# Patient Record
Sex: MALE | Race: WHITE | Employment: OTHER | URBAN - METROPOLITAN AREA
[De-identification: names, ages, dates, MRNs, and addresses within clinical notes are randomized per-mention and may not be internally consistent; named-entity substitution may affect disease eponyms.]

---

## 2017-03-29 DIAGNOSIS — E78.2 MIXED HYPERLIPIDEMIA: ICD-10-CM

## 2017-03-30 RX ORDER — SIMVASTATIN 40 MG
TABLET ORAL
Qty: 90 TABLET | Refills: 1 | Status: SHIPPED | OUTPATIENT
Start: 2017-03-30 | End: 2018-03-27 | Stop reason: SDUPTHER

## 2017-04-23 DIAGNOSIS — I10 ESSENTIAL HYPERTENSION, BENIGN: ICD-10-CM

## 2017-04-25 RX ORDER — ATENOLOL 100 MG/1
TABLET ORAL
Qty: 90 TABLET | Refills: 1 | Status: ON HOLD | OUTPATIENT
Start: 2017-04-25 | End: 2019-01-01

## 2017-05-01 ENCOUNTER — OFFICE VISIT (OUTPATIENT)
Dept: FAMILY MEDICINE CLINIC | Age: 72
End: 2017-05-01

## 2017-05-01 VITALS
SYSTOLIC BLOOD PRESSURE: 178 MMHG | DIASTOLIC BLOOD PRESSURE: 84 MMHG | HEART RATE: 60 BPM | WEIGHT: 198 LBS | BODY MASS INDEX: 29.24 KG/M2

## 2017-05-01 DIAGNOSIS — E78.2 MIXED HYPERLIPIDEMIA: ICD-10-CM

## 2017-05-01 DIAGNOSIS — R97.20 ELEVATED PSA: ICD-10-CM

## 2017-05-01 DIAGNOSIS — I25.10 CORONARY ARTERY DISEASE INVOLVING NATIVE CORONARY ARTERY OF NATIVE HEART WITHOUT ANGINA PECTORIS: ICD-10-CM

## 2017-05-01 DIAGNOSIS — I73.9 PAD (PERIPHERAL ARTERY DISEASE) (HCC): ICD-10-CM

## 2017-05-01 DIAGNOSIS — I10 ESSENTIAL HYPERTENSION: Primary | ICD-10-CM

## 2017-05-01 DIAGNOSIS — Z11.59 NEED FOR HEPATITIS C SCREENING TEST: ICD-10-CM

## 2017-05-01 DIAGNOSIS — I73.9 PERIPHERAL VASCULAR DISEASE, UNSPECIFIED (HCC): ICD-10-CM

## 2017-05-01 DIAGNOSIS — R73.01 ELEVATED FASTING GLUCOSE: ICD-10-CM

## 2017-05-01 LAB
A/G RATIO: 1.7 (ref 1.1–2.2)
ALBUMIN SERPL-MCNC: 4.5 G/DL (ref 3.4–5)
ALP BLD-CCNC: 70 U/L (ref 40–129)
ALT SERPL-CCNC: 35 U/L (ref 10–40)
ANION GAP SERPL CALCULATED.3IONS-SCNC: 15 MMOL/L (ref 3–16)
AST SERPL-CCNC: 26 U/L (ref 15–37)
BASOPHILS ABSOLUTE: 0.1 K/UL (ref 0–0.2)
BASOPHILS RELATIVE PERCENT: 1.1 %
BILIRUB SERPL-MCNC: 1.1 MG/DL (ref 0–1)
BUN BLDV-MCNC: 14 MG/DL (ref 7–20)
CALCIUM SERPL-MCNC: 9.5 MG/DL (ref 8.3–10.6)
CHLORIDE BLD-SCNC: 101 MMOL/L (ref 99–110)
CHOLESTEROL, TOTAL: 157 MG/DL (ref 0–199)
CO2: 24 MMOL/L (ref 21–32)
CREAT SERPL-MCNC: 0.9 MG/DL (ref 0.8–1.3)
EOSINOPHILS ABSOLUTE: 0.3 K/UL (ref 0–0.6)
EOSINOPHILS RELATIVE PERCENT: 4.1 %
GFR AFRICAN AMERICAN: >60
GFR NON-AFRICAN AMERICAN: >60
GLOBULIN: 2.6 G/DL
GLUCOSE BLD-MCNC: 106 MG/DL (ref 70–99)
HCT VFR BLD CALC: 46.4 % (ref 40.5–52.5)
HDLC SERPL-MCNC: 47 MG/DL (ref 40–60)
HEMOGLOBIN: 15.4 G/DL (ref 13.5–17.5)
HEPATITIS C ANTIBODY INTERPRETATION: NORMAL
LDL CHOLESTEROL CALCULATED: 75 MG/DL
LYMPHOCYTES ABSOLUTE: 2.1 K/UL (ref 1–5.1)
LYMPHOCYTES RELATIVE PERCENT: 29.2 %
MCH RBC QN AUTO: 30.3 PG (ref 26–34)
MCHC RBC AUTO-ENTMCNC: 33.3 G/DL (ref 31–36)
MCV RBC AUTO: 91 FL (ref 80–100)
MONOCYTES ABSOLUTE: 0.7 K/UL (ref 0–1.3)
MONOCYTES RELATIVE PERCENT: 9.6 %
NEUTROPHILS ABSOLUTE: 4 K/UL (ref 1.7–7.7)
NEUTROPHILS RELATIVE PERCENT: 56 %
PDW BLD-RTO: 13.9 % (ref 12.4–15.4)
PLATELET # BLD: 141 K/UL (ref 135–450)
PMV BLD AUTO: 9.4 FL (ref 5–10.5)
POTASSIUM SERPL-SCNC: 4.1 MMOL/L (ref 3.5–5.1)
PROSTATE SPECIFIC ANTIGEN: 6.96 NG/ML (ref 0–4)
RBC # BLD: 5.1 M/UL (ref 4.2–5.9)
SODIUM BLD-SCNC: 140 MMOL/L (ref 136–145)
TOTAL PROTEIN: 7.1 G/DL (ref 6.4–8.2)
TRIGL SERPL-MCNC: 173 MG/DL (ref 0–150)
TSH SERPL DL<=0.05 MIU/L-ACNC: 1.76 UIU/ML (ref 0.27–4.2)
VLDLC SERPL CALC-MCNC: 35 MG/DL
WBC # BLD: 7.1 K/UL (ref 4–11)

## 2017-05-01 PROCEDURE — G8598 ASA/ANTIPLAT THER USED: HCPCS | Performed by: FAMILY MEDICINE

## 2017-05-01 PROCEDURE — 1123F ACP DISCUSS/DSCN MKR DOCD: CPT | Performed by: FAMILY MEDICINE

## 2017-05-01 PROCEDURE — 1036F TOBACCO NON-USER: CPT | Performed by: FAMILY MEDICINE

## 2017-05-01 PROCEDURE — G8420 CALC BMI NORM PARAMETERS: HCPCS | Performed by: FAMILY MEDICINE

## 2017-05-01 PROCEDURE — 3017F COLORECTAL CA SCREEN DOC REV: CPT | Performed by: FAMILY MEDICINE

## 2017-05-01 PROCEDURE — 36415 COLL VENOUS BLD VENIPUNCTURE: CPT | Performed by: FAMILY MEDICINE

## 2017-05-01 PROCEDURE — G8427 DOCREV CUR MEDS BY ELIG CLIN: HCPCS | Performed by: FAMILY MEDICINE

## 2017-05-01 PROCEDURE — 99214 OFFICE O/P EST MOD 30 MIN: CPT | Performed by: FAMILY MEDICINE

## 2017-05-01 PROCEDURE — 4040F PNEUMOC VAC/ADMIN/RCVD: CPT | Performed by: FAMILY MEDICINE

## 2017-05-01 RX ORDER — VALSARTAN 320 MG/1
320 TABLET ORAL DAILY
Qty: 30 TABLET | Refills: 1 | Status: SHIPPED | OUTPATIENT
Start: 2017-05-01 | End: 2017-06-30 | Stop reason: SDUPTHER

## 2017-05-01 ASSESSMENT — ENCOUNTER SYMPTOMS
ABDOMINAL PAIN: 0
COUGH: 0
NAUSEA: 0
DIARRHEA: 0
EYE PAIN: 0
SHORTNESS OF BREATH: 0

## 2017-05-02 LAB
ESTIMATED AVERAGE GLUCOSE: 122.6 MG/DL
HBA1C MFR BLD: 5.9 %

## 2017-06-30 DIAGNOSIS — I10 ESSENTIAL HYPERTENSION: ICD-10-CM

## 2017-07-03 RX ORDER — VALSARTAN 320 MG/1
320 TABLET ORAL DAILY
Qty: 90 TABLET | Refills: 1 | Status: SHIPPED | OUTPATIENT
Start: 2017-07-03 | End: 2017-11-21 | Stop reason: SDUPTHER

## 2017-11-21 ENCOUNTER — OFFICE VISIT (OUTPATIENT)
Dept: FAMILY MEDICINE CLINIC | Age: 72
End: 2017-11-21

## 2017-11-21 VITALS
HEART RATE: 60 BPM | SYSTOLIC BLOOD PRESSURE: 174 MMHG | BODY MASS INDEX: 31.45 KG/M2 | DIASTOLIC BLOOD PRESSURE: 84 MMHG | WEIGHT: 213 LBS

## 2017-11-21 DIAGNOSIS — I25.10 CORONARY ARTERY DISEASE INVOLVING NATIVE CORONARY ARTERY OF NATIVE HEART WITHOUT ANGINA PECTORIS: ICD-10-CM

## 2017-11-21 DIAGNOSIS — I10 ESSENTIAL HYPERTENSION: Primary | ICD-10-CM

## 2017-11-21 DIAGNOSIS — R97.20 ELEVATED PSA: ICD-10-CM

## 2017-11-21 DIAGNOSIS — L30.9 DERMATITIS: ICD-10-CM

## 2017-11-21 DIAGNOSIS — Z79.899 LONG TERM CURRENT USE OF AMIODARONE: ICD-10-CM

## 2017-11-21 DIAGNOSIS — Z51.81 MEDICATION MONITORING ENCOUNTER: ICD-10-CM

## 2017-11-21 DIAGNOSIS — H61.21 OBSTRUCTION OF VENTILATION TUBE OF RIGHT EAR BY CERUMEN: ICD-10-CM

## 2017-11-21 DIAGNOSIS — R73.01 ELEVATED FASTING GLUCOSE: ICD-10-CM

## 2017-11-21 DIAGNOSIS — I73.9 PAD (PERIPHERAL ARTERY DISEASE) (HCC): ICD-10-CM

## 2017-11-21 DIAGNOSIS — E78.2 MIXED HYPERLIPIDEMIA: ICD-10-CM

## 2017-11-21 LAB
A/G RATIO: 1.5 (ref 1.1–2.2)
ALBUMIN SERPL-MCNC: 4.3 G/DL (ref 3.4–5)
ALP BLD-CCNC: 84 U/L (ref 40–129)
ALT SERPL-CCNC: 33 U/L (ref 10–40)
ANION GAP SERPL CALCULATED.3IONS-SCNC: 15 MMOL/L (ref 3–16)
AST SERPL-CCNC: 29 U/L (ref 15–37)
BILIRUB SERPL-MCNC: 1.1 MG/DL (ref 0–1)
BUN BLDV-MCNC: 14 MG/DL (ref 7–20)
CALCIUM SERPL-MCNC: 9.1 MG/DL (ref 8.3–10.6)
CHLORIDE BLD-SCNC: 99 MMOL/L (ref 99–110)
CHOLESTEROL, TOTAL: 180 MG/DL (ref 0–199)
CO2: 27 MMOL/L (ref 21–32)
CREAT SERPL-MCNC: 1 MG/DL (ref 0.8–1.3)
GFR AFRICAN AMERICAN: >60
GFR NON-AFRICAN AMERICAN: >60
GLOBULIN: 2.9 G/DL
GLUCOSE BLD-MCNC: 108 MG/DL (ref 70–99)
HDLC SERPL-MCNC: 46 MG/DL (ref 40–60)
LDL CHOLESTEROL CALCULATED: 97 MG/DL
POTASSIUM SERPL-SCNC: 4.1 MMOL/L (ref 3.5–5.1)
PROSTATE SPECIFIC ANTIGEN: 7.03 NG/ML (ref 0–4)
SODIUM BLD-SCNC: 141 MMOL/L (ref 136–145)
T4 FREE: 1.5 NG/DL (ref 0.9–1.8)
TOTAL PROTEIN: 7.2 G/DL (ref 6.4–8.2)
TRIGL SERPL-MCNC: 183 MG/DL (ref 0–150)
TSH SERPL DL<=0.05 MIU/L-ACNC: 1.65 UIU/ML (ref 0.27–4.2)
VLDLC SERPL CALC-MCNC: 37 MG/DL

## 2017-11-21 PROCEDURE — 3017F COLORECTAL CA SCREEN DOC REV: CPT | Performed by: FAMILY MEDICINE

## 2017-11-21 PROCEDURE — 99214 OFFICE O/P EST MOD 30 MIN: CPT | Performed by: FAMILY MEDICINE

## 2017-11-21 PROCEDURE — G8419 CALC BMI OUT NRM PARAM NOF/U: HCPCS | Performed by: FAMILY MEDICINE

## 2017-11-21 PROCEDURE — 4040F PNEUMOC VAC/ADMIN/RCVD: CPT | Performed by: FAMILY MEDICINE

## 2017-11-21 PROCEDURE — 4004F PT TOBACCO SCREEN RCVD TLK: CPT | Performed by: FAMILY MEDICINE

## 2017-11-21 PROCEDURE — G8427 DOCREV CUR MEDS BY ELIG CLIN: HCPCS | Performed by: FAMILY MEDICINE

## 2017-11-21 PROCEDURE — 1123F ACP DISCUSS/DSCN MKR DOCD: CPT | Performed by: FAMILY MEDICINE

## 2017-11-21 PROCEDURE — 36415 COLL VENOUS BLD VENIPUNCTURE: CPT | Performed by: FAMILY MEDICINE

## 2017-11-21 PROCEDURE — G8598 ASA/ANTIPLAT THER USED: HCPCS | Performed by: FAMILY MEDICINE

## 2017-11-21 PROCEDURE — G8484 FLU IMMUNIZE NO ADMIN: HCPCS | Performed by: FAMILY MEDICINE

## 2017-11-21 RX ORDER — VALSARTAN 320 MG/1
320 TABLET ORAL DAILY
Qty: 90 TABLET | Refills: 1 | Status: ON HOLD | OUTPATIENT
Start: 2017-11-21 | End: 2019-01-01 | Stop reason: ALTCHOICE

## 2017-11-21 RX ORDER — MUPIROCIN CALCIUM 20 MG/G
CREAM TOPICAL
Qty: 15 G | Refills: 1 | Status: SHIPPED | OUTPATIENT
Start: 2017-11-21 | End: 2017-12-21

## 2017-11-22 LAB
ESTIMATED AVERAGE GLUCOSE: 119.8 MG/DL
HBA1C MFR BLD: 5.8 %

## 2017-11-22 ASSESSMENT — ENCOUNTER SYMPTOMS
SHORTNESS OF BREATH: 0
DIARRHEA: 0
ABDOMINAL PAIN: 0
NAUSEA: 0
COUGH: 0
EYE PAIN: 0

## 2017-12-19 ENCOUNTER — OFFICE VISIT (OUTPATIENT)
Dept: FAMILY MEDICINE CLINIC | Age: 72
End: 2017-12-19

## 2017-12-19 DIAGNOSIS — J01.90 ACUTE NON-RECURRENT SINUSITIS, UNSPECIFIED LOCATION: Primary | ICD-10-CM

## 2017-12-19 PROCEDURE — G8419 CALC BMI OUT NRM PARAM NOF/U: HCPCS | Performed by: FAMILY MEDICINE

## 2017-12-19 PROCEDURE — 3017F COLORECTAL CA SCREEN DOC REV: CPT | Performed by: FAMILY MEDICINE

## 2017-12-19 PROCEDURE — 1123F ACP DISCUSS/DSCN MKR DOCD: CPT | Performed by: FAMILY MEDICINE

## 2017-12-19 PROCEDURE — G8598 ASA/ANTIPLAT THER USED: HCPCS | Performed by: FAMILY MEDICINE

## 2017-12-19 PROCEDURE — 4040F PNEUMOC VAC/ADMIN/RCVD: CPT | Performed by: FAMILY MEDICINE

## 2017-12-19 PROCEDURE — G8427 DOCREV CUR MEDS BY ELIG CLIN: HCPCS | Performed by: FAMILY MEDICINE

## 2017-12-19 PROCEDURE — 99213 OFFICE O/P EST LOW 20 MIN: CPT | Performed by: FAMILY MEDICINE

## 2017-12-19 PROCEDURE — G8484 FLU IMMUNIZE NO ADMIN: HCPCS | Performed by: FAMILY MEDICINE

## 2017-12-19 PROCEDURE — 4004F PT TOBACCO SCREEN RCVD TLK: CPT | Performed by: FAMILY MEDICINE

## 2017-12-19 RX ORDER — CEPHALEXIN 500 MG/1
500 CAPSULE ORAL 3 TIMES DAILY
Qty: 30 CAPSULE | Refills: 0 | Status: SHIPPED | OUTPATIENT
Start: 2017-12-19 | End: 2017-12-29

## 2017-12-21 VITALS
BODY MASS INDEX: 29.53 KG/M2 | DIASTOLIC BLOOD PRESSURE: 82 MMHG | TEMPERATURE: 99.2 F | WEIGHT: 200 LBS | SYSTOLIC BLOOD PRESSURE: 154 MMHG

## 2017-12-21 ASSESSMENT — ENCOUNTER SYMPTOMS
COUGH: 1
WHEEZING: 1
ABDOMINAL PAIN: 0
SHORTNESS OF BREATH: 0
SINUS PRESSURE: 1

## 2017-12-21 NOTE — PATIENT INSTRUCTIONS
Advised supportive treatment as discussed, including consideration of Mucinex DM. Advise you start antibiotic only if new or worsening symptoms or if no definite improvement to your symptoms after 10-14 days. Call or return if any questions.

## 2018-01-01 ENCOUNTER — HOSPITAL ENCOUNTER (OUTPATIENT)
Dept: PHYSICAL THERAPY | Age: 73
Setting detail: THERAPIES SERIES
Discharge: HOME | End: 2018-07-24
Payer: MEDICARE

## 2018-01-01 ENCOUNTER — HOSPITAL ENCOUNTER (OUTPATIENT)
Dept: OTHER | Age: 73
Discharge: HOME | End: 2018-05-11
Attending: FAMILY MEDICINE | Admitting: FAMILY MEDICINE

## 2018-01-01 ENCOUNTER — NURSE ONLY (OUTPATIENT)
Dept: FAMILY MEDICINE CLINIC | Age: 73
End: 2018-01-01
Payer: MEDICARE

## 2018-01-01 ENCOUNTER — HOSPITAL ENCOUNTER (OUTPATIENT)
Dept: GENERAL RADIOLOGY | Age: 73
Discharge: HOME | End: 2018-12-04
Payer: MEDICARE

## 2018-01-01 ENCOUNTER — HOSPITAL ENCOUNTER (OUTPATIENT)
Dept: NUCLEAR MEDICINE | Age: 73
Discharge: HOME | End: 2018-12-10
Payer: MEDICARE

## 2018-01-01 ENCOUNTER — OFFICE VISIT (OUTPATIENT)
Dept: ORTHOPEDIC SURGERY | Age: 73
End: 2018-01-01
Payer: MEDICARE

## 2018-01-01 ENCOUNTER — HOSPITAL ENCOUNTER (OUTPATIENT)
Dept: PHYSICAL THERAPY | Age: 73
Discharge: HOME | End: 2018-07-04
Admitting: FAMILY MEDICINE

## 2018-01-01 ENCOUNTER — OFFICE VISIT (OUTPATIENT)
Dept: FAMILY MEDICINE CLINIC | Age: 73
End: 2018-01-01

## 2018-01-01 ENCOUNTER — OFFICE VISIT (OUTPATIENT)
Dept: ORTHOPEDIC SURGERY | Age: 73
End: 2018-01-01

## 2018-01-01 ENCOUNTER — HOSPITAL ENCOUNTER (OUTPATIENT)
Dept: PHYSICAL THERAPY | Age: 73
Setting detail: THERAPIES SERIES
Discharge: HOME | End: 2018-11-19
Payer: MEDICARE

## 2018-01-01 ENCOUNTER — HOSPITAL ENCOUNTER (OUTPATIENT)
Dept: PHYSICAL THERAPY | Age: 73
Setting detail: THERAPIES SERIES
Discharge: HOME | End: 2018-11-02
Payer: MEDICARE

## 2018-01-01 ENCOUNTER — HOSPITAL ENCOUNTER (OUTPATIENT)
Dept: PHYSICAL THERAPY | Age: 73
Setting detail: THERAPIES SERIES
Discharge: HOME | End: 2018-11-05
Payer: MEDICARE

## 2018-01-01 ENCOUNTER — HOSPITAL ENCOUNTER (OUTPATIENT)
Dept: PHYSICAL THERAPY | Age: 73
Discharge: HOME | End: 2018-06-27
Admitting: FAMILY MEDICINE

## 2018-01-01 ENCOUNTER — TELEPHONE (OUTPATIENT)
Dept: FAMILY MEDICINE CLINIC | Age: 73
End: 2018-01-01

## 2018-01-01 ENCOUNTER — HOSPITAL ENCOUNTER (OUTPATIENT)
Dept: PHYSICAL THERAPY | Age: 73
Setting detail: THERAPIES SERIES
Discharge: HOME | End: 2018-10-31
Payer: MEDICARE

## 2018-01-01 ENCOUNTER — HOSPITAL ENCOUNTER (OUTPATIENT)
Dept: PHYSICAL THERAPY | Age: 73
Discharge: HOME | End: 2018-07-01
Attending: FAMILY MEDICINE | Admitting: FAMILY MEDICINE

## 2018-01-01 ENCOUNTER — OFFICE VISIT (OUTPATIENT)
Dept: FAMILY MEDICINE CLINIC | Age: 73
End: 2018-01-01
Payer: MEDICARE

## 2018-01-01 ENCOUNTER — HOSPITAL ENCOUNTER (OUTPATIENT)
Dept: PHYSICAL THERAPY | Age: 73
Discharge: HOME | End: 2018-07-06
Admitting: FAMILY MEDICINE

## 2018-01-01 ENCOUNTER — HOSPITAL ENCOUNTER (OUTPATIENT)
Dept: PHYSICAL THERAPY | Age: 73
Discharge: HOME | End: 2018-06-29
Admitting: FAMILY MEDICINE

## 2018-01-01 ENCOUNTER — HOSPITAL ENCOUNTER (OUTPATIENT)
Dept: PHYSICAL THERAPY | Age: 73
Discharge: HOME | End: 2018-06-30
Admitting: FAMILY MEDICINE

## 2018-01-01 ENCOUNTER — HOSPITAL ENCOUNTER (OUTPATIENT)
Dept: CT IMAGING | Age: 73
Discharge: HOME | End: 2018-11-02
Payer: MEDICARE

## 2018-01-01 ENCOUNTER — HOSPITAL ENCOUNTER (OUTPATIENT)
Dept: PHYSICAL THERAPY | Age: 73
Setting detail: THERAPIES SERIES
Discharge: HOME | End: 2018-11-21
Payer: MEDICARE

## 2018-01-01 ENCOUNTER — HOSPITAL ENCOUNTER (OUTPATIENT)
Age: 73
Discharge: HOME | End: 2018-12-04
Payer: MEDICARE

## 2018-01-01 VITALS
SYSTOLIC BLOOD PRESSURE: 117 MMHG | HEIGHT: 69 IN | DIASTOLIC BLOOD PRESSURE: 68 MMHG | HEART RATE: 60 BPM | BODY MASS INDEX: 28.9 KG/M2 | WEIGHT: 195.11 LBS

## 2018-01-01 VITALS
WEIGHT: 195.11 LBS | HEIGHT: 69 IN | DIASTOLIC BLOOD PRESSURE: 77 MMHG | SYSTOLIC BLOOD PRESSURE: 131 MMHG | BODY MASS INDEX: 28.9 KG/M2 | HEART RATE: 58 BPM

## 2018-01-01 VITALS
WEIGHT: 196.8 LBS | HEART RATE: 62 BPM | HEIGHT: 69 IN | BODY MASS INDEX: 29.15 KG/M2 | DIASTOLIC BLOOD PRESSURE: 76 MMHG | SYSTOLIC BLOOD PRESSURE: 130 MMHG

## 2018-01-01 VITALS
DIASTOLIC BLOOD PRESSURE: 75 MMHG | WEIGHT: 196 LBS | BODY MASS INDEX: 29.03 KG/M2 | SYSTOLIC BLOOD PRESSURE: 134 MMHG | HEIGHT: 69 IN

## 2018-01-01 VITALS
HEART RATE: 57 BPM | WEIGHT: 195.11 LBS | SYSTOLIC BLOOD PRESSURE: 139 MMHG | DIASTOLIC BLOOD PRESSURE: 82 MMHG | BODY MASS INDEX: 28.9 KG/M2 | HEIGHT: 69 IN

## 2018-01-01 VITALS
BODY MASS INDEX: 29.53 KG/M2 | OXYGEN SATURATION: 99 % | DIASTOLIC BLOOD PRESSURE: 80 MMHG | SYSTOLIC BLOOD PRESSURE: 137 MMHG | HEIGHT: 69 IN | WEIGHT: 199.4 LBS | HEART RATE: 62 BPM

## 2018-01-01 VITALS
DIASTOLIC BLOOD PRESSURE: 76 MMHG | HEIGHT: 69 IN | SYSTOLIC BLOOD PRESSURE: 138 MMHG | BODY MASS INDEX: 29.16 KG/M2 | HEART RATE: 54 BPM | WEIGHT: 196.87 LBS

## 2018-01-01 VITALS
SYSTOLIC BLOOD PRESSURE: 138 MMHG | BODY MASS INDEX: 28.9 KG/M2 | WEIGHT: 195.11 LBS | DIASTOLIC BLOOD PRESSURE: 75 MMHG | HEIGHT: 69 IN | HEART RATE: 60 BPM

## 2018-01-01 VITALS — SYSTOLIC BLOOD PRESSURE: 160 MMHG | DIASTOLIC BLOOD PRESSURE: 76 MMHG | HEART RATE: 60 BPM

## 2018-01-01 VITALS — BODY MASS INDEX: 28.88 KG/M2 | HEIGHT: 69 IN | WEIGHT: 195 LBS

## 2018-01-01 VITALS
DIASTOLIC BLOOD PRESSURE: 73 MMHG | HEART RATE: 63 BPM | BODY MASS INDEX: 28.9 KG/M2 | HEIGHT: 69 IN | SYSTOLIC BLOOD PRESSURE: 135 MMHG | WEIGHT: 195.11 LBS

## 2018-01-01 VITALS
DIASTOLIC BLOOD PRESSURE: 63 MMHG | BODY MASS INDEX: 28.88 KG/M2 | WEIGHT: 195 LBS | HEIGHT: 69 IN | HEART RATE: 65 BPM | SYSTOLIC BLOOD PRESSURE: 128 MMHG

## 2018-01-01 VITALS — WEIGHT: 199 LBS | BODY MASS INDEX: 29.47 KG/M2 | HEIGHT: 69 IN

## 2018-01-01 DIAGNOSIS — I73.9 PAD (PERIPHERAL ARTERY DISEASE) (HCC): ICD-10-CM

## 2018-01-01 DIAGNOSIS — S82.61XA CLOSED FRACTURE OF PROXIMAL LATERAL MALLEOLUS OF RIGHT FIBULA, INITIAL ENCOUNTER: ICD-10-CM

## 2018-01-01 DIAGNOSIS — M54.50 LUMBAR SPINE PAIN: ICD-10-CM

## 2018-01-01 DIAGNOSIS — S92.354D CLOSED NONDISPLACED FRACTURE OF FIFTH METATARSAL BONE OF RIGHT FOOT WITH ROUTINE HEALING, SUBSEQUENT ENCOUNTER: ICD-10-CM

## 2018-01-01 DIAGNOSIS — R93.7 ABNORMAL X-RAY OF THORACIC SPINE: ICD-10-CM

## 2018-01-01 DIAGNOSIS — R93.89 ABNORMAL CHEST X-RAY: ICD-10-CM

## 2018-01-01 DIAGNOSIS — S93.601D SPRAIN OF RIGHT FOOT, SUBSEQUENT ENCOUNTER: ICD-10-CM

## 2018-01-01 DIAGNOSIS — M25.571 RIGHT ANKLE PAIN, UNSPECIFIED CHRONICITY: Primary | ICD-10-CM

## 2018-01-01 DIAGNOSIS — S92.354D CLOSED NONDISPLACED FRACTURE OF FIFTH METATARSAL BONE OF RIGHT FOOT WITH ROUTINE HEALING, SUBSEQUENT ENCOUNTER: Primary | ICD-10-CM

## 2018-01-01 DIAGNOSIS — M25.571 RIGHT ANKLE PAIN, UNSPECIFIED CHRONICITY: ICD-10-CM

## 2018-01-01 DIAGNOSIS — M47.816 SPONDYLOSIS OF LUMBAR REGION WITHOUT MYELOPATHY OR RADICULOPATHY: ICD-10-CM

## 2018-01-01 DIAGNOSIS — S92.354A CLOSED NONDISPLACED FRACTURE OF FIFTH METATARSAL BONE OF RIGHT FOOT, INITIAL ENCOUNTER: ICD-10-CM

## 2018-01-01 DIAGNOSIS — M85.88 OTHER SPECIFIED DISORDERS OF BONE DENSITY AND STRUCTURE, OTHER SITE: ICD-10-CM

## 2018-01-01 DIAGNOSIS — M79.671 RIGHT FOOT PAIN: ICD-10-CM

## 2018-01-01 DIAGNOSIS — I25.10 CORONARY ARTERY DISEASE INVOLVING NATIVE CORONARY ARTERY OF NATIVE HEART WITHOUT ANGINA PECTORIS: ICD-10-CM

## 2018-01-01 DIAGNOSIS — R94.8 ABNORMAL BONE SCAN OF THORACIC SPINE: ICD-10-CM

## 2018-01-01 DIAGNOSIS — M54.31 SCIATICA OF RIGHT SIDE: ICD-10-CM

## 2018-01-01 DIAGNOSIS — M54.6 ACUTE THORACIC BACK PAIN, UNSPECIFIED BACK PAIN LATERALITY: ICD-10-CM

## 2018-01-01 DIAGNOSIS — R73.01 ELEVATED FASTING GLUCOSE: ICD-10-CM

## 2018-01-01 DIAGNOSIS — S82.61XD CLOSED FRACTURE OF PROXIMAL LATERAL MALLEOLUS OF RIGHT FIBULA WITH ROUTINE HEALING, SUBSEQUENT ENCOUNTER: ICD-10-CM

## 2018-01-01 DIAGNOSIS — M54.41 ACUTE RIGHT-SIDED LOW BACK PAIN WITH RIGHT-SIDED SCIATICA: Primary | ICD-10-CM

## 2018-01-01 DIAGNOSIS — Z87.891 FORMER SMOKER: ICD-10-CM

## 2018-01-01 DIAGNOSIS — Z91.81 AT HIGH RISK FOR FALLS: ICD-10-CM

## 2018-01-01 DIAGNOSIS — R94.8 ABNORMAL BONE SCAN OF THORACIC SPINE: Primary | ICD-10-CM

## 2018-01-01 DIAGNOSIS — M54.16 LUMBAR RADICULOPATHY: ICD-10-CM

## 2018-01-01 DIAGNOSIS — S93.601A SPRAIN OF RIGHT FOOT, INITIAL ENCOUNTER: ICD-10-CM

## 2018-01-01 DIAGNOSIS — E78.2 MIXED HYPERLIPIDEMIA: ICD-10-CM

## 2018-01-01 DIAGNOSIS — M79.671 RIGHT FOOT PAIN: Primary | ICD-10-CM

## 2018-01-01 DIAGNOSIS — S29.012A UPPER BACK STRAIN, INITIAL ENCOUNTER: ICD-10-CM

## 2018-01-01 DIAGNOSIS — M54.31 SCIATICA OF RIGHT SIDE: Primary | ICD-10-CM

## 2018-01-01 DIAGNOSIS — S93.491D SPRAIN OF ANTERIOR TALOFIBULAR LIGAMENT OF RIGHT ANKLE, SUBSEQUENT ENCOUNTER: ICD-10-CM

## 2018-01-01 DIAGNOSIS — R93.89 ABNORMAL CXR: ICD-10-CM

## 2018-01-01 DIAGNOSIS — S93.491A SPRAIN OF ANTERIOR TALOFIBULAR LIGAMENT OF RIGHT ANKLE, INITIAL ENCOUNTER: ICD-10-CM

## 2018-01-01 DIAGNOSIS — R05.9 COUGH: ICD-10-CM

## 2018-01-01 DIAGNOSIS — M54.6 THORACIC SPINE PAIN: ICD-10-CM

## 2018-01-01 DIAGNOSIS — M51.26 PROTRUSION OF LUMBAR INTERVERTEBRAL DISC: Primary | ICD-10-CM

## 2018-01-01 DIAGNOSIS — R05.9 COUGH: Primary | ICD-10-CM

## 2018-01-01 DIAGNOSIS — R97.20 ELEVATED PSA: ICD-10-CM

## 2018-01-01 DIAGNOSIS — I10 ESSENTIAL HYPERTENSION: ICD-10-CM

## 2018-01-01 DIAGNOSIS — M51.26 HNP (HERNIATED NUCLEUS PULPOSUS), LUMBAR: Primary | ICD-10-CM

## 2018-01-01 DIAGNOSIS — M25.571 ACUTE RIGHT ANKLE PAIN: Primary | ICD-10-CM

## 2018-01-01 DIAGNOSIS — I10 ESSENTIAL HYPERTENSION: Primary | ICD-10-CM

## 2018-01-01 DIAGNOSIS — M79.671 FOOT PAIN, RIGHT: Primary | ICD-10-CM

## 2018-01-01 LAB
A/G RATIO: 1.5 (ref 1.1–2.2)
ALBUMIN SERPL-MCNC: 4.3 G/DL (ref 3.4–5)
ALK PHOS BONE SPECIFIC: 13.7 UG/L (ref 6.5–20.1)
ALP BLD-CCNC: 73 U/L (ref 40–129)
ALT SERPL-CCNC: 17 U/L (ref 10–40)
ANION GAP SERPL CALCULATED.3IONS-SCNC: 14 MMOL/L (ref 3–16)
ANION GAP SERPL CALCULATED.3IONS-SCNC: 17 MMOL/L (ref 3–16)
AST SERPL-CCNC: 20 U/L (ref 15–37)
BILIRUB SERPL-MCNC: 0.7 MG/DL (ref 0–1)
BUN BLDV-MCNC: 15 MG/DL (ref 7–20)
BUN BLDV-MCNC: 15 MG/DL (ref 7–20)
CALCIUM SERPL-MCNC: 9.4 MG/DL (ref 8.3–10.6)
CALCIUM SERPL-MCNC: 9.8 MG/DL (ref 8.3–10.6)
CHLORIDE BLD-SCNC: 100 MMOL/L (ref 99–110)
CHLORIDE BLD-SCNC: 98 MMOL/L (ref 99–110)
CHOLESTEROL, TOTAL: 147 MG/DL (ref 0–199)
CO2: 24 MMOL/L (ref 21–32)
CO2: 28 MMOL/L (ref 21–32)
CREAT SERPL-MCNC: 1 MG/DL (ref 0.8–1.3)
CREAT SERPL-MCNC: 1.1 MG/DL (ref 0.8–1.3)
ESTIMATED AVERAGE GLUCOSE: 125.5 MG/DL
GFR AFRICAN AMERICAN: >60
GFR AFRICAN AMERICAN: >60
GFR NON-AFRICAN AMERICAN: >60
GFR NON-AFRICAN AMERICAN: >60
GLOBULIN: 2.8 G/DL
GLUCOSE BLD-MCNC: 108 MG/DL (ref 70–99)
GLUCOSE BLD-MCNC: 94 MG/DL (ref 70–99)
HBA1C MFR BLD: 6 %
HDLC SERPL-MCNC: 39 MG/DL (ref 40–60)
LDL CHOLESTEROL CALCULATED: 72 MG/DL
POTASSIUM SERPL-SCNC: 3.5 MMOL/L (ref 3.5–5.1)
POTASSIUM SERPL-SCNC: 4.4 MMOL/L (ref 3.5–5.1)
PROSTATE SPECIFIC ANTIGEN: 7.72 NG/ML (ref 0–4)
SODIUM BLD-SCNC: 140 MMOL/L (ref 136–145)
SODIUM BLD-SCNC: 141 MMOL/L (ref 136–145)
TOTAL PROTEIN: 7.1 G/DL (ref 6.4–8.2)
TRIGL SERPL-MCNC: 178 MG/DL (ref 0–150)
URIC ACID, SERUM: 6.5 MG/DL (ref 3.5–7.2)
VITAMIN D 25-HYDROXY: 30.1 NG/ML
VLDLC SERPL CALC-MCNC: 36 MG/DL

## 2018-01-01 PROCEDURE — G8484 FLU IMMUNIZE NO ADMIN: HCPCS | Performed by: PHYSICAL MEDICINE & REHABILITATION

## 2018-01-01 PROCEDURE — G8427 DOCREV CUR MEDS BY ELIG CLIN: HCPCS | Performed by: FAMILY MEDICINE

## 2018-01-01 PROCEDURE — 1123F ACP DISCUSS/DSCN MKR DOCD: CPT | Performed by: PHYSICAL MEDICINE & REHABILITATION

## 2018-01-01 PROCEDURE — 1101F PT FALLS ASSESS-DOCD LE1/YR: CPT | Performed by: PHYSICAL MEDICINE & REHABILITATION

## 2018-01-01 PROCEDURE — 1036F TOBACCO NON-USER: CPT | Performed by: FAMILY MEDICINE

## 2018-01-01 PROCEDURE — G8598 ASA/ANTIPLAT THER USED: HCPCS | Performed by: FAMILY MEDICINE

## 2018-01-01 PROCEDURE — L1902 AFO ANKLE GAUNTLET PRE OTS: HCPCS | Performed by: FAMILY MEDICINE

## 2018-01-01 PROCEDURE — 99213 OFFICE O/P EST LOW 20 MIN: CPT | Performed by: FAMILY MEDICINE

## 2018-01-01 PROCEDURE — 4040F PNEUMOC VAC/ADMIN/RCVD: CPT | Performed by: FAMILY MEDICINE

## 2018-01-01 PROCEDURE — 97110 THERAPEUTIC EXERCISES: CPT

## 2018-01-01 PROCEDURE — G8427 DOCREV CUR MEDS BY ELIG CLIN: HCPCS | Performed by: PHYSICAL MEDICINE & REHABILITATION

## 2018-01-01 PROCEDURE — 1123F ACP DISCUSS/DSCN MKR DOCD: CPT | Performed by: PHYSICIAN ASSISTANT

## 2018-01-01 PROCEDURE — 1036F TOBACCO NON-USER: CPT | Performed by: PHYSICAL MEDICINE & REHABILITATION

## 2018-01-01 PROCEDURE — 1123F ACP DISCUSS/DSCN MKR DOCD: CPT | Performed by: FAMILY MEDICINE

## 2018-01-01 PROCEDURE — G8981 BODY POS CURRENT STATUS: HCPCS

## 2018-01-01 PROCEDURE — G8419 CALC BMI OUT NRM PARAM NOF/U: HCPCS | Performed by: FAMILY MEDICINE

## 2018-01-01 PROCEDURE — 99203 OFFICE O/P NEW LOW 30 MIN: CPT | Performed by: PHYSICAL MEDICINE & REHABILITATION

## 2018-01-01 PROCEDURE — 1101F PT FALLS ASSESS-DOCD LE1/YR: CPT | Performed by: PHYSICIAN ASSISTANT

## 2018-01-01 PROCEDURE — 97140 MANUAL THERAPY 1/> REGIONS: CPT

## 2018-01-01 PROCEDURE — G8484 FLU IMMUNIZE NO ADMIN: HCPCS | Performed by: PHYSICIAN ASSISTANT

## 2018-01-01 PROCEDURE — 36415 COLL VENOUS BLD VENIPUNCTURE: CPT | Performed by: FAMILY MEDICINE

## 2018-01-01 PROCEDURE — G8427 DOCREV CUR MEDS BY ELIG CLIN: HCPCS | Performed by: PHYSICIAN ASSISTANT

## 2018-01-01 PROCEDURE — G8482 FLU IMMUNIZE ORDER/ADMIN: HCPCS | Performed by: PHYSICAL MEDICINE & REHABILITATION

## 2018-01-01 PROCEDURE — 1101F PT FALLS ASSESS-DOCD LE1/YR: CPT | Performed by: FAMILY MEDICINE

## 2018-01-01 PROCEDURE — G8598 ASA/ANTIPLAT THER USED: HCPCS | Performed by: PHYSICAL MEDICINE & REHABILITATION

## 2018-01-01 PROCEDURE — 3430000000 HC RX DIAGNOSTIC RADIOPHARMACEUTICAL: Performed by: FAMILY MEDICINE

## 2018-01-01 PROCEDURE — 1036F TOBACCO NON-USER: CPT | Performed by: PHYSICIAN ASSISTANT

## 2018-01-01 PROCEDURE — G8482 FLU IMMUNIZE ORDER/ADMIN: HCPCS | Performed by: FAMILY MEDICINE

## 2018-01-01 PROCEDURE — 99213 OFFICE O/P EST LOW 20 MIN: CPT | Performed by: PHYSICAL MEDICINE & REHABILITATION

## 2018-01-01 PROCEDURE — 3017F COLORECTAL CA SCREEN DOC REV: CPT | Performed by: FAMILY MEDICINE

## 2018-01-01 PROCEDURE — G8419 CALC BMI OUT NRM PARAM NOF/U: HCPCS | Performed by: PHYSICAL MEDICINE & REHABILITATION

## 2018-01-01 PROCEDURE — G8982 BODY POS GOAL STATUS: HCPCS

## 2018-01-01 PROCEDURE — 4040F PNEUMOC VAC/ADMIN/RCVD: CPT | Performed by: PHYSICIAN ASSISTANT

## 2018-01-01 PROCEDURE — 97110 THERAPEUTIC EXERCISES: CPT | Performed by: PHYSICAL THERAPIST

## 2018-01-01 PROCEDURE — 3017F COLORECTAL CA SCREEN DOC REV: CPT | Performed by: PHYSICAL MEDICINE & REHABILITATION

## 2018-01-01 PROCEDURE — G8978 MOBILITY CURRENT STATUS: HCPCS

## 2018-01-01 PROCEDURE — A9503 TC99M MEDRONATE: HCPCS | Performed by: FAMILY MEDICINE

## 2018-01-01 PROCEDURE — G8980 MOBILITY D/C STATUS: HCPCS

## 2018-01-01 PROCEDURE — 99214 OFFICE O/P EST MOD 30 MIN: CPT | Performed by: PHYSICIAN ASSISTANT

## 2018-01-01 PROCEDURE — 99203 OFFICE O/P NEW LOW 30 MIN: CPT | Performed by: FAMILY MEDICINE

## 2018-01-01 PROCEDURE — 3017F COLORECTAL CA SCREEN DOC REV: CPT | Performed by: PHYSICIAN ASSISTANT

## 2018-01-01 PROCEDURE — L4361 PNEUMA/VAC WALK BOOT PRE OTS: HCPCS | Performed by: FAMILY MEDICINE

## 2018-01-01 PROCEDURE — 71046 X-RAY EXAM CHEST 2 VIEWS: CPT

## 2018-01-01 PROCEDURE — G8598 ASA/ANTIPLAT THER USED: HCPCS | Performed by: PHYSICIAN ASSISTANT

## 2018-01-01 PROCEDURE — G8979 MOBILITY GOAL STATUS: HCPCS

## 2018-01-01 PROCEDURE — 29515 APPLICATION SHORT LEG SPLINT: CPT | Performed by: FAMILY MEDICINE

## 2018-01-01 PROCEDURE — 4040F PNEUMOC VAC/ADMIN/RCVD: CPT | Performed by: PHYSICAL MEDICINE & REHABILITATION

## 2018-01-01 PROCEDURE — 29405 APPL SHORT LEG CAST: CPT | Performed by: FAMILY MEDICINE

## 2018-01-01 PROCEDURE — G8419 CALC BMI OUT NRM PARAM NOF/U: HCPCS | Performed by: PHYSICIAN ASSISTANT

## 2018-01-01 PROCEDURE — 78306 BONE IMAGING WHOLE BODY: CPT

## 2018-01-01 PROCEDURE — 97140 MANUAL THERAPY 1/> REGIONS: CPT | Performed by: PHYSICAL THERAPIST

## 2018-01-01 PROCEDURE — 97161 PT EVAL LOW COMPLEX 20 MIN: CPT

## 2018-01-01 PROCEDURE — 72131 CT LUMBAR SPINE W/O DYE: CPT

## 2018-01-01 RX ORDER — METHYLPREDNISOLONE 4 MG/1
TABLET ORAL
Qty: 1 KIT | Refills: 0 | Status: SHIPPED | OUTPATIENT
Start: 2018-01-01 | End: 2018-01-01

## 2018-01-01 RX ORDER — TC 99M MEDRONATE 20 MG/10ML
26 INJECTION, POWDER, LYOPHILIZED, FOR SOLUTION INTRAVENOUS
Status: COMPLETED | OUTPATIENT
Start: 2018-01-01 | End: 2018-01-01

## 2018-01-01 RX ORDER — SIMVASTATIN 40 MG
TABLET ORAL
Qty: 90 TABLET | Refills: 1 | Status: SHIPPED | OUTPATIENT
Start: 2018-01-01

## 2018-01-01 RX ADMIN — Medication 26 MILLICURIE: at 10:30

## 2018-01-01 ASSESSMENT — PATIENT HEALTH QUESTIONNAIRE - PHQ9
SUM OF ALL RESPONSES TO PHQ9 QUESTIONS 1 & 2: 2
SUM OF ALL RESPONSES TO PHQ QUESTIONS 1-9: 2
2. FEELING DOWN, DEPRESSED OR HOPELESS: 1
1. LITTLE INTEREST OR PLEASURE IN DOING THINGS: 1

## 2018-01-01 ASSESSMENT — ENCOUNTER SYMPTOMS
ABDOMINAL PAIN: 0
SHORTNESS OF BREATH: 0
COUGH: 1
ABDOMINAL PAIN: 0
SHORTNESS OF BREATH: 0
WHEEZING: 0
BACK PAIN: 1

## 2018-03-27 DIAGNOSIS — E78.2 MIXED HYPERLIPIDEMIA: ICD-10-CM

## 2018-03-29 RX ORDER — SIMVASTATIN 40 MG
TABLET ORAL
Qty: 90 TABLET | Refills: 1 | Status: SHIPPED | OUTPATIENT
Start: 2018-03-29 | End: 2018-01-01 | Stop reason: SDUPTHER

## 2018-05-07 PROBLEM — S82.61XA CLOSED FRACTURE OF PROXIMAL LATERAL MALLEOLUS OF RIGHT ANKLE: Status: ACTIVE | Noted: 2018-01-01

## 2018-05-07 PROBLEM — M79.671 RIGHT FOOT PAIN: Status: ACTIVE | Noted: 2018-01-01

## 2018-05-07 PROBLEM — S93.491A SPRAIN OF ANTERIOR TALOFIBULAR LIGAMENT OF RIGHT ANKLE: Status: ACTIVE | Noted: 2018-01-01

## 2018-05-07 PROBLEM — S92.354A CLOSED NONDISPLACED FRACTURE OF FIFTH RIGHT METATARSAL BONE: Status: ACTIVE | Noted: 2018-01-01

## 2018-05-07 PROBLEM — M25.571 RIGHT ANKLE PAIN: Status: ACTIVE | Noted: 2018-01-01

## 2018-05-07 PROBLEM — S93.601A SPRAIN OF RIGHT FOOT: Status: ACTIVE | Noted: 2018-01-01

## 2018-05-14 PROBLEM — S92.354D NONDISPLACED FRACTURE OF FIFTH RIGHT METATARSAL BONE WITH ROUTINE HEALING: Status: ACTIVE | Noted: 2018-01-01

## 2018-05-14 PROBLEM — S82.63XD: Status: ACTIVE | Noted: 2018-01-01

## 2018-06-20 NOTE — PLAN OF CARE
[facility], [address]  Phone: [phone]  Fax [phone]     Physical Therapy Certification    Dear Referring Practitioner: Dr. Lupillo Bardales,    We had the pleasure of evaluating the following patient for physical therapy services at [address] [place] [address]. A summary of our findings can be found in the initial assessment below. This includes our plan of care. If you have any questions or concerns regarding these findings, please do not hesitate to contact me at the office phone number checked above. Thank you for the referral.       Physician Signature:_______________________________Date:__________________  By signing above (or electronic signature), therapists plan is approved by physician    Patient: Patricia Copeland   : 1945   MRN: [id number]  Referring Physician: Referring Practitioner: Dr. Lupillo Bardales      Evaluation Date: 2018      Medical Diagnosis Information:  Diagnosis: Sprain ATFL (V85.466A), R foot pain (M79.671) s/p R lateral mallelus and base of fifth met fractures on 18   Treatment Diagnosis: R ankle stiffness s/p lateral malleolus and 5th met fracture (M25.671)                                         Insurance information: PT Insurance Information: [facility]     Precautions/ Contra-indications: Pacemeaker and defibrillator- no estim  Latex Allergy:  [x]NO      []YES  Preferred Language for Healthcare:   [x]English       []other:    SUBJECTIVE: Patient stated complaint: Patient states he fractured his ankle/foot on May 1st.  Has been doing 25% weight bearing for last week and a half. Hasn't had much pain since the initial break. Was non-weight bearing for first 5 weeks. States he is 25% weight bearing right now and today is the first time he has used crutches.     Relevant Medical History: Heart and leg stents  Functional Disability Index:PT G-Codes  Functional Review Of Systems (ROS):  [x]Performed Review of systems (Integumentary, CardioPulmonary, Neurological) by intake and observation. Intake form has been scanned into medical record. Patient has been instructed to contact their primary care physician regarding ROS issues if not already being addressed at this time. Co-morbidities/Complexities (which will affect course of rehabilitation):   []None           Arthritic conditions   []Rheumatoid arthritis (M05.9)  []Osteoarthritis (M19.91)   Cardiovascular conditions   [x]Hypertension (I10)  [x]Hyperlipidemia (E78.5)  []Angina pectoris (I20)  []Atherosclerosis (I70)   Musculoskeletal conditions   []Disc pathology   []Congenital spine pathologies   []Prior surgical intervention  []Osteoporosis (M81.8)  []Osteopenia (M85.8)   Endocrine conditions   []Hypothyroid (E03.9)  []Hyperthyroid Gastrointestinal conditions   []Constipation (H97.75)   Metabolic conditions   []Morbid obesity (E66.01)  []Diabetes type 1(E10.65) or 2 (E11.65)   []Neuropathy (G60.9)     Pulmonary conditions   []Asthma (J45)  []Coughing   []COPD (J44.9)   Psychological Disorders  []Anxiety (F41.9)  []Depression (F32.9)   []Other:   [x]Other:  Pacemaker        Barriers to/and or personal factors that will affect rehab potential:              []Age  []Sex              []Motivation/Lack of Motivation                        [x]Co-Morbidities              []Cognitive Function, education/learning barriers              []Environmental, home barriers              []profession/work barriers  []past PT/medical experience  []other:  Justification:     Falls Risk Assessment (30 days):  [x] Falls Risk assessed and no intervention required.   [] Falls Risk assessed and Patient requires intervention due to being higher risk   TUG score (>12s at risk):     [] Falls education provided, including       G-Codes:  PT G-Codes  Functional Assessment Tool Used: LEFS  Score: 43%  Functional Limitation: Mobility: Walking

## 2018-06-20 NOTE — FLOWSHEET NOTE
hip and/or LS spine soft tissue/joints for the purpose of modulating pain, promoting relaxation,  increasing ROM, reducing/eliminating soft tissue swelling/inflammation/restriction, improving soft tissue extensibility and allowing for proper ROM for normal function with self care, mobility, lifting and ambulation. Modalities: CP 15 min foot/ankle     Charges:  Timed Code Treatment Minutes: 28'   Total Treatment Minutes: 72'     [x] EVAL (LOW) 88564 (typically 20 minutes face-to-face)  [] EVAL (MOD) 37056 (typically 30 minutes face-to-face)  [] EVAL (HIGH) 14636 (typically 45 minutes face-to-face)  [] RE-EVAL     [x] JQ(50318) x  2   [] IONTO  [] NMR (94013) x      [] VASO  [] Manual (74755) x       [] Other:  [] TA x       [] Mech Traction (80447)  [] ES(attended) (48055)      [] ES (un) (65010):     GOALS:  Patient stated goal: \"Normal walking\"    Therapist goals for Patient:   Short Term Goals: To be achieved in: 2 weeks  1. Independent in HEP and progression per patient tolerance, in order to prevent re-injury. 2. Patient will have a decrease in pain to facilitate improvement in movement, function, and ADLs as indicated by Functional Deficits. Long Term Goals: To be achieved in: 8 weeks  1. Disability index score of 22% or less for the LEFS to assist with reaching prior level of function. 2. Patient will demonstrate increased AROM to 10 deg DF, 50 deg PF to allow for proper joint functioning as indicated by patients Functional Deficits. 3. Patient will demonstrate an increase in Strength to4+/5 into PF, DF, inv and ev in LE to allow for proper functional mobility as indicated by patients Functional Deficits. 4. Patient will return to walking without AD without increased symptoms or restriction.   (patient specific functional goal)      Progression Towards Functional goals:  [] Patient is progressing as expected towards functional goals listed.     [] Progression is slowed due to complexities listed. [] Progression has been slowed due to co-morbidities.   [x] Plan just implemented, too soon to assess goals progression  [] Other:     ASSESSMENT:  See eval    Treatment/Activity Tolerance:  [x] Patient tolerated treatment well [] Patient limited by fatique  [] Patient limited by pain  [] Patient limited by other medical complications  [] Other:     Prognosis: [x] Good [] Fair  [] Poor    Patient Requires Follow-up: [x] Yes  [] No    PLAN: See eval  [] Continue per plan of care [] Alter current plan (see comments)  [x] Plan of care initiated [] Hold pending MD visit [] Discharge    Electronically signed by: Jason Rhoades, PT,DPT [id number]

## 2018-07-03 NOTE — FLOWSHEET NOTE
5x +HEP 6/28   [facility] walking 5x  5        Therapeutic Exercise and NMR EXR  [x] (29119) Provided verbal/tactile cueing for activities related to strengthening, flexibility, endurance, ROM for improvements in LE, proximal hip, and core control with self care, mobility, lifting, ambulation.  [] (28318) Provided verbal/tactile cueing for activities related to improving balance, coordination, kinesthetic sense, posture, motor skill, proprioception  to assist with LE, proximal hip, and core control in self care, mobility, lifting, ambulation and eccentric single leg control.      NMR and Therapeutic Activities:    [] (89572 or 99959) Provided verbal/tactile cueing for activities related to improving balance, coordination, kinesthetic sense, posture, motor skill, proprioception and motor activation to allow for proper function of core, proximal hip and LE with self care and ADLs  [] (91972) Gait Re-education- Provided training and instruction to the patient for proper LE, core and proximal hip recruitment and positioning and eccentric body weight control with ambulation re-education including up and down stairs     Home Exercise Program:    [x] (35526) Reviewed/Progressed HEP activities related to strengthening, flexibility, endurance, ROM of core, proximal hip and LE for functional self-care, mobility, lifting and ambulation/stair navigation   [] (87417)Reviewed/Progressed HEP activities related to improving balance, coordination, kinesthetic sense, posture, motor skill, proprioception of core, proximal hip and LE for self care, mobility, lifting, and ambulation/stair navigation      Manual Treatments:  PROM / STM / Oscillations-Mobs:  G-I, II, III, IV (PA's, Inf., Post.)  [x] (21050) Provided manual therapy to mobilize LE, proximal hip and/or LS spine soft tissue/joints for the purpose of modulating pain, promoting relaxation,  increasing ROM, reducing/eliminating soft tissue swelling/inflammation/restriction, improving soft tissue extensibility and allowing for proper ROM for normal function with self care, mobility, lifting and ambulation. Modalities: CP 15 min foot/ankle NO ESU PACEMAKER    Charges:  Timed Code Treatment Minutes: 39'   Total Treatment Minutes: 61'     [] EVAL (LOW) 91364 (typically 20 minutes face-to-face)  [] EVAL (MOD) 79100 (typically 30 minutes face-to-face)  [] EVAL (HIGH) 27505 (typically 45 minutes face-to-face)  [] RE-EVAL     [x] CY(16072) x  1   [] IONTO  [x] NMR (52481) x  1   [] VASO  [x] Manual (03812) x  1    [] Other:  [] TA x       [] Mech Traction (13551)  [] ES(attended) (69250)      [] ES (un) (11712):     GOALS:  Patient stated goal: \"Normal walking\"    Therapist goals for Patient:   Short Term Goals: To be achieved in: 2 weeks  1. Independent in HEP and progression per patient tolerance, in order to prevent re-injury. 2. Patient will have a decrease in pain to facilitate improvement in movement, function, and ADLs as indicated by Functional Deficits. Long Term Goals: To be achieved in: 8 weeks  1. Disability index score of 22% or less for the LEFS to assist with reaching prior level of function. 2. Patient will demonstrate increased AROM to 10 deg DF, 50 deg PF to allow for proper joint functioning as indicated by patients Functional Deficits. 3. Patient will demonstrate an increase in Strength to4+/5 into PF, DF, inv and ev in LE to allow for proper functional mobility as indicated by patients Functional Deficits. 4. Patient will return to walking without AD without increased symptoms or restriction.   (patient specific functional goal)      Progression Towards Functional goals:  [x] Patient is progressing as expected towards functional goals listed. [] Progression is slowed due to complexities listed. [] Progression has been slowed due to co-morbidities.   [] Plan just implemented, too soon to assess goals progression  [] Other:     ASSESSMENT: Pt doing well with ambulation without AD and in brace. Balance still challenging.     Treatment/Activity Tolerance:  [x] Patient tolerated treatment well [] Patient limited by fatique  [] Patient limited by pain  [] Patient limited by other medical complications  [] Other:     Prognosis: [x] Good [] Fair  [] Poor    Patient Requires Follow-up: [x] Yes  [] No    PLAN: Update HEP if needed  [x] Continue per plan of care [] Alter current plan (see comments)  [] Plan of care initiated [] Hold pending MD visit [] Discharge    Electronically signed by: Gemma Dsouza, PT,DPT [id number]

## 2018-07-05 NOTE — FLOWSHEET NOTE
[facility], [address] [facility] [place]  Phone: [phone]  Fax [phone]    Physical Therapy Daily Treatment Note  Date:  2018    Patient Name:  Maurilio Roberts  \"FLORENCIA\" :  1945  MRN: [id number]  Restrictions/Precautions:    Medical/Treatment Diagnosis Information:  Diagnosis: Sprain ATFL (D71.290K), R foot pain (M79.671) s/p R lateral mallelus and base of fifth met fractures on 18  Treatment Diagnosis: R ankle stiffness s/p lateral malleolus and 5th met fracture (U31.563)  Insurance/Certification information:  PT Insurance Information: /[place]  Physician Information:  Referring Practitioner: Dr. Cop[facility] of care signed (Y/N): yes    Date of Patient follow up with Physician:  18    G-Code (if applicable):      Date G-Code Applied:  18       Progress Note: []  Yes  [x]  No  Next due by: Visit #10       Latex Allergy:  [x]NO      []YES  Preferred Language for Healthcare:   [x]English       []other:    Visit # Insurance Allowable Requires auth   5     []no        []yes:       Pain level:  0/10     SUBJECTIVE: Pt states he still doesn't have any pain. Feels exercises are easy at home. OBJECTIVE: Per MD note pr able to transition into lace up brace full time. 18  Observation: tight medial soleus  Test measurements:  None this visit    RESTRICTIONS/PRECAUTIONS: ICD/Pacemaker; no estim; ;    Exercises/Interventions:     Therapeutic Ex Sets/sec Reps Notes   Calf stretch incline 30\" 3 HEP   HS stretch on step R/L 30\" 3 HEP    HEP    HEP   Supine piriformis stretch 30\" 3    SLR abd 2 10    SLR flex 2 10 \"   \"   Seated 1/2 foam roll DF/PF and INV/EV  15x ea    LSD touch only  15x 6\"   3D step through wedge 15x ea     Heel raises 20x     Recumbent bike  5'  No pain may do at home   Squats 20x     Manual Intervention                                 NMR re-education                  SLS RLE 10\" 5x +HEP 6/28   [facility] walking 5x  5        Therapeutic Exercise and NMR EXR  [x] (17683) Provided verbal/tactile cueing for activities related to strengthening, flexibility, endurance, ROM for improvements in LE, proximal hip, and core control with self care, mobility, lifting, ambulation.  [] (42977) Provided verbal/tactile cueing for activities related to improving balance, coordination, kinesthetic sense, posture, motor skill, proprioception  to assist with LE, proximal hip, and core control in self care, mobility, lifting, ambulation and eccentric single leg control.      NMR and Therapeutic Activities:    [] (22607 or 15443) Provided verbal/tactile cueing for activities related to improving balance, coordination, kinesthetic sense, posture, motor skill, proprioception and motor activation to allow for proper function of core, proximal hip and LE with self care and ADLs  [] (16598) Gait Re-education- Provided training and instruction to the patient for proper LE, core and proximal hip recruitment and positioning and eccentric body weight control with ambulation re-education including up and down stairs     Home Exercise Program:    [x] (00534) Reviewed/Progressed HEP activities related to strengthening, flexibility, endurance, ROM of core, proximal hip and LE for functional self-care, mobility, lifting and ambulation/stair navigation   [] (72349)Reviewed/Progressed HEP activities related to improving balance, coordination, kinesthetic sense, posture, motor skill, proprioception of core, proximal hip and LE for self care, mobility, lifting, and ambulation/stair navigation      Manual Treatments:  PROM / STM / Oscillations-Mobs:  G-I, II, III, IV (PA's, Inf., Post.)  [x] (22785) Provided manual therapy to mobilize LE, proximal hip and/or LS spine soft tissue/joints for the purpose of modulating pain, promoting relaxation,  increasing ROM, reducing/eliminating soft tissue swelling/inflammation/restriction, improving soft standing TE today. Updated HEP (see media), pt will be gone for about 3 weeks then will do a re-check visit when he returns.     Treatment/Activity Tolerance:  [x] Patient tolerated treatment well [] Patient limited by fatique  [] Patient limited by pain  [] Patient limited by other medical complications  [] Other:     Prognosis: [x] Good [] Fair  [] Poor    Patient Requires Follow-up: [x] Yes  [] No    PLAN:reassess  [x] Continue per plan of care [] Alter current plan (see comments)  [] Plan of care initiated [] Hold pending MD visit [] Discharge    Electronically signed by: Cammy Zhu, PT,DPT [id number]

## 2018-07-05 NOTE — FLOWSHEET NOTE
[facility], [address] [place]  Phone: [phone]  Fax [phone]      ATHLETIC TRAINING [address]  Date:  2018    Patient Name:  Mona Madison    :  1945  MRN: [id number]  Restrictions/Precautions:    Medical/Treatment Diagnosis Information:  ·    ·    Physician Information:       Weeks Post-op  8 wks  12 wks 16 wks 20 wks   24 wks                            Activity Log                                                  DOS/DOI:                                                    Date:  18   ATC communication Never been on machines before, needs overall lower strengthening, walks in hip INV   Bike    Elliptical    Treadmill    Airdyne        Gastroc stretch    Soleus stretch    Hamstring stretch    ITB stretch    Hip Flexor stretch    Quad stretch    Adductor stretch        Weight Shifting sp                              fp                              tp    Lateral walking (with/w/o TB)        Balance: PEP/Ekaterina board                   SLS          Star excursion load/explode          Extremity reach UE/LE        Leg Press Vipul. 80# 2x10                     Ecc.                      Inv. Calf Press Vipul. 80# 2x10                      Ecc.                        Inv.        KEI   Flex               ABd 45# 2x10 R/L              ADd              TKE               Ext        Steps Up               Up and Over               Down               Lateral               Rotation        Squats  mini                  wall                 BOSU         Lunges:  Lunge to Balance                   Balance to Lunge                   Walking        Knee Extension Bilat. Ecc.                               Inv. Hamstring Curls Bilat. 40# 2x10                              Ecc.                               Inv.        Soleus Press Bilat.  40# 2x10 Ecc.                           Inv.                             Ladders                Square               Jump/Hop  Low                      Med.                      High                                                            Modality Declined- at home   Initials                             MKS   Time spent with PT assistant

## 2018-07-24 NOTE — DISCHARGE SUMMARY
[x]Electrical Stimulation  [] Gait Training     []Cervical Traction    [] Lumbar Traction  [] Neuromuscular Re-education [x] Cold/hotpack         []Iontophoresis  [x] Instruction in HEP      Other:  [x] Manual Therapy                   []                       ?           []   Assessment:  [x] All Goals were achieved. [] The following goals were achieved (#'s):  [] The following goals were not achieved for the following reasons:/assessmen of improvement as it relates to each goal:    Plan of Care:  [x] Discharge from Therapy Services due to:  Pt has met all goals for therapy and is not having any functional limitations. Has progressed well and has a home program that he can continue at home as well as a 30 day [facility] pass. Reason for Discharge:   [x] All goals achieved    [] Patient having surgery  [] Physician discontinued therapy  [] Insurance/Financial Limitations [] Patient did not return for follow up visits [] Home program/1 visit only   [] No subjective or objective improvement [] Plateaued   [] Patient was unable to adhere to the plan of care established at evaluation. [] Referred back to physician for re-evaluation and did not return.      [] Other:?       Electronically signed by:  Shirin Donald, PT,DPT [id number]

## 2018-07-24 NOTE — FLOWSHEET NOTE
[facility],  [address] [facility] [place]  Phone: [phone]  Fax [phone]      ATHLETIC TRAINING [address]  Date:  2018    Patient Name:  Tacos Almanzar    :  1945  MRN: [id number]  Restrictions/Precautions:    Medical/Treatment Diagnosis Information:  ·   Diagnosis: Sprain ATFL (U06.451L), R foot pain (M79.671) s/p R lateral mallelus and base of fifth met fractures on 18  Treatment Diagnosis: R ankle stiffness s/p lateral malleolus and 5th met fracture (M25.671)     Physician Information:    Referring Practitioner: Dr. Lauren Mckeon Post-op  8 wks  12 wks 16 wks 20 wks   24 wks                            Activity Log                                                  DOS/DOI:                                                    Date:  18   ATC communication:  Works part time - [employer]  Cardiac patient - walks TM 3x20\"/day Never been on machines before, needs overall lower strengthening, walks in hip INV General strength for stability  Gave Gym HEP  Review TM walking progression   Bike     Elliptical     Treadmill  5' 2.0 mph   Airdyne          Gastroc stretch     Soleus stretch     Hamstring stretch     ITB stretch     Hip Flexor stretch     Quad stretch     Adductor stretch          Weight Shifting sp                               fp                               tp     Lateral walking (with/w/o TB)          Balance: PEP/Ekaterina board                    SLS           Star excursion load/explode           Extremity reach UE/LE          Leg Press Vipul. 80# 2x10 80# 2x10                     Ecc.                       Inv. Calf Press Vipul.  80# 2x10                       Ecc.   W/ PT                      Inv.          [facility]   Flex                ABd 45# 2x10 R/L 45# 2x10 R/L              ADd               TKE                Ext  45# 2x10 R/L        Steps Up                Up and Over

## 2018-07-24 NOTE — FLOWSHEET NOTE
ICD/Pacemaker; no estim; ;    Exercises/Interventions:     Therapeutic Ex Sets/sec Reps Notes   Calf stretch incline  Soleus stretch incline 30\" 3 each   HS stretch on table 30\" 3 HEP    HEP    HEP   Supine piriformis stretch 30\" 3    SLR abd 2 10    SLR flex 2 10 \"   \"         LSD touch only  15x 6\"         Heel raises 20x     Recumbent bike  5'  No pain may do at home   Squats 20x     Manual Intervention      Calcaneal mobs  STM gastroc/soleus 12'  Tight at medial gastroc                                 NMR re-education      Step up and over 6\" step 20x     Glider SL squat 20x                 SLS RLE 10\" 5x +HEP 6/28             Therapeutic Exercise and NMR EXR  [x] (87748) Provided verbal/tactile cueing for activities related to strengthening, flexibility, endurance, ROM for improvements in LE, proximal hip, and core control with self care, mobility, lifting, ambulation.  [] (55402) Provided verbal/tactile cueing for activities related to improving balance, coordination, kinesthetic sense, posture, motor skill, proprioception  to assist with LE, proximal hip, and core control in self care, mobility, lifting, ambulation and eccentric single leg control.      NMR and Therapeutic Activities:    [] (77035 or 98372) Provided verbal/tactile cueing for activities related to improving balance, coordination, kinesthetic sense, posture, motor skill, proprioception and motor activation to allow for proper function of core, proximal hip and LE with self care and ADLs  [] (49728) Gait Re-education- Provided training and instruction to the patient for proper LE, core and proximal hip recruitment and positioning and eccentric body weight control with ambulation re-education including up and down stairs     Home Exercise Program:    [x] (69717) Reviewed/Progressed HEP activities related to strengthening, flexibility, endurance, ROM of core, proximal hip and LE for functional self-care, mobility, lifting and ambulation/stair navigation   [] (04776)Reviewed/Progressed HEP activities related to improving balance, coordination, kinesthetic sense, posture, motor skill, proprioception of core, proximal hip and LE for self care, mobility, lifting, and ambulation/stair navigation      Manual Treatments:  PROM / STM / Oscillations-Mobs:  G-I, II, III, IV (PA's, Inf., Post.)  [x] (94475) Provided manual therapy to mobilize LE, proximal hip and/or LS spine soft tissue/joints for the purpose of modulating pain, promoting relaxation,  increasing ROM, reducing/eliminating soft tissue swelling/inflammation/restriction, improving soft tissue extensibility and allowing for proper ROM for normal function with self care, mobility, lifting and ambulation. Modalities: CP 15 min foot/ankle NO ESU PACEMAKER    Charges:  Timed Code Treatment Minutes: 39'   Total Treatment Minutes: 61'     [] EVAL (LOW) 02112 (typically 20 minutes face-to-face)  [] EVAL (MOD) 67817 (typically 30 minutes face-to-face)  [] EVAL (HIGH) 68774 (typically 45 minutes face-to-face)  [] RE-EVAL     [x] ML(39878) x  2   [] IONTO  [x] NMR (02554) x  1   [] VASO  [] Manual (37563) x       [] Other:  [] TA x       [] Mech Traction (56619)  [] ES(attended) (24837)      [] ES (un) (98030):     GOALS:  Patient stated goal: \"Normal walking\"    Therapist goals for Patient:   Short Term Goals: To be achieved in: 2 weeks  1. Independent in HEP and progression per patient tolerance, in order to prevent re-injury. 2. Patient will have a decrease in pain to facilitate improvement in movement, function, and ADLs as indicated by Functional Deficits. Long Term Goals: To be achieved in: 8 weeks  1. Disability index score of 22% or less for the LEFS to assist with reaching prior level of function. met  2. Patient will demonstrate increased AROM to 10 deg DF, 50 deg PF to allow for proper joint functioning as indicated by patients Functional Deficits. met  3.  Patient will demonstrate an increase

## 2018-08-08 NOTE — PROGRESS NOTES
Chief Complaint follow-up     Ankle Pain (CK RIGHT ANKLE) and Foot Pain (CK RIGHT FOOT)    Follow-up right foot 5th metatarsal base fracture with lateral malleolus transverse fracture with x-ray check with improved right great toe soreness and swelling    History of Present Illness:  Macho Colbert is a 68 y.o. 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 Sharita Grey who is being seen today in Consultation from Dr. Ana Hickman in [facility] for Evaluation of an Acute Injury to His Right Foot and Ankle. Apparently on 4/30/2018, He Was Coming off of the Step off of his deck and Stepped Awkwardly and Sustained a Rotational Inversion Injury about His Right Foot and Ankle. There Is a Popliteal Time of the Injury Followed by Immediate Pain and Limited Ability to Bear Weight. He Also Developed Swelling and Ecchymosis. Initially Treated Himself with Relative Rest Ice Elevation and Compression. He was also using crutches and due to persistence of pain and was actually seen in the emergency room on 5/1/2018 where x-rays did reveal a nondisplaced transverse fracture to his right lateral malleolus as well as a 5th metatarsal base fracture. He was placed in a posterior splint. He has been trying to be nonweightbearing. As he is on Plavix he has been taking some Tylenol and was given some pain medications of the emergency room which he has not taken. He has been working to elevate. He has been a little bit inconsistent with this. He does have an achy pain at only 1-2 out of 10 but if he inadvertently tries to put weight on his ankle he can be a 7-8 out of 10 and more sharp in nature. He does have stiffness and weakness in his swelling has improved to some degree and ecchymosis distally is improving.   He is being seen today for orthopedic and sports consultation with He is not requiring any medications at this point and would rates improvement to 80 and 90%. He is having less great toe soreness. He has been working on his range of motion exercises in the foot. He was last seen on 6/13/2018 and was continued on treatment for his transverse right ankle lateral malleolus fracture with metatarsal base fracture. Overall he is doing much better and has not had pain for the past 3 weeks he is in the process of this progression of weightbearing. He has had 2 sessions of therapy thus far with some improvement in his mobility and strength. He is going on vacation in [place] for 2 weeks next week. Denies locking catching or instability. He is not requiring pain medications at this point. He was last seen in the office on 6/27/2018 was continued on treatment for his transverse right lateral malleolus fracture with this metatarsal base fracture. Overall he is doing much better and believes he is better than his baseline. He did attend 6 sessions of therapy prior to going to their [address] [place] [address] in [place] and has no pain with prolonged standing and walking stairclimbing or with doing work on his home in [place]. He is not requiring any medications at this point and is now about 14 weeks out from his fractures. He is very pleased with his progress. He is no longer having knee discomfort. Denies mechanical or instability symptoms about the knee. Medical History    Patient's medications, allergies, past medical, surgical, social and family histories were reviewed and updated as appropriate. Review of Systems    Pertinent items are noted in HPI  Review of systems reviewed from Patient History Form dated on 5/7/2018 and available in the patient's chart under the Media tab.        Vital Signs  Vitals:    08/08/18 0958   BP: 128/63   Pulse: 65       General Exam:     Constitutional: Patient is adequately groomed with no evidence of malnutrition  DTRs: Deep tendon

## 2018-10-30 NOTE — PROGRESS NOTES
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      Past Surgical History:     Past Surgical History:   Procedure Laterality Date    ARTERIAL ANEURYSM REPAIR  11/2010    right popliteal artery aneurysm repair    COLONOSCOPY  10/08/01 12/20/04    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    COLONOSCOPY  4/4/14    wnl    CORONARY ANGIOPLASTY WITH STENT PLACEMENT      FINGER FRACTURE SURGERY      left middle finger    UPPER GASTROINTESTINAL ENDOSCOPY  2/10/2014    BX duodenum, atrum, distal esophagus    UPPER GASTROINTESTINAL ENDOSCOPY  4/4/14    BX gastric    UPPER GASTROINTESTINAL ENDOSCOPY  11/4/15     Current Medications:     Current Outpatient Prescriptions:     simvastatin (ZOCOR) 40 MG tablet, TAKE 1 TABLET BY MOUTH NIGHTLY, Disp: 90 tablet, Rfl: 1    valsartan (DIOVAN) 320 MG tablet, Take 1 tablet by mouth daily (instead of the 160mg dose) for high blood pressure., Disp: 90 tablet, Rfl: 1    atenolol (TENORMIN) 100 MG tablet, TAKE 1 TABLET BY MOUTH EVERY MORNING (INSTEAD OF THE METROPROLOL ER 50MG), Disp: 90 tablet, Rfl: 1    amiodarone (PACERONE) 100 MG tablet, Take 100 mg by mouth daily, Disp: , Rfl:     famotidine (PEPCID) 20 MG tablet, Take 20 mg by mouth 2 times daily. , Disp: , Rfl:     aspirin 81 MG EC tablet, Take 81 mg by mouth daily. , Disp: , Rfl:     clopidogrel (PLAVIX) 75 MG tablet, Take 75 mg by mouth daily. , Disp: , Rfl:   

## 2018-10-31 NOTE — FLOWSHEET NOTE
without increased symptoms or restriction. 5. Patient will be able to ascend/descend stairs with reciprocal pattern for return to normal ADLs. New or Updated Goals (if applicable):  [x] No change to goals established upon initial eval/last progress note:  New Goals:    Progression Towards Functional goals:   [] Patient is progressing as expected towards functional goals listed. [] Progression is slowed due to complexities listed. [] Progression has been slowed due to co-morbidities.   [x] Plan just implemented, too soon to assess goals progression  [] Other:     ASSESSMENT:    [] Improvement noted relative to goals:  [] No Improvement noted related to goals:  Summary/Patient's response to treatment: See Eval    Treatment/Activity Tolerance:  [x] Patient tolerated treatment well [] Patient limited by fatique  [] Patient limited by pain  [] Patient limited by other medical complications  [] Other:     Prognosis: [x] Good [] Fair  [] Poor    Patient Requires Follow-up: [x] Yes  [] No    PLAN: See eval  [] Continue per plan of care [] Alter current plan (see comments)  [x] Plan of care initiated [] Hold pending MD visit [] Discharge    Electronically signed by: Justin Thornton PT

## 2018-11-02 NOTE — FLOWSHEET NOTE
[facility], [address] [place] [address]  Phone: [phone]  Fax [phone]    Physical Therapy Daily Treatment Note  Date:  2018    Patient Name:  Gabriel Lisa   :  1945  MRN: [id number]  Restrictions/Precautions:    Physician Information:  Referring Practitioner: Sara Coreas  Medical/Treatment Diagnosis Information:  · Diagnosis: M54.31 (ICD-10-CM) - Sciatica of right side,  M47.816 (ICD-10-CM) - Spondylosis of lumbar region without myelopathy or radiculopathy  · Treatment Diagnosis[de-identified] M54.5 LBP M54.31 (ICD-10-CM) - Sciatica of right side  [x] Conservative / [] Surgical - DOS:  Therapy Diagnosis/Practice Pattern:  Practice Pattern F: Spinal Disorders  Insurance/Certification information:  PT Insurance Information: [address] [place] [address] signed: [x] YES  [] NO  Number of Comorbidities:  []0     [x]1-2    []3+  Date of Patient follow up with Physician:     G-Code (if applicable):      Date G-Code Applied:         Progress Note: [x]  Yes  []  No  Next due by: Visit #10        Latex Allergy:  [x]NO      []YES  Preferred Language for Healthcare:   [x]English       []other:    Visit # Insurance Allowable Reporting Period   2 MC Begin Date: 2018               End Date:      RECERT DUE BY: 10 weeks    SUBJECTIVE:  Pt is sore in the right side of his mid back. Pt states that he has been sore since a deep tissue massage. He does not think it is related to low back. He is scheduled to get CAT Scan today. OBJECTIVE: See eval  Observation:   Palpation:     Test used Initial score Current Score   Pain Summary VAS 5-6    Functional questionnaire Oswestry 16%    ROM Lumbar Flexion To ankles     Lumbar Ext 85% provoke symptoms     Lumbar SB L/R Fib head     Lumbar rotation L/R 90%    Strength Hip ext      ABD      TrA       RESTRICTIONS/PRECAUTIONS:  PACEMAKER!!! Exercises/Interventions:   See ATC . IV (Vashti, Inf., Post.)  [x] (49262) Provided manual therapy to mobilize proximal hip and LS spine soft tissue/joints for the purpose of modulating pain, promoting relaxation,  increasing ROM, reducing/eliminating soft tissue swelling/inflammation/restriction, improving soft tissue extensibility and allowing for proper ROM for normal function with self care, mobility, lifting and ambulation. Modalities:   NO ESU    Charges:  Timed Code Treatment Minutes: 45   Total Treatment Minutes: 45     [] EVAL (LOW) 97676 (typically 20 minutes face-to-face)  [] EVAL (MOD) 79500 (typically 30 minutes face-to-face)  [] EVAL (HIGH) 09853 (typically 45 minutes face-to-face)  [] RE-EVAL     [x] SV(42205) x  2   [] IONTO  [] NMR (49205) x      [] VASO  [x] Manual (21365) x  1    [] Other:  [] TA x       [] Mech Traction (56856)  [] ES(attended) (97321)      [] ES (un) (29304):     Goals: Patient stated goal: Patient would like to be able to walk without pain.     Therapist goals for Patient:   Short Term Goals: To be achieved in: 2 weeks  1. Independent in HEP and progression per patient tolerance, in order to prevent re-injury. 2. Patient will have a decrease in pain to facilitate improvement in movement, function, and ADLs as indicated by Functional Deficits.     Long Term Goals: To be achieved in: 10 weeks  1. Disability index score of 10% or less for the Oswestry  to assist with reaching prior level of function. 2. Patient will demonstrate increased AROM to WNL, good LS mobility, good hip ROM to allow for proper joint functioning as indicated by patients Functional Deficits. 3. Patient will demonstrate an increase in Strength to good proximal hip and core activation to allow for proper functional mobility as indicated by patients Functional Deficits. 4. Patient will return to walking 20+ minutes without increased symptoms or restriction.    5. Patient will be able to ascend/descend stairs with reciprocal pattern for

## 2018-11-06 NOTE — PROGRESS NOTES
the feet. · Gait & station: normal, patient ambulates without assistance  · Additional Examinations:  · RIGHT LOWER EXTREMITY: Inspection/examination of the right lower extremity does not show any tenderness, deformity or injury. Range of motion is normal and pain-free. There is no gross instability. There are no rashes, ulcerations or lesions. Strength and tone are normal. No atrophy or abnormal movements are noted. · LEFT LOWER EXTREMITY:  Inspection/examination of the left lower extremity does not show any tenderness, deformity or injury. Range of motion is normal and pain-free. There is no gross instability. There are no rashes, ulcerations or lesions. Strength and tone are normal. No atrophy or abnormal movements are noted. Diagnostic Testing:    CT lumbar 11/2/18  No acute fracture or subluxation.       L5-S1 right paracentral disc protrusion, suspicious for impingement on the   traversing right S1 nerve root. Results for orders placed or performed in visit on 05/22/18   Lipid Panel   Result Value Ref Range    Cholesterol, Total 147 0 - 199 mg/dL    Triglycerides 178 (H) 0 - 150 mg/dL    HDL 39 (L) 40 - 60 mg/dL    LDL Calculated 72 <100 mg/dL    VLDL Cholesterol Calculated 36 Not Established mg/dL   Basic Metabolic Panel   Result Value Ref Range    Sodium 141 136 - 145 mmol/L    Potassium 4.4 3.5 - 5.1 mmol/L    Chloride 100 99 - 110 mmol/L    CO2 24 21 - 32 mmol/L    Anion Gap 17 (H) 3 - 16    Glucose 108 (H) 70 - 99 mg/dL    BUN 15 7 - 20 mg/dL    CREATININE 1.0 0.8 - 1.3 mg/dL    GFR Non-African American >60 >60    GFR African American >60 >60    Calcium 9.4 8.3 - 10.6 mg/dL   Hemoglobin A1C   Result Value Ref Range    Hemoglobin A1C 6.0 See comment %    eAG 125.5 mg/dL   PSA, Prostatic Specific Antigen   Result Value Ref Range    PSA 7.72 (H) 0.00 - 4.00 ng/mL     Impression:       1. HNP (herniated nucleus pulposus), lumbar    2. Lumbar radiculopathy    3.  Spondylosis of lumbar region

## 2018-11-19 NOTE — FLOWSHEET NOTE
symptoms Continues to provoke symptoms    Lumbar SB L/R Fib head Fib head    Lumbar rotation L/R 90%    Strength Hip ext      ABD      TrA       RESTRICTIONS/PRECAUTIONS:  PACEMAKER    Exercises/Interventions:   See ATC . Therapeutic Ex sets/reps comments   Longsit HS stretch 3 x 30\" HEP   Gastroc strap strech 3 x 30\" HEP   Q/L (S)     SKTC 3x  ;20     DKTC     LTR X 10      Supine Piriformis (S)     Supine Hip flexor (S)     TA brace/TA bridge/SB bridge X 10  :05 /.2 x 10 HEP   Clamshell 2 x 10 HEP   Seth ADD/ABD iso 20 x    Prone prop X 1 min    Prone knee flex, hip ext X 15     Standing march, ext 10 x              Manual Intervention      Hip PROM, HS stretch 4 min    Central PA lumbar, sacrum 4 min                                  NMR re-education      TA activation                           Therapeutic Exercise and NMR EXR  [] (88979) Provided verbal/tactile cueing for activities related to strengthening, flexibility, endurance, ROM  for improvements in proximal hip and core control with self care, mobility, lifting and ambulation.  [] (97104) Provided verbal/tactile cueing for activities related to improving balance, coordination, kinesthetic sense, posture, motor skill, proprioception  to assist with core control in self care, mobility, lifting, and ambulation.      Therapeutic Activities:    [] (65637 or 80530) Provided verbal/tactile cueing for activities related to improving balance, coordination, kinesthetic sense, posture, motor skill, proprioception and motor activation to allow for proper function  with self care and ADLs  [] (41524) Provided training and instruction to the patient for proper core and proximal hip recruitment and positioning with ambulation re-education     Home Exercise Program:    [x] (01537) Reviewed/Progressed HEP activities related to strengthening, flexibility, endurance, ROM of core, proximal hip and LE for functional self-care, mobility, lifting and ambulation   [] (39361) proximal hip and core activation to allow for proper functional mobility as indicated by patients Functional Deficits. 4. Patient will return to walking 20+ minutes without increased symptoms or restriction. 5. Patient will be able to ascend/descend stairs with reciprocal pattern for return to normal ADLs. New or Updated Goals (if applicable):  [x] No change to goals established upon initial eval/last progress note:  New Goals:    Progression Towards Functional goals:   [] Patient is progressing as expected towards functional goals listed. [] Progression is slowed due to complexities listed. [] Progression has been slowed due to co-morbidities.   [x] Plan just implemented, too soon to assess goals progression  [] Other:     ASSESSMENT:    [] Improvement noted relative to goals:  [] No Improvement noted related to goals:  Summary/Patient's response to treatment: See Eval    Treatment/Activity Tolerance:  [x] Patient tolerated treatment well [] Patient limited by fatique  [] Patient limited by pain  [] Patient limited by other medical complications  [] Other:     Prognosis: [x] Good [] Fair  [] Poor    Patient Requires Follow-up: [x] Yes  [] No    PLAN: See eval  [x] Continue per plan of care [] Alter current plan (see comments)  [] Plan of care initiated [] Hold pending MD visit [] Discharge    Electronically signed by: Antione Reed PT

## 2018-11-21 NOTE — FLOWSHEET NOTE
kinesthetic sense, posture, motor skill, proprioception of core, proximal hip and LE for self care, mobility, lifting, and ambulation      Manual Treatments:  PROM / STM / Oscillations-Mobs:  G-I, II, III, IV (PA's, Inf., Post.)  [x] (90227) Provided manual therapy to mobilize proximal hip and LS spine soft tissue/joints for the purpose of modulating pain, promoting relaxation,  increasing ROM, reducing/eliminating soft tissue swelling/inflammation/restriction, improving soft tissue extensibility and allowing for proper ROM for normal function with self care, mobility, lifting and ambulation. Modalities:   NO ESU   10 min CP    Charges:  Timed Code Treatment Minutes: 45   Total Treatment Minutes: 45     [] EVAL (LOW) 35980 (typically 20 minutes face-to-face)  [] EVAL (MOD) 02246 (typically 30 minutes face-to-face)  [] EVAL (HIGH) 17313 (typically 45 minutes face-to-face)  [] RE-EVAL     [x] GX(70326) x  2   [] IONTO  [] NMR (60267) x      [] VASO  [x] Manual (37528) x  1    [] Other:  [] TA x       [] Mech Traction (32062)  [] ES(attended) (03121)      [] ES (un) (76346):     Goals: Patient stated goal: Patient would like to be able to walk without pain.     Therapist goals for Patient:   Short Term Goals: To be achieved in: 2 weeks  1. Independent in HEP and progression per patient tolerance, in order to prevent re-injury. MET  2. Patient will have a decrease in pain to facilitate improvement in movement, function, and ADLs as indicated by Functional Deficits. MET     Long Term Goals: To be achieved in: 10 weeks  1. Disability index score of 10% or less for the Oswestry  to assist with reaching prior level of function. PROGRESSING  2. Patient will demonstrate increased AROM to WNL, good LS mobility, good hip ROM to allow for proper joint functioning as indicated by patients Functional Deficits. PROGRESSING  3.  Patient will demonstrate an increase in Strength to good proximal hip and core activation to allow for

## 2018-12-11 NOTE — PROGRESS NOTES
Follow up: [address] [facility]  1945  [id number]      CHIEF COMPLAINT:    Chief Complaint   Patient presents with    Lower Back Pain     f/u PT LSP         HISTORY OF PRESENT ILLNESS:  Mr. Vj Wan is a 68 y.o. male returns for a follow up visit for multiple medical problems. His current presenting problems are   1. Protrusion of lumbar intervertebral disc    2. Lumbar radiculopathy    3. Thoracic spine pain    . As per information/history obtained from the PADT(patient assessment and documentation tool) - He complains of pain in the lower back with radiation to the upper leg Right He rates the pain 1/10 and describes it as dull. Pain is made worse by: nothing. He denies side effects from the current pain regimen. Patient reports that since the last follow up visit the physical functioning is better, family/social relationships are unchanged, mood is unchanged and sleep patterns are unchanged, and that the overall functioning is better. Patient denies neurological bowel or bladder. Patient follows up today after completing 6 visits of physical therapy from 10/31/18 through 11/21/18. Patient reports complete relief following therapy. He underwent a bone scan due to thoracic pain. He was found have a T6 lesion on x-rays of the thoracic spine. He reports he has pain and thoracic spine as well as in the red. He has had an elevated PSA but has been unable to undergo biopsy due to being unable to come off his blood thinners. He reports a physical therapy has completely relieved his back pain. He previously underwent a CT which was unremarkable.     Associated signs and symptoms:   Neurogenic bowel or bladder symptoms:  no   Perceived weakness:  no   Difficulty walking:  no              Past Medical History:   Past Medical History:   Diagnosis Date    Benign neoplasm of colon 2001    Diverticulosis of colon (without mention of hemorrhage) 2001    Elevated fasting glucose     Former lesions. · Reflexes: Reflexes are symmetrically 2+ at the patellar and ankle tendons. Clonus absent bilaterally at the feet. · Gait & station: normal, patient ambulates without assistance  · Additional Examinations:  · RIGHT LOWER EXTREMITY: Inspection/examination of the right lower extremity does not show any tenderness, deformity or injury. Range of motion is normal and pain-free. There is no gross instability. There are no rashes, ulcerations or lesions. Strength and tone are normal. No atrophy or abnormal movements are noted. · LEFT LOWER EXTREMITY:  Inspection/examination of the left lower extremity does not show any tenderness, deformity or injury. Range of motion is normal and pain-free. There is no gross instability. There are no rashes, ulcerations or lesions. Strength and tone are normal. No atrophy or abnormal movements are noted. Diagnostic Testing:    Bone scan -   Extensive abnormal radionuclide, T6 vertebral body which appears to extend   into the transverse processes.  This in addition to the moderate radiograph   sclerosis, raises potential benign consideration of Paget's disease.  This   could also involve the medial most right 5th rib or transverse process and   mandible.  The possibility of blastic metastatic disease such as from   prostate carcinoma is also a strong but malignant consideration.  Both   differential considerations could also potentially account for the bilateral   mandibular disease since the uptake is fairly pronounced for periodontal   disease.        Results for orders placed or performed in visit on 05/22/18   Lipid Panel   Result Value Ref Range    Cholesterol, Total 147 0 - 199 mg/dL    Triglycerides 178 (H) 0 - 150 mg/dL    HDL 39 (L) 40 - 60 mg/dL    LDL Calculated 72 <100 mg/dL    VLDL Cholesterol Calculated 36 Not Established mg/dL   Basic Metabolic Panel   Result Value Ref Range    Sodium 141 136 - 145 mmol/L    Potassium 4.4 3.5 - 5.1 mmol/L    Chloride 100

## 2018-12-12 NOTE — TELEPHONE ENCOUNTER
I spoke to patient last evening, approximately 7:20 PM (12/11/18). Reviewed main findings of the recent abnormal bone scan, specifically abnormal uptake at the T6 vertebral body which extends and the transverse processes and also some increased uptake in the mandible bilaterally. Main concerns per radiology impression include Paget's disease and possible prostatic disease such as metastatic prostate cancer. Advised patient return tomorrow for basic blood tests to help rule out Paget's disease. Patient also has scheduled a follow-up exam with his urologist, Dr. Philomena Correa, later this week. Advised patient  a copy of the interpreted bone scan when he comes in for the blood draw on Wednesday (to give to Dr. Philomena Correa). Told him I would be surprised if this would be metastatic prostate cancer, as my limited understanding is that usually it tends to spread more locally, to bones around the prostate and pelvic area, though I cannot say for sure. (Of note - patient did have recent CT of the lumbar spine without contrast, without any significant osseous abnormalities.)  Patient denies any localized midline thoracic back pain, though does still have more generalized/widespread thoracic back pain. Await further labs and urology opinion. Orders in EPIC (for FU nonfasting labs), and copies placed up front of his bone scan AND the CT of lumbar spine.

## 2018-12-27 NOTE — TELEPHONE ENCOUNTER
Patient is asking for a copy of his bone density results of 12-10-18. These have not been read by Dr. Nancy Curran as of yet. I printed a copy, let me know when able to call patient for . Also, he asked for a recommendation for an oncologist.  I have him the name for Dr. Louise Barboza and gave him the phone number.

## 2019-01-01 ENCOUNTER — APPOINTMENT (OUTPATIENT)
Dept: CT IMAGING | Age: 74
DRG: 167 | End: 2019-01-01
Attending: INTERNAL MEDICINE
Payer: MEDICARE

## 2019-01-01 ENCOUNTER — APPOINTMENT (OUTPATIENT)
Dept: GENERAL RADIOLOGY | Age: 74
DRG: 391 | End: 2019-01-01
Payer: MEDICARE

## 2019-01-01 ENCOUNTER — APPOINTMENT (OUTPATIENT)
Dept: GENERAL RADIOLOGY | Age: 74
DRG: 167 | End: 2019-01-01
Attending: INTERNAL MEDICINE
Payer: MEDICARE

## 2019-01-01 ENCOUNTER — HOSPITAL ENCOUNTER (INPATIENT)
Age: 74
LOS: 38 days | Discharge: HOSPICE | DRG: 329 | End: 2019-04-14
Attending: EMERGENCY MEDICINE | Admitting: INTERNAL MEDICINE
Payer: MEDICARE

## 2019-01-01 ENCOUNTER — CARE COORDINATION (OUTPATIENT)
Dept: CASE MANAGEMENT | Age: 74
End: 2019-01-01

## 2019-01-01 ENCOUNTER — APPOINTMENT (OUTPATIENT)
Dept: GENERAL RADIOLOGY | Age: 74
DRG: 329 | End: 2019-01-01
Payer: MEDICARE

## 2019-01-01 ENCOUNTER — APPOINTMENT (OUTPATIENT)
Dept: CT IMAGING | Age: 74
DRG: 329 | End: 2019-01-01
Payer: MEDICARE

## 2019-01-01 ENCOUNTER — ANESTHESIA (OUTPATIENT)
Dept: OPERATING ROOM | Age: 74
DRG: 329 | End: 2019-01-01
Payer: MEDICARE

## 2019-01-01 ENCOUNTER — APPOINTMENT (OUTPATIENT)
Dept: INTERVENTIONAL RADIOLOGY/VASCULAR | Age: 74
DRG: 329 | End: 2019-01-01
Payer: MEDICARE

## 2019-01-01 ENCOUNTER — ANESTHESIA EVENT (OUTPATIENT)
Dept: OPERATING ROOM | Age: 74
DRG: 329 | End: 2019-01-01
Payer: MEDICARE

## 2019-01-01 ENCOUNTER — HOSPITAL ENCOUNTER (INPATIENT)
Age: 74
LOS: 8 days | Discharge: HOME | DRG: 167 | End: 2019-01-24
Attending: INTERNAL MEDICINE | Admitting: INTERNAL MEDICINE
Payer: MEDICARE

## 2019-01-01 ENCOUNTER — OFFICE VISIT (OUTPATIENT)
Dept: FAMILY MEDICINE CLINIC | Age: 74
End: 2019-01-01
Payer: MEDICARE

## 2019-01-01 ENCOUNTER — APPOINTMENT (OUTPATIENT)
Dept: CT IMAGING | Age: 74
DRG: 391 | End: 2019-01-01
Payer: MEDICARE

## 2019-01-01 ENCOUNTER — ANESTHESIA EVENT (OUTPATIENT)
Dept: ENDOSCOPY | Age: 74
DRG: 329 | End: 2019-01-01
Payer: MEDICARE

## 2019-01-01 ENCOUNTER — HOSPITAL ENCOUNTER (OUTPATIENT)
Age: 74
Discharge: HOME | End: 2019-02-04
Payer: MEDICARE

## 2019-01-01 ENCOUNTER — HOSPITAL ENCOUNTER (OUTPATIENT)
Dept: CT IMAGING | Age: 74
Discharge: HOME | End: 2019-01-02
Payer: MEDICARE

## 2019-01-01 ENCOUNTER — ANESTHESIA EVENT (OUTPATIENT)
Dept: OPERATING ROOM | Age: 74
DRG: 167 | End: 2019-01-01
Payer: MEDICARE

## 2019-01-01 ENCOUNTER — HOSPITAL ENCOUNTER (OUTPATIENT)
Age: 74
Discharge: HOME | End: 2019-01-02
Payer: MEDICARE

## 2019-01-01 ENCOUNTER — HOSPITAL ENCOUNTER (INPATIENT)
Age: 74
LOS: 1 days | DRG: 189 | End: 2019-04-14
Attending: INTERNAL MEDICINE | Admitting: INTERNAL MEDICINE
Payer: COMMERCIAL

## 2019-01-01 ENCOUNTER — HOSPITAL ENCOUNTER (OUTPATIENT)
Dept: CT IMAGING | Age: 74
Discharge: HOME | End: 2019-02-04
Payer: MEDICARE

## 2019-01-01 ENCOUNTER — HOSPITAL ENCOUNTER (OUTPATIENT)
Dept: PET IMAGING | Age: 74
Discharge: HOME | End: 2019-02-05
Payer: MEDICARE

## 2019-01-01 ENCOUNTER — ANESTHESIA EVENT (OUTPATIENT)
Dept: ENDOSCOPY | Age: 74
DRG: 167 | End: 2019-01-01
Payer: MEDICARE

## 2019-01-01 ENCOUNTER — ANESTHESIA (OUTPATIENT)
Dept: ENDOSCOPY | Age: 74
DRG: 167 | End: 2019-01-01
Payer: MEDICARE

## 2019-01-01 ENCOUNTER — ANESTHESIA (OUTPATIENT)
Dept: ENDOSCOPY | Age: 74
DRG: 329 | End: 2019-01-01
Payer: MEDICARE

## 2019-01-01 ENCOUNTER — TELEPHONE (OUTPATIENT)
Dept: OTHER | Facility: CLINIC | Age: 74
End: 2019-01-01

## 2019-01-01 ENCOUNTER — ANESTHESIA (OUTPATIENT)
Dept: OPERATING ROOM | Age: 74
DRG: 167 | End: 2019-01-01
Payer: MEDICARE

## 2019-01-01 ENCOUNTER — HOSPITAL ENCOUNTER (INPATIENT)
Age: 74
LOS: 4 days | Discharge: HOME | DRG: 391 | End: 2019-02-23
Attending: EMERGENCY MEDICINE | Admitting: INTERNAL MEDICINE
Payer: MEDICARE

## 2019-01-01 VITALS
DIASTOLIC BLOOD PRESSURE: 68 MMHG | WEIGHT: 188.3 LBS | SYSTOLIC BLOOD PRESSURE: 145 MMHG | BODY MASS INDEX: 27.89 KG/M2 | HEIGHT: 69 IN | HEART RATE: 70 BPM | TEMPERATURE: 97.5 F | OXYGEN SATURATION: 99 % | RESPIRATION RATE: 18 BRPM

## 2019-01-01 VITALS
OXYGEN SATURATION: 97 % | RESPIRATION RATE: 16 BRPM | HEIGHT: 69 IN | WEIGHT: 183 LBS | BODY MASS INDEX: 27.11 KG/M2 | SYSTOLIC BLOOD PRESSURE: 135 MMHG | DIASTOLIC BLOOD PRESSURE: 78 MMHG | HEART RATE: 96 BPM | TEMPERATURE: 98 F

## 2019-01-01 VITALS
HEIGHT: 69 IN | WEIGHT: 163.14 LBS | DIASTOLIC BLOOD PRESSURE: 88 MMHG | RESPIRATION RATE: 26 BRPM | TEMPERATURE: 97.5 F | OXYGEN SATURATION: 88 % | SYSTOLIC BLOOD PRESSURE: 154 MMHG | HEART RATE: 101 BPM | BODY MASS INDEX: 24.16 KG/M2

## 2019-01-01 VITALS
TEMPERATURE: 96.4 F | HEIGHT: 69 IN | WEIGHT: 201.2 LBS | OXYGEN SATURATION: 96 % | RESPIRATION RATE: 14 BRPM | SYSTOLIC BLOOD PRESSURE: 130 MMHG | HEART RATE: 57 BPM | BODY MASS INDEX: 29.8 KG/M2 | DIASTOLIC BLOOD PRESSURE: 84 MMHG

## 2019-01-01 VITALS — DIASTOLIC BLOOD PRESSURE: 56 MMHG | OXYGEN SATURATION: 82 % | SYSTOLIC BLOOD PRESSURE: 66 MMHG | TEMPERATURE: 98.3 F

## 2019-01-01 VITALS — DIASTOLIC BLOOD PRESSURE: 50 MMHG | SYSTOLIC BLOOD PRESSURE: 109 MMHG | OXYGEN SATURATION: 92 % | TEMPERATURE: 97.9 F

## 2019-01-01 VITALS
TEMPERATURE: 97.4 F | HEIGHT: 69 IN | OXYGEN SATURATION: 98 % | DIASTOLIC BLOOD PRESSURE: 60 MMHG | WEIGHT: 184.6 LBS | SYSTOLIC BLOOD PRESSURE: 130 MMHG | BODY MASS INDEX: 27.34 KG/M2 | RESPIRATION RATE: 16 BRPM | HEART RATE: 66 BPM

## 2019-01-01 VITALS
BODY MASS INDEX: 28.79 KG/M2 | TEMPERATURE: 97.5 F | HEIGHT: 69 IN | DIASTOLIC BLOOD PRESSURE: 72 MMHG | RESPIRATION RATE: 16 BRPM | OXYGEN SATURATION: 97 % | WEIGHT: 194.4 LBS | HEART RATE: 59 BPM | SYSTOLIC BLOOD PRESSURE: 138 MMHG

## 2019-01-01 VITALS — DIASTOLIC BLOOD PRESSURE: 80 MMHG | OXYGEN SATURATION: 97 % | SYSTOLIC BLOOD PRESSURE: 120 MMHG

## 2019-01-01 VITALS
RESPIRATION RATE: 12 BRPM | DIASTOLIC BLOOD PRESSURE: 48 MMHG | SYSTOLIC BLOOD PRESSURE: 89 MMHG | OXYGEN SATURATION: 99 % | TEMPERATURE: 98.6 F

## 2019-01-01 VITALS — TEMPERATURE: 98.6 F | OXYGEN SATURATION: 100 % | DIASTOLIC BLOOD PRESSURE: 53 MMHG | SYSTOLIC BLOOD PRESSURE: 121 MMHG

## 2019-01-01 DIAGNOSIS — C79.51 BONE METASTASIS (HCC): ICD-10-CM

## 2019-01-01 DIAGNOSIS — K57.20 DIVERTICULITIS OF LARGE INTESTINE WITH PERFORATION AND ABSCESS WITHOUT BLEEDING: Primary | ICD-10-CM

## 2019-01-01 DIAGNOSIS — R10.32 ABDOMINAL PAIN, LEFT LOWER QUADRANT: ICD-10-CM

## 2019-01-01 DIAGNOSIS — K57.32 DIVERTICULITIS OF COLON: Primary | ICD-10-CM

## 2019-01-01 DIAGNOSIS — Z87.09: ICD-10-CM

## 2019-01-01 DIAGNOSIS — C34.12 CANCER OF BRONCHUS OF LEFT UPPER LOBE (HCC): ICD-10-CM

## 2019-01-01 DIAGNOSIS — K59.00 CONSTIPATION, UNSPECIFIED CONSTIPATION TYPE: ICD-10-CM

## 2019-01-01 DIAGNOSIS — Z09 HOSPITAL DISCHARGE FOLLOW-UP: Primary | ICD-10-CM

## 2019-01-01 DIAGNOSIS — E87.1 HYPONATREMIA: ICD-10-CM

## 2019-01-01 DIAGNOSIS — Z85.118 HISTORY OF LUNG CANCER: ICD-10-CM

## 2019-01-01 DIAGNOSIS — T45.1X5A CHEMOTHERAPY-INDUCED NEUTROPENIA (HCC): ICD-10-CM

## 2019-01-01 DIAGNOSIS — R91.8 LUNG NODULES: ICD-10-CM

## 2019-01-01 DIAGNOSIS — D61.818 PANCYTOPENIA (HCC): ICD-10-CM

## 2019-01-01 DIAGNOSIS — K57.92 DIVERTICULITIS: Primary | ICD-10-CM

## 2019-01-01 DIAGNOSIS — I71.30 LEAKING ABDOMINAL AORTIC ANEURYSM (AAA): ICD-10-CM

## 2019-01-01 DIAGNOSIS — C34.90 LUNG CANCER METASTATIC TO BONE (HCC): ICD-10-CM

## 2019-01-01 DIAGNOSIS — C79.51 LUNG CANCER METASTATIC TO BONE (HCC): ICD-10-CM

## 2019-01-01 DIAGNOSIS — R06.2 WHEEZES: ICD-10-CM

## 2019-01-01 DIAGNOSIS — C34.92 ADENOCARCINOMA OF LEFT LUNG (HCC): ICD-10-CM

## 2019-01-01 DIAGNOSIS — D70.1 CHEMOTHERAPY-INDUCED NEUTROPENIA (HCC): ICD-10-CM

## 2019-01-01 DIAGNOSIS — C34.90 PRIMARY MALIGNANT NEOPLASM OF LUNG METASTATIC TO OTHER SITE, UNSPECIFIED LATERALITY (HCC): ICD-10-CM

## 2019-01-01 DIAGNOSIS — J44.1 CHRONIC OBSTRUCTIVE PULMONARY DISEASE WITH (ACUTE) EXACERBATION (HCC): ICD-10-CM

## 2019-01-01 DIAGNOSIS — R94.8 ABNORMAL BONE SCAN OF THORACIC SPINE: ICD-10-CM

## 2019-01-01 DIAGNOSIS — J44.1 CHRONIC OBSTRUCTIVE PULMONARY DISEASE WITH (ACUTE) EXACERBATION (HCC): Primary | ICD-10-CM

## 2019-01-01 DIAGNOSIS — R93.7 ABNORMAL SPINAL DIAGNOSTIC IMAGING: ICD-10-CM

## 2019-01-01 DIAGNOSIS — I73.9 PAD (PERIPHERAL ARTERY DISEASE) (HCC): ICD-10-CM

## 2019-01-01 LAB
A/G RATIO: 0.9 (ref 1.1–2.2)
A/G RATIO: 1.1 (ref 1.1–2.2)
A/G RATIO: 1.1 (ref 1.1–2.2)
A/G RATIO: 1.2 (ref 1.1–2.2)
ABO/RH: NORMAL
AFB CULTURE (MYCOBACTERIA): NORMAL
AFB SMEAR: NORMAL
ALBUMIN SERPL-MCNC: 2.8 G/DL (ref 3.4–5)
ALBUMIN SERPL-MCNC: 2.8 G/DL (ref 3.4–5)
ALBUMIN SERPL-MCNC: 3 G/DL (ref 3.4–5)
ALBUMIN SERPL-MCNC: 3.5 G/DL (ref 3.4–5)
ALP BLD-CCNC: 51 U/L (ref 40–129)
ALP BLD-CCNC: 53 U/L (ref 40–129)
ALP BLD-CCNC: 55 U/L (ref 40–129)
ALP BLD-CCNC: 58 U/L (ref 40–129)
ALT SERPL-CCNC: 14 U/L (ref 10–40)
ALT SERPL-CCNC: 15 U/L (ref 10–40)
ALT SERPL-CCNC: 16 U/L (ref 10–40)
ALT SERPL-CCNC: 28 U/L (ref 10–40)
AMORPHOUS: ABNORMAL /HPF
AMORPHOUS: ABNORMAL /HPF
ANION GAP SERPL CALCULATED.3IONS-SCNC: 10 MMOL/L (ref 3–16)
ANION GAP SERPL CALCULATED.3IONS-SCNC: 11 MMOL/L (ref 3–16)
ANION GAP SERPL CALCULATED.3IONS-SCNC: 12 MMOL/L (ref 3–16)
ANION GAP SERPL CALCULATED.3IONS-SCNC: 13 MMOL/L (ref 3–16)
ANION GAP SERPL CALCULATED.3IONS-SCNC: 13 MMOL/L (ref 3–16)
ANION GAP SERPL CALCULATED.3IONS-SCNC: 14 MMOL/L (ref 3–16)
ANION GAP SERPL CALCULATED.3IONS-SCNC: 7 MMOL/L (ref 3–16)
ANION GAP SERPL CALCULATED.3IONS-SCNC: 7 MMOL/L (ref 3–16)
ANION GAP SERPL CALCULATED.3IONS-SCNC: 8 MMOL/L (ref 3–16)
ANION GAP SERPL CALCULATED.3IONS-SCNC: 9 MMOL/L (ref 3–16)
ANISOCYTOSIS: ABNORMAL
ANTIBODY SCREEN: NORMAL
APTT: 106.9 SEC (ref 26–36)
APTT: 108.2 SEC (ref 26–36)
APTT: 171.1 SEC (ref 26–36)
APTT: 29 SEC (ref 26–36)
APTT: 31.6 SEC (ref 26–36)
APTT: 32.4 SEC (ref 26–36)
APTT: 35.1 SEC (ref 26–36)
APTT: 39.2 SEC (ref 26–36)
APTT: 43.6 SEC (ref 26–36)
APTT: 46.9 SEC (ref 26–36)
APTT: 47 SEC (ref 26–36)
APTT: 47.2 SEC (ref 26–36)
APTT: 48.1 SEC (ref 26–36)
APTT: 51.3 SEC (ref 26–36)
APTT: 52.7 SEC (ref 26–36)
APTT: 53.4 SEC (ref 26–36)
APTT: 53.6 SEC (ref 26–36)
APTT: 53.7 SEC (ref 26–36)
APTT: 54.2 SEC (ref 26–36)
APTT: 57.8 SEC (ref 26–36)
APTT: 58.5 SEC (ref 26–36)
APTT: 59.5 SEC (ref 26–36)
APTT: 60.4 SEC (ref 26–36)
APTT: 61.4 SEC (ref 26–36)
APTT: 62.3 SEC (ref 26–36)
APTT: 62.8 SEC (ref 26–36)
APTT: 63 SEC (ref 26–36)
APTT: 63.5 SEC (ref 26–36)
APTT: 63.6 SEC (ref 26–36)
APTT: 64.6 SEC (ref 26–36)
APTT: 65.5 SEC (ref 26–36)
APTT: 66.2 SEC (ref 26–36)
APTT: 66.2 SEC (ref 26–36)
APTT: 69.5 SEC (ref 26–36)
APTT: 70.4 SEC (ref 26–36)
APTT: 72.1 SEC (ref 26–36)
APTT: 73.5 SEC (ref 26–36)
APTT: 74.4 SEC (ref 26–36)
APTT: 75.4 SEC (ref 26–36)
APTT: 75.7 SEC (ref 26–36)
APTT: 75.9 SEC (ref 26–36)
APTT: 76.3 SEC (ref 26–36)
APTT: 76.4 SEC (ref 26–36)
APTT: 77.6 SEC (ref 26–36)
APTT: 80.5 SEC (ref 26–36)
APTT: 80.5 SEC (ref 26–36)
APTT: 85.3 SEC (ref 26–36)
APTT: 86.3 SEC (ref 26–36)
APTT: 87.4 SEC (ref 26–36)
APTT: 87.6 SEC (ref 26–36)
APTT: 93.1 SEC (ref 26–36)
APTT: 94.6 SEC (ref 26–36)
AST SERPL-CCNC: 14 U/L (ref 15–37)
AST SERPL-CCNC: 14 U/L (ref 15–37)
AST SERPL-CCNC: 15 U/L (ref 15–37)
AST SERPL-CCNC: 20 U/L (ref 15–37)
ATYPICAL LYMPHOCYTE RELATIVE PERCENT: 1 % (ref 0–6)
ATYPICAL LYMPHOCYTE RELATIVE PERCENT: 11 % (ref 0–6)
ATYPICAL LYMPHOCYTE RELATIVE PERCENT: 2 % (ref 0–6)
ATYPICAL LYMPHOCYTE RELATIVE PERCENT: 3 % (ref 0–6)
BANDED NEUTROPHILS RELATIVE PERCENT: 10 % (ref 0–7)
BANDED NEUTROPHILS RELATIVE PERCENT: 11 % (ref 0–7)
BANDED NEUTROPHILS RELATIVE PERCENT: 12 % (ref 0–7)
BANDED NEUTROPHILS RELATIVE PERCENT: 16 % (ref 0–7)
BANDED NEUTROPHILS RELATIVE PERCENT: 17 % (ref 0–7)
BANDED NEUTROPHILS RELATIVE PERCENT: 18 % (ref 0–7)
BANDED NEUTROPHILS RELATIVE PERCENT: 18 % (ref 0–7)
BANDED NEUTROPHILS RELATIVE PERCENT: 2 % (ref 0–7)
BANDED NEUTROPHILS RELATIVE PERCENT: 21 % (ref 0–7)
BANDED NEUTROPHILS RELATIVE PERCENT: 22 % (ref 0–7)
BANDED NEUTROPHILS RELATIVE PERCENT: 3 % (ref 0–7)
BANDED NEUTROPHILS RELATIVE PERCENT: 4 % (ref 0–7)
BANDED NEUTROPHILS RELATIVE PERCENT: 42 % (ref 0–7)
BANDED NEUTROPHILS RELATIVE PERCENT: 5 % (ref 0–7)
BANDED NEUTROPHILS RELATIVE PERCENT: 6 % (ref 0–7)
BANDED NEUTROPHILS RELATIVE PERCENT: 6 % (ref 0–7)
BANDED NEUTROPHILS RELATIVE PERCENT: 7 % (ref 0–7)
BANDED NEUTROPHILS RELATIVE PERCENT: 7 % (ref 0–7)
BASE EXCESS ARTERIAL: -1.2 MMOL/L (ref -3–3)
BASE EXCESS ARTERIAL: 2.6 MMOL/L (ref -3–3)
BASE EXCESS ARTERIAL: 3.1 MMOL/L (ref -3–3)
BASE EXCESS ARTERIAL: 3.1 MMOL/L (ref -3–3)
BASE EXCESS ARTERIAL: 4 (ref -3–3)
BASE EXCESS ARTERIAL: 4 MMOL/L (ref -3–3)
BASOPHILIC STIPPLING: ABNORMAL
BASOPHILS ABSOLUTE: 0 K/UL (ref 0–0.2)
BASOPHILS ABSOLUTE: 0.1 K/UL (ref 0–0.2)
BASOPHILS RELATIVE PERCENT: 0 %
BASOPHILS RELATIVE PERCENT: 0.1 %
BASOPHILS RELATIVE PERCENT: 0.3 %
BASOPHILS RELATIVE PERCENT: 0.3 %
BASOPHILS RELATIVE PERCENT: 0.4 %
BASOPHILS RELATIVE PERCENT: 0.6 %
BASOPHILS RELATIVE PERCENT: 1 %
BILIRUB SERPL-MCNC: 0.4 MG/DL (ref 0–1)
BILIRUB SERPL-MCNC: 0.4 MG/DL (ref 0–1)
BILIRUB SERPL-MCNC: 0.6 MG/DL (ref 0–1)
BILIRUB SERPL-MCNC: 1.2 MG/DL (ref 0–1)
BILIRUBIN URINE: NEGATIVE
BLOOD BANK DISPENSE STATUS: NORMAL
BLOOD BANK PRODUCT CODE: NORMAL
BLOOD CULTURE, ROUTINE: NORMAL
BLOOD CULTURE, ROUTINE: NORMAL
BLOOD, URINE: NEGATIVE
BPU ID: NORMAL
BUN BLDV-MCNC: 10 MG/DL (ref 7–20)
BUN BLDV-MCNC: 11 MG/DL (ref 7–20)
BUN BLDV-MCNC: 12 MG/DL (ref 7–20)
BUN BLDV-MCNC: 13 MG/DL (ref 7–20)
BUN BLDV-MCNC: 14 MG/DL (ref 7–20)
BUN BLDV-MCNC: 15 MG/DL (ref 7–20)
BUN BLDV-MCNC: 16 MG/DL (ref 7–20)
BUN BLDV-MCNC: 16 MG/DL (ref 7–20)
BUN BLDV-MCNC: 17 MG/DL (ref 7–20)
BUN BLDV-MCNC: 18 MG/DL (ref 7–20)
BUN BLDV-MCNC: 18 MG/DL (ref 7–20)
BUN BLDV-MCNC: 19 MG/DL (ref 7–20)
BUN BLDV-MCNC: 21 MG/DL (ref 7–20)
BUN BLDV-MCNC: 5 MG/DL (ref 7–20)
BUN BLDV-MCNC: 6 MG/DL (ref 7–20)
BUN BLDV-MCNC: 6 MG/DL (ref 7–20)
BUN BLDV-MCNC: 7 MG/DL (ref 7–20)
BUN BLDV-MCNC: 7 MG/DL (ref 7–20)
BUN BLDV-MCNC: 8 MG/DL (ref 7–20)
BUN BLDV-MCNC: 9 MG/DL (ref 7–20)
C DIFFICILE TOXIN, EIA: NORMAL
C-REACTIVE PROTEIN: 126.3 MG/L (ref 0–5.1)
CALCIUM SERPL-MCNC: 6.4 MG/DL (ref 8.3–10.6)
CALCIUM SERPL-MCNC: 6.7 MG/DL (ref 8.3–10.6)
CALCIUM SERPL-MCNC: 6.8 MG/DL (ref 8.3–10.6)
CALCIUM SERPL-MCNC: 7 MG/DL (ref 8.3–10.6)
CALCIUM SERPL-MCNC: 7.1 MG/DL (ref 8.3–10.6)
CALCIUM SERPL-MCNC: 7.2 MG/DL (ref 8.3–10.6)
CALCIUM SERPL-MCNC: 7.3 MG/DL (ref 8.3–10.6)
CALCIUM SERPL-MCNC: 7.4 MG/DL (ref 8.3–10.6)
CALCIUM SERPL-MCNC: 7.4 MG/DL (ref 8.3–10.6)
CALCIUM SERPL-MCNC: 7.5 MG/DL (ref 8.3–10.6)
CALCIUM SERPL-MCNC: 7.6 MG/DL (ref 8.3–10.6)
CALCIUM SERPL-MCNC: 7.7 MG/DL (ref 8.3–10.6)
CALCIUM SERPL-MCNC: 7.8 MG/DL (ref 8.3–10.6)
CALCIUM SERPL-MCNC: 8 MG/DL (ref 8.3–10.6)
CALCIUM SERPL-MCNC: 8.1 MG/DL (ref 8.3–10.6)
CALCIUM SERPL-MCNC: 8.2 MG/DL (ref 8.3–10.6)
CALCIUM SERPL-MCNC: 8.3 MG/DL (ref 8.3–10.6)
CALCIUM SERPL-MCNC: 8.8 MG/DL (ref 8.3–10.6)
CALCIUM SERPL-MCNC: 8.9 MG/DL (ref 8.3–10.6)
CARBOXYHEMOGLOBIN ARTERIAL: 0 % (ref 0–1.5)
CARBOXYHEMOGLOBIN ARTERIAL: 0.1 % (ref 0–1.5)
CARBOXYHEMOGLOBIN ARTERIAL: 0.3 % (ref 0–1.5)
CHLORIDE BLD-SCNC: 100 MMOL/L (ref 99–110)
CHLORIDE BLD-SCNC: 101 MMOL/L (ref 99–110)
CHLORIDE BLD-SCNC: 102 MMOL/L (ref 99–110)
CHLORIDE BLD-SCNC: 103 MMOL/L (ref 99–110)
CHLORIDE BLD-SCNC: 104 MMOL/L (ref 99–110)
CHLORIDE BLD-SCNC: 105 MMOL/L (ref 99–110)
CHLORIDE BLD-SCNC: 105 MMOL/L (ref 99–110)
CHLORIDE BLD-SCNC: 107 MMOL/L (ref 99–110)
CHLORIDE BLD-SCNC: 109 MMOL/L (ref 99–110)
CHLORIDE BLD-SCNC: 111 MMOL/L (ref 99–110)
CHLORIDE BLD-SCNC: 92 MMOL/L (ref 99–110)
CHLORIDE BLD-SCNC: 93 MMOL/L (ref 99–110)
CHLORIDE BLD-SCNC: 94 MMOL/L (ref 99–110)
CHLORIDE BLD-SCNC: 95 MMOL/L (ref 99–110)
CHLORIDE BLD-SCNC: 96 MMOL/L (ref 99–110)
CHLORIDE BLD-SCNC: 97 MMOL/L (ref 99–110)
CHLORIDE BLD-SCNC: 98 MMOL/L (ref 99–110)
CHLORIDE BLD-SCNC: 99 MMOL/L (ref 99–110)
CLARITY: CLEAR
CLOSTRIDIUM DIFFICILE DNA AMPLIFICATION: NORMAL
CO2: 20 MMOL/L (ref 21–32)
CO2: 21 MMOL/L (ref 21–32)
CO2: 22 MMOL/L (ref 21–32)
CO2: 23 MMOL/L (ref 21–32)
CO2: 24 MMOL/L (ref 21–32)
CO2: 25 MMOL/L (ref 21–32)
CO2: 26 MMOL/L (ref 21–32)
CO2: 27 MMOL/L (ref 21–32)
CO2: 28 MMOL/L (ref 21–32)
CO2: 29 MMOL/L (ref 21–32)
COLOR: YELLOW
CORTISOL TOTAL: 22.6 UG/DL
CREAT SERPL-MCNC: 0.6 MG/DL (ref 0.8–1.3)
CREAT SERPL-MCNC: 0.7 MG/DL (ref 0.8–1.3)
CREAT SERPL-MCNC: 0.8 MG/DL (ref 0.8–1.3)
CREAT SERPL-MCNC: 0.9 MG/DL (ref 0.8–1.3)
CREAT SERPL-MCNC: 1.1 MG/DL (ref 0.8–1.3)
CREAT SERPL-MCNC: 1.2 MG/DL (ref 0.8–1.3)
CREAT SERPL-MCNC: 1.2 MG/DL (ref 0.8–1.3)
CREAT SERPL-MCNC: 1.6 MG/DL (ref 0.8–1.3)
CREAT SERPL-MCNC: <0.5 MG/DL (ref 0.8–1.3)
CRENATED RBC'S: ABNORMAL
CULTURE, BLOOD 2: NORMAL
CULTURE, BLOOD 2: NORMAL
CULTURE, RESPIRATORY: NORMAL
D DIMER: 3876 NG/ML DDU (ref 0–229)
DESCRIPTION BLOOD BANK: NORMAL
DOHLE BODIES: PRESENT
EKG ATRIAL RATE: 108 BPM
EKG ATRIAL RATE: 58 BPM
EKG ATRIAL RATE: 72 BPM
EKG ATRIAL RATE: 84 BPM
EKG DIAGNOSIS: NORMAL
EKG P AXIS: -14 DEGREES
EKG P AXIS: 37 DEGREES
EKG P-R INTERVAL: 248 MS
EKG P-R INTERVAL: 264 MS
EKG P-R INTERVAL: 270 MS
EKG Q-T INTERVAL: 338 MS
EKG Q-T INTERVAL: 356 MS
EKG Q-T INTERVAL: 470 MS
EKG Q-T INTERVAL: 502 MS
EKG QRS DURATION: 124 MS
EKG QRS DURATION: 124 MS
EKG QRS DURATION: 126 MS
EKG QRS DURATION: 128 MS
EKG QTC CALCULATION (BAZETT): 452 MS
EKG QTC CALCULATION (BAZETT): 492 MS
EKG QTC CALCULATION (BAZETT): 531 MS
EKG QTC CALCULATION (BAZETT): 555 MS
EKG R AXIS: -33 DEGREES
EKG R AXIS: -50 DEGREES
EKG R AXIS: -54 DEGREES
EKG R AXIS: -67 DEGREES
EKG T AXIS: 149 DEGREES
EKG T AXIS: 29 DEGREES
EKG T AXIS: 66 DEGREES
EKG T AXIS: 93 DEGREES
EKG VENTRICULAR RATE: 108 BPM
EKG VENTRICULAR RATE: 134 BPM
EKG VENTRICULAR RATE: 58 BPM
EKG VENTRICULAR RATE: 84 BPM
EOSINOPHILS ABSOLUTE: 0 K/UL (ref 0–0.6)
EOSINOPHILS ABSOLUTE: 0.1 K/UL (ref 0–0.6)
EOSINOPHILS ABSOLUTE: 0.2 K/UL (ref 0–0.6)
EOSINOPHILS ABSOLUTE: 0.2 K/UL (ref 0–0.6)
EOSINOPHILS ABSOLUTE: 0.3 K/UL (ref 0–0.6)
EOSINOPHILS ABSOLUTE: 0.4 K/UL (ref 0–0.6)
EOSINOPHILS ABSOLUTE: 0.5 K/UL (ref 0–0.6)
EOSINOPHILS RELATIVE PERCENT: 0 %
EOSINOPHILS RELATIVE PERCENT: 0.1 %
EOSINOPHILS RELATIVE PERCENT: 0.4 %
EOSINOPHILS RELATIVE PERCENT: 0.4 %
EOSINOPHILS RELATIVE PERCENT: 1 %
EOSINOPHILS RELATIVE PERCENT: 1.3 %
EOSINOPHILS RELATIVE PERCENT: 1.7 %
EOSINOPHILS RELATIVE PERCENT: 2 %
EOSINOPHILS RELATIVE PERCENT: 2.2 %
EOSINOPHILS RELATIVE PERCENT: 4 %
EOSINOPHILS RELATIVE PERCENT: 5 %
EOSINOPHILS RELATIVE PERCENT: 7 %
ESTIMATED AVERAGE GLUCOSE: 157.1 MG/DL
FERRITIN: 2037 NG/ML (ref 30–400)
FERRITIN: 3019 NG/ML (ref 30–400)
FIBRINOGEN: 469 MG/DL (ref 200–397)
FOLATE: 14.75 NG/ML (ref 4.78–24.2)
FOLATE: >20 NG/ML (ref 4.78–24.2)
FUNGUS (MYCOLOGY) CULTURE: NORMAL
FUNGUS STAIN: NORMAL
GFR AFRICAN AMERICAN: 51
GFR AFRICAN AMERICAN: >60
GFR NON-AFRICAN AMERICAN: 43
GFR NON-AFRICAN AMERICAN: 59
GFR NON-AFRICAN AMERICAN: 59
GFR NON-AFRICAN AMERICAN: >60
GLOBULIN: 2.6 G/DL
GLOBULIN: 2.8 G/DL
GLOBULIN: 2.9 G/DL
GLOBULIN: 3 G/DL
GLUCOSE BLD-MCNC: 101 MG/DL (ref 70–99)
GLUCOSE BLD-MCNC: 104 MG/DL (ref 70–99)
GLUCOSE BLD-MCNC: 105 MG/DL (ref 70–99)
GLUCOSE BLD-MCNC: 108 MG/DL (ref 70–99)
GLUCOSE BLD-MCNC: 109 MG/DL (ref 70–99)
GLUCOSE BLD-MCNC: 109 MG/DL (ref 70–99)
GLUCOSE BLD-MCNC: 110 MG/DL (ref 70–99)
GLUCOSE BLD-MCNC: 111 MG/DL (ref 70–99)
GLUCOSE BLD-MCNC: 111 MG/DL (ref 70–99)
GLUCOSE BLD-MCNC: 112 MG/DL (ref 70–99)
GLUCOSE BLD-MCNC: 112 MG/DL (ref 70–99)
GLUCOSE BLD-MCNC: 113 MG/DL (ref 70–99)
GLUCOSE BLD-MCNC: 115 MG/DL (ref 70–99)
GLUCOSE BLD-MCNC: 116 MG/DL (ref 70–99)
GLUCOSE BLD-MCNC: 117 MG/DL (ref 70–99)
GLUCOSE BLD-MCNC: 118 MG/DL (ref 70–99)
GLUCOSE BLD-MCNC: 120 MG/DL (ref 70–99)
GLUCOSE BLD-MCNC: 121 MG/DL (ref 70–99)
GLUCOSE BLD-MCNC: 122 MG/DL (ref 70–99)
GLUCOSE BLD-MCNC: 123 MG/DL (ref 70–99)
GLUCOSE BLD-MCNC: 123 MG/DL (ref 70–99)
GLUCOSE BLD-MCNC: 124 MG/DL (ref 70–99)
GLUCOSE BLD-MCNC: 125 MG/DL (ref 70–99)
GLUCOSE BLD-MCNC: 126 MG/DL (ref 70–99)
GLUCOSE BLD-MCNC: 127 MG/DL (ref 70–99)
GLUCOSE BLD-MCNC: 127 MG/DL (ref 70–99)
GLUCOSE BLD-MCNC: 128 MG/DL (ref 70–99)
GLUCOSE BLD-MCNC: 129 MG/DL (ref 70–99)
GLUCOSE BLD-MCNC: 133 MG/DL (ref 70–99)
GLUCOSE BLD-MCNC: 133 MG/DL (ref 70–99)
GLUCOSE BLD-MCNC: 134 MG/DL (ref 70–99)
GLUCOSE BLD-MCNC: 135 MG/DL (ref 70–99)
GLUCOSE BLD-MCNC: 137 MG/DL (ref 70–99)
GLUCOSE BLD-MCNC: 139 MG/DL (ref 70–99)
GLUCOSE BLD-MCNC: 140 MG/DL (ref 70–99)
GLUCOSE BLD-MCNC: 141 MG/DL (ref 70–99)
GLUCOSE BLD-MCNC: 141 MG/DL (ref 70–99)
GLUCOSE BLD-MCNC: 143 MG/DL (ref 70–99)
GLUCOSE BLD-MCNC: 143 MG/DL (ref 70–99)
GLUCOSE BLD-MCNC: 144 MG/DL (ref 70–99)
GLUCOSE BLD-MCNC: 144 MG/DL (ref 70–99)
GLUCOSE BLD-MCNC: 145 MG/DL (ref 70–99)
GLUCOSE BLD-MCNC: 147 MG/DL (ref 70–99)
GLUCOSE BLD-MCNC: 148 MG/DL (ref 70–99)
GLUCOSE BLD-MCNC: 150 MG/DL (ref 70–99)
GLUCOSE BLD-MCNC: 156 MG/DL (ref 70–99)
GLUCOSE BLD-MCNC: 162 MG/DL (ref 70–99)
GLUCOSE BLD-MCNC: 163 MG/DL (ref 70–99)
GLUCOSE BLD-MCNC: 163 MG/DL (ref 70–99)
GLUCOSE BLD-MCNC: 165 MG/DL (ref 70–99)
GLUCOSE BLD-MCNC: 166 MG/DL (ref 70–99)
GLUCOSE BLD-MCNC: 170 MG/DL (ref 70–99)
GLUCOSE BLD-MCNC: 172 MG/DL (ref 70–99)
GLUCOSE BLD-MCNC: 175 MG/DL (ref 70–99)
GLUCOSE BLD-MCNC: 179 MG/DL (ref 70–99)
GLUCOSE BLD-MCNC: 180 MG/DL (ref 70–99)
GLUCOSE BLD-MCNC: 181 MG/DL (ref 70–99)
GLUCOSE BLD-MCNC: 194 MG/DL (ref 70–99)
GLUCOSE BLD-MCNC: 196 MG/DL (ref 70–99)
GLUCOSE BLD-MCNC: 202 MG/DL (ref 70–99)
GLUCOSE BLD-MCNC: 208 MG/DL (ref 70–99)
GLUCOSE BLD-MCNC: 213 MG/DL (ref 70–99)
GLUCOSE BLD-MCNC: 217 MG/DL (ref 70–99)
GLUCOSE BLD-MCNC: 221 MG/DL (ref 70–99)
GLUCOSE BLD-MCNC: 275 MG/DL (ref 70–99)
GLUCOSE BLD-MCNC: 71 MG/DL (ref 70–99)
GLUCOSE BLD-MCNC: 74 MG/DL (ref 70–99)
GLUCOSE BLD-MCNC: 76 MG/DL (ref 70–99)
GLUCOSE BLD-MCNC: 78 MG/DL (ref 70–99)
GLUCOSE BLD-MCNC: 79 MG/DL (ref 70–99)
GLUCOSE BLD-MCNC: 84 MG/DL (ref 70–99)
GLUCOSE BLD-MCNC: 85 MG/DL (ref 70–99)
GLUCOSE BLD-MCNC: 85 MG/DL (ref 70–99)
GLUCOSE BLD-MCNC: 87 MG/DL (ref 70–99)
GLUCOSE BLD-MCNC: 87 MG/DL (ref 70–99)
GLUCOSE BLD-MCNC: 88 MG/DL (ref 70–99)
GLUCOSE BLD-MCNC: 92 MG/DL (ref 70–99)
GLUCOSE BLD-MCNC: 93 MG/DL (ref 70–99)
GLUCOSE BLD-MCNC: 93 MG/DL (ref 70–99)
GLUCOSE BLD-MCNC: 97 MG/DL (ref 70–99)
GLUCOSE BLD-MCNC: 99 MG/DL (ref 70–99)
GLUCOSE BLD-MCNC: 99 MG/DL (ref 70–99)
GLUCOSE URINE: NEGATIVE MG/DL
GRAM STAIN RESULT: NORMAL
HAPTOGLOBIN: 326 MG/DL (ref 30–200)
HBA1C MFR BLD: 7.1 %
HCO3 ARTERIAL: 21.8 MMOL/L (ref 21–29)
HCO3 ARTERIAL: 24.4 MMOL/L (ref 21–29)
HCO3 ARTERIAL: 25.6 MMOL/L (ref 21–29)
HCO3 ARTERIAL: 27 MMOL/L (ref 21–29)
HCO3 ARTERIAL: 27 MMOL/L (ref 21–29)
HCO3 ARTERIAL: 27.2 MMOL/L (ref 21–29)
HCT VFR BLD CALC: 19.5 % (ref 40.5–52.5)
HCT VFR BLD CALC: 20.2 % (ref 40.5–52.5)
HCT VFR BLD CALC: 20.5 % (ref 40.5–52.5)
HCT VFR BLD CALC: 21.2 % (ref 40.5–52.5)
HCT VFR BLD CALC: 21.5 % (ref 40.5–52.5)
HCT VFR BLD CALC: 21.6 % (ref 40.5–52.5)
HCT VFR BLD CALC: 21.6 % (ref 40.5–52.5)
HCT VFR BLD CALC: 21.9 % (ref 40.5–52.5)
HCT VFR BLD CALC: 22.1 % (ref 40.5–52.5)
HCT VFR BLD CALC: 22.3 % (ref 40.5–52.5)
HCT VFR BLD CALC: 22.4 % (ref 40.5–52.5)
HCT VFR BLD CALC: 22.4 % (ref 40.5–52.5)
HCT VFR BLD CALC: 22.6 % (ref 40.5–52.5)
HCT VFR BLD CALC: 22.7 % (ref 40.5–52.5)
HCT VFR BLD CALC: 22.7 % (ref 40.5–52.5)
HCT VFR BLD CALC: 23.1 % (ref 40.5–52.5)
HCT VFR BLD CALC: 23.2 % (ref 40.5–52.5)
HCT VFR BLD CALC: 23.3 % (ref 40.5–52.5)
HCT VFR BLD CALC: 23.4 % (ref 40.5–52.5)
HCT VFR BLD CALC: 23.5 % (ref 40.5–52.5)
HCT VFR BLD CALC: 23.7 % (ref 40.5–52.5)
HCT VFR BLD CALC: 23.8 % (ref 40.5–52.5)
HCT VFR BLD CALC: 23.8 % (ref 40.5–52.5)
HCT VFR BLD CALC: 24 % (ref 40.5–52.5)
HCT VFR BLD CALC: 24.3 % (ref 40.5–52.5)
HCT VFR BLD CALC: 24.4 % (ref 40.5–52.5)
HCT VFR BLD CALC: 24.4 % (ref 40.5–52.5)
HCT VFR BLD CALC: 24.6 % (ref 40.5–52.5)
HCT VFR BLD CALC: 24.7 % (ref 40.5–52.5)
HCT VFR BLD CALC: 24.7 % (ref 40.5–52.5)
HCT VFR BLD CALC: 24.9 % (ref 40.5–52.5)
HCT VFR BLD CALC: 25 % (ref 40.5–52.5)
HCT VFR BLD CALC: 25.3 % (ref 40.5–52.5)
HCT VFR BLD CALC: 26 % (ref 40.5–52.5)
HCT VFR BLD CALC: 26.1 % (ref 40.5–52.5)
HCT VFR BLD CALC: 26.6 % (ref 40.5–52.5)
HCT VFR BLD CALC: 27.1 % (ref 40.5–52.5)
HCT VFR BLD CALC: 27.7 % (ref 40.5–52.5)
HCT VFR BLD CALC: 28.3 % (ref 40.5–52.5)
HCT VFR BLD CALC: 29.8 % (ref 40.5–52.5)
HCT VFR BLD CALC: 32.7 % (ref 40.5–52.5)
HCT VFR BLD CALC: 35.9 % (ref 40.5–52.5)
HCT VFR BLD CALC: 44.3 % (ref 40.5–52.5)
HEMATOLOGY PATH CONSULT: NO
HEMOGLOBIN, ART, EXTENDED: 8.1 G/DL (ref 13.5–17.5)
HEMOGLOBIN, ART, EXTENDED: 8.2 G/DL (ref 13.5–17.5)
HEMOGLOBIN, ART, EXTENDED: 8.8 G/DL (ref 13.5–17.5)
HEMOGLOBIN, ART, EXTENDED: 9.1 G/DL (ref 13.5–17.5)
HEMOGLOBIN, ART, EXTENDED: 9.8 G/DL (ref 13.5–17.5)
HEMOGLOBIN: 10.5 G/DL (ref 13.5–17.5)
HEMOGLOBIN: 10.8 G/DL (ref 13.5–17.5)
HEMOGLOBIN: 12.4 G/DL (ref 13.5–17.5)
HEMOGLOBIN: 14.8 G/DL (ref 13.5–17.5)
HEMOGLOBIN: 6.6 G/DL (ref 13.5–17.5)
HEMOGLOBIN: 6.7 G/DL (ref 13.5–17.5)
HEMOGLOBIN: 6.8 G/DL (ref 13.5–17.5)
HEMOGLOBIN: 7.2 G/DL (ref 13.5–17.5)
HEMOGLOBIN: 7.3 G/DL (ref 13.5–17.5)
HEMOGLOBIN: 7.3 G/DL (ref 13.5–17.5)
HEMOGLOBIN: 7.4 G/DL (ref 13.5–17.5)
HEMOGLOBIN: 7.5 G/DL (ref 13.5–17.5)
HEMOGLOBIN: 7.5 G/DL (ref 13.5–17.5)
HEMOGLOBIN: 7.6 G/DL (ref 13.5–17.5)
HEMOGLOBIN: 7.6 G/DL (ref 13.5–17.5)
HEMOGLOBIN: 7.7 G/DL (ref 13.5–17.5)
HEMOGLOBIN: 7.8 G/DL (ref 13.5–17.5)
HEMOGLOBIN: 7.9 G/DL (ref 13.5–17.5)
HEMOGLOBIN: 8 G/DL (ref 13.5–17.5)
HEMOGLOBIN: 8.1 G/DL (ref 13.5–17.5)
HEMOGLOBIN: 8.2 G/DL (ref 13.5–17.5)
HEMOGLOBIN: 8.3 G/DL (ref 13.5–17.5)
HEMOGLOBIN: 8.3 G/DL (ref 13.5–17.5)
HEMOGLOBIN: 8.5 G/DL (ref 13.5–17.5)
HEMOGLOBIN: 8.6 G/DL (ref 13.5–17.5)
HEMOGLOBIN: 8.6 G/DL (ref 13.5–17.5)
HEMOGLOBIN: 8.7 G/DL (ref 13.5–17.5)
HEMOGLOBIN: 8.8 G/DL (ref 13.5–17.5)
HEMOGLOBIN: 8.9 G/DL (ref 13.5–17.5)
HEMOGLOBIN: 8.9 G/DL (ref 13.5–17.5)
HEMOGLOBIN: 9.1 G/DL (ref 13.5–17.5)
HEMOGLOBIN: 9.4 G/DL (ref 13.5–17.5)
HEMOGLOBIN: 9.7 G/DL (ref 13.5–17.5)
HEMOGLOBIN: 9.9 G/DL (ref 13.5–17.5)
HYPOCHROMIA: ABNORMAL
IMMATURE RETIC FRACT: 0.62 (ref 0.21–0.37)
INR BLD: 1.06 (ref 0.86–1.14)
INR BLD: 1.13 (ref 0.86–1.14)
INR BLD: 1.25 (ref 0.86–1.14)
INR BLD: 1.28 (ref 0.86–1.14)
INR BLD: 1.4 (ref 0.86–1.14)
IRON SATURATION: 22 % (ref 20–50)
IRON SATURATION: 28 % (ref 20–50)
IRON: 25 UG/DL (ref 59–158)
IRON: 39 UG/DL (ref 59–158)
KETONES, URINE: ABNORMAL MG/DL
KETONES, URINE: NEGATIVE MG/DL
KETONES, URINE: NEGATIVE MG/DL
LACTATE DEHYDROGENASE: 464 U/L (ref 100–190)
LACTIC ACID: 0.9 MMOL/L (ref 0.4–2)
LACTIC ACID: 1 MMOL/L (ref 0.4–2)
LACTIC ACID: 1.3 MMOL/L (ref 0.4–2)
LACTIC ACID: 1.9 MMOL/L (ref 0.4–2)
LEUKOCYTE ESTERASE, URINE: NEGATIVE
LIPASE: 80 U/L (ref 13–60)
LV EF: 33 %
LVEF MODALITY: NORMAL
LYMPHOCYTES ABSOLUTE: 0.2 K/UL (ref 1–5.1)
LYMPHOCYTES ABSOLUTE: 0.2 K/UL (ref 1–5.1)
LYMPHOCYTES ABSOLUTE: 0.3 K/UL (ref 1–5.1)
LYMPHOCYTES ABSOLUTE: 0.4 K/UL (ref 1–5.1)
LYMPHOCYTES ABSOLUTE: 0.5 K/UL (ref 1–5.1)
LYMPHOCYTES ABSOLUTE: 0.6 K/UL (ref 1–5.1)
LYMPHOCYTES ABSOLUTE: 0.7 K/UL (ref 1–5.1)
LYMPHOCYTES ABSOLUTE: 0.7 K/UL (ref 1–5.1)
LYMPHOCYTES ABSOLUTE: 0.8 K/UL (ref 1–5.1)
LYMPHOCYTES ABSOLUTE: 0.8 K/UL (ref 1–5.1)
LYMPHOCYTES ABSOLUTE: 0.9 K/UL (ref 1–5.1)
LYMPHOCYTES ABSOLUTE: 1 K/UL (ref 1–5.1)
LYMPHOCYTES ABSOLUTE: 1.1 K/UL (ref 1–5.1)
LYMPHOCYTES ABSOLUTE: 1.2 K/UL (ref 1–5.1)
LYMPHOCYTES ABSOLUTE: 1.2 K/UL (ref 1–5.1)
LYMPHOCYTES ABSOLUTE: 1.3 K/UL (ref 1–5.1)
LYMPHOCYTES ABSOLUTE: 1.5 K/UL (ref 1–5.1)
LYMPHOCYTES ABSOLUTE: 1.8 K/UL (ref 1–5.1)
LYMPHOCYTES ABSOLUTE: 2.2 K/UL (ref 1–5.1)
LYMPHOCYTES ABSOLUTE: 2.3 K/UL (ref 1–5.1)
LYMPHOCYTES ABSOLUTE: 3.1 K/UL (ref 1–5.1)
LYMPHOCYTES RELATIVE PERCENT: 10 %
LYMPHOCYTES RELATIVE PERCENT: 11 %
LYMPHOCYTES RELATIVE PERCENT: 11 %
LYMPHOCYTES RELATIVE PERCENT: 12 %
LYMPHOCYTES RELATIVE PERCENT: 12 %
LYMPHOCYTES RELATIVE PERCENT: 13 %
LYMPHOCYTES RELATIVE PERCENT: 13 %
LYMPHOCYTES RELATIVE PERCENT: 15 %
LYMPHOCYTES RELATIVE PERCENT: 17 %
LYMPHOCYTES RELATIVE PERCENT: 17 %
LYMPHOCYTES RELATIVE PERCENT: 17.2 %
LYMPHOCYTES RELATIVE PERCENT: 2 %
LYMPHOCYTES RELATIVE PERCENT: 2.9 %
LYMPHOCYTES RELATIVE PERCENT: 21.2 %
LYMPHOCYTES RELATIVE PERCENT: 23 %
LYMPHOCYTES RELATIVE PERCENT: 27 %
LYMPHOCYTES RELATIVE PERCENT: 3 %
LYMPHOCYTES RELATIVE PERCENT: 3.5 %
LYMPHOCYTES RELATIVE PERCENT: 3.7 %
LYMPHOCYTES RELATIVE PERCENT: 33 %
LYMPHOCYTES RELATIVE PERCENT: 34 %
LYMPHOCYTES RELATIVE PERCENT: 34 %
LYMPHOCYTES RELATIVE PERCENT: 4 %
LYMPHOCYTES RELATIVE PERCENT: 4 %
LYMPHOCYTES RELATIVE PERCENT: 5 %
LYMPHOCYTES RELATIVE PERCENT: 5 %
LYMPHOCYTES RELATIVE PERCENT: 5.4 %
LYMPHOCYTES RELATIVE PERCENT: 5.6 %
LYMPHOCYTES RELATIVE PERCENT: 6 %
LYMPHOCYTES RELATIVE PERCENT: 6 %
LYMPHOCYTES RELATIVE PERCENT: 6.9 %
LYMPHOCYTES RELATIVE PERCENT: 7 %
LYMPHOCYTES RELATIVE PERCENT: 7 %
LYMPHOCYTES RELATIVE PERCENT: 8 %
LYMPHOCYTES RELATIVE PERCENT: 8.7 %
LYMPHOCYTES RELATIVE PERCENT: 9 %
LYMPHOCYTES RELATIVE PERCENT: 9 %
LYMPHOCYTES RELATIVE PERCENT: 9.6 %
MACROCYTES: ABNORMAL
MAGNESIUM: 1.3 MG/DL (ref 1.8–2.4)
MAGNESIUM: 1.4 MG/DL (ref 1.8–2.4)
MAGNESIUM: 1.4 MG/DL (ref 1.8–2.4)
MAGNESIUM: 1.5 MG/DL (ref 1.8–2.4)
MAGNESIUM: 1.6 MG/DL (ref 1.8–2.4)
MAGNESIUM: 1.7 MG/DL (ref 1.8–2.4)
MAGNESIUM: 1.8 MG/DL (ref 1.8–2.4)
MAGNESIUM: 1.9 MG/DL (ref 1.8–2.4)
MAGNESIUM: 2 MG/DL (ref 1.8–2.4)
MAGNESIUM: 2.2 MG/DL (ref 1.8–2.4)
MCH RBC QN AUTO: 28.4 PG (ref 26–34)
MCH RBC QN AUTO: 28.5 PG (ref 26–34)
MCH RBC QN AUTO: 28.6 PG (ref 26–34)
MCH RBC QN AUTO: 28.6 PG (ref 26–34)
MCH RBC QN AUTO: 28.7 PG (ref 26–34)
MCH RBC QN AUTO: 28.7 PG (ref 26–34)
MCH RBC QN AUTO: 28.8 PG (ref 26–34)
MCH RBC QN AUTO: 28.9 PG (ref 26–34)
MCH RBC QN AUTO: 29 PG (ref 26–34)
MCH RBC QN AUTO: 29.1 PG (ref 26–34)
MCH RBC QN AUTO: 29.2 PG (ref 26–34)
MCH RBC QN AUTO: 29.3 PG (ref 26–34)
MCH RBC QN AUTO: 29.7 PG (ref 26–34)
MCH RBC QN AUTO: 29.9 PG (ref 26–34)
MCH RBC QN AUTO: 30 PG (ref 26–34)
MCH RBC QN AUTO: 30.1 PG (ref 26–34)
MCH RBC QN AUTO: 30.2 PG (ref 26–34)
MCH RBC QN AUTO: 30.4 PG (ref 26–34)
MCH RBC QN AUTO: 30.5 PG (ref 26–34)
MCH RBC QN AUTO: 30.6 PG (ref 26–34)
MCH RBC QN AUTO: 30.7 PG (ref 26–34)
MCH RBC QN AUTO: 30.8 PG (ref 26–34)
MCH RBC QN AUTO: 30.9 PG (ref 26–34)
MCH RBC QN AUTO: 30.9 PG (ref 26–34)
MCH RBC QN AUTO: 31 PG (ref 26–34)
MCH RBC QN AUTO: 31.9 PG (ref 26–34)
MCHC RBC AUTO-ENTMCNC: 32 G/DL (ref 31–36)
MCHC RBC AUTO-ENTMCNC: 32.2 G/DL (ref 31–36)
MCHC RBC AUTO-ENTMCNC: 32.3 G/DL (ref 31–36)
MCHC RBC AUTO-ENTMCNC: 32.4 G/DL (ref 31–36)
MCHC RBC AUTO-ENTMCNC: 32.7 G/DL (ref 31–36)
MCHC RBC AUTO-ENTMCNC: 32.8 G/DL (ref 31–36)
MCHC RBC AUTO-ENTMCNC: 33 G/DL (ref 31–36)
MCHC RBC AUTO-ENTMCNC: 33.1 G/DL (ref 31–36)
MCHC RBC AUTO-ENTMCNC: 33.1 G/DL (ref 31–36)
MCHC RBC AUTO-ENTMCNC: 33.2 G/DL (ref 31–36)
MCHC RBC AUTO-ENTMCNC: 33.2 G/DL (ref 31–36)
MCHC RBC AUTO-ENTMCNC: 33.3 G/DL (ref 31–36)
MCHC RBC AUTO-ENTMCNC: 33.3 G/DL (ref 31–36)
MCHC RBC AUTO-ENTMCNC: 33.4 G/DL (ref 31–36)
MCHC RBC AUTO-ENTMCNC: 33.5 G/DL (ref 31–36)
MCHC RBC AUTO-ENTMCNC: 33.6 G/DL (ref 31–36)
MCHC RBC AUTO-ENTMCNC: 33.7 G/DL (ref 31–36)
MCHC RBC AUTO-ENTMCNC: 33.8 G/DL (ref 31–36)
MCHC RBC AUTO-ENTMCNC: 33.8 G/DL (ref 31–36)
MCHC RBC AUTO-ENTMCNC: 33.9 G/DL (ref 31–36)
MCHC RBC AUTO-ENTMCNC: 34 G/DL (ref 31–36)
MCHC RBC AUTO-ENTMCNC: 34 G/DL (ref 31–36)
MCHC RBC AUTO-ENTMCNC: 34.1 G/DL (ref 31–36)
MCHC RBC AUTO-ENTMCNC: 34.1 G/DL (ref 31–36)
MCHC RBC AUTO-ENTMCNC: 34.2 G/DL (ref 31–36)
MCHC RBC AUTO-ENTMCNC: 34.4 G/DL (ref 31–36)
MCHC RBC AUTO-ENTMCNC: 34.4 G/DL (ref 31–36)
MCHC RBC AUTO-ENTMCNC: 34.5 G/DL (ref 31–36)
MCHC RBC AUTO-ENTMCNC: 34.5 G/DL (ref 31–36)
MCHC RBC AUTO-ENTMCNC: 34.7 G/DL (ref 31–36)
MCHC RBC AUTO-ENTMCNC: 34.8 G/DL (ref 31–36)
MCHC RBC AUTO-ENTMCNC: 34.8 G/DL (ref 31–36)
MCHC RBC AUTO-ENTMCNC: 35.1 G/DL (ref 31–36)
MCHC RBC AUTO-ENTMCNC: 35.2 G/DL (ref 31–36)
MCHC RBC AUTO-ENTMCNC: 35.5 G/DL (ref 31–36)
MCHC RBC AUTO-ENTMCNC: 35.6 G/DL (ref 31–36)
MCV RBC AUTO: 84.8 FL (ref 80–100)
MCV RBC AUTO: 85.5 FL (ref 80–100)
MCV RBC AUTO: 85.6 FL (ref 80–100)
MCV RBC AUTO: 85.7 FL (ref 80–100)
MCV RBC AUTO: 85.8 FL (ref 80–100)
MCV RBC AUTO: 85.9 FL (ref 80–100)
MCV RBC AUTO: 85.9 FL (ref 80–100)
MCV RBC AUTO: 86 FL (ref 80–100)
MCV RBC AUTO: 86.1 FL (ref 80–100)
MCV RBC AUTO: 86.1 FL (ref 80–100)
MCV RBC AUTO: 86.2 FL (ref 80–100)
MCV RBC AUTO: 86.3 FL (ref 80–100)
MCV RBC AUTO: 86.3 FL (ref 80–100)
MCV RBC AUTO: 86.5 FL (ref 80–100)
MCV RBC AUTO: 86.8 FL (ref 80–100)
MCV RBC AUTO: 86.9 FL (ref 80–100)
MCV RBC AUTO: 87 FL (ref 80–100)
MCV RBC AUTO: 87 FL (ref 80–100)
MCV RBC AUTO: 87.3 FL (ref 80–100)
MCV RBC AUTO: 87.4 FL (ref 80–100)
MCV RBC AUTO: 87.6 FL (ref 80–100)
MCV RBC AUTO: 88 FL (ref 80–100)
MCV RBC AUTO: 88.1 FL (ref 80–100)
MCV RBC AUTO: 88.1 FL (ref 80–100)
MCV RBC AUTO: 88.4 FL (ref 80–100)
MCV RBC AUTO: 88.5 FL (ref 80–100)
MCV RBC AUTO: 88.7 FL (ref 80–100)
MCV RBC AUTO: 88.9 FL (ref 80–100)
MCV RBC AUTO: 89 FL (ref 80–100)
MCV RBC AUTO: 89.3 FL (ref 80–100)
MCV RBC AUTO: 89.4 FL (ref 80–100)
MCV RBC AUTO: 89.5 FL (ref 80–100)
MCV RBC AUTO: 89.6 FL (ref 80–100)
MCV RBC AUTO: 89.7 FL (ref 80–100)
MCV RBC AUTO: 90 FL (ref 80–100)
MCV RBC AUTO: 90.1 FL (ref 80–100)
MCV RBC AUTO: 91.7 FL (ref 80–100)
MCV RBC AUTO: 92.4 FL (ref 80–100)
METAMYELOCYTES RELATIVE PERCENT: 1 %
METAMYELOCYTES RELATIVE PERCENT: 2 %
METAMYELOCYTES RELATIVE PERCENT: 2 %
METHEMOGLOBIN ARTERIAL: 0.5 %
METHEMOGLOBIN ARTERIAL: 0.5 %
METHEMOGLOBIN ARTERIAL: 0.6 %
METHEMOGLOBIN ARTERIAL: 0.8 %
METHEMOGLOBIN ARTERIAL: 1 %
MICROCYTES: ABNORMAL
MICROSCOPIC EXAMINATION: NORMAL
MICROSCOPIC EXAMINATION: YES
MICROSCOPIC EXAMINATION: YES
MONOCYTES ABSOLUTE: 0 K/UL (ref 0–1.3)
MONOCYTES ABSOLUTE: 0 K/UL (ref 0–1.3)
MONOCYTES ABSOLUTE: 0.1 K/UL (ref 0–1.3)
MONOCYTES ABSOLUTE: 0.2 K/UL (ref 0–1.3)
MONOCYTES ABSOLUTE: 0.3 K/UL (ref 0–1.3)
MONOCYTES ABSOLUTE: 0.3 K/UL (ref 0–1.3)
MONOCYTES ABSOLUTE: 0.4 K/UL (ref 0–1.3)
MONOCYTES ABSOLUTE: 0.5 K/UL (ref 0–1.3)
MONOCYTES ABSOLUTE: 0.6 K/UL (ref 0–1.3)
MONOCYTES ABSOLUTE: 0.7 K/UL (ref 0–1.3)
MONOCYTES ABSOLUTE: 0.8 K/UL (ref 0–1.3)
MONOCYTES ABSOLUTE: 0.8 K/UL (ref 0–1.3)
MONOCYTES ABSOLUTE: 0.9 K/UL (ref 0–1.3)
MONOCYTES ABSOLUTE: 1 K/UL (ref 0–1.3)
MONOCYTES ABSOLUTE: 1.3 K/UL (ref 0–1.3)
MONOCYTES ABSOLUTE: 1.5 K/UL (ref 0–1.3)
MONOCYTES ABSOLUTE: 1.9 K/UL (ref 0–1.3)
MONOCYTES RELATIVE PERCENT: 0 %
MONOCYTES RELATIVE PERCENT: 1 %
MONOCYTES RELATIVE PERCENT: 1 %
MONOCYTES RELATIVE PERCENT: 1.8 %
MONOCYTES RELATIVE PERCENT: 10.5 %
MONOCYTES RELATIVE PERCENT: 11 %
MONOCYTES RELATIVE PERCENT: 12.6 %
MONOCYTES RELATIVE PERCENT: 13 %
MONOCYTES RELATIVE PERCENT: 19 %
MONOCYTES RELATIVE PERCENT: 2 %
MONOCYTES RELATIVE PERCENT: 2 %
MONOCYTES RELATIVE PERCENT: 2.1 %
MONOCYTES RELATIVE PERCENT: 3 %
MONOCYTES RELATIVE PERCENT: 3.2 %
MONOCYTES RELATIVE PERCENT: 4 %
MONOCYTES RELATIVE PERCENT: 5 %
MONOCYTES RELATIVE PERCENT: 6 %
MONOCYTES RELATIVE PERCENT: 6.6 %
MONOCYTES RELATIVE PERCENT: 6.8 %
MONOCYTES RELATIVE PERCENT: 7 %
MONOCYTES RELATIVE PERCENT: 7.2 %
MONOCYTES RELATIVE PERCENT: 7.4 %
MONOCYTES RELATIVE PERCENT: 8 %
MONOCYTES RELATIVE PERCENT: 8 %
MONOCYTES RELATIVE PERCENT: 8.1 %
MUCUS: ABNORMAL /LPF
MYELOCYTE PERCENT: 1 %
MYELOCYTE PERCENT: 3 %
MYELOCYTE PERCENT: 5 %
MYELOCYTE PERCENT: 6 %
NEUTROPHILS ABSOLUTE: 1 K/UL (ref 1.7–7.7)
NEUTROPHILS ABSOLUTE: 1.2 K/UL (ref 1.7–7.7)
NEUTROPHILS ABSOLUTE: 1.4 K/UL (ref 1.7–7.7)
NEUTROPHILS ABSOLUTE: 1.5 K/UL (ref 1.7–7.7)
NEUTROPHILS ABSOLUTE: 10.4 K/UL (ref 1.7–7.7)
NEUTROPHILS ABSOLUTE: 11.1 K/UL (ref 1.7–7.7)
NEUTROPHILS ABSOLUTE: 11.4 K/UL (ref 1.7–7.7)
NEUTROPHILS ABSOLUTE: 13.2 K/UL (ref 1.7–7.7)
NEUTROPHILS ABSOLUTE: 16.5 K/UL (ref 1.7–7.7)
NEUTROPHILS ABSOLUTE: 3.6 K/UL (ref 1.7–7.7)
NEUTROPHILS ABSOLUTE: 3.6 K/UL (ref 1.7–7.7)
NEUTROPHILS ABSOLUTE: 4.1 K/UL (ref 1.7–7.7)
NEUTROPHILS ABSOLUTE: 4.6 K/UL (ref 1.7–7.7)
NEUTROPHILS ABSOLUTE: 4.9 K/UL (ref 1.7–7.7)
NEUTROPHILS ABSOLUTE: 5.8 K/UL (ref 1.7–7.7)
NEUTROPHILS ABSOLUTE: 5.9 K/UL (ref 1.7–7.7)
NEUTROPHILS ABSOLUTE: 6 K/UL (ref 1.7–7.7)
NEUTROPHILS ABSOLUTE: 6.1 K/UL (ref 1.7–7.7)
NEUTROPHILS ABSOLUTE: 6.4 K/UL (ref 1.7–7.7)
NEUTROPHILS ABSOLUTE: 6.5 K/UL (ref 1.7–7.7)
NEUTROPHILS ABSOLUTE: 6.6 K/UL (ref 1.7–7.7)
NEUTROPHILS ABSOLUTE: 6.8 K/UL (ref 1.7–7.7)
NEUTROPHILS ABSOLUTE: 7.1 K/UL (ref 1.7–7.7)
NEUTROPHILS ABSOLUTE: 7.2 K/UL (ref 1.7–7.7)
NEUTROPHILS ABSOLUTE: 7.4 K/UL (ref 1.7–7.7)
NEUTROPHILS ABSOLUTE: 7.5 K/UL (ref 1.7–7.7)
NEUTROPHILS ABSOLUTE: 7.8 K/UL (ref 1.7–7.7)
NEUTROPHILS ABSOLUTE: 7.8 K/UL (ref 1.7–7.7)
NEUTROPHILS ABSOLUTE: 7.9 K/UL (ref 1.7–7.7)
NEUTROPHILS ABSOLUTE: 8.1 K/UL (ref 1.7–7.7)
NEUTROPHILS ABSOLUTE: 8.7 K/UL (ref 1.7–7.7)
NEUTROPHILS ABSOLUTE: 8.8 K/UL (ref 1.7–7.7)
NEUTROPHILS ABSOLUTE: 8.9 K/UL (ref 1.7–7.7)
NEUTROPHILS ABSOLUTE: 9.4 K/UL (ref 1.7–7.7)
NEUTROPHILS ABSOLUTE: 9.7 K/UL (ref 1.7–7.7)
NEUTROPHILS ABSOLUTE: 9.7 K/UL (ref 1.7–7.7)
NEUTROPHILS RELATIVE PERCENT: 29 %
NEUTROPHILS RELATIVE PERCENT: 32 %
NEUTROPHILS RELATIVE PERCENT: 35 %
NEUTROPHILS RELATIVE PERCENT: 45 %
NEUTROPHILS RELATIVE PERCENT: 49 %
NEUTROPHILS RELATIVE PERCENT: 54 %
NEUTROPHILS RELATIVE PERCENT: 55 %
NEUTROPHILS RELATIVE PERCENT: 55 %
NEUTROPHILS RELATIVE PERCENT: 59 %
NEUTROPHILS RELATIVE PERCENT: 60 %
NEUTROPHILS RELATIVE PERCENT: 63 %
NEUTROPHILS RELATIVE PERCENT: 65.1 %
NEUTROPHILS RELATIVE PERCENT: 68.9 %
NEUTROPHILS RELATIVE PERCENT: 69 %
NEUTROPHILS RELATIVE PERCENT: 70 %
NEUTROPHILS RELATIVE PERCENT: 70 %
NEUTROPHILS RELATIVE PERCENT: 75 %
NEUTROPHILS RELATIVE PERCENT: 77 %
NEUTROPHILS RELATIVE PERCENT: 77 %
NEUTROPHILS RELATIVE PERCENT: 78 %
NEUTROPHILS RELATIVE PERCENT: 78 %
NEUTROPHILS RELATIVE PERCENT: 79 %
NEUTROPHILS RELATIVE PERCENT: 81 %
NEUTROPHILS RELATIVE PERCENT: 81 %
NEUTROPHILS RELATIVE PERCENT: 83.1 %
NEUTROPHILS RELATIVE PERCENT: 83.1 %
NEUTROPHILS RELATIVE PERCENT: 84 %
NEUTROPHILS RELATIVE PERCENT: 84.4 %
NEUTROPHILS RELATIVE PERCENT: 84.7 %
NEUTROPHILS RELATIVE PERCENT: 85 %
NEUTROPHILS RELATIVE PERCENT: 86.1 %
NEUTROPHILS RELATIVE PERCENT: 87 %
NEUTROPHILS RELATIVE PERCENT: 93 %
NEUTROPHILS RELATIVE PERCENT: 94.7 %
NEUTROPHILS RELATIVE PERCENT: 94.9 %
NEUTROPHILS RELATIVE PERCENT: 96 %
NITRITE, URINE: NEGATIVE
NUCLEATED RED BLOOD CELLS: 1 /100 WBC
O2 CONTENT ARTERIAL: 11 ML/DL
O2 CONTENT ARTERIAL: 11 ML/DL
O2 CONTENT ARTERIAL: 12 ML/DL
O2 CONTENT ARTERIAL: 13 ML/DL
O2 CONTENT ARTERIAL: 9 ML/DL
O2 SAT, ARTERIAL: 81.4 %
O2 SAT, ARTERIAL: 90.4 %
O2 SAT, ARTERIAL: 91.8 %
O2 SAT, ARTERIAL: 92 %
O2 SAT, ARTERIAL: 95.8 %
O2 SAT, ARTERIAL: 99 % (ref 93–100)
O2 THERAPY: ABNORMAL
OCCULT BLOOD SCREENING: ABNORMAL
OSMOLALITY URINE: 599 MOSM/KG (ref 390–1070)
OVALOCYTES: ABNORMAL
PCO2 ARTERIAL: 27.7 MMHG (ref 35–45)
PCO2 ARTERIAL: 30.4 MMHG (ref 35–45)
PCO2 ARTERIAL: 31.2 MMHG (ref 35–45)
PCO2 ARTERIAL: 34.9 MMHG (ref 35–45)
PCO2 ARTERIAL: 35 MM HG (ref 35–45)
PCO2 ARTERIAL: 37.8 MMHG (ref 35–45)
PDW BLD-RTO: 13.1 % (ref 12.4–15.4)
PDW BLD-RTO: 13.1 % (ref 12.4–15.4)
PDW BLD-RTO: 13.2 % (ref 12.4–15.4)
PDW BLD-RTO: 13.4 % (ref 12.4–15.4)
PDW BLD-RTO: 13.5 % (ref 12.4–15.4)
PDW BLD-RTO: 13.8 % (ref 12.4–15.4)
PDW BLD-RTO: 14.2 % (ref 12.4–15.4)
PDW BLD-RTO: 14.3 % (ref 12.4–15.4)
PDW BLD-RTO: 14.5 % (ref 12.4–15.4)
PDW BLD-RTO: 14.5 % (ref 12.4–15.4)
PDW BLD-RTO: 14.6 % (ref 12.4–15.4)
PDW BLD-RTO: 14.8 % (ref 12.4–15.4)
PDW BLD-RTO: 15.1 % (ref 12.4–15.4)
PDW BLD-RTO: 15.5 % (ref 12.4–15.4)
PDW BLD-RTO: 15.6 % (ref 12.4–15.4)
PDW BLD-RTO: 15.7 % (ref 12.4–15.4)
PDW BLD-RTO: 15.7 % (ref 12.4–15.4)
PDW BLD-RTO: 15.8 % (ref 12.4–15.4)
PDW BLD-RTO: 15.9 % (ref 12.4–15.4)
PDW BLD-RTO: 15.9 % (ref 12.4–15.4)
PDW BLD-RTO: 16 % (ref 12.4–15.4)
PDW BLD-RTO: 16.1 % (ref 12.4–15.4)
PDW BLD-RTO: 16.3 % (ref 12.4–15.4)
PDW BLD-RTO: 16.5 % (ref 12.4–15.4)
PDW BLD-RTO: 16.6 % (ref 12.4–15.4)
PDW BLD-RTO: 16.7 % (ref 12.4–15.4)
PDW BLD-RTO: 16.7 % (ref 12.4–15.4)
PDW BLD-RTO: 16.8 % (ref 12.4–15.4)
PDW BLD-RTO: 17.2 % (ref 12.4–15.4)
PDW BLD-RTO: 17.6 % (ref 12.4–15.4)
PDW BLD-RTO: 18.9 % (ref 12.4–15.4)
PERFORMED ON: ABNORMAL
PERFORMED ON: NORMAL
PH ARTERIAL: 7.47 (ref 7.35–7.45)
PH ARTERIAL: 7.47 (ref 7.35–7.45)
PH ARTERIAL: 7.5 (ref 7.35–7.45)
PH ARTERIAL: 7.51 (ref 7.35–7.45)
PH ARTERIAL: 7.53 (ref 7.35–7.45)
PH ARTERIAL: 7.56 (ref 7.35–7.45)
PH UA: 6 (ref 5–8)
PH UA: 6 (ref 5–8)
PH UA: 6.5
PHOSPHORUS: 1.1 MG/DL (ref 2.5–4.9)
PLATELET # BLD: 101 K/UL (ref 135–450)
PLATELET # BLD: 108 K/UL (ref 135–450)
PLATELET # BLD: 114 K/UL (ref 135–450)
PLATELET # BLD: 118 K/UL (ref 135–450)
PLATELET # BLD: 123 K/UL (ref 135–450)
PLATELET # BLD: 123 K/UL (ref 135–450)
PLATELET # BLD: 125 K/UL (ref 135–450)
PLATELET # BLD: 126 K/UL (ref 135–450)
PLATELET # BLD: 128 K/UL (ref 135–450)
PLATELET # BLD: 128 K/UL (ref 135–450)
PLATELET # BLD: 129 K/UL (ref 135–450)
PLATELET # BLD: 129 K/UL (ref 135–450)
PLATELET # BLD: 133 K/UL (ref 135–450)
PLATELET # BLD: 138 K/UL (ref 135–450)
PLATELET # BLD: 145 K/UL (ref 135–450)
PLATELET # BLD: 146 K/UL (ref 135–450)
PLATELET # BLD: 147 K/UL (ref 135–450)
PLATELET # BLD: 147 K/UL (ref 135–450)
PLATELET # BLD: 154 K/UL (ref 135–450)
PLATELET # BLD: 159 K/UL (ref 135–450)
PLATELET # BLD: 161 K/UL (ref 135–450)
PLATELET # BLD: 166 K/UL (ref 135–450)
PLATELET # BLD: 173 K/UL (ref 135–450)
PLATELET # BLD: 174 K/UL (ref 135–450)
PLATELET # BLD: 174 K/UL (ref 135–450)
PLATELET # BLD: 178 K/UL (ref 135–450)
PLATELET # BLD: 180 K/UL (ref 135–450)
PLATELET # BLD: 180 K/UL (ref 135–450)
PLATELET # BLD: 192 K/UL (ref 135–450)
PLATELET # BLD: 197 K/UL (ref 135–450)
PLATELET # BLD: 208 K/UL (ref 135–450)
PLATELET # BLD: 24 K/UL (ref 135–450)
PLATELET # BLD: 29 K/UL (ref 135–450)
PLATELET # BLD: 47 K/UL (ref 135–450)
PLATELET # BLD: 50 K/UL (ref 135–450)
PLATELET # BLD: 53 K/UL (ref 135–450)
PLATELET # BLD: 54 K/UL (ref 135–450)
PLATELET # BLD: 57 K/UL (ref 135–450)
PLATELET # BLD: 66 K/UL (ref 135–450)
PLATELET # BLD: 74 K/UL (ref 135–450)
PLATELET # BLD: 82 K/UL (ref 135–450)
PLATELET # BLD: 87 K/UL (ref 135–450)
PLATELET # BLD: 98 K/UL (ref 135–450)
PLATELET SLIDE REVIEW: ABNORMAL
PLATELET SLIDE REVIEW: ADEQUATE
PMV BLD AUTO: 8 FL (ref 5–10.5)
PMV BLD AUTO: 8.1 FL (ref 5–10.5)
PMV BLD AUTO: 8.1 FL (ref 5–10.5)
PMV BLD AUTO: 8.2 FL (ref 5–10.5)
PMV BLD AUTO: 8.3 FL (ref 5–10.5)
PMV BLD AUTO: 8.3 FL (ref 5–10.5)
PMV BLD AUTO: 8.4 FL (ref 5–10.5)
PMV BLD AUTO: 8.5 FL (ref 5–10.5)
PMV BLD AUTO: 8.5 FL (ref 5–10.5)
PMV BLD AUTO: 8.6 FL (ref 5–10.5)
PMV BLD AUTO: 8.7 FL (ref 5–10.5)
PMV BLD AUTO: 8.8 FL (ref 5–10.5)
PMV BLD AUTO: 8.9 FL (ref 5–10.5)
PMV BLD AUTO: 8.9 FL (ref 5–10.5)
PMV BLD AUTO: 9 FL (ref 5–10.5)
PMV BLD AUTO: 9.1 FL (ref 5–10.5)
PMV BLD AUTO: 9.2 FL (ref 5–10.5)
PMV BLD AUTO: 9.2 FL (ref 5–10.5)
PMV BLD AUTO: 9.3 FL (ref 5–10.5)
PMV BLD AUTO: 9.3 FL (ref 5–10.5)
PMV BLD AUTO: 9.4 FL (ref 5–10.5)
PMV BLD AUTO: 9.5 FL (ref 5–10.5)
PMV BLD AUTO: 9.5 FL (ref 5–10.5)
PMV BLD AUTO: 9.6 FL (ref 5–10.5)
PMV BLD AUTO: 9.7 FL (ref 5–10.5)
PMV BLD AUTO: 9.8 FL (ref 5–10.5)
PO2 ARTERIAL: 104.2 MM HG (ref 75–108)
PO2 ARTERIAL: 46.6 MMHG (ref 75–108)
PO2 ARTERIAL: 64.7 MMHG (ref 75–108)
PO2 ARTERIAL: 65.8 MMHG (ref 75–108)
PO2 ARTERIAL: 66.6 MMHG (ref 75–108)
PO2 ARTERIAL: 89.7 MMHG (ref 75–108)
POC ACT LR: 121 SEC
POC ACT LR: 142 SEC
POC ACT LR: 205 SEC
POC ACT LR: 227 SEC
POC ACT LR: 337 SEC
POC ACT LR: 375 SEC
POC ACT LR: >400 SEC
POC SAMPLE TYPE: ABNORMAL
POIKILOCYTES: ABNORMAL
POLYCHROMASIA: ABNORMAL
POTASSIUM REFLEX MAGNESIUM: 2.9 MMOL/L (ref 3.5–5.1)
POTASSIUM REFLEX MAGNESIUM: 3.1 MMOL/L (ref 3.5–5.1)
POTASSIUM REFLEX MAGNESIUM: 3.2 MMOL/L (ref 3.5–5.1)
POTASSIUM REFLEX MAGNESIUM: 3.3 MMOL/L (ref 3.5–5.1)
POTASSIUM REFLEX MAGNESIUM: 3.4 MMOL/L (ref 3.5–5.1)
POTASSIUM REFLEX MAGNESIUM: 3.5 MMOL/L (ref 3.5–5.1)
POTASSIUM REFLEX MAGNESIUM: 3.6 MMOL/L (ref 3.5–5.1)
POTASSIUM REFLEX MAGNESIUM: 3.7 MMOL/L (ref 3.5–5.1)
POTASSIUM REFLEX MAGNESIUM: 3.8 MMOL/L (ref 3.5–5.1)
POTASSIUM REFLEX MAGNESIUM: 3.8 MMOL/L (ref 3.5–5.1)
POTASSIUM REFLEX MAGNESIUM: 3.9 MMOL/L (ref 3.5–5.1)
POTASSIUM REFLEX MAGNESIUM: 3.9 MMOL/L (ref 3.5–5.1)
POTASSIUM REFLEX MAGNESIUM: 4 MMOL/L (ref 3.5–5.1)
POTASSIUM REFLEX MAGNESIUM: 4 MMOL/L (ref 3.5–5.1)
POTASSIUM REFLEX MAGNESIUM: 4.2 MMOL/L (ref 3.5–5.1)
POTASSIUM REFLEX MAGNESIUM: 4.2 MMOL/L (ref 3.5–5.1)
POTASSIUM REFLEX MAGNESIUM: 4.4 MMOL/L (ref 3.5–5.1)
POTASSIUM REFLEX MAGNESIUM: 4.6 MMOL/L (ref 3.5–5.1)
POTASSIUM REFLEX MAGNESIUM: 4.8 MMOL/L (ref 3.5–5.1)
POTASSIUM REFLEX MAGNESIUM: 5.2 MMOL/L (ref 3.5–5.1)
POTASSIUM REFLEX MAGNESIUM: 5.4 MMOL/L (ref 3.5–5.1)
POTASSIUM SERPL-SCNC: 2.9 MMOL/L (ref 3.5–5.1)
POTASSIUM SERPL-SCNC: 3.6 MMOL/L (ref 3.5–5.1)
POTASSIUM SERPL-SCNC: 3.6 MMOL/L (ref 3.5–5.1)
POTASSIUM SERPL-SCNC: 3.7 MMOL/L (ref 3.5–5.1)
POTASSIUM SERPL-SCNC: 3.8 MMOL/L (ref 3.5–5.1)
POTASSIUM SERPL-SCNC: 3.9 MMOL/L (ref 3.5–5.1)
POTASSIUM SERPL-SCNC: 4.1 MMOL/L (ref 3.5–5.1)
POTASSIUM SERPL-SCNC: 4.2 MMOL/L (ref 3.5–5.1)
POTASSIUM SERPL-SCNC: 4.3 MMOL/L (ref 3.5–5.1)
POTASSIUM SERPL-SCNC: 4.4 MMOL/L (ref 3.5–5.1)
POTASSIUM SERPL-SCNC: 4.7 MMOL/L (ref 3.5–5.1)
POTASSIUM SERPL-SCNC: 4.7 MMOL/L (ref 3.5–5.1)
POTASSIUM SERPL-SCNC: 5.4 MMOL/L (ref 3.5–5.1)
PREALBUMIN: 7.2 MG/DL (ref 20–40)
PRO-BNP: 5868 PG/ML (ref 0–124)
PRO-BNP: 8685 PG/ML (ref 0–124)
PROCALCITONIN: 0.18 NG/ML (ref 0–0.15)
PROMYELOCYTES PERCENT: 1 %
PROTEIN UA: 30 MG/DL
PROTEIN UA: ABNORMAL MG/DL
PROTEIN UA: NEGATIVE MG/DL
PROTHROMBIN TIME: 12.1 SEC (ref 9.8–13)
PROTHROMBIN TIME: 12.9 SEC (ref 9.8–13)
PROTHROMBIN TIME: 14.3 SEC (ref 9.8–13)
PROTHROMBIN TIME: 14.6 SEC (ref 9.8–13)
PROTHROMBIN TIME: 16 SEC (ref 9.8–13)
RAPID INFLUENZA  B AGN: NEGATIVE
RAPID INFLUENZA A AGN: NEGATIVE
RBC # BLD: 2.18 M/UL (ref 4.2–5.9)
RBC # BLD: 2.39 M/UL (ref 4.2–5.9)
RBC # BLD: 2.4 M/UL (ref 4.2–5.9)
RBC # BLD: 2.46 M/UL (ref 4.2–5.9)
RBC # BLD: 2.46 M/UL (ref 4.2–5.9)
RBC # BLD: 2.49 M/UL (ref 4.2–5.9)
RBC # BLD: 2.49 M/UL (ref 4.2–5.9)
RBC # BLD: 2.5 M/UL (ref 4.2–5.9)
RBC # BLD: 2.51 M/UL (ref 4.2–5.9)
RBC # BLD: 2.55 M/UL (ref 4.2–5.9)
RBC # BLD: 2.57 M/UL (ref 4.2–5.9)
RBC # BLD: 2.59 M/UL (ref 4.2–5.9)
RBC # BLD: 2.59 M/UL (ref 4.2–5.9)
RBC # BLD: 2.61 M/UL (ref 4.2–5.9)
RBC # BLD: 2.61 M/UL (ref 4.2–5.9)
RBC # BLD: 2.63 M/UL (ref 4.2–5.9)
RBC # BLD: 2.64 M/UL (ref 4.2–5.9)
RBC # BLD: 2.64 M/UL (ref 4.2–5.9)
RBC # BLD: 2.65 M/UL (ref 4.2–5.9)
RBC # BLD: 2.68 M/UL (ref 4.2–5.9)
RBC # BLD: 2.7 M/UL (ref 4.2–5.9)
RBC # BLD: 2.7 M/UL (ref 4.2–5.9)
RBC # BLD: 2.71 M/UL (ref 4.2–5.9)
RBC # BLD: 2.72 M/UL (ref 4.2–5.9)
RBC # BLD: 2.79 M/UL (ref 4.2–5.9)
RBC # BLD: 2.83 M/UL (ref 4.2–5.9)
RBC # BLD: 2.83 M/UL (ref 4.2–5.9)
RBC # BLD: 2.84 M/UL (ref 4.2–5.9)
RBC # BLD: 2.86 M/UL (ref 4.2–5.9)
RBC # BLD: 2.87 M/UL (ref 4.2–5.9)
RBC # BLD: 2.87 M/UL (ref 4.2–5.9)
RBC # BLD: 2.92 M/UL (ref 4.2–5.9)
RBC # BLD: 3.01 M/UL (ref 4.2–5.9)
RBC # BLD: 3.05 M/UL (ref 4.2–5.9)
RBC # BLD: 3.09 M/UL (ref 4.2–5.9)
RBC # BLD: 3.12 M/UL (ref 4.2–5.9)
RBC # BLD: 3.24 M/UL (ref 4.2–5.9)
RBC # BLD: 3.38 M/UL (ref 4.2–5.9)
RBC # BLD: 3.63 M/UL (ref 4.2–5.9)
RBC # BLD: 3.88 M/UL (ref 4.2–5.9)
RBC # BLD: 4.83 M/UL (ref 4.2–5.9)
RBC # BLD: NORMAL 10*6/UL
RBC UA: ABNORMAL /HPF (ref 0–2)
RBC UA: ABNORMAL /HPF (ref 0–2)
REASON FOR REJECTION: NORMAL
REJECTED TEST: NORMAL
RENAL EPITHELIAL, UA: ABNORMAL /HPF
RETICULOCYTE ABSOLUTE COUNT: 0.08 M/UL
RETICULOCYTE COUNT PCT: 2.73 % (ref 0.5–2.18)
SEDIMENTATION RATE, ERYTHROCYTE: 71 MM/HR (ref 0–20)
SLIDE REVIEW: ABNORMAL
SMUDGE CELLS: PRESENT
SODIUM BLD-SCNC: 123 MMOL/L (ref 136–145)
SODIUM BLD-SCNC: 124 MMOL/L (ref 136–145)
SODIUM BLD-SCNC: 125 MMOL/L (ref 136–145)
SODIUM BLD-SCNC: 127 MMOL/L (ref 136–145)
SODIUM BLD-SCNC: 128 MMOL/L (ref 136–145)
SODIUM BLD-SCNC: 129 MMOL/L (ref 136–145)
SODIUM BLD-SCNC: 130 MMOL/L (ref 136–145)
SODIUM BLD-SCNC: 130 MMOL/L (ref 136–145)
SODIUM BLD-SCNC: 131 MMOL/L (ref 136–145)
SODIUM BLD-SCNC: 131 MMOL/L (ref 136–145)
SODIUM BLD-SCNC: 132 MMOL/L (ref 136–145)
SODIUM BLD-SCNC: 133 MMOL/L (ref 136–145)
SODIUM BLD-SCNC: 134 MMOL/L (ref 136–145)
SODIUM BLD-SCNC: 135 MMOL/L (ref 136–145)
SODIUM BLD-SCNC: 136 MMOL/L (ref 136–145)
SODIUM BLD-SCNC: 137 MMOL/L (ref 136–145)
SODIUM BLD-SCNC: 137 MMOL/L (ref 136–145)
SODIUM BLD-SCNC: 138 MMOL/L (ref 136–145)
SODIUM BLD-SCNC: 138 MMOL/L (ref 136–145)
SODIUM BLD-SCNC: 139 MMOL/L (ref 136–145)
SODIUM BLD-SCNC: 140 MMOL/L (ref 136–145)
SODIUM BLD-SCNC: 142 MMOL/L (ref 136–145)
SODIUM BLD-SCNC: 144 MMOL/L (ref 136–145)
SODIUM BLD-SCNC: 144 MMOL/L (ref 136–145)
SODIUM URINE: 98 MMOL/L
SOLUBLE TRANSFERRIN RECEPT: 5.4 MG/L (ref 2.2–5)
SPECIFIC GRAVITY UA: 1.01 (ref 1–1.03)
SPECIFIC GRAVITY UA: 1.02
SPECIFIC GRAVITY UA: 1.02 (ref 1–1.03)
STOMATOCYTES: ABNORMAL
STOMATOCYTES: ABNORMAL
TARGET CELLS: ABNORMAL
TCO2 ARTERIAL: 22.7 MMOL/L
TCO2 ARTERIAL: 25.3 MMOL/L
TCO2 ARTERIAL: 26.6 MMOL/L
TCO2 ARTERIAL: 28 MMOL/L
TCO2 ARTERIAL: 28.1 MMOL/L
TCO2 ARTERIAL: 28.3 MMOL/L
TEAR DROP CELLS: ABNORMAL
TOTAL IRON BINDING CAPACITY: 116 UG/DL (ref 260–445)
TOTAL IRON BINDING CAPACITY: 141 UG/DL (ref 260–445)
TOTAL PROTEIN: 5.4 G/DL (ref 6.4–8.2)
TOTAL PROTEIN: 5.8 G/DL (ref 6.4–8.2)
TOTAL PROTEIN: 5.8 G/DL (ref 6.4–8.2)
TOTAL PROTEIN: 6.4 G/DL (ref 6.4–8.2)
TOXIC GRANULATION: PRESENT
TRIGL SERPL-MCNC: 741 MG/DL (ref 0–150)
TROPONIN: 0.01 NG/ML
TSH REFLEX: 0.89 UIU/ML (ref 0.27–4.2)
URINE REFLEX TO CULTURE: ABNORMAL
URINE TYPE: ABNORMAL
URINE TYPE: ABNORMAL
URINE TYPE: NORMAL
UROBILINOGEN, URINE: 0.2 E.U./DL
UROBILINOGEN, URINE: 0.2 E.U./DL
UROBILINOGEN, URINE: 1 E.U./DL
VANCOMYCIN TROUGH: 13.5 UG/ML (ref 10–20)
VANCOMYCIN TROUGH: 5.7 UG/ML (ref 10–20)
VANCOMYCIN TROUGH: 6.7 UG/ML (ref 10–20)
VITAMIN B-12: 1013 PG/ML (ref 211–911)
VITAMIN B-12: 1027 PG/ML (ref 211–911)
WBC # BLD: 1.4 K/UL (ref 4–11)
WBC # BLD: 1.6 K/UL (ref 4–11)
WBC # BLD: 1.8 K/UL (ref 4–11)
WBC # BLD: 1.8 K/UL (ref 4–11)
WBC # BLD: 10.5 K/UL (ref 4–11)
WBC # BLD: 10.7 K/UL (ref 4–11)
WBC # BLD: 10.7 K/UL (ref 4–11)
WBC # BLD: 11.1 K/UL (ref 4–11)
WBC # BLD: 11.2 K/UL (ref 4–11)
WBC # BLD: 11.3 K/UL (ref 4–11)
WBC # BLD: 11.6 K/UL (ref 4–11)
WBC # BLD: 11.8 K/UL (ref 4–11)
WBC # BLD: 12.2 K/UL (ref 4–11)
WBC # BLD: 12.9 K/UL (ref 4–11)
WBC # BLD: 14.5 K/UL (ref 4–11)
WBC # BLD: 16.9 K/UL (ref 4–11)
WBC # BLD: 17.9 K/UL (ref 4–11)
WBC # BLD: 2.1 K/UL (ref 4–11)
WBC # BLD: 2.7 K/UL (ref 4–11)
WBC # BLD: 3.6 K/UL (ref 4–11)
WBC # BLD: 5 K/UL (ref 4–11)
WBC # BLD: 6.1 K/UL (ref 4–11)
WBC # BLD: 6.5 K/UL (ref 4–11)
WBC # BLD: 6.9 K/UL (ref 4–11)
WBC # BLD: 7 K/UL (ref 4–11)
WBC # BLD: 7.1 K/UL (ref 4–11)
WBC # BLD: 7.4 K/UL (ref 4–11)
WBC # BLD: 7.5 K/UL (ref 4–11)
WBC # BLD: 7.6 K/UL (ref 4–11)
WBC # BLD: 7.7 K/UL (ref 4–11)
WBC # BLD: 7.8 K/UL (ref 4–11)
WBC # BLD: 8.2 K/UL (ref 4–11)
WBC # BLD: 8.4 K/UL (ref 4–11)
WBC # BLD: 8.6 K/UL (ref 4–11)
WBC # BLD: 8.9 K/UL (ref 4–11)
WBC # BLD: 9 K/UL (ref 4–11)
WBC # BLD: 9.1 K/UL (ref 4–11)
WBC # BLD: 9.3 K/UL (ref 4–11)
WBC # BLD: 9.3 K/UL (ref 4–11)
WBC # BLD: 9.4 K/UL (ref 4–11)
WBC # BLD: 9.4 K/UL (ref 4–11)
WBC # BLD: 9.6 K/UL (ref 4–11)
WBC # BLD: 9.8 K/UL (ref 4–11)
WBC UA: ABNORMAL /HPF (ref 0–5)
WBC UA: ABNORMAL /HPF (ref 0–5)

## 2019-01-01 PROCEDURE — 2700000000 HC OXYGEN THERAPY PER DAY

## 2019-01-01 PROCEDURE — 6360000002 HC RX W HCPCS: Performed by: SURGERY

## 2019-01-01 PROCEDURE — 99291 CRITICAL CARE FIRST HOUR: CPT | Performed by: INTERNAL MEDICINE

## 2019-01-01 PROCEDURE — 6370000000 HC RX 637 (ALT 250 FOR IP): Performed by: INTERNAL MEDICINE

## 2019-01-01 PROCEDURE — 6360000002 HC RX W HCPCS: Performed by: INTERNAL MEDICINE

## 2019-01-01 PROCEDURE — 6370000000 HC RX 637 (ALT 250 FOR IP): Performed by: SURGERY

## 2019-01-01 PROCEDURE — 85018 HEMOGLOBIN: CPT

## 2019-01-01 PROCEDURE — 80048 BASIC METABOLIC PNL TOTAL CA: CPT

## 2019-01-01 PROCEDURE — 82803 BLOOD GASES ANY COMBINATION: CPT

## 2019-01-01 PROCEDURE — APPSS45 APP SPLIT SHARED TIME 31-45 MINUTES: Performed by: CLINICAL NURSE SPECIALIST

## 2019-01-01 PROCEDURE — 2580000003 HC RX 258: Performed by: NURSE PRACTITIONER

## 2019-01-01 PROCEDURE — 07B74ZX EXCISION OF THORAX LYMPHATIC, PERCUTANEOUS ENDOSCOPIC APPROACH, DIAGNOSTIC: ICD-10-PCS | Performed by: INTERNAL MEDICINE

## 2019-01-01 PROCEDURE — 2580000003 HC RX 258: Performed by: SURGERY

## 2019-01-01 PROCEDURE — 6370000000 HC RX 637 (ALT 250 FOR IP): Performed by: NURSE PRACTITIONER

## 2019-01-01 PROCEDURE — 71045 X-RAY EXAM CHEST 1 VIEW: CPT

## 2019-01-01 PROCEDURE — 87324 CLOSTRIDIUM AG IA: CPT

## 2019-01-01 PROCEDURE — 6360000002 HC RX W HCPCS: Performed by: NURSE PRACTITIONER

## 2019-01-01 PROCEDURE — G8598 ASA/ANTIPLAT THER USED: HCPCS | Performed by: PHYSICIAN ASSISTANT

## 2019-01-01 PROCEDURE — 99233 SBSQ HOSP IP/OBS HIGH 50: CPT | Performed by: NURSE PRACTITIONER

## 2019-01-01 PROCEDURE — 85730 THROMBOPLASTIN TIME PARTIAL: CPT

## 2019-01-01 PROCEDURE — 94660 CPAP INITIATION&MGMT: CPT

## 2019-01-01 PROCEDURE — 74176 CT ABD & PELVIS W/O CONTRAST: CPT

## 2019-01-01 PROCEDURE — 2580000003 HC RX 258: Performed by: INTERNAL MEDICINE

## 2019-01-01 PROCEDURE — 80202 ASSAY OF VANCOMYCIN: CPT

## 2019-01-01 PROCEDURE — 84100 ASSAY OF PHOSPHORUS: CPT

## 2019-01-01 PROCEDURE — 2580000003 HC RX 258

## 2019-01-01 PROCEDURE — 83735 ASSAY OF MAGNESIUM: CPT

## 2019-01-01 PROCEDURE — 97110 THERAPEUTIC EXERCISES: CPT

## 2019-01-01 PROCEDURE — P9035 PLATELET PHERES LEUKOREDUCED: HCPCS

## 2019-01-01 PROCEDURE — 2060000000 HC ICU INTERMEDIATE R&B

## 2019-01-01 PROCEDURE — 99024 POSTOP FOLLOW-UP VISIT: CPT | Performed by: SURGERY

## 2019-01-01 PROCEDURE — 3700000001 HC ADD 15 MINUTES (ANESTHESIA): Performed by: SURGERY

## 2019-01-01 PROCEDURE — 94761 N-INVAS EAR/PLS OXIMETRY MLT: CPT

## 2019-01-01 PROCEDURE — 6360000002 HC RX W HCPCS: Performed by: CLINICAL NURSE SPECIALIST

## 2019-01-01 PROCEDURE — 85014 HEMATOCRIT: CPT

## 2019-01-01 PROCEDURE — 94640 AIRWAY INHALATION TREATMENT: CPT

## 2019-01-01 PROCEDURE — 87040 BLOOD CULTURE FOR BACTERIA: CPT

## 2019-01-01 PROCEDURE — 36592 COLLECT BLOOD FROM PICC: CPT

## 2019-01-01 PROCEDURE — 80053 COMPREHEN METABOLIC PANEL: CPT

## 2019-01-01 PROCEDURE — 97116 GAIT TRAINING THERAPY: CPT

## 2019-01-01 PROCEDURE — 36415 COLL VENOUS BLD VENIPUNCTURE: CPT

## 2019-01-01 PROCEDURE — 36430 TRANSFUSION BLD/BLD COMPNT: CPT

## 2019-01-01 PROCEDURE — 20225 BONE BIOPSY TROCAR/NDL DEEP: CPT

## 2019-01-01 PROCEDURE — 99223 1ST HOSP IP/OBS HIGH 75: CPT | Performed by: SURGERY

## 2019-01-01 PROCEDURE — 93005 ELECTROCARDIOGRAM TRACING: CPT | Performed by: NURSE PRACTITIONER

## 2019-01-01 PROCEDURE — 1123F ACP DISCUSS/DSCN MKR DOCD: CPT | Performed by: PHYSICIAN ASSISTANT

## 2019-01-01 PROCEDURE — 1200000000 HC SEMI PRIVATE

## 2019-01-01 PROCEDURE — 2580000003 HC RX 258: Performed by: REGISTERED NURSE

## 2019-01-01 PROCEDURE — 6360000002 HC RX W HCPCS: Performed by: REGISTERED NURSE

## 2019-01-01 PROCEDURE — 6370000000 HC RX 637 (ALT 250 FOR IP): Performed by: REGISTERED NURSE

## 2019-01-01 PROCEDURE — P9037 PLATE PHERES LEUKOREDU IRRAD: HCPCS

## 2019-01-01 PROCEDURE — 2709999900 HC NON-CHARGEABLE SUPPLY

## 2019-01-01 PROCEDURE — 99233 SBSQ HOSP IP/OBS HIGH 50: CPT | Performed by: INTERNAL MEDICINE

## 2019-01-01 PROCEDURE — 2500000003 HC RX 250 WO HCPCS: Performed by: NURSE ANESTHETIST, CERTIFIED REGISTERED

## 2019-01-01 PROCEDURE — 7100000000 HC PACU RECOVERY - FIRST 15 MIN: Performed by: SURGERY

## 2019-01-01 PROCEDURE — 3609010800 HC BRONCHOSCOPY ALVEOLAR LAVAGE: Performed by: INTERNAL MEDICINE

## 2019-01-01 PROCEDURE — 86900 BLOOD TYPING SEROLOGIC ABO: CPT

## 2019-01-01 PROCEDURE — 84484 ASSAY OF TROPONIN QUANT: CPT

## 2019-01-01 PROCEDURE — 6360000004 HC RX CONTRAST MEDICATION: Performed by: NURSE PRACTITIONER

## 2019-01-01 PROCEDURE — C9113 INJ PANTOPRAZOLE SODIUM, VIA: HCPCS | Performed by: SURGERY

## 2019-01-01 PROCEDURE — 83550 IRON BINDING TEST: CPT

## 2019-01-01 PROCEDURE — C9113 INJ PANTOPRAZOLE SODIUM, VIA: HCPCS | Performed by: INTERNAL MEDICINE

## 2019-01-01 PROCEDURE — 85025 COMPLETE CBC W/AUTO DIFF WBC: CPT

## 2019-01-01 PROCEDURE — 82533 TOTAL CORTISOL: CPT

## 2019-01-01 PROCEDURE — 0DJ08ZZ INSPECTION OF UPPER INTESTINAL TRACT, VIA NATURAL OR ARTIFICIAL OPENING ENDOSCOPIC: ICD-10-PCS | Performed by: INTERNAL MEDICINE

## 2019-01-01 PROCEDURE — 3700000000 HC ANESTHESIA ATTENDED CARE: Performed by: SURGERY

## 2019-01-01 PROCEDURE — 3609011300 HC BRONCHOSCOPY BRONCHIAL/ENDOBRNCL BX 1+ SITES: Performed by: INTERNAL MEDICINE

## 2019-01-01 PROCEDURE — B2151ZZ FLUOROSCOPY OF LEFT HEART USING LOW OSMOLAR CONTRAST: ICD-10-PCS | Performed by: INTERNAL MEDICINE

## 2019-01-01 PROCEDURE — 4040F PNEUMOC VAC/ADMIN/RCVD: CPT | Performed by: PHYSICIAN ASSISTANT

## 2019-01-01 PROCEDURE — 97535 SELF CARE MNGMENT TRAINING: CPT

## 2019-01-01 PROCEDURE — 97166 OT EVAL MOD COMPLEX 45 MIN: CPT

## 2019-01-01 PROCEDURE — 36573 INSJ PICC RS&I 5 YR+: CPT

## 2019-01-01 PROCEDURE — 2709999900 HC NON-CHARGEABLE SUPPLY: Performed by: INTERNAL MEDICINE

## 2019-01-01 PROCEDURE — 71260 CT THORAX DX C+: CPT

## 2019-01-01 PROCEDURE — 83615 LACTATE (LD) (LDH) ENZYME: CPT

## 2019-01-01 PROCEDURE — C1769 GUIDE WIRE: HCPCS

## 2019-01-01 PROCEDURE — 88305 TISSUE EXAM BY PATHOLOGIST: CPT

## 2019-01-01 PROCEDURE — C1887 CATHETER, GUIDING: HCPCS

## 2019-01-01 PROCEDURE — 7100000010 HC PHASE II RECOVERY - FIRST 15 MIN: Performed by: INTERNAL MEDICINE

## 2019-01-01 PROCEDURE — 88112 CYTOPATH CELL ENHANCE TECH: CPT

## 2019-01-01 PROCEDURE — 7100000011 HC PHASE II RECOVERY - ADDTL 15 MIN: Performed by: INTERNAL MEDICINE

## 2019-01-01 PROCEDURE — 0BDJ8ZX EXTRACTION OF LEFT LOWER LUNG LOBE, VIA NATURAL OR ARTIFICIAL OPENING ENDOSCOPIC, DIAGNOSTIC: ICD-10-PCS | Performed by: INTERNAL MEDICINE

## 2019-01-01 PROCEDURE — 86850 RBC ANTIBODY SCREEN: CPT

## 2019-01-01 PROCEDURE — 0D1M0Z4 BYPASS DESCENDING COLON TO CUTANEOUS, OPEN APPROACH: ICD-10-PCS | Performed by: SURGERY

## 2019-01-01 PROCEDURE — 3609011100 HC BRONCHOSCOPY BRUSHINGS: Performed by: INTERNAL MEDICINE

## 2019-01-01 PROCEDURE — APPSS30 APP SPLIT SHARED TIME 16-30 MINUTES: Performed by: CLINICAL NURSE SPECIALIST

## 2019-01-01 PROCEDURE — 31500 INSERT EMERGENCY AIRWAY: CPT

## 2019-01-01 PROCEDURE — 2709999900 HC NON-CHARGEABLE SUPPLY: Performed by: SURGERY

## 2019-01-01 PROCEDURE — 1101F PT FALLS ASSESS-DOCD LE1/YR: CPT | Performed by: PHYSICIAN ASSISTANT

## 2019-01-01 PROCEDURE — 2500000003 HC RX 250 WO HCPCS: Performed by: NURSE PRACTITIONER

## 2019-01-01 PROCEDURE — 7100000001 HC PACU RECOVERY - ADDTL 15 MIN: Performed by: SURGERY

## 2019-01-01 PROCEDURE — 6360000002 HC RX W HCPCS: Performed by: NURSE ANESTHETIST, CERTIFIED REGISTERED

## 2019-01-01 PROCEDURE — 92526 ORAL FUNCTION THERAPY: CPT

## 2019-01-01 PROCEDURE — 6360000004 HC RX CONTRAST MEDICATION: Performed by: INTERNAL MEDICINE

## 2019-01-01 PROCEDURE — 87102 FUNGUS ISOLATION CULTURE: CPT

## 2019-01-01 PROCEDURE — 82607 VITAMIN B-12: CPT

## 2019-01-01 PROCEDURE — 94668 MNPJ CHEST WALL SBSQ: CPT

## 2019-01-01 PROCEDURE — 85027 COMPLETE CBC AUTOMATED: CPT

## 2019-01-01 PROCEDURE — 99232 SBSQ HOSP IP/OBS MODERATE 35: CPT | Performed by: NURSE PRACTITIONER

## 2019-01-01 PROCEDURE — 84478 ASSAY OF TRIGLYCERIDES: CPT

## 2019-01-01 PROCEDURE — 2000000000 HC ICU R&B

## 2019-01-01 PROCEDURE — G8427 DOCREV CUR MEDS BY ELIG CLIN: HCPCS | Performed by: PHYSICIAN ASSISTANT

## 2019-01-01 PROCEDURE — 6360000004 HC RX CONTRAST MEDICATION: Performed by: EMERGENCY MEDICINE

## 2019-01-01 PROCEDURE — 0DJD4ZZ INSPECTION OF LOWER INTESTINAL TRACT, PERCUTANEOUS ENDOSCOPIC APPROACH: ICD-10-PCS | Performed by: SURGERY

## 2019-01-01 PROCEDURE — 83540 ASSAY OF IRON: CPT

## 2019-01-01 PROCEDURE — 97162 PT EVAL MOD COMPLEX 30 MIN: CPT

## 2019-01-01 PROCEDURE — C1894 INTRO/SHEATH, NON-LASER: HCPCS

## 2019-01-01 PROCEDURE — 99292 CRITICAL CARE ADDL 30 MIN: CPT | Performed by: INTERNAL MEDICINE

## 2019-01-01 PROCEDURE — 3023F SPIROM DOC REV: CPT | Performed by: PHYSICIAN ASSISTANT

## 2019-01-01 PROCEDURE — 85610 PROTHROMBIN TIME: CPT

## 2019-01-01 PROCEDURE — 2500000003 HC RX 250 WO HCPCS: Performed by: INTERNAL MEDICINE

## 2019-01-01 PROCEDURE — APPNB60 APP NON BILLABLE TIME 46-60 MINS: Performed by: CLINICAL NURSE SPECIALIST

## 2019-01-01 PROCEDURE — 5A1945Z RESPIRATORY VENTILATION, 24-96 CONSECUTIVE HOURS: ICD-10-PCS | Performed by: INTERNAL MEDICINE

## 2019-01-01 PROCEDURE — 82947 ASSAY GLUCOSE BLOOD QUANT: CPT

## 2019-01-01 PROCEDURE — 94667 MNPJ CHEST WALL 1ST: CPT

## 2019-01-01 PROCEDURE — APPNB30 APP NON BILLABLE TIME 0-30 MINS: Performed by: CLINICAL NURSE SPECIALIST

## 2019-01-01 PROCEDURE — 3609011900 HC BRONCHOSCOPY NEEDLE BX TRACHEA MAIN STEM&/BRON: Performed by: INTERNAL MEDICINE

## 2019-01-01 PROCEDURE — 3600000004 HC SURGERY LEVEL 4 BASE: Performed by: SURGERY

## 2019-01-01 PROCEDURE — 36591 DRAW BLOOD OFF VENOUS DEVICE: CPT

## 2019-01-01 PROCEDURE — 99232 SBSQ HOSP IP/OBS MODERATE 35: CPT | Performed by: SURGERY

## 2019-01-01 PROCEDURE — 85379 FIBRIN DEGRADATION QUANT: CPT

## 2019-01-01 PROCEDURE — 99285 EMERGENCY DEPT VISIT HI MDM: CPT

## 2019-01-01 PROCEDURE — 86901 BLOOD TYPING SEROLOGIC RH(D): CPT

## 2019-01-01 PROCEDURE — 3700000001 HC ADD 15 MINUTES (ANESTHESIA): Performed by: INTERNAL MEDICINE

## 2019-01-01 PROCEDURE — 87449 NOS EACH ORGANISM AG IA: CPT

## 2019-01-01 PROCEDURE — B2111ZZ FLUOROSCOPY OF MULTIPLE CORONARY ARTERIES USING LOW OSMOLAR CONTRAST: ICD-10-PCS | Performed by: INTERNAL MEDICINE

## 2019-01-01 PROCEDURE — 6360000002 HC RX W HCPCS: Performed by: RADIOLOGY

## 2019-01-01 PROCEDURE — 6360000002 HC RX W HCPCS: Performed by: PEDIATRICS

## 2019-01-01 PROCEDURE — 93005 ELECTROCARDIOGRAM TRACING: CPT | Performed by: INTERNAL MEDICINE

## 2019-01-01 PROCEDURE — 97530 THERAPEUTIC ACTIVITIES: CPT

## 2019-01-01 PROCEDURE — 82728 ASSAY OF FERRITIN: CPT

## 2019-01-01 PROCEDURE — 99232 SBSQ HOSP IP/OBS MODERATE 35: CPT | Performed by: INTERNAL MEDICINE

## 2019-01-01 PROCEDURE — 94664 DEMO&/EVAL PT USE INHALER: CPT

## 2019-01-01 PROCEDURE — 2720000010 HC SURG SUPPLY STERILE: Performed by: SURGERY

## 2019-01-01 PROCEDURE — 83880 ASSAY OF NATRIURETIC PEPTIDE: CPT

## 2019-01-01 PROCEDURE — 99222 1ST HOSP IP/OBS MODERATE 55: CPT | Performed by: SURGERY

## 2019-01-01 PROCEDURE — 31645 BRNCHSC W/THER ASPIR 1ST: CPT | Performed by: INTERNAL MEDICINE

## 2019-01-01 PROCEDURE — C1874 STENT, COATED/COV W/DEL SYS: HCPCS

## 2019-01-01 PROCEDURE — 87804 INFLUENZA ASSAY W/OPTIC: CPT

## 2019-01-01 PROCEDURE — 31624 DX BRONCHOSCOPE/LAVAGE: CPT | Performed by: INTERNAL MEDICINE

## 2019-01-01 PROCEDURE — 71048 X-RAY EXAM CHEST 4+ VIEWS: CPT

## 2019-01-01 PROCEDURE — 74177 CT ABD & PELVIS W/CONTRAST: CPT

## 2019-01-01 PROCEDURE — 88341 IMHCHEM/IMCYTCHM EA ADD ANTB: CPT

## 2019-01-01 PROCEDURE — 44139 MOBILIZATION OF COLON: CPT | Performed by: SURGERY

## 2019-01-01 PROCEDURE — 88307 TISSUE EXAM BY PATHOLOGIST: CPT

## 2019-01-01 PROCEDURE — 2580000003 HC RX 258: Performed by: NURSE ANESTHETIST, CERTIFIED REGISTERED

## 2019-01-01 PROCEDURE — 88177 CYTP FNA EVAL EA ADDL: CPT

## 2019-01-01 PROCEDURE — 93010 ELECTROCARDIOGRAM REPORT: CPT | Performed by: INTERNAL MEDICINE

## 2019-01-01 PROCEDURE — 99152 MOD SED SAME PHYS/QHP 5/>YRS: CPT | Performed by: INTERNAL MEDICINE

## 2019-01-01 PROCEDURE — 99223 1ST HOSP IP/OBS HIGH 75: CPT | Performed by: INTERNAL MEDICINE

## 2019-01-01 PROCEDURE — 85045 AUTOMATED RETICULOCYTE COUNT: CPT

## 2019-01-01 PROCEDURE — 99222 1ST HOSP IP/OBS MODERATE 55: CPT | Performed by: INTERNAL MEDICINE

## 2019-01-01 PROCEDURE — 83605 ASSAY OF LACTIC ACID: CPT

## 2019-01-01 PROCEDURE — 88172 CYTP DX EVAL FNA 1ST EA SITE: CPT

## 2019-01-01 PROCEDURE — 94150 VITAL CAPACITY TEST: CPT

## 2019-01-01 PROCEDURE — 94770 HC ETCO2 MONITOR DAILY: CPT

## 2019-01-01 PROCEDURE — 88342 IMHCHEM/IMCYTCHM 1ST ANTB: CPT

## 2019-01-01 PROCEDURE — 0B9F8ZX DRAINAGE OF RIGHT LOWER LUNG LOBE, VIA NATURAL OR ARTIFICIAL OPENING ENDOSCOPIC, DIAGNOSTIC: ICD-10-PCS | Performed by: INTERNAL MEDICINE

## 2019-01-01 PROCEDURE — 0B9J8ZX DRAINAGE OF LEFT LOWER LUNG LOBE, VIA NATURAL OR ARTIFICIAL OPENING ENDOSCOPIC, DIAGNOSTIC: ICD-10-PCS | Performed by: INTERNAL MEDICINE

## 2019-01-01 PROCEDURE — 81001 URINALYSIS AUTO W/SCOPE: CPT

## 2019-01-01 PROCEDURE — 93970 EXTREMITY STUDY: CPT

## 2019-01-01 PROCEDURE — 99214 OFFICE O/P EST MOD 30 MIN: CPT | Performed by: PHYSICIAN ASSISTANT

## 2019-01-01 PROCEDURE — 3600000013 HC SURGERY LEVEL 3 ADDTL 15MIN: Performed by: SURGERY

## 2019-01-01 PROCEDURE — 94750 HC PULMONARY COMPLIANCE STUDY: CPT

## 2019-01-01 PROCEDURE — 77001 FLUOROGUIDE FOR VEIN DEVICE: CPT | Performed by: SURGERY

## 2019-01-01 PROCEDURE — 86923 COMPATIBILITY TEST ELECTRIC: CPT

## 2019-01-01 PROCEDURE — 0BH18EZ INSERTION OF ENDOTRACHEAL AIRWAY INTO TRACHEA, VIA NATURAL OR ARTIFICIAL OPENING ENDOSCOPIC: ICD-10-PCS | Performed by: INTERNAL MEDICINE

## 2019-01-01 PROCEDURE — 36561 INSERT TUNNELED CV CATH: CPT | Performed by: SURGERY

## 2019-01-01 PROCEDURE — 2580000003 HC RX 258: Performed by: EMERGENCY MEDICINE

## 2019-01-01 PROCEDURE — C9113 INJ PANTOPRAZOLE SODIUM, VIA: HCPCS | Performed by: NURSE PRACTITIONER

## 2019-01-01 PROCEDURE — 3017F COLORECTAL CA SCREEN DOC REV: CPT | Performed by: PHYSICIAN ASSISTANT

## 2019-01-01 PROCEDURE — G8482 FLU IMMUNIZE ORDER/ADMIN: HCPCS | Performed by: PHYSICIAN ASSISTANT

## 2019-01-01 PROCEDURE — 3600000015 HC SURGERY LEVEL 5 ADDTL 15MIN: Performed by: SURGERY

## 2019-01-01 PROCEDURE — 93460 R&L HRT ART/VENTRICLE ANGIO: CPT

## 2019-01-01 PROCEDURE — 97164 PT RE-EVAL EST PLAN CARE: CPT

## 2019-01-01 PROCEDURE — C1725 CATH, TRANSLUMIN NON-LASER: HCPCS

## 2019-01-01 PROCEDURE — 92610 EVALUATE SWALLOWING FUNCTION: CPT

## 2019-01-01 PROCEDURE — 87070 CULTURE OTHR SPECIMN AEROBIC: CPT

## 2019-01-01 PROCEDURE — 87015 SPECIMEN INFECT AGNT CONCNTJ: CPT

## 2019-01-01 PROCEDURE — 6360000004 HC RX CONTRAST MEDICATION

## 2019-01-01 PROCEDURE — 3430000000 HC RX DIAGNOSTIC RADIOPHARMACEUTICAL: Performed by: RADIOLOGY

## 2019-01-01 PROCEDURE — 84443 ASSAY THYROID STIM HORMONE: CPT

## 2019-01-01 PROCEDURE — 2500000003 HC RX 250 WO HCPCS: Performed by: SURGERY

## 2019-01-01 PROCEDURE — 1250000000 HC SEMI PRIVATE HOSPICE R&B

## 2019-01-01 PROCEDURE — C1788 PORT, INDWELLING, IMP: HCPCS | Performed by: SURGERY

## 2019-01-01 PROCEDURE — 77012 CT SCAN FOR NEEDLE BIOPSY: CPT

## 2019-01-01 PROCEDURE — 2500000003 HC RX 250 WO HCPCS

## 2019-01-01 PROCEDURE — 83935 ASSAY OF URINE OSMOLALITY: CPT

## 2019-01-01 PROCEDURE — 99284 EMERGENCY DEPT VISIT MOD MDM: CPT

## 2019-01-01 PROCEDURE — P9016 RBC LEUKOCYTES REDUCED: HCPCS

## 2019-01-01 PROCEDURE — 96365 THER/PROPH/DIAG IV INF INIT: CPT

## 2019-01-01 PROCEDURE — 3609011200 HC BRONCHOSCOPY W/TRANSBRONCL NDL ASPIR BX EA ADDL LOBE: Performed by: INTERNAL MEDICINE

## 2019-01-01 PROCEDURE — 94003 VENT MGMT INPAT SUBQ DAY: CPT

## 2019-01-01 PROCEDURE — 0BC18ZZ EXTIRPATION OF MATTER FROM TRACHEA, VIA NATURAL OR ARTIFICIAL OPENING ENDOSCOPIC: ICD-10-PCS | Performed by: INTERNAL MEDICINE

## 2019-01-01 PROCEDURE — 85347 COAGULATION TIME ACTIVATED: CPT

## 2019-01-01 PROCEDURE — 3600000005 HC SURGERY LEVEL 5 BASE: Performed by: SURGERY

## 2019-01-01 PROCEDURE — 3700000000 HC ANESTHESIA ATTENDED CARE: Performed by: INTERNAL MEDICINE

## 2019-01-01 PROCEDURE — P9045 ALBUMIN (HUMAN), 5%, 250 ML: HCPCS | Performed by: NURSE ANESTHETIST, CERTIFIED REGISTERED

## 2019-01-01 PROCEDURE — 31500 INSERT EMERGENCY AIRWAY: CPT | Performed by: NURSE PRACTITIONER

## 2019-01-01 PROCEDURE — 87116 MYCOBACTERIA CULTURE: CPT

## 2019-01-01 PROCEDURE — 87206 SMEAR FLUORESCENT/ACID STAI: CPT

## 2019-01-01 PROCEDURE — 027135Z DILATION OF CORONARY ARTERY, TWO ARTERIES WITH TWO DRUG-ELUTING INTRALUMINAL DEVICES, PERCUTANEOUS APPROACH: ICD-10-PCS | Performed by: INTERNAL MEDICINE

## 2019-01-01 PROCEDURE — 0BBJ8ZX EXCISION OF LEFT LOWER LUNG LOBE, VIA NATURAL OR ARTIFICIAL OPENING ENDOSCOPIC, DIAGNOSTIC: ICD-10-PCS | Performed by: INTERNAL MEDICINE

## 2019-01-01 PROCEDURE — 97168 OT RE-EVAL EST PLAN CARE: CPT

## 2019-01-01 PROCEDURE — 0PB43ZX EXCISION OF THORACIC VERTEBRA, PERCUTANEOUS APPROACH, DIAGNOSTIC: ICD-10-PCS | Performed by: RADIOLOGY

## 2019-01-01 PROCEDURE — 86140 C-REACTIVE PROTEIN: CPT

## 2019-01-01 PROCEDURE — 49905 OMENTAL FLAP INTRA-ABDOM: CPT | Performed by: SURGERY

## 2019-01-01 PROCEDURE — 3209999900 FLUORO FOR SURGICAL PROCEDURES

## 2019-01-01 PROCEDURE — 99231 SBSQ HOSP IP/OBS SF/LOW 25: CPT | Performed by: INTERNAL MEDICINE

## 2019-01-01 PROCEDURE — 4A023N8 MEASUREMENT OF CARDIAC SAMPLING AND PRESSURE, BILATERAL, PERCUTANEOUS APPROACH: ICD-10-PCS | Performed by: INTERNAL MEDICINE

## 2019-01-01 PROCEDURE — 0DTG0ZZ RESECTION OF LEFT LARGE INTESTINE, OPEN APPROACH: ICD-10-PCS | Performed by: SURGERY

## 2019-01-01 PROCEDURE — 88173 CYTOPATH EVAL FNA REPORT: CPT

## 2019-01-01 PROCEDURE — 6360000002 HC RX W HCPCS: Performed by: ANESTHESIOLOGY

## 2019-01-01 PROCEDURE — 84134 ASSAY OF PREALBUMIN: CPT

## 2019-01-01 PROCEDURE — 94002 VENT MGMT INPAT INIT DAY: CPT

## 2019-01-01 PROCEDURE — 83036 HEMOGLOBIN GLYCOSYLATED A1C: CPT

## 2019-01-01 PROCEDURE — 2500000003 HC RX 250 WO HCPCS: Performed by: REGISTERED NURSE

## 2019-01-01 PROCEDURE — 1036F TOBACCO NON-USER: CPT | Performed by: PHYSICIAN ASSISTANT

## 2019-01-01 PROCEDURE — 31625 BRONCHOSCOPY W/BIOPSY(S): CPT | Performed by: INTERNAL MEDICINE

## 2019-01-01 PROCEDURE — 84295 ASSAY OF SERUM SODIUM: CPT

## 2019-01-01 PROCEDURE — 3600000003 HC SURGERY LEVEL 3 BASE: Performed by: SURGERY

## 2019-01-01 PROCEDURE — 85384 FIBRINOGEN ACTIVITY: CPT

## 2019-01-01 PROCEDURE — 71250 CT THORAX DX C-: CPT

## 2019-01-01 PROCEDURE — 77001 FLUOROGUIDE FOR VEIN DEVICE: CPT

## 2019-01-01 PROCEDURE — 3609011800 HC BRONCHOSCOPY/TRANSBRONCHIAL LUNG BIOPSY: Performed by: INTERNAL MEDICINE

## 2019-01-01 PROCEDURE — 84300 ASSAY OF URINE SODIUM: CPT

## 2019-01-01 PROCEDURE — G0328 FECAL BLOOD SCRN IMMUNOASSAY: HCPCS

## 2019-01-01 PROCEDURE — 82746 ASSAY OF FOLIC ACID SERUM: CPT

## 2019-01-01 PROCEDURE — 93460 R&L HRT ART/VENTRICLE ANGIO: CPT | Performed by: INTERNAL MEDICINE

## 2019-01-01 PROCEDURE — 99496 TRANSJ CARE MGMT HIGH F2F 7D: CPT | Performed by: PHYSICIAN ASSISTANT

## 2019-01-01 PROCEDURE — 87252 VIRUS INOCULATION TISSUE: CPT

## 2019-01-01 PROCEDURE — G8419 CALC BMI OUT NRM PARAM NOF/U: HCPCS | Performed by: PHYSICIAN ASSISTANT

## 2019-01-01 PROCEDURE — A9552 F18 FDG: HCPCS | Performed by: RADIOLOGY

## 2019-01-01 PROCEDURE — 84132 ASSAY OF SERUM POTASSIUM: CPT

## 2019-01-01 PROCEDURE — C8929 TTE W OR WO FOL WCON,DOPPLER: HCPCS

## 2019-01-01 PROCEDURE — 94640 AIRWAY INHALATION TREATMENT: CPT | Performed by: PHYSICIAN ASSISTANT

## 2019-01-01 PROCEDURE — 44145 PARTIAL REMOVAL OF COLON: CPT | Performed by: SURGERY

## 2019-01-01 PROCEDURE — 6360000002 HC RX W HCPCS

## 2019-01-01 PROCEDURE — 02HV33Z INSERTION OF INFUSION DEVICE INTO SUPERIOR VENA CAVA, PERCUTANEOUS APPROACH: ICD-10-PCS | Performed by: RADIOLOGY

## 2019-01-01 PROCEDURE — 99153 MOD SED SAME PHYS/QHP EA: CPT | Performed by: INTERNAL MEDICINE

## 2019-01-01 PROCEDURE — 74018 RADEX ABDOMEN 1 VIEW: CPT

## 2019-01-01 PROCEDURE — 0JH63XZ INSERTION OF TUNNELED VASCULAR ACCESS DEVICE INTO CHEST SUBCUTANEOUS TISSUE AND FASCIA, PERCUTANEOUS APPROACH: ICD-10-PCS | Performed by: EMERGENCY MEDICINE

## 2019-01-01 PROCEDURE — 78815 PET IMAGE W/CT SKULL-THIGH: CPT

## 2019-01-01 PROCEDURE — 93308 TTE F-UP OR LMTD: CPT

## 2019-01-01 PROCEDURE — G8926 SPIRO NO PERF OR DOC: HCPCS | Performed by: PHYSICIAN ASSISTANT

## 2019-01-01 PROCEDURE — 3600000014 HC SURGERY LEVEL 4 ADDTL 15MIN: Performed by: SURGERY

## 2019-01-01 PROCEDURE — 87205 SMEAR GRAM STAIN: CPT

## 2019-01-01 PROCEDURE — 83690 ASSAY OF LIPASE: CPT

## 2019-01-01 PROCEDURE — 94669 MECHANICAL CHEST WALL OSCILL: CPT

## 2019-01-01 PROCEDURE — 44143 PARTIAL REMOVAL OF COLON: CPT | Performed by: SURGERY

## 2019-01-01 PROCEDURE — 83010 ASSAY OF HAPTOGLOBIN QUANT: CPT

## 2019-01-01 PROCEDURE — 70470 CT HEAD/BRAIN W/O & W/DYE: CPT

## 2019-01-01 PROCEDURE — 31652 BRONCH EBUS SAMPLNG 1/2 NODE: CPT | Performed by: INTERNAL MEDICINE

## 2019-01-01 PROCEDURE — 3609017100 HC EGD: Performed by: INTERNAL MEDICINE

## 2019-01-01 PROCEDURE — 85049 AUTOMATED PLATELET COUNT: CPT

## 2019-01-01 PROCEDURE — 1111F DSCHRG MED/CURRENT MED MERGE: CPT | Performed by: PHYSICIAN ASSISTANT

## 2019-01-01 PROCEDURE — 36593 DECLOT VASCULAR DEVICE: CPT

## 2019-01-01 PROCEDURE — 81003 URINALYSIS AUTO W/O SCOPE: CPT

## 2019-01-01 PROCEDURE — C1751 CATH, INF, PER/CENT/MIDLINE: HCPCS

## 2019-01-01 PROCEDURE — 31623 DX BRONCHOSCOPE/BRUSH: CPT | Performed by: INTERNAL MEDICINE

## 2019-01-01 PROCEDURE — 85652 RBC SED RATE AUTOMATED: CPT

## 2019-01-01 PROCEDURE — 88311 DECALCIFY TISSUE: CPT

## 2019-01-01 PROCEDURE — 36600 WITHDRAWAL OF ARTERIAL BLOOD: CPT

## 2019-01-01 PROCEDURE — 02HV33Z INSERTION OF INFUSION DEVICE INTO SUPERIOR VENA CAVA, PERCUTANEOUS APPROACH: ICD-10-PCS | Performed by: SURGERY

## 2019-01-01 PROCEDURE — 3603165200 HC BRNCHSC EBUS GUIDED SAMPL 1/2 NODE STATION/STRUX: Performed by: INTERNAL MEDICINE

## 2019-01-01 PROCEDURE — APPNB30 APP NON BILLABLE TIME 0-30 MINS: Performed by: NURSE PRACTITIONER

## 2019-01-01 PROCEDURE — 6360000002 HC RX W HCPCS: Performed by: EMERGENCY MEDICINE

## 2019-01-01 PROCEDURE — 71046 X-RAY EXAM CHEST 2 VIEWS: CPT

## 2019-01-01 PROCEDURE — 2709999900 FL BARIUM ENEMA

## 2019-01-01 PROCEDURE — 84238 ASSAY NONENDOCRINE RECEPTOR: CPT

## 2019-01-01 PROCEDURE — 87493 C DIFF AMPLIFIED PROBE: CPT

## 2019-01-01 PROCEDURE — 3609010900 HC BRONCHOSCOPY THERAPUTIC ASPIRATION INITIAL: Performed by: INTERNAL MEDICINE

## 2019-01-01 PROCEDURE — 84145 PROCALCITONIN (PCT): CPT

## 2019-01-01 PROCEDURE — C9600 PERC DRUG-EL COR STENT SING: HCPCS

## 2019-01-01 DEVICE — PORT INFUS OD2.7MM ID1.5MM INTRO 8FR TI POLYUR CATH DETACH CT80STPD] ANGIODYNAMICS INC]: Type: IMPLANTABLE DEVICE | Status: FUNCTIONAL

## 2019-01-01 RX ORDER — PROMETHAZINE HYDROCHLORIDE 25 MG/ML
6.25 INJECTION, SOLUTION INTRAMUSCULAR; INTRAVENOUS
Status: DISCONTINUED | OUTPATIENT
Start: 2019-01-01 | End: 2019-01-01 | Stop reason: HOSPADM

## 2019-01-01 RX ORDER — POTASSIUM CHLORIDE 20 MEQ/1
40 TABLET, EXTENDED RELEASE ORAL ONCE
Status: COMPLETED | OUTPATIENT
Start: 2019-01-01 | End: 2019-01-01

## 2019-01-01 RX ORDER — IPRATROPIUM BROMIDE AND ALBUTEROL SULFATE 2.5; .5 MG/3ML; MG/3ML
1 SOLUTION RESPIRATORY (INHALATION) EVERY 4 HOURS PRN
Status: DISCONTINUED | OUTPATIENT
Start: 2019-01-01 | End: 2019-01-01 | Stop reason: HOSPADM

## 2019-01-01 RX ORDER — FENTANYL CITRATE 50 UG/ML
25 INJECTION, SOLUTION INTRAMUSCULAR; INTRAVENOUS
Status: DISCONTINUED | OUTPATIENT
Start: 2019-01-01 | End: 2019-01-01

## 2019-01-01 RX ORDER — AMOXICILLIN AND CLAVULANATE POTASSIUM 875; 125 MG/1; MG/1
1 TABLET, FILM COATED ORAL 2 TIMES DAILY WITH MEALS
Status: DISCONTINUED | OUTPATIENT
Start: 2019-01-01 | End: 2019-01-01

## 2019-01-01 RX ORDER — OXYCODONE HYDROCHLORIDE AND ACETAMINOPHEN 5; 325 MG/1; MG/1
2 TABLET ORAL PRN
Status: DISCONTINUED | OUTPATIENT
Start: 2019-01-01 | End: 2019-01-01 | Stop reason: HOSPADM

## 2019-01-01 RX ORDER — VALSARTAN 80 MG/1
320 TABLET ORAL DAILY
Status: DISCONTINUED | OUTPATIENT
Start: 2019-01-01 | End: 2019-01-01 | Stop reason: HOSPADM

## 2019-01-01 RX ORDER — OXYCODONE HYDROCHLORIDE AND ACETAMINOPHEN 5; 325 MG/1; MG/1
1 TABLET ORAL PRN
Status: DISCONTINUED | OUTPATIENT
Start: 2019-01-01 | End: 2019-01-01 | Stop reason: HOSPADM

## 2019-01-01 RX ORDER — HYDRALAZINE HYDROCHLORIDE 20 MG/ML
5 INJECTION INTRAMUSCULAR; INTRAVENOUS EVERY 10 MIN PRN
Status: DISCONTINUED | OUTPATIENT
Start: 2019-01-01 | End: 2019-01-01 | Stop reason: HOSPADM

## 2019-01-01 RX ORDER — HEPARIN SODIUM 1000 [USP'U]/ML
40 INJECTION, SOLUTION INTRAVENOUS; SUBCUTANEOUS PRN
Status: DISCONTINUED | OUTPATIENT
Start: 2019-01-01 | End: 2019-01-01

## 2019-01-01 RX ORDER — SODIUM CHLORIDE 0.9 % (FLUSH) 0.9 %
10 SYRINGE (ML) INJECTION PRN
Status: DISCONTINUED | OUTPATIENT
Start: 2019-01-01 | End: 2019-01-01

## 2019-01-01 RX ORDER — FUROSEMIDE 40 MG/1
40 TABLET ORAL 2 TIMES DAILY
Status: DISCONTINUED | OUTPATIENT
Start: 2019-01-01 | End: 2019-01-01

## 2019-01-01 RX ORDER — SODIUM CHLORIDE 9 MG/ML
INJECTION, SOLUTION INTRAVENOUS CONTINUOUS
Status: DISCONTINUED | OUTPATIENT
Start: 2019-01-01 | End: 2019-01-01

## 2019-01-01 RX ORDER — HYDROMORPHONE HCL 110MG/55ML
0.25 PATIENT CONTROLLED ANALGESIA SYRINGE INTRAVENOUS EVERY 5 MIN PRN
Status: DISCONTINUED | OUTPATIENT
Start: 2019-01-01 | End: 2019-01-01 | Stop reason: HOSPADM

## 2019-01-01 RX ORDER — SODIUM CHLORIDE, SODIUM LACTATE, POTASSIUM CHLORIDE, CALCIUM CHLORIDE 600; 310; 30; 20 MG/100ML; MG/100ML; MG/100ML; MG/100ML
INJECTION, SOLUTION INTRAVENOUS CONTINUOUS PRN
Status: DISCONTINUED | OUTPATIENT
Start: 2019-01-01 | End: 2019-01-01 | Stop reason: SDUPTHER

## 2019-01-01 RX ORDER — EPHEDRINE SULFATE 50 MG/ML
INJECTION, SOLUTION INTRAVENOUS PRN
Status: DISCONTINUED | OUTPATIENT
Start: 2019-01-01 | End: 2019-01-01 | Stop reason: SDUPTHER

## 2019-01-01 RX ORDER — MAGNESIUM SULFATE IN WATER 40 MG/ML
2 INJECTION, SOLUTION INTRAVENOUS ONCE
Status: COMPLETED | OUTPATIENT
Start: 2019-01-01 | End: 2019-01-01

## 2019-01-01 RX ORDER — PROPOFOL 10 MG/ML
INJECTION, EMULSION INTRAVENOUS PRN
Status: DISCONTINUED | OUTPATIENT
Start: 2019-01-01 | End: 2019-01-01 | Stop reason: SDUPTHER

## 2019-01-01 RX ORDER — 0.9 % SODIUM CHLORIDE 0.9 %
INTRAVENOUS SOLUTION INTRAVENOUS CONTINUOUS PRN
Status: COMPLETED | OUTPATIENT
Start: 2019-01-01 | End: 2019-01-01

## 2019-01-01 RX ORDER — DEXAMETHASONE SODIUM PHOSPHATE 4 MG/ML
INJECTION, SOLUTION INTRA-ARTICULAR; INTRALESIONAL; INTRAMUSCULAR; INTRAVENOUS; SOFT TISSUE PRN
Status: DISCONTINUED | OUTPATIENT
Start: 2019-01-01 | End: 2019-01-01 | Stop reason: SDUPTHER

## 2019-01-01 RX ORDER — ONDANSETRON 2 MG/ML
4 INJECTION INTRAMUSCULAR; INTRAVENOUS EVERY 6 HOURS PRN
Status: DISCONTINUED | OUTPATIENT
Start: 2019-01-01 | End: 2019-01-01 | Stop reason: HOSPADM

## 2019-01-01 RX ORDER — EPTIFIBATIDE 0.75 MG/ML
2 INJECTION, SOLUTION INTRAVENOUS CONTINUOUS
Status: DISPENSED | OUTPATIENT
Start: 2019-01-01 | End: 2019-01-01

## 2019-01-01 RX ORDER — POTASSIUM CHLORIDE 7.45 MG/ML
10 INJECTION INTRAVENOUS
Status: COMPLETED | OUTPATIENT
Start: 2019-01-01 | End: 2019-01-01

## 2019-01-01 RX ORDER — MAGNESIUM SULFATE 1 G/100ML
1 INJECTION INTRAVENOUS PRN
Status: DISCONTINUED | OUTPATIENT
Start: 2019-01-01 | End: 2019-01-01 | Stop reason: HOSPADM

## 2019-01-01 RX ORDER — LORAZEPAM 2 MG/ML
INJECTION INTRAMUSCULAR
Status: DISCONTINUED
Start: 2019-01-01 | End: 2019-01-01

## 2019-01-01 RX ORDER — ONDANSETRON 2 MG/ML
INJECTION INTRAMUSCULAR; INTRAVENOUS PRN
Status: DISCONTINUED | OUTPATIENT
Start: 2019-01-01 | End: 2019-01-01 | Stop reason: SDUPTHER

## 2019-01-01 RX ORDER — CIPROFLOXACIN 2 MG/ML
400 INJECTION, SOLUTION INTRAVENOUS ONCE
Status: COMPLETED | OUTPATIENT
Start: 2019-01-01 | End: 2019-01-01

## 2019-01-01 RX ORDER — POTASSIUM CHLORIDE 20 MEQ/1
40 TABLET, EXTENDED RELEASE ORAL EVERY 4 HOURS
Status: COMPLETED | OUTPATIENT
Start: 2019-01-01 | End: 2019-01-01

## 2019-01-01 RX ORDER — LIDOCAINE HYDROCHLORIDE 10 MG/ML
INJECTION, SOLUTION EPIDURAL; INFILTRATION; INTRACAUDAL; PERINEURAL PRN
Status: DISCONTINUED | OUTPATIENT
Start: 2019-01-01 | End: 2019-01-01 | Stop reason: HOSPADM

## 2019-01-01 RX ORDER — HEPARIN SODIUM 1000 [USP'U]/ML
80 INJECTION, SOLUTION INTRAVENOUS; SUBCUTANEOUS PRN
Status: DISCONTINUED | OUTPATIENT
Start: 2019-01-01 | End: 2019-01-01

## 2019-01-01 RX ORDER — IPRATROPIUM BROMIDE AND ALBUTEROL SULFATE 2.5; .5 MG/3ML; MG/3ML
1 SOLUTION RESPIRATORY (INHALATION)
Status: DISCONTINUED | OUTPATIENT
Start: 2019-01-01 | End: 2019-01-01

## 2019-01-01 RX ORDER — AMIODARONE HYDROCHLORIDE 200 MG/1
100 TABLET ORAL DAILY
Status: DISCONTINUED | OUTPATIENT
Start: 2019-01-01 | End: 2019-01-01 | Stop reason: HOSPADM

## 2019-01-01 RX ORDER — MAGNESIUM SULFATE IN WATER 40 MG/ML
4 INJECTION, SOLUTION INTRAVENOUS ONCE
Status: DISCONTINUED | OUTPATIENT
Start: 2019-01-01 | End: 2019-01-01 | Stop reason: CLARIF

## 2019-01-01 RX ORDER — METHYLPREDNISOLONE SODIUM SUCCINATE 125 MG/2ML
60 INJECTION, POWDER, LYOPHILIZED, FOR SOLUTION INTRAMUSCULAR; INTRAVENOUS EVERY 6 HOURS
Status: DISCONTINUED | OUTPATIENT
Start: 2019-01-01 | End: 2019-01-01

## 2019-01-01 RX ORDER — PREDNISONE 20 MG/1
20 TABLET ORAL DAILY
Status: DISCONTINUED | OUTPATIENT
Start: 2019-01-01 | End: 2019-01-01

## 2019-01-01 RX ORDER — ALBUTEROL SULFATE 90 UG/1
2 AEROSOL, METERED RESPIRATORY (INHALATION) 4 TIMES DAILY
Status: DISCONTINUED | OUTPATIENT
Start: 2019-01-01 | End: 2019-01-01 | Stop reason: HOSPADM

## 2019-01-01 RX ORDER — DEXTROSE MONOHYDRATE 50 MG/ML
100 INJECTION, SOLUTION INTRAVENOUS PRN
Status: DISCONTINUED | OUTPATIENT
Start: 2019-01-01 | End: 2019-01-01 | Stop reason: HOSPADM

## 2019-01-01 RX ORDER — FAMOTIDINE 20 MG/1
20 TABLET, FILM COATED ORAL 2 TIMES DAILY
Status: DISCONTINUED | OUTPATIENT
Start: 2019-01-01 | End: 2019-01-01 | Stop reason: HOSPADM

## 2019-01-01 RX ORDER — MORPHINE SULFATE 4 MG/ML
2 INJECTION, SOLUTION INTRAMUSCULAR; INTRAVENOUS EVERY 5 MIN PRN
Status: DISCONTINUED | OUTPATIENT
Start: 2019-01-01 | End: 2019-01-01 | Stop reason: HOSPADM

## 2019-01-01 RX ORDER — LIDOCAINE HYDROCHLORIDE 20 MG/ML
INJECTION, SOLUTION INFILTRATION; PERINEURAL PRN
Status: DISCONTINUED | OUTPATIENT
Start: 2019-01-01 | End: 2019-01-01 | Stop reason: SDUPTHER

## 2019-01-01 RX ORDER — LIDOCAINE HYDROCHLORIDE 10 MG/ML
5 INJECTION, SOLUTION INFILTRATION; PERINEURAL ONCE
Status: COMPLETED | OUTPATIENT
Start: 2019-01-01 | End: 2019-01-01

## 2019-01-01 RX ORDER — LIDOCAINE HYDROCHLORIDE 20 MG/ML
INJECTION, SOLUTION EPIDURAL; INFILTRATION; INTRACAUDAL; PERINEURAL PRN
Status: DISCONTINUED | OUTPATIENT
Start: 2019-01-01 | End: 2019-01-01 | Stop reason: SDUPTHER

## 2019-01-01 RX ORDER — MAGNESIUM SULFATE IN WATER 40 MG/ML
4 INJECTION, SOLUTION INTRAVENOUS ONCE
Status: COMPLETED | OUTPATIENT
Start: 2019-01-01 | End: 2019-01-01

## 2019-01-01 RX ORDER — ACETAMINOPHEN 325 MG/1
650 TABLET ORAL ONCE
Status: COMPLETED | OUTPATIENT
Start: 2019-01-01 | End: 2019-01-01

## 2019-01-01 RX ORDER — ATROPINE SULFATE 10 MG/ML
1 SOLUTION/ DROPS OPHTHALMIC 3 TIMES DAILY PRN
Status: CANCELLED | OUTPATIENT
Start: 2019-01-01

## 2019-01-01 RX ORDER — ACETAMINOPHEN 325 MG/1
650 TABLET ORAL EVERY 4 HOURS PRN
Status: DISCONTINUED | OUTPATIENT
Start: 2019-01-01 | End: 2019-01-01 | Stop reason: HOSPADM

## 2019-01-01 RX ORDER — MAGNESIUM SULFATE IN WATER 40 MG/ML
2 INJECTION, SOLUTION INTRAVENOUS
Status: COMPLETED | OUTPATIENT
Start: 2019-01-01 | End: 2019-01-01

## 2019-01-01 RX ORDER — SODIUM CHLORIDE 9 MG/ML
INJECTION, SOLUTION INTRAVENOUS CONTINUOUS PRN
Status: DISCONTINUED | OUTPATIENT
Start: 2019-01-01 | End: 2019-01-01 | Stop reason: SDUPTHER

## 2019-01-01 RX ORDER — AMIODARONE HYDROCHLORIDE 200 MG/1
200 TABLET ORAL DAILY
Status: DISCONTINUED | OUTPATIENT
Start: 2019-01-01 | End: 2019-01-01

## 2019-01-01 RX ORDER — OXYCODONE HYDROCHLORIDE 5 MG/1
5 TABLET ORAL EVERY 4 HOURS PRN
Qty: 1 TABLET | Refills: 0
Start: 2019-01-01 | End: 2019-04-16

## 2019-01-01 RX ORDER — FENTANYL CITRATE 50 UG/ML
INJECTION, SOLUTION INTRAMUSCULAR; INTRAVENOUS PRN
Status: DISCONTINUED | OUTPATIENT
Start: 2019-01-01 | End: 2019-01-01 | Stop reason: HOSPADM

## 2019-01-01 RX ORDER — AMOXICILLIN AND CLAVULANATE POTASSIUM 875; 125 MG/1; MG/1
1 TABLET, FILM COATED ORAL 2 TIMES DAILY
Qty: 14 TABLET | Refills: 0 | Status: ON HOLD | OUTPATIENT
Start: 2019-01-01 | End: 2019-01-01 | Stop reason: HOSPADM

## 2019-01-01 RX ORDER — FENTANYL CITRATE 50 UG/ML
INJECTION, SOLUTION INTRAMUSCULAR; INTRAVENOUS PRN
Status: DISCONTINUED | OUTPATIENT
Start: 2019-01-01 | End: 2019-01-01 | Stop reason: SDUPTHER

## 2019-01-01 RX ORDER — LEVOFLOXACIN 5 MG/ML
500 INJECTION, SOLUTION INTRAVENOUS EVERY 24 HOURS
Status: DISCONTINUED | OUTPATIENT
Start: 2019-01-01 | End: 2019-01-01

## 2019-01-01 RX ORDER — LIDOCAINE HYDROCHLORIDE 20 MG/ML
INJECTION, SOLUTION EPIDURAL; INFILTRATION; INTRACAUDAL; PERINEURAL PRN
Status: DISCONTINUED | OUTPATIENT
Start: 2019-01-01 | End: 2019-01-01 | Stop reason: HOSPADM

## 2019-01-01 RX ORDER — DIPHENHYDRAMINE HCL 25 MG
25 TABLET ORAL ONCE
Status: DISCONTINUED | OUTPATIENT
Start: 2019-01-01 | End: 2019-01-01

## 2019-01-01 RX ORDER — IPRATROPIUM BROMIDE AND ALBUTEROL SULFATE 2.5; .5 MG/3ML; MG/3ML
1 SOLUTION RESPIRATORY (INHALATION) EVERY 4 HOURS PRN
Status: CANCELLED | OUTPATIENT
Start: 2019-01-01

## 2019-01-01 RX ORDER — OXYCODONE HYDROCHLORIDE 5 MG/1
5 TABLET ORAL EVERY 4 HOURS PRN
Status: DISCONTINUED | OUTPATIENT
Start: 2019-01-01 | End: 2019-01-01

## 2019-01-01 RX ORDER — HYDROMORPHONE HCL 110MG/55ML
0.5 PATIENT CONTROLLED ANALGESIA SYRINGE INTRAVENOUS EVERY 5 MIN PRN
Status: DISCONTINUED | OUTPATIENT
Start: 2019-01-01 | End: 2019-01-01 | Stop reason: HOSPADM

## 2019-01-01 RX ORDER — ASPIRIN 325 MG
325 TABLET ORAL DAILY
Status: DISCONTINUED | OUTPATIENT
Start: 2019-01-01 | End: 2019-01-01

## 2019-01-01 RX ORDER — POTASSIUM CHLORIDE 750 MG/1
20 TABLET, EXTENDED RELEASE ORAL 2 TIMES DAILY
Status: DISCONTINUED | OUTPATIENT
Start: 2019-01-01 | End: 2019-01-01

## 2019-01-01 RX ORDER — HYDROMORPHONE HCL 110MG/55ML
0.5 PATIENT CONTROLLED ANALGESIA SYRINGE INTRAVENOUS
Status: DISCONTINUED | OUTPATIENT
Start: 2019-01-01 | End: 2019-01-01

## 2019-01-01 RX ORDER — MAGNESIUM HYDROXIDE 1200 MG/15ML
LIQUID ORAL CONTINUOUS PRN
Status: COMPLETED | OUTPATIENT
Start: 2019-01-01 | End: 2019-01-01

## 2019-01-01 RX ORDER — 0.9 % SODIUM CHLORIDE 0.9 %
500 INTRAVENOUS SOLUTION INTRAVENOUS ONCE
Status: COMPLETED | OUTPATIENT
Start: 2019-01-01 | End: 2019-01-01

## 2019-01-01 RX ORDER — OMEGA-3S/DHA/EPA/FISH OIL/D3 300MG-1000
400 CAPSULE ORAL DAILY
Status: ON HOLD | COMMUNITY
End: 2019-01-01 | Stop reason: HOSPADM

## 2019-01-01 RX ORDER — 0.9 % SODIUM CHLORIDE 0.9 %
1000 INTRAVENOUS SOLUTION INTRAVENOUS ONCE
Status: COMPLETED | OUTPATIENT
Start: 2019-01-01 | End: 2019-01-01

## 2019-01-01 RX ORDER — SODIUM CHLORIDE 0.9 % (FLUSH) 0.9 %
10 SYRINGE (ML) INJECTION EVERY 12 HOURS SCHEDULED
Status: DISCONTINUED | OUTPATIENT
Start: 2019-01-01 | End: 2019-01-01 | Stop reason: HOSPADM

## 2019-01-01 RX ORDER — SODIUM CHLORIDE 9 MG/ML
INJECTION, SOLUTION INTRAVENOUS CONTINUOUS
Status: DISPENSED | OUTPATIENT
Start: 2019-01-01 | End: 2019-01-01

## 2019-01-01 RX ORDER — SODIUM CHLORIDE 9 MG/ML
INJECTION, SOLUTION INTRAVENOUS
Status: COMPLETED
Start: 2019-01-01 | End: 2019-01-01

## 2019-01-01 RX ORDER — METOPROLOL SUCCINATE 25 MG/1
50 TABLET, EXTENDED RELEASE ORAL DAILY
Status: DISCONTINUED | OUTPATIENT
Start: 2019-01-01 | End: 2019-01-01

## 2019-01-01 RX ORDER — SODIUM CHLORIDE 0.9 % (FLUSH) 0.9 %
10 SYRINGE (ML) INJECTION EVERY 12 HOURS
Status: DISCONTINUED | OUTPATIENT
Start: 2019-01-01 | End: 2019-01-01 | Stop reason: HOSPADM

## 2019-01-01 RX ORDER — AMLODIPINE BESYLATE 5 MG/1
5 TABLET ORAL DAILY
Qty: 30 TABLET | Refills: 3 | Status: ON HOLD | OUTPATIENT
Start: 2019-01-01 | End: 2019-01-01 | Stop reason: HOSPADM

## 2019-01-01 RX ORDER — DIPHENHYDRAMINE HCL 25 MG
25 TABLET ORAL ONCE
Status: COMPLETED | OUTPATIENT
Start: 2019-01-01 | End: 2019-01-01

## 2019-01-01 RX ORDER — ACETAMINOPHEN 325 MG/1
650 TABLET ORAL EVERY 4 HOURS PRN
Status: CANCELLED | OUTPATIENT
Start: 2019-01-01

## 2019-01-01 RX ORDER — METHYLPREDNISOLONE SODIUM SUCCINATE 125 MG/2ML
60 INJECTION, POWDER, LYOPHILIZED, FOR SOLUTION INTRAMUSCULAR; INTRAVENOUS EVERY 12 HOURS
DISCHARGE
Start: 2019-01-01

## 2019-01-01 RX ORDER — DIPHENHYDRAMINE HYDROCHLORIDE 50 MG/ML
12.5 INJECTION INTRAMUSCULAR; INTRAVENOUS
Status: DISCONTINUED | OUTPATIENT
Start: 2019-01-01 | End: 2019-01-01 | Stop reason: HOSPADM

## 2019-01-01 RX ORDER — PROCHLORPERAZINE 25 MG
25 SUPPOSITORY, RECTAL RECTAL EVERY 12 HOURS PRN
Status: ON HOLD | COMMUNITY
End: 2019-01-01 | Stop reason: HOSPADM

## 2019-01-01 RX ORDER — HEPARIN SODIUM 1000 [USP'U]/ML
80 INJECTION, SOLUTION INTRAVENOUS; SUBCUTANEOUS ONCE
Status: DISCONTINUED | OUTPATIENT
Start: 2019-01-01 | End: 2019-01-01

## 2019-01-01 RX ORDER — LABETALOL HYDROCHLORIDE 5 MG/ML
5 INJECTION, SOLUTION INTRAVENOUS EVERY 10 MIN PRN
Status: DISCONTINUED | OUTPATIENT
Start: 2019-01-01 | End: 2019-01-01 | Stop reason: HOSPADM

## 2019-01-01 RX ORDER — ONDANSETRON 2 MG/ML
4 INJECTION INTRAMUSCULAR; INTRAVENOUS PRN
Status: DISCONTINUED | OUTPATIENT
Start: 2019-01-01 | End: 2019-01-01 | Stop reason: HOSPADM

## 2019-01-01 RX ORDER — CALCIUM CARBONATE/VITAMIN D3 250-3.125
1 TABLET ORAL DAILY
Status: DISCONTINUED | OUTPATIENT
Start: 2019-01-01 | End: 2019-01-01

## 2019-01-01 RX ORDER — ATROPINE SULFATE 10 MG/ML
1 SOLUTION/ DROPS OPHTHALMIC 3 TIMES DAILY PRN
Status: DISCONTINUED | OUTPATIENT
Start: 2019-01-01 | End: 2019-01-01 | Stop reason: HOSPADM

## 2019-01-01 RX ORDER — NALOXONE HYDROCHLORIDE 0.4 MG/ML
0.4 INJECTION, SOLUTION INTRAMUSCULAR; INTRAVENOUS; SUBCUTANEOUS PRN
Status: DISCONTINUED | OUTPATIENT
Start: 2019-01-01 | End: 2019-01-01

## 2019-01-01 RX ORDER — FENTANYL CITRATE 50 UG/ML
50 INJECTION, SOLUTION INTRAMUSCULAR; INTRAVENOUS ONCE
Status: COMPLETED | OUTPATIENT
Start: 2019-01-01 | End: 2019-01-01

## 2019-01-01 RX ORDER — PROPOFOL 10 MG/ML
10 INJECTION, EMULSION INTRAVENOUS
Status: DISCONTINUED | OUTPATIENT
Start: 2019-01-01 | End: 2019-01-01

## 2019-01-01 RX ORDER — BENZONATATE 100 MG/1
100 CAPSULE ORAL 3 TIMES DAILY PRN
Status: DISCONTINUED | OUTPATIENT
Start: 2019-01-01 | End: 2019-01-01 | Stop reason: HOSPADM

## 2019-01-01 RX ORDER — OXYCODONE HYDROCHLORIDE AND ACETAMINOPHEN 5; 325 MG/1; MG/1
1 TABLET ORAL EVERY 6 HOURS PRN
Status: DISCONTINUED | OUTPATIENT
Start: 2019-01-01 | End: 2019-01-01

## 2019-01-01 RX ORDER — POTASSIUM CHLORIDE 750 MG/1
10 TABLET, EXTENDED RELEASE ORAL 3 TIMES DAILY
Status: DISCONTINUED | OUTPATIENT
Start: 2019-01-01 | End: 2019-01-01

## 2019-01-01 RX ORDER — NICOTINE POLACRILEX 4 MG
15 LOZENGE BUCCAL PRN
Status: DISCONTINUED | OUTPATIENT
Start: 2019-01-01 | End: 2019-01-01 | Stop reason: HOSPADM

## 2019-01-01 RX ORDER — PANTOPRAZOLE SODIUM 40 MG/10ML
40 INJECTION, POWDER, LYOPHILIZED, FOR SOLUTION INTRAVENOUS DAILY
Status: DISCONTINUED | OUTPATIENT
Start: 2019-01-01 | End: 2019-01-01

## 2019-01-01 RX ORDER — OXYCODONE HYDROCHLORIDE AND ACETAMINOPHEN 5; 325 MG/1; MG/1
1 TABLET ORAL EVERY 4 HOURS PRN
Status: DISCONTINUED | OUTPATIENT
Start: 2019-01-01 | End: 2019-01-01 | Stop reason: HOSPADM

## 2019-01-01 RX ORDER — MIDODRINE HYDROCHLORIDE 5 MG/1
5 TABLET ORAL
Status: DISCONTINUED | OUTPATIENT
Start: 2019-01-01 | End: 2019-01-01

## 2019-01-01 RX ORDER — HEPARIN SODIUM 5000 [USP'U]/ML
5000 INJECTION, SOLUTION INTRAVENOUS; SUBCUTANEOUS EVERY 8 HOURS SCHEDULED
Status: DISCONTINUED | OUTPATIENT
Start: 2019-01-01 | End: 2019-01-01

## 2019-01-01 RX ORDER — PANTOPRAZOLE SODIUM 40 MG/10ML
40 INJECTION, POWDER, LYOPHILIZED, FOR SOLUTION INTRAVENOUS 2 TIMES DAILY
Status: DISCONTINUED | OUTPATIENT
Start: 2019-01-01 | End: 2019-01-01

## 2019-01-01 RX ORDER — FOLIC ACID 1 MG/1
1 TABLET ORAL DAILY
Status: DISCONTINUED | OUTPATIENT
Start: 2019-01-01 | End: 2019-01-01

## 2019-01-01 RX ORDER — HYDROMORPHONE HCL 110MG/55ML
1 PATIENT CONTROLLED ANALGESIA SYRINGE INTRAVENOUS
Status: DISCONTINUED | OUTPATIENT
Start: 2019-01-01 | End: 2019-01-01 | Stop reason: HOSPADM

## 2019-01-01 RX ORDER — MIDAZOLAM HYDROCHLORIDE 5 MG/ML
INJECTION INTRAMUSCULAR; INTRAVENOUS
Status: COMPLETED | OUTPATIENT
Start: 2019-01-01 | End: 2019-01-01

## 2019-01-01 RX ORDER — METHYLPREDNISOLONE SODIUM SUCCINATE 40 MG/ML
40 INJECTION, POWDER, LYOPHILIZED, FOR SOLUTION INTRAMUSCULAR; INTRAVENOUS EVERY 12 HOURS
Status: DISCONTINUED | OUTPATIENT
Start: 2019-01-01 | End: 2019-01-01

## 2019-01-01 RX ORDER — CLOPIDOGREL BISULFATE 75 MG/1
75 TABLET ORAL DAILY
Status: DISCONTINUED | OUTPATIENT
Start: 2019-01-01 | End: 2019-01-01

## 2019-01-01 RX ORDER — SODIUM CHLORIDE 0.9 % (FLUSH) 0.9 %
10 SYRINGE (ML) INJECTION PRN
Status: DISCONTINUED | OUTPATIENT
Start: 2019-01-01 | End: 2019-01-01 | Stop reason: HOSPADM

## 2019-01-01 RX ORDER — POTASSIUM CHLORIDE 7.45 MG/ML
10 INJECTION INTRAVENOUS PRN
Status: DISCONTINUED | OUTPATIENT
Start: 2019-01-01 | End: 2019-01-01 | Stop reason: HOSPADM

## 2019-01-01 RX ORDER — EPTIFIBATIDE 0.75 MG/ML
2 INJECTION, SOLUTION INTRAVENOUS CONTINUOUS
Status: DISCONTINUED | OUTPATIENT
Start: 2019-01-01 | End: 2019-01-01

## 2019-01-01 RX ORDER — ONDANSETRON 2 MG/ML
4 INJECTION INTRAMUSCULAR; INTRAVENOUS EVERY 10 MIN PRN
Status: DISCONTINUED | OUTPATIENT
Start: 2019-01-01 | End: 2019-01-01 | Stop reason: HOSPADM

## 2019-01-01 RX ORDER — DEXAMETHASONE 4 MG/1
4 TABLET ORAL EVERY 12 HOURS SCHEDULED
Qty: 20 TABLET | Refills: 0 | Status: SHIPPED | OUTPATIENT
Start: 2019-01-01 | End: 2019-01-01

## 2019-01-01 RX ORDER — POTASSIUM CHLORIDE 29.8 MG/ML
20 INJECTION INTRAVENOUS
Status: COMPLETED | OUTPATIENT
Start: 2019-01-01 | End: 2019-01-01

## 2019-01-01 RX ORDER — LORAZEPAM 2 MG/ML
0.5 INJECTION INTRAMUSCULAR ONCE
Status: COMPLETED | OUTPATIENT
Start: 2019-01-01 | End: 2019-01-01

## 2019-01-01 RX ORDER — PANTOPRAZOLE SODIUM 40 MG/10ML
40 INJECTION, POWDER, LYOPHILIZED, FOR SOLUTION INTRAVENOUS DAILY
DISCHARGE
Start: 2019-01-01

## 2019-01-01 RX ORDER — AMLODIPINE BESYLATE 5 MG/1
5 TABLET ORAL DAILY
Status: DISCONTINUED | OUTPATIENT
Start: 2019-01-01 | End: 2019-01-01 | Stop reason: HOSPADM

## 2019-01-01 RX ORDER — DIPHENHYDRAMINE HYDROCHLORIDE 50 MG/ML
25 INJECTION INTRAMUSCULAR; INTRAVENOUS ONCE
Status: COMPLETED | OUTPATIENT
Start: 2019-01-01 | End: 2019-01-01

## 2019-01-01 RX ORDER — CHLORHEXIDINE GLUCONATE 0.12 MG/ML
15 RINSE ORAL 2 TIMES DAILY
Status: DISCONTINUED | OUTPATIENT
Start: 2019-01-01 | End: 2019-01-01

## 2019-01-01 RX ORDER — METRONIDAZOLE 250 MG/1
500 TABLET ORAL EVERY 8 HOURS SCHEDULED
Status: DISCONTINUED | OUTPATIENT
Start: 2019-01-01 | End: 2019-01-01 | Stop reason: HOSPADM

## 2019-01-01 RX ORDER — ROCURONIUM BROMIDE 10 MG/ML
INJECTION, SOLUTION INTRAVENOUS PRN
Status: DISCONTINUED | OUTPATIENT
Start: 2019-01-01 | End: 2019-01-01 | Stop reason: SDUPTHER

## 2019-01-01 RX ORDER — ASPIRIN 81 MG/1
81 TABLET, CHEWABLE ORAL DAILY
Status: DISCONTINUED | OUTPATIENT
Start: 2019-01-01 | End: 2019-01-01

## 2019-01-01 RX ORDER — ATENOLOL 100 MG/1
200 TABLET ORAL DAILY
Status: ON HOLD | COMMUNITY
End: 2019-01-01 | Stop reason: HOSPADM

## 2019-01-01 RX ORDER — FENTANYL CITRATE 50 UG/ML
INJECTION, SOLUTION INTRAMUSCULAR; INTRAVENOUS PRN
Status: DISCONTINUED | OUTPATIENT
Start: 2019-01-01 | End: 2019-01-01 | Stop reason: ALTCHOICE

## 2019-01-01 RX ORDER — HYDROPHILIC CREAM
1 PASTE (GRAM) TOPICAL DAILY
Refills: 0 | DISCHARGE
Start: 2019-01-01

## 2019-01-01 RX ORDER — POTASSIUM CHLORIDE 20 MEQ/1
20 TABLET, EXTENDED RELEASE ORAL ONCE
Status: COMPLETED | OUTPATIENT
Start: 2019-01-01 | End: 2019-01-01

## 2019-01-01 RX ORDER — ONDANSETRON 2 MG/ML
4 INJECTION INTRAMUSCULAR; INTRAVENOUS EVERY 6 HOURS PRN
Status: DISCONTINUED | OUTPATIENT
Start: 2019-01-01 | End: 2019-01-01 | Stop reason: SDUPTHER

## 2019-01-01 RX ORDER — DEXAMETHASONE 4 MG/1
4 TABLET ORAL
Status: ON HOLD | COMMUNITY
Start: 2019-01-01 | End: 2019-01-01

## 2019-01-01 RX ORDER — SODIUM CHLORIDE 0.9 % (FLUSH) 0.9 %
SYRINGE (ML) INJECTION
Status: COMPLETED
Start: 2019-01-01 | End: 2019-01-01

## 2019-01-01 RX ORDER — HEPARIN SODIUM 1000 [USP'U]/ML
40 INJECTION, SOLUTION INTRAVENOUS; SUBCUTANEOUS ONCE
Status: COMPLETED | OUTPATIENT
Start: 2019-01-01 | End: 2019-01-01

## 2019-01-01 RX ORDER — CLOPIDOGREL BISULFATE 75 MG/1
75 TABLET ORAL DAILY
Status: DISCONTINUED | OUTPATIENT
Start: 2019-01-01 | End: 2019-01-01 | Stop reason: HOSPADM

## 2019-01-01 RX ORDER — ONDANSETRON 2 MG/ML
4 INJECTION INTRAMUSCULAR; INTRAVENOUS
Status: DISCONTINUED | OUTPATIENT
Start: 2019-01-01 | End: 2019-01-01 | Stop reason: HOSPADM

## 2019-01-01 RX ORDER — SODIUM CHLORIDE 9 MG/ML
INJECTION, SOLUTION INTRAVENOUS
Status: DISPENSED
Start: 2019-01-01 | End: 2019-01-01

## 2019-01-01 RX ORDER — FUROSEMIDE 40 MG/1
40 TABLET ORAL DAILY
Status: DISCONTINUED | OUTPATIENT
Start: 2019-01-01 | End: 2019-01-01

## 2019-01-01 RX ORDER — FENTANYL CITRATE 50 UG/ML
INJECTION, SOLUTION INTRAMUSCULAR; INTRAVENOUS
Status: COMPLETED | OUTPATIENT
Start: 2019-01-01 | End: 2019-01-01

## 2019-01-01 RX ORDER — SPIRONOLACTONE 25 MG/1
25 TABLET ORAL DAILY
Status: DISCONTINUED | OUTPATIENT
Start: 2019-01-01 | End: 2019-01-01

## 2019-01-01 RX ORDER — SODIUM CHLORIDE 0.9 % (FLUSH) 0.9 %
10 SYRINGE (ML) INJECTION EVERY 12 HOURS SCHEDULED
Status: DISCONTINUED | OUTPATIENT
Start: 2019-01-01 | End: 2019-01-01

## 2019-01-01 RX ORDER — LORAZEPAM 2 MG/ML
1 INJECTION INTRAMUSCULAR ONCE
Status: COMPLETED | OUTPATIENT
Start: 2019-01-01 | End: 2019-01-01

## 2019-01-01 RX ORDER — LOPERAMIDE HYDROCHLORIDE 2 MG/1
2 CAPSULE ORAL 4 TIMES DAILY PRN
Status: DISCONTINUED | OUTPATIENT
Start: 2019-01-01 | End: 2019-01-01

## 2019-01-01 RX ORDER — FLUCONAZOLE 2 MG/ML
200 INJECTION, SOLUTION INTRAVENOUS EVERY 24 HOURS
Status: COMPLETED | OUTPATIENT
Start: 2019-01-01 | End: 2019-01-01

## 2019-01-01 RX ORDER — PROPOFOL 10 MG/ML
INJECTION, EMULSION INTRAVENOUS CONTINUOUS PRN
Status: DISCONTINUED | OUTPATIENT
Start: 2019-01-01 | End: 2019-01-01 | Stop reason: SDUPTHER

## 2019-01-01 RX ORDER — HEPARIN SODIUM 1000 [USP'U]/ML
5200 INJECTION, SOLUTION INTRAVENOUS; SUBCUTANEOUS ONCE
Status: COMPLETED | OUTPATIENT
Start: 2019-01-01 | End: 2019-01-01

## 2019-01-01 RX ORDER — BENZONATATE 100 MG/1
100 CAPSULE ORAL 3 TIMES DAILY PRN
Qty: 30 CAPSULE | Refills: 0 | Status: SHIPPED | OUTPATIENT
Start: 2019-01-01 | End: 2019-01-01

## 2019-01-01 RX ORDER — ACYCLOVIR 800 MG/1
800 TABLET ORAL 3 TIMES DAILY
Status: DISCONTINUED | OUTPATIENT
Start: 2019-01-01 | End: 2019-01-01

## 2019-01-01 RX ORDER — SIMVASTATIN 10 MG
20 TABLET ORAL NIGHTLY
Status: DISCONTINUED | OUTPATIENT
Start: 2019-01-01 | End: 2019-01-01 | Stop reason: HOSPADM

## 2019-01-01 RX ORDER — MORPHINE SULFATE 2 MG/ML
2 INJECTION, SOLUTION INTRAMUSCULAR; INTRAVENOUS
Status: DISCONTINUED | OUTPATIENT
Start: 2019-01-01 | End: 2019-01-01

## 2019-01-01 RX ORDER — MAGNESIUM SULFATE IN WATER 40 MG/ML
INJECTION, SOLUTION INTRAVENOUS
Status: DISPENSED
Start: 2019-01-01 | End: 2019-01-01

## 2019-01-01 RX ORDER — DEXAMETHASONE SODIUM PHOSPHATE 4 MG/ML
4 INJECTION, SOLUTION INTRA-ARTICULAR; INTRALESIONAL; INTRAMUSCULAR; INTRAVENOUS; SOFT TISSUE EVERY 8 HOURS
Status: DISCONTINUED | OUTPATIENT
Start: 2019-01-01 | End: 2019-01-01

## 2019-01-01 RX ORDER — BUPIVACAINE HYDROCHLORIDE 5 MG/ML
INJECTION, SOLUTION PERINEURAL PRN
Status: DISCONTINUED | OUTPATIENT
Start: 2019-01-01 | End: 2019-01-01 | Stop reason: HOSPADM

## 2019-01-01 RX ORDER — ATENOLOL 25 MG/1
25 TABLET ORAL DAILY
Status: DISCONTINUED | OUTPATIENT
Start: 2019-01-01 | End: 2019-01-01 | Stop reason: HOSPADM

## 2019-01-01 RX ORDER — MAGNESIUM SULFATE IN WATER 40 MG/ML
4 INJECTION, SOLUTION INTRAVENOUS ONCE
Status: DISCONTINUED | OUTPATIENT
Start: 2019-01-01 | End: 2019-01-01

## 2019-01-01 RX ORDER — METRONIDAZOLE 500 MG/1
500 TABLET ORAL EVERY 8 HOURS SCHEDULED
Qty: 27 TABLET | Refills: 0 | Status: SHIPPED | OUTPATIENT
Start: 2019-01-01 | End: 2019-01-01

## 2019-01-01 RX ORDER — HEPARIN SODIUM 1000 [USP'U]/ML
80 INJECTION, SOLUTION INTRAVENOUS; SUBCUTANEOUS PRN
Status: DISCONTINUED | OUTPATIENT
Start: 2019-01-01 | End: 2019-01-01 | Stop reason: ALTCHOICE

## 2019-01-01 RX ORDER — ONDANSETRON 2 MG/ML
4 INJECTION INTRAMUSCULAR; INTRAVENOUS EVERY 6 HOURS PRN
Status: CANCELLED | OUTPATIENT
Start: 2019-01-01

## 2019-01-01 RX ORDER — MORPHINE SULFATE 2 MG/ML
2 INJECTION, SOLUTION INTRAMUSCULAR; INTRAVENOUS
Status: DISCONTINUED | OUTPATIENT
Start: 2019-01-01 | End: 2019-01-01 | Stop reason: SDUPTHER

## 2019-01-01 RX ORDER — LORAZEPAM 2 MG/ML
1 INJECTION INTRAMUSCULAR EVERY 30 MIN PRN
Status: DISCONTINUED | OUTPATIENT
Start: 2019-01-01 | End: 2019-01-01 | Stop reason: HOSPADM

## 2019-01-01 RX ORDER — SODIUM CHLORIDE 450 MG/100ML
INJECTION, SOLUTION INTRAVENOUS CONTINUOUS
Status: DISCONTINUED | OUTPATIENT
Start: 2019-01-01 | End: 2019-01-01

## 2019-01-01 RX ORDER — MEPERIDINE HYDROCHLORIDE 50 MG/ML
12.5 INJECTION INTRAMUSCULAR; INTRAVENOUS; SUBCUTANEOUS EVERY 5 MIN PRN
Status: DISCONTINUED | OUTPATIENT
Start: 2019-01-01 | End: 2019-01-01 | Stop reason: HOSPADM

## 2019-01-01 RX ORDER — FUROSEMIDE 10 MG/ML
20 INJECTION INTRAMUSCULAR; INTRAVENOUS 2 TIMES DAILY
Status: DISCONTINUED | OUTPATIENT
Start: 2019-01-01 | End: 2019-01-01

## 2019-01-01 RX ORDER — MORPHINE SULFATE 2 MG/ML
2 INJECTION, SOLUTION INTRAMUSCULAR; INTRAVENOUS EVERY 4 HOURS PRN
Status: DISCONTINUED | OUTPATIENT
Start: 2019-01-01 | End: 2019-01-01

## 2019-01-01 RX ORDER — HYDROMORPHONE HCL 110MG/55ML
0.5 PATIENT CONTROLLED ANALGESIA SYRINGE INTRAVENOUS
Qty: 1 ML | Refills: 0
Start: 2019-01-01 | End: 2019-04-16

## 2019-01-01 RX ORDER — ASPIRIN 81 MG/1
81 TABLET ORAL DAILY
Status: DISCONTINUED | OUTPATIENT
Start: 2019-01-01 | End: 2019-01-01

## 2019-01-01 RX ORDER — DEXAMETHASONE 4 MG/1
4 TABLET ORAL EVERY 12 HOURS SCHEDULED
Status: DISCONTINUED | OUTPATIENT
Start: 2019-01-01 | End: 2019-01-01 | Stop reason: HOSPADM

## 2019-01-01 RX ORDER — METHYLPREDNISOLONE SODIUM SUCCINATE 125 MG/2ML
60 INJECTION, POWDER, LYOPHILIZED, FOR SOLUTION INTRAMUSCULAR; INTRAVENOUS EVERY 12 HOURS
Status: DISCONTINUED | OUTPATIENT
Start: 2019-01-01 | End: 2019-01-01 | Stop reason: HOSPADM

## 2019-01-01 RX ORDER — SULFAMETHOXAZOLE AND TRIMETHOPRIM 800; 160 MG/1; MG/1
1 TABLET ORAL EVERY 12 HOURS SCHEDULED
Qty: 18 TABLET | Refills: 0 | Status: SHIPPED | OUTPATIENT
Start: 2019-01-01 | End: 2019-01-01

## 2019-01-01 RX ORDER — MORPHINE SULFATE 2 MG/ML
2 INJECTION, SOLUTION INTRAMUSCULAR; INTRAVENOUS EVERY 30 MIN PRN
Status: DISCONTINUED | OUTPATIENT
Start: 2019-01-01 | End: 2019-01-01 | Stop reason: HOSPADM

## 2019-01-01 RX ORDER — METOPROLOL SUCCINATE 50 MG/1
50 TABLET, EXTENDED RELEASE ORAL 2 TIMES DAILY
Status: DISCONTINUED | OUTPATIENT
Start: 2019-01-01 | End: 2019-01-01

## 2019-01-01 RX ORDER — HEPARIN SODIUM (PORCINE) LOCK FLUSH IV SOLN 100 UNIT/ML 100 UNIT/ML
SOLUTION INTRAVENOUS PRN
Status: DISCONTINUED | OUTPATIENT
Start: 2019-01-01 | End: 2019-01-01 | Stop reason: HOSPADM

## 2019-01-01 RX ORDER — FUROSEMIDE 10 MG/ML
40 INJECTION INTRAMUSCULAR; INTRAVENOUS ONCE
Status: COMPLETED | OUTPATIENT
Start: 2019-01-01 | End: 2019-01-01

## 2019-01-01 RX ORDER — POTASSIUM CHLORIDE 29.8 MG/ML
20 INJECTION INTRAVENOUS ONCE
Status: COMPLETED | OUTPATIENT
Start: 2019-01-01 | End: 2019-01-01

## 2019-01-01 RX ORDER — 0.9 % SODIUM CHLORIDE 0.9 %
250 INTRAVENOUS SOLUTION INTRAVENOUS ONCE
Status: COMPLETED | OUTPATIENT
Start: 2019-01-01 | End: 2019-01-01

## 2019-01-01 RX ORDER — METOPROLOL SUCCINATE 50 MG/1
100 TABLET, EXTENDED RELEASE ORAL DAILY
Status: DISCONTINUED | OUTPATIENT
Start: 2019-01-01 | End: 2019-01-01

## 2019-01-01 RX ORDER — METOPROLOL SUCCINATE 50 MG/1
50 TABLET, EXTENDED RELEASE ORAL DAILY
Qty: 30 TABLET | Refills: 3 | DISCHARGE
Start: 2019-01-01

## 2019-01-01 RX ORDER — MAGNESIUM SULFATE 1 G/100ML
1 INJECTION INTRAVENOUS ONCE
Status: COMPLETED | OUTPATIENT
Start: 2019-01-01 | End: 2019-01-01

## 2019-01-01 RX ORDER — POTASSIUM CHLORIDE 750 MG/1
10 TABLET, EXTENDED RELEASE ORAL 4 TIMES DAILY
Status: DISCONTINUED | OUTPATIENT
Start: 2019-01-01 | End: 2019-01-01

## 2019-01-01 RX ORDER — 0.9 % SODIUM CHLORIDE 0.9 %
250 INTRAVENOUS SOLUTION INTRAVENOUS ONCE
Status: DISCONTINUED | OUTPATIENT
Start: 2019-01-01 | End: 2019-01-01

## 2019-01-01 RX ORDER — METOPROLOL TARTRATE 5 MG/5ML
2.5 INJECTION INTRAVENOUS ONCE
Status: COMPLETED | OUTPATIENT
Start: 2019-01-01 | End: 2019-01-01

## 2019-01-01 RX ORDER — HYDROMORPHONE HCL 110MG/55ML
PATIENT CONTROLLED ANALGESIA SYRINGE INTRAVENOUS PRN
Status: DISCONTINUED | OUTPATIENT
Start: 2019-01-01 | End: 2019-01-01 | Stop reason: SDUPTHER

## 2019-01-01 RX ORDER — HEPARIN SODIUM 10000 [USP'U]/100ML
16.2 INJECTION, SOLUTION INTRAVENOUS CONTINUOUS
Status: DISCONTINUED | OUTPATIENT
Start: 2019-01-01 | End: 2019-01-01 | Stop reason: HOSPADM

## 2019-01-01 RX ORDER — HYDRALAZINE HYDROCHLORIDE 20 MG/ML
20 INJECTION INTRAMUSCULAR; INTRAVENOUS ONCE
Status: COMPLETED | OUTPATIENT
Start: 2019-01-01 | End: 2019-01-01

## 2019-01-01 RX ORDER — OXYCODONE HYDROCHLORIDE 5 MG/1
10 TABLET ORAL EVERY 4 HOURS PRN
Status: DISCONTINUED | OUTPATIENT
Start: 2019-01-01 | End: 2019-01-01

## 2019-01-01 RX ORDER — IPRATROPIUM BROMIDE AND ALBUTEROL SULFATE 2.5; .5 MG/3ML; MG/3ML
3 SOLUTION RESPIRATORY (INHALATION)
Qty: 360 ML | Refills: 0
Start: 2019-01-01

## 2019-01-01 RX ORDER — LISINOPRIL 5 MG/1
5 TABLET ORAL NIGHTLY
Status: DISCONTINUED | OUTPATIENT
Start: 2019-01-01 | End: 2019-01-01

## 2019-01-01 RX ORDER — POTASSIUM CHLORIDE 7.45 MG/ML
10 INJECTION INTRAVENOUS
Status: DISCONTINUED | OUTPATIENT
Start: 2019-01-01 | End: 2019-01-01

## 2019-01-01 RX ORDER — AMIODARONE HYDROCHLORIDE 200 MG/1
200 TABLET ORAL DAILY
Status: ON HOLD | COMMUNITY
End: 2019-01-01 | Stop reason: HOSPADM

## 2019-01-01 RX ORDER — HEPARIN SODIUM 10000 [USP'U]/100ML
16.8 INJECTION, SOLUTION INTRAVENOUS CONTINUOUS
Status: DISCONTINUED | OUTPATIENT
Start: 2019-01-01 | End: 2019-01-01

## 2019-01-01 RX ORDER — TOLVAPTAN 15 MG/1
15 TABLET ORAL DAILY
Status: DISPENSED | OUTPATIENT
Start: 2019-01-01 | End: 2019-01-01

## 2019-01-01 RX ORDER — HEPARIN SODIUM 1000 [USP'U]/ML
80 INJECTION, SOLUTION INTRAVENOUS; SUBCUTANEOUS ONCE
Status: COMPLETED | OUTPATIENT
Start: 2019-01-01 | End: 2019-01-01

## 2019-01-01 RX ORDER — EPTIFIBATIDE 0.75 MG/ML
2 INJECTION, SOLUTION INTRAVENOUS CONTINUOUS
Status: DISCONTINUED | OUTPATIENT
Start: 2019-01-01 | End: 2019-01-01 | Stop reason: ALTCHOICE

## 2019-01-01 RX ORDER — HEPARIN SODIUM 1000 [USP'U]/ML
40 INJECTION, SOLUTION INTRAVENOUS; SUBCUTANEOUS PRN
Status: DISCONTINUED | OUTPATIENT
Start: 2019-01-01 | End: 2019-01-01 | Stop reason: ALTCHOICE

## 2019-01-01 RX ORDER — EPHEDRINE SULFATE 50 MG/ML
INJECTION INTRAVENOUS PRN
Status: DISCONTINUED | OUTPATIENT
Start: 2019-01-01 | End: 2019-01-01 | Stop reason: SDUPTHER

## 2019-01-01 RX ORDER — FENTANYL CITRATE 50 UG/ML
25 INJECTION, SOLUTION INTRAMUSCULAR; INTRAVENOUS EVERY 5 MIN PRN
Status: DISCONTINUED | OUTPATIENT
Start: 2019-01-01 | End: 2019-01-01 | Stop reason: HOSPADM

## 2019-01-01 RX ORDER — HEPARIN SODIUM 10000 [USP'U]/100ML
18.3 INJECTION, SOLUTION INTRAVENOUS CONTINUOUS
Status: DISPENSED | OUTPATIENT
Start: 2019-01-01 | End: 2019-01-01

## 2019-01-01 RX ORDER — FUROSEMIDE 10 MG/ML
20 INJECTION INTRAMUSCULAR; INTRAVENOUS ONCE
Status: COMPLETED | OUTPATIENT
Start: 2019-01-01 | End: 2019-01-01

## 2019-01-01 RX ORDER — FLUDEOXYGLUCOSE F 18 200 MCI/ML
12.58 INJECTION, SOLUTION INTRAVENOUS
Status: COMPLETED | OUTPATIENT
Start: 2019-01-01 | End: 2019-01-01

## 2019-01-01 RX ORDER — FOLIC ACID 1 MG/1
1 TABLET ORAL DAILY
Qty: 30 TABLET | Refills: 3 | Status: SHIPPED | OUTPATIENT
Start: 2019-01-01

## 2019-01-01 RX ORDER — ONDANSETRON HYDROCHLORIDE 8 MG/1
TABLET, FILM COATED ORAL
Status: ON HOLD | COMMUNITY
Start: 2019-01-01 | End: 2019-01-01 | Stop reason: HOSPADM

## 2019-01-01 RX ORDER — LISINOPRIL 5 MG/1
5 TABLET ORAL DAILY
Status: DISCONTINUED | OUTPATIENT
Start: 2019-01-01 | End: 2019-01-01

## 2019-01-01 RX ORDER — IPRATROPIUM BROMIDE AND ALBUTEROL SULFATE 2.5; .5 MG/3ML; MG/3ML
1 SOLUTION RESPIRATORY (INHALATION) EVERY 4 HOURS PRN
Status: DISCONTINUED | OUTPATIENT
Start: 2019-01-01 | End: 2019-01-01 | Stop reason: SDUPTHER

## 2019-01-01 RX ORDER — SODIUM CHLORIDE 0.9 % (FLUSH) 0.9 %
10 SYRINGE (ML) INJECTION PRN
Status: DISCONTINUED | OUTPATIENT
Start: 2019-01-01 | End: 2019-01-01 | Stop reason: SDUPTHER

## 2019-01-01 RX ORDER — FENTANYL CITRATE 50 UG/ML
100 INJECTION, SOLUTION INTRAMUSCULAR; INTRAVENOUS ONCE
Status: COMPLETED | OUTPATIENT
Start: 2019-01-01 | End: 2019-01-01

## 2019-01-01 RX ORDER — SIMVASTATIN 40 MG
40 TABLET ORAL NIGHTLY
Status: DISCONTINUED | OUTPATIENT
Start: 2019-01-01 | End: 2019-01-01

## 2019-01-01 RX ORDER — ACYCLOVIR 50 MG/G
OINTMENT TOPICAL
Status: DISCONTINUED | OUTPATIENT
Start: 2019-01-01 | End: 2019-01-01

## 2019-01-01 RX ORDER — LEVALBUTEROL INHALATION SOLUTION 1.25 MG/3ML
1.25 SOLUTION RESPIRATORY (INHALATION) ONCE
Status: COMPLETED | OUTPATIENT
Start: 2019-01-01 | End: 2019-01-01

## 2019-01-01 RX ORDER — MIDAZOLAM HYDROCHLORIDE 5 MG/ML
INJECTION INTRAMUSCULAR; INTRAVENOUS PRN
Status: DISCONTINUED | OUTPATIENT
Start: 2019-01-01 | End: 2019-01-01 | Stop reason: ALTCHOICE

## 2019-01-01 RX ORDER — FUROSEMIDE 10 MG/ML
60 INJECTION INTRAMUSCULAR; INTRAVENOUS 2 TIMES DAILY
Status: DISCONTINUED | OUTPATIENT
Start: 2019-01-01 | End: 2019-01-01

## 2019-01-01 RX ORDER — HYDROPHILIC CREAM
1 PASTE (GRAM) TOPICAL DAILY
Status: DISCONTINUED | OUTPATIENT
Start: 2019-01-01 | End: 2019-01-01 | Stop reason: HOSPADM

## 2019-01-01 RX ORDER — HYDROMORPHONE HCL 110MG/55ML
1 PATIENT CONTROLLED ANALGESIA SYRINGE INTRAVENOUS
Status: CANCELLED | OUTPATIENT
Start: 2019-01-01

## 2019-01-01 RX ORDER — CARBOXYMETHYLCELLULOSE SODIUM 10 MG/ML
1 GEL OPHTHALMIC 3 TIMES DAILY
Status: DISCONTINUED | OUTPATIENT
Start: 2019-01-01 | End: 2019-01-01

## 2019-01-01 RX ORDER — MORPHINE SULFATE 2 MG/ML
2 INJECTION, SOLUTION INTRAMUSCULAR; INTRAVENOUS
Status: DISCONTINUED | OUTPATIENT
Start: 2019-01-01 | End: 2019-01-01 | Stop reason: HOSPADM

## 2019-01-01 RX ORDER — LORAZEPAM 2 MG/ML
1 INJECTION INTRAMUSCULAR
Status: CANCELLED | OUTPATIENT
Start: 2019-01-01

## 2019-01-01 RX ORDER — HYDRALAZINE HYDROCHLORIDE 20 MG/ML
10 INJECTION INTRAMUSCULAR; INTRAVENOUS EVERY 4 HOURS PRN
Status: DISCONTINUED | OUTPATIENT
Start: 2019-01-01 | End: 2019-01-01 | Stop reason: HOSPADM

## 2019-01-01 RX ORDER — METOPROLOL SUCCINATE 50 MG/1
50 TABLET, EXTENDED RELEASE ORAL 3 TIMES DAILY
Status: DISCONTINUED | OUTPATIENT
Start: 2019-01-01 | End: 2019-01-01

## 2019-01-01 RX ORDER — POTASSIUM CHLORIDE 750 MG/1
10 TABLET, EXTENDED RELEASE ORAL 2 TIMES DAILY
Status: DISCONTINUED | OUTPATIENT
Start: 2019-01-01 | End: 2019-01-01

## 2019-01-01 RX ORDER — MENTHOL AND ZINC OXIDE .44; 20.625 G/100G; G/100G
OINTMENT TOPICAL 2 TIMES DAILY PRN
Status: DISCONTINUED | OUTPATIENT
Start: 2019-01-01 | End: 2019-01-01

## 2019-01-01 RX ORDER — ATENOLOL 25 MG/1
200 TABLET ORAL DAILY
Status: DISCONTINUED | OUTPATIENT
Start: 2019-01-01 | End: 2019-01-01 | Stop reason: HOSPADM

## 2019-01-01 RX ORDER — MORPHINE SULFATE 2 MG/ML
2 INJECTION, SOLUTION INTRAMUSCULAR; INTRAVENOUS
Status: CANCELLED | OUTPATIENT
Start: 2019-01-01

## 2019-01-01 RX ORDER — POTASSIUM CHLORIDE 20 MEQ/1
40 TABLET, EXTENDED RELEASE ORAL PRN
Status: DISCONTINUED | OUTPATIENT
Start: 2019-01-01 | End: 2019-01-01 | Stop reason: HOSPADM

## 2019-01-01 RX ORDER — CYANOCOBALAMIN 1000 UG/ML
1000 INJECTION INTRAMUSCULAR; SUBCUTANEOUS ONCE
Status: COMPLETED | OUTPATIENT
Start: 2019-01-01 | End: 2019-01-01

## 2019-01-01 RX ORDER — SULFAMETHOXAZOLE AND TRIMETHOPRIM 800; 160 MG/1; MG/1
1 TABLET ORAL EVERY 12 HOURS SCHEDULED
Status: DISCONTINUED | OUTPATIENT
Start: 2019-01-01 | End: 2019-01-01 | Stop reason: HOSPADM

## 2019-01-01 RX ORDER — HYDROPHILIC CREAM
1 PASTE (GRAM) TOPICAL DAILY
Status: DISCONTINUED | OUTPATIENT
Start: 2019-04-15 | End: 2019-01-01 | Stop reason: HOSPADM

## 2019-01-01 RX ORDER — LORAZEPAM 2 MG/ML
1 INJECTION INTRAMUSCULAR
Status: DISCONTINUED | OUTPATIENT
Start: 2019-01-01 | End: 2019-01-01 | Stop reason: HOSPADM

## 2019-01-01 RX ORDER — MORPHINE SULFATE 4 MG/ML
1 INJECTION, SOLUTION INTRAMUSCULAR; INTRAVENOUS EVERY 5 MIN PRN
Status: DISCONTINUED | OUTPATIENT
Start: 2019-01-01 | End: 2019-01-01 | Stop reason: HOSPADM

## 2019-01-01 RX ORDER — LIDOCAINE HYDROCHLORIDE 20 MG/ML
INJECTION, SOLUTION INFILTRATION; PERINEURAL PRN
Status: DISCONTINUED | OUTPATIENT
Start: 2019-01-01 | End: 2019-01-01 | Stop reason: ALTCHOICE

## 2019-01-01 RX ORDER — ALBUMIN, HUMAN INJ 5% 5 %
SOLUTION INTRAVENOUS PRN
Status: DISCONTINUED | OUTPATIENT
Start: 2019-01-01 | End: 2019-01-01 | Stop reason: SDUPTHER

## 2019-01-01 RX ORDER — POTASSIUM CHLORIDE 7.45 MG/ML
10 INJECTION INTRAVENOUS
Status: DISPENSED | OUTPATIENT
Start: 2019-01-01 | End: 2019-01-01

## 2019-01-01 RX ORDER — TOLVAPTAN 15 MG/1
7.5 TABLET ORAL ONCE
Status: COMPLETED | OUTPATIENT
Start: 2019-01-01 | End: 2019-01-01

## 2019-01-01 RX ORDER — ATENOLOL 25 MG/1
100 TABLET ORAL DAILY
Status: DISCONTINUED | OUTPATIENT
Start: 2019-01-01 | End: 2019-01-01

## 2019-01-01 RX ORDER — LISINOPRIL 10 MG/1
10 TABLET ORAL
Status: ON HOLD | COMMUNITY
Start: 2019-01-01 | End: 2019-01-01 | Stop reason: HOSPADM

## 2019-01-01 RX ORDER — ATROPINE SULFATE 10 MG/ML
1 SOLUTION/ DROPS OPHTHALMIC 3 TIMES DAILY PRN
Status: DISCONTINUED | OUTPATIENT
Start: 2019-01-01 | End: 2019-01-01 | Stop reason: SDUPTHER

## 2019-01-01 RX ORDER — ACYCLOVIR 800 MG/1
800 TABLET ORAL
Status: DISCONTINUED | OUTPATIENT
Start: 2019-01-01 | End: 2019-01-01

## 2019-01-01 RX ORDER — ASPIRIN 81 MG/1
81 TABLET ORAL DAILY
Status: DISCONTINUED | OUTPATIENT
Start: 2019-01-01 | End: 2019-01-01 | Stop reason: HOSPADM

## 2019-01-01 RX ORDER — CEFAZOLIN SODIUM 1 G/3ML
INJECTION, POWDER, FOR SOLUTION INTRAMUSCULAR; INTRAVENOUS PRN
Status: DISCONTINUED | OUTPATIENT
Start: 2019-01-01 | End: 2019-01-01 | Stop reason: SDUPTHER

## 2019-01-01 RX ORDER — MIDAZOLAM HYDROCHLORIDE 5 MG/ML
INJECTION INTRAMUSCULAR; INTRAVENOUS PRN
Status: DISCONTINUED | OUTPATIENT
Start: 2019-01-01 | End: 2019-01-01 | Stop reason: HOSPADM

## 2019-01-01 RX ORDER — LORAZEPAM 2 MG/ML
0.5 INJECTION INTRAMUSCULAR
Status: DISCONTINUED | OUTPATIENT
Start: 2019-01-01 | End: 2019-01-01

## 2019-01-01 RX ORDER — AMIODARONE HYDROCHLORIDE 200 MG/1
200 TABLET ORAL 2 TIMES DAILY
Status: DISCONTINUED | OUTPATIENT
Start: 2019-01-01 | End: 2019-01-01

## 2019-01-01 RX ORDER — PREDNISONE 20 MG/1
TABLET ORAL
Qty: 8 TABLET | Refills: 0 | Status: ON HOLD | OUTPATIENT
Start: 2019-01-01 | End: 2019-01-01 | Stop reason: HOSPADM

## 2019-01-01 RX ORDER — HYDROMORPHONE HCL 110MG/55ML
1 PATIENT CONTROLLED ANALGESIA SYRINGE INTRAVENOUS
Status: DISCONTINUED | OUTPATIENT
Start: 2019-01-01 | End: 2019-01-01 | Stop reason: SDUPTHER

## 2019-01-01 RX ORDER — DEXTROSE MONOHYDRATE 25 G/50ML
12.5 INJECTION, SOLUTION INTRAVENOUS PRN
Status: DISCONTINUED | OUTPATIENT
Start: 2019-01-01 | End: 2019-01-01 | Stop reason: HOSPADM

## 2019-01-01 RX ORDER — OXYCODONE HYDROCHLORIDE AND ACETAMINOPHEN 5; 325 MG/1; MG/1
TABLET ORAL
Refills: 0 | COMMUNITY
Start: 2019-01-01 | End: 2019-01-01

## 2019-01-01 RX ORDER — AMIODARONE HYDROCHLORIDE 200 MG/1
100 TABLET ORAL DAILY
Status: DISCONTINUED | OUTPATIENT
Start: 2019-01-01 | End: 2019-01-01

## 2019-01-01 RX ORDER — BUPIVACAINE HYDROCHLORIDE AND EPINEPHRINE 5; 5 MG/ML; UG/ML
INJECTION, SOLUTION PERINEURAL PRN
Status: DISCONTINUED | OUTPATIENT
Start: 2019-01-01 | End: 2019-01-01 | Stop reason: ALTCHOICE

## 2019-01-01 RX ORDER — LORAZEPAM 2 MG/ML
1 INJECTION INTRAMUSCULAR
Status: DISCONTINUED | OUTPATIENT
Start: 2019-01-01 | End: 2019-01-01

## 2019-01-01 RX ORDER — FOLIC ACID 1 MG/1
1 TABLET ORAL DAILY
Status: DISCONTINUED | OUTPATIENT
Start: 2019-01-01 | End: 2019-01-01 | Stop reason: HOSPADM

## 2019-01-01 RX ORDER — TOLVAPTAN 15 MG/1
15 TABLET ORAL DAILY
Status: COMPLETED | OUTPATIENT
Start: 2019-01-01 | End: 2019-01-01

## 2019-01-01 RX ORDER — KETOROLAC TROMETHAMINE 30 MG/ML
30 INJECTION, SOLUTION INTRAMUSCULAR; INTRAVENOUS ONCE
Status: COMPLETED | OUTPATIENT
Start: 2019-01-01 | End: 2019-01-01

## 2019-01-01 RX ORDER — METOPROLOL SUCCINATE 50 MG/1
50 TABLET, EXTENDED RELEASE ORAL DAILY
Status: DISCONTINUED | OUTPATIENT
Start: 2019-01-01 | End: 2019-01-01

## 2019-01-01 RX ORDER — HEPARIN SODIUM 10000 [USP'U]/100ML
18 INJECTION, SOLUTION INTRAVENOUS CONTINUOUS
Status: DISCONTINUED | OUTPATIENT
Start: 2019-01-01 | End: 2019-01-01

## 2019-01-01 RX ORDER — HYDROMORPHONE HCL 110MG/55ML
1 PATIENT CONTROLLED ANALGESIA SYRINGE INTRAVENOUS
Status: DISCONTINUED | OUTPATIENT
Start: 2019-01-01 | End: 2019-01-01

## 2019-01-01 RX ORDER — POTASSIUM CHLORIDE 20 MEQ/1
40 TABLET, EXTENDED RELEASE ORAL EVERY 4 HOURS
Status: DISCONTINUED | OUTPATIENT
Start: 2019-01-01 | End: 2019-01-01

## 2019-01-01 RX ORDER — SODIUM CHLORIDE 0.9 % (FLUSH) 0.9 %
10 SYRINGE (ML) INJECTION PRN
Status: CANCELLED | OUTPATIENT
Start: 2019-01-01

## 2019-01-01 RX ADMIN — FUROSEMIDE 40 MG: 40 TABLET ORAL at 10:18

## 2019-01-01 RX ADMIN — Medication 10 ML: at 20:23

## 2019-01-01 RX ADMIN — CALCIUM CARBONATE-CHOLECALCIFEROL TAB 250 MG-125 UNIT 250 MG: 250-125 TAB at 09:43

## 2019-01-01 RX ADMIN — FUROSEMIDE 40 MG: 40 TABLET ORAL at 09:57

## 2019-01-01 RX ADMIN — MUPIROCIN: 20 OINTMENT TOPICAL at 22:15

## 2019-01-01 RX ADMIN — ONDANSETRON 4 MG: 2 SOLUTION INTRAMUSCULAR; INTRAVENOUS at 17:16

## 2019-01-01 RX ADMIN — ASPIRIN 81 MG 81 MG: 81 TABLET ORAL at 10:11

## 2019-01-01 RX ADMIN — SODIUM CHLORIDE 250 ML: 9 INJECTION, SOLUTION INTRAVENOUS at 20:48

## 2019-01-01 RX ADMIN — AMIODARONE HYDROCHLORIDE 200 MG: 200 TABLET ORAL at 22:18

## 2019-01-01 RX ADMIN — MAGNESIUM GLUCONATE 500 MG ORAL TABLET 400 MG: 500 TABLET ORAL at 21:47

## 2019-01-01 RX ADMIN — HEPARIN SODIUM 2400 UNITS: 1000 INJECTION INTRAVENOUS; SUBCUTANEOUS at 11:09

## 2019-01-01 RX ADMIN — CALCIUM CARBONATE-CHOLECALCIFEROL TAB 250 MG-125 UNIT 250 MG: 250-125 TAB at 08:56

## 2019-01-01 RX ADMIN — ATENOLOL 25 MG: 25 TABLET ORAL at 08:21

## 2019-01-01 RX ADMIN — MAGNESIUM SULFATE HEPTAHYDRATE 2 G: 40 INJECTION, SOLUTION INTRAVENOUS at 11:38

## 2019-01-01 RX ADMIN — MUPIROCIN: 20 OINTMENT TOPICAL at 20:35

## 2019-01-01 RX ADMIN — FUROSEMIDE 60 MG: 10 INJECTION, SOLUTION INTRAMUSCULAR; INTRAVENOUS at 08:54

## 2019-01-01 RX ADMIN — POTASSIUM CHLORIDE 20 MEQ: 20 TABLET, EXTENDED RELEASE ORAL at 09:20

## 2019-01-01 RX ADMIN — ONDANSETRON 4 MG: 2 SOLUTION INTRAMUSCULAR; INTRAVENOUS at 23:42

## 2019-01-01 RX ADMIN — LORAZEPAM 1 MG: 2 INJECTION INTRAMUSCULAR; INTRAVENOUS at 15:31

## 2019-01-01 RX ADMIN — MEROPENEM 1 G: 1 INJECTION, POWDER, FOR SOLUTION INTRAVENOUS at 12:25

## 2019-01-01 RX ADMIN — POTASSIUM CHLORIDE 10 MEQ: 7.46 INJECTION, SOLUTION INTRAVENOUS at 08:52

## 2019-01-01 RX ADMIN — ACYCLOVIR: 50 OINTMENT TOPICAL at 23:30

## 2019-01-01 RX ADMIN — METOPROLOL SUCCINATE 100 MG: 50 TABLET, EXTENDED RELEASE ORAL at 08:57

## 2019-01-01 RX ADMIN — VANCOMYCIN HYDROCHLORIDE 1000 MG: 1 INJECTION, POWDER, LYOPHILIZED, FOR SOLUTION INTRAVENOUS at 06:31

## 2019-01-01 RX ADMIN — EPTIFIBATIDE 2 MCG/KG/MIN: 0.75 INJECTION, SOLUTION INTRAVENOUS at 10:22

## 2019-01-01 RX ADMIN — Medication 10 ML: at 20:46

## 2019-01-01 RX ADMIN — ACYCLOVIR SODIUM 700 MG: 50 INJECTION, SOLUTION INTRAVENOUS at 18:22

## 2019-01-01 RX ADMIN — SODIUM CHLORIDE: 9 INJECTION, SOLUTION INTRAVENOUS at 12:20

## 2019-01-01 RX ADMIN — ACETAMINOPHEN 650 MG: 325 TABLET ORAL at 19:45

## 2019-01-01 RX ADMIN — PANTOPRAZOLE SODIUM 40 MG: 40 INJECTION, POWDER, FOR SOLUTION INTRAVENOUS at 12:00

## 2019-01-01 RX ADMIN — FOLIC ACID 1 MG: 1 TABLET ORAL at 10:11

## 2019-01-01 RX ADMIN — FAMOTIDINE 20 MG: 20 TABLET ORAL at 08:52

## 2019-01-01 RX ADMIN — SODIUM CHLORIDE 1000 ML: 9 INJECTION, SOLUTION INTRAVENOUS at 13:52

## 2019-01-01 RX ADMIN — Medication 0.5 MG: at 11:06

## 2019-01-01 RX ADMIN — ALTEPLASE 1 MG: 2.2 INJECTION, POWDER, LYOPHILIZED, FOR SOLUTION INTRAVENOUS at 04:06

## 2019-01-01 RX ADMIN — ACYCLOVIR 800 MG: 800 TABLET ORAL at 20:01

## 2019-01-01 RX ADMIN — IOHEXOL 50 ML: 240 INJECTION, SOLUTION INTRATHECAL; INTRAVASCULAR; INTRAVENOUS; ORAL at 08:35

## 2019-01-01 RX ADMIN — PIPERACILLIN AND TAZOBACTAM 3.38 G: 3; .375 INJECTION, POWDER, FOR SOLUTION INTRAVENOUS at 03:11

## 2019-01-01 RX ADMIN — AMIODARONE HYDROCHLORIDE 200 MG: 200 TABLET ORAL at 21:13

## 2019-01-01 RX ADMIN — CALCIUM CARBONATE-CHOLECALCIFEROL TAB 250 MG-125 UNIT 250 MG: 250-125 TAB at 09:33

## 2019-01-01 RX ADMIN — METHYLPREDNISOLONE SODIUM SUCCINATE 60 MG: 125 INJECTION, POWDER, FOR SOLUTION INTRAMUSCULAR; INTRAVENOUS at 17:48

## 2019-01-01 RX ADMIN — ACYCLOVIR: 50 OINTMENT TOPICAL at 19:21

## 2019-01-01 RX ADMIN — POTASSIUM CHLORIDE 10 MEQ: 750 TABLET, EXTENDED RELEASE ORAL at 09:19

## 2019-01-01 RX ADMIN — FOLIC ACID 1 MG: 1 TABLET ORAL at 09:33

## 2019-01-01 RX ADMIN — AMIODARONE HYDROCHLORIDE 100 MG: 200 TABLET ORAL at 09:00

## 2019-01-01 RX ADMIN — FUROSEMIDE 20 MG: 10 INJECTION, SOLUTION INTRAMUSCULAR; INTRAVENOUS at 10:11

## 2019-01-01 RX ADMIN — MAGNESIUM GLUCONATE 500 MG ORAL TABLET 400 MG: 500 TABLET ORAL at 20:38

## 2019-01-01 RX ADMIN — ACYCLOVIR: 50 OINTMENT TOPICAL at 01:33

## 2019-01-01 RX ADMIN — INSULIN LISPRO 1 UNITS: 100 INJECTION, SOLUTION INTRAVENOUS; SUBCUTANEOUS at 09:33

## 2019-01-01 RX ADMIN — SODIUM CHLORIDE, POTASSIUM CHLORIDE, SODIUM LACTATE AND CALCIUM CHLORIDE: 600; 310; 30; 20 INJECTION, SOLUTION INTRAVENOUS at 12:57

## 2019-01-01 RX ADMIN — PANTOPRAZOLE SODIUM 40 MG: 40 INJECTION, POWDER, FOR SOLUTION INTRAVENOUS at 09:27

## 2019-01-01 RX ADMIN — SODIUM CHLORIDE: 9 INJECTION, SOLUTION INTRAVENOUS at 13:56

## 2019-01-01 RX ADMIN — ACYCLOVIR 800 MG: 800 TABLET ORAL at 09:34

## 2019-01-01 RX ADMIN — EPTIFIBATIDE 2 MCG/KG/MIN: 0.75 INJECTION, SOLUTION INTRAVENOUS at 02:28

## 2019-01-01 RX ADMIN — SODIUM CHLORIDE 250 ML: 9 INJECTION, SOLUTION INTRAVENOUS at 09:33

## 2019-01-01 RX ADMIN — SODIUM CHLORIDE: 9 INJECTION, SOLUTION INTRAVENOUS at 20:11

## 2019-01-01 RX ADMIN — AMIODARONE HYDROCHLORIDE 100 MG: 200 TABLET ORAL at 08:33

## 2019-01-01 RX ADMIN — POTASSIUM CHLORIDE 40 MEQ: 20 TABLET, EXTENDED RELEASE ORAL at 21:01

## 2019-01-01 RX ADMIN — SILVER SULFADIAZINE: 10 CREAM TOPICAL at 08:53

## 2019-01-01 RX ADMIN — ACYCLOVIR 800 MG: 800 TABLET ORAL at 08:32

## 2019-01-01 RX ADMIN — OXYCODONE AND ACETAMINOPHEN 1 TABLET: 5; 325 TABLET ORAL at 21:57

## 2019-01-01 RX ADMIN — POTASSIUM CHLORIDE 10 MEQ: 750 TABLET, EXTENDED RELEASE ORAL at 14:57

## 2019-01-01 RX ADMIN — Medication 2 PUFF: at 20:39

## 2019-01-01 RX ADMIN — PROPOFOL 50 MCG/KG/MIN: 10 INJECTION, EMULSION INTRAVENOUS at 02:30

## 2019-01-01 RX ADMIN — IPRATROPIUM BROMIDE AND ALBUTEROL SULFATE 1 AMPULE: .5; 3 SOLUTION RESPIRATORY (INHALATION) at 08:20

## 2019-01-01 RX ADMIN — Medication 10 ML: at 09:13

## 2019-01-01 RX ADMIN — POTASSIUM CHLORIDE 40 MEQ: 20 TABLET, EXTENDED RELEASE ORAL at 20:52

## 2019-01-01 RX ADMIN — FOLIC ACID 1 MG: 1 TABLET ORAL at 08:45

## 2019-01-01 RX ADMIN — CALCIUM CARBONATE-CHOLECALCIFEROL TAB 250 MG-125 UNIT 250 MG: 250-125 TAB at 09:36

## 2019-01-01 RX ADMIN — MEROPENEM 1 G: 1 INJECTION, POWDER, FOR SOLUTION INTRAVENOUS at 13:27

## 2019-01-01 RX ADMIN — Medication 2 PUFF: at 11:45

## 2019-01-01 RX ADMIN — MIDAZOLAM HYDROCHLORIDE 1 MG: 5 INJECTION, SOLUTION INTRAMUSCULAR; INTRAVENOUS at 10:34

## 2019-01-01 RX ADMIN — Medication 2 PUFF: at 21:00

## 2019-01-01 RX ADMIN — EPTIFIBATIDE 2 MCG/KG/MIN: 0.75 INJECTION, SOLUTION INTRAVENOUS at 12:30

## 2019-01-01 RX ADMIN — SODIUM CHLORIDE: 9 INJECTION, SOLUTION INTRAVENOUS at 17:55

## 2019-01-01 RX ADMIN — HEPARIN SODIUM 2400 UNITS: 1000 INJECTION INTRAVENOUS; SUBCUTANEOUS at 03:02

## 2019-01-01 RX ADMIN — Medication 10 ML: at 08:54

## 2019-01-01 RX ADMIN — AMIODARONE HYDROCHLORIDE 200 MG: 200 TABLET ORAL at 10:02

## 2019-01-01 RX ADMIN — FUROSEMIDE 20 MG: 10 INJECTION, SOLUTION INTRAMUSCULAR; INTRAVENOUS at 13:50

## 2019-01-01 RX ADMIN — CARBOXYMETHYLCELLULOSE SODIUM 1 DROP: 10 GEL OPHTHALMIC at 20:31

## 2019-01-01 RX ADMIN — ACYCLOVIR: 50 OINTMENT TOPICAL at 09:42

## 2019-01-01 RX ADMIN — VITAMIN D, TAB 1000IU (100/BT) 1000 UNITS: 25 TAB at 08:43

## 2019-01-01 RX ADMIN — ATENOLOL 200 MG: 25 TABLET ORAL at 10:18

## 2019-01-01 RX ADMIN — ASPIRIN 81 MG 81 MG: 81 TABLET ORAL at 09:57

## 2019-01-01 RX ADMIN — ACYCLOVIR: 50 OINTMENT TOPICAL at 04:28

## 2019-01-01 RX ADMIN — OXYCODONE AND ACETAMINOPHEN 1 TABLET: 5; 325 TABLET ORAL at 22:13

## 2019-01-01 RX ADMIN — CALCIUM CARBONATE-CHOLECALCIFEROL TAB 250 MG-125 UNIT 250 MG: 250-125 TAB at 09:35

## 2019-01-01 RX ADMIN — CALCIUM CARBONATE-CHOLECALCIFEROL TAB 250 MG-125 UNIT 250 MG: 250-125 TAB at 08:45

## 2019-01-01 RX ADMIN — POTASSIUM CHLORIDE 20 MEQ: 29.8 INJECTION, SOLUTION INTRAVENOUS at 09:45

## 2019-01-01 RX ADMIN — POTASSIUM CHLORIDE 10 MEQ: 750 TABLET, EXTENDED RELEASE ORAL at 08:45

## 2019-01-01 RX ADMIN — DIPHENHYDRAMINE HCL 25 MG: 25 TABLET ORAL at 05:45

## 2019-01-01 RX ADMIN — POTASSIUM CHLORIDE 10 MEQ: 750 TABLET, EXTENDED RELEASE ORAL at 16:08

## 2019-01-01 RX ADMIN — PANTOPRAZOLE SODIUM 40 MG: 40 INJECTION, POWDER, FOR SOLUTION INTRAVENOUS at 09:00

## 2019-01-01 RX ADMIN — MEROPENEM 1 G: 1 INJECTION, POWDER, FOR SOLUTION INTRAVENOUS at 11:52

## 2019-01-01 RX ADMIN — MUPIROCIN: 20 OINTMENT TOPICAL at 21:47

## 2019-01-01 RX ADMIN — ACETAMINOPHEN 650 MG: 325 TABLET ORAL at 14:40

## 2019-01-01 RX ADMIN — SIMVASTATIN 40 MG: 40 TABLET, FILM COATED ORAL at 21:06

## 2019-01-01 RX ADMIN — Medication 10 MG: at 11:37

## 2019-01-01 RX ADMIN — ACYCLOVIR 800 MG: 800 TABLET ORAL at 14:14

## 2019-01-01 RX ADMIN — EPTIFIBATIDE 2 MCG/KG/MIN: 0.75 INJECTION, SOLUTION INTRAVENOUS at 12:46

## 2019-01-01 RX ADMIN — CALCIUM CARBONATE-CHOLECALCIFEROL TAB 250 MG-125 UNIT 250 MG: 250-125 TAB at 09:34

## 2019-01-01 RX ADMIN — Medication 10 ML: at 08:32

## 2019-01-01 RX ADMIN — INSULIN LISPRO 2 UNITS: 100 INJECTION, SOLUTION INTRAVENOUS; SUBCUTANEOUS at 22:19

## 2019-01-01 RX ADMIN — MUPIROCIN: 20 OINTMENT TOPICAL at 16:23

## 2019-01-01 RX ADMIN — Medication 10 ML: at 20:56

## 2019-01-01 RX ADMIN — POTASSIUM CHLORIDE 20 MEQ: 29.8 INJECTION, SOLUTION INTRAVENOUS at 10:41

## 2019-01-01 RX ADMIN — CIPROFLOXACIN 400 MG: 2 INJECTION, SOLUTION INTRAVENOUS at 17:32

## 2019-01-01 RX ADMIN — METHYLPREDNISOLONE SODIUM SUCCINATE 40 MG: 40 INJECTION, POWDER, FOR SOLUTION INTRAMUSCULAR; INTRAVENOUS at 13:19

## 2019-01-01 RX ADMIN — ASPIRIN 81 MG 81 MG: 81 TABLET ORAL at 08:56

## 2019-01-01 RX ADMIN — KETOROLAC TROMETHAMINE 30 MG: 30 INJECTION, SOLUTION INTRAMUSCULAR at 23:27

## 2019-01-01 RX ADMIN — SPIRONOLACTONE 25 MG: 25 TABLET ORAL at 10:28

## 2019-01-01 RX ADMIN — ACYCLOVIR 800 MG: 800 TABLET ORAL at 20:03

## 2019-01-01 RX ADMIN — Medication 1 APPLICATOR: at 09:30

## 2019-01-01 RX ADMIN — EPTIFIBATIDE 2 MCG/KG/MIN: 0.75 INJECTION, SOLUTION INTRAVENOUS at 04:04

## 2019-01-01 RX ADMIN — AMIODARONE HYDROCHLORIDE 100 MG: 200 TABLET ORAL at 08:54

## 2019-01-01 RX ADMIN — SODIUM CHLORIDE, PRESERVATIVE FREE 10 ML: 5 INJECTION INTRAVENOUS at 22:29

## 2019-01-01 RX ADMIN — AMIODARONE HYDROCHLORIDE 200 MG: 200 TABLET ORAL at 09:55

## 2019-01-01 RX ADMIN — MEROPENEM 1 G: 1 INJECTION, POWDER, FOR SOLUTION INTRAVENOUS at 15:14

## 2019-01-01 RX ADMIN — PANTOPRAZOLE SODIUM 40 MG: 40 INJECTION, POWDER, FOR SOLUTION INTRAVENOUS at 18:18

## 2019-01-01 RX ADMIN — SILVER SULFADIAZINE: 10 CREAM TOPICAL at 12:56

## 2019-01-01 RX ADMIN — SIMVASTATIN 40 MG: 40 TABLET, FILM COATED ORAL at 20:46

## 2019-01-01 RX ADMIN — METOPROLOL SUCCINATE 50 MG: 50 TABLET, EXTENDED RELEASE ORAL at 20:09

## 2019-01-01 RX ADMIN — CALCIUM CARBONATE-CHOLECALCIFEROL TAB 250 MG-125 UNIT 250 MG: 250-125 TAB at 09:57

## 2019-01-01 RX ADMIN — FAMOTIDINE 20 MG: 20 TABLET, FILM COATED ORAL at 20:23

## 2019-01-01 RX ADMIN — HYDRALAZINE HYDROCHLORIDE 10 MG: 20 INJECTION INTRAMUSCULAR; INTRAVENOUS at 04:06

## 2019-01-01 RX ADMIN — OXYCODONE HYDROCHLORIDE 10 MG: 5 TABLET ORAL at 20:53

## 2019-01-01 RX ADMIN — IPRATROPIUM BROMIDE AND ALBUTEROL SULFATE 1 AMPULE: .5; 3 SOLUTION RESPIRATORY (INHALATION) at 00:13

## 2019-01-01 RX ADMIN — ACYCLOVIR 800 MG: 800 TABLET ORAL at 06:36

## 2019-01-01 RX ADMIN — HYDROMORPHONE HYDROCHLORIDE 0.6 MG: 2 INJECTION INTRAMUSCULAR; INTRAVENOUS; SUBCUTANEOUS at 14:11

## 2019-01-01 RX ADMIN — ACYCLOVIR SODIUM 700 MG: 50 INJECTION, SOLUTION INTRAVENOUS at 11:19

## 2019-01-01 RX ADMIN — VALSARTAN 320 MG: 80 TABLET, FILM COATED ORAL at 09:12

## 2019-01-01 RX ADMIN — TOLVAPTAN 15 MG: 15 TABLET ORAL at 09:04

## 2019-01-01 RX ADMIN — VANCOMYCIN HYDROCHLORIDE 1000 MG: 1 INJECTION, POWDER, LYOPHILIZED, FOR SOLUTION INTRAVENOUS at 18:57

## 2019-01-01 RX ADMIN — ACYCLOVIR 800 MG: 800 TABLET ORAL at 22:28

## 2019-01-01 RX ADMIN — LORAZEPAM 0.5 MG: 2 INJECTION INTRAMUSCULAR; INTRAVENOUS at 12:09

## 2019-01-01 RX ADMIN — LISINOPRIL 5 MG: 5 TABLET ORAL at 21:17

## 2019-01-01 RX ADMIN — ACYCLOVIR 800 MG: 800 TABLET ORAL at 14:11

## 2019-01-01 RX ADMIN — FLUCONAZOLE, SODIUM CHLORIDE 200 MG: 2 INJECTION INTRAVENOUS at 12:12

## 2019-01-01 RX ADMIN — EPTIFIBATIDE 2 MCG/KG/MIN: 0.75 INJECTION, SOLUTION INTRAVENOUS at 17:09

## 2019-01-01 RX ADMIN — EPTIFIBATIDE 2 MCG/KG/MIN: 0.75 INJECTION, SOLUTION INTRAVENOUS at 07:44

## 2019-01-01 RX ADMIN — SILVER SULFADIAZINE: 10 CREAM TOPICAL at 09:43

## 2019-01-01 RX ADMIN — ACYCLOVIR: 50 OINTMENT TOPICAL at 09:45

## 2019-01-01 RX ADMIN — EPTIFIBATIDE 2 MCG/KG/MIN: 0.75 INJECTION, SOLUTION INTRAVENOUS at 22:07

## 2019-01-01 RX ADMIN — TBO-FILGRASTIM 480 MCG: 480 INJECTION, SOLUTION SUBCUTANEOUS at 18:50

## 2019-01-01 RX ADMIN — FLUCONAZOLE, SODIUM CHLORIDE 200 MG: 2 INJECTION INTRAVENOUS at 11:37

## 2019-01-01 RX ADMIN — FUROSEMIDE 20 MG: 10 INJECTION, SOLUTION INTRAMUSCULAR; INTRAVENOUS at 18:04

## 2019-01-01 RX ADMIN — HEPARIN SODIUM 3300 UNITS: 1000 INJECTION INTRAVENOUS; SUBCUTANEOUS at 18:52

## 2019-01-01 RX ADMIN — MEROPENEM 1 G: 1 INJECTION, POWDER, FOR SOLUTION INTRAVENOUS at 17:00

## 2019-01-01 RX ADMIN — LIDOCAINE HYDROCHLORIDE 50 MG: 20 INJECTION, SOLUTION INFILTRATION; PERINEURAL at 08:03

## 2019-01-01 RX ADMIN — MEROPENEM 1 G: 1 INJECTION, POWDER, FOR SOLUTION INTRAVENOUS at 23:48

## 2019-01-01 RX ADMIN — MEROPENEM 1 G: 1 INJECTION, POWDER, FOR SOLUTION INTRAVENOUS at 17:12

## 2019-01-01 RX ADMIN — FENTANYL CITRATE 100 MCG: 50 INJECTION INTRAMUSCULAR; INTRAVENOUS at 08:03

## 2019-01-01 RX ADMIN — SODIUM CHLORIDE, POTASSIUM CHLORIDE, SODIUM LACTATE AND CALCIUM CHLORIDE: 600; 310; 30; 20 INJECTION, SOLUTION INTRAVENOUS at 15:30

## 2019-01-01 RX ADMIN — METOPROLOL SUCCINATE 100 MG: 50 TABLET, EXTENDED RELEASE ORAL at 10:31

## 2019-01-01 RX ADMIN — Medication 2 PUFF: at 20:40

## 2019-01-01 RX ADMIN — Medication 10 ML: at 09:33

## 2019-01-01 RX ADMIN — EPTIFIBATIDE 2 MCG/KG/MIN: 0.75 INJECTION, SOLUTION INTRAVENOUS at 19:19

## 2019-01-01 RX ADMIN — PROPOFOL 200 MG: 10 INJECTION, EMULSION INTRAVENOUS at 08:03

## 2019-01-01 RX ADMIN — PIPERACILLIN AND TAZOBACTAM 3.38 G: 3; .375 INJECTION, POWDER, FOR SOLUTION INTRAVENOUS at 17:15

## 2019-01-01 RX ADMIN — FOLIC ACID 1 MG: 1 TABLET ORAL at 08:28

## 2019-01-01 RX ADMIN — MUPIROCIN: 20 OINTMENT TOPICAL at 10:57

## 2019-01-01 RX ADMIN — HEPARIN SODIUM AND DEXTROSE 16.8 ML/HR: 10000; 5 INJECTION INTRAVENOUS at 23:32

## 2019-01-01 RX ADMIN — FOLIC ACID 1 MG: 1 TABLET ORAL at 08:32

## 2019-01-01 RX ADMIN — PROPOFOL 50 MG: 10 INJECTION, EMULSION INTRAVENOUS at 14:54

## 2019-01-01 RX ADMIN — FOLIC ACID 1 MG: 1 TABLET ORAL at 10:18

## 2019-01-01 RX ADMIN — AMIODARONE HYDROCHLORIDE 100 MG: 200 TABLET ORAL at 08:43

## 2019-01-01 RX ADMIN — SIMVASTATIN 40 MG: 40 TABLET, FILM COATED ORAL at 21:10

## 2019-01-01 RX ADMIN — SODIUM CHLORIDE: 900 INJECTION, SOLUTION INTRAVENOUS at 14:52

## 2019-01-01 RX ADMIN — PIPERACILLIN AND TAZOBACTAM 3.38 G: 3; .375 INJECTION, POWDER, FOR SOLUTION INTRAVENOUS at 09:29

## 2019-01-01 RX ADMIN — IOPAMIDOL 75 ML: 755 INJECTION, SOLUTION INTRAVENOUS at 16:09

## 2019-01-01 RX ADMIN — FOLIC ACID 1 MG: 1 TABLET ORAL at 09:21

## 2019-01-01 RX ADMIN — Medication 2 PUFF: at 08:08

## 2019-01-01 RX ADMIN — ACYCLOVIR 800 MG: 800 TABLET ORAL at 12:07

## 2019-01-01 RX ADMIN — POTASSIUM CHLORIDE 20 MEQ: 20 TABLET, EXTENDED RELEASE ORAL at 11:58

## 2019-01-01 RX ADMIN — VANCOMYCIN HYDROCHLORIDE 1000 MG: 1 INJECTION, POWDER, LYOPHILIZED, FOR SOLUTION INTRAVENOUS at 21:26

## 2019-01-01 RX ADMIN — HEPARIN SODIUM 2400 UNITS: 1000 INJECTION INTRAVENOUS; SUBCUTANEOUS at 10:03

## 2019-01-01 RX ADMIN — METOPROLOL SUCCINATE 50 MG: 50 TABLET, EXTENDED RELEASE ORAL at 09:05

## 2019-01-01 RX ADMIN — OXYCODONE AND ACETAMINOPHEN 1 TABLET: 5; 325 TABLET ORAL at 10:22

## 2019-01-01 RX ADMIN — HYDROMORPHONE HYDROCHLORIDE 1 MG: 2 INJECTION INTRAMUSCULAR; INTRAVENOUS; SUBCUTANEOUS at 22:36

## 2019-01-01 RX ADMIN — POTASSIUM CHLORIDE 10 MEQ: 750 TABLET, EXTENDED RELEASE ORAL at 20:46

## 2019-01-01 RX ADMIN — CYANOCOBALAMIN 1000 MCG: 1000 INJECTION, SOLUTION INTRAMUSCULAR; SUBCUTANEOUS at 12:39

## 2019-01-01 RX ADMIN — Medication 10 ML: at 09:43

## 2019-01-01 RX ADMIN — EPTIFIBATIDE 2 MCG/KG/MIN: 0.75 INJECTION, SOLUTION INTRAVENOUS at 02:50

## 2019-01-01 RX ADMIN — MEROPENEM 1 G: 1 INJECTION, POWDER, FOR SOLUTION INTRAVENOUS at 01:00

## 2019-01-01 RX ADMIN — Medication 1 APPLICATOR: at 09:22

## 2019-01-01 RX ADMIN — SIMVASTATIN 20 MG: 10 TABLET, FILM COATED ORAL at 20:10

## 2019-01-01 RX ADMIN — ACYCLOVIR 800 MG: 800 TABLET ORAL at 13:39

## 2019-01-01 RX ADMIN — SILVER SULFADIAZINE: 10 CREAM TOPICAL at 09:40

## 2019-01-01 RX ADMIN — ROCURONIUM BROMIDE 30 MG: 10 SOLUTION INTRAVENOUS at 14:57

## 2019-01-01 RX ADMIN — LOPERAMIDE HYDROCHLORIDE 2 MG: 2 CAPSULE ORAL at 17:09

## 2019-01-01 RX ADMIN — VALSARTAN 320 MG: 80 TABLET, FILM COATED ORAL at 08:49

## 2019-01-01 RX ADMIN — Medication 10 ML: at 21:12

## 2019-01-01 RX ADMIN — ACYCLOVIR 800 MG: 800 TABLET ORAL at 14:47

## 2019-01-01 RX ADMIN — Medication 2 PUFF: at 16:54

## 2019-01-01 RX ADMIN — PANTOPRAZOLE SODIUM 40 MG: 40 INJECTION, POWDER, FOR SOLUTION INTRAVENOUS at 09:20

## 2019-01-01 RX ADMIN — Medication: at 18:03

## 2019-01-01 RX ADMIN — SILVER SULFADIAZINE: 10 CREAM TOPICAL at 09:21

## 2019-01-01 RX ADMIN — ACYCLOVIR 800 MG: 800 TABLET ORAL at 13:16

## 2019-01-01 RX ADMIN — Medication 10 ML: at 20:53

## 2019-01-01 RX ADMIN — ACETAMINOPHEN 650 MG: 325 TABLET ORAL at 00:38

## 2019-01-01 RX ADMIN — AMIODARONE HYDROCHLORIDE 200 MG: 200 TABLET ORAL at 10:29

## 2019-01-01 RX ADMIN — IPRATROPIUM BROMIDE AND ALBUTEROL SULFATE 1 AMPULE: .5; 3 SOLUTION RESPIRATORY (INHALATION) at 03:44

## 2019-01-01 RX ADMIN — MEROPENEM 1 G: 1 INJECTION, POWDER, FOR SOLUTION INTRAVENOUS at 21:33

## 2019-01-01 RX ADMIN — ASPIRIN 81 MG 81 MG: 81 TABLET ORAL at 08:34

## 2019-01-01 RX ADMIN — ACYCLOVIR: 50 OINTMENT TOPICAL at 22:13

## 2019-01-01 RX ADMIN — FOLIC ACID 1 MG: 1 TABLET ORAL at 10:02

## 2019-01-01 RX ADMIN — POTASSIUM CHLORIDE 10 MEQ: 750 TABLET, EXTENDED RELEASE ORAL at 14:16

## 2019-01-01 RX ADMIN — CALCIUM CARBONATE-CHOLECALCIFEROL TAB 250 MG-125 UNIT 250 MG: 250-125 TAB at 09:29

## 2019-01-01 RX ADMIN — FLUCONAZOLE, SODIUM CHLORIDE 200 MG: 2 INJECTION INTRAVENOUS at 11:43

## 2019-01-01 RX ADMIN — INSULIN LISPRO 1 UNITS: 100 INJECTION, SOLUTION INTRAVENOUS; SUBCUTANEOUS at 21:18

## 2019-01-01 RX ADMIN — POTASSIUM CHLORIDE 10 MEQ: 750 TABLET, EXTENDED RELEASE ORAL at 20:03

## 2019-01-01 RX ADMIN — ROCURONIUM BROMIDE 20 MG: 10 SOLUTION INTRAVENOUS at 13:38

## 2019-01-01 RX ADMIN — SILVER SULFADIAZINE: 10 CREAM TOPICAL at 11:33

## 2019-01-01 RX ADMIN — HEPARIN SODIUM AND DEXTROSE 16.8 ML/HR: 10000; 5 INJECTION INTRAVENOUS at 19:19

## 2019-01-01 RX ADMIN — SODIUM CHLORIDE 250 ML: 9 INJECTION, SOLUTION INTRAVENOUS at 11:41

## 2019-01-01 RX ADMIN — POTASSIUM CHLORIDE 10 MEQ: 750 TABLET, EXTENDED RELEASE ORAL at 20:08

## 2019-01-01 RX ADMIN — MUPIROCIN: 20 OINTMENT TOPICAL at 18:19

## 2019-01-01 RX ADMIN — EPHEDRINE SULFATE 5 MG: 50 INJECTION INTRAVENOUS at 10:35

## 2019-01-01 RX ADMIN — IPRATROPIUM BROMIDE AND ALBUTEROL SULFATE 1 AMPULE: .5; 3 SOLUTION RESPIRATORY (INHALATION) at 08:49

## 2019-01-01 RX ADMIN — METRONIDAZOLE 500 MG: 500 INJECTION, SOLUTION INTRAVENOUS at 19:01

## 2019-01-01 RX ADMIN — AMIODARONE HYDROCHLORIDE 100 MG: 200 TABLET ORAL at 08:49

## 2019-01-01 RX ADMIN — EPTIFIBATIDE 2 MCG/KG/MIN: 0.75 INJECTION, SOLUTION INTRAVENOUS at 19:22

## 2019-01-01 RX ADMIN — SIMVASTATIN 40 MG: 40 TABLET, FILM COATED ORAL at 21:12

## 2019-01-01 RX ADMIN — METRONIDAZOLE 500 MG: 500 INJECTION, SOLUTION INTRAVENOUS at 23:23

## 2019-01-01 RX ADMIN — INSULIN LISPRO 1 UNITS: 100 INJECTION, SOLUTION INTRAVENOUS; SUBCUTANEOUS at 14:31

## 2019-01-01 RX ADMIN — LISINOPRIL 5 MG: 5 TABLET ORAL at 22:18

## 2019-01-01 RX ADMIN — POTASSIUM CHLORIDE 10 MEQ: 10 TABLET, EXTENDED RELEASE ORAL at 09:33

## 2019-01-01 RX ADMIN — IPRATROPIUM BROMIDE AND ALBUTEROL SULFATE 1 AMPULE: .5; 3 SOLUTION RESPIRATORY (INHALATION) at 08:09

## 2019-01-01 RX ADMIN — HYDROMORPHONE HYDROCHLORIDE 10 MG: 10 INJECTION, SOLUTION INTRAMUSCULAR; INTRAVENOUS; SUBCUTANEOUS at 11:37

## 2019-01-01 RX ADMIN — ASPIRIN 81 MG 81 MG: 81 TABLET ORAL at 09:33

## 2019-01-01 RX ADMIN — Medication 2 PUFF: at 07:37

## 2019-01-01 RX ADMIN — SODIUM CHLORIDE, POTASSIUM CHLORIDE, SODIUM LACTATE AND CALCIUM CHLORIDE: 600; 310; 30; 20 INJECTION, SOLUTION INTRAVENOUS at 09:27

## 2019-01-01 RX ADMIN — POTASSIUM CHLORIDE 10 MEQ: 10 TABLET, EXTENDED RELEASE ORAL at 21:36

## 2019-01-01 RX ADMIN — Medication 2 PUFF: at 11:14

## 2019-01-01 RX ADMIN — MEROPENEM 1 G: 1 INJECTION, POWDER, FOR SOLUTION INTRAVENOUS at 20:59

## 2019-01-01 RX ADMIN — EPTIFIBATIDE 2 MCG/KG/MIN: 0.75 INJECTION, SOLUTION INTRAVENOUS at 15:14

## 2019-01-01 RX ADMIN — TICAGRELOR 90 MG: 90 TABLET ORAL at 20:01

## 2019-01-01 RX ADMIN — ACYCLOVIR 800 MG: 800 TABLET ORAL at 14:39

## 2019-01-01 RX ADMIN — MAGNESIUM GLUCONATE 500 MG ORAL TABLET 400 MG: 500 TABLET ORAL at 22:18

## 2019-01-01 RX ADMIN — Medication 15 ML: at 20:53

## 2019-01-01 RX ADMIN — Medication 2 PUFF: at 16:19

## 2019-01-01 RX ADMIN — MAGNESIUM GLUCONATE 500 MG ORAL TABLET 400 MG: 500 TABLET ORAL at 09:29

## 2019-01-01 RX ADMIN — MEROPENEM 1 G: 1 INJECTION, POWDER, FOR SOLUTION INTRAVENOUS at 12:39

## 2019-01-01 RX ADMIN — Medication 10 ML: at 20:09

## 2019-01-01 RX ADMIN — IPRATROPIUM BROMIDE AND ALBUTEROL SULFATE 1 AMPULE: .5; 3 SOLUTION RESPIRATORY (INHALATION) at 19:42

## 2019-01-01 RX ADMIN — MUPIROCIN: 20 OINTMENT TOPICAL at 08:48

## 2019-01-01 RX ADMIN — ONDANSETRON 4 MG: 2 SOLUTION INTRAMUSCULAR; INTRAVENOUS at 19:00

## 2019-01-01 RX ADMIN — HEPARIN SODIUM AND DEXTROSE 16.8 ML/HR: 10000; 5 INJECTION INTRAVENOUS at 05:43

## 2019-01-01 RX ADMIN — ROCURONIUM BROMIDE 20 MG: 10 SOLUTION INTRAVENOUS at 09:13

## 2019-01-01 RX ADMIN — Medication 1 APPLICATOR: at 15:56

## 2019-01-01 RX ADMIN — HEPARIN SODIUM AND DEXTROSE 18.3 ML/HR: 10000; 5 INJECTION INTRAVENOUS at 01:33

## 2019-01-01 RX ADMIN — AMIODARONE HYDROCHLORIDE 200 MG: 200 TABLET ORAL at 09:11

## 2019-01-01 RX ADMIN — METRONIDAZOLE 500 MG: 250 TABLET ORAL at 22:28

## 2019-01-01 RX ADMIN — SIMVASTATIN 40 MG: 40 TABLET, FILM COATED ORAL at 21:13

## 2019-01-01 RX ADMIN — AMLODIPINE BESYLATE 5 MG: 5 TABLET ORAL at 11:08

## 2019-01-01 RX ADMIN — OXYCODONE AND ACETAMINOPHEN 1 TABLET: 5; 325 TABLET ORAL at 21:34

## 2019-01-01 RX ADMIN — MAGNESIUM GLUCONATE 500 MG ORAL TABLET 400 MG: 500 TABLET ORAL at 09:19

## 2019-01-01 RX ADMIN — MAGNESIUM SULFATE HEPTAHYDRATE 2 G: 40 INJECTION, SOLUTION INTRAVENOUS at 09:33

## 2019-01-01 RX ADMIN — IPRATROPIUM BROMIDE AND ALBUTEROL SULFATE 1 AMPULE: .5; 3 SOLUTION RESPIRATORY (INHALATION) at 20:34

## 2019-01-01 RX ADMIN — EPTIFIBATIDE 2 MCG/KG/MIN: 0.75 INJECTION, SOLUTION INTRAVENOUS at 18:49

## 2019-01-01 RX ADMIN — ACYCLOVIR 800 MG: 800 TABLET ORAL at 10:55

## 2019-01-01 RX ADMIN — POTASSIUM CHLORIDE 10 MEQ: 750 TABLET, EXTENDED RELEASE ORAL at 08:31

## 2019-01-01 RX ADMIN — SODIUM CHLORIDE 1000 ML: 9 INJECTION, SOLUTION INTRAVENOUS at 14:41

## 2019-01-01 RX ADMIN — EPTIFIBATIDE 2 MCG/KG/MIN: 0.75 INJECTION, SOLUTION INTRAVENOUS at 22:05

## 2019-01-01 RX ADMIN — ALTEPLASE 1 MG: 2.2 INJECTION, POWDER, LYOPHILIZED, FOR SOLUTION INTRAVENOUS at 23:27

## 2019-01-01 RX ADMIN — EPTIFIBATIDE 2 MCG/KG/MIN: 0.75 INJECTION, SOLUTION INTRAVENOUS at 03:21

## 2019-01-01 RX ADMIN — MEROPENEM 1 G: 1 INJECTION, POWDER, FOR SOLUTION INTRAVENOUS at 20:27

## 2019-01-01 RX ADMIN — TICAGRELOR 90 MG: 90 TABLET ORAL at 08:31

## 2019-01-01 RX ADMIN — AMIODARONE HYDROCHLORIDE 200 MG: 200 TABLET ORAL at 21:18

## 2019-01-01 RX ADMIN — FUROSEMIDE 40 MG: 40 TABLET ORAL at 10:29

## 2019-01-01 RX ADMIN — Medication 10 ML: at 12:01

## 2019-01-01 RX ADMIN — ACYCLOVIR 800 MG: 800 TABLET ORAL at 20:08

## 2019-01-01 RX ADMIN — FAMOTIDINE 20 MG: 20 TABLET, FILM COATED ORAL at 08:53

## 2019-01-01 RX ADMIN — PANTOPRAZOLE SODIUM 40 MG: 40 INJECTION, POWDER, FOR SOLUTION INTRAVENOUS at 09:35

## 2019-01-01 RX ADMIN — TICAGRELOR 90 MG: 90 TABLET ORAL at 21:06

## 2019-01-01 RX ADMIN — Medication 2 PUFF: at 20:11

## 2019-01-01 RX ADMIN — LEVOFLOXACIN 500 MG: 5 INJECTION, SOLUTION INTRAVENOUS at 05:14

## 2019-01-01 RX ADMIN — Medication 2 PUFF: at 12:15

## 2019-01-01 RX ADMIN — VANCOMYCIN HYDROCHLORIDE 750 MG: 750 INJECTION, POWDER, LYOPHILIZED, FOR SOLUTION INTRAVENOUS at 16:30

## 2019-01-01 RX ADMIN — METOPROLOL SUCCINATE 100 MG: 50 TABLET, EXTENDED RELEASE ORAL at 10:28

## 2019-01-01 RX ADMIN — Medication 10 ML: at 21:10

## 2019-01-01 RX ADMIN — IOPAMIDOL 75 ML: 755 INJECTION, SOLUTION INTRAVENOUS at 16:42

## 2019-01-01 RX ADMIN — IPRATROPIUM BROMIDE AND ALBUTEROL SULFATE 1 AMPULE: .5; 3 SOLUTION RESPIRATORY (INHALATION) at 15:39

## 2019-01-01 RX ADMIN — Medication 10 ML: at 21:37

## 2019-01-01 RX ADMIN — FENTANYL CITRATE 50 MCG: 50 INJECTION, SOLUTION INTRAMUSCULAR; INTRAVENOUS at 14:49

## 2019-01-01 RX ADMIN — FOLIC ACID 1 MG: 1 TABLET ORAL at 08:49

## 2019-01-01 RX ADMIN — Medication 10 ML: at 21:57

## 2019-01-01 RX ADMIN — CEFAZOLIN 2000 MG: 1 INJECTION, POWDER, FOR SOLUTION INTRAMUSCULAR; INTRAVENOUS at 15:14

## 2019-01-01 RX ADMIN — VALSARTAN 320 MG: 80 TABLET, FILM COATED ORAL at 08:51

## 2019-01-01 RX ADMIN — METHYLPREDNISOLONE SODIUM SUCCINATE 60 MG: 125 INJECTION, POWDER, FOR SOLUTION INTRAMUSCULAR; INTRAVENOUS at 06:16

## 2019-01-01 RX ADMIN — TICAGRELOR 90 MG: 90 TABLET ORAL at 21:43

## 2019-01-01 RX ADMIN — HEPARIN SODIUM 2400 UNITS: 1000 INJECTION INTRAVENOUS; SUBCUTANEOUS at 16:32

## 2019-01-01 RX ADMIN — Medication 2 PUFF: at 08:06

## 2019-01-01 RX ADMIN — MEROPENEM 1 G: 1 INJECTION, POWDER, FOR SOLUTION INTRAVENOUS at 05:13

## 2019-01-01 RX ADMIN — FENTANYL CITRATE 50 MCG: 50 INJECTION INTRAMUSCULAR; INTRAVENOUS at 09:12

## 2019-01-01 RX ADMIN — MAGNESIUM OXIDE TAB 400 MG (241.3 MG ELEMENTAL MG) 400 MG: 400 (241.3 MG) TAB at 16:34

## 2019-01-01 RX ADMIN — Medication 10 ML: at 21:01

## 2019-01-01 RX ADMIN — PHYTONADIONE 10 MG: 10 INJECTION, EMULSION INTRAMUSCULAR; INTRAVENOUS; SUBCUTANEOUS at 01:24

## 2019-01-01 RX ADMIN — PHENYLEPHRINE HYDROCHLORIDE 100 MCG: 10 INJECTION INTRAVENOUS at 10:14

## 2019-01-01 RX ADMIN — MAGNESIUM SULFATE HEPTAHYDRATE 2 G: 40 INJECTION, SOLUTION INTRAVENOUS at 13:53

## 2019-01-01 RX ADMIN — MEROPENEM 1 G: 1 INJECTION, POWDER, FOR SOLUTION INTRAVENOUS at 12:28

## 2019-01-01 RX ADMIN — POTASSIUM CHLORIDE 10 MEQ: 7.46 INJECTION, SOLUTION INTRAVENOUS at 17:07

## 2019-01-01 RX ADMIN — IPRATROPIUM BROMIDE AND ALBUTEROL SULFATE 1 AMPULE: .5; 3 SOLUTION RESPIRATORY (INHALATION) at 09:06

## 2019-01-01 RX ADMIN — POTASSIUM CHLORIDE 40 MEQ: 20 TABLET, EXTENDED RELEASE ORAL at 14:09

## 2019-01-01 RX ADMIN — MEROPENEM 1 G: 1 INJECTION, POWDER, FOR SOLUTION INTRAVENOUS at 06:26

## 2019-01-01 RX ADMIN — EPTIFIBATIDE 2 MCG/KG/MIN: 0.75 INJECTION INTRAVENOUS at 17:10

## 2019-01-01 RX ADMIN — MAGNESIUM SULFATE HEPTAHYDRATE 4 G: 40 INJECTION, SOLUTION INTRAVENOUS at 12:16

## 2019-01-01 RX ADMIN — Medication 2 PUFF: at 08:41

## 2019-01-01 RX ADMIN — ACETAMINOPHEN 650 MG: 325 TABLET ORAL at 11:58

## 2019-01-01 RX ADMIN — Medication 10 ML: at 10:58

## 2019-01-01 RX ADMIN — ASPIRIN 81 MG: 81 TABLET, COATED ORAL at 08:49

## 2019-01-01 RX ADMIN — EPTIFIBATIDE 2 MCG/KG/MIN: 0.75 INJECTION, SOLUTION INTRAVENOUS at 00:19

## 2019-01-01 RX ADMIN — FUROSEMIDE 20 MG: 10 INJECTION, SOLUTION INTRAMUSCULAR; INTRAVENOUS at 09:05

## 2019-01-01 RX ADMIN — METHYLPREDNISOLONE SODIUM SUCCINATE 60 MG: 125 INJECTION, POWDER, FOR SOLUTION INTRAMUSCULAR; INTRAVENOUS at 21:57

## 2019-01-01 RX ADMIN — SIMVASTATIN 40 MG: 40 TABLET, FILM COATED ORAL at 20:09

## 2019-01-01 RX ADMIN — OXYCODONE AND ACETAMINOPHEN 1 TABLET: 5; 325 TABLET ORAL at 16:04

## 2019-01-01 RX ADMIN — SODIUM CHLORIDE: 9 INJECTION, SOLUTION INTRAVENOUS at 16:55

## 2019-01-01 RX ADMIN — Medication 10 ML: at 09:35

## 2019-01-01 RX ADMIN — Medication: at 13:50

## 2019-01-01 RX ADMIN — MEROPENEM 1 G: 1 INJECTION, POWDER, FOR SOLUTION INTRAVENOUS at 04:28

## 2019-01-01 RX ADMIN — EPTIFIBATIDE 2 MCG/KG/MIN: 0.75 INJECTION, SOLUTION INTRAVENOUS at 18:59

## 2019-01-01 RX ADMIN — ENOXAPARIN SODIUM 40 MG: 40 INJECTION SUBCUTANEOUS at 16:19

## 2019-01-01 RX ADMIN — AMLODIPINE BESYLATE 5 MG: 5 TABLET ORAL at 16:48

## 2019-01-01 RX ADMIN — FLUCONAZOLE, SODIUM CHLORIDE 200 MG: 2 INJECTION INTRAVENOUS at 13:51

## 2019-01-01 RX ADMIN — CALCIUM CARBONATE-CHOLECALCIFEROL TAB 250 MG-125 UNIT 250 MG: 250-125 TAB at 09:00

## 2019-01-01 RX ADMIN — MAGNESIUM SULFATE HEPTAHYDRATE 2 G: 40 INJECTION, SOLUTION INTRAVENOUS at 12:45

## 2019-01-01 RX ADMIN — FUROSEMIDE 40 MG: 40 TABLET ORAL at 15:58

## 2019-01-01 RX ADMIN — INSULIN LISPRO 1 UNITS: 100 INJECTION, SOLUTION INTRAVENOUS; SUBCUTANEOUS at 18:05

## 2019-01-01 RX ADMIN — SULFAMETHOXAZOLE AND TRIMETHOPRIM 1 TABLET: 800; 160 TABLET ORAL at 08:51

## 2019-01-01 RX ADMIN — AMIODARONE HYDROCHLORIDE 200 MG: 200 TABLET ORAL at 21:55

## 2019-01-01 RX ADMIN — ACYCLOVIR: 50 OINTMENT TOPICAL at 22:00

## 2019-01-01 RX ADMIN — PROPOFOL 50 MCG/KG/MIN: 10 INJECTION, EMULSION INTRAVENOUS at 10:09

## 2019-01-01 RX ADMIN — SODIUM CHLORIDE: 9 INJECTION, SOLUTION INTRAVENOUS at 21:45

## 2019-01-01 RX ADMIN — ACYCLOVIR: 50 OINTMENT TOPICAL at 21:00

## 2019-01-01 RX ADMIN — AMIODARONE HYDROCHLORIDE 200 MG: 200 TABLET ORAL at 10:18

## 2019-01-01 RX ADMIN — ACYCLOVIR: 50 OINTMENT TOPICAL at 13:39

## 2019-01-01 RX ADMIN — FOLIC ACID 1 MG: 1 TABLET ORAL at 09:35

## 2019-01-01 RX ADMIN — MAGNESIUM GLUCONATE 500 MG ORAL TABLET 400 MG: 500 TABLET ORAL at 08:31

## 2019-01-01 RX ADMIN — SODIUM CHLORIDE, PRESERVATIVE FREE 10 ML: 5 INJECTION INTRAVENOUS at 11:32

## 2019-01-01 RX ADMIN — VANCOMYCIN HYDROCHLORIDE 750 MG: 750 INJECTION, POWDER, LYOPHILIZED, FOR SOLUTION INTRAVENOUS at 18:38

## 2019-01-01 RX ADMIN — FOLIC ACID 1 MG: 1 TABLET ORAL at 08:33

## 2019-01-01 RX ADMIN — MAGNESIUM GLUCONATE 500 MG ORAL TABLET 400 MG: 500 TABLET ORAL at 10:32

## 2019-01-01 RX ADMIN — CALCIUM CARBONATE-CHOLECALCIFEROL TAB 250 MG-125 UNIT 250 MG: 250-125 TAB at 09:11

## 2019-01-01 RX ADMIN — TOLVAPTAN 15 MG: 15 TABLET ORAL at 12:15

## 2019-01-01 RX ADMIN — MAGNESIUM GLUCONATE 500 MG ORAL TABLET 400 MG: 500 TABLET ORAL at 21:06

## 2019-01-01 RX ADMIN — DEXAMETHASONE SODIUM PHOSPHATE 4 MG: 4 INJECTION, SOLUTION INTRAMUSCULAR; INTRAVENOUS at 08:03

## 2019-01-01 RX ADMIN — VALSARTAN 320 MG: 80 TABLET, FILM COATED ORAL at 08:54

## 2019-01-01 RX ADMIN — AMIODARONE HYDROCHLORIDE 100 MG: 200 TABLET ORAL at 10:18

## 2019-01-01 RX ADMIN — POTASSIUM CHLORIDE 20 MEQ: 20 TABLET, EXTENDED RELEASE ORAL at 10:18

## 2019-01-01 RX ADMIN — LORAZEPAM 1 MG: 2 INJECTION, SOLUTION INTRAMUSCULAR; INTRAVENOUS at 13:07

## 2019-01-01 RX ADMIN — LISINOPRIL 5 MG: 5 TABLET ORAL at 20:38

## 2019-01-01 RX ADMIN — ROCURONIUM BROMIDE 50 MG: 10 SOLUTION INTRAVENOUS at 08:03

## 2019-01-01 RX ADMIN — MIDAZOLAM HYDROCHLORIDE 1 MG: 5 INJECTION, SOLUTION INTRAMUSCULAR; INTRAVENOUS at 10:39

## 2019-01-01 RX ADMIN — FOLIC ACID 1 MG: 1 TABLET ORAL at 03:00

## 2019-01-01 RX ADMIN — FLUCONAZOLE, SODIUM CHLORIDE 200 MG: 2 INJECTION INTRAVENOUS at 14:13

## 2019-01-01 RX ADMIN — ACETAMINOPHEN 650 MG: 325 TABLET ORAL at 23:32

## 2019-01-01 RX ADMIN — SODIUM CHLORIDE, POTASSIUM CHLORIDE, SODIUM LACTATE AND CALCIUM CHLORIDE: 600; 310; 30; 20 INJECTION, SOLUTION INTRAVENOUS at 15:14

## 2019-01-01 RX ADMIN — Medication 10 ML: at 10:43

## 2019-01-01 RX ADMIN — SODIUM CHLORIDE, PRESERVATIVE FREE 10 ML: 5 INJECTION INTRAVENOUS at 20:52

## 2019-01-01 RX ADMIN — MEROPENEM 1 G: 1 INJECTION, POWDER, FOR SOLUTION INTRAVENOUS at 21:20

## 2019-01-01 RX ADMIN — FLUDEOXYGLUCOSE F 18 12.58 MILLICURIE: 200 INJECTION, SOLUTION INTRAVENOUS at 11:32

## 2019-01-01 RX ADMIN — SODIUM CHLORIDE, PRESERVATIVE FREE 10 ML: 5 INJECTION INTRAVENOUS at 16:31

## 2019-01-01 RX ADMIN — POTASSIUM CHLORIDE 10 MEQ: 750 TABLET, EXTENDED RELEASE ORAL at 13:50

## 2019-01-01 RX ADMIN — ASPIRIN 81 MG 81 MG: 81 TABLET ORAL at 13:05

## 2019-01-01 RX ADMIN — Medication 10 ML: at 21:22

## 2019-01-01 RX ADMIN — EPTIFIBATIDE 2 MCG/KG/MIN: 0.75 INJECTION, SOLUTION INTRAVENOUS at 18:04

## 2019-01-01 RX ADMIN — ROCURONIUM BROMIDE 15 MG: 10 SOLUTION INTRAVENOUS at 08:40

## 2019-01-01 RX ADMIN — FOLIC ACID 1 MG: 1 TABLET ORAL at 10:29

## 2019-01-01 RX ADMIN — SPIRONOLACTONE 25 MG: 25 TABLET ORAL at 09:57

## 2019-01-01 RX ADMIN — FAMOTIDINE 20 MG: 20 TABLET ORAL at 10:09

## 2019-01-01 RX ADMIN — MEROPENEM 1 G: 1 INJECTION, POWDER, FOR SOLUTION INTRAVENOUS at 00:53

## 2019-01-01 RX ADMIN — MEROPENEM 1 G: 1 INJECTION, POWDER, FOR SOLUTION INTRAVENOUS at 21:18

## 2019-01-01 RX ADMIN — PIPERACILLIN AND TAZOBACTAM 3.38 G: 3; .375 INJECTION, POWDER, FOR SOLUTION INTRAVENOUS at 10:23

## 2019-01-01 RX ADMIN — LOPERAMIDE HYDROCHLORIDE 2 MG: 2 CAPSULE ORAL at 20:48

## 2019-01-01 RX ADMIN — FOLIC ACID 1 MG: 1 TABLET ORAL at 09:20

## 2019-01-01 RX ADMIN — ACYCLOVIR 800 MG: 800 TABLET ORAL at 21:51

## 2019-01-01 RX ADMIN — CARBOXYMETHYLCELLULOSE SODIUM 1 DROP: 10 GEL OPHTHALMIC at 09:35

## 2019-01-01 RX ADMIN — SODIUM CHLORIDE, PRESERVATIVE FREE 10 ML: 5 INJECTION INTRAVENOUS at 20:11

## 2019-01-01 RX ADMIN — ACYCLOVIR 800 MG: 800 TABLET ORAL at 10:41

## 2019-01-01 RX ADMIN — IPRATROPIUM BROMIDE AND ALBUTEROL SULFATE 1 AMPULE: .5; 3 SOLUTION RESPIRATORY (INHALATION) at 16:28

## 2019-01-01 RX ADMIN — METOPROLOL SUCCINATE 50 MG: 50 TABLET, EXTENDED RELEASE ORAL at 14:57

## 2019-01-01 RX ADMIN — Medication 10 ML: at 10:32

## 2019-01-01 RX ADMIN — ROCURONIUM BROMIDE 35 MG: 10 SOLUTION INTRAVENOUS at 14:01

## 2019-01-01 RX ADMIN — Medication: at 18:32

## 2019-01-01 RX ADMIN — PANTOPRAZOLE SODIUM 40 MG: 40 INJECTION, POWDER, FOR SOLUTION INTRAVENOUS at 08:09

## 2019-01-01 RX ADMIN — ALTEPLASE 1 MG: 2.2 INJECTION, POWDER, LYOPHILIZED, FOR SOLUTION INTRAVENOUS at 01:47

## 2019-01-01 RX ADMIN — FENTANYL CITRATE 75 MCG/HR: 50 INJECTION INTRAVENOUS at 12:37

## 2019-01-01 RX ADMIN — IPRATROPIUM BROMIDE AND ALBUTEROL SULFATE 1 AMPULE: .5; 3 SOLUTION RESPIRATORY (INHALATION) at 09:17

## 2019-01-01 RX ADMIN — FUROSEMIDE 20 MG: 10 INJECTION, SOLUTION INTRAMUSCULAR; INTRAVENOUS at 08:46

## 2019-01-01 RX ADMIN — MUPIROCIN: 20 OINTMENT TOPICAL at 15:40

## 2019-01-01 RX ADMIN — Medication 10 ML: at 20:38

## 2019-01-01 RX ADMIN — SODIUM CHLORIDE: 9 INJECTION, SOLUTION INTRAVENOUS at 21:25

## 2019-01-01 RX ADMIN — MEROPENEM 1 G: 1 INJECTION, POWDER, FOR SOLUTION INTRAVENOUS at 06:31

## 2019-01-01 RX ADMIN — MAGNESIUM GLUCONATE 500 MG ORAL TABLET 400 MG: 500 TABLET ORAL at 09:20

## 2019-01-01 RX ADMIN — DEXMEDETOMIDINE HYDROCHLORIDE 0.6 MCG/KG/HR: 100 INJECTION, SOLUTION INTRAVENOUS at 15:58

## 2019-01-01 RX ADMIN — ATENOLOL 25 MG: 25 TABLET ORAL at 08:49

## 2019-01-01 RX ADMIN — ACYCLOVIR 800 MG: 800 TABLET ORAL at 15:18

## 2019-01-01 RX ADMIN — NYSTATIN 5 ML: 100000 SUSPENSION ORAL at 12:12

## 2019-01-01 RX ADMIN — SILVER SULFADIAZINE: 10 CREAM TOPICAL at 09:37

## 2019-01-01 RX ADMIN — IPRATROPIUM BROMIDE AND ALBUTEROL SULFATE 1 AMPULE: .5; 3 SOLUTION RESPIRATORY (INHALATION) at 15:37

## 2019-01-01 RX ADMIN — Medication 2 PUFF: at 16:41

## 2019-01-01 RX ADMIN — PROPOFOL 100 MCG/KG/MIN: 10 INJECTION, EMULSION INTRAVENOUS at 15:14

## 2019-01-01 RX ADMIN — FENTANYL CITRATE 50 MCG: 50 INJECTION INTRAMUSCULAR; INTRAVENOUS at 09:02

## 2019-01-01 RX ADMIN — ACYCLOVIR SODIUM 700 MG: 50 INJECTION, SOLUTION INTRAVENOUS at 00:49

## 2019-01-01 RX ADMIN — FOLIC ACID 1 MG: 1 TABLET ORAL at 09:43

## 2019-01-01 RX ADMIN — MEROPENEM 1 G: 1 INJECTION, POWDER, FOR SOLUTION INTRAVENOUS at 20:02

## 2019-01-01 RX ADMIN — EPTIFIBATIDE 2 MCG/KG/MIN: 0.75 INJECTION, SOLUTION INTRAVENOUS at 11:41

## 2019-01-01 RX ADMIN — ASPIRIN 81 MG 81 MG: 81 TABLET ORAL at 10:02

## 2019-01-01 RX ADMIN — IPRATROPIUM BROMIDE AND ALBUTEROL SULFATE 1 AMPULE: .5; 3 SOLUTION RESPIRATORY (INHALATION) at 08:45

## 2019-01-01 RX ADMIN — POTASSIUM CHLORIDE 10 MEQ: 7.46 INJECTION, SOLUTION INTRAVENOUS at 12:43

## 2019-01-01 RX ADMIN — FLUCONAZOLE, SODIUM CHLORIDE 200 MG: 2 INJECTION INTRAVENOUS at 13:05

## 2019-01-01 RX ADMIN — HYDRALAZINE HYDROCHLORIDE 10 MG: 20 INJECTION INTRAMUSCULAR; INTRAVENOUS at 15:17

## 2019-01-01 RX ADMIN — MAGNESIUM SULFATE HEPTAHYDRATE 1 G: 1 INJECTION, SOLUTION INTRAVENOUS at 14:19

## 2019-01-01 RX ADMIN — Medication 10 ML: at 08:16

## 2019-01-01 RX ADMIN — POTASSIUM CHLORIDE 10 MEQ: 750 TABLET, EXTENDED RELEASE ORAL at 09:34

## 2019-01-01 RX ADMIN — MAGNESIUM GLUCONATE 500 MG ORAL TABLET 400 MG: 500 TABLET ORAL at 08:34

## 2019-01-01 RX ADMIN — Medication 10 ML: at 21:41

## 2019-01-01 RX ADMIN — Medication 1 APPLICATOR: at 09:00

## 2019-01-01 RX ADMIN — POTASSIUM CHLORIDE 10 MEQ: 750 TABLET, EXTENDED RELEASE ORAL at 21:43

## 2019-01-01 RX ADMIN — HYDRALAZINE HYDROCHLORIDE 10 MG: 20 INJECTION INTRAMUSCULAR; INTRAVENOUS at 03:14

## 2019-01-01 RX ADMIN — HYDROMORPHONE HYDROCHLORIDE 1 MG: 2 INJECTION INTRAMUSCULAR; INTRAVENOUS; SUBCUTANEOUS at 14:05

## 2019-01-01 RX ADMIN — ONDANSETRON 4 MG: 2 SOLUTION INTRAMUSCULAR; INTRAVENOUS at 21:08

## 2019-01-01 RX ADMIN — MAGNESIUM GLUCONATE 500 MG ORAL TABLET 400 MG: 500 TABLET ORAL at 10:28

## 2019-01-01 RX ADMIN — Medication 10 ML: at 09:21

## 2019-01-01 RX ADMIN — VANCOMYCIN HYDROCHLORIDE 1000 MG: 1 INJECTION, POWDER, LYOPHILIZED, FOR SOLUTION INTRAVENOUS at 18:40

## 2019-01-01 RX ADMIN — Medication 10 ML: at 20:58

## 2019-01-01 RX ADMIN — IPRATROPIUM BROMIDE AND ALBUTEROL SULFATE 1 AMPULE: .5; 3 SOLUTION RESPIRATORY (INHALATION) at 11:50

## 2019-01-01 RX ADMIN — INSULIN LISPRO 1 UNITS: 100 INJECTION, SOLUTION INTRAVENOUS; SUBCUTANEOUS at 17:01

## 2019-01-01 RX ADMIN — HEPARIN SODIUM AND DEXTROSE 18.3 ML/HR: 10000; 5 INJECTION INTRAVENOUS at 23:42

## 2019-01-01 RX ADMIN — Medication 10 ML: at 16:47

## 2019-01-01 RX ADMIN — MUPIROCIN: 20 OINTMENT TOPICAL at 13:11

## 2019-01-01 RX ADMIN — SODIUM CHLORIDE: 900 INJECTION, SOLUTION INTRAVENOUS at 19:20

## 2019-01-01 RX ADMIN — PANTOPRAZOLE SODIUM 40 MG: 40 INJECTION, POWDER, FOR SOLUTION INTRAVENOUS at 08:35

## 2019-01-01 RX ADMIN — MUPIROCIN: 20 OINTMENT TOPICAL at 09:36

## 2019-01-01 RX ADMIN — Medication 10 ML: at 20:01

## 2019-01-01 RX ADMIN — Medication 10 ML: at 21:14

## 2019-01-01 RX ADMIN — HEPARIN SODIUM AND DEXTROSE 16.8 ML/HR: 10000; 5 INJECTION INTRAVENOUS at 10:26

## 2019-01-01 RX ADMIN — AMIODARONE HYDROCHLORIDE 200 MG: 200 TABLET ORAL at 20:48

## 2019-01-01 RX ADMIN — FLUCONAZOLE, SODIUM CHLORIDE 200 MG: 2 INJECTION INTRAVENOUS at 12:23

## 2019-01-01 RX ADMIN — HEPARIN SODIUM AND DEXTROSE 16.8 ML/HR: 10000; 5 INJECTION INTRAVENOUS at 18:36

## 2019-01-01 RX ADMIN — ACYCLOVIR: 50 OINTMENT TOPICAL at 21:24

## 2019-01-01 RX ADMIN — HYDROMORPHONE HYDROCHLORIDE 0.5 MG: 2 INJECTION, SOLUTION INTRAMUSCULAR; INTRAVENOUS; SUBCUTANEOUS at 06:20

## 2019-01-01 RX ADMIN — SPIRONOLACTONE 25 MG: 25 TABLET ORAL at 10:18

## 2019-01-01 RX ADMIN — IPRATROPIUM BROMIDE AND ALBUTEROL SULFATE 1 AMPULE: .5; 3 SOLUTION RESPIRATORY (INHALATION) at 20:25

## 2019-01-01 RX ADMIN — AMIODARONE HYDROCHLORIDE 200 MG: 200 TABLET ORAL at 08:28

## 2019-01-01 RX ADMIN — AMIODARONE HYDROCHLORIDE 100 MG: 200 TABLET ORAL at 09:34

## 2019-01-01 RX ADMIN — MUPIROCIN: 20 OINTMENT TOPICAL at 21:53

## 2019-01-01 RX ADMIN — EPTIFIBATIDE 2 MCG/KG/MIN: 0.75 INJECTION, SOLUTION INTRAVENOUS at 20:07

## 2019-01-01 RX ADMIN — ACYCLOVIR 800 MG: 800 TABLET ORAL at 13:19

## 2019-01-01 RX ADMIN — FUROSEMIDE 20 MG: 10 INJECTION, SOLUTION INTRAMUSCULAR; INTRAVENOUS at 20:52

## 2019-01-01 RX ADMIN — DEXAMETHASONE 4 MG: 4 TABLET ORAL at 11:08

## 2019-01-01 RX ADMIN — MEROPENEM 1 G: 1 INJECTION, POWDER, FOR SOLUTION INTRAVENOUS at 04:47

## 2019-01-01 RX ADMIN — ACYCLOVIR: 50 OINTMENT TOPICAL at 17:30

## 2019-01-01 RX ADMIN — FENTANYL CITRATE 50 MCG/HR: 50 INJECTION INTRAVENOUS at 14:11

## 2019-01-01 RX ADMIN — ATENOLOL 200 MG: 25 TABLET ORAL at 09:12

## 2019-01-01 RX ADMIN — POTASSIUM CHLORIDE 20 MEQ: 20 TABLET, EXTENDED RELEASE ORAL at 21:13

## 2019-01-01 RX ADMIN — POTASSIUM CHLORIDE 20 MEQ: 20 TABLET, EXTENDED RELEASE ORAL at 20:52

## 2019-01-01 RX ADMIN — OXYCODONE HYDROCHLORIDE 10 MG: 5 TABLET ORAL at 00:23

## 2019-01-01 RX ADMIN — NYSTATIN 5 ML: 100000 SUSPENSION ORAL at 20:35

## 2019-01-01 RX ADMIN — MAGNESIUM SULFATE HEPTAHYDRATE 1 G: 1 INJECTION, SOLUTION INTRAVENOUS at 10:08

## 2019-01-01 RX ADMIN — AMIODARONE HYDROCHLORIDE 200 MG: 200 TABLET ORAL at 09:57

## 2019-01-01 RX ADMIN — SULFAMETHOXAZOLE AND TRIMETHOPRIM 1 TABLET: 800; 160 TABLET ORAL at 13:56

## 2019-01-01 RX ADMIN — SODIUM CHLORIDE, PRESERVATIVE FREE 10 ML: 5 INJECTION INTRAVENOUS at 21:53

## 2019-01-01 RX ADMIN — ACYCLOVIR 800 MG: 800 TABLET ORAL at 09:04

## 2019-01-01 RX ADMIN — MEROPENEM 1 G: 1 INJECTION, POWDER, FOR SOLUTION INTRAVENOUS at 13:40

## 2019-01-01 RX ADMIN — MEROPENEM 1 G: 1 INJECTION, POWDER, FOR SOLUTION INTRAVENOUS at 23:56

## 2019-01-01 RX ADMIN — PROCHLORPERAZINE EDISYLATE 10 MG: 5 INJECTION INTRAMUSCULAR; INTRAVENOUS at 22:00

## 2019-01-01 RX ADMIN — EPTIFIBATIDE 2 MCG/KG/MIN: 0.75 INJECTION INTRAVENOUS at 15:15

## 2019-01-01 RX ADMIN — EPHEDRINE SULFATE 10 MG: 50 INJECTION INTRAMUSCULAR; INTRAVENOUS; SUBCUTANEOUS at 13:06

## 2019-01-01 RX ADMIN — CALCIUM CARBONATE-CHOLECALCIFEROL TAB 250 MG-125 UNIT 250 MG: 250-125 TAB at 08:52

## 2019-01-01 RX ADMIN — CALCIUM CARBONATE-CHOLECALCIFEROL TAB 250 MG-125 UNIT 250 MG: 250-125 TAB at 09:55

## 2019-01-01 RX ADMIN — POTASSIUM CHLORIDE 10 MEQ: 7.46 INJECTION, SOLUTION INTRAVENOUS at 15:39

## 2019-01-01 RX ADMIN — Medication 10 ML: at 09:28

## 2019-01-01 RX ADMIN — MAGNESIUM GLUCONATE 500 MG ORAL TABLET 400 MG: 500 TABLET ORAL at 09:35

## 2019-01-01 RX ADMIN — EPTIFIBATIDE 2 MCG/KG/MIN: 0.75 INJECTION, SOLUTION INTRAVENOUS at 05:24

## 2019-01-01 RX ADMIN — VALSARTAN 320 MG: 80 TABLET, FILM COATED ORAL at 10:18

## 2019-01-01 RX ADMIN — CLOPIDOGREL BISULFATE 75 MG: 75 TABLET ORAL at 08:21

## 2019-01-01 RX ADMIN — Medication 2 PUFF: at 08:15

## 2019-01-01 RX ADMIN — ACYCLOVIR 800 MG: 800 TABLET ORAL at 14:57

## 2019-01-01 RX ADMIN — FAMOTIDINE 20 MG: 20 TABLET, FILM COATED ORAL at 21:01

## 2019-01-01 RX ADMIN — FLUCONAZOLE, SODIUM CHLORIDE 200 MG: 2 INJECTION INTRAVENOUS at 12:15

## 2019-01-01 RX ADMIN — MUPIROCIN: 20 OINTMENT TOPICAL at 20:39

## 2019-01-01 RX ADMIN — LEVALBUTEROL INHALATION SOLUTION 1.25 MG: 1.25 SOLUTION RESPIRATORY (INHALATION) at 11:07

## 2019-01-01 RX ADMIN — ACYCLOVIR: 50 OINTMENT TOPICAL at 14:12

## 2019-01-01 RX ADMIN — FUROSEMIDE 40 MG: 40 TABLET ORAL at 17:16

## 2019-01-01 RX ADMIN — FOLIC ACID 1 MG: 1 TABLET ORAL at 08:21

## 2019-01-01 RX ADMIN — MUPIROCIN: 20 OINTMENT TOPICAL at 15:38

## 2019-01-01 RX ADMIN — ENOXAPARIN SODIUM 40 MG: 40 INJECTION SUBCUTANEOUS at 09:29

## 2019-01-01 RX ADMIN — ACYCLOVIR: 50 OINTMENT TOPICAL at 00:49

## 2019-01-01 RX ADMIN — CLOPIDOGREL BISULFATE 75 MG: 75 TABLET ORAL at 11:10

## 2019-01-01 RX ADMIN — AMIODARONE HYDROCHLORIDE 100 MG: 200 TABLET ORAL at 08:51

## 2019-01-01 RX ADMIN — CALCIUM CARBONATE-CHOLECALCIFEROL TAB 250 MG-125 UNIT 250 MG: 250-125 TAB at 09:20

## 2019-01-01 RX ADMIN — IPRATROPIUM BROMIDE AND ALBUTEROL SULFATE 1 AMPULE: .5; 3 SOLUTION RESPIRATORY (INHALATION) at 15:57

## 2019-01-01 RX ADMIN — MEROPENEM 1 G: 1 INJECTION, POWDER, FOR SOLUTION INTRAVENOUS at 02:29

## 2019-01-01 RX ADMIN — ONDANSETRON 4 MG: 2 INJECTION INTRAMUSCULAR; INTRAVENOUS at 08:24

## 2019-01-01 RX ADMIN — METOPROLOL SUCCINATE 50 MG: 50 TABLET, EXTENDED RELEASE ORAL at 13:50

## 2019-01-01 RX ADMIN — SILVER SULFADIAZINE: 10 CREAM TOPICAL at 08:28

## 2019-01-01 RX ADMIN — AMLODIPINE BESYLATE 5 MG: 5 TABLET ORAL at 08:52

## 2019-01-01 RX ADMIN — CLOPIDOGREL BISULFATE 75 MG: 75 TABLET ORAL at 08:49

## 2019-01-01 RX ADMIN — PROPOFOL 200 MG: 10 INJECTION, EMULSION INTRAVENOUS at 14:01

## 2019-01-01 RX ADMIN — ASPIRIN 81 MG 81 MG: 81 TABLET ORAL at 10:28

## 2019-01-01 RX ADMIN — PANTOPRAZOLE SODIUM 40 MG: 40 INJECTION, POWDER, FOR SOLUTION INTRAVENOUS at 09:39

## 2019-01-01 RX ADMIN — ALBUMIN (HUMAN) 250 ML: 12.5 INJECTION, SOLUTION INTRAVENOUS at 10:04

## 2019-01-01 RX ADMIN — Medication 10 ML: at 22:18

## 2019-01-01 RX ADMIN — MEROPENEM 1 G: 1 INJECTION, POWDER, FOR SOLUTION INTRAVENOUS at 05:44

## 2019-01-01 RX ADMIN — ACYCLOVIR 800 MG: 800 TABLET ORAL at 20:56

## 2019-01-01 RX ADMIN — LOPERAMIDE HYDROCHLORIDE 2 MG: 2 CAPSULE ORAL at 22:47

## 2019-01-01 RX ADMIN — HEPARIN SODIUM 6610 UNITS: 1000 INJECTION INTRAVENOUS; SUBCUTANEOUS at 10:52

## 2019-01-01 RX ADMIN — Medication 2 PUFF: at 16:56

## 2019-01-01 RX ADMIN — Medication 10 ML: at 09:29

## 2019-01-01 RX ADMIN — FUROSEMIDE 20 MG: 10 INJECTION, SOLUTION INTRAMUSCULAR; INTRAVENOUS at 12:39

## 2019-01-01 RX ADMIN — HEPARIN SODIUM AND DEXTROSE 18.3 ML/HR: 10000; 5 INJECTION INTRAVENOUS at 18:58

## 2019-01-01 RX ADMIN — HEPARIN SODIUM 14.9 ML/HR: 10000 INJECTION, SOLUTION INTRAVENOUS at 08:49

## 2019-01-01 RX ADMIN — Medication 10 ML: at 19:24

## 2019-01-01 RX ADMIN — CALCIUM CARBONATE-CHOLECALCIFEROL TAB 250 MG-125 UNIT 250 MG: 250-125 TAB at 09:19

## 2019-01-01 RX ADMIN — FOLIC ACID 1 MG: 1 TABLET ORAL at 09:29

## 2019-01-01 RX ADMIN — IPRATROPIUM BROMIDE AND ALBUTEROL SULFATE 1 AMPULE: .5; 3 SOLUTION RESPIRATORY (INHALATION) at 20:21

## 2019-01-01 RX ADMIN — TBO-FILGRASTIM 300 MCG: 300 INJECTION, SOLUTION SUBCUTANEOUS at 18:04

## 2019-01-01 RX ADMIN — ACYCLOVIR 800 MG: 800 TABLET ORAL at 00:59

## 2019-01-01 RX ADMIN — FENTANYL CITRATE 75 MCG/HR: 50 INJECTION INTRAVENOUS at 23:51

## 2019-01-01 RX ADMIN — Medication 10 ML: at 09:55

## 2019-01-01 RX ADMIN — METOPROLOL SUCCINATE 50 MG: 25 TABLET, EXTENDED RELEASE ORAL at 09:28

## 2019-01-01 RX ADMIN — MAGNESIUM GLUCONATE 500 MG ORAL TABLET 400 MG: 500 TABLET ORAL at 09:34

## 2019-01-01 RX ADMIN — ASPIRIN 81 MG 81 MG: 81 TABLET ORAL at 09:20

## 2019-01-01 RX ADMIN — ATENOLOL 100 MG: 25 TABLET ORAL at 11:58

## 2019-01-01 RX ADMIN — IOPAMIDOL 75 ML: 755 INJECTION, SOLUTION INTRAVENOUS at 20:03

## 2019-01-01 RX ADMIN — OXYCODONE AND ACETAMINOPHEN 1 TABLET: 5; 325 TABLET ORAL at 14:36

## 2019-01-01 RX ADMIN — ASPIRIN 81 MG 81 MG: 81 TABLET ORAL at 09:19

## 2019-01-01 RX ADMIN — EPHEDRINE SULFATE 20 MG: 50 INJECTION INTRAMUSCULAR; INTRAVENOUS; SUBCUTANEOUS at 14:59

## 2019-01-01 RX ADMIN — EPTIFIBATIDE 2 MCG/KG/MIN: 0.75 INJECTION, SOLUTION INTRAVENOUS at 03:25

## 2019-01-01 RX ADMIN — POTASSIUM CHLORIDE 20 MEQ: 20 TABLET, EXTENDED RELEASE ORAL at 08:34

## 2019-01-01 RX ADMIN — CEFEPIME 2 G: 2 INJECTION, POWDER, FOR SOLUTION INTRAVENOUS at 22:02

## 2019-01-01 RX ADMIN — POTASSIUM CHLORIDE 10 MEQ: 750 TABLET, EXTENDED RELEASE ORAL at 09:35

## 2019-01-01 RX ADMIN — ACETAMINOPHEN 650 MG: 325 TABLET ORAL at 08:54

## 2019-01-01 RX ADMIN — MEROPENEM 1 G: 1 INJECTION, POWDER, FOR SOLUTION INTRAVENOUS at 21:52

## 2019-01-01 RX ADMIN — SODIUM CHLORIDE: 9 INJECTION, SOLUTION INTRAVENOUS at 13:11

## 2019-01-01 RX ADMIN — NYSTATIN 5 ML: 100000 SUSPENSION ORAL at 12:50

## 2019-01-01 RX ADMIN — Medication 10 ML: at 14:39

## 2019-01-01 RX ADMIN — HYDRALAZINE HYDROCHLORIDE 10 MG: 20 INJECTION INTRAMUSCULAR; INTRAVENOUS at 16:39

## 2019-01-01 RX ADMIN — SIMVASTATIN 20 MG: 10 TABLET, FILM COATED ORAL at 21:52

## 2019-01-01 RX ADMIN — POTASSIUM CHLORIDE 20 MEQ: 20 TABLET, EXTENDED RELEASE ORAL at 08:57

## 2019-01-01 RX ADMIN — METOPROLOL SUCCINATE 50 MG: 50 TABLET, EXTENDED RELEASE ORAL at 09:10

## 2019-01-01 RX ADMIN — CLOPIDOGREL BISULFATE 75 MG: 75 TABLET ORAL at 08:52

## 2019-01-01 RX ADMIN — POTASSIUM CHLORIDE 20 MEQ: 29.8 INJECTION, SOLUTION INTRAVENOUS at 12:21

## 2019-01-01 RX ADMIN — MUPIROCIN: 20 OINTMENT TOPICAL at 17:35

## 2019-01-01 RX ADMIN — MEROPENEM 1 G: 1 INJECTION, POWDER, FOR SOLUTION INTRAVENOUS at 23:54

## 2019-01-01 RX ADMIN — MEROPENEM 1 G: 1 INJECTION, POWDER, FOR SOLUTION INTRAVENOUS at 03:25

## 2019-01-01 RX ADMIN — FUROSEMIDE 40 MG: 40 TABLET ORAL at 10:31

## 2019-01-01 RX ADMIN — TBO-FILGRASTIM 300 MCG: 300 INJECTION, SOLUTION SUBCUTANEOUS at 19:38

## 2019-01-01 RX ADMIN — HEPARIN SODIUM AND DEXTROSE 16.8 ML/HR: 10000; 5 INJECTION INTRAVENOUS at 03:21

## 2019-01-01 RX ADMIN — VANCOMYCIN HYDROCHLORIDE 1000 MG: 1 INJECTION, POWDER, LYOPHILIZED, FOR SOLUTION INTRAVENOUS at 05:29

## 2019-01-01 RX ADMIN — ACETAMINOPHEN 650 MG: 325 TABLET ORAL at 20:11

## 2019-01-01 RX ADMIN — FOLIC ACID 1 MG: 1 TABLET ORAL at 10:52

## 2019-01-01 RX ADMIN — ATENOLOL 200 MG: 25 TABLET ORAL at 08:42

## 2019-01-01 RX ADMIN — IPRATROPIUM BROMIDE AND ALBUTEROL SULFATE 1 AMPULE: .5; 3 SOLUTION RESPIRATORY (INHALATION) at 15:43

## 2019-01-01 RX ADMIN — PIPERACILLIN AND TAZOBACTAM 3.38 G: 3; .375 INJECTION, POWDER, FOR SOLUTION INTRAVENOUS at 09:35

## 2019-01-01 RX ADMIN — SODIUM CHLORIDE 1000 ML: 9 INJECTION, SOLUTION INTRAVENOUS at 20:29

## 2019-01-01 RX ADMIN — VALSARTAN 320 MG: 80 TABLET, FILM COATED ORAL at 11:08

## 2019-01-01 RX ADMIN — INSULIN LISPRO 1 UNITS: 100 INJECTION, SOLUTION INTRAVENOUS; SUBCUTANEOUS at 12:31

## 2019-01-01 RX ADMIN — FOLIC ACID 1 MG: 1 TABLET ORAL at 16:19

## 2019-01-01 RX ADMIN — ACYCLOVIR: 50 OINTMENT TOPICAL at 00:37

## 2019-01-01 RX ADMIN — ACYCLOVIR: 50 OINTMENT TOPICAL at 18:13

## 2019-01-01 RX ADMIN — MAGNESIUM GLUCONATE 500 MG ORAL TABLET 400 MG: 500 TABLET ORAL at 21:42

## 2019-01-01 RX ADMIN — FENTANYL CITRATE 50 MCG: 50 INJECTION INTRAMUSCULAR; INTRAVENOUS at 10:39

## 2019-01-01 RX ADMIN — MEROPENEM 1 G: 1 INJECTION, POWDER, FOR SOLUTION INTRAVENOUS at 21:26

## 2019-01-01 RX ADMIN — METHYLPREDNISOLONE SODIUM SUCCINATE 60 MG: 125 INJECTION, POWDER, FOR SOLUTION INTRAMUSCULAR; INTRAVENOUS at 11:24

## 2019-01-01 RX ADMIN — SIMVASTATIN 40 MG: 40 TABLET, FILM COATED ORAL at 20:48

## 2019-01-01 RX ADMIN — Medication 10 ML: at 20:48

## 2019-01-01 RX ADMIN — METOPROLOL SUCCINATE 50 MG: 50 TABLET, EXTENDED RELEASE ORAL at 09:20

## 2019-01-01 RX ADMIN — CALCIUM CARBONATE-CHOLECALCIFEROL TAB 250 MG-125 UNIT 250 MG: 250-125 TAB at 10:11

## 2019-01-01 RX ADMIN — MUPIROCIN: 20 OINTMENT TOPICAL at 21:01

## 2019-01-01 RX ADMIN — POTASSIUM CHLORIDE 10 MEQ: 7.46 INJECTION, SOLUTION INTRAVENOUS at 07:23

## 2019-01-01 RX ADMIN — METOPROLOL SUCCINATE 50 MG: 50 TABLET, EXTENDED RELEASE ORAL at 08:45

## 2019-01-01 RX ADMIN — METHYLPREDNISOLONE SODIUM SUCCINATE 60 MG: 125 INJECTION, POWDER, FOR SOLUTION INTRAMUSCULAR; INTRAVENOUS at 05:22

## 2019-01-01 RX ADMIN — METOPROLOL SUCCINATE 50 MG: 50 TABLET, EXTENDED RELEASE ORAL at 14:39

## 2019-01-01 RX ADMIN — AMIODARONE HYDROCHLORIDE 200 MG: 200 TABLET ORAL at 11:59

## 2019-01-01 RX ADMIN — FAMOTIDINE 20 MG: 20 TABLET, FILM COATED ORAL at 09:12

## 2019-01-01 RX ADMIN — Medication 2 PUFF: at 12:28

## 2019-01-01 RX ADMIN — ACYCLOVIR 800 MG: 800 TABLET ORAL at 21:55

## 2019-01-01 RX ADMIN — ACYCLOVIR SODIUM 700 MG: 50 INJECTION, SOLUTION INTRAVENOUS at 14:35

## 2019-01-01 RX ADMIN — SIMVASTATIN 40 MG: 40 TABLET, FILM COATED ORAL at 22:28

## 2019-01-01 RX ADMIN — AMIODARONE HYDROCHLORIDE 100 MG: 200 TABLET ORAL at 08:17

## 2019-01-01 RX ADMIN — CALCIUM CARBONATE-CHOLECALCIFEROL TAB 250 MG-125 UNIT 250 MG: 250-125 TAB at 09:05

## 2019-01-01 RX ADMIN — FLUCONAZOLE, SODIUM CHLORIDE 200 MG: 2 INJECTION INTRAVENOUS at 14:50

## 2019-01-01 RX ADMIN — EPTIFIBATIDE 2 MCG/KG/MIN: 0.75 INJECTION, SOLUTION INTRAVENOUS at 05:05

## 2019-01-01 RX ADMIN — CALCIUM CARBONATE-CHOLECALCIFEROL TAB 250 MG-125 UNIT 250 MG: 250-125 TAB at 08:28

## 2019-01-01 RX ADMIN — IOPAMIDOL 75 ML: 755 INJECTION, SOLUTION INTRAVENOUS at 10:57

## 2019-01-01 RX ADMIN — FLUCONAZOLE, SODIUM CHLORIDE 200 MG: 2 INJECTION INTRAVENOUS at 12:28

## 2019-01-01 RX ADMIN — SODIUM CHLORIDE: 9 INJECTION, SOLUTION INTRAVENOUS at 11:19

## 2019-01-01 RX ADMIN — FUROSEMIDE 40 MG: 40 TABLET ORAL at 08:34

## 2019-01-01 RX ADMIN — FAMOTIDINE 20 MG: 20 TABLET ORAL at 21:52

## 2019-01-01 RX ADMIN — SIMVASTATIN 40 MG: 40 TABLET, FILM COATED ORAL at 21:24

## 2019-01-01 RX ADMIN — MUPIROCIN: 20 OINTMENT TOPICAL at 15:58

## 2019-01-01 RX ADMIN — INSULIN LISPRO 1 UNITS: 100 INJECTION, SOLUTION INTRAVENOUS; SUBCUTANEOUS at 22:55

## 2019-01-01 RX ADMIN — SODIUM CHLORIDE: 9 INJECTION, SOLUTION INTRAVENOUS at 04:17

## 2019-01-01 RX ADMIN — Medication 10 ML: at 09:00

## 2019-01-01 RX ADMIN — ONDANSETRON 4 MG: 2 SOLUTION INTRAMUSCULAR; INTRAVENOUS at 10:55

## 2019-01-01 RX ADMIN — FLUCONAZOLE, SODIUM CHLORIDE 200 MG: 2 INJECTION INTRAVENOUS at 12:17

## 2019-01-01 RX ADMIN — FUROSEMIDE 40 MG: 40 TABLET ORAL at 09:20

## 2019-01-01 RX ADMIN — POTASSIUM CHLORIDE 10 MEQ: 10 TABLET, EXTENDED RELEASE ORAL at 22:28

## 2019-01-01 RX ADMIN — MEROPENEM 1 G: 1 INJECTION, POWDER, FOR SOLUTION INTRAVENOUS at 05:10

## 2019-01-01 RX ADMIN — VALSARTAN 320 MG: 80 TABLET, FILM COATED ORAL at 08:53

## 2019-01-01 RX ADMIN — IPRATROPIUM BROMIDE AND ALBUTEROL SULFATE 1 AMPULE: .5; 3 SOLUTION RESPIRATORY (INHALATION) at 20:43

## 2019-01-01 RX ADMIN — IPRATROPIUM BROMIDE AND ALBUTEROL SULFATE 1 AMPULE: .5; 3 SOLUTION RESPIRATORY (INHALATION) at 19:38

## 2019-01-01 RX ADMIN — PIPERACILLIN AND TAZOBACTAM 3.38 G: 3; .375 INJECTION, POWDER, FOR SOLUTION INTRAVENOUS at 18:05

## 2019-01-01 RX ADMIN — APIXABAN 5 MG: 5 TABLET, FILM COATED ORAL at 09:18

## 2019-01-01 RX ADMIN — METHYLPREDNISOLONE SODIUM SUCCINATE 60 MG: 125 INJECTION, POWDER, FOR SOLUTION INTRAMUSCULAR; INTRAVENOUS at 06:44

## 2019-01-01 RX ADMIN — MUPIROCIN: 20 OINTMENT TOPICAL at 21:00

## 2019-01-01 RX ADMIN — LISINOPRIL 5 MG: 5 TABLET ORAL at 21:13

## 2019-01-01 RX ADMIN — Medication 1 APPLICATOR: at 09:35

## 2019-01-01 RX ADMIN — HYDROMORPHONE HYDROCHLORIDE 0.5 MG: 1 INJECTION, SOLUTION INTRAMUSCULAR; INTRAVENOUS; SUBCUTANEOUS at 15:11

## 2019-01-01 RX ADMIN — ACYCLOVIR: 50 OINTMENT TOPICAL at 07:00

## 2019-01-01 RX ADMIN — ACYCLOVIR 800 MG: 800 TABLET ORAL at 17:40

## 2019-01-01 RX ADMIN — MEROPENEM 1 G: 1 INJECTION, POWDER, FOR SOLUTION INTRAVENOUS at 05:29

## 2019-01-01 RX ADMIN — MAGNESIUM GLUCONATE 500 MG ORAL TABLET 400 MG: 500 TABLET ORAL at 21:10

## 2019-01-01 RX ADMIN — NYSTATIN 5 ML: 100000 SUSPENSION ORAL at 20:36

## 2019-01-01 RX ADMIN — AMIODARONE HYDROCHLORIDE 200 MG: 200 TABLET ORAL at 10:31

## 2019-01-01 RX ADMIN — IPRATROPIUM BROMIDE AND ALBUTEROL SULFATE 1 AMPULE: .5; 3 SOLUTION RESPIRATORY (INHALATION) at 12:06

## 2019-01-01 RX ADMIN — ACYCLOVIR 800 MG: 800 TABLET ORAL at 21:12

## 2019-01-01 RX ADMIN — ACYCLOVIR 800 MG: 800 TABLET ORAL at 14:05

## 2019-01-01 RX ADMIN — SILVER SULFADIAZINE: 10 CREAM TOPICAL at 08:46

## 2019-01-01 RX ADMIN — ASPIRIN 81 MG 81 MG: 81 TABLET ORAL at 10:18

## 2019-01-01 RX ADMIN — HEPARIN SODIUM 14.9 ML/HR: 10000 INJECTION, SOLUTION INTRAVENOUS at 01:56

## 2019-01-01 RX ADMIN — Medication 10 ML: at 09:08

## 2019-01-01 RX ADMIN — LOPERAMIDE HYDROCHLORIDE 2 MG: 2 CAPSULE ORAL at 10:51

## 2019-01-01 RX ADMIN — NYSTATIN 5 ML: 100000 SUSPENSION ORAL at 17:19

## 2019-01-01 RX ADMIN — SODIUM CHLORIDE, PRESERVATIVE FREE 10 ML: 5 INJECTION INTRAVENOUS at 08:52

## 2019-01-01 RX ADMIN — METOPROLOL SUCCINATE 50 MG: 50 TABLET, EXTENDED RELEASE ORAL at 21:48

## 2019-01-01 RX ADMIN — POTASSIUM CHLORIDE 40 MEQ: 20 TABLET, EXTENDED RELEASE ORAL at 11:05

## 2019-01-01 RX ADMIN — SIMVASTATIN 20 MG: 10 TABLET, FILM COATED ORAL at 21:40

## 2019-01-01 RX ADMIN — OXYCODONE HYDROCHLORIDE 5 MG: 5 TABLET ORAL at 12:07

## 2019-01-01 RX ADMIN — LISINOPRIL 5 MG: 5 TABLET ORAL at 20:48

## 2019-01-01 RX ADMIN — POTASSIUM CHLORIDE 20 MEQ: 20 TABLET, EXTENDED RELEASE ORAL at 10:31

## 2019-01-01 RX ADMIN — TICAGRELOR 90 MG: 90 TABLET ORAL at 09:29

## 2019-01-01 RX ADMIN — MUPIROCIN: 20 OINTMENT TOPICAL at 09:37

## 2019-01-01 RX ADMIN — POTASSIUM CHLORIDE 10 MEQ: 7.46 INJECTION, SOLUTION INTRAVENOUS at 18:14

## 2019-01-01 RX ADMIN — DEXTROSE MONOHYDRATE 100 ML/HR: 5 INJECTION INTRAVENOUS at 17:00

## 2019-01-01 RX ADMIN — ACYCLOVIR: 50 OINTMENT TOPICAL at 14:13

## 2019-01-01 RX ADMIN — MUPIROCIN: 20 OINTMENT TOPICAL at 15:37

## 2019-01-01 RX ADMIN — METRONIDAZOLE 500 MG: 250 TABLET ORAL at 13:56

## 2019-01-01 RX ADMIN — MUPIROCIN: 20 OINTMENT TOPICAL at 13:40

## 2019-01-01 RX ADMIN — HEPARIN SODIUM AND DEXTROSE 16.8 ML/HR: 10000; 5 INJECTION INTRAVENOUS at 20:15

## 2019-01-01 RX ADMIN — METHYLPREDNISOLONE SODIUM SUCCINATE 60 MG: 125 INJECTION, POWDER, FOR SOLUTION INTRAMUSCULAR; INTRAVENOUS at 03:57

## 2019-01-01 RX ADMIN — Medication 2 PUFF: at 11:53

## 2019-01-01 RX ADMIN — FENTANYL CITRATE 50 MCG: 50 INJECTION INTRAMUSCULAR; INTRAVENOUS at 09:14

## 2019-01-01 RX ADMIN — Medication 2 PUFF: at 19:03

## 2019-01-01 RX ADMIN — FOLIC ACID 1 MG: 1 TABLET ORAL at 09:57

## 2019-01-01 RX ADMIN — CLOPIDOGREL BISULFATE 75 MG: 75 TABLET ORAL at 10:11

## 2019-01-01 RX ADMIN — Medication 15 ML: at 21:52

## 2019-01-01 RX ADMIN — MORPHINE SULFATE 2 MG: 2 INJECTION, SOLUTION INTRAMUSCULAR; INTRAVENOUS at 08:36

## 2019-01-01 RX ADMIN — POTASSIUM CHLORIDE 10 MEQ: 10 TABLET, EXTENDED RELEASE ORAL at 09:11

## 2019-01-01 RX ADMIN — AMIODARONE HYDROCHLORIDE 100 MG: 200 TABLET ORAL at 10:22

## 2019-01-01 RX ADMIN — SODIUM CHLORIDE: 9 INJECTION, SOLUTION INTRAVENOUS at 16:13

## 2019-01-01 RX ADMIN — MAGNESIUM SULFATE HEPTAHYDRATE 2 G: 40 INJECTION, SOLUTION INTRAVENOUS at 08:28

## 2019-01-01 RX ADMIN — MAGNESIUM GLUCONATE 500 MG ORAL TABLET 400 MG: 500 TABLET ORAL at 21:13

## 2019-01-01 RX ADMIN — SODIUM CHLORIDE: 9 INJECTION, SOLUTION INTRAVENOUS at 05:44

## 2019-01-01 RX ADMIN — FAMOTIDINE 20 MG: 20 TABLET ORAL at 22:28

## 2019-01-01 RX ADMIN — METHYLPREDNISOLONE SODIUM SUCCINATE 60 MG: 125 INJECTION, POWDER, FOR SOLUTION INTRAMUSCULAR; INTRAVENOUS at 23:28

## 2019-01-01 RX ADMIN — MEROPENEM 1 G: 1 INJECTION, POWDER, FOR SOLUTION INTRAVENOUS at 18:36

## 2019-01-01 RX ADMIN — METOPROLOL SUCCINATE 50 MG: 25 TABLET, EXTENDED RELEASE ORAL at 08:31

## 2019-01-01 RX ADMIN — POTASSIUM CHLORIDE 40 MEQ: 20 TABLET, EXTENDED RELEASE ORAL at 09:19

## 2019-01-01 RX ADMIN — ACETAMINOPHEN 650 MG: 325 TABLET ORAL at 20:34

## 2019-01-01 RX ADMIN — PANTOPRAZOLE SODIUM 40 MG: 40 INJECTION, POWDER, FOR SOLUTION INTRAVENOUS at 09:03

## 2019-01-01 RX ADMIN — METHYLPREDNISOLONE SODIUM SUCCINATE 60 MG: 125 INJECTION, POWDER, FOR SOLUTION INTRAMUSCULAR; INTRAVENOUS at 18:21

## 2019-01-01 RX ADMIN — MAGNESIUM SULFATE HEPTAHYDRATE 1 G: 1 INJECTION, SOLUTION INTRAVENOUS at 11:06

## 2019-01-01 RX ADMIN — METOPROLOL SUCCINATE 50 MG: 25 TABLET, EXTENDED RELEASE ORAL at 09:34

## 2019-01-01 RX ADMIN — METHYLPREDNISOLONE SODIUM SUCCINATE 40 MG: 40 INJECTION, POWDER, FOR SOLUTION INTRAMUSCULAR; INTRAVENOUS at 13:40

## 2019-01-01 RX ADMIN — SIMVASTATIN 40 MG: 40 TABLET, FILM COATED ORAL at 20:01

## 2019-01-01 RX ADMIN — SIMVASTATIN 40 MG: 40 TABLET, FILM COATED ORAL at 21:51

## 2019-01-01 RX ADMIN — ACETAMINOPHEN 650 MG: 325 TABLET ORAL at 05:45

## 2019-01-01 RX ADMIN — FOLIC ACID 1 MG: 1 TABLET ORAL at 09:12

## 2019-01-01 RX ADMIN — HEPARIN SODIUM AND DEXTROSE 16.8 ML/HR: 10000; 5 INJECTION INTRAVENOUS at 01:27

## 2019-01-01 RX ADMIN — Medication 10 ML: at 08:49

## 2019-01-01 RX ADMIN — FAMOTIDINE 20 MG: 20 TABLET, FILM COATED ORAL at 08:17

## 2019-01-01 RX ADMIN — ACETAMINOPHEN 650 MG: 325 TABLET ORAL at 21:42

## 2019-01-01 RX ADMIN — FLUCONAZOLE, SODIUM CHLORIDE 200 MG: 2 INJECTION INTRAVENOUS at 11:38

## 2019-01-01 RX ADMIN — HEPARIN SODIUM AND DEXTROSE 16.8 ML/HR: 10000; 5 INJECTION INTRAVENOUS at 15:26

## 2019-01-01 RX ADMIN — AMIODARONE HYDROCHLORIDE 200 MG: 200 TABLET ORAL at 08:09

## 2019-01-01 RX ADMIN — METHYLPREDNISOLONE SODIUM SUCCINATE 40 MG: 40 INJECTION, POWDER, FOR SOLUTION INTRAMUSCULAR; INTRAVENOUS at 00:10

## 2019-01-01 RX ADMIN — ASPIRIN 81 MG 81 MG: 81 TABLET ORAL at 09:29

## 2019-01-01 RX ADMIN — ACYCLOVIR 800 MG: 800 TABLET ORAL at 16:08

## 2019-01-01 RX ADMIN — SIMVASTATIN 40 MG: 40 TABLET, FILM COATED ORAL at 21:18

## 2019-01-01 RX ADMIN — HEPARIN SODIUM 5200 UNITS: 1000 INJECTION INTRAVENOUS; SUBCUTANEOUS at 15:28

## 2019-01-01 RX ADMIN — HYDRALAZINE HYDROCHLORIDE 20 MG: 20 INJECTION INTRAMUSCULAR; INTRAVENOUS at 18:30

## 2019-01-01 RX ADMIN — POTASSIUM CHLORIDE 10 MEQ: 7.46 INJECTION, SOLUTION INTRAVENOUS at 14:13

## 2019-01-01 RX ADMIN — ACYCLOVIR 800 MG: 800 TABLET ORAL at 17:51

## 2019-01-01 RX ADMIN — Medication 325 MG: at 16:26

## 2019-01-01 RX ADMIN — SIMVASTATIN 20 MG: 10 TABLET, FILM COATED ORAL at 21:34

## 2019-01-01 RX ADMIN — FOLIC ACID 1 MG: 1 TABLET ORAL at 11:59

## 2019-01-01 RX ADMIN — POTASSIUM CHLORIDE 10 MEQ: 750 TABLET, EXTENDED RELEASE ORAL at 12:07

## 2019-01-01 RX ADMIN — HEPARIN SODIUM AND DEXTROSE 15 ML/HR: 10000; 5 INJECTION INTRAVENOUS at 01:17

## 2019-01-01 RX ADMIN — Medication 10 ML: at 08:33

## 2019-01-01 RX ADMIN — ACETAMINOPHEN 650 MG: 325 TABLET ORAL at 10:31

## 2019-01-01 RX ADMIN — CALCIUM CARBONATE-CHOLECALCIFEROL TAB 250 MG-125 UNIT 250 MG: 250-125 TAB at 08:34

## 2019-01-01 RX ADMIN — HEPARIN SODIUM 3300 UNITS: 1000 INJECTION INTRAVENOUS; SUBCUTANEOUS at 10:07

## 2019-01-01 RX ADMIN — IPRATROPIUM BROMIDE AND ALBUTEROL SULFATE 1 AMPULE: .5; 3 SOLUTION RESPIRATORY (INHALATION) at 11:54

## 2019-01-01 RX ADMIN — Medication 10 ML: at 21:00

## 2019-01-01 RX ADMIN — IPRATROPIUM BROMIDE AND ALBUTEROL SULFATE 1 AMPULE: .5; 3 SOLUTION RESPIRATORY (INHALATION) at 08:00

## 2019-01-01 RX ADMIN — EPTIFIBATIDE 2 MCG/KG/MIN: 0.75 INJECTION, SOLUTION INTRAVENOUS at 14:28

## 2019-01-01 RX ADMIN — VALSARTAN 320 MG: 80 TABLET, FILM COATED ORAL at 08:43

## 2019-01-01 RX ADMIN — MAGNESIUM SULFATE HEPTAHYDRATE 2 G: 40 INJECTION, SOLUTION INTRAVENOUS at 15:26

## 2019-01-01 RX ADMIN — MEROPENEM 1 G: 1 INJECTION, POWDER, FOR SOLUTION INTRAVENOUS at 20:48

## 2019-01-01 RX ADMIN — EPTIFIBATIDE 2 MCG/KG/MIN: 0.75 INJECTION, SOLUTION INTRAVENOUS at 11:50

## 2019-01-01 RX ADMIN — FAMOTIDINE 20 MG: 20 TABLET, FILM COATED ORAL at 21:40

## 2019-01-01 RX ADMIN — PIPERACILLIN AND TAZOBACTAM 3.38 G: 3; .375 INJECTION, POWDER, FOR SOLUTION INTRAVENOUS at 01:46

## 2019-01-01 RX ADMIN — POTASSIUM CHLORIDE 40 MEQ: 20 TABLET, EXTENDED RELEASE ORAL at 18:11

## 2019-01-01 RX ADMIN — FUROSEMIDE 20 MG: 10 INJECTION, SOLUTION INTRAMUSCULAR; INTRAVENOUS at 18:32

## 2019-01-01 RX ADMIN — ACYCLOVIR 800 MG: 800 TABLET ORAL at 09:11

## 2019-01-01 RX ADMIN — MEROPENEM 1 G: 1 INJECTION, POWDER, FOR SOLUTION INTRAVENOUS at 13:42

## 2019-01-01 RX ADMIN — SODIUM CHLORIDE: 9 INJECTION, SOLUTION INTRAVENOUS at 19:15

## 2019-01-01 RX ADMIN — APIXABAN 5 MG: 5 TABLET, FILM COATED ORAL at 21:10

## 2019-01-01 RX ADMIN — ACYCLOVIR: 50 OINTMENT TOPICAL at 16:20

## 2019-01-01 RX ADMIN — TICAGRELOR 90 MG: 90 TABLET ORAL at 20:46

## 2019-01-01 RX ADMIN — ACYCLOVIR: 50 OINTMENT TOPICAL at 08:46

## 2019-01-01 RX ADMIN — FAMOTIDINE 20 MG: 20 TABLET, FILM COATED ORAL at 20:26

## 2019-01-01 RX ADMIN — HEPARIN SODIUM AND DEXTROSE 15 ML/HR: 10000; 5 INJECTION INTRAVENOUS at 10:59

## 2019-01-01 RX ADMIN — VANCOMYCIN HYDROCHLORIDE 750 MG: 750 INJECTION, POWDER, LYOPHILIZED, FOR SOLUTION INTRAVENOUS at 07:03

## 2019-01-01 RX ADMIN — METHYLPREDNISOLONE SODIUM SUCCINATE 60 MG: 125 INJECTION, POWDER, FOR SOLUTION INTRAMUSCULAR; INTRAVENOUS at 21:44

## 2019-01-01 RX ADMIN — SPIRONOLACTONE 25 MG: 25 TABLET ORAL at 10:32

## 2019-01-01 RX ADMIN — SIMVASTATIN 40 MG: 40 TABLET, FILM COATED ORAL at 21:17

## 2019-01-01 RX ADMIN — PANTOPRAZOLE SODIUM 40 MG: 40 INJECTION, POWDER, FOR SOLUTION INTRAVENOUS at 09:56

## 2019-01-01 RX ADMIN — MEROPENEM 1 G: 1 INJECTION, POWDER, FOR SOLUTION INTRAVENOUS at 21:36

## 2019-01-01 RX ADMIN — MEROPENEM 1 G: 1 INJECTION, POWDER, FOR SOLUTION INTRAVENOUS at 08:33

## 2019-01-01 RX ADMIN — FOLIC ACID 1 MG: 1 TABLET ORAL at 10:31

## 2019-01-01 RX ADMIN — FENTANYL CITRATE 50 MCG: 50 INJECTION INTRAMUSCULAR; INTRAVENOUS at 09:25

## 2019-01-01 RX ADMIN — HYDROMORPHONE HYDROCHLORIDE 0.5 MG: 2 INJECTION, SOLUTION INTRAMUSCULAR; INTRAVENOUS; SUBCUTANEOUS at 11:24

## 2019-01-01 RX ADMIN — IPRATROPIUM BROMIDE AND ALBUTEROL SULFATE 1 AMPULE: .5; 3 SOLUTION RESPIRATORY (INHALATION) at 17:03

## 2019-01-01 RX ADMIN — SODIUM CHLORIDE: 900 INJECTION, SOLUTION INTRAVENOUS at 12:33

## 2019-01-01 RX ADMIN — Medication 10 ML: at 10:14

## 2019-01-01 RX ADMIN — TBO-FILGRASTIM 480 MCG: 480 INJECTION, SOLUTION SUBCUTANEOUS at 18:18

## 2019-01-01 RX ADMIN — ASPIRIN 81 MG: 81 TABLET, COATED ORAL at 10:25

## 2019-01-01 RX ADMIN — FAMOTIDINE 20 MG: 20 TABLET, FILM COATED ORAL at 21:57

## 2019-01-01 RX ADMIN — SODIUM CHLORIDE: 9 INJECTION, SOLUTION INTRAVENOUS at 20:14

## 2019-01-01 RX ADMIN — METOPROLOL SUCCINATE 100 MG: 50 TABLET, EXTENDED RELEASE ORAL at 09:21

## 2019-01-01 RX ADMIN — EPHEDRINE SULFATE 10 MG: 50 INJECTION INTRAVENOUS at 15:59

## 2019-01-01 RX ADMIN — MUPIROCIN: 20 OINTMENT TOPICAL at 19:24

## 2019-01-01 RX ADMIN — LORAZEPAM 0.5 MG: 2 INJECTION INTRAMUSCULAR; INTRAVENOUS at 02:41

## 2019-01-01 RX ADMIN — ATENOLOL 200 MG: 25 TABLET ORAL at 08:54

## 2019-01-01 RX ADMIN — MUPIROCIN: 20 OINTMENT TOPICAL at 20:46

## 2019-01-01 RX ADMIN — FENTANYL CITRATE 100 MCG: 50 INJECTION INTRAMUSCULAR; INTRAVENOUS at 10:00

## 2019-01-01 RX ADMIN — Medication 10 ML: at 09:31

## 2019-01-01 RX ADMIN — AMIODARONE HYDROCHLORIDE 200 MG: 200 TABLET ORAL at 20:27

## 2019-01-01 RX ADMIN — AMIODARONE HYDROCHLORIDE 200 MG: 200 TABLET ORAL at 09:36

## 2019-01-01 RX ADMIN — SODIUM CHLORIDE 250 ML: 900 INJECTION, SOLUTION INTRAVENOUS at 12:00

## 2019-01-01 RX ADMIN — MEROPENEM 1 G: 1 INJECTION, POWDER, FOR SOLUTION INTRAVENOUS at 09:35

## 2019-01-01 RX ADMIN — ASPIRIN 81 MG 81 MG: 81 TABLET ORAL at 09:51

## 2019-01-01 RX ADMIN — POTASSIUM CHLORIDE 20 MEQ: 29.8 INJECTION, SOLUTION INTRAVENOUS at 14:20

## 2019-01-01 RX ADMIN — ATENOLOL 100 MG: 25 TABLET ORAL at 12:39

## 2019-01-01 RX ADMIN — FUROSEMIDE 40 MG: 10 INJECTION, SOLUTION INTRAMUSCULAR; INTRAVENOUS at 16:33

## 2019-01-01 RX ADMIN — HEPARIN SODIUM 16.6 ML/HR: 10000 INJECTION, SOLUTION INTRAVENOUS at 19:51

## 2019-01-01 RX ADMIN — METOPROLOL SUCCINATE 50 MG: 50 TABLET, EXTENDED RELEASE ORAL at 09:55

## 2019-01-01 RX ADMIN — MEROPENEM 1 G: 1 INJECTION, POWDER, FOR SOLUTION INTRAVENOUS at 21:16

## 2019-01-01 RX ADMIN — Medication 10 ML: at 08:57

## 2019-01-01 RX ADMIN — POTASSIUM CHLORIDE 10 MEQ: 750 TABLET, EXTENDED RELEASE ORAL at 14:39

## 2019-01-01 RX ADMIN — EPTIFIBATIDE 2 MCG/KG/MIN: 0.75 INJECTION, SOLUTION INTRAVENOUS at 13:13

## 2019-01-01 RX ADMIN — HEPARIN SODIUM 5000 UNITS: 5000 INJECTION INTRAVENOUS; SUBCUTANEOUS at 14:28

## 2019-01-01 RX ADMIN — FOLIC ACID 1 MG: 1 TABLET ORAL at 09:34

## 2019-01-01 RX ADMIN — OXYCODONE AND ACETAMINOPHEN 1 TABLET: 5; 325 TABLET ORAL at 16:31

## 2019-01-01 RX ADMIN — FOLIC ACID 1 MG: 1 TABLET ORAL at 08:51

## 2019-01-01 RX ADMIN — ACETAMINOPHEN 650 MG: 325 TABLET ORAL at 19:20

## 2019-01-01 RX ADMIN — MAGNESIUM SULFATE HEPTAHYDRATE 2 G: 40 INJECTION, SOLUTION INTRAVENOUS at 14:42

## 2019-01-01 RX ADMIN — MAGNESIUM GLUCONATE 500 MG ORAL TABLET 400 MG: 500 TABLET ORAL at 21:12

## 2019-01-01 RX ADMIN — EPTIFIBATIDE 2 MCG/KG/MIN: 0.75 INJECTION, SOLUTION INTRAVENOUS at 20:56

## 2019-01-01 RX ADMIN — DIPHENHYDRAMINE HCL 25 MG: 25 TABLET ORAL at 12:22

## 2019-01-01 RX ADMIN — METOPROLOL SUCCINATE 50 MG: 50 TABLET, EXTENDED RELEASE ORAL at 08:09

## 2019-01-01 RX ADMIN — MEROPENEM 1 G: 1 INJECTION, POWDER, FOR SOLUTION INTRAVENOUS at 12:20

## 2019-01-01 RX ADMIN — HYDROMORPHONE HYDROCHLORIDE: 10 INJECTION, SOLUTION INTRAMUSCULAR; INTRAVENOUS; SUBCUTANEOUS at 16:32

## 2019-01-01 RX ADMIN — MAGNESIUM SULFATE HEPTAHYDRATE 2 G: 40 INJECTION, SOLUTION INTRAVENOUS at 17:29

## 2019-01-01 RX ADMIN — MAGNESIUM SULFATE HEPTAHYDRATE 4 G: 40 INJECTION, SOLUTION INTRAVENOUS at 09:40

## 2019-01-01 RX ADMIN — SIMVASTATIN 40 MG: 40 TABLET, FILM COATED ORAL at 21:43

## 2019-01-01 RX ADMIN — IPRATROPIUM BROMIDE AND ALBUTEROL SULFATE 1 AMPULE: .5; 3 SOLUTION RESPIRATORY (INHALATION) at 12:56

## 2019-01-01 RX ADMIN — Medication 2 PUFF: at 07:15

## 2019-01-01 RX ADMIN — SILVER SULFADIAZINE: 10 CREAM TOPICAL at 10:58

## 2019-01-01 RX ADMIN — MORPHINE SULFATE 2 MG: 2 INJECTION, SOLUTION INTRAMUSCULAR; INTRAVENOUS at 13:07

## 2019-01-01 RX ADMIN — CALCIUM CARBONATE-CHOLECALCIFEROL TAB 250 MG-125 UNIT 250 MG: 250-125 TAB at 08:32

## 2019-01-01 RX ADMIN — Medication 10 ML: at 09:20

## 2019-01-01 RX ADMIN — MAGNESIUM SULFATE HEPTAHYDRATE 2 G: 40 INJECTION, SOLUTION INTRAVENOUS at 10:30

## 2019-01-01 RX ADMIN — FUROSEMIDE 40 MG: 10 INJECTION, SOLUTION INTRAMUSCULAR; INTRAVENOUS at 17:03

## 2019-01-01 RX ADMIN — SIMVASTATIN 40 MG: 40 TABLET, FILM COATED ORAL at 21:35

## 2019-01-01 RX ADMIN — MORPHINE SULFATE 2 MG: 2 INJECTION, SOLUTION INTRAMUSCULAR; INTRAVENOUS at 05:47

## 2019-01-01 RX ADMIN — METOPROLOL SUCCINATE 50 MG: 50 TABLET, EXTENDED RELEASE ORAL at 20:03

## 2019-01-01 RX ADMIN — EPTIFIBATIDE 2 MCG/KG/MIN: 0.75 INJECTION, SOLUTION INTRAVENOUS at 03:19

## 2019-01-01 RX ADMIN — ACYCLOVIR: 50 OINTMENT TOPICAL at 22:14

## 2019-01-01 RX ADMIN — MEROPENEM 1 G: 1 INJECTION, POWDER, FOR SOLUTION INTRAVENOUS at 05:22

## 2019-01-01 RX ADMIN — HEPARIN SODIUM AND DEXTROSE 16.6 ML/HR: 10000; 5 INJECTION INTRAVENOUS at 08:46

## 2019-01-01 RX ADMIN — IPRATROPIUM BROMIDE AND ALBUTEROL SULFATE 1 AMPULE: .5; 3 SOLUTION RESPIRATORY (INHALATION) at 11:56

## 2019-01-01 RX ADMIN — FAMOTIDINE 20 MG: 20 TABLET ORAL at 20:11

## 2019-01-01 RX ADMIN — FOLIC ACID 1 MG: 1 TABLET ORAL at 09:00

## 2019-01-01 RX ADMIN — PANTOPRAZOLE SODIUM 40 MG: 40 INJECTION, POWDER, FOR SOLUTION INTRAVENOUS at 08:32

## 2019-01-01 RX ADMIN — ASPIRIN 81 MG: 81 TABLET, COATED ORAL at 08:52

## 2019-01-01 RX ADMIN — METOPROLOL SUCCINATE 50 MG: 50 TABLET, EXTENDED RELEASE ORAL at 10:02

## 2019-01-01 RX ADMIN — FENTANYL CITRATE 50 MCG: 50 INJECTION, SOLUTION INTRAMUSCULAR; INTRAVENOUS at 14:01

## 2019-01-01 RX ADMIN — SIMVASTATIN 20 MG: 10 TABLET, FILM COATED ORAL at 20:23

## 2019-01-01 RX ADMIN — FENTANYL CITRATE 50 MCG: 50 INJECTION INTRAMUSCULAR; INTRAVENOUS at 08:40

## 2019-01-01 RX ADMIN — VANCOMYCIN HYDROCHLORIDE 1250 MG: 10 INJECTION, POWDER, LYOPHILIZED, FOR SOLUTION INTRAVENOUS at 22:37

## 2019-01-01 RX ADMIN — FOLIC ACID 1 MG: 1 TABLET ORAL at 09:19

## 2019-01-01 RX ADMIN — AMIODARONE HYDROCHLORIDE 200 MG: 200 TABLET ORAL at 20:52

## 2019-01-01 RX ADMIN — Medication: at 11:41

## 2019-01-01 RX ADMIN — ONDANSETRON 4 MG: 2 INJECTION INTRAMUSCULAR; INTRAVENOUS at 14:10

## 2019-01-01 RX ADMIN — HYDROMORPHONE HYDROCHLORIDE 0.6 MG: 2 INJECTION INTRAMUSCULAR; INTRAVENOUS; SUBCUTANEOUS at 14:24

## 2019-01-01 RX ADMIN — OXYCODONE AND ACETAMINOPHEN 1 TABLET: 5; 325 TABLET ORAL at 04:31

## 2019-01-01 RX ADMIN — SIMVASTATIN 40 MG: 40 TABLET, FILM COATED ORAL at 20:03

## 2019-01-01 RX ADMIN — IOPAMIDOL 75 ML: 755 INJECTION, SOLUTION INTRAVENOUS at 12:01

## 2019-01-01 RX ADMIN — IPRATROPIUM BROMIDE AND ALBUTEROL SULFATE 1 AMPULE: .5; 3 SOLUTION RESPIRATORY (INHALATION) at 11:31

## 2019-01-01 RX ADMIN — EPTIFIBATIDE 2 MCG/KG/MIN: 0.75 INJECTION, SOLUTION INTRAVENOUS at 15:54

## 2019-01-01 RX ADMIN — MEROPENEM 1 G: 1 INJECTION, POWDER, FOR SOLUTION INTRAVENOUS at 23:25

## 2019-01-01 RX ADMIN — MEROPENEM 1 G: 1 INJECTION, POWDER, FOR SOLUTION INTRAVENOUS at 00:02

## 2019-01-01 RX ADMIN — CALCIUM GLUCONATE 2 G: 98 INJECTION, SOLUTION INTRAVENOUS at 18:53

## 2019-01-01 RX ADMIN — PANTOPRAZOLE SODIUM 40 MG: 40 INJECTION, POWDER, FOR SOLUTION INTRAVENOUS at 10:03

## 2019-01-01 RX ADMIN — SODIUM CHLORIDE: 9 INJECTION, SOLUTION INTRAVENOUS at 04:25

## 2019-01-01 RX ADMIN — DEXAMETHASONE SODIUM PHOSPHATE 4 MG: 4 INJECTION, SOLUTION INTRAMUSCULAR; INTRAVENOUS at 06:39

## 2019-01-01 RX ADMIN — ACYCLOVIR: 50 OINTMENT TOPICAL at 03:27

## 2019-01-01 RX ADMIN — ACYCLOVIR 800 MG: 800 TABLET ORAL at 21:50

## 2019-01-01 RX ADMIN — LIDOCAINE HYDROCHLORIDE 5 ML: 10 INJECTION, SOLUTION EPIDURAL; INFILTRATION; INTRACAUDAL; PERINEURAL at 14:41

## 2019-01-01 RX ADMIN — ASPIRIN 81 MG 81 MG: 81 TABLET ORAL at 09:11

## 2019-01-01 RX ADMIN — SODIUM CHLORIDE: 9 INJECTION, SOLUTION INTRAVENOUS at 20:47

## 2019-01-01 RX ADMIN — DEXAMETHASONE SODIUM PHOSPHATE 4 MG: 4 INJECTION, SOLUTION INTRAMUSCULAR; INTRAVENOUS at 23:23

## 2019-01-01 RX ADMIN — IPRATROPIUM BROMIDE AND ALBUTEROL SULFATE 1 AMPULE: .5; 3 SOLUTION RESPIRATORY (INHALATION) at 20:27

## 2019-01-01 RX ADMIN — MEROPENEM 1 G: 1 INJECTION, POWDER, FOR SOLUTION INTRAVENOUS at 21:11

## 2019-01-01 RX ADMIN — MAGNESIUM OXIDE TAB 400 MG (241.3 MG ELEMENTAL MG) 400 MG: 400 (241.3 MG) TAB at 09:57

## 2019-01-01 RX ADMIN — EPTIFIBATIDE 2 MCG/KG/MIN: 0.75 INJECTION, SOLUTION INTRAVENOUS at 15:43

## 2019-01-01 RX ADMIN — SODIUM CHLORIDE: 9 INJECTION, SOLUTION INTRAVENOUS at 21:55

## 2019-01-01 RX ADMIN — Medication 10 ML: at 10:28

## 2019-01-01 RX ADMIN — LISINOPRIL 5 MG: 5 TABLET ORAL at 09:19

## 2019-01-01 RX ADMIN — METHYLPREDNISOLONE SODIUM SUCCINATE 60 MG: 125 INJECTION, POWDER, FOR SOLUTION INTRAMUSCULAR; INTRAVENOUS at 16:44

## 2019-01-01 RX ADMIN — POTASSIUM CHLORIDE 10 MEQ: 750 TABLET, EXTENDED RELEASE ORAL at 21:48

## 2019-01-01 RX ADMIN — FAMOTIDINE 20 MG: 20 TABLET, FILM COATED ORAL at 11:09

## 2019-01-01 RX ADMIN — IPRATROPIUM BROMIDE AND ALBUTEROL SULFATE 1 AMPULE: .5; 3 SOLUTION RESPIRATORY (INHALATION) at 20:17

## 2019-01-01 RX ADMIN — SODIUM CHLORIDE 250 ML: 9 INJECTION, SOLUTION INTRAVENOUS at 20:41

## 2019-01-01 RX ADMIN — LISINOPRIL 5 MG: 5 TABLET ORAL at 21:18

## 2019-01-01 RX ADMIN — IOPAMIDOL 75 ML: 755 INJECTION, SOLUTION INTRAVENOUS at 17:44

## 2019-01-01 RX ADMIN — DEXAMETHASONE SODIUM PHOSPHATE 8 MG: 4 INJECTION, SOLUTION INTRAMUSCULAR; INTRAVENOUS at 14:10

## 2019-01-01 RX ADMIN — METRONIDAZOLE 500 MG: 250 TABLET ORAL at 06:12

## 2019-01-01 RX ADMIN — SILVER SULFADIAZINE: 10 CREAM TOPICAL at 08:35

## 2019-01-01 RX ADMIN — PANTOPRAZOLE SODIUM 40 MG: 40 INJECTION, POWDER, FOR SOLUTION INTRAVENOUS at 11:32

## 2019-01-01 RX ADMIN — Medication 10 ML: at 09:36

## 2019-01-01 RX ADMIN — MAGNESIUM GLUCONATE 500 MG ORAL TABLET 400 MG: 500 TABLET ORAL at 21:17

## 2019-01-01 RX ADMIN — ATENOLOL 25 MG: 25 TABLET ORAL at 10:11

## 2019-01-01 RX ADMIN — ASPIRIN 81 MG: 81 TABLET, COATED ORAL at 08:21

## 2019-01-01 RX ADMIN — Medication 1 APPLICATOR: at 09:12

## 2019-01-01 RX ADMIN — ACYCLOVIR SODIUM 700 MG: 50 INJECTION, SOLUTION INTRAVENOUS at 19:21

## 2019-01-01 RX ADMIN — AMLODIPINE BESYLATE 5 MG: 5 TABLET ORAL at 08:49

## 2019-01-01 RX ADMIN — MEROPENEM 1 G: 1 INJECTION, POWDER, FOR SOLUTION INTRAVENOUS at 05:05

## 2019-01-01 RX ADMIN — APIXABAN 5 MG: 5 TABLET, FILM COATED ORAL at 20:46

## 2019-01-01 RX ADMIN — PHENYLEPHRINE HYDROCHLORIDE 100 MCG: 10 INJECTION INTRAVENOUS at 08:32

## 2019-01-01 RX ADMIN — PANTOPRAZOLE SODIUM 40 MG: 40 INJECTION, POWDER, FOR SOLUTION INTRAVENOUS at 09:55

## 2019-01-01 RX ADMIN — FUROSEMIDE 40 MG: 40 TABLET ORAL at 08:56

## 2019-01-01 RX ADMIN — SILVER SULFADIAZINE: 10 CREAM TOPICAL at 13:40

## 2019-01-01 RX ADMIN — POTASSIUM CHLORIDE 20 MEQ: 29.8 INJECTION, SOLUTION INTRAVENOUS at 13:48

## 2019-01-01 RX ADMIN — ACYCLOVIR 800 MG: 800 TABLET ORAL at 13:40

## 2019-01-01 RX ADMIN — EPHEDRINE SULFATE 10 MG: 50 INJECTION INTRAMUSCULAR; INTRAVENOUS; SUBCUTANEOUS at 13:10

## 2019-01-01 RX ADMIN — MAGNESIUM SULFATE HEPTAHYDRATE 2 G: 40 INJECTION, SOLUTION INTRAVENOUS at 13:11

## 2019-01-01 RX ADMIN — ACETAMINOPHEN 650 MG: 325 TABLET ORAL at 12:23

## 2019-01-01 RX ADMIN — SIMVASTATIN 40 MG: 40 TABLET, FILM COATED ORAL at 21:36

## 2019-01-01 RX ADMIN — SPIRONOLACTONE 25 MG: 25 TABLET ORAL at 08:34

## 2019-01-01 RX ADMIN — IOHEXOL 50 ML: 240 INJECTION, SOLUTION INTRATHECAL; INTRAVASCULAR; INTRAVENOUS; ORAL at 17:44

## 2019-01-01 RX ADMIN — POTASSIUM CHLORIDE 40 MEQ: 20 TABLET, EXTENDED RELEASE ORAL at 10:11

## 2019-01-01 RX ADMIN — ACYCLOVIR 800 MG: 800 TABLET ORAL at 00:25

## 2019-01-01 RX ADMIN — MEROPENEM 1 G: 1 INJECTION, POWDER, FOR SOLUTION INTRAVENOUS at 04:27

## 2019-01-01 RX ADMIN — POTASSIUM CHLORIDE 10 MEQ: 750 TABLET, EXTENDED RELEASE ORAL at 09:20

## 2019-01-01 RX ADMIN — MAGNESIUM SULFATE HEPTAHYDRATE 2 G: 40 INJECTION, SOLUTION INTRAVENOUS at 10:29

## 2019-01-01 RX ADMIN — IPRATROPIUM BROMIDE AND ALBUTEROL SULFATE 1 AMPULE: .5; 3 SOLUTION RESPIRATORY (INHALATION) at 16:34

## 2019-01-01 RX ADMIN — POTASSIUM CHLORIDE 10 MEQ: 750 TABLET, EXTENDED RELEASE ORAL at 20:01

## 2019-01-01 RX ADMIN — ROCURONIUM BROMIDE 15 MG: 10 SOLUTION INTRAVENOUS at 14:23

## 2019-01-01 RX ADMIN — PANTOPRAZOLE SODIUM 40 MG: 40 INJECTION, POWDER, FOR SOLUTION INTRAVENOUS at 13:10

## 2019-01-01 RX ADMIN — CALCIUM CARBONATE-CHOLECALCIFEROL TAB 250 MG-125 UNIT 250 MG: 250-125 TAB at 09:21

## 2019-01-01 RX ADMIN — ONDANSETRON 4 MG: 2 SOLUTION INTRAMUSCULAR; INTRAVENOUS at 08:10

## 2019-01-01 RX ADMIN — EPTIFIBATIDE 2 MCG/KG/MIN: 0.75 INJECTION, SOLUTION INTRAVENOUS at 22:20

## 2019-01-01 RX ADMIN — POTASSIUM CHLORIDE 10 MEQ: 750 TABLET, EXTENDED RELEASE ORAL at 21:24

## 2019-01-01 RX ADMIN — HEPARIN SODIUM AND DEXTROSE 100 ML/HR: 10000; 5 INJECTION INTRAVENOUS at 18:21

## 2019-01-01 RX ADMIN — POTASSIUM CHLORIDE 20 MEQ: 20 TABLET, EXTENDED RELEASE ORAL at 22:18

## 2019-01-01 RX ADMIN — ALBUMIN (HUMAN) 250 ML: 12.5 INJECTION, SOLUTION INTRAVENOUS at 09:26

## 2019-01-01 RX ADMIN — MUPIROCIN: 20 OINTMENT TOPICAL at 23:57

## 2019-01-01 RX ADMIN — POTASSIUM CHLORIDE 20 MEQ: 29.8 INJECTION, SOLUTION INTRAVENOUS at 12:31

## 2019-01-01 RX ADMIN — MAGNESIUM SULFATE HEPTAHYDRATE 2 G: 40 INJECTION, SOLUTION INTRAVENOUS at 13:07

## 2019-01-01 RX ADMIN — MORPHINE SULFATE 2 MG: 2 INJECTION, SOLUTION INTRAMUSCULAR; INTRAVENOUS at 03:50

## 2019-01-01 RX ADMIN — PHENYLEPHRINE HYDROCHLORIDE 100 MCG: 10 INJECTION INTRAVENOUS at 08:19

## 2019-01-01 RX ADMIN — ACETAMINOPHEN 650 MG: 325 TABLET ORAL at 19:21

## 2019-01-01 RX ADMIN — AMIODARONE HYDROCHLORIDE 200 MG: 200 TABLET ORAL at 08:46

## 2019-01-01 RX ADMIN — ROCURONIUM BROMIDE 10 MG: 10 SOLUTION INTRAVENOUS at 09:46

## 2019-01-01 RX ADMIN — FOLIC ACID 1 MG: 1 TABLET ORAL at 08:10

## 2019-01-01 RX ADMIN — NYSTATIN 5 ML: 100000 SUSPENSION ORAL at 08:54

## 2019-01-01 RX ADMIN — ACYCLOVIR: 50 OINTMENT TOPICAL at 15:59

## 2019-01-01 RX ADMIN — SIMVASTATIN 20 MG: 10 TABLET, FILM COATED ORAL at 20:26

## 2019-01-01 RX ADMIN — AMLODIPINE BESYLATE 5 MG: 5 TABLET ORAL at 10:11

## 2019-01-01 RX ADMIN — LORAZEPAM 0.5 MG: 2 INJECTION INTRAMUSCULAR; INTRAVENOUS at 09:41

## 2019-01-01 RX ADMIN — CALCIUM CARBONATE-CHOLECALCIFEROL TAB 250 MG-125 UNIT 250 MG: 250-125 TAB at 10:32

## 2019-01-01 RX ADMIN — AMIODARONE HYDROCHLORIDE 100 MG: 200 TABLET ORAL at 10:11

## 2019-01-01 RX ADMIN — ACETAMINOPHEN 650 MG: 325 TABLET ORAL at 09:43

## 2019-01-01 RX ADMIN — DIPHENHYDRAMINE HYDROCHLORIDE 25 MG: 50 INJECTION, SOLUTION INTRAMUSCULAR; INTRAVENOUS at 22:26

## 2019-01-01 RX ADMIN — FAMOTIDINE 20 MG: 20 TABLET, FILM COATED ORAL at 08:43

## 2019-01-01 RX ADMIN — POTASSIUM CHLORIDE 20 MEQ: 20 TABLET, EXTENDED RELEASE ORAL at 10:28

## 2019-01-01 RX ADMIN — MEROPENEM 1 G: 1 INJECTION, POWDER, FOR SOLUTION INTRAVENOUS at 04:33

## 2019-01-01 RX ADMIN — ENOXAPARIN SODIUM 40 MG: 40 INJECTION SUBCUTANEOUS at 08:09

## 2019-01-01 RX ADMIN — FAMOTIDINE 20 MG: 20 TABLET, FILM COATED ORAL at 10:17

## 2019-01-01 RX ADMIN — FUROSEMIDE 20 MG: 10 INJECTION, SOLUTION INTRAMUSCULAR; INTRAVENOUS at 23:49

## 2019-01-01 RX ADMIN — EPTIFIBATIDE 2 MCG/KG/MIN: 0.75 INJECTION, SOLUTION INTRAVENOUS at 02:42

## 2019-01-01 RX ADMIN — MUPIROCIN: 20 OINTMENT TOPICAL at 09:04

## 2019-01-01 RX ADMIN — MAGNESIUM GLUCONATE 500 MG ORAL TABLET 400 MG: 500 TABLET ORAL at 08:56

## 2019-01-01 RX ADMIN — IPRATROPIUM BROMIDE AND ALBUTEROL SULFATE 1 AMPULE: .5; 3 SOLUTION RESPIRATORY (INHALATION) at 08:42

## 2019-01-01 RX ADMIN — ASPIRIN 81 MG 81 MG: 81 TABLET ORAL at 08:32

## 2019-01-01 RX ADMIN — FAMOTIDINE 20 MG: 20 TABLET ORAL at 08:21

## 2019-01-01 RX ADMIN — SODIUM CHLORIDE, PRESERVATIVE FREE 10 ML: 5 INJECTION INTRAVENOUS at 10:26

## 2019-01-01 RX ADMIN — SPIRONOLACTONE 25 MG: 25 TABLET ORAL at 09:20

## 2019-01-01 RX ADMIN — VANCOMYCIN HYDROCHLORIDE 1000 MG: 1 INJECTION, POWDER, LYOPHILIZED, FOR SOLUTION INTRAVENOUS at 09:10

## 2019-01-01 RX ADMIN — Medication 10 ML: at 20:39

## 2019-01-01 RX ADMIN — POTASSIUM CHLORIDE 20 MEQ: 29.8 INJECTION, SOLUTION INTRAVENOUS at 15:25

## 2019-01-01 RX ADMIN — EPTIFIBATIDE 2 MCG/KG/MIN: 0.75 INJECTION, SOLUTION INTRAVENOUS at 08:10

## 2019-01-01 RX ADMIN — ACYCLOVIR: 50 OINTMENT TOPICAL at 10:56

## 2019-01-01 RX ADMIN — ATENOLOL 200 MG: 25 TABLET ORAL at 08:53

## 2019-01-01 RX ADMIN — FENTANYL CITRATE 50 MCG: 50 INJECTION INTRAMUSCULAR; INTRAVENOUS at 13:00

## 2019-01-01 RX ADMIN — CLOPIDOGREL BISULFATE 75 MG: 75 TABLET ORAL at 21:41

## 2019-01-01 RX ADMIN — MEROPENEM 1 G: 1 INJECTION, POWDER, FOR SOLUTION INTRAVENOUS at 13:50

## 2019-01-01 RX ADMIN — FOLIC ACID 1 MG: 1 TABLET ORAL at 09:04

## 2019-01-01 RX ADMIN — POTASSIUM CHLORIDE 20 MEQ: 29.8 INJECTION, SOLUTION INTRAVENOUS at 11:35

## 2019-01-01 RX ADMIN — METOPROLOL TARTRATE 2.5 MG: 5 INJECTION, SOLUTION INTRAVENOUS at 21:22

## 2019-01-01 RX ADMIN — HEPARIN SODIUM AND DEXTROSE 16.6 ML/HR: 10000; 5 INJECTION INTRAVENOUS at 23:36

## 2019-01-01 RX ADMIN — FUROSEMIDE 20 MG: 10 INJECTION, SOLUTION INTRAMUSCULAR; INTRAVENOUS at 11:59

## 2019-01-01 RX ADMIN — ATENOLOL 200 MG: 25 TABLET ORAL at 08:16

## 2019-01-01 RX ADMIN — LISINOPRIL 5 MG: 5 TABLET ORAL at 21:10

## 2019-01-01 RX ADMIN — INSULIN LISPRO 1 UNITS: 100 INJECTION, SOLUTION INTRAVENOUS; SUBCUTANEOUS at 16:40

## 2019-01-01 RX ADMIN — AMIODARONE HYDROCHLORIDE 200 MG: 200 TABLET ORAL at 09:35

## 2019-01-01 RX ADMIN — ACYCLOVIR SODIUM 700 MG: 50 INJECTION, SOLUTION INTRAVENOUS at 13:50

## 2019-01-01 RX ADMIN — POTASSIUM CHLORIDE 10 MEQ: 750 TABLET, EXTENDED RELEASE ORAL at 15:35

## 2019-01-01 RX ADMIN — EPTIFIBATIDE 2 MCG/KG/MIN: 0.75 INJECTION INTRAVENOUS at 20:33

## 2019-01-01 RX ADMIN — ENOXAPARIN SODIUM 40 MG: 40 INJECTION SUBCUTANEOUS at 15:16

## 2019-01-01 RX ADMIN — METHYLPREDNISOLONE SODIUM SUCCINATE 60 MG: 125 INJECTION, POWDER, FOR SOLUTION INTRAMUSCULAR; INTRAVENOUS at 04:36

## 2019-01-01 RX ADMIN — MEROPENEM 1 G: 1 INJECTION, POWDER, FOR SOLUTION INTRAVENOUS at 04:16

## 2019-01-01 RX ADMIN — POTASSIUM CHLORIDE 20 MEQ: 20 TABLET, EXTENDED RELEASE ORAL at 21:10

## 2019-01-01 RX ADMIN — CALCIUM CARBONATE-CHOLECALCIFEROL TAB 250 MG-125 UNIT 250 MG: 250-125 TAB at 08:33

## 2019-01-01 RX ADMIN — FENTANYL CITRATE 50 MCG: 50 INJECTION INTRAMUSCULAR; INTRAVENOUS at 13:05

## 2019-01-01 RX ADMIN — VALSARTAN 320 MG: 80 TABLET, FILM COATED ORAL at 08:16

## 2019-01-01 RX ADMIN — INSULIN LISPRO 2 UNITS: 100 INJECTION, SOLUTION INTRAVENOUS; SUBCUTANEOUS at 17:36

## 2019-01-01 RX ADMIN — Medication 10 MG: at 15:48

## 2019-01-01 RX ADMIN — MEROPENEM 1 G: 1 INJECTION, POWDER, FOR SOLUTION INTRAVENOUS at 13:39

## 2019-01-01 RX ADMIN — MORPHINE SULFATE 2 MG: 2 INJECTION, SOLUTION INTRAMUSCULAR; INTRAVENOUS at 10:36

## 2019-01-01 RX ADMIN — MEROPENEM 1 G: 1 INJECTION, POWDER, FOR SOLUTION INTRAVENOUS at 04:00

## 2019-01-01 RX ADMIN — FOLIC ACID 1 MG: 1 TABLET ORAL at 08:56

## 2019-01-01 RX ADMIN — FUROSEMIDE 20 MG: 10 INJECTION, SOLUTION INTRAMUSCULAR; INTRAVENOUS at 17:55

## 2019-01-01 RX ADMIN — MEROPENEM 1 G: 1 INJECTION, POWDER, FOR SOLUTION INTRAVENOUS at 12:54

## 2019-01-01 RX ADMIN — HEPARIN SODIUM 18 UNITS/KG/HR: 10000 INJECTION, SOLUTION INTRAVENOUS at 17:29

## 2019-01-01 RX ADMIN — PANTOPRAZOLE SODIUM 40 MG: 40 INJECTION, POWDER, FOR SOLUTION INTRAVENOUS at 09:21

## 2019-01-01 RX ADMIN — PANTOPRAZOLE SODIUM 40 MG: 40 INJECTION, POWDER, FOR SOLUTION INTRAVENOUS at 09:32

## 2019-01-01 RX ADMIN — LISINOPRIL 5 MG: 5 TABLET ORAL at 21:06

## 2019-01-01 RX ADMIN — AMIODARONE HYDROCHLORIDE 100 MG: 200 TABLET ORAL at 08:44

## 2019-01-01 RX ADMIN — SPIRONOLACTONE 25 MG: 25 TABLET ORAL at 08:56

## 2019-01-01 RX ADMIN — METOPROLOL SUCCINATE 50 MG: 50 TABLET, EXTENDED RELEASE ORAL at 09:35

## 2019-01-01 RX ADMIN — METOPROLOL SUCCINATE 100 MG: 50 TABLET, EXTENDED RELEASE ORAL at 10:18

## 2019-01-01 RX ADMIN — PANTOPRAZOLE SODIUM 40 MG: 40 INJECTION, POWDER, FOR SOLUTION INTRAVENOUS at 10:10

## 2019-01-01 RX ADMIN — HEPARIN SODIUM 6610 UNITS: 1000 INJECTION INTRAVENOUS; SUBCUTANEOUS at 18:19

## 2019-01-01 RX ADMIN — PROPOFOL 70 MG: 10 INJECTION, EMULSION INTRAVENOUS at 08:25

## 2019-01-01 RX ADMIN — VALSARTAN 320 MG: 80 TABLET, FILM COATED ORAL at 08:21

## 2019-01-01 RX ADMIN — MEROPENEM 1 G: 1 INJECTION, POWDER, FOR SOLUTION INTRAVENOUS at 08:52

## 2019-01-01 RX ADMIN — METOPROLOL SUCCINATE 50 MG: 50 TABLET, EXTENDED RELEASE ORAL at 09:33

## 2019-01-01 RX ADMIN — IPRATROPIUM BROMIDE AND ALBUTEROL SULFATE 1 AMPULE: .5; 3 SOLUTION RESPIRATORY (INHALATION) at 09:04

## 2019-01-01 RX ADMIN — MEROPENEM 1 G: 1 INJECTION, POWDER, FOR SOLUTION INTRAVENOUS at 16:40

## 2019-01-01 RX ADMIN — TOLVAPTAN 7.5 MG: 15 TABLET ORAL at 17:19

## 2019-01-01 RX ADMIN — ATENOLOL 25 MG: 25 TABLET ORAL at 08:51

## 2019-01-01 RX ADMIN — SIMVASTATIN 40 MG: 40 TABLET, FILM COATED ORAL at 20:52

## 2019-01-01 RX ADMIN — Medication 10 ML: at 21:25

## 2019-01-01 RX ADMIN — SODIUM CHLORIDE: 9 INJECTION, SOLUTION INTRAVENOUS at 04:47

## 2019-01-01 RX ADMIN — POTASSIUM CHLORIDE 20 MEQ: 20 TABLET, EXTENDED RELEASE ORAL at 17:51

## 2019-01-01 RX ADMIN — ACETAMINOPHEN 650 MG: 325 TABLET ORAL at 04:07

## 2019-01-01 RX ADMIN — IPRATROPIUM BROMIDE AND ALBUTEROL SULFATE 1 AMPULE: .5; 3 SOLUTION RESPIRATORY (INHALATION) at 09:00

## 2019-01-01 RX ADMIN — EPTIFIBATIDE 2 MCG/KG/MIN: 0.75 INJECTION, SOLUTION INTRAVENOUS at 14:52

## 2019-01-01 RX ADMIN — MUPIROCIN: 20 OINTMENT TOPICAL at 18:21

## 2019-01-01 RX ADMIN — EPHEDRINE SULFATE 10 MG: 50 INJECTION INTRAMUSCULAR; INTRAVENOUS; SUBCUTANEOUS at 13:50

## 2019-01-01 RX ADMIN — Medication 10 ML: at 10:19

## 2019-01-01 RX ADMIN — MAGNESIUM OXIDE TAB 400 MG (241.3 MG ELEMENTAL MG) 400 MG: 400 (241.3 MG) TAB at 10:18

## 2019-01-01 RX ADMIN — Medication 2 PUFF: at 11:30

## 2019-01-01 RX ADMIN — HEPARIN SODIUM AND DEXTROSE 16.8 ML/HR: 10000; 5 INJECTION INTRAVENOUS at 13:08

## 2019-01-01 RX ADMIN — IPRATROPIUM BROMIDE AND ALBUTEROL SULFATE 1 AMPULE: .5; 3 SOLUTION RESPIRATORY (INHALATION) at 19:50

## 2019-01-01 RX ADMIN — SODIUM CHLORIDE 1000 ML: 9 INJECTION, SOLUTION INTRAVENOUS at 14:40

## 2019-01-01 RX ADMIN — AMIODARONE HYDROCHLORIDE 200 MG: 200 TABLET ORAL at 09:33

## 2019-01-01 RX ADMIN — ACYCLOVIR 800 MG: 800 TABLET ORAL at 06:47

## 2019-01-01 RX ADMIN — PANTOPRAZOLE SODIUM 40 MG: 40 INJECTION, POWDER, FOR SOLUTION INTRAVENOUS at 09:10

## 2019-01-01 RX ADMIN — PANTOPRAZOLE SODIUM 40 MG: 40 INJECTION, POWDER, FOR SOLUTION INTRAVENOUS at 08:56

## 2019-01-01 RX ADMIN — TBO-FILGRASTIM 480 MCG: 480 INJECTION, SOLUTION SUBCUTANEOUS at 18:06

## 2019-01-01 RX ADMIN — ACYCLOVIR 800 MG: 800 TABLET ORAL at 09:35

## 2019-01-01 RX ADMIN — TOLVAPTAN 15 MG: 15 TABLET ORAL at 09:40

## 2019-01-01 RX ADMIN — POTASSIUM CHLORIDE 40 MEQ: 20 TABLET, EXTENDED RELEASE ORAL at 18:55

## 2019-01-01 RX ADMIN — IPRATROPIUM BROMIDE AND ALBUTEROL SULFATE 1 AMPULE: .5; 3 SOLUTION RESPIRATORY (INHALATION) at 08:11

## 2019-01-01 RX ADMIN — MEROPENEM 1 G: 1 INJECTION, POWDER, FOR SOLUTION INTRAVENOUS at 08:29

## 2019-01-01 RX ADMIN — POTASSIUM CHLORIDE 10 MEQ: 10 TABLET, EXTENDED RELEASE ORAL at 12:55

## 2019-01-01 RX ADMIN — Medication 10 ML: at 22:40

## 2019-01-01 RX ADMIN — SIMVASTATIN 20 MG: 10 TABLET, FILM COATED ORAL at 21:01

## 2019-01-01 RX ADMIN — IPRATROPIUM BROMIDE AND ALBUTEROL SULFATE 1 AMPULE: .5; 3 SOLUTION RESPIRATORY (INHALATION) at 00:06

## 2019-01-01 RX ADMIN — VANCOMYCIN HYDROCHLORIDE 1000 MG: 1 INJECTION, POWDER, LYOPHILIZED, FOR SOLUTION INTRAVENOUS at 20:53

## 2019-01-01 RX ADMIN — POTASSIUM CHLORIDE 10 MEQ: 750 TABLET, EXTENDED RELEASE ORAL at 21:06

## 2019-01-01 RX ADMIN — MEROPENEM 1 G: 1 INJECTION, POWDER, FOR SOLUTION INTRAVENOUS at 08:58

## 2019-01-01 RX ADMIN — Medication 2 PUFF: at 20:38

## 2019-01-01 RX ADMIN — SIMVASTATIN 20 MG: 10 TABLET, FILM COATED ORAL at 22:29

## 2019-01-01 RX ADMIN — MEROPENEM 1 G: 1 INJECTION, POWDER, FOR SOLUTION INTRAVENOUS at 14:17

## 2019-01-01 RX ADMIN — AMIODARONE HYDROCHLORIDE 200 MG: 200 TABLET ORAL at 18:20

## 2019-01-01 RX ADMIN — EPTIFIBATIDE 2 MCG/KG/MIN: 0.75 INJECTION, SOLUTION INTRAVENOUS at 00:56

## 2019-01-01 RX ADMIN — IOPAMIDOL 75 ML: 755 INJECTION, SOLUTION INTRAVENOUS at 15:24

## 2019-01-01 RX ADMIN — EPTIFIBATIDE 2 MCG/KG/MIN: 0.75 INJECTION, SOLUTION INTRAVENOUS at 23:56

## 2019-01-01 RX ADMIN — CALCIUM CARBONATE-CHOLECALCIFEROL TAB 250 MG-125 UNIT 250 MG: 250-125 TAB at 16:19

## 2019-01-01 RX ADMIN — ASPIRIN 81 MG 81 MG: 81 TABLET ORAL at 10:31

## 2019-01-01 RX ADMIN — MEROPENEM 1 G: 1 INJECTION, POWDER, FOR SOLUTION INTRAVENOUS at 21:25

## 2019-01-01 RX ADMIN — IPRATROPIUM BROMIDE AND ALBUTEROL SULFATE 1 AMPULE: .5; 3 SOLUTION RESPIRATORY (INHALATION) at 11:44

## 2019-01-01 RX ADMIN — ACYCLOVIR 800 MG: 800 TABLET ORAL at 21:31

## 2019-01-01 RX ADMIN — AMIODARONE HYDROCHLORIDE 100 MG: 200 TABLET ORAL at 08:53

## 2019-01-01 RX ADMIN — MEROPENEM 1 G: 1 INJECTION, POWDER, FOR SOLUTION INTRAVENOUS at 00:25

## 2019-01-01 RX ADMIN — ASPIRIN 81 MG 81 MG: 81 TABLET ORAL at 09:55

## 2019-01-01 RX ADMIN — SIMVASTATIN 40 MG: 40 TABLET, FILM COATED ORAL at 21:48

## 2019-01-01 RX ADMIN — MAGNESIUM SULFATE HEPTAHYDRATE 4 G: 40 INJECTION, SOLUTION INTRAVENOUS at 10:07

## 2019-01-01 RX ADMIN — EPTIFIBATIDE 2 MCG/KG/MIN: 0.75 INJECTION, SOLUTION INTRAVENOUS at 11:24

## 2019-01-01 RX ADMIN — Medication 10 ML: at 21:55

## 2019-01-01 RX ADMIN — ASPIRIN 81 MG 81 MG: 81 TABLET ORAL at 09:35

## 2019-01-01 RX ADMIN — POTASSIUM CHLORIDE 20 MEQ: 20 TABLET, EXTENDED RELEASE ORAL at 14:04

## 2019-01-01 RX ADMIN — AMIODARONE HYDROCHLORIDE 200 MG: 200 TABLET ORAL at 09:21

## 2019-01-01 RX ADMIN — ACYCLOVIR 800 MG: 800 TABLET ORAL at 21:43

## 2019-01-01 RX ADMIN — SILVER SULFADIAZINE: 10 CREAM TOPICAL at 11:42

## 2019-01-01 RX ADMIN — LORAZEPAM 0.5 MG: 2 INJECTION, SOLUTION INTRAMUSCULAR; INTRAVENOUS at 04:09

## 2019-01-01 RX ADMIN — MUPIROCIN: 20 OINTMENT TOPICAL at 08:47

## 2019-01-01 RX ADMIN — METHYLPREDNISOLONE SODIUM SUCCINATE 60 MG: 125 INJECTION, POWDER, FOR SOLUTION INTRAMUSCULAR; INTRAVENOUS at 05:04

## 2019-01-01 RX ADMIN — MAGNESIUM GLUCONATE 500 MG ORAL TABLET 400 MG: 500 TABLET ORAL at 09:04

## 2019-01-01 RX ADMIN — ONDANSETRON 4 MG: 2 SOLUTION INTRAMUSCULAR; INTRAVENOUS at 01:20

## 2019-01-01 RX ADMIN — HEPARIN SODIUM AND DEXTROSE 12 ML/HR: 10000; 5 INJECTION INTRAVENOUS at 08:51

## 2019-01-01 RX ADMIN — POTASSIUM CHLORIDE 10 MEQ: 7.46 INJECTION, SOLUTION INTRAVENOUS at 14:32

## 2019-01-01 RX ADMIN — MAGNESIUM GLUCONATE 500 MG ORAL TABLET 400 MG: 500 TABLET ORAL at 20:01

## 2019-01-01 RX ADMIN — HEPARIN SODIUM AND DEXTROSE 18.3 ML/HR: 10000; 5 INJECTION INTRAVENOUS at 03:16

## 2019-01-01 RX ADMIN — FENTANYL CITRATE 50 MCG: 50 INJECTION INTRAMUSCULAR; INTRAVENOUS at 10:34

## 2019-01-01 RX ADMIN — METHYLPREDNISOLONE SODIUM SUCCINATE 60 MG: 125 INJECTION, POWDER, FOR SOLUTION INTRAMUSCULAR; INTRAVENOUS at 16:08

## 2019-01-01 RX ADMIN — SILVER SULFADIAZINE: 10 CREAM TOPICAL at 09:00

## 2019-01-01 RX ADMIN — NYSTATIN 5 ML: 100000 SUSPENSION ORAL at 14:11

## 2019-01-01 RX ADMIN — Medication 10 ML: at 21:36

## 2019-01-01 RX ADMIN — MAGNESIUM SULFATE HEPTAHYDRATE 2 G: 40 INJECTION, SOLUTION INTRAVENOUS at 09:24

## 2019-01-01 RX ADMIN — SODIUM CHLORIDE: 9 INJECTION, SOLUTION INTRAVENOUS at 17:12

## 2019-01-01 RX ADMIN — METHYLPREDNISOLONE SODIUM SUCCINATE 40 MG: 40 INJECTION, POWDER, FOR SOLUTION INTRAMUSCULAR; INTRAVENOUS at 13:06

## 2019-01-01 RX ADMIN — MEROPENEM 1 G: 1 INJECTION, POWDER, FOR SOLUTION INTRAVENOUS at 20:49

## 2019-01-01 RX ADMIN — Medication 10 ML: at 23:29

## 2019-01-01 RX ADMIN — POTASSIUM CHLORIDE 10 MEQ: 7.46 INJECTION, SOLUTION INTRAVENOUS at 17:17

## 2019-01-01 RX ADMIN — METHYLPREDNISOLONE SODIUM SUCCINATE 60 MG: 125 INJECTION, POWDER, FOR SOLUTION INTRAMUSCULAR; INTRAVENOUS at 10:34

## 2019-01-01 RX ADMIN — Medication 1 APPLICATOR: at 09:06

## 2019-01-01 RX ADMIN — ACYCLOVIR: 50 OINTMENT TOPICAL at 14:38

## 2019-01-01 RX ADMIN — INSULIN LISPRO 2 UNITS: 100 INJECTION, SOLUTION INTRAVENOUS; SUBCUTANEOUS at 11:19

## 2019-01-01 RX ADMIN — IPRATROPIUM BROMIDE AND ALBUTEROL SULFATE 1 AMPULE: .5; 3 SOLUTION RESPIRATORY (INHALATION) at 12:51

## 2019-01-01 RX ADMIN — FENTANYL CITRATE 50 MCG: 50 INJECTION, SOLUTION INTRAMUSCULAR; INTRAVENOUS at 13:42

## 2019-01-01 RX ADMIN — PANTOPRAZOLE SODIUM 40 MG: 40 INJECTION, POWDER, FOR SOLUTION INTRAVENOUS at 09:07

## 2019-01-01 RX ADMIN — POTASSIUM CHLORIDE 10 MEQ: 10 TABLET, EXTENDED RELEASE ORAL at 10:11

## 2019-01-01 RX ADMIN — Medication 10 ML: at 20:04

## 2019-01-01 RX ADMIN — METHYLPREDNISOLONE SODIUM SUCCINATE 40 MG: 40 INJECTION, POWDER, FOR SOLUTION INTRAMUSCULAR; INTRAVENOUS at 01:12

## 2019-01-01 RX ADMIN — HEPARIN SODIUM AND DEXTROSE 18.3 ML/HR: 10000; 5 INJECTION INTRAVENOUS at 04:38

## 2019-01-01 RX ADMIN — AMLODIPINE BESYLATE 5 MG: 5 TABLET ORAL at 08:21

## 2019-01-01 RX ADMIN — POTASSIUM CHLORIDE 10 MEQ: 10 TABLET, EXTENDED RELEASE ORAL at 16:52

## 2019-01-01 RX ADMIN — MEROPENEM 1 G: 1 INJECTION, POWDER, FOR SOLUTION INTRAVENOUS at 09:26

## 2019-01-01 RX ADMIN — ATENOLOL 200 MG: 25 TABLET ORAL at 11:09

## 2019-01-01 RX ADMIN — AMIODARONE HYDROCHLORIDE 100 MG: 200 TABLET ORAL at 09:12

## 2019-01-01 RX ADMIN — FOLIC ACID 1 MG: 1 TABLET ORAL at 09:55

## 2019-01-01 RX ADMIN — HEPARIN SODIUM AND DEXTROSE 16.6 ML/HR: 10000; 5 INJECTION INTRAVENOUS at 05:22

## 2019-01-01 RX ADMIN — Medication 10 ML: at 10:29

## 2019-01-01 RX ADMIN — Medication 1 APPLICATOR: at 10:12

## 2019-01-01 RX ADMIN — CALCIUM CARBONATE-CHOLECALCIFEROL TAB 250 MG-125 UNIT 250 MG: 250-125 TAB at 10:02

## 2019-01-01 RX ADMIN — NYSTATIN 5 ML: 100000 SUSPENSION ORAL at 20:40

## 2019-01-01 RX ADMIN — POTASSIUM CHLORIDE 20 MEQ: 20 TABLET, EXTENDED RELEASE ORAL at 20:48

## 2019-01-01 RX ADMIN — EPHEDRINE SULFATE 10 MG: 50 INJECTION INTRAVENOUS at 08:15

## 2019-01-01 RX ADMIN — MUPIROCIN: 20 OINTMENT TOPICAL at 09:35

## 2019-01-01 RX ADMIN — ACYCLOVIR 800 MG: 800 TABLET ORAL at 06:37

## 2019-01-01 RX ADMIN — HEPARIN SODIUM AND DEXTROSE 18 UNITS/KG/HR: 10000; 5 INJECTION INTRAVENOUS at 14:39

## 2019-01-01 RX ADMIN — HEPARIN SODIUM AND DEXTROSE 16.2 ML/HR: 10000; 5 INJECTION INTRAVENOUS at 03:13

## 2019-01-01 RX ADMIN — METHYLPREDNISOLONE SODIUM SUCCINATE 60 MG: 125 INJECTION, POWDER, FOR SOLUTION INTRAMUSCULAR; INTRAVENOUS at 21:48

## 2019-01-01 RX ADMIN — FAMOTIDINE 20 MG: 20 TABLET ORAL at 08:49

## 2019-01-01 RX ADMIN — METRONIDAZOLE 500 MG: 500 INJECTION, SOLUTION INTRAVENOUS at 06:37

## 2019-01-01 RX ADMIN — AMIODARONE HYDROCHLORIDE 100 MG: 200 TABLET ORAL at 08:21

## 2019-01-01 RX ADMIN — POTASSIUM CHLORIDE 20 MEQ: 29.8 INJECTION, SOLUTION INTRAVENOUS at 13:26

## 2019-01-01 RX ADMIN — SIMVASTATIN 40 MG: 40 TABLET, FILM COATED ORAL at 22:18

## 2019-01-01 RX ADMIN — ACYCLOVIR 800 MG: 800 TABLET ORAL at 07:10

## 2019-01-01 RX ADMIN — OXYCODONE AND ACETAMINOPHEN 1 TABLET: 5; 325 TABLET ORAL at 12:58

## 2019-01-01 RX ADMIN — OXYCODONE AND ACETAMINOPHEN 1 TABLET: 5; 325 TABLET ORAL at 21:40

## 2019-01-01 RX ADMIN — MAGNESIUM GLUCONATE 500 MG ORAL TABLET 400 MG: 500 TABLET ORAL at 10:11

## 2019-01-01 RX ADMIN — AMIODARONE HYDROCHLORIDE 100 MG: 200 TABLET ORAL at 09:41

## 2019-01-01 RX ADMIN — Medication 10 ML: at 21:11

## 2019-01-01 RX ADMIN — IPRATROPIUM BROMIDE AND ALBUTEROL SULFATE 1 AMPULE: .5; 3 SOLUTION RESPIRATORY (INHALATION) at 16:39

## 2019-01-01 RX ADMIN — METOPROLOL SUCCINATE 50 MG: 25 TABLET, EXTENDED RELEASE ORAL at 09:18

## 2019-01-01 RX ADMIN — MAGNESIUM GLUCONATE 500 MG ORAL TABLET 400 MG: 500 TABLET ORAL at 20:08

## 2019-01-01 RX ADMIN — SIMVASTATIN 20 MG: 10 TABLET, FILM COATED ORAL at 20:58

## 2019-01-01 RX ADMIN — POTASSIUM CHLORIDE 20 MEQ: 20 TABLET, EXTENDED RELEASE ORAL at 21:17

## 2019-01-01 RX ADMIN — SODIUM CHLORIDE: 9 INJECTION, SOLUTION INTRAVENOUS at 19:22

## 2019-01-01 RX ADMIN — SIMVASTATIN 20 MG: 10 TABLET, FILM COATED ORAL at 20:32

## 2019-01-01 RX ADMIN — HEPARIN SODIUM AND DEXTROSE 16.6 ML/HR: 10000; 5 INJECTION INTRAVENOUS at 14:34

## 2019-01-01 RX ADMIN — Medication 2 PUFF: at 08:07

## 2019-01-01 RX ADMIN — FAMOTIDINE 20 MG: 20 TABLET, FILM COATED ORAL at 20:58

## 2019-01-01 RX ADMIN — SODIUM CHLORIDE: 9 INJECTION, SOLUTION INTRAVENOUS at 18:50

## 2019-01-01 RX ADMIN — MEROPENEM 1 G: 1 INJECTION, POWDER, FOR SOLUTION INTRAVENOUS at 15:29

## 2019-01-01 RX ADMIN — FAMOTIDINE 20 MG: 20 TABLET ORAL at 20:56

## 2019-01-01 RX ADMIN — POTASSIUM CHLORIDE 10 MEQ: 750 TABLET, EXTENDED RELEASE ORAL at 09:03

## 2019-01-01 RX ADMIN — HEPARIN SODIUM AND DEXTROSE 18.3 ML/HR: 10000; 5 INJECTION INTRAVENOUS at 13:46

## 2019-01-01 RX ADMIN — MEROPENEM 1 G: 1 INJECTION, POWDER, FOR SOLUTION INTRAVENOUS at 16:41

## 2019-01-01 RX ADMIN — EPTIFIBATIDE 2 MCG/KG/MIN: 0.75 INJECTION, SOLUTION INTRAVENOUS at 17:16

## 2019-01-01 RX ADMIN — INSULIN LISPRO 1 UNITS: 100 INJECTION, SOLUTION INTRAVENOUS; SUBCUTANEOUS at 17:18

## 2019-01-01 RX ADMIN — ACYCLOVIR 800 MG: 800 TABLET ORAL at 09:20

## 2019-01-01 RX ADMIN — OXYCODONE HYDROCHLORIDE 5 MG: 5 TABLET ORAL at 18:23

## 2019-01-01 RX ADMIN — ASPIRIN 81 MG 81 MG: 81 TABLET ORAL at 08:11

## 2019-01-01 RX ADMIN — AMLODIPINE BESYLATE 5 MG: 5 TABLET ORAL at 08:54

## 2019-01-01 RX ADMIN — SPIRONOLACTONE 25 MG: 25 TABLET ORAL at 15:58

## 2019-01-01 RX ADMIN — ACYCLOVIR: 50 OINTMENT TOPICAL at 18:20

## 2019-01-01 RX ADMIN — ACYCLOVIR: 50 OINTMENT TOPICAL at 07:27

## 2019-01-01 RX ADMIN — Medication 10 ML: at 09:19

## 2019-01-01 RX ADMIN — ACYCLOVIR 800 MG: 800 TABLET ORAL at 21:36

## 2019-01-01 RX ADMIN — IPRATROPIUM BROMIDE AND ALBUTEROL SULFATE 1 AMPULE: .5; 3 SOLUTION RESPIRATORY (INHALATION) at 00:49

## 2019-01-01 RX ADMIN — Medication 10 ML: at 09:40

## 2019-01-01 RX ADMIN — Medication 10 ML: at 21:48

## 2019-01-01 RX ADMIN — Medication 10 ML: at 09:04

## 2019-01-01 RX ADMIN — LIDOCAINE HYDROCHLORIDE 60 MG: 20 INJECTION, SOLUTION EPIDURAL; INFILTRATION; INTRACAUDAL; PERINEURAL at 13:00

## 2019-01-01 RX ADMIN — EPHEDRINE SULFATE 5 MG: 50 INJECTION INTRAVENOUS at 08:24

## 2019-01-01 RX ADMIN — MUPIROCIN: 20 OINTMENT TOPICAL at 17:36

## 2019-01-01 RX ADMIN — Medication 1 APPLICATOR: at 09:34

## 2019-01-01 RX ADMIN — PROPOFOL 100 MG: 10 INJECTION, EMULSION INTRAVENOUS at 13:00

## 2019-01-01 RX ADMIN — METHYLPREDNISOLONE SODIUM SUCCINATE 40 MG: 40 INJECTION, POWDER, FOR SOLUTION INTRAMUSCULAR; INTRAVENOUS at 00:00

## 2019-01-01 RX ADMIN — MUPIROCIN: 20 OINTMENT TOPICAL at 09:00

## 2019-01-01 RX ADMIN — HYDRALAZINE HYDROCHLORIDE 10 MG: 20 INJECTION INTRAMUSCULAR; INTRAVENOUS at 05:33

## 2019-01-01 RX ADMIN — METHYLPREDNISOLONE SODIUM SUCCINATE 60 MG: 125 INJECTION, POWDER, FOR SOLUTION INTRAMUSCULAR; INTRAVENOUS at 17:55

## 2019-01-01 RX ADMIN — MEROPENEM 1 G: 1 INJECTION, POWDER, FOR SOLUTION INTRAVENOUS at 05:02

## 2019-01-01 RX ADMIN — MAGNESIUM GLUCONATE 500 MG ORAL TABLET 400 MG: 500 TABLET ORAL at 20:03

## 2019-01-01 RX ADMIN — MEROPENEM 1 G: 1 INJECTION, POWDER, FOR SOLUTION INTRAVENOUS at 20:11

## 2019-01-01 RX ADMIN — ACYCLOVIR 800 MG: 800 TABLET ORAL at 09:28

## 2019-01-01 RX ADMIN — POTASSIUM CHLORIDE 10 MEQ: 7.46 INJECTION, SOLUTION INTRAVENOUS at 10:16

## 2019-01-01 RX ADMIN — INSULIN LISPRO 2 UNITS: 100 INJECTION, SOLUTION INTRAVENOUS; SUBCUTANEOUS at 12:43

## 2019-01-01 RX ADMIN — TBO-FILGRASTIM 300 MCG: 300 INJECTION, SOLUTION SUBCUTANEOUS at 18:18

## 2019-01-01 RX ADMIN — POTASSIUM CHLORIDE 10 MEQ: 10 TABLET, EXTENDED RELEASE ORAL at 12:51

## 2019-01-01 RX ADMIN — ONDANSETRON 4 MG: 2 INJECTION INTRAMUSCULAR; INTRAVENOUS at 18:56

## 2019-01-01 RX ADMIN — Medication 10 ML: at 10:13

## 2019-01-01 RX ADMIN — AMIODARONE HYDROCHLORIDE 200 MG: 200 TABLET ORAL at 08:34

## 2019-01-01 RX ADMIN — AMIODARONE HYDROCHLORIDE 100 MG: 200 TABLET ORAL at 11:09

## 2019-01-01 RX ADMIN — HYDRALAZINE HYDROCHLORIDE 10 MG: 20 INJECTION INTRAMUSCULAR; INTRAVENOUS at 21:57

## 2019-01-01 RX ADMIN — CALCIUM CARBONATE-CHOLECALCIFEROL TAB 250 MG-125 UNIT 250 MG: 250-125 TAB at 10:29

## 2019-01-01 RX ADMIN — FOLIC ACID 1 MG: 1 TABLET ORAL at 08:34

## 2019-01-01 RX ADMIN — ACETAMINOPHEN 650 MG: 325 TABLET ORAL at 23:26

## 2019-01-01 RX ADMIN — AMIODARONE HYDROCHLORIDE 200 MG: 200 TABLET ORAL at 10:11

## 2019-01-01 RX ADMIN — VANCOMYCIN HYDROCHLORIDE 750 MG: 750 INJECTION, POWDER, LYOPHILIZED, FOR SOLUTION INTRAVENOUS at 00:40

## 2019-01-01 RX ADMIN — SODIUM CHLORIDE: 4.5 INJECTION, SOLUTION INTRAVENOUS at 14:13

## 2019-01-01 RX ADMIN — FLUCONAZOLE, SODIUM CHLORIDE 200 MG: 2 INJECTION INTRAVENOUS at 15:28

## 2019-01-01 RX ADMIN — LIDOCAINE HYDROCHLORIDE 70 MG: 20 INJECTION, SOLUTION EPIDURAL; INFILTRATION; INTRACAUDAL; PERINEURAL at 14:01

## 2019-01-01 RX ADMIN — EPHEDRINE SULFATE 10 MG: 50 INJECTION INTRAMUSCULAR; INTRAVENOUS; SUBCUTANEOUS at 14:18

## 2019-01-01 RX ADMIN — ACYCLOVIR: 50 OINTMENT TOPICAL at 04:45

## 2019-01-01 RX ADMIN — AMIODARONE HYDROCHLORIDE 200 MG: 200 TABLET ORAL at 09:04

## 2019-01-01 RX ADMIN — AMIODARONE HYDROCHLORIDE 200 MG: 200 TABLET ORAL at 08:52

## 2019-01-01 RX ADMIN — Medication 2 PUFF: at 08:42

## 2019-01-01 RX ADMIN — PANTOPRAZOLE SODIUM 40 MG: 40 INJECTION, POWDER, FOR SOLUTION INTRAVENOUS at 10:29

## 2019-01-01 RX ADMIN — POTASSIUM CHLORIDE 10 MEQ: 750 TABLET, EXTENDED RELEASE ORAL at 14:11

## 2019-01-01 RX ADMIN — FAMOTIDINE 20 MG: 20 TABLET, FILM COATED ORAL at 08:54

## 2019-01-01 RX ADMIN — SUGAMMADEX 100 MG: 100 INJECTION, SOLUTION INTRAVENOUS at 14:22

## 2019-01-01 RX ADMIN — MEROPENEM 1 G: 1 INJECTION, POWDER, FOR SOLUTION INTRAVENOUS at 18:18

## 2019-01-01 RX ADMIN — MEROPENEM 1 G: 1 INJECTION, POWDER, FOR SOLUTION INTRAVENOUS at 16:29

## 2019-01-01 RX ADMIN — IPRATROPIUM BROMIDE AND ALBUTEROL SULFATE 1 AMPULE: .5; 3 SOLUTION RESPIRATORY (INHALATION) at 16:29

## 2019-01-01 RX ADMIN — SILVER SULFADIAZINE: 10 CREAM TOPICAL at 10:38

## 2019-01-01 RX ADMIN — Medication 15 ML: at 09:30

## 2019-01-01 RX ADMIN — MEROPENEM 1 G: 1 INJECTION, POWDER, FOR SOLUTION INTRAVENOUS at 04:01

## 2019-01-01 RX ADMIN — MEROPENEM 1 G: 1 INJECTION, POWDER, FOR SOLUTION INTRAVENOUS at 12:55

## 2019-01-01 RX ADMIN — METOPROLOL SUCCINATE 100 MG: 50 TABLET, EXTENDED RELEASE ORAL at 09:57

## 2019-01-01 RX ADMIN — INSULIN LISPRO 1 UNITS: 100 INJECTION, SOLUTION INTRAVENOUS; SUBCUTANEOUS at 18:24

## 2019-01-01 RX ADMIN — PROPOFOL 50 MCG/KG/MIN: 10 INJECTION, EMULSION INTRAVENOUS at 17:10

## 2019-01-01 RX ADMIN — MAGNESIUM SULFATE HEPTAHYDRATE 2 G: 40 INJECTION, SOLUTION INTRAVENOUS at 12:04

## 2019-01-01 RX ADMIN — HYDROMORPHONE HYDROCHLORIDE 1 MG: 2 INJECTION INTRAMUSCULAR; INTRAVENOUS; SUBCUTANEOUS at 23:49

## 2019-01-01 RX ADMIN — ACYCLOVIR: 50 OINTMENT TOPICAL at 13:45

## 2019-01-01 RX ADMIN — ACYCLOVIR 800 MG: 800 TABLET ORAL at 22:56

## 2019-01-01 RX ADMIN — POTASSIUM CHLORIDE 10 MEQ: 10 TABLET, EXTENDED RELEASE ORAL at 13:39

## 2019-01-01 RX ADMIN — SIMVASTATIN 20 MG: 10 TABLET, FILM COATED ORAL at 21:57

## 2019-01-01 RX ADMIN — FUROSEMIDE 20 MG: 10 INJECTION, SOLUTION INTRAMUSCULAR; INTRAVENOUS at 09:07

## 2019-01-01 RX ADMIN — MEROPENEM 1 G: 1 INJECTION, POWDER, FOR SOLUTION INTRAVENOUS at 08:41

## 2019-01-01 RX ADMIN — ENOXAPARIN SODIUM 40 MG: 40 INJECTION SUBCUTANEOUS at 09:54

## 2019-01-01 RX ADMIN — Medication 10 ML: at 08:35

## 2019-01-01 RX ADMIN — CALCIUM CARBONATE-CHOLECALCIFEROL TAB 250 MG-125 UNIT 250 MG: 250-125 TAB at 11:59

## 2019-01-01 RX ADMIN — PANTOPRAZOLE SODIUM 40 MG: 40 INJECTION, POWDER, FOR SOLUTION INTRAVENOUS at 09:19

## 2019-01-01 RX ADMIN — SUGAMMADEX 200 MG: 100 INJECTION, SOLUTION INTRAVENOUS at 10:49

## 2019-01-01 RX ADMIN — AMIODARONE HYDROCHLORIDE 200 MG: 200 TABLET ORAL at 09:20

## 2019-01-01 RX ADMIN — ACYCLOVIR SODIUM 700 MG: 50 INJECTION, SOLUTION INTRAVENOUS at 02:29

## 2019-01-01 RX ADMIN — FUROSEMIDE 40 MG: 40 TABLET ORAL at 18:18

## 2019-01-01 RX ADMIN — APIXABAN 5 MG: 5 TABLET, FILM COATED ORAL at 11:48

## 2019-01-01 RX ADMIN — FUROSEMIDE 40 MG: 40 TABLET ORAL at 17:09

## 2019-01-01 RX ADMIN — PANTOPRAZOLE SODIUM 40 MG: 40 INJECTION, POWDER, FOR SOLUTION INTRAVENOUS at 12:39

## 2019-01-01 RX ADMIN — ASPIRIN 81 MG 81 MG: 81 TABLET ORAL at 09:05

## 2019-01-01 RX ADMIN — FAMOTIDINE 20 MG: 20 TABLET, FILM COATED ORAL at 21:34

## 2019-01-01 RX ADMIN — MAGNESIUM SULFATE HEPTAHYDRATE 2 G: 40 INJECTION, SOLUTION INTRAVENOUS at 14:04

## 2019-01-01 RX ADMIN — AMIODARONE HYDROCHLORIDE 200 MG: 200 TABLET ORAL at 08:56

## 2019-01-01 RX ADMIN — Medication 10 ML: at 08:45

## 2019-01-01 RX ADMIN — ACYCLOVIR 800 MG: 800 TABLET ORAL at 10:11

## 2019-01-01 RX ADMIN — SIMVASTATIN 40 MG: 40 TABLET, FILM COATED ORAL at 20:38

## 2019-01-01 RX ADMIN — Medication 10 ML: at 09:37

## 2019-01-01 RX ADMIN — METOPROLOL SUCCINATE 100 MG: 50 TABLET, EXTENDED RELEASE ORAL at 08:34

## 2019-01-01 RX ADMIN — SODIUM CHLORIDE, PRESERVATIVE FREE 10 ML: 5 INJECTION INTRAVENOUS at 08:27

## 2019-01-01 RX ADMIN — CALCIUM CARBONATE-CHOLECALCIFEROL TAB 250 MG-125 UNIT 250 MG: 250-125 TAB at 10:52

## 2019-01-01 RX ADMIN — ROCURONIUM BROMIDE 50 MG: 10 SOLUTION INTRAVENOUS at 13:00

## 2019-01-01 RX ADMIN — TICAGRELOR 90 MG: 90 TABLET ORAL at 09:19

## 2019-01-01 RX ADMIN — ACYCLOVIR SODIUM 700 MG: 50 INJECTION, SOLUTION INTRAVENOUS at 00:47

## 2019-01-01 RX ADMIN — SODIUM CHLORIDE: 9 INJECTION, SOLUTION INTRAVENOUS at 10:20

## 2019-01-01 RX ADMIN — AMIODARONE HYDROCHLORIDE 100 MG: 200 TABLET ORAL at 10:51

## 2019-01-01 RX ADMIN — HEPARIN SODIUM AND DEXTROSE 14.4 ML/HR: 10000; 5 INJECTION INTRAVENOUS at 19:18

## 2019-01-01 RX ADMIN — FOLIC ACID 1 MG: 1 TABLET ORAL at 08:52

## 2019-01-01 RX ADMIN — SODIUM CHLORIDE 500 ML: 9 INJECTION, SOLUTION INTRAVENOUS at 12:45

## 2019-01-01 RX ADMIN — Medication 10 ML: at 21:52

## 2019-01-01 RX ADMIN — PROPOFOL 10 MCG/KG/MIN: 10 INJECTION, EMULSION INTRAVENOUS at 12:54

## 2019-01-01 RX ADMIN — INSULIN LISPRO 1 UNITS: 100 INJECTION, SOLUTION INTRAVENOUS; SUBCUTANEOUS at 21:15

## 2019-01-01 RX ADMIN — HEPARIN SODIUM AND DEXTROSE 18.3 ML/HR: 10000; 5 INJECTION INTRAVENOUS at 14:42

## 2019-01-01 RX ADMIN — TICAGRELOR 90 MG: 90 TABLET ORAL at 09:34

## 2019-01-01 RX ADMIN — VITAMIN D, TAB 1000IU (100/BT) 1000 UNITS: 25 TAB at 11:08

## 2019-01-01 RX ADMIN — METHYLPREDNISOLONE SODIUM SUCCINATE 60 MG: 125 INJECTION, POWDER, FOR SOLUTION INTRAMUSCULAR; INTRAVENOUS at 14:55

## 2019-01-01 RX ADMIN — Medication 10 ML: at 21:44

## 2019-01-01 RX ADMIN — SODIUM CHLORIDE: 9 INJECTION, SOLUTION INTRAVENOUS at 17:19

## 2019-01-01 RX ADMIN — SIMVASTATIN 20 MG: 10 TABLET, FILM COATED ORAL at 20:56

## 2019-01-01 RX ADMIN — PROPOFOL 50 MCG/KG/MIN: 10 INJECTION, EMULSION INTRAVENOUS at 05:56

## 2019-01-01 RX ADMIN — AMIODARONE HYDROCHLORIDE 100 MG: 200 TABLET ORAL at 09:29

## 2019-01-01 RX ADMIN — POTASSIUM CHLORIDE 40 MEQ: 20 TABLET, EXTENDED RELEASE ORAL at 09:57

## 2019-01-01 RX ADMIN — POTASSIUM CHLORIDE 20 MEQ: 20 TABLET, EXTENDED RELEASE ORAL at 20:38

## 2019-01-01 RX ADMIN — CALCIUM CARBONATE-CHOLECALCIFEROL TAB 250 MG-125 UNIT 250 MG: 250-125 TAB at 08:09

## 2019-01-01 RX ADMIN — IPRATROPIUM BROMIDE AND ALBUTEROL SULFATE 1 AMPULE: .5; 3 SOLUTION RESPIRATORY (INHALATION) at 19:21

## 2019-01-01 RX ADMIN — SODIUM CHLORIDE: 9 INJECTION, SOLUTION INTRAVENOUS at 11:37

## 2019-01-01 RX ADMIN — EPHEDRINE SULFATE 10 MG: 50 INJECTION INTRAMUSCULAR; INTRAVENOUS; SUBCUTANEOUS at 14:32

## 2019-01-01 RX ADMIN — IPRATROPIUM BROMIDE AND ALBUTEROL SULFATE 1 AMPULE: .5; 3 SOLUTION RESPIRATORY (INHALATION) at 20:51

## 2019-01-01 RX ADMIN — EPTIFIBATIDE 2 MCG/KG/MIN: 0.75 INJECTION, SOLUTION INTRAVENOUS at 20:32

## 2019-01-01 RX ADMIN — Medication 10 ML: at 09:12

## 2019-01-01 RX ADMIN — METHYLPREDNISOLONE SODIUM SUCCINATE 60 MG: 125 INJECTION, POWDER, FOR SOLUTION INTRAMUSCULAR; INTRAVENOUS at 13:10

## 2019-01-01 RX ADMIN — OXYCODONE HYDROCHLORIDE 10 MG: 5 TABLET ORAL at 14:31

## 2019-01-01 RX ADMIN — ASPIRIN 81 MG: 81 TABLET, COATED ORAL at 09:28

## 2019-01-01 RX ADMIN — Medication 10 ML: at 11:14

## 2019-01-01 RX ADMIN — ASPIRIN 81 MG 81 MG: 81 TABLET ORAL at 08:45

## 2019-01-01 RX ADMIN — PHENYLEPHRINE HYDROCHLORIDE 100 MCG: 10 INJECTION INTRAVENOUS at 08:27

## 2019-01-01 RX ADMIN — PANTOPRAZOLE SODIUM 40 MG: 40 INJECTION, POWDER, FOR SOLUTION INTRAVENOUS at 10:31

## 2019-01-01 RX ADMIN — ACYCLOVIR 800 MG: 800 TABLET ORAL at 09:43

## 2019-01-01 RX ADMIN — MEROPENEM 1 G: 1 INJECTION, POWDER, FOR SOLUTION INTRAVENOUS at 13:57

## 2019-01-01 RX ADMIN — METOPROLOL SUCCINATE 50 MG: 50 TABLET, EXTENDED RELEASE ORAL at 21:12

## 2019-01-01 RX ADMIN — MEROPENEM 1 G: 1 INJECTION, POWDER, FOR SOLUTION INTRAVENOUS at 11:59

## 2019-01-01 RX ADMIN — POTASSIUM CHLORIDE 10 MEQ: 10 TABLET, EXTENDED RELEASE ORAL at 17:27

## 2019-01-01 RX ADMIN — CALCIUM CARBONATE-CHOLECALCIFEROL TAB 250 MG-125 UNIT 250 MG: 250-125 TAB at 10:18

## 2019-01-01 RX ADMIN — HEPARIN SODIUM 2400 UNITS: 1000 INJECTION INTRAVENOUS; SUBCUTANEOUS at 15:17

## 2019-01-01 RX ADMIN — POTASSIUM CHLORIDE 10 MEQ: 10 TABLET, EXTENDED RELEASE ORAL at 20:01

## 2019-01-01 RX ADMIN — Medication 10 ML: at 08:11

## 2019-01-01 RX ADMIN — ACYCLOVIR 800 MG: 800 TABLET ORAL at 08:46

## 2019-01-01 RX ADMIN — ACYCLOVIR: 50 OINTMENT TOPICAL at 10:25

## 2019-01-01 RX ADMIN — ACETAMINOPHEN 650 MG: 325 TABLET ORAL at 10:24

## 2019-01-01 RX ADMIN — SODIUM CHLORIDE: 9 INJECTION, SOLUTION INTRAVENOUS at 23:23

## 2019-01-01 RX ADMIN — Medication 10 ML: at 09:39

## 2019-01-01 RX ADMIN — MAGNESIUM SULFATE HEPTAHYDRATE 4 G: 40 INJECTION, SOLUTION INTRAVENOUS at 10:36

## 2019-01-01 RX ADMIN — MAGNESIUM SULFATE HEPTAHYDRATE 2 G: 40 INJECTION, SOLUTION INTRAVENOUS at 11:35

## 2019-01-01 ASSESSMENT — PULMONARY FUNCTION TESTS
PIF_VALUE: 15
PIF_VALUE: 38
PIF_VALUE: 2
PIF_VALUE: 14
PIF_VALUE: 38
PIF_VALUE: 16
PIF_VALUE: 19
PIF_VALUE: 19
PIF_VALUE: 15
PIF_VALUE: 15
PIF_VALUE: 9
PIF_VALUE: 2
PIF_VALUE: 16
PIF_VALUE: 17
PIF_VALUE: 19
PIF_VALUE: 16
PIF_VALUE: 25
PIF_VALUE: 18
PIF_VALUE: 15
PIF_VALUE: 27
PIF_VALUE: 0
PIF_VALUE: 19
PIF_VALUE: 17
PIF_VALUE: 18
PIF_VALUE: 15
PIF_VALUE: 2
PIF_VALUE: 28
PIF_VALUE: 16
PIF_VALUE: 0
PIF_VALUE: 5
PIF_VALUE: 16
PIF_VALUE: 29
PIF_VALUE: 20
PIF_VALUE: 20
PIF_VALUE: 17
PIF_VALUE: 2
PIF_VALUE: 28
PIF_VALUE: 26
PIF_VALUE: 22
PIF_VALUE: 20
PIF_VALUE: 16
PIF_VALUE: 18
PIF_VALUE: 19
PIF_VALUE: 3
PIF_VALUE: 15
PIF_VALUE: 25
PIF_VALUE: 10
PIF_VALUE: 16
PIF_VALUE: 20
PIF_VALUE: 20
PIF_VALUE: 18
PIF_VALUE: 15
PIF_VALUE: 18
PIF_VALUE: 17
PIF_VALUE: 22
PIF_VALUE: 12
PIF_VALUE: 20
PIF_VALUE: 2
PIF_VALUE: 14
PIF_VALUE: 19
PIF_VALUE: 1
PIF_VALUE: 27
PIF_VALUE: 1
PIF_VALUE: 15
PIF_VALUE: 16
PIF_VALUE: 17
PIF_VALUE: 1
PIF_VALUE: 18
PIF_VALUE: 16
PIF_VALUE: 19
PIF_VALUE: 14
PIF_VALUE: 18
PIF_VALUE: 29
PIF_VALUE: 20
PIF_VALUE: 0
PIF_VALUE: 0
PIF_VALUE: 34
PIF_VALUE: 1
PIF_VALUE: 2
PIF_VALUE: 14
PIF_VALUE: 1
PIF_VALUE: 19
PIF_VALUE: 18
PIF_VALUE: 27
PIF_VALUE: 19
PIF_VALUE: 16
PIF_VALUE: 0
PIF_VALUE: 1
PIF_VALUE: 14
PIF_VALUE: 14
PIF_VALUE: 19
PIF_VALUE: 26
PIF_VALUE: 22
PIF_VALUE: 18
PIF_VALUE: 16
PIF_VALUE: 16
PIF_VALUE: 20
PIF_VALUE: 0
PIF_VALUE: 2
PIF_VALUE: 20
PIF_VALUE: 0
PIF_VALUE: 16
PIF_VALUE: 16
PIF_VALUE: 27
PIF_VALUE: 31
PIF_VALUE: 14
PIF_VALUE: 15
PIF_VALUE: 18
PIF_VALUE: 19
PIF_VALUE: 18
PIF_VALUE: 14
PIF_VALUE: 19
PIF_VALUE: 19
PIF_VALUE: 25
PIF_VALUE: 39
PIF_VALUE: 18
PIF_VALUE: 18
PIF_VALUE: 13
PIF_VALUE: 18
PIF_VALUE: 15
PIF_VALUE: 14
PIF_VALUE: 19
PIF_VALUE: 20
PIF_VALUE: 14
PIF_VALUE: 14
PIF_VALUE: 15
PIF_VALUE: 36
PIF_VALUE: 33
PIF_VALUE: 20
PIF_VALUE: 18
PIF_VALUE: 33
PIF_VALUE: 19
PIF_VALUE: 1
PIF_VALUE: 21
PIF_VALUE: 20
PIF_VALUE: 2
PIF_VALUE: 15
PIF_VALUE: 20
PIF_VALUE: 21
PIF_VALUE: 0
PIF_VALUE: 2
PIF_VALUE: 20
PIF_VALUE: 2
PIF_VALUE: 28
PIF_VALUE: 2
PIF_VALUE: 20
PIF_VALUE: 27
PIF_VALUE: 4
PIF_VALUE: 0
PIF_VALUE: 17
PIF_VALUE: 18
PIF_VALUE: 14
PIF_VALUE: 19
PIF_VALUE: 28
PIF_VALUE: 16
PIF_VALUE: 18
PIF_VALUE: 20
PIF_VALUE: 8
PIF_VALUE: 16
PIF_VALUE: 15
PIF_VALUE: 19
PIF_VALUE: 14
PIF_VALUE: 14
PIF_VALUE: 32
PIF_VALUE: 19
PIF_VALUE: 1
PIF_VALUE: 3
PIF_VALUE: 21
PIF_VALUE: 27
PIF_VALUE: 20
PIF_VALUE: 0
PIF_VALUE: 42
PIF_VALUE: 19
PIF_VALUE: 19
PIF_VALUE: 14
PIF_VALUE: 0
PIF_VALUE: 20
PIF_VALUE: 1
PIF_VALUE: 3
PIF_VALUE: 26
PIF_VALUE: 0
PIF_VALUE: 20
PIF_VALUE: 18
PIF_VALUE: 13
PIF_VALUE: 25
PIF_VALUE: 28
PIF_VALUE: 19
PIF_VALUE: 14
PIF_VALUE: 18
PIF_VALUE: 18
PIF_VALUE: 0
PIF_VALUE: 16
PIF_VALUE: 19
PIF_VALUE: 1
PIF_VALUE: 19
PIF_VALUE: 19
PIF_VALUE: 4
PIF_VALUE: 22
PIF_VALUE: 19
PIF_VALUE: 0
PIF_VALUE: 18
PIF_VALUE: 32
PIF_VALUE: 0
PIF_VALUE: 17
PIF_VALUE: 15
PIF_VALUE: 19
PIF_VALUE: 28
PIF_VALUE: 23
PIF_VALUE: 1
PIF_VALUE: 2
PIF_VALUE: 18
PIF_VALUE: 18
PIF_VALUE: 14
PIF_VALUE: 24
PIF_VALUE: 17
PIF_VALUE: 17
PIF_VALUE: 20
PIF_VALUE: 17
PIF_VALUE: 10
PIF_VALUE: 18
PIF_VALUE: 16
PIF_VALUE: 20
PIF_VALUE: 19
PIF_VALUE: 40
PIF_VALUE: 25
PIF_VALUE: 16
PIF_VALUE: 21
PIF_VALUE: 15
PIF_VALUE: 14
PIF_VALUE: 20
PIF_VALUE: 22
PIF_VALUE: 23
PIF_VALUE: 0
PIF_VALUE: 14
PIF_VALUE: 0
PIF_VALUE: 15
PIF_VALUE: 16
PIF_VALUE: 19
PIF_VALUE: 19
PIF_VALUE: 10
PIF_VALUE: 17
PIF_VALUE: 18
PIF_VALUE: 16
PIF_VALUE: 42
PIF_VALUE: 13
PIF_VALUE: 1
PIF_VALUE: 28
PIF_VALUE: 1
PIF_VALUE: 1
PIF_VALUE: 18
PIF_VALUE: 30
PIF_VALUE: 20
PIF_VALUE: 1
PIF_VALUE: 19
PIF_VALUE: 1
PIF_VALUE: 20
PIF_VALUE: 18
PIF_VALUE: 3
PIF_VALUE: 12
PIF_VALUE: 18
PIF_VALUE: 14
PIF_VALUE: 20
PIF_VALUE: 19
PIF_VALUE: 18
PIF_VALUE: 20
PIF_VALUE: 16
PIF_VALUE: 1
PIF_VALUE: 17
PIF_VALUE: 0
PIF_VALUE: 13
PIF_VALUE: 18
PIF_VALUE: 20
PIF_VALUE: 15
PIF_VALUE: 20
PIF_VALUE: 19
PIF_VALUE: 1
PIF_VALUE: 18
PIF_VALUE: 38
PIF_VALUE: 1
PIF_VALUE: 14
PIF_VALUE: 14
PIF_VALUE: 3
PIF_VALUE: 15
PIF_VALUE: 17
PIF_VALUE: 15
PIF_VALUE: 16
PIF_VALUE: 19
PIF_VALUE: 15
PIF_VALUE: 3
PIF_VALUE: 23
PIF_VALUE: 20
PIF_VALUE: 16
PIF_VALUE: 18
PIF_VALUE: 17
PIF_VALUE: 21
PIF_VALUE: 19
PIF_VALUE: 15
PIF_VALUE: 16
PIF_VALUE: 15
PIF_VALUE: 19
PIF_VALUE: 18
PIF_VALUE: 30
PIF_VALUE: 19
PIF_VALUE: 1
PIF_VALUE: 18
PIF_VALUE: 20
PIF_VALUE: 25
PIF_VALUE: 21
PIF_VALUE: 16
PIF_VALUE: 15
PIF_VALUE: 26
PIF_VALUE: 1
PIF_VALUE: 21
PIF_VALUE: 2
PIF_VALUE: 18
PIF_VALUE: 18
PIF_VALUE: 24
PIF_VALUE: 0
PIF_VALUE: 21
PIF_VALUE: 4
PIF_VALUE: 16
PIF_VALUE: 17
PIF_VALUE: 18
PIF_VALUE: 16
PIF_VALUE: 0
PIF_VALUE: 2
PIF_VALUE: 21
PIF_VALUE: 16
PIF_VALUE: 2
PIF_VALUE: 14
PIF_VALUE: 39
PIF_VALUE: 18
PIF_VALUE: 19
PIF_VALUE: 1
PIF_VALUE: 16
PIF_VALUE: 19
PIF_VALUE: 4
PIF_VALUE: 14
PIF_VALUE: 39
PIF_VALUE: 19
PIF_VALUE: 0
PIF_VALUE: 1
PIF_VALUE: 13
PIF_VALUE: 16
PIF_VALUE: 17
PIF_VALUE: 19
PIF_VALUE: 20
PIF_VALUE: 8
PIF_VALUE: 20
PIF_VALUE: 19
PIF_VALUE: 15
PIF_VALUE: 18
PIF_VALUE: 20
PIF_VALUE: 14
PIF_VALUE: 16
PIF_VALUE: 20
PIF_VALUE: 11
PIF_VALUE: 13
PIF_VALUE: 16
PIF_VALUE: 19
PIF_VALUE: 18
PIF_VALUE: 14
PIF_VALUE: 14
PIF_VALUE: 13
PIF_VALUE: 1
PIF_VALUE: 2
PIF_VALUE: 15
PIF_VALUE: 19
PIF_VALUE: 14
PIF_VALUE: 18
PIF_VALUE: 20
PIF_VALUE: 28
PIF_VALUE: 16
PIF_VALUE: 19
PIF_VALUE: 16
PIF_VALUE: 28
PIF_VALUE: 1
PIF_VALUE: 19
PIF_VALUE: 28
PIF_VALUE: 1
PIF_VALUE: 20
PIF_VALUE: 20
PIF_VALUE: 16
PIF_VALUE: 2
PIF_VALUE: 19
PIF_VALUE: 19
PIF_VALUE: 16
PIF_VALUE: 25
PIF_VALUE: 1
PIF_VALUE: 28
PIF_VALUE: 27
PIF_VALUE: 25
PIF_VALUE: 1
PIF_VALUE: 16
PIF_VALUE: 16
PIF_VALUE: 15
PIF_VALUE: 41
PIF_VALUE: 1
PIF_VALUE: 22
PIF_VALUE: 19
PIF_VALUE: 14
PIF_VALUE: 24
PIF_VALUE: 19
PIF_VALUE: 18
PIF_VALUE: 14
PIF_VALUE: 16
PIF_VALUE: 16
PIF_VALUE: 11
PIF_VALUE: 22
PIF_VALUE: 19
PIF_VALUE: 19
PIF_VALUE: 16
PIF_VALUE: 18
PIF_VALUE: 20
PIF_VALUE: 22
PIF_VALUE: 17
PIF_VALUE: 6
PIF_VALUE: 0
PIF_VALUE: 15
PIF_VALUE: 19
PIF_VALUE: 18
PIF_VALUE: 1
PIF_VALUE: 19
PIF_VALUE: 19
PIF_VALUE: 2
PIF_VALUE: 33
PIF_VALUE: 19
PIF_VALUE: 18
PIF_VALUE: 16
PIF_VALUE: 0
PIF_VALUE: 18
PIF_VALUE: 1
PIF_VALUE: 17
PIF_VALUE: 14
PIF_VALUE: 15
PIF_VALUE: 23
PIF_VALUE: 19
PIF_VALUE: 14
PIF_VALUE: 17
PIF_VALUE: 14
PIF_VALUE: 41
PIF_VALUE: 14
PIF_VALUE: 3
PIF_VALUE: 20
PIF_VALUE: 19
PIF_VALUE: 30
PIF_VALUE: 15
PIF_VALUE: 16
PIF_VALUE: 0
PIF_VALUE: 19
PIF_VALUE: 19
PIF_VALUE: 15
PIF_VALUE: 19

## 2019-01-01 ASSESSMENT — PAIN SCALES - GENERAL
PAINLEVEL_OUTOF10: 3
PAINLEVEL_OUTOF10: 4
PAINLEVEL_OUTOF10: 5
PAINLEVEL_OUTOF10: 5
PAINLEVEL_OUTOF10: 6
PAINLEVEL_OUTOF10: 0
PAINLEVEL_OUTOF10: 5
PAINLEVEL_OUTOF10: 0
PAINLEVEL_OUTOF10: 7
PAINLEVEL_OUTOF10: 0
PAINLEVEL_OUTOF10: 4
PAINLEVEL_OUTOF10: 3
PAINLEVEL_OUTOF10: 0
PAINLEVEL_OUTOF10: 5
PAINLEVEL_OUTOF10: 0
PAINLEVEL_OUTOF10: 3
PAINLEVEL_OUTOF10: 2
PAINLEVEL_OUTOF10: 9
PAINLEVEL_OUTOF10: 0
PAINLEVEL_OUTOF10: 0
PAINLEVEL_OUTOF10: 4
PAINLEVEL_OUTOF10: 3
PAINLEVEL_OUTOF10: 0
PAINLEVEL_OUTOF10: 7
PAINLEVEL_OUTOF10: 4
PAINLEVEL_OUTOF10: 10
PAINLEVEL_OUTOF10: 4
PAINLEVEL_OUTOF10: 0
PAINLEVEL_OUTOF10: 3
PAINLEVEL_OUTOF10: 0
PAINLEVEL_OUTOF10: 1
PAINLEVEL_OUTOF10: 0
PAINLEVEL_OUTOF10: 5
PAINLEVEL_OUTOF10: 0
PAINLEVEL_OUTOF10: 3
PAINLEVEL_OUTOF10: 0
PAINLEVEL_OUTOF10: 2
PAINLEVEL_OUTOF10: 2
PAINLEVEL_OUTOF10: 4
PAINLEVEL_OUTOF10: 2
PAINLEVEL_OUTOF10: 5
PAINLEVEL_OUTOF10: 0
PAINLEVEL_OUTOF10: 7
PAINLEVEL_OUTOF10: 0
PAINLEVEL_OUTOF10: 6
PAINLEVEL_OUTOF10: 0
PAINLEVEL_OUTOF10: 0
PAINLEVEL_OUTOF10: 3
PAINLEVEL_OUTOF10: 3
PAINLEVEL_OUTOF10: 0
PAINLEVEL_OUTOF10: 4
PAINLEVEL_OUTOF10: 1
PAINLEVEL_OUTOF10: 0
PAINLEVEL_OUTOF10: 2
PAINLEVEL_OUTOF10: 7
PAINLEVEL_OUTOF10: 1
PAINLEVEL_OUTOF10: 4
PAINLEVEL_OUTOF10: 0
PAINLEVEL_OUTOF10: 1
PAINLEVEL_OUTOF10: 0
PAINLEVEL_OUTOF10: 4
PAINLEVEL_OUTOF10: 5
PAINLEVEL_OUTOF10: 0
PAINLEVEL_OUTOF10: 4
PAINLEVEL_OUTOF10: 3
PAINLEVEL_OUTOF10: 0
PAINLEVEL_OUTOF10: 0
PAINLEVEL_OUTOF10: 3
PAINLEVEL_OUTOF10: 4
PAINLEVEL_OUTOF10: 0
PAINLEVEL_OUTOF10: 5
PAINLEVEL_OUTOF10: 6
PAINLEVEL_OUTOF10: 3
PAINLEVEL_OUTOF10: 4
PAINLEVEL_OUTOF10: 0
PAINLEVEL_OUTOF10: 2
PAINLEVEL_OUTOF10: 0
PAINLEVEL_OUTOF10: 8
PAINLEVEL_OUTOF10: 3
PAINLEVEL_OUTOF10: 0
PAINLEVEL_OUTOF10: 0
PAINLEVEL_OUTOF10: 1
PAINLEVEL_OUTOF10: 0
PAINLEVEL_OUTOF10: 1
PAINLEVEL_OUTOF10: 3
PAINLEVEL_OUTOF10: 4
PAINLEVEL_OUTOF10: 2
PAINLEVEL_OUTOF10: 0
PAINLEVEL_OUTOF10: 3
PAINLEVEL_OUTOF10: 0
PAINLEVEL_OUTOF10: 7
PAINLEVEL_OUTOF10: 0
PAINLEVEL_OUTOF10: 4
PAINLEVEL_OUTOF10: 4
PAINLEVEL_OUTOF10: 1
PAINLEVEL_OUTOF10: 0
PAINLEVEL_OUTOF10: 0
PAINLEVEL_OUTOF10: 3
PAINLEVEL_OUTOF10: 0
PAINLEVEL_OUTOF10: 5
PAINLEVEL_OUTOF10: 0
PAINLEVEL_OUTOF10: 10
PAINLEVEL_OUTOF10: 0
PAINLEVEL_OUTOF10: 2
PAINLEVEL_OUTOF10: 1
PAINLEVEL_OUTOF10: 2
PAINLEVEL_OUTOF10: 3
PAINLEVEL_OUTOF10: 3
PAINLEVEL_OUTOF10: 0
PAINLEVEL_OUTOF10: 4
PAINLEVEL_OUTOF10: 0
PAINLEVEL_OUTOF10: 0
PAINLEVEL_OUTOF10: 2
PAINLEVEL_OUTOF10: 2
PAINLEVEL_OUTOF10: 4
PAINLEVEL_OUTOF10: 1
PAINLEVEL_OUTOF10: 2
PAINLEVEL_OUTOF10: 0
PAINLEVEL_OUTOF10: 2
PAINLEVEL_OUTOF10: 4
PAINLEVEL_OUTOF10: 0
PAINLEVEL_OUTOF10: 4
PAINLEVEL_OUTOF10: 0
PAINLEVEL_OUTOF10: 0
PAINLEVEL_OUTOF10: 6
PAINLEVEL_OUTOF10: 0
PAINLEVEL_OUTOF10: 4
PAINLEVEL_OUTOF10: 0
PAINLEVEL_OUTOF10: 4
PAINLEVEL_OUTOF10: 2
PAINLEVEL_OUTOF10: 2
PAINLEVEL_OUTOF10: 0

## 2019-01-01 ASSESSMENT — PAIN - FUNCTIONAL ASSESSMENT
PAIN_FUNCTIONAL_ASSESSMENT: ACTIVITIES ARE NOT PREVENTED
PAIN_FUNCTIONAL_ASSESSMENT: PREVENTS OR INTERFERES SOME ACTIVE ACTIVITIES AND ADLS
PAIN_FUNCTIONAL_ASSESSMENT: 0-10
PAIN_FUNCTIONAL_ASSESSMENT: 0-10
PAIN_FUNCTIONAL_ASSESSMENT: PREVENTS OR INTERFERES SOME ACTIVE ACTIVITIES AND ADLS

## 2019-01-01 ASSESSMENT — PAIN DESCRIPTION - LOCATION
LOCATION: GENERALIZED
LOCATION: ABDOMEN
LOCATION: BACK
LOCATION: BACK
LOCATION: ABDOMEN
LOCATION: BACK;CHEST
LOCATION: ABDOMEN
LOCATION: BACK
LOCATION: RIB CAGE
LOCATION: ABDOMEN
LOCATION: BACK;NECK;CHEST
LOCATION: ABDOMEN
LOCATION: BACK
LOCATION: ABDOMEN
LOCATION: BACK
LOCATION: BACK
LOCATION: ABDOMEN
LOCATION: BACK
LOCATION: BACK

## 2019-01-01 ASSESSMENT — PAIN DESCRIPTION - PROGRESSION
CLINICAL_PROGRESSION: NOT CHANGED

## 2019-01-01 ASSESSMENT — ENCOUNTER SYMPTOMS
NAUSEA: 0
DIARRHEA: 0
BLOOD IN STOOL: 0
RESPIRATORY NEGATIVE: 1
SHORTNESS OF BREATH: 0
BACK PAIN: 0
ABDOMINAL PAIN: 0
ABDOMINAL PAIN: 1
VOMITING: 0
ABDOMINAL PAIN: 1
VOICE CHANGE: 0
EYE PAIN: 0
COLOR CHANGE: 0
ANAL BLEEDING: 0
SHORTNESS OF BREATH: 0
CONSTIPATION: 0
CONSTIPATION: 0
EYE DISCHARGE: 0
SORE THROAT: 0
COUGH: 1
NAUSEA: 1
EYE ITCHING: 0
STRIDOR: 0
DIARRHEA: 0

## 2019-01-01 ASSESSMENT — PAIN DESCRIPTION - PAIN TYPE
TYPE: ACUTE PAIN
TYPE: ACUTE PAIN
TYPE: CHRONIC PAIN
TYPE: SURGICAL PAIN
TYPE: ACUTE PAIN
TYPE: SURGICAL PAIN
TYPE: SURGICAL PAIN
TYPE: CHRONIC PAIN
TYPE: ACUTE PAIN;CHRONIC PAIN
TYPE: SURGICAL PAIN
TYPE: SURGICAL PAIN
TYPE: ACUTE PAIN
TYPE: SURGICAL PAIN
TYPE: CHRONIC PAIN
TYPE: SURGICAL PAIN
TYPE: SURGICAL PAIN
TYPE: CHRONIC PAIN
TYPE: ACUTE PAIN
TYPE: SURGICAL PAIN
TYPE: CHRONIC PAIN
TYPE: SURGICAL PAIN
TYPE: ACUTE PAIN

## 2019-01-01 ASSESSMENT — PAIN DESCRIPTION - DESCRIPTORS
DESCRIPTORS: ACHING
DESCRIPTORS: DISCOMFORT;DULL
DESCRIPTORS: ACHING
DESCRIPTORS: ACHING;DISCOMFORT

## 2019-01-01 ASSESSMENT — PAIN DESCRIPTION - ORIENTATION
ORIENTATION: MID
ORIENTATION: LEFT
ORIENTATION: MID
ORIENTATION: LEFT;LOWER
ORIENTATION: LEFT
ORIENTATION: LEFT
ORIENTATION: MID
ORIENTATION: LOWER
ORIENTATION: LOWER
ORIENTATION: LEFT;LOWER
ORIENTATION: MID

## 2019-01-01 ASSESSMENT — PAIN DESCRIPTION - FREQUENCY
FREQUENCY: INTERMITTENT
FREQUENCY: INTERMITTENT
FREQUENCY: CONTINUOUS
FREQUENCY: INTERMITTENT
FREQUENCY: CONTINUOUS

## 2019-01-01 ASSESSMENT — COPD QUESTIONNAIRES
CAT_SEVERITY: MILD

## 2019-01-01 ASSESSMENT — PAIN SCALES - WONG BAKER
WONGBAKER_NUMERICALRESPONSE: 4
WONGBAKER_NUMERICALRESPONSE: 2

## 2019-01-01 ASSESSMENT — PAIN DESCRIPTION - ONSET
ONSET: ON-GOING

## 2019-01-01 ASSESSMENT — LIFESTYLE VARIABLES: SMOKING_STATUS: 0

## 2019-01-09 PROBLEM — Z87.09 HISTORY OF PULMONARY EMPHYSEMA: Status: ACTIVE | Noted: 2019-01-01

## 2019-01-09 PROBLEM — R91.8 LUNG NODULES: Status: ACTIVE | Noted: 2019-01-01

## 2019-01-09 PROBLEM — R94.8 ABNORMAL BONE SCAN OF THORACIC SPINE: Status: ACTIVE | Noted: 2019-01-01

## 2019-01-09 PROBLEM — J44.1 CHRONIC OBSTRUCTIVE PULMONARY DISEASE WITH (ACUTE) EXACERBATION (HCC): Status: ACTIVE | Noted: 2019-01-01

## 2019-01-16 PROBLEM — M54.9 BACK PAIN: Status: ACTIVE | Noted: 2019-01-01

## 2019-01-18 PROBLEM — R91.1 PULMONARY NODULE, LEFT: Status: ACTIVE | Noted: 2019-01-01

## 2019-01-21 PROBLEM — Z87.891 FORMER SMOKER: Status: ACTIVE | Noted: 2019-01-01

## 2019-01-21 PROBLEM — K80.20 GALLSTONES: Status: ACTIVE | Noted: 2019-01-01

## 2019-01-21 PROBLEM — R97.20 ELEVATED PSA: Status: ACTIVE | Noted: 2019-01-01

## 2019-01-21 PROBLEM — R04.2 HEMOPTYSIS: Status: ACTIVE | Noted: 2019-01-01

## 2019-01-22 PROBLEM — C34.92 ADENOCARCINOMA OF LEFT LUNG (HCC): Status: ACTIVE | Noted: 2019-01-01

## 2019-02-19 PROBLEM — K57.92 ACUTE DIVERTICULITIS: Status: ACTIVE | Noted: 2019-01-01

## 2019-02-19 PROBLEM — D61.818 PANCYTOPENIA (HCC): Status: ACTIVE | Noted: 2019-01-01

## 2019-03-07 PROBLEM — K57.92 DIVERTICULITIS: Status: ACTIVE | Noted: 2019-01-01

## 2019-03-07 NOTE — ED PROVIDER NOTES
[address]  eMERGENCY dEPARTMENT eNCOUnter        Pt Name: Fernando Jha  MRN: [id number]  [place] 1945  Date of evaluation: 3/7/2019  Provider: Elio Gutierrez MD  PCP: Aida Khan MD      CHIEF COMPLAINT       Chief Complaint   Patient presents with    Abdominal Pain     LLQ pain, was seen last week for Diverticulitis, patient states he cannot get rid of the pain, had Chemo on Friday        HISTORY OFPRESENT ILLNESS   (Location/Symptom, Timing/Onset, Context/Setting, Quality, Duration, Modifying Factors,Severity)  Note limiting factors. Fernando Jha is a 69 y.o. male presenting today with worsening left lower quadrant pain for the last week after having been admitted to the hospital on February 19 for diverticulitis and prior to discharge having the pain improve but then noticing over the last week it worsening again. He did receive chemotherapy for lung cancer one week ago and had received medication for neutropenia as well since he has dealt with this in the past with chemotherapy. He did have a fever yesterday of 100.5 Fahrenheit but is taking Tylenol frequently. He has generalized fatigue and weakness and feels lightheaded. No chest pain or shortness of breath. His main concern today is the worsening left lower abdominal pain so he came to the emergency department. Denies any nausea or vomiting. No dysuria or flank pain. He does not want any pain medication at this time but just wants to figure out what is going on. REVIEW OF SYSTEMS    (2-9 systems for level 4, 10 or more for level 5)     Review of Systems   Constitutional: Positive for fatigue and fever. Negative for chills and diaphoresis. HENT: Negative for congestion. Respiratory: Negative for shortness of breath. Cardiovascular: Negative for chest pain. Gastrointestinal: Positive for abdominal pain.  Negative for anal bleeding, blood in stool, constipation, diarrhea, nausea and vomiting. Genitourinary: Negative for difficulty urinating, dysuria and flank pain. Musculoskeletal: Negative for back pain and neck pain. Skin: Negative for color change. Neurological: Positive for weakness (generalized) and light-headedness. Negative for dizziness, syncope and headaches. Psychiatric/Behavioral: Negative for confusion. Positives and Pertinent negatives as per HPI.       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    Right popliteal artery occlusion ([facility] [address] 11/2010         SURGICAL HISTORY       Past Surgical History:   Procedure Laterality Date    ARTERIAL ANEURYSM REPAIR  11/2010    right popliteal artery aneurysm repair    BRONCHOSCOPY  01/18/2019    left endobronchial tumor    BRONCHOSCOPY N/A 1/18/2019    BRONCHOSCOPY ALVEOLAR LAVAGE performed by Don Hatch MD at [address] [place] [address]  1/18/2019    BRONCHOSCOPY/TRANSBRONCHIAL LUNG BIOPSY performed by Don Hatch MD at [address] [place] [address] N/A 1/23/2019    EBUS ULTRASOUND W/ ANESTHESIA AND NEEDLE BIOPSY performed by Silvina Pérez MD at [address] [place] [address]  1/23/2019    BRONCHOSCOPY ALVEOLAR LAVAGE performed by Silvina Pérez MD at [address] [place] [address]  1/23/2019    BRONCHOSCOPY BIOPSY BRONCHUS performed by Silvina Pérez MD at [address] [place] [address]  1/23/2019    BRONCHOSCOPY BRUSHINGS performed by Silvina Pérez MD at [address] [place] [address]  1/23/2019    BRONCHOSCOPY/TRANSBRONCHIAL NEEDLE BIOPSY performed by Silvina Pérez MD at [address] [place] [address]  1/23/2019    BRONCHOSCOPY/TRANSBRONCHIAL NEEDLE tablet 4 mg      Aprepitant 130 MG/18ML EMUL 337 mg      folic acid (FOLVITE) 1 MG tablet Take 1 tablet by mouth daily  Qty: 30 tablet, Refills: 3      Psyllium (METAMUCIL) WAFR Take 6 tablets by mouth 2 times daily. ALLERGIES     Patient has no known allergies. FAMILY HISTORY       Family History   Adopted: Yes   Problem Relation Age of Onset    Stroke Mother     Schizophrenia Brother     Cancer Neg Hx           SOCIAL HISTORY       Social History     Socioeconomic History    Marital status:      Spouse name: None    Number of children: 4    Years of education: None    Highest education level: None   Occupational History    None   Social Needs    Financial resource strain: None    Food insecurity:     Worry: None     Inability: None    Transportation needs:     Medical: None     Non-medical: None   Tobacco Use    Smoking status: Former Smoker     Packs/day: 0.00     Years: 0.00     Pack years: 0.00     Last attempt to quit: 2003     Years since quittin.1    Smokeless tobacco: Never Used   Substance and Sexual Activity    Alcohol use:  Yes     Alcohol/week: 10.8 oz     Types: 18 Shots of liquor per week     Comment: social-used to drink alot     Drug use: No    Sexual activity: None   Lifestyle    Physical activity:     Days per week: None     Minutes per session: None    Stress: None   Relationships    Social connections:     Talks on phone: None     Gets together: None     Attends Nondenominational service: None     Active member of club or organization: None     Attends meetings of clubs or organizations: None     Relationship status: None    Intimate partner violence:     Fear of current or ex partner: None     Emotionally abused: None     Physically abused: None     Forced sexual activity: None   Other Topics Concern    None   Social History Narrative    None       SCREENINGS    [place] Coma Scale  Eye Opening: Spontaneous  Best Verbal Response: Oriented  Best Motor Response: Obeys commands  Nati Coma Scale Score: 15 @FLOW([id number])@      PHYSICAL EXAM    (up to 7 for level 4, 8 or more for level 5)     ED Triage Vitals [03/07/19 1718]   BP Temp Temp Source Pulse Resp SpO2 Height Weight   (!) 93/59 98.8 °F (37.1 °C) Oral 72 16 96 % 5' 9\" (1.753 m) 182 lb (82.6 kg)       Physical Exam   Constitutional: He is oriented to person, place, and time. He appears well-developed and well-nourished. He is active and cooperative. Non-toxic appearance. He does not have a sickly appearance. He does not appear ill. No distress. HENT:   Head: Normocephalic and atraumatic. Mouth/Throat: Mucous membranes are normal. No oropharyngeal exudate. Eyes: Right eye exhibits no discharge. Left eye exhibits no discharge. Neck: Trachea normal, normal range of motion and full passive range of motion without pain. Neck supple. No neck rigidity. No tracheal deviation, no edema, no erythema and normal range of motion present. Cardiovascular: Normal rate, regular rhythm, intact distal pulses and normal pulses. Exam reveals no gallop and no friction rub. Pulmonary/Chest: Effort normal and breath sounds normal. No accessory muscle usage or stridor. No respiratory distress. He has no decreased breath sounds. He has no wheezes. He has no rhonchi. He has no rales. Abdominal: Soft. Bowel sounds are normal. He exhibits no distension. There is tenderness (moderate) in the left lower quadrant. There is no rigidity, no rebound, no guarding, no CVA tenderness, no tenderness at McBurney's point and negative Soto's sign. Musculoskeletal: Normal range of motion. He exhibits no edema, tenderness or deformity. Neurological: He is alert and oriented to person, place, and time. He has normal strength. He displays no atrophy and no tremor. No sensory deficit. He exhibits normal muscle tone. He displays no seizure activity. GCS eye subscore is 4. GCS verbal subscore is 5. GCS motor subscore is 6.    Skin: 03/08/2019  10:07, by [facility]  Hematology results called to and read back by Juan Carlos Burrows RN, 03/08/2019  05:25, by [facility]  Performed at:  [address], [facility] [employer]   Phone [phone]   COMPREHENSIVE METABOLIC PANEL W/ REFLEX TO MG FOR LOW K - Abnormal; Notable for the following components:    Sodium 127 (*)     Potassium reflex Magnesium 5.2 (*)     Chloride 96 (*)     Glucose 162 (*)     CREATININE 0.7 (*)     Calcium 7.2 (*)     Total Protein 5.4 (*)     Alb 2.8 (*)     AST 14 (*)     All other components within normal limits    Narrative:     Performed at:  [address], [facility] [employer]   Phone [phone] - Abnormal; Notable for the following components:    Protime 14.3 (*)     INR 1.25 (*)     All other components within normal limits    Narrative:     Performed at:  [place] [address] [employer]   Phone [phone]   URINALYSIS    Narrative:     Performed at:  [address], [facility] [employer]   Phone [phone]   LACTIC ACID, PLASMA    Narrative:     Performed at:  [place] [address] [employer]   Phone [phone]   SODIUM, URINE, RANDOM   OSMOLALITY, URINE   FERRITIN   IRON AND TIBC   VITAMIN B12 & FOLATE   POCT GLUCOSE       All other labs were within normal range or not returned asof this dictation. EKG:  All EKG's are interpreted by the Emergency Department Physician who either signs or Co-signs this chart in the absence of a cardiologist.        RADIOLOGY:   Non-plain film images such as CT, Ultrasound and MRI are read by the radiologist. Plainradiographic images are visualized and preliminarily interpreted by the  ED Provider with the belowfindings:        Interpretation per the Radiologist below, if available at the time of this note:    CT ABDOMEN PELVIS W IV CONTRAST Additional Contrast? None   Final Result   Acute perforated descending colon diverticulitis. There is free air in the   abdomen and there is a small extraluminal gas and fluid collection/abscess   adjacent to the descending colon measuring 4.1 cm in AP diameter and 6 cm in   length. There is diverticulosis of the entire colon. Cholelithiasis. 4.7 cm infrarenal abdominal aortic aneurysm status post endograft repair with   a patent aorto bi-iliac graft. There is suspicion of an endoleak with blush   of contrast enhancement suspected in the inferior aspect of the native   aneurysm. As a noncontrast study was not performed, calcification of the   intraluminal thrombus accounting for the apparent enhancement is not   excluded. 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 Comparison with a noncontrast CT examination would be   helpful. Critical results were called by Dr. Perez Gudino. MD [facility] on   3/7/2019 at 20:35. PROCEDURES   Unless otherwise noted below, none     Procedures    CRITICAL CARE TIME   N/A    CONSULTS: Spoke with LISA Love at 2103 for admission and also updated her on specialist's recommendations later in the evening. Spoke with Dr. Carlitos Mendenhall at 2104 and he reviewed the CAT scan and will see the patient in the morning. Spoke with Dr. Lamonte Kearney with vascular surgery at 2111 and he stated this is a common finding in regards to the endovascular leak and no need for emergent evaluation at this time related to this, but a vascular surgeon to see the patient tomorrow.     IP CONSULT TO HOSPITALIST  IP CONSULT TO GENERAL SURGERY  IP CONSULT TO VASCULAR SURGERY  IP CONSULT TO ONCOLOGY    EMERGENCY DEPARTMENT COURSE and DIFFERENTIAL DIAGNOSIS/MDM:   Vitals:    Vitals:    03/07/19 2030 03/07/19 2206 03/07/19 2304 03/08/19 0854   BP: (!) 119/59  (!) 147/77 119/73 (ISOVUE-370) 76 % injection 75 mL (75 mLs Intravenous Given 3/7/19 2003)   vancomycin 1000 mg IVPB in 250 mL D5W addavial (0 mg Intravenous Stopped 3/7/19 2206)   cefepime (MAXIPIME) 2 g in dextrose 5 % 50 mL IVPB (0 g Intravenous Stopped 3/7/19 2232)   phytonadione (ADULT) (VITAMIN K) 10 mg in dextrose 5 % 100 mL IVPB (0 mg Intravenous Stopped 3/8/19 0224)     Patient was evaluated due to concern for worsening left lower abdominal pain with fever. He did have neutropenia however at this time I do not believe he is dealing with neutropenic fever but to be safe I did start him on vancomycin, cefepime, Flagyl. Otherwise, general surgery plans to see the patient in the morning along with vascular surgery. He was in no acute distress at time of admission and felt comfortable with this plan. The patient tolerated their visit well. The patient and / or the family were informed of the results of any tests, a time was given to answer questions. FINAL IMPRESSION      1. Diverticulitis of large intestine with perforation and abscess without bleeding    2. Leaking abdominal aortic aneurysm (AAA) (HCC)    3. Abdominal pain, left lower quadrant    4.  Chemotherapy-induced neutropenia [facility])          DISPOSITION/PLAN   DISPOSITION Admitted 03/07/2019 09:59:29 PM      PATIENT REFERRED TO:  Adrián Quiroz MD  [address]  [address], [address]            DISCHARGEMEDICATIONS:  Current Discharge Medication List          DISCONTINUED MEDICATIONS:  Current Discharge Medication List                 (Please note that portions of this note were completed with a voicerecognition program.  Efforts were made to edit the dictations but occasionally words are mis-transcribed.)    Tory Godinez MD (electronically signed)            Tory Godinez MD  03/08/19 1736

## 2019-03-07 NOTE — ED NOTES
Patient only wants blood taken from his port and refused IV access in triage.      Scarlett Escobedo, RN  03/07/19 6470

## 2019-03-07 NOTE — LETTER
[address] [facility] [address]  Phone: [phone]             April 13, 2019    Patient: Princess Pretty   YOB: 1945   Date of Visit: 3/7/2019       To Whom It May Concern:    Daughter Seferino Wall) of pt Radha Stout has been at bedside with patient for treatment since 04/06/2019 until further notice.       Sincerely,       Muriel Calix RN         Signature:__________________________________

## 2019-03-07 NOTE — LETTER
[address] [facility] [address]  Phone: [phone]             April 13, 2019    Patient: Janny Shin   YOB: 1945   Date of Visit: 3/7/2019       To Whom It May Concern:    Daughter Jere Villa) of patient Anna Hewitt has been at bedside with patient for treatment since 4/6/19 until further notice.        Sincerely,       Ana Mckeon RN         Signature:__________________________________

## 2019-03-08 NOTE — CARE COORDINATION
[address] Transitions Interview     3/8/2019    Patient: Lucy Car Patient : 1945   MRN: [id number]  Reason for Admission: Diverticulitis  RARS: Readmission Risk Score: 34         Spoke with: patient Aleida Kerr and his daughter Lisa Tavarez      Readmission Risk  Patient Active Problem List   Diagnosis    Elevated fasting glucose    CAD (coronary artery disease)    [facility]) [facility]    Essential hypertension    Mixed hyperlipidemia    Sprain of right foot    Sprain of anterior talofibular ligament of right ankle    Closed nondisplaced fracture of fifth right metatarsal bone    Closed fracture of proximal lateral malleolus of ankle with routine healing    Right foot pain    Right ankle pain    Nondisplaced fracture of fifth right metatarsal bone with routine healing    History of pulmonary emphysema    Chronic obstructive pulmonary disease with (acute) exacerbation (HCC)    Pulmonary nodule, left    Abnormal bone scan of thoracic spine    Back pain    Visit for monitoring Plavix therapy    History of ventricular tachycardia    Gallstones    Hemoptysis    Former smoker    Elevated PSA    Adenocarcinoma of left lung ([facility]    Acute diverticulitis    Pancytopenia (HCC)    Diverticulitis of large intestine with perforation and abscess without bleeding    Diverticulitis       Inpatient Assessment  Care Transitions Summary    Care Transitions Inpatient Review  Medication Review  Do you have all of your prescriptions and are they filled?:  Yes   Are you able to afford your medications?:  Yes  How often do you have difficulty taking your medications?:  I always take them as prescribed.   Housing Review  Who do you live with?:  Partner/Spouse/SO  Are you an active caregiver in your home?:  No  Social Support  Do you have a ?:  No  Do you have a [facility]?:  No  Durable Medical Equipment  Patient DME:  Crutches, Other  Other Patient DME:  has crutches from previous ankle fx not using at this time  Functional Review  Ability to seek help/take action for Emergent/Urgent situations i.e. fire, crime, inclement weather or health crisis. :  Independent  Ability handle personal hygiene needs (bathing/dressing/grooming): Independent  Ability to manage medications: Independent  Ability to prepare food:  Independent  Ability to maintain home (clean home, laundry): Independent  Ability to drive and/or has transportation:  Independent  Ability to do shopping:  Independent  Ability to manage finances: Independent  Is patient able to live independently?:  Yes  Hearing and Vision  Visual Impairment:  Visual impairment (Glasses/contacts)  Hearing Impairment:  None  Care Transitions Interventions     Other Services:  Completed (Comment: home care referral )      Met with patient to discuss care transition. Role of CTC and care transition program explained to patient. Patient known to [facility], was recently in care transition program, stated he remembered me as well. Daughter Corinna Courts present in room and participated in discussion. Patient lives with wife Jonny Crawley, states he is totally independent. Daughter agrees but states they need help with medication management. States her step mom is very overwhelmed with all of his medications and the frequent changes and they would need some assistance. Patient seemed to downplay this but daughter stated that Jonny Crawley is really the only support that patient has as he has 4 daughters and 3 live out of town. Suggested home care referral for SN to assist with medication management-they were agreeable, they did not want therapy. Offered list but they were agreeable to referral to [facility]. Informed [facility] liaison and relayed assessment information to , KIKA Sandoval RN via . If meets criteria, patient agreeable to participate in care transition program post discharge.  Encouraged patient to ask to speak with Case Manager on the unit should any needs arise. Follow Up  Future Appointments   Date Time Provider Bogdan Batista   6/3/2019  9:20 AM Jhonathan Asencio MD [facility] [address] [facility]  There are no preventive care reminders to display for this patient.     Charles Tavarez RN

## 2019-03-08 NOTE — H&P
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 Authorizing Provider   dexamethasone (DECADRON) 4 MG tablet 4 mg 2/1/19   Historical Provider, MD   ondansetron (ZOFRAN) 8 MG tablet 02/01/2019Ondansetron 8 MG Oral Tablet [Zofran] orally1.0 tabletevery 8 hours02/01/2019Active 2/1/19   Historical Provider, MD   Aprepitant 130 MG/18ML EMUL 130 mg 3/1/19   Historical Provider, MD   lisinopril (PRINIVIL;ZESTRIL) 10 MG tablet Take 10 mg by mouth 2/15/19   Historical Provider, MD   folic acid (FOLVITE) 1 MG tablet Take 1 tablet by mouth daily 2/24/19   Sarbjit Reid MD   amLODIPine (NORVASC) 5 MG tablet Take 1 tablet by mouth daily 1/25/19   Sarbjit Reid MD   simvastatin (ZOCOR) 40 MG tablet TAKE 1 TABLET BY MOUTH NIGHTLY 10/24/18   Savannah Quiroz MD   atenolol (TENORMIN) 100 MG tablet TAKE 1 TABLET BY MOUTH EVERY MORNING (INSTEAD OF THE METROPROLOL ER 50MG) 4/25/17   Savannah Quiroz MD   amiodarone (PACERONE) 100 MG tablet Take 100 mg by mouth daily    Historical Provider, MD   famotidine (PEPCID) 20 MG tablet Take 20 mg by mouth 2 times daily.       Historical Provider, MD   aspirin 81 MG EC tablet Take 81 mg by mouth daily. Historical Provider, MD   clopidogrel (PLAVIX) 75 MG tablet Take 75 mg by mouth daily. Historical Provider, MD   Multiple Vitamin (MULTIVITAMIN PO) Take  by mouth. Historical Provider, MD   vitamin D (CHOLECALCIFEROL) 1000 UNIT TABS Take 1,000 Int'l Units by mouth daily. Historical Provider, MD   Psyllium (METAMUCIL) WAFR Take 6 tablets by mouth 2 times daily. 12/30/10   Historical Provider, MD       Allergies:  Patient has no known allergies. Social History:      The patient currently lives at home    TOBACCO:   reports that he quit smoking about 16 years ago. He smoked 0.00 packs per day for 0.00 years. He has never used smokeless tobacco.  ETOH:   reports that he drinks about 10.8 oz of alcohol per week. Family History:       Reviewed in detail and negative for DM, CAD, Cancer, CVA. Positive as follows: Adopted: Yes   Problem Relation Age of Onset    Stroke Mother     Schizophrenia Brother     Cancer Neg Hx        REVIEW OF SYSTEMS:   Pertinent positives as noted in the HPI. All other systems reviewed and negative. PHYSICAL EXAM PERFORMED:    BP (!) 119/59   Pulse 69   Temp 98.8 °F (37.1 °C) (Oral)   Resp 16   Ht 5' 9\" (1.753 m)   Wt 182 lb (82.6 kg)   SpO2 95%   BMI 26.88 kg/m²     General appearance:  No apparent distress, appears stated age and cooperative. HEENT:  Normal cephalic, atraumatic without obvious deformity. Pupils equal, round, and reactive to light. Extra ocular muscles intact. Conjunctivae/corneas clear. Neck: Supple, with full range of motion. No jugular venous distention. Trachea midline. Respiratory:  Normal respiratory effort. Clear to auscultation, bilaterally without Rales/Wheezes/Rhonchi. Right sided port  Cardiovascular:  Regular rate and rhythm with normal S1/S2 without murmurs, rubs or gallops.   Abdomen: Soft, sl LLQ tenderness, no r/g, non-distended with normal bowel sounds. Musculoskeletal:  No clubbing, cyanosis or edema bilaterally. Full range of motion without deformity. Skin: Skin color, texture, turgor normal.  No rashes or lesions. Neurologic:  Neurovascularly intact without any focal sensory/motor deficits. Cranial nerves: II-XII intact, grossly non-focal.  Psychiatric:  Alert and oriented, thought content appropriate, normal insight  Capillary Refill: Brisk,< 3 seconds   Peripheral Pulses: +2 palpable, equal bilaterally       Labs:     Recent Labs     03/07/19  1821   WBC 1.8*   HGB 9.1*   HCT 26.0*        Recent Labs     03/07/19  1821   *   K 4.7   CL 92*   CO2 21   BUN 15   CREATININE 0.9   CALCIUM 7.7*     Recent Labs     03/07/19  1821   AST 15   ALT 15   BILITOT 0.4   ALKPHOS 55     Recent Labs     03/07/19 1826   INR 1.28*     No results for input(s): Cristobal Pheasant in the last 72 hours. Urinalysis:      Lab Results   Component Value Date    NITRU Negative 03/07/2019    WBCUA 0-2 02/19/2019    RBCUA 0-2 02/19/2019    BLOODU Negative 03/07/2019    SPECGRAV 1.015 03/07/2019    GLUCOSEU Negative 03/07/2019       Radiology:     CXR: I have reviewed the CXR with the following interpretation:   EKG:  I have reviewed the EKG with the following interpretation:     CT ABDOMEN PELVIS W IV CONTRAST Additional Contrast? None   Final Result   Acute perforated descending colon diverticulitis. There is free air in the   abdomen and there is a small extraluminal gas and fluid collection/abscess   adjacent to the descending colon measuring 4.1 cm in AP diameter and 6 cm in   length. There is diverticulosis of the entire colon. Cholelithiasis. 4.7 cm infrarenal abdominal aortic aneurysm status post endograft repair with   a patent aorto bi-iliac graft. There is suspicion of an endoleak with blush   of contrast enhancement suspected in the inferior aspect of the native   aneurysm.   As a noncontrast study was not performed, calcification of

## 2019-03-08 NOTE — CARE COORDINATION
Patient assessed by Cynthia Spears RN, care Transitions. Stage 4 lung cancer. Reports spoke with patients wife stated she is overwhelmed. Requesting [facility] provided by [facility]. Referral made will follow for needs.  Allen Dodson RN

## 2019-03-08 NOTE — PLAN OF CARE
Patient has multiple stage 2 pressure ulcers on sacrum, consulted wound care for evaluation. Patient is a stand by assist. Encouraged to turn in bed. Mepliex on wounds. Will continue to monitor.

## 2019-03-08 NOTE — DISCHARGE INSTR - COC
Continuity of Care Form    Patient Name: Tee Rodríguez   :  1945  MRN:  [id number]    Admit date:  3/7/2019  Discharge date:  19    Code Status Order: Full Code   Advance Directives:   Advance Care Flowsheet Documentation     Date/Time Healthcare Directive Type of Healthcare Directive Copy in [address] Agent's Name Healthcare Agent's Phone Number    [phone]  Yes, patient has an advance directive for healthcare treatment  Durable power of  for health care;Living will  No, copy requested from family  [address]  [phone]          Admitting Physician:  Debra Wahl MD  PCP: Corby Flaherty MD    Discharging Nurse: [facility] Unit/Room#: 6661/0297-88  Discharging Unit Phone Number: [phone]    Emergency Contact:   Extended Emergency Contact Information  Primary Emergency Contact: Naren Katiaconcha ALEJANDRE  Address: [address] [place] [address] [address] Phone: [phone]  Work Phone: [phone]  Mobile Phone: [phone]  Relation: Spouse  Secondary Emergency Contact: Allen Liang [facility] [address] Phone: [phone]  Mobile Phone: [phone]  Relation: Child    Past Surgical History:  Past Surgical History:   Procedure Laterality Date    ARTERIAL ANEURYSM REPAIR  2010    right popliteal artery aneurysm repair    BRONCHOSCOPY  2019    left endobronchial tumor    BRONCHOSCOPY N/A 2019    BRONCHOSCOPY ALVEOLAR LAVAGE performed by Genesis Bailey MD at [address] [place] [address]  2019    BRONCHOSCOPY/TRANSBRONCHIAL LUNG BIOPSY performed by Genesis Bailey MD at [address] [place] [address] N/A 2019    EBUS ULTRASOUND W/ ANESTHESIA AND NEEDLE BIOPSY performed by Joslyn Estevez MD at [address] [place] [address]  2019    BRONCHOSCOPY ALVEOLAR LAVAGE performed by Joslyn Estevez MD at [address] [place] [address]  BRONCHOSCOPY  1/23/2019    BRONCHOSCOPY BIOPSY BRONCHUS performed by Jhonathan Raman MD at [address] [place] [address]  1/23/2019    BRONCHOSCOPY BRUSHINGS performed by Jhonathan Raman MD at [address] [place] [address]  1/23/2019    BRONCHOSCOPY/TRANSBRONCHIAL NEEDLE BIOPSY performed by Jhonathan Raman MD at [address] [place] [address]  1/23/2019    BRONCHOSCOPY/TRANSBRONCHIAL NEEDLE BIOPSY ADDL LOBE performed by Jhonathan Raman MD at [facility]  10/08/01 12/20/04    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    COLONOSCOPY  4/4/14    wnl    CORONARY ANGIOPLASTY WITH STENT PLACEMENT      FINGER FRACTURE SURGERY      left middle finger    INSERTION / REMOVAL / REPLACEMENT VENOUS ACCESS CATHETER Right 1/23/2019    RIGHT SUBCLAVIAN VEIN PORT A CATH PLACEMENT performed by Jannei Vaughan MD at [address] [place] [address]  2/10/2014    BX duodenum, atrum, distal esophagus    UPPER GASTROINTESTINAL ENDOSCOPY  4/4/14    BX gastric    UPPER GASTROINTESTINAL ENDOSCOPY  11/4/15       Immunization History:   Immunization History   Administered Date(s) Administered    Influenza Vaccine, unspecified formulation 10/21/2017    Influenza Virus Vaccine 10/26/2013    Influenza, High Dose (Fluzone 65 yrs and older) 09/15/2014, 10/05/2015, 10/31/2016, 09/23/2018    Pneumococcal 13-valent Conjugate ([id number]) 04/18/2016    Pneumococcal Polysaccharide ([id number]) 09/09/2013       Active Problems:  Patient Active Problem List   Diagnosis Code    Elevated fasting glucose R73.01    CAD (coronary artery disease) I25.10    PAD (peripheral artery disease) ([facility]) I73.9    Essential hypertension I10    Mixed hyperlipidemia E78.2    Sprain of right foot S93.601A    Sprain of anterior talofibular ligament of right ankle S93.491A    Closed nondisplaced fracture of fifth right metatarsal bone S92.354A    Closed fracture of proximal lateral malleolus of ankle with routine healing S82.63XD    Right foot pain M79.671    Right ankle pain M25.571    Nondisplaced fracture of fifth right metatarsal bone with routine healing S92.354D    History of pulmonary emphysema Z87.09    Chronic obstructive pulmonary disease with (acute) exacerbation (HCC) J44.1    Pulmonary nodule, left R91.1    Abnormal bone scan of thoracic spine R93.7    Back pain M54.9    Visit for monitoring Plavix therapy Z51.81, Z79.02    History of ventricular tachycardia Z86.79    Gallstones K80.20    Hemoptysis R04.2    Former smoker Z87.891    Elevated PSA R97.20    Adenocarcinoma of left lung (HCC) C34.92    Acute diverticulitis K57.92    Pancytopenia (HCC) U88.071    Diverticulitis of large intestine with perforation and abscess without bleeding K57.20    Diverticulitis K57.92       Isolation/Infection:   Isolation          No Isolation            Nurse Assessment:  Last Vital Signs: /67   Pulse 72   Temp 98. 114 °F (36.7 °C)   Resp 16   Ht 5' 9\" (1.753 m)   Wt 182 lb (82.6 kg)   SpO2 95%   BMI 26.88 kg/m²     Last documented pain score (0-10 scale): Pain Level: 2  Last Weight:   Wt Readings from Last 1 Encounters:   03/07/19 182 lb (82.6 kg)     Mental Status:  oriented    IV Access:  - PICC - site  L Upper Arm, insertion date: 0    Nursing Mobility/ADLs:  Walking   Assisted  Transfer  Assisted  Bathing  Independent  Dressing  Assisted  Toileting  Assisted  Feeding  Assisted  Med Admin  Assisted  Med Delivery   whole    Wound Care Documentation and Therapy:  Wound 02/19/19 Back Mid Radiation burn  (Active)   Wound Image   2/20/2019  2:18 PM   Wound Burn 3/8/2019 12:00 AM   Dressing Status Clean;Dry; Intact 3/8/2019 12:00 AM   Dressing Changed Changed/New 2/20/2019  2:18 PM   Dressing/Treatment Other (comment) 3/8/2019 12:00 AM   Wound Cleansed Rinsed/Irrigated with saline 2/20/2019  2:18 PM   Dressing Change Due 02/23/19 2/20/2019  2:18 PM   Wound Length (cm) 19 cm 2/20/2019  2:18 PM

## 2019-03-08 NOTE — PROGRESS NOTES
Pt alert and orientated. Call light within reach. No complaints at this time and will continue to monitor. Madeline

## 2019-03-08 NOTE — PROGRESS NOTES
Hospitalist Progress Note      PCP: Don Parks MD    Date of Admission: 3/7/2019    Chief Complaint: Abdominal pain     Hospital Course:  69 y.o. male who presented to [facility] with LLQ abdominal pain. Pt. Was recently d/c'd for diverticulitis, states pain never went away. C/o low grade fevers for 2 weeks. States pain not as severe as previous admission. Had chemo on Friday, states received neulasta. Denies cp, n/v, sob. Subjective:   Pt is on RA. Afebrile. No N/V. + BM this morning. No chest pain or SOB. Medications:  Reviewed    Infusion Medications    dextrose      sodium chloride 75 mL/hr at 03/07/19 2323     Scheduled Medications    tbo-filgrastim  480 mcg Subcutaneous QPM    piperacillin-tazobactam  3.375 g Intravenous Q8H    [START ON 3/9/2019] clopidogrel  75 mg Oral Daily    insulin lispro  0-6 Units Subcutaneous TID WC    insulin lispro  0-3 Units Subcutaneous Nightly    folic acid  1 mg Oral Daily    oyster shell calcium/vitamin D  1 tablet Oral Daily    mupirocin   Topical TID    mupirocin   Nasal TID    silver sulfADIAZINE   Topical Daily    amiodarone  100 mg Oral Daily    aspirin  81 mg Oral Daily    sodium chloride flush  10 mL Intravenous 2 times per day    enoxaparin  40 mg Subcutaneous Daily     PRN Meds: glucose, dextrose, glucagon (rDNA), dextrose, sodium chloride flush, magnesium hydroxide, ondansetron, acetaminophen, morphine      Intake/Output Summary (Last 24 hours) at 3/8/2019 1556  Last data filed at 3/8/2019 1528  Gross per 24 hour   Intake 0 ml   Output 200 ml   Net -200 ml       Physical Exam Performed:    /76   Pulse 70   Temp 97.8 °F (36.6 °C) (Oral)   Resp 16   Ht 5' 9\" (1.753 m)   Wt 182 lb (82.6 kg)   SpO2 96%   BMI 26.88 kg/m²     General appearance: No apparent distress, appears stated age and cooperative. HEENT: Pupils equal, round, and reactive to light. Conjunctivae/corneas clear.   Neck: Supple, with full range of motion. No jugular venous distention. Trachea midline. Respiratory:  Normal respiratory effort. Clear to auscultation, bilaterally without Rales/Wheezes/Rhonchi. Cardiovascular: Regular rate and rhythm with normal S1/S2 without murmurs, rubs or gallops. Abdomen: Soft, non-tender, non-distended with normal bowel sounds. Musculoskeletal: No clubbing, cyanosis or edema bilaterally. Full range of motion without deformity. Skin: Skin color, texture, turgor normal.  Radiation changes to back. Neurologic:  Neurovascularly intact without any focal sensory/motor deficits. Cranial nerves: II-XII intact, grossly non-focal.  Psychiatric: Alert and oriented, thought content appropriate, normal insight  Capillary Refill: Brisk,< 3 seconds   Peripheral Pulses: +2 palpable, equal bilaterally       Labs:   Recent Labs     03/07/19 1821 03/08/19  0510   WBC 1.8* 1.4*   HGB 9.1* 8.6*   HCT 26.0* 24.6*    146     Recent Labs     03/07/19 1821 03/08/19  0510   * 127*   K 4.7 5.2*   CL 92* 96*   CO2 21 21   BUN 15 11   CREATININE 0.9 0.7*   CALCIUM 7.7* 7.2*     Recent Labs     03/07/19 1821 03/08/19  0510   AST 15 14*   ALT 15 14   BILITOT 0.4 0.4   ALKPHOS 55 51     Recent Labs     03/07/19 1826 03/08/19  0510   INR 1.28* 1.25*     Urinalysis:      Lab Results   Component Value Date    NITRU Negative 03/07/2019    WBCUA 0-2 02/19/2019    RBCUA 0-2 02/19/2019    BLOODU Negative 03/07/2019    SPECGRAV 1.015 03/07/2019    GLUCOSEU Negative 03/07/2019       Radiology:  CT ABDOMEN PELVIS W IV CONTRAST Additional Contrast? None   Final Result   Acute perforated descending colon diverticulitis. There is free air in the   abdomen and there is a small extraluminal gas and fluid collection/abscess   adjacent to the descending colon measuring 4.1 cm in AP diameter and 6 cm in   length. There is diverticulosis of the entire colon. Cholelithiasis.       4.7 cm infrarenal abdominal aortic aneurysm status post

## 2019-03-08 NOTE — PROGRESS NOTES
Patient oriented to room, denied questions. Family at bedside. Four eye assessment with Cesario RN and Leeanne Hogan RN. Multiple stage 2 pressure sores and scabbing on sacrum, preventive in place. Scarring on back, from radiation, improving per patient and family. Assessment completed and documented. VSS. A/ox4. C/o of discomfort in LLQ, 2/10 on pain scale. Ordered morphine PRN, patient said he will call when he needs it. IV fluids and antibiotics running. [place] port flushed, blood return noted. LLQ tender, non distended. BS active. NPO. Denies further needs at this time. Bed locked and in lowest position. Bedside table and call light within reach. Will continue to monitor.

## 2019-03-08 NOTE — PROGRESS NOTES
4 Eyes Skin Assessment     The patient is being assess for  Admission    I agree that 2 RN's have performed a thorough Head to Toe Skin Assessment on the patient. ALL assessment sites listed below have been assessed. Areas assessed by both nurses: Abena Segura / Joy Rodríguez  [x]   Head, Face, and Ears   [x]   Shoulders, Back, and Chest  [x]   Arms, Elbows, and Hands   [x]   Coccyx, Sacrum, and IschIum  [x]   Legs, Feet, and Heels        Does the Patient have Skin Breakdown?   Yes LDA WOUND CARE was Initiated documentation include the [facility]-wound, Wound Assessment, Measurements, Dressing Treatment, Drainage, and Color\",         Jared Prevention initiated:  No   Wound Care Orders initiated:  Yes      [address]  nurse consulted for Pressure Injury (Stage 3,4, Unstageable, DTI, NWPT, and Complex wounds), New and Established Ostomies:  Yes      Nurse 1 eSignature: Electronically signed by Iris Troy RN on 3/8/19 at 3:04 AM    **SHARE this note so that the co-signing nurse is able to place an [address] [place] [address] RN  Nurse 2 eSignature: Electronically signed by Abena Segura RN on 3/8/19 at 4:39 AM

## 2019-03-08 NOTE — CONSULTS
BRONCHOSCOPY  1/18/2019    BRONCHOSCOPY/TRANSBRONCHIAL LUNG BIOPSY performed by Brisa Machado MD at [address] [place] [address] N/A 1/23/2019    EBUS ULTRASOUND W/ ANESTHESIA AND NEEDLE BIOPSY performed by Loy Councilman, MD at [address] [place] [address]  1/23/2019    BRONCHOSCOPY ALVEOLAR LAVAGE performed by Loy Councilman, MD at [address] [place] [address]  1/23/2019    BRONCHOSCOPY BIOPSY BRONCHUS performed by Loy Councilman, MD at [address] [place] [address]  1/23/2019    BRONCHOSCOPY BRUSHINGS performed by Loy Councilman, MD at [address] [place] [address]  1/23/2019    BRONCHOSCOPY/TRANSBRONCHIAL NEEDLE BIOPSY performed by Loy Councilman, MD at [address] [place] [address]  1/23/2019    BRONCHOSCOPY/TRANSBRONCHIAL NEEDLE BIOPSY ADDL LOBE performed by Loy Councilman, MD at [facility]  10/08/01 12/20/04    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    COLONOSCOPY  4/4/14    wnl    CORONARY ANGIOPLASTY WITH STENT PLACEMENT      FINGER FRACTURE SURGERY      left middle finger    INSERTION / REMOVAL / REPLACEMENT VENOUS ACCESS CATHETER Right 1/23/2019    RIGHT SUBCLAVIAN VEIN PORT A CATH PLACEMENT performed by Zulay Lyles MD at [address] [place] [address]  2/10/2014    BX duodenum, atrum, distal esophagus    UPPER GASTROINTESTINAL ENDOSCOPY  4/4/14    BX gastric    UPPER GASTROINTESTINAL ENDOSCOPY  11/4/15       Current Medications:    Current Facility-Administered Medications   Medication Dose Route Frequency Provider Last Rate Last Dose    Tbo-Filgrastim (GRANIX) injection 480 mcg  480 mcg Subcutaneous QPM DEBRA Haney - CNP        piperacillin-tazobactam (ZOSYN) 3.375 g in dextrose 5 % 50 mL IVPB extended infusion (mini-bag)  3.375 g Intravenous Q8H DEBRA De Guzman - CNP 12.5 mL/hr at 03/08/19 1023 3.375 g at 03/08/19 1023    glucose (GLUTOSE) 40 % oral gel 15 g  15 g Oral PRN DEBRA De Gumzan - CNP  dextrose 50 % solution 12.5 g  12.5 g Intravenous  [facility], APRN - CNP        glucagon (rDNA) injection 1 mg  1 mg Intramuscular PRN Taryn Alejandra, DEBRA - CNP        dextrose 5 % solution  100 mL/hr Intravenous  [facility], APRREID - [facility]        [START ON 3/9/2019] clopidogrel (PLAVIX) tablet 75 mg  75 mg Oral Daily Taryn Alejandra APRN - CNP        insulin lispro (HUMALOG) injection vial 0-6 Units  0-6 Units Subcutaneous TID WC Taryn Alejandra, APRN - CNP        insulin lispro (HUMALOG) injection vial 0-3 Units  0-3 Units Subcutaneous Nightly Taryn Alejandra, APRN - CNP        folic acid (FOLVITE) tablet 1 mg  1 mg Oral Daily Orene Fling, APRN - CNP        oyster shell calcium/vitamin D 250-125 MG-UNIT per tablet 250 mg  1 tablet Oral Daily Orene Fling, DEBRA - CNP        mupirocin (BACTROBAN) 2 % ointment   Topical TID Orene Fling, DEBRA - CNP        mupirocin (BACTROBAN) 2 % ointment   Nasal TID Orene Fling, APRN - CNP        amiodarone (CORDARONE) tablet 100 mg  100 mg Oral Daily Anahy Arango MD   100 mg at 03/08/19 1022    aspirin EC tablet 81 mg  81 mg Oral Daily Anahy Arango MD        sodium chloride flush 0.9 % injection 10 mL  10 mL Intravenous 2 times per day Anahy Arango MD   10 mL at 03/08/19 1028    sodium chloride flush 0.9 % injection 10 mL  10 mL Intravenous PRN Anahy Arango MD        magnesium hydroxide (MILK OF MAGNESIA) 400 MG/5ML suspension 30 mL  30 mL Oral Daily PRN Anahy Arango MD        ondansetron [facility]) injection 4 mg  4 mg Intravenous Q6H PRN Anahy Arango MD        enoxaparin (LOVENOX) injection 40 mg  40 mg Subcutaneous Daily Anahy Arango MD        0.9 % sodium chloride infusion   Intravenous Continuous Anahy Arango MD 75 mL/hr at 03/07/19 2323      acetaminophen (TYLENOL) tablet 650 mg  650 mg Oral Q4H PRN Anahy Arango MD        morphine (PF) injection 2 mg  2 mg Intravenous Q4H PRN Phosphorus:  No components found for: PO4  Calcium:  No components found for: CA  CBC:    Lab Results   Component Value Date    WBC 1.4 03/08/2019    RBC 2.84 03/08/2019    HGB 8.6 03/08/2019    HCT 24.6 03/08/2019    MCV 86.8 03/08/2019    RDW 14.5 03/08/2019     03/08/2019     DIFF:  Lab Results   Component Value Date    MCV 86.8 03/08/2019    RDW 14.5 03/08/2019      Acute perforated descending colon diverticulitis. Ana Maria Sans is free air in the   abdomen and there is a small extraluminal gas and fluid collection/abscess   adjacent to the descending colon measuring 4.1 cm in AP diameter and 6 cm in   length.       There is diverticulosis of the entire colon.       Cholelithiasis.       4.7 cm infrarenal abdominal aortic aneurysm status post endograft repair with   a patent aorto bi-iliac graft. Ana Maria Sans is suspicion of an endoleak with blush   of contrast enhancement suspected in the inferior aspect of the native   aneurysm.  As a noncontrast study was not performed, calcification of the   intraluminal thrombus accounting for the apparent enhancement is not   excluded.  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  Comparison with a noncontrast CT examination would be   helpful.       Critical results were called by Dr. Virgilio Hubbard. MD Elizabeth to [facility] on   3/7/2019 at 20:35.        Acute perforated descending colon diverticulitis. Ana Maria Sans is free air in the   abdomen and there is a small extraluminal gas and fluid collection/abscess   adjacent to the descending colon measuring 4.1 cm in AP diameter and 6 cm in   length.       There is diverticulosis of the entire colon.       Cholelithiasis.       4.7 cm infrarenal abdominal aortic aneurysm status post endograft repair with   a patent aorto bi-iliac graft. Ana Maria Sans is suspicion of an endoleak with blush   of contrast enhancement suspected in the inferior aspect of the native   aneurysm.  As a noncontrast study was not surgically healed. He is not due for Cycle 3 Carbo/Alimta/Keytruda until 3-22. Can delay a couple weeks to allow for surgical healing.   - Stop Dex- this is a pre med for Alimta chemo, CT head no brain mets on initial work up   - cont folate while on alimta (folate antagonist)     HTN  - holding home Lisinopril due to Hyperkalemia , creat stable   - Was on Norvasc at one point, but pt admits to stopping this drug weeks ago     Right chin crusting skin lesion with honey-like drainage  - Possible Impetigo  - Bactroban started topically and nasal swabs ordered   - On IV Vanco (staph aureus coverage/MRSA)     Radiation Dermatitis  - Silvadene cream to back  - Keep open to air  - Per patient and family, rash improving     Jaw pain, right  - s/p Zometa with poor dentition   - No signs of overt ONJ- no visible bone to jaw line, will need to cont to monitor - on Zosyn   - cont calcium and vit d         Home health care requested,  is aware. Patient confused regarding medications. Would benefit from RN eval in the home with organization of pills in a weekly box. Discussed with Dr. Kathy Meza. He will see the patient in the AM.     Thank you for asking me to see the patient.        Yajaira Raymundo, CNP  [facility]  Please Contact Through [employer]

## 2019-03-08 NOTE — PLAN OF CARE
Last [facility] office note from 3-5-2019     Patient Name: Aaliyah Vyas   Patient : 1945   Patient MRN: [id number]   Primary Oncologist: Vilma Harris   Referring Physician: Pankaj Tracey   Date of Service: 2019   Chief Complaint  Non-small cell lung cancer (disorder) ( Stage Date: Unknown, Stage IVB  Histopathologic Type: Adenocarcinoma; ROS1 Gene: Unknown; PD-L1: Unknown; BRAF Mutation: Unknown; EGFR Expression: Unknown; ALK (FISH): Unknown; TRK gene: Unknown; )  Problem List  Non-small cell lung cancer (disorder) ( Stage Date: Unknown, Stage IVB  Histopathologic Type: Adenocarcinoma; ROS1 Gene: Unknown; PD-L1: Unknown; BRAF Mutation: Unknown; EGFR Expression: Unknown; ALK (FISH): Unknown; TRK gene: Unknown; )   AAA   Abnormal radiological finding   Agranulocytosis due to cancer chemotherapy   Anemia (disorder)   Bone metastasis   CAD   Drug induced thrombocytopenia   Essential hypertension   Hyperlipidemia   Hypotension   ISCHEMIC CARDIOMYOPATHY (HEART CONDITION)   Pain due to neoplastic disease (finding)   Peripheral vascular disease (disorder)   URI    HPI  (Oncologic History)    He presented with several months of progressive upper mid back pain. On 18, CXR demonstrated sclerosis of T6. On 12/10/18, bone scan demonstrated extensive abnormal radionuclide uptake at T6 appearing to extend into the transverse process. This was felt to possibly be Paget's disease versus blastic metastatic disease. There was uptake in the medial right 5th rib and bilateral mandible.     On 18, CT C/A/P demonstrated a 2.7cm opacity posterior to the left main pulmonary artery at the hilar level, multifocal ill defined airspace opacity of the superior left lower lobe, scattered sub-5mm pulmonary nodules, small mediastinal nodes, small sclerotic density in the sternum, dense sclerosis throughout T6, right T5 transverse process sclerosis, punctate right 4th rib sclerosis, abnormal tissue surrounding the T6 vertebral body, 1.5cm lobular lesion of left adrenal, prostate enlargement with several calcifications, sclerotic foci of the left inferior pubic ramus, left pubic symphysis, posterior left hemipelvis, , left hemisacrum, left L3 pedicle, and anterior L2. On 1/16/19, he was admitted in order to transition off Plavix and go on an Integrilin drip to allow for tissue acquisition. On 1/17/19, CT Chest demonstrated a stable masslike density in the left infrahilar region, mild post obstructive atelectasis in the left lower lobe, several stable small pulmonary nodules, and stable sclerotic bone lesions most prominent at T6 with a paravertebral soft tissue component. On 1/18/19, he underwent bronchoscopy demonstrating extrinsic compression of the left lower lobe superior subsegment. Left lower lobe biopsy and BAL demonstrated adenocarcinoma. On 1/22/19, CT guided biopsy was performed of T6 demonstrating adenocarcinoma of lung origin. From 1/25/19 - 2/7/19 he received 3000 cGy in 10 fractions to the T5-T6/Left upper lobe region. On 2/5/19, PET/CT demonstrated focal hypermetabolism of the T6 vertebral body and hypermetabolism of the posterior left hilum. On 2/8/19, he started carbo/Alimta/Keytruda.   Previous Therapies    Non-small cell lung cancer (disorder)  Pembrolizumab + Pemetrexed + Carboplatin Q21D[Assoc Dx: Non-small cell lung cancer (disorder) LOT: 1st Line Metastatic or Recurrent ]  Medication Start End   Pembrolizumab  mg 02/08/2019 04/12/2019   Pemetrexed IV 1050 mg (500 mg/m2) 02/08/2019 04/12/2019   Carboplatin  mg (5 AUC) 02/08/2019 04/12/2019   Cyanocobalamin IM 1000 mcg 04/12/2019 04/12/2019   Cyanocobalamin IM 1000 mcg 02/08/2019 02/08/2019   Dexamethasone Oral 4 mg bid 11/84/0077 86/87/0561   Folic Acid Oral 1 mg 52/09/1661 04/12/2019   Palonosetron IV 0.25 mg 02/08/2019 04/12/2019   Dexamethasone IV 10 mg 02/08/2019 04/12/2019   Aprepitant  mg 2019   Vitamin B12 Pre-Pemetrexed[Assoc Dx: Non-small cell lung cancer (disorder) LOT: Toxicity Management ]  Medication Start End   Cyanocobalamin IM 1000 mcg 2019   Zoledronic acid (Zometa) Q28D[Assoc Dx: Non-small cell lung cancer (disorder) LOT: 1st Line Metastatic or Recurrent ]  Medication Start End   Zometa (Zoledronic Acid IV) 4 mg 2019 04/15/2022   Calcium Carbonate Oral 500 mg calcium (1,250 mg) tablet 1 tablet 2019 04/15/2022   Cholecalciferol Oral 400 unit 2019 04/15/2022   [facility] Hydration[Assoc Dx: Non-small cell lung cancer (disorder) LOT: Toxicity Management ]  Medication Start End   Sodium Chloride IV 0.9 % 1000 mL 2019 02/15/2019           Current Therapy   Pembrolizumab + Pemetrexed + Carboplatin Q21D Cycle Length: 21 Number Cycles: 4 Start: C1D1 on 02/15/2019 Assoc Dx: Non-small cell lung cancer (disorder) LOT: 1st Line Metastatic or Recurrent 2019 C1 D1  Pembrolizumab IV, 200 mg   Pemetrexed IV, 1050 mg (500 mg/m2)   Carboplatin IV, 540 mg (5 AUC)   Cyanocobalamin IM,  mcg   Cyanocobalamin IM, 1000 mcg   Dexamethasone Oral, 4 mg bid   Folic Acid Oral, 1 mg  Vitamin B12 Pre-Pemetrexed Cycle Length: 35 Number Cycles: 10 Start: Nadia Brown on 2019 Assoc Dx: Non-small cell lung cancer (disorder) LOT: Toxicity Management 2019 C1 D1  Cyanocobalamin IM, 1000 mcg  Zoledronic acid (Zometa) Q28D Cycle Length: 28 Number Cycles: 42 Start: Nadia Brown on 02/15/2019 Assoc Dx: Non-small cell lung cancer (disorder) LOT: 1st Line Metastatic or Recurrent 2021 C21 D1  Zometa (Zoledronic Acid IV), 4 mg   Calcium Carbonate Oral, 500 mg calcium (1,250 mg) tablet 1 tablet   Cholecalciferol Oral, 400 unit  [facility] Hydration Cycle Length: 1 Number Cycles: 2 Start: Nadia Brown on 2019 Assoc Dx: Non-small cell lung cancer (disorder) LOT: Toxicity Management 2019 C2 D1  Sodium Chloride IV 0.9 %,  mL, Dose modified  Pegfilgrastim Q21D Cycle Length: 21 Number Cycles: 5 Start: C1D1 on 2019 Assoc Dx: Agranulocytosis due to cancer chemotherapy LOT: Toxicity Management 2019 C1 D1  Pegfilgrastim Subcutaneous (via wearable injector), 6 mg  Interval History   Vero Rea returns for a sick visit. He is again having LLQ pain similar to the pain he was having when he was recently admitted to the hospital with diverticulitis. He denies fevers, vomiting or back pain. He had a large bowel movement today, he had not had a large bowel movement for about a week prior to today. Pain is a 4 out of 10 and is exacerbated when he applies pressure to LLQ. Review of Systems  Per interval history; otherwise 10 point ROS is negative   Vital Signs  Blood pressure: 128/74, L arm, Pulse: 72, Temperature: 98.7 F, Respirations: 12, O2 sat: , Pain Scale: 4, Height: 69.5 in, Weight: 182.2 lb, BSA: 2.01, BMI: 26.52 kg/m2   Physical Exam   ECO Normal activity. Fully active, able to carry on all pre-disease performance without restriction. (Date: 02/15/2019)  CONSTITUTIONAL: Awake, alert, cooperative, no apparent distress. Appears his stated age. HEAD: Normocephalic, without obvious abnormality, atraumatic  EYES: Pupils equally round, extraocular movements intact   ENT: No visible external lesions of the ears or nose, mucous membranes moist   NECK: Symmetrical, no jugular venous distension   HEMATOLOGIC/LYMPHATIC: No cervical, supraclavicular lymphadenopathy   THORACIC: No increased work of breathing   CARDIOVASCULAR: Regular rate and rhythm   ABDOMEN: Nondistended   MUSCULOSKELETAL: Full range of motion noted without obvious loss of muscle tone   NEUROLOGIC: Awake, alert, conversing appropriately. Motor skills grossly intact. SKIN: Patchy moist desquamation of the mid back region overlying the spine. No obvious induration, edema, purulent drainage, or excessive erythema outside of radiation field region. EXTREMITIES: No cyanosis, edema, or clubbing.       Labs  CBC Lab Results 03/05/2019 03/01/2019 02/22/2019 02/14/2019 02/08/2019 01/23/2019   CBC                     WBC x 10^3/uL 11.0 (H) 11.7 (H)    4.2 (L) 9.9 (H)      RBC x 10^6/uL 3.49 (L) 3.22 (L)    4.72 4.30 (L)      HGB g/dL 10.5 (L) 9.6 (L)    14.5 13.3 (L)      HCT % 31.1 (L) 28.4 (L)    41.1 39.2 (L)      MCV fL 89.1 88.2    87.1 91.2      MCH pg 30.1 29.8    30.7 30.9      MCHC g/dL 33.8 33.8    35.3 33.9      RDW-CV, % 14.6 (H) 14.1    12.9 12.6      PLT x 10^3/uL 224.0 147.0 (L)    51.0 (L) 104.0 (L)      Jeanne % 87.5 (H) 72.0 (H)    88.1 (H) 93.7 (H)      LY % 11.7 (L) 22.9    6.7 (L) 2.1 (L)      MO % 0.5 (L) 4.9 (L)    4.3 (L) 4.0 (L)      EO % 0.2 (L) 0.0 (L)    0.7 (L) 0.0 (L)      BA % 0.1 (L) 0.2    0.2 0.2      Jeanne # (ANC) x 10^3/uL 9.59 (H) 8.42 (H)    3.67 9.23 (H)      LY # x 10^3/uL 1.28 (L) 2.68    0.28 (L) 0.21 (L)      MO # x 10^3/uL 0.06 (L) 0.57    0.18 (L) 0.39      EO # x 10^3/uL 0.02 (L) 0.00 (L)    0.03 (L) 0.00 (L)      BA # x 10^3/uL 0.01 0.02    0.01 0.02      CMP   Lab Results 03/05/2019 03/01/2019 02/22/2019 02/14/2019 02/08/2019 01/23/2019   Chemistries                     Glucose mg/dL    304 (H)       285 (H)      BUN mg/dL    13       22      Creatinine mg/dL    0.8       0.9      Sodium mmol/L    136       137      Potassium mmol/L    3.8       4.4      Chloride mmol/L    99       95 (L)      CO2 mmol/L    21       25      Calcium mg/dL    8.3       8.7      Albumin g/dL    2.7 (L)       3.0 (L)      Total protein g/dL    6.0 (L)       6.2 (L)      Bilirubin, total mg/dL    0.6       1.0      Alkaline phosphatase U/L    63       75      AST/SGOT U/L    30       19      ALT/SGPT U/L    40       35      GFR non-African American, estimated mL/min/1.73m2    >60.0       >60.0      GFR African American, estimated mL/min/1.73m2    >60.0       >60.0      Imaging    The patient's recent imaging has been reviewed as described in the interval history.    OCM - Patient Care Management   Pain Scale Value 4   Pain Management Plan:       ECOG Status 0 Normal activity. Fully active, able to carry on all pre-disease performance without restriction. (Date: 03/05/2019)   Depression Status Was screened; Outcome positive: No; Screening Date: 03/05/2019; Screening Tool: Patient Health Questionnaire (PHQ9)   Psycho-social PHQ-9 Follow-up Plan (if applicable):   Assessment & Plan  1. Metastatic Adenocarcinoma of LLL- Sclerotic lesion at T6 - First identified on a routine chest xray to workup a cough. A follow up bone scan on 12/10/2018 showed extensive uptake at T6 involving the transverse processes. A CT scan performed on 1/02/2019 showed indeterminate lung nodules, airspace disease in the superior segment of the left lower lobe with parenchymal consolidation posterior to the left hilum, which could possibly be adenopathy and a sclerotic lesion at T6 with soft tissue adjacent to it. Admitted to [facility] 1/6/2019 to bridge to [facility] so biopsy lesion at T6. Bronchoscopy with Dr. America Giles 1/18/2019, showing adenocarcinoma of the LLL of the lung. CT guided biopsy of T6 1/22/2019, showing metastasis. Port placed 1/23/2019 with Dr. Asif Pena RT to T5-T6/left lung 1/25/2019-2/7/2019. Started Carbo/Alimta/Keytruda on 2/8/2019    2. Prevention of SRE - Started Zoledronic acid on 2/08/2019. Giving every 6 weeks for patient convenience. 3. Cancer related pain - Improved continue Oxycodone/APAP 5/325 mg PRN    4. CAD/MI - On Plavix. 5. Hypotension - Has required IVF in the office previously. 6. LLQ pain- Cipro and Flagyl prescribed today. Patient was instructed to call if pain worsened, if he developed a fever or if he stopped passing gas. Discussed with Dr. Dilshad Gonsalez, he was agreeable to this plan. 7. Plan - RTO 3 weeks for #3 w/ Zometa. Scan after #4. Maci Renner Recent imaging and labs were reviewed and discussed with the patient.      Emerita Burt, CNP       Electronically signed by Jonna Otoole MD 03/06/2019 06:26 EST

## 2019-03-08 NOTE — CONSULTS
Vascular Surgery    Chart and CT reviewed. S/P Endovascular AAA repair several years ago by Dr Carmen Alejandre at [facility]. Continues to follow with him every 6 months. CT performed in ED- reading suggesting possible Type II endoleak. I have personally reviewed. Difficult to tell for certain as no non contrast scans done. If endoleak is present it is likely from DAVID. Duplex performed in October however showed no flow in native aneurysm sac. Regardless this does not require any treatment and is in no way responsible for patients current condition. Recc further f/u with Dr Carmen Alejandre post discharge.

## 2019-03-08 NOTE — CONSULTS
Department of General Surgery Consult    PATIENT NAME: Kaylynn Mccullough OF BIRTH: 1945    ADMISSION DATE: 3/7/2019  5:45 PM      TODAY'S DATE: 3/8/2019    Reason for Consult:  Diverticulitis, recurrent    Chief Complaint: Abdominal pain    Requesting Physician:  Jumana Russo    HISTORY OF PRESENT ILLNESS:              The patient is a 69 y.o. male who presents with recurrent episode of sigmoid diverticultis. Was admitted last month w/ similar episode, managed w/ IV then PO abx. Complicating factor is that he has recently been diagnosed w/ Stage IV lung cancer and just recently received his 2d cycle of chemotherapy, resulting in significant immunosuppression. Current pain has not been as severe as during his last admission, but he has noted intermittent low-grade fevers. Seen in ED, had CT showing scattered free air and a gas-filled pocket adjacent to the inflamed colon at junction b/t sigmoid and descending colon.     Past Medical History:        Diagnosis Date    Benign neoplasm of colon 2001    Cancer 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    Right popliteal artery occlusion ([address] 11/2010       Past Surgical History:        Procedure Laterality Date    ARTERIAL ANEURYSM REPAIR  11/2010    right popliteal artery aneurysm repair    BRONCHOSCOPY  01/18/2019    left endobronchial tumor    BRONCHOSCOPY N/A 1/18/2019    BRONCHOSCOPY ALVEOLAR LAVAGE performed by Virgil Lozano MD at [address] [place] [address]  1/18/2019    BRONCHOSCOPY/TRANSBRONCHIAL LUNG BIOPSY performed by Virgil Lozano MD at [address] [place] [address] N/A 1/23/2019    EBUS ULTRASOUND W/ ANESTHESIA AND NEEDLE BIOPSY performed by Janet Francois MD at [address] [place] [address]  1/23/2019 BRONCHOSCOPY ALVEOLAR LAVAGE performed by Jhonathan Raman MD at [address] [place] [address]  1/23/2019    BRONCHOSCOPY BIOPSY BRONCHUS performed by Jhonathan Raman MD at [address] [place] [address]  1/23/2019    BRONCHOSCOPY BRUSHINGS performed by Jhonathan Raman MD at [address] [place] [address]  1/23/2019    BRONCHOSCOPY/TRANSBRONCHIAL NEEDLE BIOPSY performed by Jhonathan Raman MD at [address] [place] [address]  1/23/2019    BRONCHOSCOPY/TRANSBRONCHIAL NEEDLE BIOPSY ADDL LOBE performed by Jhonathan Raman MD at 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  2/10/2014    BX duodenum, atrum, distal esophagus    UPPER GASTROINTESTINAL ENDOSCOPY  4/4/14    BX gastric    UPPER GASTROINTESTINAL ENDOSCOPY  11/4/15       Current Medications:   Current Facility-Administered Medications: Tbo-Filgrastim (GRANIX) injection 480 mcg, 480 mcg, Subcutaneous, QPM  piperacillin-tazobactam (ZOSYN) 3.375 g in dextrose 5 % 50 mL IVPB extended infusion (mini-bag), 3.375 g, Intravenous, Q8H  glucose (GLUTOSE) 40 % oral gel 15 g, 15 g, Oral, PRN  dextrose 50 % solution 12.5 g, 12.5 g, Intravenous, PRN  glucagon (rDNA) injection 1 mg, 1 mg, Intramuscular, PRN  dextrose 5 % solution, 100 mL/hr, Intravenous, PRN  famotidine (PEPCID) injection 20 mg, 20 mg, Intravenous, BID  [START ON 3/9/2019] clopidogrel (PLAVIX) tablet 75 mg, 75 mg, Oral, Daily  insulin lispro (HUMALOG) injection vial 0-6 Units, 0-6 Units, Subcutaneous, TID WC  insulin lispro (HUMALOG) injection vial 0-3 Units, 0-3 Units, Subcutaneous, Nightly  amiodarone (CORDARONE) tablet 100 mg, 100 mg, Oral, Daily  aspirin EC tablet 81 mg, 81 mg, Oral, Daily  sodium chloride flush 0.9 % injection 10 mL, 10 mL, Intravenous, 2 times per day  sodium chloride flush 0.9 % injection 10 mL, 10 mL, Intravenous, PRN  magnesium hydroxide (MILK OF MAGNESIA) 400 MG/5ML suspension 30 mL, 30 mL, Oral, Daily PRN  ondansetron (ZOFRAN) injection 4 mg, 4 mg, Intravenous, Q6H PRN  enoxaparin (LOVENOX) injection 40 mg, 40 mg, Subcutaneous, Daily  0.9 % sodium chloride infusion, , Intravenous, Continuous  acetaminophen (TYLENOL) tablet 650 mg, 650 mg, Oral, Q4H PRN  dexamethasone (DECADRON) injection 4 mg, 4 mg, Intravenous, Q8H  morphine (PF) injection 2 mg, 2 mg, Intravenous, Q4H PRN  Prior to Admission medications    Medication Sig Start Date End Date Taking? Authorizing Provider   prochlorperazine (COMPAZINE) 25 MG suppository Place 25 mg rectally every 12 hours as needed for Nausea   Yes Historical Provider, MD   ondansetron (ZOFRAN) 8 MG tablet 02/01/2019Ondansetron 8 MG Oral Tablet [Zofran] orally1.0 tabletevery 8 hours02/01/2019Active 2/1/19  Yes Historical Provider, MD   lisinopril (PRINIVIL;ZESTRIL) 10 MG tablet Take 10 mg by mouth 2/15/19  Yes Historical Provider, MD   amLODIPine (NORVASC) 5 MG tablet Take 1 tablet by mouth daily 1/25/19  Yes Mauri Glover MD   simvastatin (ZOCOR) 40 MG tablet TAKE 1 TABLET BY MOUTH NIGHTLY 10/24/18  Yes Jr Quiroz MD   atenolol (TENORMIN) 100 MG tablet TAKE 1 TABLET BY MOUTH EVERY MORNING (INSTEAD OF THE METROPROLOL ER 50MG) 4/25/17  Yes Marj Graves MD   amiodarone (PACERONE) 100 MG tablet Take 100 mg by mouth daily   Yes Historical Provider, MD   famotidine (PEPCID) 20 MG tablet Take 20 mg by mouth 2 times daily. Yes Historical Provider, MD   aspirin 81 MG EC tablet Take 81 mg by mouth daily. Yes Historical Provider, MD   clopidogrel (PLAVIX) 75 MG tablet Take 75 mg by mouth daily.    Yes Historical Provider, MD   Multiple Vitamin (MULTIVITAMIN PO) Take  by mouth. Yes Historical Provider, MD   vitamin D (CHOLECALCIFEROL) 1000 UNIT TABS Take 1,000 Int'l Units by mouth daily. Yes Historical Provider, MD   dexamethasone (DECADRON) 4 MG tablet 4 mg 2/1/19   Historical Provider, MD   Aprepitant 130 MG/18ML EMUL 130 mg 3/1/19   Historical Provider, MD   folic acid (FOLVITE) 1 MG tablet Take 1 tablet by mouth daily 2/24/19   Mini Monroe MD   Psyllium (METAMUCIL) WAFR Take 6 tablets by mouth 2 times daily. 12/30/10   Historical Provider, MD        Allergies:  Patient has no known allergies. Social History:   TOBACCO:   reports that he quit smoking about 16 years ago. He smoked 0.00 packs per day for 0.00 years. He has never used smokeless tobacco.  ETOH:   reports that he drinks about 10.8 oz of alcohol per week. DRUGS:   reports that he does not use drugs. OCCUPATION:    Patient currently lives with family      Family History:        Adopted: Yes   Problem Relation Age of Onset    Stroke Mother     Schizophrenia Brother     Cancer Neg Hx        REVIEW OF SYSTEMS:  CONSTITUTIONAL:  positive for  fevers and malaise  HEENT:  negative  RESPIRATORY:  negative  CARDIOVASCULAR:  negative  GASTROINTESTINAL:  negative except for abdominal pain  GENITOURINARY:  negative  HEMATOLOGIC/LYMPHATIC:  negative  NEUROLOGICAL:  Negative  * All other ROS reviewed and negative. PHYSICAL EXAM:  VITALS:  /73   Pulse 71   Temp 98.9 °F (37.2 °C) (Oral)   Resp 16   Ht 5' 9\" (1.753 m)   Wt 182 lb (82.6 kg)   SpO2 94%   BMI 26.88 kg/m²   24HR INTAKE/OUTPUT:    I/O last 3 completed shifts: In: 0   Out: 200 [Urine:200]  No intake/output data recorded.     CONSTITUTIONAL:  alert, no apparent distress and normal weight  EYES:  PERRL, sclera clear  ENT:  Normocepalic,atraumatic, without obvious abnormality  NECK:  supple, symmetrical, trachea midline  LUNGS: Resp effort easy and unlabored, clear to auscultation  CARDIOVASCULAR:  NO JVD, regular IMPRESSION/RECOMMENDATIONS:    Recurrent colonic diverticulitis, most certainly due to immunosuppression. Currently is minimally symptomatic and overall hemodynamically stable, which should allow him to avoid emergent surgery. Question is what should be done with this portion of colon to allow him to proceed with his chemo at some point without risk of continued recurrent episodes of diverticulitis. We reviewed the CT w/ Radiology - with the predominant gas component of the abscess a percutaneous drain would likely not contribute much to the healing of the inflamed/ruptured colon. - cont IV abx   - ok for clear liquids   - will discuss w/ Oncology regarding potential timing for surgery in the near future, and, if we do proceed with surgery, should we attempt resection with anastomosis (which risks a postop leak while undergoing chemo in the future) or simple resection w/ colostomy. I discussed these issues in detail w/ patient and family, for over 1 hour of time. Pt appears to understand, asks appropriate questions, and agrees with the plan.     Yak[facility] Surgery  [id number]

## 2019-03-09 NOTE — PROGRESS NOTES
Conjunctivae/corneas clear. Neck: Supple, with full range of motion. No jugular venous distention. Trachea midline. Respiratory:  Normal respiratory effort. Clear to auscultation, bilaterally without Rales/Wheezes/Rhonchi. Cardiovascular: Regular rate and rhythm with normal S1/S2 without murmurs, rubs or gallops. Abdomen: Soft, non-tender, non-distended with normal bowel sounds. Musculoskeletal: No clubbing, cyanosis or edema bilaterally. Full range of motion without deformity. Skin: Skin color, texture, turgor normal.  Radiation changes to back. Neurologic:  Neurovascularly intact without any focal sensory/motor deficits. Cranial nerves: II-XII intact, grossly non-focal.  Psychiatric: Alert and oriented, thought content appropriate, normal insight  Capillary Refill: Brisk,< 3 seconds   Peripheral Pulses: +2 palpable, equal bilaterally       Labs:   Recent Labs     03/07/19 1821 03/08/19  0510 03/09/19  0522   WBC 1.8* 1.4* 1.8*   HGB 9.1* 8.6* 8.8*   HCT 26.0* 24.6* 24.7*    146 123*     Recent Labs     03/08/19  0510 03/08/19  1705 03/09/19  0522   * 127* 130*   K 5.2* 4.8 4.6   CL 96* 94* 97*   CO2 21 21 22   BUN 11 13 11   CREATININE 0.7* 0.6* 0.6*   CALCIUM 7.2* 7.7* 8.0*     Recent Labs     03/07/19  1821 03/08/19  0510   AST 15 14*   ALT 15 14   BILITOT 0.4 0.4   ALKPHOS 55 51     Recent Labs     03/07/19  1826 03/08/19  0510   INR 1.28* 1.25*     Urinalysis:      Lab Results   Component Value Date    NITRU Negative 03/07/2019    WBCUA 0-2 02/19/2019    RBCUA 0-2 02/19/2019    BLOODU Negative 03/07/2019    SPECGRAV 1.015 03/07/2019    GLUCOSEU Negative 03/07/2019       Radiology:  CT ABDOMEN PELVIS W IV CONTRAST Additional Contrast? None   Final Result   Acute perforated descending colon diverticulitis.   There is free air in the   abdomen and there is a small extraluminal gas and fluid collection/abscess   adjacent to the descending colon measuring 4.1 cm in AP diameter and 6 cm in length. There is diverticulosis of the entire colon. Cholelithiasis. 4.7 cm infrarenal abdominal aortic aneurysm status post endograft repair with   a patent aorto bi-iliac graft. There is suspicion of an endoleak with blush   of contrast enhancement suspected in the inferior aspect of the native   aneurysm. As a noncontrast study was not performed, calcification of the   intraluminal thrombus accounting for the apparent enhancement is not   excluded. 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 Comparison with a noncontrast CT examination would be   helpful. Critical results were called by Dr. Tori Mcdowell. MD [facility] to [facility] on   3/7/2019 at 20:35. Assessment/Plan:    Active Hospital Problems    Diagnosis Date Noted    Diverticulitis [K57.92] 03/07/2019    Diverticulitis of large intestine with perforation and abscess without bleeding [K57.20]        Acute perforated recurrent diverticulitis/pneumoperitoneum/abscess in setting of immunosuppression due to chemotherapy:   - CT A/P demonstrates acute perforated colon diverticulitis with free air with fluid collection.   - Continue IV Zosyn and IV fluids.   - General surgery consulted/following. Possible plan for surgery by mid-next week.      Adenocarcinoma of the LLL:  - With known mets to spine. Also question of mets to spleen incidentally noted on CT, though thought to be less likely. Hem/Onc is consulted/following. Chemo on hold in preparation for surgical healing.      Neutropenia due to chemotherapy:   - S/p Neulasta on 3/1. Hem/onc started Granix on 3/8. - Wound care for his radiation ulcer on his back. Acute hyponatremia (improving):  - Likely secondary to dehydration. Continue IV fluids. Monitor BMP closely.      Hypertension:  - Currently normotensive.  Holding BP meds for now.     Peripheral arterial disease:   - Per chart review on previous admission, pt cannot stop

## 2019-03-09 NOTE — PROGRESS NOTES
[facility]    PATIENT NAME: Soraya Wong     TODAY'S DATE: 3/9/2019    CHIEF COMPLAINT: none    INTERVAL HISTORY/HPI:    Pt continues to feel better, minimal LLQ/flank pain, taking clears well, is hungry. OBJECTIVE:  VITALS:  /77   Pulse 73   Temp 98 °F (36.7 °C) (Oral)   Resp 16   Ht 5' 9\" (1.753 m)   Wt 182 lb (82.6 kg)   SpO2 95%   BMI 26.88 kg/m²     INTAKE/OUTPUT:    I/O last 3 completed shifts: In: 960 [P.O.:960]  Out: -   No intake/output data recorded. CONSTITUTIONAL:  awake and alert  LUNGS:     ABDOMEN:   , soft, non-distended,       Data:  CBC:   Recent Labs     03/07/19 1821 03/08/19  0510 03/09/19  0522   WBC 1.8* 1.4* 1.8*   HGB 9.1* 8.6* 8.8*   HCT 26.0* 24.6* 24.7*    146 123*     BMP:    Recent Labs     03/08/19  0510 03/08/19  1705 03/09/19  0522   * 127* 130*   K 5.2* 4.8 4.6   CL 96* 94* 97*   CO2 21 21 22   BUN 11 13 11   CREATININE 0.7* 0.6* 0.6*   GLUCOSE 162* 165* 128*     Hepatic:   Recent Labs     03/07/19  1821 03/08/19  0510   AST 15 14*   ALT 15 14   BILITOT 0.4 0.4   ALKPHOS 55 51     Mag:    No results for input(s): MG in the last 72 hours. Phos:   No results for input(s): PHOS in the last 72 hours. INR:   Recent Labs     03/07/19 1826 03/08/19  0510   INR 1.28* 1.25*       Radiology Review:         ASSESSMENT AND PLAN:  Complicated recurrent diverticulitis in setting of acute immunosuppression due to chemotherapy. Acute symptoms resolving. Discussed case extensively w/ Dr Jareth Cantrell and reviewed plan, options again w/ patient and family   - will cont IV abx   - ok to advance to full liquids   - is off Plavix but needs anticoagulation for LLE vascular graft. As per prior admission, should restart Integrilin infusion; should stop Lovenox, especially w/ declining platelet counts   - presuming pt's hematologic indices normalize in next few days, will plan for surgical colonic resection +/- colostomy by mid-next week.   Per Oncology, he will be able to wait up to 2 months postop before resuming chemo, which should allow him a full surgical recovery.       ANGELA Poken KEBEDE

## 2019-03-09 NOTE — ONCOLOGY
ONCOLOGY HEMATOLOGY CARE PROGRESS NOTE      SUBJECTIVE:     Mucositis is his major concern. Abdomen feel improved. Wants to know if he can safely hold off on chemo to aloow healing from a surgery. ROS:   The remaining 10 point review of symptoms is unremarkable. OBJECTIVE        Physical    VITALS:  /77   Pulse 73   Temp 98 °F (36.7 °C) (Oral)   Resp 16   Ht 5' 9\" (1.753 m)   Wt 182 lb (82.6 kg)   SpO2 95%   BMI 26.88 kg/m²   TEMPERATURE:  Current - Temp: 98 °F (36.7 °C); Max - Temp  Av.8 °F (36.6 °C)  Min: 97.5 °F (36.4 °C)  Max: 98.114 °F (36.7 °C)  PULSE OXIMETRY RANGE: SpO2  Av.9 %  Min: 95 %  Max: 97 %  24HR INTAKE/OUTPUT:      Intake/Output Summary (Last 24 hours) at 3/9/2019 0935  Last data filed at 3/8/2019 2120  Gross per 24 hour   Intake 960 ml   Output --   Net 960 ml       CONSTITUTIONAL:  awake, alert, cooperative, no apparent distress, HEENT oral pharynx , no scleral icterus  HEMATOLOGIC/LYMPHATICS:  no cervical lymphadenopathy, no supraclavicular lymphadenopathy, no axillary lymphadenopathy and no inguinal lymphadenopathy  LUNGS:  No increased work of breathing, good air exchange, clear to auscultation bilaterally, no crackles or wheezing  CARDIOVASCULAR:  , regular rate and rhythm, normal S1 and S2, no S3 or S4, and no murmur noted  ABDOMEN:  No scars, normal bowel sounds, soft, non-distended, non-tender, no masses palpated, no hepatosplenomegally  MUSCULOSKELETAL:  There is no redness, warmth, or swelling of the joints. EXTREMETIES: No clubbing cynosis or edema  NEUROLOGIC:  Awake, alert, oriented to name, place and time. Cranial nerves II-XII are grossly intact. Motor is 5 out of 5 bilaterally.    SKIN:  no bruising or bleeding      Data      Recent Labs     19  1821 19  0510 19  0522   WBC 1.8* 1.4* 1.8*   HGB 9.1* 8.6* 8.8*   HCT 26.0* 24.6* 24.7*    146 123*   MCV 86.3 86.8 86.0        Recent Labs couple weeks to allow for surgical healing.   - Stop Dex- this is a pre med for Alimta chemo, CT head no brain mets on initial work up   - cont folate while on alimta (folate antagonist)      HTN  - holding home Lisinopril due to Hyperkalemia , creat stable   - Was on Norvasc at one point, but pt admits to stopping this drug weeks ago      Right chin crusting skin lesion with honey-like drainage  - Possible Impetigo  - Bactroban started topically and nasal swabs ordered   - On IV Vanco (staph aureus coverage/MRSA)      Radiation Dermatitis  - Silvadene cream to back  - Keep open to air  - Per patient and family, rash improving      Mucositis  - Fluconzaole/MMW. I would estimate that he can safely delay chemotherapy for 2 months is necessary to allow wound healing. Also, if needed, he could transition to maintenance Pembrolizumab early, which would not effect wound healing.       ONCOLOGIC DISPOSITION:  TBD    Dakota Vicente MD  Please contact through [address]

## 2019-03-09 NOTE — PROGRESS NOTES
Pt axo. Assessment completed as charted. Pt denies pain, nausea, or needs at this time. Will continue to monitor. Call light in reach.

## 2019-03-09 NOTE — PROGRESS NOTES
Pt a/o. Pt complains of pain but did not want morphine--given tylenol per MAR instead. New lumps/blisters on lips--crosscover CNP paged by day shift RN--see [facility] for new orders. 960 mL intake this evening. No emesis.  Bed locked in lowest position; side rails 2/4; call light within reach; will continue to monitor

## 2019-03-09 NOTE — PROGRESS NOTES
Message sent to crosscopamela Alvares \"FYI critical value WBC 1.8 (increased from 1.4 yesterday). Already in neutropenia precautions. \"    Only complaint is painful lips. Denies any other needs at this time. Will continue to monitor.

## 2019-03-09 NOTE — PROGRESS NOTES
Pt up in chair. Tolerating clear diet without difficulty. Complains of blisters on lips. Calls for assistance as needed and will continue to monitor. Madeline

## 2019-03-10 NOTE — PROGRESS NOTES
[facility]    PATIENT NAME: Fernando Jha     TODAY'S DATE: 3/10/2019    CHIEF COMPLAINT: light-headed    INTERVAL HISTORY/HPI:    Pt feeling a bit \"light-headed\" today, has had low BP. Minimal LLQ/flank pain, no nausea. REVIEW OF SYSTEMS:  Pertinent positives and negatives as per interval history section    OBJECTIVE:  VITALS:  BP (!) 82/55   Pulse 97   Temp 99.3 °F (37.4 °C)   Resp 18   Ht 5' 9\" (1.753 m)   Wt 182 lb (82.6 kg)   SpO2 92%   BMI 26.88 kg/m²     INTAKE/OUTPUT:    I/O last 3 completed shifts: In: 2250.1 [P.O.:600; I.V.:1650.1]  Out: -   No intake/output data recorded. CONSTITUTIONAL:  awake and alert  LUNGS:  Respirations easy and unlabored,    CARD:  regular rate and rhythm       Data:  CBC:   Recent Labs     03/08/19  0510 03/09/19  0522 03/10/19  0532   WBC 1.4* 1.8* 2.7*   HGB 8.6* 8.8* 8.9*   HCT 24.6* 24.7* 25.3*    123* 82*     BMP:    Recent Labs     03/08/19  1705 03/09/19  0522 03/10/19  0532   * 130* 131*   K 4.8 4.6 4.2   CL 94* 97* 99   CO2 21 22 21   BUN 13 11 8   CREATININE 0.6* 0.6* 0.7*   GLUCOSE 165* 128* 78     Hepatic:   Recent Labs     03/07/19  1821 03/08/19  0510   AST 15 14*   ALT 15 14   BILITOT 0.4 0.4   ALKPHOS 55 51     Mag:    No results for input(s): MG in the last 72 hours. Phos:   No results for input(s): PHOS in the last 72 hours. INR:   Recent Labs     03/07/19  1826 03/08/19  0510   INR 1.28* 1.25*       Radiology Review:  *Imaging personally reviewed by me. ASSESSMENT AND PLAN:  Acute diverticulitis; currently w/ low BP, concerning for sepsis.   Unclear source at this time - worsening colonic process?   - agree w/ adding Abd/Pel to CT of chest today to reassess status of left colon   - o/w continue current plan with goal for surgery later this week when hematologic indices have improved    [address] [facility]

## 2019-03-10 NOTE — PROGRESS NOTES
Hospitalist Progress Note      PCP: Elis Monaco MD    Date of Admission: 3/7/2019    Chief Complaint: Abdominal pain     Hospital Course:  69 y.o. male who presented to [facility] with LLQ abdominal pain. Pt. Was recently d/c'd for diverticulitis, states pain never went away. C/o low grade fevers for 2 weeks. States pain not as severe as previous admission. Had chemo on Friday, states received neulasta. Denies cp, n/v, sob. Subjective:   Pt is on RA. Tmax 101.9. Pt with marginal low/BPs. No N/V/D. No chest pain or SOB.      Medications:  Reviewed    Infusion Medications    eptifibatide 2 mcg/kg/min (03/10/19 0319)    dextrose 100 mL/hr (03/08/19 1700)    sodium chloride 75 mL/hr at 03/09/19 1922     Scheduled Medications    sodium chloride  500 mL Intravenous Once    meropenem  1 g Intravenous Q8H    fluconazole  200 mg Intravenous Q24H    tbo-filgrastim  480 mcg Subcutaneous QPM    insulin lispro  0-6 Units Subcutaneous TID WC    insulin lispro  0-3 Units Subcutaneous Nightly    folic acid  1 mg Oral Daily    oyster shell calcium/vitamin D  1 tablet Oral Daily    mupirocin   Topical TID    mupirocin   Nasal TID    silver sulfADIAZINE   Topical Daily    acyclovir   Topical Q3H (SCHEDULED)    amiodarone  100 mg Oral Daily    sodium chloride flush  10 mL Intravenous 2 times per day     PRN Meds: magic (miracle) mouthwash with nystatin, oxyCODONE-acetaminophen, glucose, dextrose, glucagon (rDNA), dextrose, sodium chloride flush, magnesium hydroxide, ondansetron, acetaminophen, morphine      Intake/Output Summary (Last 24 hours) at 3/10/2019 1345  Last data filed at 3/10/2019 0545  Gross per 24 hour   Intake 2250.07 ml   Output --   Net 2250.07 ml       Physical Exam Performed:    BP (!) 89/57   Pulse 113   Temp 99.1 °F (37.3 °C)   Resp 18   Ht 5' 9\" (1.753 m)   Wt 182 lb (82.6 kg)   SpO2 92%   BMI 26.88 kg/m²     General appearance: No apparent distress, appears stated age and cooperative. HEENT: Pupils equal, round, and reactive to light. Conjunctivae/corneas clear. Neck: Supple, with full range of motion. No jugular venous distention. Trachea midline. Respiratory:  Normal respiratory effort. Clear to auscultation, bilaterally without Rales/Wheezes/Rhonchi. Cardiovascular: Regular rate and rhythm with normal S1/S2 without murmurs, rubs or gallops. Abdomen: Soft, non-tender, non-distended with normal bowel sounds. Musculoskeletal: No clubbing, cyanosis or edema bilaterally. Full range of motion without deformity. Skin: Skin color, texture, turgor normal.  Radiation changes to back. Neurologic:  Neurovascularly intact without any focal sensory/motor deficits. Cranial nerves: II-XII intact, grossly non-focal.  Psychiatric: Alert and oriented, thought content appropriate, normal insight  Capillary Refill: Brisk,< 3 seconds   Peripheral Pulses: +2 palpable, equal bilaterally       Labs:   Recent Labs     03/08/19  0510 03/09/19  0522 03/10/19  0532   WBC 1.4* 1.8* 2.7*   HGB 8.6* 8.8* 8.9*   HCT 24.6* 24.7* 25.3*    123* 82*     Recent Labs     03/08/19  1705 03/09/19  0522 03/10/19  0532   * 130* 131*   K 4.8 4.6 4.2   CL 94* 97* 99   CO2 21 22 21   BUN 13 11 8   CREATININE 0.6* 0.6* 0.7*   CALCIUM 7.7* 8.0* 7.8*     Recent Labs     03/07/19  1821 03/08/19  0510   AST 15 14*   ALT 15 14   BILITOT 0.4 0.4   ALKPHOS 55 51     Recent Labs     03/07/19  1826 03/08/19  0510   INR 1.28* 1.25*     Urinalysis:      Lab Results   Component Value Date    NITRU Negative 03/07/2019    WBCUA 0-2 02/19/2019    RBCUA 0-2 02/19/2019    BLOODU Negative 03/07/2019    SPECGRAV 1.015 03/07/2019    GLUCOSEU Negative 03/07/2019       Radiology:  CT ABDOMEN PELVIS W IV CONTRAST Additional Contrast? None   Final Result   Acute perforated descending colon diverticulitis.   There is free air in the   abdomen and there is a small extraluminal gas and fluid collection/abscess   adjacent to the descending colon measuring 4.1 cm in AP diameter and 6 cm in   length. There is diverticulosis of the entire colon. Cholelithiasis. 4.7 cm infrarenal abdominal aortic aneurysm status post endograft repair with   a patent aorto bi-iliac graft. There is suspicion of an endoleak with blush   of contrast enhancement suspected in the inferior aspect of the native   aneurysm. As a noncontrast study was not performed, calcification of the   intraluminal thrombus accounting for the apparent enhancement is not   excluded. 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 Comparison with a noncontrast CT examination would be   helpful. Critical results were called by Dr. Julian Johnson MD to [facility] on   3/7/2019 at 20:35. CT CHEST WO CONTRAST    (Results Pending)       Assessment/Plan:    Active Hospital Problems    Diagnosis Date Noted    Diverticulitis [K57.92] 03/07/2019    Diverticulitis of large intestine with perforation and abscess without bleeding [K57.20]        Acute perforated recurrent diverticulitis/pneumoperitoneum/abscess in setting of immunosuppression due to chemotherapy:   - CT A/P demonstrates acute perforated colon diverticulitis with free air with fluid collection.   - General surgery consulted/following. Possible plan for surgery by mid-next week. - Pt started on Zosyn on admission -- transition to [address] [place] [address] on 3/10 due to fevers. Fever & hypotension, possibles sepsis:  - Noted on 3/10 with hypotension. Possibly secondary to above. Send blood cultures x2. Check UA with reflex. CT chest ordered per Oncology. Ask surgery if CT A/P indicated. Lactic is normal. Change Zosyn to Merrem. Check MRSA nasal swab. IV fluid bolus.     Adenocarcinoma of the LLL:  - With known mets to spine. Also question of mets to spleen incidentally noted on CT, though thought to be less likely. Hem/Onc is consulted/following.  Chemo on hold in preparation for surgical healing.      Neutropenia due to chemotherapy:   - S/p Neulasta on 3/1. Hem/onc started Granix on 3/8. - Wound care for his radiation ulcer on his back. Acute hyponatremia (improving):  - Likely secondary to dehydration. Continue IV fluids. Monitor BMP closely.      Hypertension:  - Currently hypotensive. Held BP meds since admission.     Peripheral arterial disease:   - Per chart review on previous admission, pt cannot stop his DAPT per vascular surgery. In fact, in the past he has been admitted for prolonged perioperative eptifibitide infusions while the DAPT washes out before procedures. Has had many complications with bypasses and stents. Pt's last dose of Plavix was on 3/7/19. Dc'd aspirin on 3/9. Started Integrilin infusion on 3/9. Monitor daily CBC.    - CT performed in ED- reading suggesting possible Type II endoleak. Vascular surgery consulted this admission -- no treatment required currently per Dr. aSda Domínguez. History of VT:   - Continue amiodarone. Monitor on telemetry. Mucositis:  - Diflucan and nystatin S&W per Oncology.        DVT Prophylaxis: SCDs, pt is on Integrilin infusion  Diet: DIET FULL LIQUID;  Code Status: Full Code    PT/OT Eval Status: Not indicated     Dispo - Uncertain     DEBRA [place]wood - HUGO

## 2019-03-10 NOTE — ONCOLOGY
ONCOLOGY HEMATOLOGY CARE PROGRESS NOTE      SUBJECTIVE:     Mucositis persists. Low grade fever, but ANC is now improving. Despite the above, he is beginning to feel better and thinks his throat is a bit less sore. He does think that his cough has returned and it concerns him. ROS:   The remaining 10 point review of symptoms is unremarkable. OBJECTIVE        Physical    VITALS:  BP (!) 151/66   Pulse 90   Temp 99.7 °F (37.6 °C) (Oral)   Resp 18   Ht 5' 9\" (1.753 m)   Wt 182 lb (82.6 kg)   SpO2 92%   BMI 26.88 kg/m²   TEMPERATURE:  Current - Temp: 99.7 °F (37.6 °C); Max - Temp  Av.9 °F (37.2 °C)  Min: 98 °F (36.7 °C)  Max: 100.5 °F (38.1 °C)  PULSE OXIMETRY RANGE: SpO2  Av %  Min: 92 %  Max: 98 %  24HR INTAKE/OUTPUT:      Intake/Output Summary (Last 24 hours) at 3/10/2019 0908  Last data filed at 3/10/2019 0545  Gross per 24 hour   Intake 2250.07 ml   Output --   Net 2250.07 ml       CONSTITUTIONAL:  awake, alert, cooperative, no apparent distress, HEENT oral pharynx , no scleral icterus  HEMATOLOGIC/LYMPHATICS:  no cervical lymphadenopathy, no supraclavicular lymphadenopathy, no axillary lymphadenopathy and no inguinal lymphadenopathy  LUNGS:  No increased work of breathing, good air exchange, clear to auscultation bilaterally, no crackles or wheezing  CARDIOVASCULAR:  , regular rate and rhythm, normal S1 and S2, no S3 or S4, and no murmur noted  ABDOMEN:  No scars, normal bowel sounds, soft, non-distended, non-tender, no masses palpated, no hepatosplenomegally  MUSCULOSKELETAL:  There is no redness, warmth, or swelling of the joints. EXTREMETIES: No clubbing cynosis or edema  NEUROLOGIC:  Awake, alert, oriented to name, place and time. Cranial nerves II-XII are grossly intact. Motor is 5 out of 5 bilaterally.    SKIN:  no bruising or bleeding      Data      Recent Labs     19  0510 19  0522 03/10/19  0532   WBC 1.4* 1.8* 2.7*   HGB 8.6* 8. 8* 8.9*   HCT 24.6* 24.7* 25.3*    123* 82*   MCV 86.8 86.0 86.5        Recent Labs     03/08/19  1705 03/09/19  0522 03/10/19  0532   * 130* 131*   K 4.8 4.6 4.2   CL 94* 97* 99   CO2 21 22 21   BUN 13 11 8   CREATININE 0.6* 0.6* 0.7*     Recent Labs     03/07/19  1821 03/08/19  0510   AST 15 14*   ALT 15 14   BILITOT 0.4 0.4   ALKPHOS 55 51       Magnesium:    Lab Results   Component Value Date    MG 1.70 02/21/2019    MG 1.50 02/20/2019    MG 1.80 01/23/2019         Problem List  Patient Active Problem List   Diagnosis    Elevated fasting glucose    CAD (coronary artery disease)    [facility]ipheral [facility]) [facility] [address]    Essential hypertension    Mixed hyperlipidemia    Sprain of right foot    Sprain of anterior talofibular ligament of right ankle    Closed nondisplaced fracture of fifth right metatarsal bone    Closed fracture of proximal lateral malleolus of ankle with routine healing    Right foot pain    Right ankle pain    Nondisplaced fracture of fifth right metatarsal bone with routine healing    History of pulmonary emphysema    Chronic obstructive pulmonary disease with (acute) exacerbation (HCC)    Pulmonary nodule, left    Abnormal bone scan of thoracic spine    Back pain    Visit for monitoring Plavix therapy    History of ventricular tachycardia    Gallstones    Hemoptysis    Former smoker    Elevated PSA    Adenocarcinoma of left lung ([address]    Acute diverticulitis    Pancytopenia (HCC)    Diverticulitis of large intestine with perforation and abscess without bleeding    Diverticulitis       ASSESSMENT AND PLAN    -Acute Perforated descending colon diverticulitis (not immune mediated colitis) with free air in the abd   - per gen surgery   - ATB support (Zosyn, Maxipime, Vanco)   - non acute abd on exam      Neutropenia secondary to chemo  - ANC 1.5, s/p Neulasta on 3/01/2019  - Granix started 3/08/2019.   Continue for now.     -Lung CA, stage 4 bone mets   - Will hold chemo until surgically healed. He is not due for Cycle 3 Carbo/Alimta/Keytruda until 3-22. - Stop Dex- this is a pre med for Alimta chemo, CT head no brain mets on initial work up   - cont folate while on alimta (folate antagonist)   - CT the chest     HTN  - holding home Lisinopril due to Hyperkalemia , creat stable   - Was on Norvasc at one point, but pt admits to stopping this drug weeks ago      Right chin crusting skin lesion with honey-like drainage  - Possible Impetigo  - Bactroban started topically and nasal swabs ordered   - On IV Vanco (staph aureus coverage/MRSA)      Radiation Dermatitis  - Silvadene cream to back  - Keep open to air  - Per patient and family, rash improving      Mucositis  - Fluconzaole/MMW. I would estimate that he can safely delay chemotherapy for 2 months if necessary to allow wound healing. Also, if needed, he could transition to maintenance Pembrolizumab early, which would not effect wound healing.       ONCOLOGIC DISPOSITION:  TBD    Sushila Mart MD  Please contact through [address]

## 2019-03-11 NOTE — ONCOLOGY
ONCOLOGY HEMATOLOGY CARE PROGRESS NOTE      SUBJECTIVE:     Tm 101.9 deg F. Up in the bathroom. ROS:   The remaining 10 point review of symptoms is unremarkable. OBJECTIVE        Physical    VITALS:  /68   Pulse 102   Temp 98.6 °F (37 °C) (Oral)   Resp 18   Ht 5' 9\" (1.753 m)   Wt 182 lb (82.6 kg)   SpO2 92%   BMI 26.88 kg/m²   TEMPERATURE:  Current - Temp: 98.6 °F (37 °C); Max - Temp  Av.1 °F (37.8 °C)  Min: 98.6 °F (37 °C)  Max: 101.9 °F (38.8 °C)  PULSE OXIMETRY RANGE: SpO2  Av %  Min: 91 %  Max: 93 %  24HR INTAKE/OUTPUT:      Intake/Output Summary (Last 24 hours) at 3/11/2019 0743  Last data filed at 3/11/2019 0327  Gross per 24 hour   Intake 2672 ml   Output 225 ml   Net 2447 ml       CONSTITUTIONAL:  awake, alert, cooperative, no apparent distress, HEENT oral pharynx , no scleral icterus  HEMATOLOGIC/LYMPHATICS:  no cervical lymphadenopathy, no supraclavicular lymphadenopathy, no axillary lymphadenopathy and no inguinal lymphadenopathy  LUNGS:  No increased work of breathing, good air exchange, clear to auscultation bilaterally, no crackles or wheezing  CARDIOVASCULAR:  , regular rate and rhythm, normal S1 and S2, no S3 or S4, and no murmur noted  ABDOMEN:  No scars, normal bowel sounds, soft, non-distended, non-tender, no masses palpated, no hepatosplenomegally  MUSCULOSKELETAL:  There is no redness, warmth, or swelling of the joints. EXTREMETIES: No clubbing cynosis or edema  NEUROLOGIC:  Awake, alert, oriented to name, place and time. Cranial nerves II-XII are grossly intact. Motor is 5 out of 5 bilaterally.    SKIN:  no bruising or bleeding      Data      Recent Labs     19  0522 03/10/19  0532 19  0640   WBC 1.8* 2.7* 6.1   HGB 8.8* 8.9* 8.6*   HCT 24.7* 25.3* 24.7*   * 82* 57*   MCV 86.0 86.5 86.2        Recent Labs     19  0522 03/10/19  0532 19  0640   * 131* 125*   K 4.6 4.2 3.6   CL 97* 99 93* CO2 22 21 23   BUN 11 8 6*   CREATININE 0.6* 0.7* 0.6*     No results for input(s): AST, ALT, ALB, BILIDIR, BILITOT, ALKPHOS in the last 72 hours. Magnesium:    Lab Results   Component Value Date    MG 1.70 02/21/2019    MG 1.50 02/20/2019    MG 1.80 01/23/2019         Problem List  Patient Active Problem List   Diagnosis    Elevated fasting glucose    CAD (coronary artery disease)    PAD (peripheral artery disease) ([facility] [address]    Essential hypertension    Mixed hyperlipidemia    Sprain of right foot    Sprain of anterior talofibular ligament of right ankle    Closed nondisplaced fracture of fifth right metatarsal bone    Closed fracture of proximal lateral malleolus of ankle with routine healing    Right foot pain    Right ankle pain    Nondisplaced fracture of fifth right metatarsal bone with routine healing    History of pulmonary emphysema    Chronic obstructive pulmonary disease with (acute) exacerbation (HCC)    Pulmonary nodule, left    Abnormal bone scan of thoracic spine    Back pain    Visit for monitoring Plavix therapy    History of ventricular tachycardia    Gallstones    Hemoptysis    Former smoker    Elevated PSA    Adenocarcinoma of left lung ([address]    Acute diverticulitis    Pancytopenia ([address]    Diverticulitis of large intestine with perforation and abscess without bleeding    Diverticulitis       ASSESSMENT AND PLAN    -Acute Perforated descending colon diverticulitis (not immune mediated colitis) with free air in the abd   - per gen surgery   - ATB support (Zosyn, Maxipime, Vanco)   - non acute abd on exam      Neutropenia secondary to chemo  - WBC is 6.1 K/mcL. Can stop Granix. Thrombocytopenia secondary to chemo  - PLTS are down to  57 K/mcL. - Stop Integrilin drip.    -Lung CA, stage 4 bone mets   - Will hold chemo until surgically healed. He is not due for Cycle 3 Carbo/Alimta/Keytruda until 3-22.   - Stop Dex- this is a pre med for Alimta chemo, CT head no brain mets on initial work up   - cont folate while on alimta (folate antagonist)   - CT chest/abd/pelvis, 3/10/2019, showed improvement in the left hilar soft tissue and radiation changes. No obvious new disease. (My read - will review in radiology).     HTN  - holding home Lisinopril due to Hyperkalemia , creat stable   - Was on Norvasc at one point, but pt admits to stopping this drug weeks ago      Right chin crusting skin lesion with honey-like drainage  - Possible Impetigo  - Bactroban started topically and nasal swabs ordered   - On IV Vanco (staph aureus coverage/MRSA)      Radiation Dermatitis  - Silvadene cream to back  - Keep open to air  - Per patient and family, rash improving      Mucositis  - Fluconzaole/MMW. I would estimate that he can safely delay chemotherapy for 2 months if necessary to allow wound healing. Also, if needed, he could transition to maintenance Pembrolizumab early, which would not effect wound healing.       ONCOLOGIC DISPOSITION:  TBD    Dakota Vicente MD  Please contact through [address]

## 2019-03-11 NOTE — PROGRESS NOTES
cooperative. HEENT: Pupils equal, round, and reactive to light. Conjunctivae/corneas clear. Neck: Supple, with full range of motion. No jugular venous distention. Trachea midline. Respiratory:  Normal respiratory effort. Clear to auscultation, bilaterally without Rales/Wheezes/Rhonchi. Cardiovascular: Regular rate and rhythm with normal S1/S2 without murmurs, rubs or gallops. Abdomen: Soft, non-tender, non-distended with normal bowel sounds. Musculoskeletal: No clubbing, cyanosis or edema bilaterally. Full range of motion without deformity. Skin: Skin color, texture, turgor normal.  Radiation changes to back. Neurologic:  Neurovascularly intact without any focal sensory/motor deficits. Cranial nerves: II-XII intact, grossly non-focal.  Psychiatric: Alert and oriented, thought content appropriate, normal insight  Capillary Refill: Brisk,< 3 seconds   Peripheral Pulses: +2 palpable, equal bilaterally       Labs:   Recent Labs     03/09/19  0522 03/10/19  0532 03/11/19  0640   WBC 1.8* 2.7* 6.1   HGB 8.8* 8.9* 8.6*   HCT 24.7* 25.3* 24.7*   * 82* 57*     Recent Labs     03/09/19  0522 03/10/19  0532 03/11/19  0640   * 131* 125*   K 4.6 4.2 3.6   CL 97* 99 93*   CO2 22 21 23   BUN 11 8 6*   CREATININE 0.6* 0.7* 0.6*   CALCIUM 8.0* 7.8* 7.4*     No results for input(s): AST, ALT, BILIDIR, BILITOT, ALKPHOS in the last 72 hours. No results for input(s): INR in the last 72 hours. Urinalysis:      Lab Results   Component Value Date    NITRU Negative 03/10/2019    WBCUA 3-5 03/10/2019    RBCUA 0-2 03/10/2019    BLOODU Negative 03/10/2019    SPECGRAV 1.025 03/10/2019    GLUCOSEU Negative 03/10/2019       Radiology:  CT CHEST ABDOMEN PELVIS WO CONTRAST   Final Result   Perforated sigmoid diverticulitis and pneumoperitoneum unchanged. Stable   left Emerita colonic air-fluid collection may represent an intramural or   extramural abscess or giant diverticulum.       Infrarenal abdominal aortic aneurysm and aorto bi-iliac stent. Other incidental findings as above. CT ABDOMEN PELVIS W IV CONTRAST Additional Contrast? None   Final Result   Acute perforated descending colon diverticulitis. There is free air in the   abdomen and there is a small extraluminal gas and fluid collection/abscess   adjacent to the descending colon measuring 4.1 cm in AP diameter and 6 cm in   length. There is diverticulosis of the entire colon. Cholelithiasis. 4.7 cm infrarenal abdominal aortic aneurysm status post endograft repair with   a patent aorto bi-iliac graft. There is suspicion of an endoleak with blush   of contrast enhancement suspected in the inferior aspect of the native   aneurysm. As a noncontrast study was not performed, calcification of the   intraluminal thrombus accounting for the apparent enhancement is not   excluded. 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 Comparison with a noncontrast CT examination would be   helpful. Critical results were called by Dr. Robb Landeros. MD [facility] to [facility] on   3/7/2019 at 20:35. Assessment/Plan:    Active Hospital Problems    Diagnosis Date Noted    Diverticulitis [K57.92] 03/07/2019    Diverticulitis of large intestine with perforation and abscess without bleeding [K57.20]        Acute perforated recurrent diverticulitis/pneumoperitoneum/abscess in setting of immunosuppression due to chemotherapy:   - CT A/P demonstrates acute perforated colon diverticulitis with free air with fluid collection.   - Continue Merrem   - General surgery consulted/following. Possible plan for surgery by mid-next week.      Adenocarcinoma of the LLL:  - With known mets to spine. Also question of mets to spleen incidentally noted on CT, though thought to be less likely. Hem/Onc is consulted/following. Chemo on hold in preparation for surgical healing.      Neutropenia due to chemotherapy:   - S/p Neulasta on 3/1. Hem/onc started Granix on 3/8. - Wound care for his radiation ulcer on his back. Acute hyponatremia :  - Poss adrenal insuff. Noted BPs to be low.   -Consult Nephrology  - Cortisol in AM     Hypertension:  - Currently normotensive. Holding BP meds for now.     Peripheral arterial disease:   - Per chart review on previous admission, pt cannot stop his DAPT per vascular surgery. In fact, in the past he has been admitted for prolonged perioperative eptifibitide infusions while the DAPT washes out before procedures. Has had many complications with bypasses and stents. Pt's last dose of Plavix was on 3/7/19. Dc aspirin today. Start Integrilin infusion. - CT performed in ED- reading suggesting possible Type II endoleak. Vascular surgery consulted this admission -- no treatment required currently. History of VT:   - Continue amiodarone. Mucositis:  - Diflucan and nystatin S&W per Oncology.        DVT Prophylaxis:d/c Integrilin   Diet: DIET FULL LIQUID;  Code Status: Full Code    PT/OT Eval Status: Not indicated     Dispo - Uncertain     DEBRA Haas - CNP

## 2019-03-11 NOTE — CONSULTS
Pharmacy to Dose Vancomycin    Dx: PNA   Goal trough = 15-20 mcg/mL  Pt wt = 82.6 kg  Estimated Creatinine Clearance: 94 mL/min (A) (based on SCr of 0.7 mg/dL (L)). Start Vancomycin 1250 mg IVPB q12h today at 2200  Vancomycin trough prior to 4th dose (3/12 0900).   Jet Hale, PharmD 3/10/2019 9:20 PM

## 2019-03-11 NOTE — PLAN OF CARE
Pt a/o, rates pain appropriately using 0-10 pain scale; pt calls out as needed for pain intervention; will continue to monitor and administer intervention as ordered and requested. Tonya Day

## 2019-03-11 NOTE — PROGRESS NOTES
Pt with continued fever- medicated with tylenol by AM RN and ice packs applied- fever continues to break. Pt denies further needs. Call light within reach, will continue to monitor.

## 2019-03-11 NOTE — PROGRESS NOTES
[facility] SURGERY    PATIENT NAME: Irina Soriano     TODAY'S DATE: 3/11/2019    CHIEF COMPLAINT: none    INTERVAL HISTORY/HPI:    Pt with mild abd pain llq, no nausea, fever overnight, no chills. REVIEW OF SYSTEMS:  Pertinent positives and negatives as per interval history section    OBJECTIVE:  VITALS:  /75   Pulse 111   Temp 99 °F (37.2 °C) (Oral)   Resp 16   Ht 5' 9\" (1.753 m)   Wt 182 lb (82.6 kg)   SpO2 93%   BMI 26.88 kg/m²     INTAKE/OUTPUT:    I/O last 3 completed shifts: In: 2672 [P. O.:960; I.V.:1712]  Out: 225 [Urine:225]  No intake/output data recorded. CONSTITUTIONAL:  awake and alert  LUNGS:  Respirations easy and unlabored, no crackles or wheezing  CARD:  tachycardic  ABDOMEN:  normal bowel sounds, soft, non-distended, mild LLQ tender     Data:  CBC:   Recent Labs     03/09/19  0522 03/10/19  0532 03/11/19  0640   WBC 1.8* 2.7* 6.1   HGB 8.8* 8.9* 8.6*   HCT 24.7* 25.3* 24.7*   * 82* 57*     BMP:    Recent Labs     03/09/19  0522 03/10/19  0532 03/11/19  0640   * 131* 125*   K 4.6 4.2 3.6   CL 97* 99 93*   CO2 22 21 23   BUN 11 8 6*   CREATININE 0.6* 0.7* 0.6*   GLUCOSE 128* 78 88     Hepatic: No results for input(s): AST, ALT, ALB, BILITOT, ALKPHOS in the last 72 hours. Mag:    No results for input(s): MG in the last 72 hours. Phos:   No results for input(s): PHOS in the last 72 hours. INR: No results for input(s): INR in the last 72 hours. Radiology Review:  *Imaging personally reviewed by me. NA      ASSESSMENT AND PLAN:  68 yo with diverticulitis  1. Neutropenia improving  2. Monitor thrombocytopenia - Integrilin held  3. Full liquids  4.   IV abx  5.  OR in next few days    Arron Oats   [id number]

## 2019-03-11 NOTE — CONSULTS
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 History of PAD, HTN, AAA s/p EVAR with endoleak. He was admitted 2/19-2/23/19 with acute, uncomplicated diverticulitis  He improved and was discharged to complete a course of Bactrim and flagyl. He received chemotherapy following that admission. Soon after, LLQ pain worsened     He returned to the ER 3/7 reporting low grade fever and worsening pain in the LLQ. Repeat CT revealed perforated diverticulitis with abscess. He was started on Zosyn  Fever persisted so the Zosyn was changed to meropenem on 3/10  Repeat CT ordered, no significant interval colon change. Gen Sgy following, planning OR in the near term, later this week. Intermittent fever persists. No supplemental oxygen requirement. Coughing very infrequently, dry cough. No chest pain or SOB. No  complaints. Tolerating full liquid diet. 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    Right popliteal artery occlusion ([address] 11/2010         Procedure Laterality Date    ARTERIAL ANEURYSM REPAIR  11/2010    right popliteal artery aneurysm repair    BRONCHOSCOPY  01/18/2019    left endobronchial tumor    BRONCHOSCOPY N/A 1/18/2019    BRONCHOSCOPY ear drainage, sinus pressure, nasal congestion, epistaxis and snoring  RESPIRATORY:  No cough, shortness of breath, hemoptysis  CARDIOVASCULAR:  negative for chest pain, palpitations, exertional chest pressure/discomfort, edema, syncope  GASTROINTESTINAL:  Per HPI   GENITOURINARY:  negative for frequency, dysuria, urinary incontinence, decreased urine volume, and hematuria  HEMATOLOGIC/LYMPHATIC:  negative for easy bruising, bleeding and lymphadenopathy  ALLERGIC/IMMUNOLOGIC:  negative for recurrent infections, angioedema, anaphylaxis and drug reactions  ENDOCRINE:  negative for weight changes and diabetic symptoms including polyuria, polydipsia and polyphagia  MUSCULOSKELETAL:  negative for acute joint pain, joint swelling, decreased range of motion and muscle weakness  NEUROLOGICAL:  negative for headaches, slurred speech, unilateral weakness  PSYCHIATRIC/BEHAVIORAL: negative for hallucinations, behavioral problems, confusion and agitation. Objective:   PHYSICAL EXAM:      VITALS:  /70   Pulse 113   Temp 100.4 °F (38 °C) (Oral)   Resp 16   Ht 5' 9\" (1.753 m)   Wt 182 lb (82.6 kg)   SpO2 90%   BMI 26.88 kg/m²      24HR INTAKE/OUTPUT:      Intake/Output Summary (Last 24 hours) at 3/11/2019 1414  Last data filed at 3/11/2019 0327  Gross per 24 hour   Intake 2672 ml   Output 225 ml   Net 2447 ml     CONSTITUTIONAL:  Awake, alert, cooperative, no apparent distress, and appears stated age  [de-identified]: NCAT, PERRL, EOMI. Sclera white, conjunctiva full. OP with moist mucosal membranes, few mucosal white plaques   Ulcerated lesions lower lip and chin  NECK:  Supple, symmetrical, trachea midline, no adenopathy  LUNGS:  no increased work of breathing, CTA jaiden. No W/R/R  CARDIOVASCULAR:  RRR without murmur  ABDOMEN:  normal bowel sounds, soft. Minimally tender LLQ, no R/G  PSYCHIATRIC: Oriented to person place and time. No obvious depression or anxiety.   MUSCULOSKELETAL: No obvious misalignment or effusion of aneurysm.  As a noncontrast study was not performed, calcification of the   intraluminal thrombus accounting for the apparent enhancement is not   excluded.  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 Emma Low with a noncontrast CT examination would be   helpful. CT abd 2/19:  Impression   1. Findings are consistent with acute proximal sigmoid diverticulitis within   the left lower quadrant, without evidence of perforation, free air, or   abscess. 2. New nonspecific focus of geographic low attenuation within the periphery   of the inferior spleen, possibly a splenic infarct, less likely a metastatic   focus.  Suggest clinical correlation and attention on follow-up abdominal CT   studies to exclude metastatic disease. 3. Cholelithiasis. 4. Stable bilateral renal cysts. 5. 4.7 x 4.4 cm fusiform aneurysm of the infrarenal abdominal aorta,   stabilized by an aortobi-iliac stent graft, without evidence of in-stent   stenosis or thrombosis. 6. Stable mild prostatomegaly.        Assessment:     Patient Active Problem List   Diagnosis    Elevated fasting glucose    CAD (coronary artery disease)    PAD (peripheral artery disease) ([address]    Essential hypertension    Mixed hyperlipidemia    Sprain of right foot    Sprain of anterior talofibular ligament of right ankle    Closed nondisplaced fracture of fifth right metatarsal bone    Closed fracture of proximal lateral malleolus of ankle with routine healing    Right foot pain    Right ankle pain    Nondisplaced fracture of fifth right metatarsal bone with routine healing    History of pulmonary emphysema    Chronic obstructive pulmonary disease with (acute) exacerbation (HCC)    Pulmonary nodule, left    Abnormal bone scan of thoracic spine    Back pain    Visit for monitoring Plavix therapy    History of ventricular tachycardia    Gallstones    Hemoptysis    Former smoker    Elevated PSA    Adenocarcinoma of left lung (HCC)    Acute diverticulitis    Pancytopenia ([address]    Diverticulitis of large intestine with perforation and abscess without bleeding    Diverticulitis       Manjit Rocha is a 68yoM who is evaluated for the following:    Stage IV lung cancer, actively treated     Persistent vs recurrent diverticulitis with perforation, abscess  Persistent fever despite abx in the context of chemotherapy associated cytopenias   Clinically, seems to have improved since admission   Plan for colectomy noted     Orolabial HSV, thrush/mucositis     Thrombocytopenia. Eduardo not yet reached     NKDA     Recs:  Continue meropenem and fluconazole   I think fever is from diverticulitis rather than pneumonia  Hopefully with resolution of leukopenia the fever will wane   Continue topical antiviral     Discussed with patient and family at bedside  We will follow        Boni Cantrell M.D. Thank you for the opportunity to participate in the care of your patient.     Please do not hesitate to contact me:   [phone] office  [phone] mobile

## 2019-03-11 NOTE — CONSULTS
on file   Relationships    Social connections:     Talks on phone: Not on file     Gets together: Not on file     Attends [employer] service: Not on file     Active member of club or organization: Not on file     Attends meetings of clubs or organizations: Not on file     Relationship status: Not on file    Intimate partner violence:     Fear of current or ex partner: Not on file     Emotionally abused: Not on file     Physically abused: Not on file     Forced sexual activity: Not on file   Other Topics Concern    Not on file   Social History Narrative    Not on file           Adopted: Yes   Problem Relation Age of Onset    Stroke Mother     Schizophrenia Brother     Cancer Neg Hx           Medication:     Scheduled Meds:   meropenem  1 g Intravenous Q8H    fluconazole  200 mg Intravenous Q24H    insulin lispro  0-6 Units Subcutaneous TID WC    insulin lispro  0-3 Units Subcutaneous Nightly    folic acid  1 mg Oral Daily    oyster shell calcium/vitamin D  1 tablet Oral Daily    mupirocin   Topical TID    mupirocin   Nasal TID    silver sulfADIAZINE   Topical Daily    acyclovir   Topical Q3H (SCHEDULED)    amiodarone  100 mg Oral Daily    sodium chloride flush  10 mL Intravenous 2 times per day     Continuous Infusions:   dextrose 100 mL/hr (03/08/19 1700)    sodium chloride 75 mL/hr at 03/09/19 1922     PRN Meds:.magic (miracle) mouthwash with nystatin, oxyCODONE-acetaminophen, glucose, dextrose, glucagon (rDNA), dextrose, sodium chloride flush, magnesium hydroxide, ondansetron, acetaminophen, morphine       Vitals :     Vitals:    03/11/19 0754   BP: 121/75   Pulse: 111   Resp: 16   Temp: 99 °F (37.2 °C)   SpO2: 93%       I & O :       Intake/Output Summary (Last 24 hours) at 3/11/2019 1138  Last data filed at 3/11/2019 0327  Gross per 24 hour   Intake 2672 ml   Output 225 ml   Net 2447 ml        Physical Examination :     General appearance: Anxious- no, distressed- no, in good spirits- no Sitting up on chair, comfortable,  HEENT: Lips- normal, teeth- ok , oral mucosa- moist, has skin lesions in the face  Neck : Mass- no, appears symmetrical, JVD- not visible  Respiratory: Respiratory effort-  normal, wheeze- no, crackles - no  Cardiovascular:  Ausculation- No M/R/G, Edema no  Abdomen: visible mass- no, distention- no, scar- no, tenderness- no                            hepatosplenomegaly-  no  Musculoskeletal:  clubbing no,cyanosis- no , digital ischemia- no                           muscle strength- grossly normal , tone - grossly normal  Skin: rashes- no , ulcers- no, induration- no, tightening - no  Psychiatric:  Judgement and insight- normal           AAO X 3     LABS:     Recent Labs     03/09/19  0522 03/10/19  0532 03/11/19  0640   WBC 1.8* 2.7* 6.1   HGB 8.8* 8.9* 8.6*   HCT 24.7* 25.3* 24.7*   * 82* 57*     Recent Labs     03/09/19  0522 03/10/19  0532 03/11/19  0640   * 131* 125*   K 4.6 4.2 3.6   CL 97* 99 93*   CO2 22 21 23   BUN 11 8 6*   CREATININE 0.6* 0.7* 0.6*   GLUCOSE 128* 78 88

## 2019-03-11 NOTE — PROGRESS NOTES
Called consult to Infectious Disease consult to ID office.  Electronically signed by Susi Earl RN on 3/11/2019 at 12:46 PM

## 2019-03-11 NOTE — PROGRESS NOTES
Reviewed chest/abdomen/pelvis CT. Abdomen remains unchanged per radiology read. There is suggestion of infiltrate in LLL, lingula and RML. Start Vanc for MRSA coverage for possible PNA (consult pharmacy to dose) and continue Merrem. Pt is receiving Bactroban therefore MRSA swab may not be reliable. Send sputum cx if he can expectorate.      Jorge Ross Hospitalist NP

## 2019-03-11 NOTE — PROGRESS NOTES
Speech Language Pathology  Facility/Department: [facility] C3 TELE/MED SURG/ONC   BEDSIDE SWALLOW EVALUATION    NAME: Mayte Collazo  : 1945  MRN: [id number]     Recommend continue full liquid diet per MD order. SLP to trial solids when advanced by MD.     If concern for silent aspiration, recommend MBSS for further assessment. RN aware of recommendations. ADMISSION DATE: 3/7/2019  ADMITTING DIAGNOSIS: has Elevated fasting glucose; CAD (coronary artery disease); PAD (peripheral artery disease) ([address] Essential hypertension; Mixed hyperlipidemia; Sprain of right foot; Sprain of anterior talofibular ligament of right ankle; Closed nondisplaced fracture of fifth right metatarsal bone; Closed fracture of proximal lateral malleolus of ankle with routine healing; Right foot pain; Right ankle pain; Nondisplaced fracture of fifth right metatarsal bone with routine healing; History of pulmonary emphysema; Chronic obstructive pulmonary disease with (acute) exacerbation ([address] Pulmonary nodule, left; Abnormal bone scan of thoracic spine; Back pain; Visit for monitoring Plavix therapy; History of ventricular tachycardia; Gallstones; Hemoptysis; Former smoker; Elevated PSA; Adenocarcinoma of left lung ([address] Acute diverticulitis; Pancytopenia ([address] Diverticulitis of large intestine with perforation and abscess without bleeding; and Diverticulitis on their problem list.     ONSET DATE: 3/7/19    Recent Chest Xray/CT of Chest: 3/10/19  Lungs/pleura: 3 mm nodule left lung base image number 61 appears stable.    Ill-defined interstitial infiltrate left lower lobe and patchy infiltrates in   the lingula.  Bibasilar subsegmental atelectasis.  Ill-defined infrahilar   soft tissue density appears stable.  Sensitivity is limited without IV   contrast.  It appears to contain a calcification.       Ill-defined interstitial infiltrate right middle lobe appears new and   measures several mm.  2-3 mm nodule right middle Treatment: 3-5x/week   D/C Recommendations: To be determined       Recommended Diet and Intervention     Liquid Consistency Recommendation: Thin  Recommended Form of Meds: PO  Therapeutic Interventions: Diet tolerance monitoring; Therapeutic PO trials with SLP;Patient/Family education    Compensatory Swallowing Strategies  Compensatory Swallowing Strategies: Small bites/sips;Upright as possible for all oral intake;Remain upright for 30-45 minutes after meals;Eat/Feed slowly    Treatment/Goals  Short-term Goals  Timeframe for Short-term Goals: 1 week   1. The patient/caregiver will demonstrate understanding of compensatory strategies for improved swallowing safety. 2. The patient will tolerate recommended diet without observed clinical signs of aspiration. 3. The patient will tolerate regular consistency solids 10/10 when cleared by MD.  Long-term Goals  Timeframe for Long-term Goals: 1-2 weeks   1. The patient will tolerate thin liquids without signs and symptoms of aspiration 10/10 via straw.;    General  Chart Reviewed: Yes  Comments: \"I did have of a lot of pain but it's better\"  Subjective  Subjective: Pt asleep upon entry, easily awakened. Family member present at bedside. Behavior/Cognition: Alert; Cooperative;Pleasant mood  Respiratory Status: Room air  Breath Sounds: Clear  O2 Device: None (Room air)  Communication Observation: Functional  Follows Directions: Simple  Dentition: Adequate  Patient Positioning: Upright in chair  Baseline Vocal Quality: Normal  Volitional Cough: Strong  Prior Dysphagia History: None per chart or per pt report  Consistencies Administered: Puree; Thin - cup; Thin - straw       Vision/Hearing  Vision  Vision: Within Functional Limits  Hearing  Hearing: Within functional limits    Oral Motor Deficits  Oral/Motor  Oral Motor:  Within functional limits    Oral Phase Dysfunction  Oral Phase  Oral Phase: WNL  Oral Phase  Oral Phase - Comment: Oral phase of swallowing WNL      Indicators of Pharyngeal Phase Dysfunction   Pharyngeal Phase  Pharyngeal Phase: WNL  Pharyngeal Phase   Pharyngeal: Pharyngeal phase of swallowing appears to be WNL     Prognosis  Prognosis  Prognosis for safe diet advancement: good  Barriers to reach goals: other (comment)  Barriers/Prognosis Comment: Liquid diet per MD   Individuals consulted  Consulted and agree with results and recommendations: Patient; Family member;RN    Education  Patient Education: Pt educated on role of SLP, diet recommendation, s/s of aspiration, risks associated with aspiration   Patient Education Response: Verbalizes understanding    Therapy Time  SLP Individual Minutes  Time In: 8410  Time Out: 5269  Minutes: [address], SLP  3/11/2019 2:37 PM

## 2019-03-11 NOTE — CARE COORDINATION
Chart reviewed day 4. Followed by nephrology, surgery, oncology and IM. With consult today to ID. Continued fevers, WBC's 6.1, . Continuing IVATBX. Plan for surgery later this week. Likely here for foreseeable future. Per patients initial assessment from [place] [id number], open to [place] [address] provided by [facility]. But, CM will follow post op progress. Patient will delay any chemo till after surgical healing is complete.  Abena Lira RN

## 2019-03-12 NOTE — ONCOLOGY
ONCOLOGY HEMATOLOGY CARE PROGRESS NOTE      SUBJECTIVE:       Plt 47, plans for surgery tomorrow. Will need platelets prior to surgery in AM.     He feels better. No complaints. He feels like his mouth sores are better     No family present. ROS:   The remaining 10 point review of symptoms is unremarkable. OBJECTIVE        Physical    VITALS:  /69   Pulse 106   Temp 98.7 °F (37.1 °C) (Oral)   Resp 16   Ht 5' 9\" (1.753 m)   Wt 182 lb (82.6 kg)   SpO2 92%   BMI 26.88 kg/m²   TEMPERATURE:  Current - Temp: 98.7 °F (37.1 °C); Max - Temp  Av °F (37.2 °C)  Min: 98.4 °F (36.9 °C)  Max: 99.8 °F (37.7 °C)  PULSE OXIMETRY RANGE: SpO2  Av %  Min: 92 %  Max: 92 %  24HR INTAKE/OUTPUT:      Intake/Output Summary (Last 24 hours) at 3/12/2019 1257  Last data filed at 3/12/2019 0451  Gross per 24 hour   Intake 1400 ml   Output --   Net 1400 ml       CONSTITUTIONAL:  awake, alert, cooperative, no apparent distress, HEENT oral pharynx , no scleral icterus  HEMATOLOGIC/LYMPHATICS:  no cervical lymphadenopathy, no supraclavicular lymphadenopathy, no axillary lymphadenopathy and no inguinal lymphadenopathy  LUNGS:  No increased work of breathing, good air exchange, clear to auscultation bilaterally, no crackles or wheezing  CARDIOVASCULAR:  , regular rate and rhythm, normal S1 and S2, no S3 or S4, and no murmur noted  ABDOMEN:  No scars, normal bowel sounds, soft, non-distended, non-tender, no masses palpated, no hepatosplenomegally  MUSCULOSKELETAL:  There is no redness, warmth, or swelling of the joints. EXTREMETIES: No clubbing cynosis or edema  NEUROLOGIC:  Awake, alert, oriented to name, place and time. Cranial nerves II-XII are grossly intact. Motor is 5 out of 5 bilaterally. SKIN:  Crusting and scabbed lesions to right side lips and chin , right temporal lesion with crusting over as well.        Data      Recent Labs     03/10/19  0532 19  1552 03/12/19  0508   WBC 2.7* 6.1 6.5   HGB 8.9* 8.6* 8.3*   HCT 25.3* 24.7* 23.8*   PLT 82* 57* 47*   MCV 86.5 86.2 87.4        Recent Labs     03/10/19  0532 03/11/19  0640  03/11/19  1755 03/12/19  0010 03/12/19  0508   * 125*   < > 123* 130* 133*   K 4.2 3.6  --   --   --  3.2*   CL 99 93*  --   --   --  100   CO2 21 23  --   --   --  23   BUN 8 6*  --   --   --  8   CREATININE 0.7* 0.6*  --   --   --  0.6*    < > = values in this interval not displayed. No results for input(s): AST, ALT, ALB, BILIDIR, BILITOT, ALKPHOS in the last 72 hours.     Magnesium:    Lab Results   Component Value Date    MG 1.80 03/12/2019    MG 1.70 02/21/2019    MG 1.50 02/20/2019         Problem List  Patient Active Problem List   Diagnosis    Elevated fasting glucose    CAD (coronary artery disease)    PAD (peripheral artery disease) ([address]    Essential hypertension    Mixed hyperlipidemia    Sprain of right foot    Sprain of anterior talofibular ligament of right ankle    Closed nondisplaced fracture of fifth right metatarsal bone    Closed fracture of proximal lateral malleolus of ankle with routine healing    Right foot pain    Right ankle pain    Nondisplaced fracture of fifth right metatarsal bone with routine healing    History of pulmonary emphysema    Chronic obstructive pulmonary disease with (acute) exacerbation (HCC)    Pulmonary nodule, left    Abnormal bone scan of thoracic spine    Back pain    Visit for monitoring Plavix therapy    History of ventricular tachycardia    Gallstones    Hemoptysis    Former smoker    Elevated PSA    Adenocarcinoma of left lung ([address]    Acute diverticulitis    Pancytopenia ([address]    Diverticulitis of large intestine with perforation and abscess without bleeding    Diverticulitis    Chemotherapy-induced neutropenia (HCC)    Mucositis       ASSESSMENT AND PLAN    -Acute Perforated descending colon diverticulitis (not immune mediated colitis) with free air in the abd   - per gen surgery , plans for resection tomorrow. - ATB support (Zosyn, Maxipime, Vanco) , changed to [address] and Diflucan now   - non acute abd on exam        Neutropenia secondary to chemo  - WBC is over 6, Granix stopped 3/11     Thrombocytopenia secondary to chemo  - PLTS are down to 47 K/mcL. - Stop Integrilin drip.  - Transfuse platelets tomorrow prior to surgery to keep plt above 50     -Lung CA, stage 4 bone mets   - Will hold chemo until surgically healed. He is not due for Cycle 3 Carbo/Alimta/Keytruda until 3-22.  - cont folate while on alimta (folate antagonist)   - CT chest/abd/pelvis, 3/10/2019, showed improvement in the left hilar soft tissue and radiation changes. No obvious new disease. (My read - will review in radiology).     HTN  - holding home Lisinopril due to Hyperkalemia , creat stable   - Was on Norvasc at one point, but pt admits to stopping this drug weeks ago      Right chin crusting skin lesion with honey-like drainage, herpetic orolabial lesions   - lesion worsened on Bactroban, started on Acyclovir cream per ID      Radiation Dermatitis  -Bactroban      Mucositis  - Fluconzaole/MMW. I would estimate that he can safely delay chemotherapy for 2 months if necessary to allow wound healing. Also, if needed, he could transition to maintenance Pembrolizumab early, which would not effect wound healing.       ONCOLOGIC DISPOSITION:  TBD, await surgery tomorrow     Adriel Cadet CNP   Please contact through [facility]

## 2019-03-12 NOTE — PROGRESS NOTES
Sodium going down  BP is good  Sodium going down likely due to  NS  Hold NS  Spoke to RN  Dr Gould Her is on call keny

## 2019-03-12 NOTE — PROGRESS NOTES
Resp 16   Ht 5' 9\" (1.753 m)   Wt 182 lb (82.6 kg)   SpO2 92%   BMI 26.88 kg/m²     General appearance: No apparent distress, appears stated age and cooperative. HEENT: Pupils equal, round, and reactive to light. Conjunctivae/corneas clear. Neck: Supple, with full range of motion. No jugular venous distention. Trachea midline. Respiratory:  Normal respiratory effort. Clear to auscultation, bilaterally without Rales/Wheezes/Rhonchi. Cardiovascular: Regular rate and rhythm with normal S1/S2 without murmurs, rubs or gallops. Abdomen: Soft, non-tender, non-distended with normal bowel sounds. Musculoskeletal: No clubbing, cyanosis or edema bilaterally. Full range of motion without deformity. Skin: Skin color, texture, turgor normal.  Radiation changes to back. Vesicles under lower lip, cheek. Mouth mucosa beefy red, painful   Neurologic:  Neurovascularly intact without any focal sensory/motor deficits. Cranial nerves: II-XII intact, grossly non-focal.  Psychiatric: Alert and oriented, thought content appropriate, normal insight  Capillary Refill: Brisk,< 3 seconds   Peripheral Pulses: +2 palpable, equal bilaterally       Labs:   Recent Labs     03/10/19  0532 03/11/19  0640 03/12/19  0508   WBC 2.7* 6.1 6.5   HGB 8.9* 8.6* 8.3*   HCT 25.3* 24.7* 23.8*   PLT 82* 57* 47*     Recent Labs     03/10/19  0532 03/11/19  0640  03/11/19  1755 03/12/19  0010 03/12/19  0508   * 125*   < > 123* 130* 133*   K 4.2 3.6  --   --   --  3.2*   CL 99 93*  --   --   --  100   CO2 21 23  --   --   --  23   BUN 8 6*  --   --   --  8   CREATININE 0.7* 0.6*  --   --   --  0.6*   CALCIUM 7.8* 7.4*  --   --   --  7.8*    < > = values in this interval not displayed. No results for input(s): AST, ALT, BILIDIR, BILITOT, ALKPHOS in the last 72 hours. No results for input(s): INR in the last 72 hours.   Urinalysis:      Lab Results   Component Value Date    NITRU Negative 03/10/2019    WBCUA 3-5 03/10/2019    RBCUA 0-2 03/10/2019 BLOODU Negative 03/10/2019    SPECGRAV 1.025 03/10/2019    GLUCOSEU Negative 03/10/2019       Radiology:  CT CHEST ABDOMEN PELVIS WO CONTRAST   Final Result   Perforated sigmoid diverticulitis and pneumoperitoneum unchanged. Stable   left [facility] colonic air-fluid collection may represent an intramural or   extramural abscess or giant diverticulum. Infrarenal abdominal aortic aneurysm and aorto bi-iliac stent. Other incidental findings as above. CT ABDOMEN PELVIS W IV CONTRAST Additional Contrast? None   Final Result   Acute perforated descending colon diverticulitis. There is free air in the   abdomen and there is a small extraluminal gas and fluid collection/abscess   adjacent to the descending colon measuring 4.1 cm in AP diameter and 6 cm in   length. There is diverticulosis of the entire colon. Cholelithiasis. 4.7 cm infrarenal abdominal aortic aneurysm status post endograft repair with   a patent aorto bi-iliac graft. There is suspicion of an endoleak with blush   of contrast enhancement suspected in the inferior aspect of the native   aneurysm. As a noncontrast study was not performed, calcification of the   intraluminal thrombus accounting for the apparent enhancement is not   excluded. 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 Comparison with a noncontrast CT examination would be   helpful. Critical results were called by Dr. Judah Silver. MD [facility] to [facility] on   3/7/2019 at 20:35.              Assessment/Plan:    Active Hospital Problems    Diagnosis Date Noted    Chemotherapy-induced neutropenia ([facility] [address] [D70.1, T45.1X5A]     Mucositis [K12.30]     Diverticulitis [K57.92] 03/07/2019    Diverticulitis of large intestine with perforation and abscess without bleeding [K57.20]        Acute perforated recurrent diverticulitis/pneumoperitoneum/abscess in setting of immunosuppression due to chemotherapy:   - CT A/P demonstrates acute perforated colon diverticulitis with free air with fluid collection.   - Continue Merrem   - General surgery consulted/following. Possible plan for surgery tomorrow     Adenocarcinoma of the LLL:  - With known mets to spine. Also question of mets to spleen incidentally noted on CT, though thought to be less likely. Hem/Onc is consulted/following. Chemo on hold in preparation for surgical healing.      Neutropenia due to chemotherapy:   - S/p Neulasta on 3/1. Hem/onc started Granix on 3/8. - Wound care for his radiation ulcer on his back. 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 Holding BP meds for now.     Peripheral arterial disease:   - Per chart review on previous admission, pt cannot stop his DAPT per vascular surgery. In fact, in the past he has been admitted for prolonged perioperative eptifibitide infusions while the DAPT washes out before procedures. Has had many complications with bypasses and stents. Pt's last dose of Plavix was on 3/7/19. Integrilin infusion d/cd yesterday due to thrombocytopenia  - CT performed in ED- reading suggesting possible Type II endoleak. Vascular surgery consulted this admission -- no treatment required currently. History of VT:   - Continue amiodarone. Mucositis:  - Diflucan and nystatin S&W per Oncology.      HSV labialis: concerns for shingles, [place] Syndrome  -Continue antivirals    DVT Prophylaxis:SCDs, platelets 47  Diet: DIET FULL LIQUID;  Code Status: Full Code    PT/OT Eval Status: Not indicated     Dispo - Uncertain     DEBRA Kraft - CNP

## 2019-03-12 NOTE — CARE COORDINATION
Chart reviewed day 5. Followed by oncology, surgery, nephrology, ID and IM. Plan for OR likely tomorrow, colectomy. CM will follow post op course for needs.  Ayah Healy RN

## 2019-03-12 NOTE — PROGRESS NOTES
Infectious Disease Follow up Notes    CC :  Complicated diverticulitis      Antibiotics:   Fluconazole 2000 IV q24  Meropenem 1g q8     Admit Date:   3/7/2019  Hospital Day: 6    Subjective:   He has defervesced, on room air   Less abd pain. +BM today, loose. Tolerating po. Reports loss of hearing from R ear and swelling below     Objective:     Patient Vitals for the past 8 hrs:   BP Temp Temp src Pulse Resp SpO2   03/12/19 1347 135/82 98.9 °F (37.2 °C) Oral 96 16 94 %   03/12/19 1055 120/69 98.7 °F (37.1 °C) Oral 106 16 92 %   03/12/19 0840 117/72 98.8 °F (37.1 °C) Oral 99 18 92 %       EXAM:  General:  Alert, sitting in chair, NAD    HEENT:  Infra-orbital swelling   Crop of purpura, vesicular component along the temple and cheek.   No vesicles seen in the canal.  Limited view of the auditory canal and drum  NECK:  supple       LUNGS:  CTA jaiden    CV:  RRR   ABD: Soft, +BS, slightly tender    EXT:  Trace LE edema      LINE: IV site ok         Scheduled Meds:   tolvaptan  15 mg Oral Daily    potassium chloride  10 mEq Intravenous Q1H    meropenem  1 g Intravenous Q8H    fluconazole  200 mg Intravenous Q24H    insulin lispro  0-6 Units Subcutaneous TID WC    insulin lispro  0-3 Units Subcutaneous Nightly    folic acid  1 mg Oral Daily    oyster shell calcium/vitamin D  1 tablet Oral Daily    mupirocin   Topical TID    silver sulfADIAZINE   Topical Daily    acyclovir   Topical Q3H (SCHEDULED)    amiodarone  100 mg Oral Daily    sodium chloride flush  10 mL Intravenous 2 times per day       Continuous Infusions:   sodium chloride      dextrose 100 mL/hr (03/08/19 1700)          Data Review:    Lab Results   Component Value Date    WBC 6.5 03/12/2019    HGB 8.3 (L) 03/12/2019    HCT 23.8 (L) 03/12/2019    MCV 87.4 03/12/2019    PLT 47 (L) 03/12/2019     Lab Results   Component Value Date    CREATININE 0.6 (L) 03/12/2019 BUN 8 03/12/2019     (L) 03/12/2019    K 3.2 (L) 03/12/2019     03/12/2019    CO2 23 03/12/2019       Hepatic Function Panel:   Lab Results   Component Value Date    ALKPHOS 51 03/08/2019    ALT 14 03/08/2019    AST 14 03/08/2019    PROT 5.4 03/08/2019    PROT 7.5 03/02/2013    BILITOT 0.4 03/08/2019    BILIDIR 0.19 12/28/2011    IBILI 0.4 12/28/2011    LABALBU 2.8 03/08/2019       Cultures:   2/19     BC x2 neg  3/10     BC x2 NGTD        Radiology Review:  All pertinent images / reports were reviewed as a part of this visit. CT abd 3/10:  Impression   Perforated sigmoid diverticulitis and pneumoperitoneum unchanged.  Stable   left [facility] colonic air-fluid collection may represent an intramural or   extramural abscess or giant diverticulum.       Infrarenal abdominal aortic aneurysm and aorto bi-iliac stent.       Other incidental findings as above.      CT abd 3/7:  Impression   Acute perforated descending colon diverticulitis. Azul Last is free air in the   abdomen and there is a small extraluminal gas and fluid collection/abscess   adjacent to the descending colon measuring 4.1 cm in AP diameter and 6 cm in   length.       There is diverticulosis of the entire colon.       Cholelithiasis.       4.7 cm infrarenal abdominal aortic aneurysm status post endograft repair with   a patent aorto bi-iliac graft. Azul Last is suspicion of an endoleak with blush   of contrast enhancement suspected in the inferior aspect of the native   aneurysm.  As a noncontrast study was not performed, calcification of the   intraluminal thrombus accounting for the apparent enhancement is not   excluded.  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 Genaro Gaucher with a noncontrast CT examination would be   helpful.      CT abd 2/19:  Impression   1.  Findings are consistent with acute proximal sigmoid diverticulitis within   the left lower quadrant, without evidence of perforation, free air,  Elevated fasting glucose 07/24/2010     Stage IV lung cancer, actively treated      Persistent vs recurrent diverticulitis with perforation, abscess  Persistent fever despite abx in the context of chemotherapy associated cytopenias   Clinically, seems to have improved since admission   Plan for colectomy noted      Orolabial HSV (?), thrush/mucositis     Acute hearing loss R ear and vesicular/purpuric rash consistent with VZV      Thrombocytopenia. Eduadro not yet reached      NKDA     Plan:   Suspect acute hearing loss from zoster oticus   Start IV antiviral   Get MRI brain, r/o other etiology   There may be a role for steroids in this context but given complexity of his situation/immunosuppression/upcoming surgery, would hold off. Would be optimal to have ENT see him. I discussed relative r/b of possible transfer for ENT eval with patient, Hospitalist, GS, Onc.   Will not pursue transfer at this time     Discussed with patient/family, all questions answered        Ene Jacques MD  Phone: [phone]   Fax : [phone]

## 2019-03-12 NOTE — PROGRESS NOTES
[facility]    [facility]              [phone]                       Plan :     Likely SIADH  Coming up with samsca  Will need fluids heena-op which can worsen hyponatremia, therefore  Will continue samsca and switch to alternate after post op period  Will remove fluid restriction while on samsca    Assessment :     Hyponatremia  Acute on chronic  Down to 123- now 133- coming up appropriately  Suspect SIADH from ca lung, pain, and nausea  And NS can worsen it- on hold  Ca ca lung( adenocarcinoma) with vertebral mets  Sodium low side since 2/19    Cortisol- normal, TSH - 1.65, LFT normal  Echo 2/12- EF of 45-50%, AL mildly dilated    Hypotension  BP: (117)/(72) Pulse:  [99]   Normally hypertension  Now has fever, neutropenic- getting better           pancytopenia  Ca lung  Diverticulitis with perforation- supportive treatment and plan for surgery this admission  Radiation dermatitis  Mucositis  [facility] would like to thank DEBRA Kraft   for opportunity to serve this patient      Please call with questions at-   24 Hrs Answering service [phone] or  7 am- 5 pm via Perfect serve or cell phone  Dr. Yoli Moncada for consult :     hyponatremia     HPI :     From consult note-     Rakesh Center is a 69 y.o. male presented to   the hospital on 3/7/2019 with left lower quadrant pain. He has that for a week. Was hospitalized with diverticulitis, DCed,  And pain never went away. Also low grade fever. No nausea. Mild pain in belly  Diagnosed to have fever and also diverticulitis. Planning for surgery. 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 Has pancytopenia from chemotherapy.      We are consulted for hyponatremia and related issues     Interval History:     Sodium down to 125    ROS:     Seen with- spouse  Pharmacy  SOB- none  Edema- none  Nausea/vomiting- none  Poor appetite-No  Confusion- no  Urinary complaints- no  Any other complaints- no  All other ROS are reviewed and are Negative       Medication:     Scheduled Meds:   tolvaptan  15 mg Oral Daily    meropenem  1 g Intravenous Q8H    fluconazole  200 mg Intravenous Q24H    insulin lispro  0-6 Units Subcutaneous TID WC    insulin lispro  0-3 Units Subcutaneous Nightly    folic acid  1 mg Oral Daily    oyster shell calcium/vitamin D  1 tablet Oral Daily    mupirocin   Topical TID    mupirocin   Nasal TID    silver sulfADIAZINE   Topical Daily    acyclovir   Topical Q3H (SCHEDULED)    amiodarone  100 mg Oral Daily    sodium chloride flush  10 mL Intravenous 2 times per day     Continuous Infusions:   dextrose 100 mL/hr (03/08/19 1700)     PRN Meds:.magic (miracle) mouthwash with nystatin, oxyCODONE-acetaminophen, glucose, dextrose, glucagon (rDNA), dextrose, sodium chloride flush, magnesium hydroxide, ondansetron, acetaminophen, morphine       Vitals :     Vitals:    03/12/19 0840   BP: 117/72   Pulse: 99   Resp: 18   Temp: 98.8 °F (37.1 °C)   SpO2: 92%       I & O :       Intake/Output Summary (Last 24 hours) at 3/12/2019 0942  Last data filed at 3/12/2019 0451  Gross per 24 hour   Intake 1400 ml   Output --   Net 1400 ml        Physical Examination :     General appearance: Anxious- no, distressed- no, in good spirits- no    Sitting up on chair, comfortable,  HEENT: Lips- normal, teeth- ok , oral mucosa- moist, has skin lesions in the face  Neck : Mass- no, appears symmetrical, JVD- not visible  Respiratory: Respiratory effort-  normal, wheeze- no, crackles - no  Cardiovascular:  Ausculation- No M/R/G, Edema no  Abdomen: visible mass- no, distention- no, scar- no, tenderness- no                            hepatosplenomegaly-  no  Musculoskeletal:  clubbing no,cyanosis- no , digital ischemia- no                           muscle strength- grossly normal , tone - grossly normal  Skin: rashes- no , ulcers- no, induration- no, tightening - no  Psychiatric:

## 2019-03-12 NOTE — PROGRESS NOTES
Speech Language Pathology  Facility/Department: [facility] C3 TELE/MED SURG/ONC  Dysphagia Daily Treatment Note    NAME: Ivette Vinson  : 1945  MRN: [id number]    Patient Diagnosis(es):   Patient Active Problem List    Diagnosis Date Noted    Chemotherapy[facility]induced neutropenia ([facility] [address]     Mucositis     Diverticulitis 2019    Diverticulitis of large intestine with perforation and abscess without bleeding     Acute diverticulitis 2019    Pancytopenia ([address] 2019    Adenocarcinoma of left lung ([facility] [address] 2019    Gallstones 2019    Hemoptysis 2019    Former smoker 2019    Elevated PSA 2019    Back pain 2019    Visit for monitoring Plavix therapy     History of ventricular tachycardia     History of pulmonary emphysema 2019    Chronic obstructive pulmonary disease with (acute) exacerbation ([facility] [address] 2019    Pulmonary nodule, left 2019    Abnormal bone scan of thoracic spine 2019    Nondisplaced fracture of fifth right metatarsal bone with routine healing 2018    Sprain of right foot 2018    Sprain of anterior talofibular ligament of right ankle 2018    Closed nondisplaced fracture of fifth right metatarsal bone 2018    Closed fracture of proximal lateral malleolus of ankle with routine healing 2018    Right foot pain 2018    Right ankle pain 2018    Essential hypertension 2017    Mixed hyperlipidemia 2017    PAD (peripheral artery disease) ([facility] [address] 2011    CAD (coronary artery disease) 2011    Elevated fasting glucose 2010     Allergies: No Known Allergies    Subjective:  Pt alert and oriented, RN ok'd SLP entry into room. Pain:  No pain indicated    Current Diet:  Full liquid diet per MD    Diet Tolerance:  Patient tolerating current diet level without signs/symptoms of penetration / aspiration. P.O.  Trials:  No PO trials given at this time    Dysphagia Therapy:  Pt sitting upright in chair, alert and oriented. Pt kindly declined PO trials at this time. Pt and SLP discussed current diet recommendations (full liquids per MD) and important use of safe swallow compensatory strategies. Pt verbalized understanding. Pt stated he wasn't interested in eating or drinking anything at this time. Pt stated he is having colon surgery tomorrow. SLP recommends pt continue current diet recommendations per MD and will fully assess solids once cleared by MD. Pt in agreement. Recommendations:  Full liquids per MD recommendations  Thin liquids    Patient/Family/Caregiver Education:  SLP provided education regarding current diet recommendations and important use of safe swallow compensatory strategies during all meals. Pt verbalized understanding. Compensatory Strategies:  Small bites/sips;Upright as possible for all oral intake;Remain upright for 30-45 minutes after meals;Eat/Feed slowly    Plan:    Continued Dysphagia treatment with goals per plan of care. Discharge Recommendations: ST follow up TBD. If pt discharges from hospital prior to Speech/Swallowing discharge, this note serves as tx and discharge summary. Total Treatment Time:  Dysphagia Tx 10\" ([id number])      Goran HUMPHREYS CCC-SLP  Speech-Language Pathologist  [id number]

## 2019-03-12 NOTE — PROGRESS NOTES
d/w Dr Masha Galvez re: neutropenia/thrombocytopenia management perioperatively. If he approves, will make plans for sigmoid resection, likely tomorrow. Pt indicates he understands these issues, asks appropriate questions, and agrees to proceed.     Mckayla French MD

## 2019-03-12 NOTE — PLAN OF CARE
Bed locked, in lowest position. Non skid socks in place while OOB. Room kept free of clutter with call light and personal belongings kept within reach.

## 2019-03-13 NOTE — ONCOLOGY
ONCOLOGY HEMATOLOGY CARE PROGRESS NOTE      SUBJECTIVE:       Plt are 50k. Surgery delayed until tomorrow due to shingles outbreak. He still has hearing changes to the right ear. He says, \" it feels like it is clogged\". Wife at bedside. He tells me that his pace maker went off yesterday and it was the first time in 5 years that it \"shocked him\". ROS:   The remaining 10 point review of symptoms is unremarkable. OBJECTIVE        Physical    VITALS:  /86   Pulse 98   Temp 100.1 °F (37.8 °C) (Oral)   Resp 17   Ht 5' 9\" (1.753 m)   Wt 182 lb (82.6 kg)   SpO2 (!) 87%   BMI 26.88 kg/m²   TEMPERATURE:  Current - Temp: 100.1 °F (37.8 °C); Max - Temp  Av.9 °F (37.2 °C)  Min: 97.8 °F (36.6 °C)  Max: 100.1 °F (37.8 °C)  PULSE OXIMETRY RANGE: SpO2  Av.9 %  Min: 87 %  Max: 95 %  24HR INTAKE/OUTPUT:      Intake/Output Summary (Last 24 hours) at 3/13/2019 1027  Last data filed at 3/12/2019 2241  Gross per 24 hour   Intake 2190 ml   Output --   Net 2190 ml       CONSTITUTIONAL:  awake, alert, cooperative, no apparent distress, HEENT oral pharynx , no scleral icterus  HEMATOLOGIC/LYMPHATICS:  no cervical lymphadenopathy, no supraclavicular lymphadenopathy, no axillary lymphadenopathy and no inguinal lymphadenopathy  LUNGS:  No increased work of breathing, good air exchange, clear to auscultation bilaterally, no crackles or wheezing  CARDIOVASCULAR:  , regular rate and rhythm, normal S1 and S2, no S3 or S4, and no murmur noted  ABDOMEN:  No scars, normal bowel sounds, soft, non-distended, non-tender, no masses palpated, no hepatosplenomegally  MUSCULOSKELETAL:  There is no redness, warmth, or swelling of the joints. EXTREMETIES: No clubbing cynosis or edema  NEUROLOGIC:  Awake, alert, oriented to name, place and time. Cranial nerves II-XII are grossly intact. Motor is 5 out of 5 bilaterally.    SKIN:  Crusting and scabbed lesions to right side lips and chin , right temporal lesion with crusting over as well. IMPROVED FROM YESTERDAY ON ACYCLOVIR       Data      Recent Labs     03/11/19  0640 03/12/19  0508 03/13/19  0635   WBC 6.1 6.5 7.0   HGB 8.6* 8.3* 8.5*   HCT 24.7* 23.8* 24.3*   PLT 57* 47* 50*   MCV 86.2 87.4 87.0        Recent Labs     03/11/19  0640  03/12/19  0508  03/12/19 2127 03/13/19  0350 03/13/19  0635   *   < > 133*   < > 129* 132* 134*   K 3.6  --  3.2*  --  3.9  --  3.4*   CL 93*  --  100  --   --   --  100   CO2 23  --  23  --   --   --  23   BUN 6*  --  8  --   --   --  9   CREATININE 0.6*  --  0.6*  --   --   --  1.1    < > = values in this interval not displayed. No results for input(s): AST, ALT, ALB, BILIDIR, BILITOT, ALKPHOS in the last 72 hours.     Magnesium:    Lab Results   Component Value Date    MG 1.70 03/13/2019    MG 1.80 03/12/2019    MG 1.70 02/21/2019         Problem List  Patient Active Problem List   Diagnosis    Elevated fasting glucose    CAD (coronary artery disease)    [facility] [facility]    Essential hypertension    Mixed hyperlipidemia    Sprain of right foot    Sprain of anterior talofibular ligament of right ankle    Closed nondisplaced fracture of fifth right metatarsal bone    Closed fracture of proximal lateral malleolus of ankle with routine healing    Right foot pain    Right ankle pain    Nondisplaced fracture of fifth right metatarsal bone with routine healing    History of pulmonary emphysema    Chronic obstructive pulmonary disease with (acute) exacerbation (HCC)    Pulmonary nodule, left    Abnormal bone scan of thoracic spine    Back pain    Visit for monitoring Plavix therapy    History of ventricular tachycardia    Gallstones    Hemoptysis    Former smoker    Elevated PSA    Adenocarcinoma of left lung ([address]    Acute diverticulitis    Pancytopenia ([address]    Diverticulitis of large intestine with perforation and abscess without bleeding    Diverticulitis  Chemotherapy-induced neutropenia (HCC)    Mucositis    Herpes zoster oticus    Hearing loss of right ear       ASSESSMENT AND PLAN    -Acute Perforated descending colon diverticulitis (not immune mediated colitis) with free air in the abd   - per gen surgery , plans for resection 3/14.   - ATB support (Zosyn, Maxipime, Vanco) , changed to [address] and Diflucan now   - non acute abd on exam   - Will transfuse plt on the way to surgery tomorrow.      Neutropenia secondary to chemo  - WBC is over 7, Granix stopped 3/11     Thrombocytopenia secondary to chemo  - PLTS are down to 50 K/mcL. - Stop Integrilin drip.  - Transfuse platelets tomorrow prior to surgery to keep plt above 50- ordered on call to the OR      -Lung CA, stage 4 bone mets   - Will hold chemo until surgically healed. He is not due for Cycle 3 Carbo/Alimta/Keytruda until 3-22. May consider holding chemo and moving forward with Brandon Oas only in future. Family aware. - cont folate while on alimta (folate antagonist), needs folate for 3 months after stopping Alimta. Needs B12 shot every 9 weeks at least (every 3rd cycle). - CT chest/abd/pelvis, 3/10/2019, showed improvement in the left hilar soft tissue and radiation changes. No obvious new disease.      HTN  - holding home Lisinopril due to Hyperkalemia , creat stable   - Was on Norvasc at one point, but pt admits to stopping this drug weeks ago      Shingles to the V3 dermatome of the trigeminal nerve  - Acyclovir started 3/13 at 10 mg/kg every 8 hours     Hearing changes  - possibly due to shingles  - no vesicles in ear canal per Dr. Barrow Days   - MRI cancelled due to pacemaker        Radiation Dermatitis  -Bactroban      Mucositis  - Fluconzaole/MMW. Pacemaker  - family to bring in interrogation device   - NSR/ST on tele     I would estimate that he can safely delay chemotherapy for 2 months if necessary to allow wound healing.   Also, if needed, he could transition to maintenance Pembrolizumab early, which would not effect wound healing.       ONCOLOGIC DISPOSITION:  TBD, await surgery tomorrow     Bill Herrera, CNP   Please contact through [address]

## 2019-03-13 NOTE — PROGRESS NOTES
Speech Language Pathology  Attempt Tx Note    Per chart review, pt is NPO for possibly surgery this date. SLP will continue to follow and will re-attempt later this date as schedule allows and as pt is medically appropriate to participate. Thank you. Julissa HUMPHREYS CCC-SLP  Speech-Language Pathologist  [id number]

## 2019-03-13 NOTE — CARE COORDINATION
Chart reviewed day 6. Patient followed by oncology, surgery, nephrology ID and IM. Plan for OR today. CM will follow post op course for needs.  Lida Gillespie RN

## 2019-03-13 NOTE — PROGRESS NOTES
Hospitalist Progress Note      PCP: Marj Graves MD    Date of Admission: 3/7/2019    Chief Complaint: Abdominal pain     Hospital Course:  69 y.o. male who presented to [facility] with LLQ abdominal pain. Pt. Was recently d/c'd for diverticulitis, states pain never went away. C/o low grade fevers for 2 weeks. States pain not as severe as previous admission. Had chemo on Friday, states received neulasta. Denies cp, n/v, sob. Subjective:   Pt feels and looks much better today. He continues to c/o ear fullness, difficulty hearing in R ear when on cell phone. No new lesions or vesicles. Vesicles around the mouth look much better and bottom lip less swollen. Wife and daughters present bedside.    Plans for OR Tomorrow    Medications:  Reviewed    Infusion Medications    sodium chloride      dextrose 100 mL/hr (03/08/19 1700)     Scheduled Medications    sodium chloride  250 mL Intravenous Once    [START ON 3/14/2019] tolvaptan  15 mg Oral Daily    sodium chloride  250 mL Intravenous Once    [START ON 3/14/2019] acetaminophen  650 mg Oral Once    [START ON 3/14/2019] diphenhydrAMINE  25 mg Oral Once    0.9 % sodium chloride  250 mL Intravenous Once    acyclovir  700 mg Intravenous Q8H    meropenem  1 g Intravenous Q8H    fluconazole  200 mg Intravenous Q24H    insulin lispro  0-6 Units Subcutaneous TID WC    insulin lispro  0-3 Units Subcutaneous Nightly    folic acid  1 mg Oral Daily    oyster shell calcium/vitamin D  1 tablet Oral Daily    mupirocin   Topical TID    silver sulfADIAZINE   Topical Daily    amiodarone  100 mg Oral Daily    sodium chloride flush  10 mL Intravenous 2 times per day     PRN Meds: magic (miracle) mouthwash with nystatin, oxyCODONE-acetaminophen, glucose, dextrose, glucagon (rDNA), dextrose, sodium chloride flush, magnesium hydroxide, ondansetron, acetaminophen, morphine      Intake/Output Summary (Last 24 hours) at 3/13/2019 7397  Last data filed at 3/12/2019 2241  Gross per 24 hour   Intake 1370 ml   Output --   Net 1370 ml       Physical Exam Performed:    /71   Pulse 86   Temp 97.8 °F (36.6 °C) (Oral)   Resp 16   Ht 5' 9\" (1.753 m)   Wt 182 lb (82.6 kg)   SpO2 99%   BMI 26.88 kg/m²     General appearance: No apparent distress, appears stated age and cooperative. HEENT: Pupils equal, round, and reactive to light. Conjunctivae/corneas clear. Vesicles in outer ear canal noted   Neck: Supple, with full range of motion. No jugular venous distention. Trachea midline. Respiratory:  Normal respiratory effort. Clear to auscultation, bilaterally without Rales/Wheezes/Rhonchi. Cardiovascular: Regular rate and rhythm with normal S1/S2 without murmurs, rubs or gallops. Abdomen: Soft, non-tender, non-distended with normal bowel sounds. Musculoskeletal: No clubbing, cyanosis or edema bilaterally. Full range of motion without deformity. Skin: Skin color, texture, turgor normal.  Radiation changes to back. Vesicles under lower lip, cheek. Mouth mucosa beefy red, painful   Neurologic:  Neurovascularly intact without any focal sensory/motor deficits. Cranial nerves: II-XII intact, grossly non-focal.  Psychiatric: Alert and oriented, thought content appropriate, normal insight  Capillary Refill: Brisk,< 3 seconds   Peripheral Pulses: +2 palpable, equal bilaterally       Labs:   Recent Labs     03/11/19  0640 03/12/19  0508 03/13/19  0635   WBC 6.1 6.5 7.0   HGB 8.6* 8.3* 8.5*   HCT 24.7* 23.8* 24.3*   PLT 57* 47* 50*     Recent Labs     03/11/19  0640  03/12/19  0508  03/12/19  2127 03/13/19  0350 03/13/19  0635   *   < > 133*   < > 129* 132* 134*   K 3.6  --  3.2*  --  3.9  --  3.4*   CL 93*  --  100  --   --   --  100   CO2 23  --  23  --   --   --  23   BUN 6*  --  8  --   --   --  9   CREATININE 0.6*  --  0.6*  --   --   --  1.1   CALCIUM 7.4*  --  7.8*  --   --   --  8.1*    < > = values in this interval not displayed.      No results for input(s): AST, ALT, BILIDIR, BILITOT, ALKPHOS in the last 72 hours. Recent Labs     03/13/19  0635   INR 1.13     Urinalysis:      Lab Results   Component Value Date    NITRU Negative 03/10/2019    WBCUA 3-5 03/10/2019    RBCUA 0-2 03/10/2019    BLOODU Negative 03/10/2019    SPECGRAV 1.025 03/10/2019    GLUCOSEU Negative 03/10/2019       Radiology:  CT CHEST ABDOMEN PELVIS WO CONTRAST   Final Result   Perforated sigmoid diverticulitis and pneumoperitoneum unchanged. Stable   left Emerita colonic air-fluid collection may represent an intramural or   extramural abscess or giant diverticulum. Infrarenal abdominal aortic aneurysm and aorto bi-iliac stent. Other incidental findings as above. CT ABDOMEN PELVIS W IV CONTRAST Additional Contrast? None   Final Result   Acute perforated descending colon diverticulitis. There is free air in the   abdomen and there is a small extraluminal gas and fluid collection/abscess   adjacent to the descending colon measuring 4.1 cm in AP diameter and 6 cm in   length. There is diverticulosis of the entire colon. Cholelithiasis. 4.7 cm infrarenal abdominal aortic aneurysm status post endograft repair with   a patent aorto bi-iliac graft. There is suspicion of an endoleak with blush   of contrast enhancement suspected in the inferior aspect of the native   aneurysm. As a noncontrast study was not performed, calcification of the   intraluminal thrombus accounting for the apparent enhancement is not   excluded. 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 Comparison with a noncontrast CT examination would be   helpful. Critical results were called by Dr. Greer Briones. MD [facility] to [facility] on   3/7/2019 at 20:35.              Assessment/Plan:    Active Hospital Problems    Diagnosis Date Noted    Herpes zoster oticus [B02.21]     Hearing loss of right ear [H91.91]     Chemotherapy-induced neutropenia (HCC) Status: Full Code    PT/OT Eval Status: Not indicated     Dispo - Uncertain     Mauri Shearer, APRN - CNP

## 2019-03-13 NOTE — PROGRESS NOTES
Infectious Disease Follow up Notes    CC :  Complicated diverticulitis      Antibiotics:   Fluconazole 2000 IV q24  Meropenem 1g q8     Admit Date:   3/7/2019  Hospital Day: 7    Subjective:   Low grade fever 100.1 earlier this morning, currerntly afebrile   He is on room air   No abd pain. No discomfort from facial rash. Still with decreased hearing R ear. No tinnitus     Objective:     Patient Vitals for the past 8 hrs:   BP Temp Temp src Pulse Resp SpO2   03/13/19 1112 113/72 98.2 °F (36.8 °C) Axillary 101 16 94 %   03/13/19 0839 126/86 -- -- 98 -- (!) 87 %   03/13/19 0830 126/86 100.1 °F (37.8 °C) Oral 96 17 (!) 89 %       EXAM:  General:  Alert, sitting in chair, NAD    HEENT:  Infra-orbital swelling   Crop of purpuricvesicular lesions along the temple and cheek. Single vesicle seen inside the ear.   Limited view of the auditory canal and drum  NECK:  supple       LUNGS:  CTA jaiden    CV:  RRR   ABD: Soft, +BS, slightly tender    EXT:  Trace LE edema      LINE: IV site ok         Scheduled Meds:   sodium chloride  250 mL Intravenous Once    [START ON 3/14/2019] tolvaptan  15 mg Oral Daily    sodium chloride  250 mL Intravenous Once    [START ON 3/14/2019] acetaminophen  650 mg Oral Once    [START ON 3/14/2019] diphenhydrAMINE  25 mg Oral Once    potassium chloride  10 mEq Intravenous Q1H    acyclovir  700 mg Intravenous Q8H    meropenem  1 g Intravenous Q8H    fluconazole  200 mg Intravenous Q24H    insulin lispro  0-6 Units Subcutaneous TID WC    insulin lispro  0-3 Units Subcutaneous Nightly    folic acid  1 mg Oral Daily    oyster shell calcium/vitamin D  1 tablet Oral Daily    mupirocin   Topical TID    silver sulfADIAZINE   Topical Daily    acyclovir   Topical Q3H (SCHEDULED)    amiodarone  100 mg Oral Daily    sodium chloride flush  10 mL Intravenous 2 times per day       Continuous Infusions:   sodium chloride      dextrose 100 mL/hr (03/08/19 1700)          Data Review:    Lab Results   Component Value Date    WBC 7.0 03/13/2019    HGB 8.5 (L) 03/13/2019    HCT 24.3 (L) 03/13/2019    MCV 87.0 03/13/2019    PLT 50 (L) 03/13/2019     Lab Results   Component Value Date    CREATININE 1.1 03/13/2019    BUN 9 03/13/2019     (L) 03/13/2019    K 3.4 (L) 03/13/2019     03/13/2019    CO2 23 03/13/2019       Hepatic Function Panel:   Lab Results   Component Value Date    ALKPHOS 51 03/08/2019    ALT 14 03/08/2019    AST 14 03/08/2019    PROT 5.4 03/08/2019    PROT 7.5 03/02/2013    BILITOT 0.4 03/08/2019    BILIDIR 0.19 12/28/2011    IBILI 0.4 12/28/2011    LABALBU 2.8 03/08/2019       Cultures:   2/19     BC x2 neg  3/10     BC x2 NGTD        Radiology Review:  All pertinent images / reports were reviewed as a part of this visit.    CT abd 3/10:  Impression   Perforated sigmoid diverticulitis and pneumoperitoneum unchanged.  Stable   left Emerita colonic air-fluid collection may represent an intramural or   extramural abscess or giant diverticulum.       Infrarenal abdominal aortic aneurysm and aorto bi-iliac stent.       Other incidental findings as above.      CT abd 3/7:  Impression   Acute perforated descending colon diverticulitis. [place]ula L[place]hal is free air in the   abdomen and there is a small extraluminal gas and fluid collection/abscess   adjacent to the descending colon measuring 4.1 cm in AP diameter and 6 cm in   length.       There is diverticulosis of the entire colon.       Cholelithiasis.       4.7 cm infrarenal abdominal aortic aneurysm status post endograft repair with   a patent aorto bi-iliac graft. Chula L[place]hal is suspicion of an endoleak with blush   of contrast enhancement suspected in the inferior aspect of the native   aneurysm.  As a noncontrast study was not performed, calcification of the   intraluminal thrombus accounting for the apparent enhancement is not   excluded.  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 Chele Dates with a noncontrast CT examination would be   helpful.      CT abd 2/19:  Impression   1. Findings are consistent with acute proximal sigmoid diverticulitis within   the left lower quadrant, without evidence of perforation, free air, or   abscess. 2. New nonspecific focus of geographic low attenuation within the periphery   of the inferior spleen, possibly a splenic infarct, less likely a metastatic   focus.  Suggest clinical correlation and attention on follow-up abdominal CT   studies to exclude metastatic disease. 3. Cholelithiasis. 4. Stable bilateral renal cysts. 5. 4.7 x 4.4 cm fusiform aneurysm of the infrarenal abdominal aorta,   stabilized by an aortobi-iliac stent graft, without evidence of in-stent   stenosis or thrombosis.    6. Stable mild prostatomegaly.      Assessment:     Patient Active Problem List    Diagnosis Date Noted    Herpes zoster oticus     Hearing loss of right ear     Chemotherapy-induced neutropenia ([address]     Mucositis     Diverticulitis 03/07/2019    Diverticulitis of large intestine with perforation and abscess without bleeding     Acute diverticulitis 02/19/2019    Pancytopenia ([address] 02/19/2019    Adenocarcinoma of left lung ([address] 01/22/2019    Gallstones 01/21/2019    Hemoptysis 01/21/2019    Former smoker 01/21/2019    Elevated PSA 01/21/2019    Back pain 01/16/2019    Visit for monitoring Plavix therapy     History of ventricular tachycardia     History of pulmonary emphysema 01/09/2019    Chronic obstructive pulmonary disease with (acute) exacerbation ([address] 01/09/2019    Pulmonary nodule, left 01/09/2019    Abnormal bone scan of thoracic spine 01/09/2019    Nondisplaced fracture of fifth right metatarsal bone with routine healing 05/14/2018    Sprain of right foot 05/07/2018    Sprain of anterior talofibular ligament of right ankle 05/07/2018    Closed nondisplaced fracture of fifth right metatarsal bone 05/07/2018    Closed fracture of proximal lateral malleolus of ankle with routine healing 05/07/2018    Right foot pain 05/07/2018    Right ankle pain 05/07/2018    Essential hypertension 05/01/2017    Mixed hyperlipidemia 05/01/2017    PAD (peripheral artery disease) ([facility] [address] 05/21/2011    CAD (coronary artery disease) 04/29/2011    Elevated fasting glucose 07/24/2010     Stage IV lung cancer, actively treated      Persistent vs recurrent diverticulitis with perforation, abscess in the context of chemotherapy associated cytopenias   Plan for colectomy      Orolabial HSV (?), thrush/mucositis     Acute hearing loss R ear and vesicular/purpuric rash consistent with zoster oticus      Thrombocytopenia.       NKDA     Plan:   Continuing meropenem/fluconazole for perforated diverticulitis  Continue acyclovir for zoster oticus.   Maintain adequate hydration to decrease risk of MAHSA from acyclovir and monitor renal function closely       Discussed with patient/family, all questions answered        Glenis White MD  Phone: [phone]   Fax : [phone]

## 2019-03-13 NOTE — PROGRESS NOTES
[facility]    [email or web address]              [phone]                       Plan :     Likely SIADH  Coming up with samsca  Will need fluids heena-op which can worsen hyponatremia, therefore  Will continue samsca and switch to alternate after post op period  Will remove fluid restriction while on samsca    Assessment :     Hyponatremia  Acute on chronic  Down to 123- now 134- coming up appropriately  Suspect SIADH from ca lung, pain, and nausea  And NS can worsen it- on hold  Ca ca lung( adenocarcinoma) with vertebral mets  Sodium low side since 2/19    Cortisol- normal, TSH - 1.65, LFT normal  Echo 2/12- EF of 45-50%, AL mildly dilated    Hypotension  BP: (126-153)/(81-86) Pulse:  [96-98]   Normally hypertensive  Now has fever, neutropenic- getting better           pancytopenia  Ca lung  Diverticulitis with perforation- supportive treatment and plan for surgery this admission  Radiation dermatitis  Mucositis  [facility] would like to thank DEBRA Davis   for opportunity to serve this patient      Please call with questions at-   24 Hrs Answering service [phone] or  7 am- 5 pm via Perfect [employer] or cell phone  Dr. Lawrence Salazar for consult :     hyponatremia     HPI :     From consult note-     Juan Diego Churchill is a 69 y.o. male presented to   the hospital on 3/7/2019 with left lower quadrant pain. He has that for a week. Was hospitalized with diverticulitis, DCed,  And pain never went away. Also low grade fever. No nausea. Mild pain in belly  Diagnosed to have fever and also diverticulitis. Planning for surgery. 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 Has pancytopenia from chemotherapy.      We are consulted for hyponatremia and related issues     Interval History:     Sodium down to 125    ROS:     Seen with- spouse  Pharmacy  SOB- none  Edema- none  Nausea/vomiting- none  Poor appetite-No  Confusion- no  Urinary complaints- no  Any other complaints- no  All other ROS are reviewed and are Negative       Medication:     Scheduled Meds:   potassium chloride  10 mEq Intravenous Q1H    magnesium sulfate  2 g Intravenous Once    sodium chloride  250 mL Intravenous Once    diphenhydrAMINE  25 mg Oral Once    acyclovir  700 mg Intravenous Q8H    meropenem  1 g Intravenous Q8H    fluconazole  200 mg Intravenous Q24H    insulin lispro  0-6 Units Subcutaneous TID WC    insulin lispro  0-3 Units Subcutaneous Nightly    folic acid  1 mg Oral Daily    oyster shell calcium/vitamin D  1 tablet Oral Daily    mupirocin   Topical TID    silver sulfADIAZINE   Topical Daily    acyclovir   Topical Q3H (SCHEDULED)    amiodarone  100 mg Oral Daily    sodium chloride flush  10 mL Intravenous 2 times per day     Continuous Infusions:   sodium chloride      dextrose 100 mL/hr (03/08/19 1700)     PRN Meds:.magic (miracle) mouthwash with nystatin, oxyCODONE-acetaminophen, glucose, dextrose, glucagon (rDNA), dextrose, sodium chloride flush, magnesium hydroxide, ondansetron, acetaminophen, morphine       Vitals :     Vitals:    03/13/19 0839   BP: 126/86   Pulse: 98   Resp:    Temp:    SpO2: (!) 87%       I & O :       Intake/Output Summary (Last 24 hours) at 3/13/2019 1021  Last data filed at 3/12/2019 2241  Gross per 24 hour   Intake 2190 ml   Output --   Net 2190 ml        Physical Examination :     General appearance: Anxious- no, distressed- no, in good spirits- no    Sitting up on chair, comfortable,  HEENT: Lips- normal, teeth- ok , oral mucosa- moist, has skin lesions in the face  Neck : Mass- no, appears symmetrical, JVD- not visible  Respiratory: Respiratory effort-  normal, wheeze- no, crackles - no  Cardiovascular:  Ausculation- No M/R/G, Edema no  Abdomen: visible mass- no, distention- no, scar- no, tenderness- no                            hepatosplenomegaly-  no  Musculoskeletal:  clubbing no,cyanosis- no , digital

## 2019-03-13 NOTE — PROGRESS NOTES
[facility]    PATIENT NAME: Demond Ramos     TODAY'S DATE: 3/13/2019    CHIEF COMPLAINT: improved LLQ pain    INTERVAL HISTORY/HPI:    Pt with improved pain, hearing loss right ear, no chills, no nausea. REVIEW OF SYSTEMS:  Pertinent positives and negatives as per interval history section    OBJECTIVE:  VITALS:  /72   Pulse 101   Temp 98.2 °F (36.8 °C) (Axillary)   Resp 16   Ht 5' 9\" (1.753 m)   Wt 182 lb (82.6 kg)   SpO2 94%   BMI 26.88 kg/m²     INTAKE/OUTPUT:    I/O last 3 completed shifts: In: 2670 [P.O.:1820; I.V.:650; IV Piggyback:200]  Out: -   No intake/output data recorded. CONSTITUTIONAL:  awake and alert  LUNGS:  Respirations easy and unlabored, no crackles or wheezing  CARD:  tachycardic with regular rhythm  ABDOMEN:  normal bowel sounds, soft, non-distended, mild LLQ tender     Data:  CBC:   Recent Labs     03/11/19  0640 03/12/19  0508 03/13/19  0635   WBC 6.1 6.5 7.0   HGB 8.6* 8.3* 8.5*   HCT 24.7* 23.8* 24.3*   PLT 57* 47* 50*     BMP:    Recent Labs     03/11/19  0640  03/12/19  0508  03/12/19 2127 03/13/19  0350 03/13/19  0635   *   < > 133*   < > 129* 132* 134*   K 3.6  --  3.2*  --  3.9  --  3.4*   CL 93*  --  100  --   --   --  100   CO2 23  --  23  --   --   --  23   BUN 6*  --  8  --   --   --  9   CREATININE 0.6*  --  0.6*  --   --   --  1.1   GLUCOSE 88  --  97  --   --   --  117*    < > = values in this interval not displayed. Hepatic: No results for input(s): AST, ALT, ALB, BILITOT, ALKPHOS in the last 72 hours. Mag:      Recent Labs     03/12/19  0508 03/13/19  0635   MG 1.80 1.70*      Phos:   No results for input(s): PHOS in the last 72 hours. INR:   Recent Labs     03/13/19  0635   INR 1.13       Radiology Review:  *Imaging personally reviewed by me. NA      ASSESSMENT AND PLAN:  68 yo with diverticulitis  1. Plan OR tomorrow morning  2. Platelets on call to OR   3. Full liquids, NPO at midnight  4.   Continue abx, antivirals     Electronically signed by Andres Farris, [address]

## 2019-03-14 NOTE — PROGRESS NOTES
[facility]           [phone]  [facility] [employer]           Quick Note     Labs and vitals noted( as given below)  Couldn't see patient today   Was in OR  Sodium normal, but cr high  Continue fluids  On iv acyclovir- risk of crystal precipitation in the kidneys  Continue aggressive hydration      Vitals:    03/14/19 1643   BP:    Pulse:    Resp: 20   Temp:    SpO2:      In: 2550   Out: 930 [Urine:500; Drains:230]  Lab Results   Component Value Date     03/14/2019    K 4.0 03/14/2019     03/14/2019    CO2 22 03/14/2019    BUN 10 03/14/2019    CREATININE 1.6 03/14/2019    GLUCOSE 104 03/14/2019    CALCIUM 8.2 03/14/2019         Please call with questions at-  24 Hrs Answering service [phone]  Perfect serve, or cell phone 7 am - 5pm  Dr Jin Parker

## 2019-03-14 NOTE — PROGRESS NOTES
Hospitalist Progress Note      PCP: Rebecca Ha MD    Date of Admission: 3/7/2019    Chief Complaint: Abdominal pain     Hospital Course:  69 y.o. male who presented to [facility] with LLQ abdominal pain. Pt. Was recently d/c'd for diverticulitis, states pain never went away. C/o low grade fevers for 2 weeks. States pain not as severe as previous admission. Had chemo on Friday, states received neulasta. Denies cp, n/v, sob. Subjective:   OR this AM.  Cr bump-1.6    Medications:  Reviewed    Infusion Medications    HYDROmorphone      sodium chloride Stopped (03/14/19 0926)    dextrose 100 mL/hr (03/08/19 1700)     Scheduled Medications    acyclovir  700 mg Intravenous Q12H    sodium chloride  250 mL Intravenous Once    tolvaptan  15 mg Oral Daily    sodium chloride  250 mL Intravenous Once    0.9 % sodium chloride  250 mL Intravenous Once    meropenem  1 g Intravenous Q8H    fluconazole  200 mg Intravenous Q24H    insulin lispro  0-6 Units Subcutaneous TID WC    insulin lispro  0-3 Units Subcutaneous Nightly    folic acid  1 mg Oral Daily    oyster shell calcium/vitamin D  1 tablet Oral Daily    mupirocin   Topical TID    silver sulfADIAZINE   Topical Daily    amiodarone  100 mg Oral Daily    sodium chloride flush  10 mL Intravenous 2 times per day     PRN Meds: magic (miracle) mouthwash with nystatin, oxyCODONE-acetaminophen, glucose, dextrose, glucagon (rDNA), dextrose, sodium chloride flush, magnesium hydroxide, ondansetron, acetaminophen, morphine      Intake/Output Summary (Last 24 hours) at 3/14/2019 1235  Last data filed at 3/14/2019 1153  Gross per 24 hour   Intake 3720 ml   Output 860 ml   Net 2860 ml       Physical Exam Performed:    /77   Pulse 96   Temp 96.9 °F (36.1 °C) (Temporal)   Resp 16   Ht 5' 9\" (1.753 m)   Wt 182 lb (82.6 kg)   SpO2 94%   BMI 26.88 kg/m²     General appearance: No apparent distress, appears stated age and cooperative.   HEENT: Pupils equal, round, and reactive to light. Conjunctivae/corneas clear. Vesicles in outer ear canal noted   Neck: Supple, with full range of motion. No jugular venous distention. Trachea midline. Respiratory:  Normal respiratory effort. Clear to auscultation, bilaterally without Rales/Wheezes/Rhonchi. Cardiovascular: Regular rate and rhythm with normal S1/S2 without murmurs, rubs or gallops. Abdomen: Soft, non-tender, non-distended with normal bowel sounds. Musculoskeletal: No clubbing, cyanosis or edema bilaterally. Full range of motion without deformity. Skin: Skin color, texture, turgor normal.  Radiation changes to back. Vesicles under lower lip, cheek. Mouth mucosa beefy red, painful   Neurologic:  Neurovascularly intact without any focal sensory/motor deficits. Cranial nerves: II-XII intact, grossly non-focal.  Psychiatric: Alert and oriented, thought content appropriate, normal insight  Capillary Refill: Brisk,< 3 seconds   Peripheral Pulses: +2 palpable, equal bilaterally       Labs:   Recent Labs     03/12/19  0508 03/13/19  0635 03/14/19  0545   WBC 6.5 7.0 7.6   HGB 8.3* 8.5* 8.7*   HCT 23.8* 24.3* 24.9*   PLT 47* 50* 66*     Recent Labs     03/12/19  0508  03/12/19 2127 03/13/19  0350 03/13/19  0635 03/14/19  0545   *   < > 129* 132* 134* 138   K 3.2*  --  3.9  --  3.4* 4.0     --   --   --  100 104   CO2 23  --   --   --  23 22   BUN 8  --   --   --  9 10   CREATININE 0.6*  --   --   --  1.1 1.6*   CALCIUM 7.8*  --   --   --  8.1* 8.2*    < > = values in this interval not displayed. No results for input(s): AST, ALT, BILIDIR, BILITOT, ALKPHOS in the last 72 hours.   Recent Labs     03/13/19  0635   INR 1.13     Urinalysis:      Lab Results   Component Value Date    NITRU Negative 03/10/2019    WBCUA 3-5 03/10/2019    RBCUA 0-2 03/10/2019    BLOODU Negative 03/10/2019    SPECGRAV 1.025 03/10/2019    GLUCOSEU Negative 03/10/2019       Radiology:  CT CHEST ABDOMEN PELVIS WO CONTRAST Final Result   Perforated sigmoid diverticulitis and pneumoperitoneum unchanged. Stable   left Emerita colonic air-fluid collection may represent an intramural or   extramural abscess or giant diverticulum. Infrarenal abdominal aortic aneurysm and aorto bi-iliac stent. Other incidental findings as above. CT ABDOMEN PELVIS W IV CONTRAST Additional Contrast? None   Final Result   Acute perforated descending colon diverticulitis. There is free air in the   abdomen and there is a small extraluminal gas and fluid collection/abscess   adjacent to the descending colon measuring 4.1 cm in AP diameter and 6 cm in   length. There is diverticulosis of the entire colon. Cholelithiasis. 4.7 cm infrarenal abdominal aortic aneurysm status post endograft repair with   a patent aorto bi-iliac graft. There is suspicion of an endoleak with blush   of contrast enhancement suspected in the inferior aspect of the native   aneurysm. As a noncontrast study was not performed, calcification of the   intraluminal thrombus accounting for the apparent enhancement is not   excluded. 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 Comparison with a noncontrast CT examination would be   helpful. Critical results were called by Dr. Julian Johnson MD to [facility] on   3/7/2019 at 20:35.              Assessment/Plan:    Active Hospital Problems    Diagnosis Date Noted    Herpes zoster oticus [B02.21]     Hearing loss of right ear [H91.91]     Chemotherapy-induced neutropenia (HCC) [D70.1, T45.1X5A]     Mucositis [K12.30]     Diverticulitis [K57.92] 03/07/2019    Diverticulitis of large intestine with perforation and abscess without bleeding [K57.20]        Acute perforated recurrent diverticulitis/pneumoperitoneum/abscess in setting of immunosuppression due to chemotherapy:   - CT A/P demonstrates acute perforated colon diverticulitis with free air with fluid

## 2019-03-14 NOTE — PROGRESS NOTES
Patient arrived to PACU. VSS. Report from [facility] BERNARDINO MILLER and [facility].

## 2019-03-14 NOTE — PROGRESS NOTES
4 Eyes Skin Assessment     The patient is being assess for   Post-Op Surgical    I agree that 2 RN's have performed a thorough Head to Toe Skin Assessment on the patient. ALL assessment sites listed below have been assessed.       Areas assessed by both nurses:   []   Head, Face, and Ears   []   Shoulders, Back, and Chest, Abdomen  []   Arms, Elbows, and Hands   [x]   Coccyx, Sacrum, and Ischium  []   Legs, Feet, and Heels        ****    **SHARE this note so that the co-signing nurse is able to place an eSignature**    Co-signer eSignature: {Esignature:[id number]}    Does the Patient have Skin Breakdown?  red, blanchable          Jared Prevention initiated:  NA   Wound Care Orders initiated:  NA      [facility] nurse consulted for Pressure Injury (Stage 3,4, Unstageable, DTI, NWPT, Complex wounds)and New or Established Ostomies:  No      Primary Nurse eSignature: Electronically signed by Steven Collier RN on 3/14/19 at 12:05 PM

## 2019-03-14 NOTE — PROGRESS NOTES
Infectious Disease Follow up Notes    CC :  Complicated diverticulitis      Antibiotics:   Fluconazole 2000 IV q24  Meropenem 1g q8   Acyclovir 10mg/kg/12    Admit Date:   3/7/2019  Hospital Day: 8    Subjective:   Currently afebrile   OR today for colectomy. Pain controlled. He says hearing in the R ear is improved   Voiding without difficulty     Objective:     Patient Vitals for the past 8 hrs:   BP Temp Temp src Pulse Resp SpO2   03/14/19 1643 -- -- -- -- 20 --   03/14/19 1416 -- -- -- 96 -- 97 %   03/14/19 1236 136/71 97.7 °F (36.5 °C) Oral 96 15 95 %   03/14/19 1155 132/77 -- -- 96 16 94 %   03/14/19 1150 139/75 96.9 °F (36.1 °C) Temporal 98 17 94 %   03/14/19 1145 (!) 141/74 -- -- 98 17 94 %   03/14/19 1140 135/72 -- -- 97 15 95 %   03/14/19 1130 139/73 -- -- 86 15 92 %   03/14/19 1120 (!) 146/72 -- -- 97 15 93 %   03/14/19 1118 (!) 141/77 -- -- 95 17 91 %   03/14/19 1108 -- 96.6 °F (35.9 °C) Oral -- -- --       EXAM:  General:  Alert, NNAD    HEENT:  No significant change vesicular rash R temple   NECK:  supple       LUNGS:  CTA jaiden    CV:  RRR   ABD: Soft, hypoactive BS, tender.   Midline dressing in place    EXT:  Trace LE edema      LINE: Port site ok         Scheduled Meds:   acyclovir  700 mg Intravenous Q12H    sodium chloride  250 mL Intravenous Once    tolvaptan  15 mg Oral Daily    sodium chloride  250 mL Intravenous Once    0.9 % sodium chloride  250 mL Intravenous Once    meropenem  1 g Intravenous Q8H    fluconazole  200 mg Intravenous Q24H    insulin lispro  0-6 Units Subcutaneous TID WC    insulin lispro  0-3 Units Subcutaneous Nightly    folic acid  1 mg Oral Daily    oyster shell calcium/vitamin D  1 tablet Oral Daily    mupirocin   Topical TID    silver sulfADIAZINE   Topical Daily    amiodarone  100 mg Oral Daily    sodium chloride flush  10 mL Intravenous 2 times per day       Continuous Infusions:   HYDROmorphone      sodium chloride 75 mL/hr at 03/14/19 1311    dextrose 100 mL/hr (03/08/19 1700)          Data Review:    Lab Results   Component Value Date    WBC 7.6 03/14/2019    HGB 8.7 (L) 03/14/2019    HCT 24.9 (L) 03/14/2019    MCV 87.4 03/14/2019    PLT 66 (L) 03/14/2019     Lab Results   Component Value Date    CREATININE 1.6 (H) 03/14/2019    BUN 10 03/14/2019     03/14/2019    K 4.0 03/14/2019     03/14/2019    CO2 22 03/14/2019       Hepatic Function Panel:   Lab Results   Component Value Date    ALKPHOS 51 03/08/2019    ALT 14 03/08/2019    AST 14 03/08/2019    PROT 5.4 03/08/2019    PROT 7.5 03/02/2013    BILITOT 0.4 03/08/2019    BILIDIR 0.19 12/28/2011    IBILI 0.4 12/28/2011    LABALBU 2.8 03/08/2019       Cultures:   2/19     BC x2 neg  3/10     BC x2 NGTD        Radiology Review:  All pertinent images / reports were reviewed as a part of this visit.    CT abd 3/10:  Impression   Perforated sigmoid diverticulitis and pneumoperitoneum unchanged.  Stable   left Emerita colonic air-fluid collection may represent an intramural or   extramural abscess or giant diverticulum.       Infrarenal abdominal aortic aneurysm and aorto bi-iliac stent.       Other incidental findings as above.      CT abd 3/7:  Impression   Acute perforated descending colon diverticulitis. Garíca Laundry is free air in the   abdomen and there is a small extraluminal gas and fluid collection/abscess   adjacent to the descending colon measuring 4.1 cm in AP diameter and 6 cm in   length.       There is diverticulosis of the entire colon.       Cholelithiasis.       4.7 cm infrarenal abdominal aortic aneurysm status post endograft repair with   a patent aorto bi-iliac graft. García Laundry is suspicion of an endoleak with blush   of contrast enhancement suspected in the inferior aspect of the native   aneurysm.  As a noncontrast study was not performed, calcification of the   intraluminal thrombus accounting for the of right ankle 05/07/2018    Closed nondisplaced fracture of fifth right metatarsal bone 05/07/2018    Closed fracture of proximal lateral malleolus of ankle with routine healing 05/07/2018    Right foot pain 05/07/2018    Right ankle pain 05/07/2018    Essential hypertension 05/01/2017    Mixed hyperlipidemia 05/01/2017    PAD (peripheral artery disease) ([facility] [address] 05/21/2011    CAD (coronary artery disease) 04/29/2011    Elevated fasting glucose 07/24/2010     Stage IV lung cancer, actively treated      Persistent vs recurrent diverticulitis with perforation, abscess in the context of chemotherapy associated cytopenias   S/p colectomy 3/14      Acute hearing loss R ear and vesicular/purpuric rash consistent with zoster oticus     MAHSA      Thrombocytopenia.       NKDA     Plan:   Continuing meropenem/fluconazole for diverticulitis  Continue acyclovir for zoster oticus, dose adjusted for MAHSA   Monitor renal function closely      Discussed with patient/family, all questions answered        Sunshine Ramos MD  Phone: [phone]   Fax : [phone]

## 2019-03-14 NOTE — PROGRESS NOTES
Pt returned from PACU. Pt a/o. VSS. 4 eye skin assessment completed with PACU, RN. Abd dressings C/D/I. Dilaudid PCA pump in place. Pt states pain well controlled. DIAN drain to bulb suction. Laurent catheter in place and patent. Billateral SCD's in place. Family at bedside. Bed in lowest position and wheels locked. Call light within reach. Bedside table within reach. Pt calls out appropriately. Will continue to monitor.

## 2019-03-14 NOTE — PROGRESS NOTES
Pt assessment completed and charted. VSS. Pt a/o. Pt denies pain @ this time. Family @ bedside. Bed in lowest position and wheels locked. Call light within reach. Bedside table within reach. Non-skid footwear in place. Pt denies any other needs at this time. Pt calls out appropriately. Will continue to monitor.

## 2019-03-14 NOTE — PROGRESS NOTES
Pt assessment completed and charted. VSS. T. 99.8. Pt a/o. Pt NPO since midnight. Pt denies pain. Consent signed and in chart. Family @ bedside. Pt transported to procedure in stable condition.

## 2019-03-14 NOTE — ONCOLOGY
ONCOLOGY HEMATOLOGY CARE PROGRESS NOTE      SUBJECTIVE:     Still has diminished hearing in his right ear. Lesions are healing. Surgery this AM.      ROS:   The remaining 10 point review of symptoms is unremarkable. OBJECTIVE        Physical    VITALS:  BP (!) 148/83   Pulse 100   Temp 98.7 °F (37.1 °C) (Oral)   Resp 16   Ht 5' 9\" (1.753 m)   Wt 182 lb (82.6 kg)   SpO2 91%   BMI 26.88 kg/m²   TEMPERATURE:  Current - Temp: 98.7 °F (37.1 °C); Max - Temp  Av °F (37.2 °C)  Min: 97.8 °F (36.6 °C)  Max: 100.1 °F (37.8 °C)  PULSE OXIMETRY RANGE: SpO2  Av.4 %  Min: 87 %  Max: 99 %  24HR INTAKE/OUTPUT:      Intake/Output Summary (Last 24 hours) at 3/14/2019 3603  Last data filed at 3/14/2019 0543  Gross per 24 hour   Intake 2120 ml   Output --   Net 2120 ml       CONSTITUTIONAL:  awake, alert, cooperative, no apparent distress, HEENT oral pharynx , no scleral icterus  HEMATOLOGIC/LYMPHATICS:  no cervical lymphadenopathy, no supraclavicular lymphadenopathy, no axillary lymphadenopathy and no inguinal lymphadenopathy  LUNGS:  No increased work of breathing, good air exchange, clear to auscultation bilaterally, no crackles or wheezing  CARDIOVASCULAR:  , regular rate and rhythm, normal S1 and S2, no S3 or S4, and no murmur noted  ABDOMEN:  No scars, normal bowel sounds, soft, non-distended, non-tender, no masses palpated, no hepatosplenomegally  MUSCULOSKELETAL:  There is no redness, warmth, or swelling of the joints. EXTREMETIES: No clubbing cynosis or edema  NEUROLOGIC:  Awake, alert, oriented to name, place and time. Cranial nerves II-XII are grossly intact. Motor is 5 out of 5 bilaterally. SKIN:  Crusting and scabbed lesions to right side lips and chin , right temporal lesion with crusting over as well.  IMPROVED FROM YESTERDAY ON ACYCLOVIR       Data      Recent Labs     19  0508 19  0635 19  0545   WBC 6.5 7.0 7.6   HGB 8.3* 8.5* 8.7*   HCT 23.8* 24.3* 24.9*   PLT 47* 50* 66*   MCV 87.4 87.0 87.4        Recent Labs     03/12/19  0508  03/12/19  2127 03/13/19  0350 03/13/19  0635 03/14/19  0545   *   < > 129* 132* 134* 138   K 3.2*  --  3.9  --  3.4* 4.0     --   --   --  100 104   CO2 23  --   --   --  23 22   BUN 8  --   --   --  9 10   CREATININE 0.6*  --   --   --  1.1 1.6*    < > = values in this interval not displayed. No results for input(s): AST, ALT, ALB, BILIDIR, BILITOT, ALKPHOS in the last 72 hours.     Magnesium:    Lab Results   Component Value Date    MG 1.70 03/13/2019    MG 1.80 03/12/2019    MG 1.70 02/21/2019         Problem List  Patient Active Problem List   Diagnosis    Elevated fasting glucose    CAD (coronary artery disease)    PAD (peripheral artery disease) ([address]    Essential hypertension    Mixed hyperlipidemia    Sprain of right foot    Sprain of anterior talofibular ligament of right ankle    Closed nondisplaced fracture of fifth right metatarsal bone    Closed fracture of proximal lateral malleolus of ankle with routine healing    Right foot pain    Right ankle pain    Nondisplaced fracture of fifth right metatarsal bone with routine healing    History of pulmonary emphysema    Chronic obstructive pulmonary disease with (acute) exacerbation (HCC)    Pulmonary nodule, left    Abnormal bone scan of thoracic spine    Back pain    Visit for monitoring Plavix therapy    History of ventricular tachycardia    Gallstones    Hemoptysis    Former smoker    Elevated PSA    Adenocarcinoma of left lung ([address]    Acute diverticulitis    Pancytopenia ([address]    Diverticulitis of large intestine with perforation and abscess without bleeding    Diverticulitis    Chemotherapy-induced neutropenia (HCC)    Mucositis    Herpes zoster oticus    Hearing loss of right ear       ASSESSMENT AND PLAN    -Acute Perforated descending colon diverticulitis (not immune mediated colitis) with free air in the abd - per gen surgery , plans for resection today (3/14). - ATB support (Zosyn, Maxipime, Vanco) , changed to [address] and Diflucan now   - non acute abd on exam   - Will transfuse plt on the way to surgery today.     Neutropenia secondary to chemo  - WBC is over 7, Granix stopped 3/11     Thrombocytopenia secondary to chemo  - PLTS 47 --> 50 --> 66. - Integrilin drip has been discontinued. - Transfuse platelets today prior to surgery to keep plt above 50- ordered on call to the OR.    -Lung CA, stage 4 bone mets   - Will hold chemo until surgically healed. He is not due for Cycle 3 Carbo/Alimta/Keytruda until 3-22. May consider holding chemo and moving forward with Amtilde [place] only in future. Family aware. - Cont folate while on alimta (folate antagonist), needs folate for 3 months after stopping Alimta. Needs B12 shot every 9 weeks at least (every 3rd cycle). - CT chest/abd/pelvis, 3/10/2019, showed improvement in the left hilar soft tissue and radiation changes. No obvious new disease.      HTN  - holding home Lisinopril due to Hyperkalemia , creat stable   - Was on Norvasc at one point, but pt admits to stopping this drug weeks ago      Shingles to the V3 dermatome of the trigeminal nerve  - Acyclovir started 3/13 at 10 mg/kg every 8 hours  - Appear to be developing crystal-induced MAHSA from Acyclovir. Can/should the dose be reduced or switched to Ganciclovir? Hearing changes  - possibly due to shingles  - no vesicles in ear canal per Dr. Joo Douglas   - MRI cancelled due to pacemaker     Radiation Dermatitis  -Bactroban      Mucositis  - Fluconzaole/MMW. Pacemaker  - Rhythm strip looks more like he was paced out of an arrhythmia than cardioverted. - NSR/ST on tele     I would estimate that he can safely delay chemotherapy for 2 months if necessary to allow wound healing. Also, if needed, he could transition to maintenance Pembrolizumab early, which would not effect wound healing.       ONCOLOGIC

## 2019-03-14 NOTE — OP NOTE
Date of Surgery: 3/14/19    Preop Dx:  Diverticulitis    Postop Dx:  Same    Procedure:  Laparoscopic converted to Open left Colectomy    Surgeon:  Ophelia Chatterjee    Assistant:      Anesthesia:  GETA    EBL:   150ml    Specimen:  Left colon, additional sigmoid colon, anastomotic donuts    Complications: none    Drains/Lines:  19F round DIAN    Indications:  70 yo with recurrent diverticulitis and abscess on imaging. Description:       Patient was given adequate description of the risks and rewards of the procedure, including bleeding, infection, injury to surrounding structures, need for further operations, possible colostomy and freely consented. He was given appropriate antibiotics and brought to the OR where GETA anesthesia was induced. He was placed in supine position. Prepped and draped in usual sterile fashion. Initial incision made above umbilicus and 5mm optiview trocar inserted without issue. Abdomen insufflated to 15mmHg pressure. 5mm trocar inserted upper midline and 12mm trocar inserted in right lower abdomen. Left colon was quite adherent to left abdominal wall. Adhesions taken down with blunt dissection and sonacision. Abscess cavity opened and drained. Omentum mobilized off of distal transverse colon and splenic flexure. At this point adhesions were too dense and decision to open was made. An incision was made with a #10blade in the midline including both 5mm trocar incision. It was carried down through the subcutaneous tissue to the fascia. The fascia was incised superiorly and opened without injury to underlying structures. With the fascia fully opened the bookwalter retractor was inserted to assist in retraction. The colon was then mobilized laterally along the white line of Toldt with the electrocautery. .  The ureter was identified and not injured.   He did  require mobilization of the splenic flexure for the planned proximal resection margin to reach without tension into the pelvis. This was carried out using electrocautery and enseal device. The distal margin of resection was then exposed and a window made in the mesentery. Using the contour stapler with blue load the distal margin was transected. The colon was then reflected anterior and superior as the mesentery was controlled with enseal device. The proximal site of resection was transected with electrocautery. The specimen was sent to pathology. The colon was sized for a 29mm EEA stapler. The anvil of the EEA stapler and spike were inserted. Blue loaded TX60 stapler was used to close the colotomy and spike brought through. A purse string of 3-0 prolene was placed around it. The rectal stump was exposed and EEA stapler inserted. This did not quite get to the end so using the contour stapler another 3cm of distal sigmod/proximal rectum was resected, tagged with a silk suture, and sent to pathology. Under direct visualization the shaft of the stapler was advanced through the center of the rectal stump wall just anterior to the staple line. The anvil of the stapler was then connected to the rest of the stapler without twisting or tension of the colon. The stapler was then tightened down and fired. It was removed and donuts inspected and found to be complete rings. The pelvis had saline instilled to above the staple line and a proctoscope inserted from below. The colon was occluded proximal to the staple line and air instilled into the rectum. No leak was seen. The proctoscope was removed and fluid suctioned free after saline lavage. An omental pedicle flap was created and sutured over the staple line using 3-0 silk sutures. Through a stab incision in the right upper abdomen a 19F round DIAN was inserted and positioned in the pelvis. The 12mm trocar site had its fascia closed with 0-0 vicryl suture. The midline fascia was closed with looped 0-0 PDS suture. Epidermis was closed with staples.       Sterile dressing placed. All suture, sponge and instrument count correct times two at end of case. Transferred to PACU in stable condition.     Lorie Jose MD

## 2019-03-15 NOTE — PROGRESS NOTES
Nutrition Assessment    Type and Reason for Visit: Initial    Nutrition Recommendations:   1. Oral diet progression per MD  2. Add ensure clear or ensure enlive TID once diet advanced  3. Monitor nutrition adequacy, pertinent labs, bowel habits, wt changes, and clinical progress    Nutrition Assessment: Pt seen for LOS assessment. Nutritionally compromised AEB NPO s/p lab open sigmoid colectomy. Gen surg following for diet adv. allowing ice chips today. Pt reports no flatus yet. Willing to trial ONS once diet advanced. Malnutrition Assessment:  · Malnutrition Status: Meets the criteria for moderate malnutrition  · Context: Acute illness or injury  · Findings of the 6 clinical characteristics of malnutrition (Minimum of 2 out of 6 clinical characteristics is required to make the diagnosis of moderate or severe Protein Calorie Malnutrition based on AND/ASPEN Guidelines):  1. Energy Intake-Less than or equal to 75% of estimated energy requirement, Greater than or equal to 1 month    2. Weight Loss-5% loss or greater, in 3 months  3. Fat Loss-No significant subcutaneous fat loss,    4. Muscle Loss-Mild muscle mass loss, Thigh (quadriceps), Clavicles (pectoralis and deltoids)  5. Fluid Accumulation-Unable to assess,    6.  Strength-Not measured    Nutrition Risk Level: Moderate    Nutrient Needs:  · Estimated Daily Total Kcal: 9414-9220 kcals   · Estimated Daily Protein (g):  gm   · Estimated Daily Total Fluid (ml/day): 1 mL/kcal     Nutrition Diagnosis:   · Problem: Inadequate oral intake  · Etiology: related to Insufficient energy/nutrient consumption     Signs and symptoms:  as evidenced by Diet history of poor intake    Objective Information:  · Nutrition-Focused Physical Findings: trace edema.  wound to sacrum/abdomen  · Wound Type: Multiple, Wound Consult Pending  · Current Nutrition Therapies:  · Oral Diet Orders: NPO   · Oral Diet intake: NPO  · Oral Nutrition Supplement (ONS) Orders: None  · ONS intake: NPO  · Anthropometric Measures:  · Ht: 5' 9\" (175.3 cm)   · Current Body Wt: 182 lb (82.6 kg)  · Usual Body Wt: 194 lb (88 kg)(in january )  · % Weight Change:  ,  -12 lbs (6.2% wt loss) x past 2 months. · Ideal Body Wt: 160 lb (72.6 kg)   · BMI Classification: BMI 25.0 - 29.9 Overweight    Nutrition Interventions:   Continue current diet  Continued Inpatient Monitoring    Nutrition Evaluation:   · Evaluation: Goals set   · Goals: Pt will tolerate diet advancement per General surgery     · Monitoring: Nutrition Progression, Meal Intake, Supplement Intake, Diet Tolerance, Weight, Pertinent Labs      Electronically signed by Wilma Rhodes.  Missy Rojas RD, LD on 3/15/19 at 2:04 PM    Contact Number: [phone]

## 2019-03-15 NOTE — PROGRESS NOTES
Hospitalist Progress Note      PCP: Jose Gil MD    Date of Admission: 3/7/2019    Chief Complaint: Abdominal pain     Hospital Course:  69 y.o. male who presented to [facility] with LLQ abdominal pain. Pt. Was recently d/c'd for diverticulitis, states pain never went away. C/o low grade fevers for 2 weeks. States pain not as severe as previous admission. Had chemo on Friday, states received neulasta. Denies cp, n/v, sob. Subjective:   Up in bedside chair. +outpt in drain.    Hearing has returned to normal  Pain minimal.     Medications:  Reviewed    Infusion Medications    HYDROmorphone      sodium chloride 100 mL/hr at 03/15/19 0417    dextrose 100 mL/hr (03/08/19 1700)     Scheduled Medications    [START ON 3/16/2019] acyclovir  800 mg Oral 5x Daily    acyclovir  700 mg Intravenous Q12H    sodium chloride  250 mL Intravenous Once    tolvaptan  15 mg Oral Daily    sodium chloride  250 mL Intravenous Once    0.9 % sodium chloride  250 mL Intravenous Once    meropenem  1 g Intravenous Q8H    fluconazole  200 mg Intravenous Q24H    insulin lispro  0-6 Units Subcutaneous TID WC    insulin lispro  0-3 Units Subcutaneous Nightly    folic acid  1 mg Oral Daily    oyster shell calcium/vitamin D  1 tablet Oral Daily    mupirocin   Topical TID    silver sulfADIAZINE   Topical Daily    amiodarone  100 mg Oral Daily    sodium chloride flush  10 mL Intravenous 2 times per day     PRN Meds: naloxone, magic (miracle) mouthwash with nystatin, glucose, dextrose, glucagon (rDNA), dextrose, sodium chloride flush, ondansetron, acetaminophen      Intake/Output Summary (Last 24 hours) at 3/15/2019 1323  Last data filed at 3/15/2019 0557  Gross per 24 hour   Intake 1437 ml   Output 2230 ml   Net -793 ml       Physical Exam Performed:    BP (!) 118/53   Pulse 108   Temp 98 °F (36.7 °C) (Oral)   Resp 18   Ht 5' 9\" (1.753 m)   Wt 182 lb (82.6 kg)   SpO2 93%   BMI 26.88 kg/m²     General appearance: No apparent distress, appears stated age and cooperative. HEENT: Pupils equal, round, and reactive to light. Conjunctivae/corneas clear. Vesicles in outer ear canal noted   Neck: Supple, with full range of motion. No jugular venous distention. Trachea midline. Respiratory:  Normal respiratory effort. Clear to auscultation, bilaterally without Rales/Wheezes/Rhonchi. Cardiovascular: Regular rate and rhythm with normal S1/S2 without murmurs, rubs or gallops. Abdomen: Abd dressing d/i, DIAN drain in place with +oupt. Musculoskeletal: No clubbing, cyanosis or edema bilaterally. Full range of motion without deformity. Skin: Skin color, texture, turgor normal.  Radiation changes to back. Vesicles under lower lip, cheek. Mouth mucosa beefy red, painful   Neurologic:  Neurovascularly intact without any focal sensory/motor deficits. Cranial nerves: II-XII intact, grossly non-focal.  Psychiatric: Alert and oriented, thought content appropriate, normal insight  Capillary Refill: Brisk,< 3 seconds   Peripheral Pulses: +2 palpable, equal bilaterally       Labs:   Recent Labs     03/13/19  0635 03/14/19  0545 03/15/19  0550   WBC 7.0 7.6 8.4   HGB 8.5* 8.7* 7.7*   HCT 24.3* 24.9* 22.3*   PLT 50* 66* 98*     Recent Labs     03/13/19  0635 03/14/19  0545 03/15/19  0550   * 138 139   K 3.4* 4.0 4.2    104 105   CO2 23 22 24   BUN 9 10 13   CREATININE 1.1 1.6* 1.2   CALCIUM 8.1* 8.2* 7.5*     No results for input(s): AST, ALT, BILIDIR, BILITOT, ALKPHOS in the last 72 hours. Recent Labs     03/13/19  0635   INR 1.13     Urinalysis:      Lab Results   Component Value Date    NITRU Negative 03/10/2019    WBCUA 3-5 03/10/2019    RBCUA 0-2 03/10/2019    BLOODU Negative 03/10/2019    SPECGRAV 1.025 03/10/2019    GLUCOSEU Negative 03/10/2019       Radiology:  CT CHEST ABDOMEN PELVIS WO CONTRAST   Final Result   Perforated sigmoid diverticulitis and pneumoperitoneum unchanged.   Stable   left [facility] colonic air-fluid collection may represent an intramural or   extramural abscess or giant diverticulum. Infrarenal abdominal aortic aneurysm and aorto bi-iliac stent. Other incidental findings as above. CT ABDOMEN PELVIS W IV CONTRAST Additional Contrast? None   Final Result   Acute perforated descending colon diverticulitis. There is free air in the   abdomen and there is a small extraluminal gas and fluid collection/abscess   adjacent to the descending colon measuring 4.1 cm in AP diameter and 6 cm in   length. There is diverticulosis of the entire colon. Cholelithiasis. 4.7 cm infrarenal abdominal aortic aneurysm status post endograft repair with   a patent aorto bi-iliac graft. There is suspicion of an endoleak with blush   of contrast enhancement suspected in the inferior aspect of the native   aneurysm. As a noncontrast study was not performed, calcification of the   intraluminal thrombus accounting for the apparent enhancement is not   excluded. 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 Comparison with a noncontrast CT examination would be   helpful. Critical results were called by Dr. Demar Mcbride. MD Elizabeth to [facility] on   3/7/2019 at 20:35. Assessment/Plan:    Active Hospital Problems    Diagnosis Date Noted    Herpes zoster oticus [B02.21]     Hearing loss of right ear [H91.91]     Chemotherapy-induced neutropenia (HCC) [D70.1, T45.1X5A]     Mucositis [K12.30]     Diverticulitis [K57.92] 03/07/2019    Diverticulitis of large intestine with perforation and abscess without bleeding [K57.20]        Acute perforated recurrent diverticulitis/pneumoperitoneum/abscess in setting of immunosuppression due to chemotherapy:   - CT A/P demonstrates acute perforated colon diverticulitis with free air with fluid collection.   - Continue Merrem   - General surgery consulted/following.  S/p Laparoscopic converted to Open left Colectomy  -I/O drain    Adenocarcinoma of the LLL:  - With known mets to spine. Also question of mets to spleen incidentally noted on CT, though thought to be less likely. Hem/Onc is consulted/following. Chemo on hold in preparation for surgical healing.      Neutropenia due to chemotherapy:   - S/p Neulasta on 3/1. Hem/onc started Granix on 3/8. - Wound care for his radiation ulcer on his back. hyponatremia : likely SIADH  - Samsca   - Nephrology  - Cortisol in AM was WNL     Hypertension:  - Currently normotensive. Holding BP meds for now.     Peripheral arterial disease:   - Per chart review on previous admission, pt cannot stop his DAPT per vascular surgery. In fact, in the past he has been admitted for prolonged perioperative eptifibitide infusions while the DAPT washes out before procedures. Has had many complications with bypasses and stents. Pt's last dose of Plavix was on 3/7/19. Integrilin infusion d/cd yesterday due to thrombocytopenia  - CT performed in ED- reading suggesting possible Type II endoleak. Vascular surgery consulted this admission -- no treatment required currently. History of VT:   Went into [facility] 3/12, PMD went off. Continue amiodarone. Mucositis:  - Diflucan and nystatin [facility] per Oncology.      HSV labialis: concerns for shingles, [place] Syndrome  -Continue antivirals    DVT Prophylaxis:SCDs, platelets 66 preoperatively  Diet: Diet NPO, After Midnight Exceptions are: Sips with Meds  Code Status: Full Code    PT/OT Eval Status: Not indicated     [address] [facility], APRN - CNP

## 2019-03-15 NOTE — ONCOLOGY
WBC 7.0 7.6 8.4   HGB 8.5* 8.7* 7.7*   HCT 24.3* 24.9* 22.3*   PLT 50* 66* 98*   MCV 87.0 87.4 89.7        Recent Labs     03/13/19  0635 03/14/19  0545 03/15/19  0550   * 138 139   K 3.4* 4.0 4.2    104 105   CO2 23 22 24   BUN 9 10 13   CREATININE 1.1 1.6* 1.2     No results for input(s): AST, ALT, ALB, BILIDIR, BILITOT, ALKPHOS in the last 72 hours.     Magnesium:    Lab Results   Component Value Date    MG 1.70 03/13/2019    MG 1.80 03/12/2019    MG 1.70 02/21/2019         Problem List  Patient Active Problem List   Diagnosis    Elevated fasting glucose    CAD (coronary artery disease)    PAD (peripheral artery disease) ([address]    Essential hypertension    Mixed hyperlipidemia    Sprain of right foot    Sprain of anterior talofibular ligament of right ankle    Closed nondisplaced fracture of fifth right metatarsal bone    Closed fracture of proximal lateral malleolus of ankle with routine healing    Right foot pain    Right ankle pain    Nondisplaced fracture of fifth right metatarsal bone with routine healing    History of pulmonary emphysema    Chronic obstructive pulmonary disease with (acute) exacerbation (HCC)    Pulmonary nodule, left    Abnormal bone scan of thoracic spine    Back pain    Visit for monitoring Plavix therapy    History of ventricular tachycardia    Gallstones    Hemoptysis    Former smoker    Elevated PSA    Adenocarcinoma of left lung ([address]    Acute diverticulitis    Pancytopenia ([address]    Diverticulitis of large intestine with perforation and abscess without bleeding    Diverticulitis    Chemotherapy-induced neutropenia (HCC)    Mucositis    Herpes zoster oticus    Hearing loss of right ear       ASSESSMENT AND PLAN    -Acute Perforated descending colon diverticulitis (not immune mediated colitis) with free air in the abd   - per gen surgery , s/p Laparoscopic converted to Open left Colectomy POD1       - ATB support (Zosyn, Maxipime, Vanco) , changed to [address] now        Neutropenia secondary to chemo  - WBC is over 7, Granix stopped 3/11     Thrombocytopenia secondary to chemo  - PLTS 47 --> 50 --> 66. Now 98k   - Integrilin drip has been discontinued due to TCP and surgery. Will need to resume when cleared by surgery. He was on Plavix prior to admit. Will need to determine if drip needed, or if we can resume Plavix when safe. Plavix is for PAD. -Lung CA, stage 4 bone mets   - Will hold chemo until surgically healed. He is not due for Cycle 3 Carbo/Alimta/Keytruda until 3-22. May consider holding chemo and moving forward with Bhumikaheather ShinJeff only in future. Family aware. We will assess how he heals next week, but his chemo will likely need to be held for at least 2 weeks. We could start Bhumikaheather ShinJeff only sooner, since this does not affect wound healing. Plans for chemo/immunotherapy outpatient. - Cont folate while on alimta (folate antagonist), needs folate for 3 months after stopping Alimta. Needs B12 shot every 9 weeks at least (every 3rd cycle). - CT chest/abd/pelvis, 3/10/2019, showed improvement in the left hilar soft tissue and radiation changes. No obvious new disease.      HTN  - holding home Lisinopril due to Hyperkalemia , creat stable   - Was on Norvasc at one point, but pt admits to stopping this drug weeks ago      Shingles to the V3 dermatome of the trigeminal nerve  - Acyclovir started 3/13 at 10 mg/kg every 8 hours  - Acyclovir dose reduced on 3/14 and creatinine improved from 1.6 to 1.2. Hearing changes  - secondary to shingles and improving     Radiation Dermatitis  -Bactroban      Mucositis  - Fluconzaole/MMW. Pacemaker  - Rhythm strip looks more like he was paced out of an arrhythmia than cardioverted. - NSR/ST on tele     I would estimate that he can safely delay chemotherapy for 2 months if necessary to allow wound healing.   Also, if needed, he could transition to maintenance Pembrolizumab early, which would not effect wound healing. ONCOLOGIC DISPOSITION:  TBD, per surgery.      Thomas Ann, CNP   Please contact through [address] [place] [address] Avenue

## 2019-03-15 NOTE — PROGRESS NOTES
[facility] NEPHROLOGY    [facility]              [phone]                 Plan :     Likely SIADH  Due to needing iv NS- giving samsca and sodium normal  Will try to wean as soon as possible  Needing NS now due to IV acyclovir which can cause crystal deposits in     kidneys causing MAHSA  Dr Cody Fajardo will be covering in the weekend    Assessment :     Hyponatremia  Acute on chronic  Down to 80- now normal  Suspect SIADH from ca lung, pain, and nausea  Ca ca lung( adenocarcinoma) with vertebral mets  Sodium low side since 2/19    Cortisol- normal, TSH - 1.65, LFT normal  Echo 2/12- EF of 45-50%, AL mildly dilated    Acute Kidney Injury  Cr peaked to 1.6- now 1  Making urine  Likely hemodynamic changes   Now BP better  Surgery done  On 3/14/19    Hypotension  BP: (118-134)/(53-76) Pulse:  [100-108]   Normally hypertensive  Now has fever, neutropenic- getting better           pancytopenia  Ca lung  Diverticulitis with perforation- supportive treatment and plan for surgery this admission  Radiation dermatitis  Mucositis  [facility] would like to thank DEBRA Sullivan   for opportunity to serve this patient      Please call with questions at-   24 Hrs Answering service [phone] or  7 am- 5 pm via Perfect serve or cell phone  Dr. Gauri Ruiz for consult :     hyponatremia     HPI :     From consult note-     Parker Abarca is a 69 y.o. male presented to   the hospital on 3/7/2019 with left lower quadrant pain. He has that for a week. Was hospitalized with diverticulitis, DCed,  And pain never went away. Also low grade fever. No nausea. Mild pain in belly  Diagnosed to have fever and also diverticulitis. Planning for surgery. 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 Has pancytopenia from chemotherapy.      We are consulted for hyponatremia and related issues     Interval History:     Sodium normal now  Cr coming down  Making urine      ROS:     Seen with- spouse and daughter  SOB- none  Edema- none  Nausea/vomiting- none  Poor appetite-No  Confusion- no  Urinary complaints- no  Any other complaints- no  All other ROS are reviewed and are Negative       Medication:     Scheduled Meds:   [START ON 3/16/2019] acyclovir  800 mg Oral 5x Daily    acyclovir  700 mg Intravenous Q12H    sodium chloride  250 mL Intravenous Once    tolvaptan  15 mg Oral Daily    sodium chloride  250 mL Intravenous Once    0.9 % sodium chloride  250 mL Intravenous Once    meropenem  1 g Intravenous Q8H    fluconazole  200 mg Intravenous Q24H    insulin lispro  0-6 Units Subcutaneous TID WC    insulin lispro  0-3 Units Subcutaneous Nightly    folic acid  1 mg Oral Daily    oyster shell calcium/vitamin D  1 tablet Oral Daily    mupirocin   Topical TID    silver sulfADIAZINE   Topical Daily    amiodarone  100 mg Oral Daily    sodium chloride flush  10 mL Intravenous 2 times per day     Continuous Infusions:   HYDROmorphone      sodium chloride 100 mL/hr at 03/15/19 0417    dextrose 100 mL/hr (03/08/19 1700)     PRN Meds:.naloxone, magic (miracle) mouthwash with nystatin, glucose, dextrose, glucagon (rDNA), dextrose, sodium chloride flush, ondansetron, acetaminophen       Vitals :     Vitals:    03/15/19 0845   BP: (!) 118/53   Pulse: 108   Resp: 18   Temp: 98 °F (36.7 °C)   SpO2: 93%       I & O :       Intake/Output Summary (Last 24 hours) at 3/15/2019 1110  Last data filed at 3/15/2019 0557  Gross per 24 hour   Intake 1437 ml   Output 2460 ml   Net -1023 ml        Physical Examination :     General appearance: Anxious- no, distressed- no, in good spirits- no    Sitting up on chair, comfortable,  HEENT: Lips- normal, teeth- ok , oral mucosa- moist, has skin lesions in the face  Neck : Mass- no, appears symmetrical, JVD- not visible  Respiratory: Respiratory effort-  normal, wheeze- no, crackles - no  Cardiovascular:  Ausculation- No M/R/G, Edema no  Abdomen: visible mass- no, distention- no, scar- no, tenderness- no                            hepatosplenomegaly-  no  Musculoskeletal:  clubbing no,cyanosis- no , digital ischemia- no                           muscle strength- grossly normal , tone - grossly normal  Skin: rashes- no , ulcers- no, induration- no, tightening - no  Psychiatric:  Judgement and insight- normal           AAO X 3     LABS:     Recent Labs     03/13/19  0635 03/14/19  0545 03/15/19  0550   WBC 7.0 7.6 8.4   HGB 8.5* 8.7* 7.7*   HCT 24.3* 24.9* 22.3*   PLT 50* 66* 98*     Recent Labs     03/13/19  0635 03/14/19  0545 03/15/19  0550   * 138 139   K 3.4* 4.0 4.2    104 105   CO2 23 22 24   BUN 9 10 13   CREATININE 1.1 1.6* 1.2   GLUCOSE 117* 104* 145*   MG 1.70*  --   --

## 2019-03-15 NOTE — PROGRESS NOTES
Infectious Disease Follow up Notes    CC :  Complicated diverticulitis      Antibiotics:   Fluconazole 200 IV q24  Meropenem 1g q8   Acyclovir 10mg/kg/12    Admit Date:   3/7/2019  Hospital Day: 9    Subjective:   He remains afebrile   Hearing back to near normal.  Abd pain well controlled. No new concerns    Objective:     Patient Vitals for the past 8 hrs:   BP Temp Temp src Pulse Resp SpO2   03/15/19 0845 (!) 118/53 98 °F (36.7 °C) Oral 108 18 93 %   03/15/19 0410 134/76 98.4 °F (36.9 °C) Oral 100 18 93 %       EXAM:  General:  Alert, NAD    HEENT:  Vesicular lesions temple into R ear crusting   NECK:  supple       LUNGS:  CTA jaiden    CV:  RRR   ABD: Soft, hypoactive BS, tender.   Midline dressing in place, DIAN x1    EXT:  Trace LE edema      LINE: Port site ok         Scheduled Meds:   acyclovir  700 mg Intravenous Q12H    sodium chloride  250 mL Intravenous Once    tolvaptan  15 mg Oral Daily    sodium chloride  250 mL Intravenous Once    0.9 % sodium chloride  250 mL Intravenous Once    meropenem  1 g Intravenous Q8H    fluconazole  200 mg Intravenous Q24H    insulin lispro  0-6 Units Subcutaneous TID WC    insulin lispro  0-3 Units Subcutaneous Nightly    folic acid  1 mg Oral Daily    oyster shell calcium/vitamin D  1 tablet Oral Daily    mupirocin   Topical TID    silver sulfADIAZINE   Topical Daily    amiodarone  100 mg Oral Daily    sodium chloride flush  10 mL Intravenous 2 times per day       Continuous Infusions:   HYDROmorphone      sodium chloride 100 mL/hr at 03/15/19 0417    dextrose 100 mL/hr (03/08/19 1700)          Data Review:    Lab Results   Component Value Date    WBC 8.4 03/15/2019    HGB 7.7 (L) 03/15/2019    HCT 22.3 (L) 03/15/2019    MCV 89.7 03/15/2019    PLT 98 (L) 03/15/2019     Lab Results   Component Value Date    CREATININE 1.2 03/15/2019    BUN 13 03/15/2019     03/15/2019    K 4.2 03/15/2019     03/15/2019    CO2 24 03/15/2019       Hepatic Function Panel:   Lab Results   Component Value Date    ALKPHOS 51 03/08/2019    ALT 14 03/08/2019    AST 14 03/08/2019    PROT 5.4 03/08/2019    PROT 7.5 03/02/2013    BILITOT 0.4 03/08/2019    BILIDIR 0.19 12/28/2011    IBILI 0.4 12/28/2011    LABALBU 2.8 03/08/2019       Cultures:   2/19     BC x2 neg  3/10     BC x2 neg        Radiology Review:  All pertinent images / reports were reviewed as a part of this visit. CT abd 3/10:  Impression   Perforated sigmoid diverticulitis and pneumoperitoneum unchanged.  Stable   left Emerita colonic air-fluid collection may represent an intramural or   extramural abscess or giant diverticulum.       Infrarenal abdominal aortic aneurysm and aorto bi-iliac stent.       Other incidental findings as above.      CT abd 3/7:  Impression   Acute perforated descending colon diverticulitis. Paty Karel is free air in the   abdomen and there is a small extraluminal gas and fluid collection/abscess   adjacent to the descending colon measuring 4.1 cm in AP diameter and 6 cm in   length.       There is diverticulosis of the entire colon.       Cholelithiasis.       4.7 cm infrarenal abdominal aortic aneurysm status post endograft repair with   a patent aorto bi-iliac graft. Paty Karel is suspicion of an endoleak with blush   of contrast enhancement suspected in the inferior aspect of the native   aneurysm.  As a noncontrast study was not performed, calcification of the   intraluminal thrombus accounting for the apparent enhancement is not   excluded.  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 Emma Low with a noncontrast CT examination would be   helpful.      CT abd 2/19:  Impression   1. Findings are consistent with acute proximal sigmoid diverticulitis within   the left lower quadrant, without evidence of perforation, free air, or   abscess.    2. New nonspecific focus of geographic low attenuation within the periphery   of the inferior spleen, possibly a splenic infarct, less likely a metastatic   focus.  Suggest clinical correlation and attention on follow-up abdominal CT   studies to exclude metastatic disease. 3. Cholelithiasis. 4. Stable bilateral renal cysts. 5. 4.7 x 4.4 cm fusiform aneurysm of the infrarenal abdominal aorta,   stabilized by an aortobi-iliac stent graft, without evidence of in-stent   stenosis or thrombosis.    6. Stable mild prostatomegaly.      Assessment:     Patient Active Problem List    Diagnosis Date Noted    Herpes zoster oticus     Hearing loss of right ear     Chemotherapy-induced neutropenia ([address]     Mucositis     Diverticulitis 03/07/2019    Diverticulitis of large intestine with perforation and abscess without bleeding     Acute diverticulitis 02/19/2019    Pancytopenia ([address] 02/19/2019    Adenocarcinoma of left lung ([address] 01/22/2019    Gallstones 01/21/2019    Hemoptysis 01/21/2019    Former smoker 01/21/2019    Elevated PSA 01/21/2019    Back pain 01/16/2019    Visit for monitoring Plavix therapy     History of ventricular tachycardia     History of pulmonary emphysema 01/09/2019    Chronic obstructive pulmonary disease with (acute) exacerbation ([address] 01/09/2019    Pulmonary nodule, left 01/09/2019    Abnormal bone scan of thoracic spine 01/09/2019    Nondisplaced fracture of fifth right metatarsal bone with routine healing 05/14/2018    Sprain of right foot 05/07/2018    Sprain of anterior talofibular ligament of right ankle 05/07/2018    Closed nondisplaced fracture of fifth right metatarsal bone 05/07/2018    Closed fracture of proximal lateral malleolus of ankle with routine healing 05/07/2018    Right foot pain 05/07/2018    Right ankle pain 05/07/2018    Essential hypertension 05/01/2017    Mixed hyperlipidemia 05/01/2017    PAD (peripheral artery disease) ([address] 05/21/2011    CAD (coronary artery disease) 04/29/2011    Elevated fasting glucose 07/24/2010     Stage IV lung cancer      Persistent vs recurrent diverticulitis with perforation and abscess in the context of chemotherapy associated cytopenias   S/p lap converted to open colectomy 3/14/19    Acute hearing loss R ear and vesicular/purpuric rash consistent with zoster oticus     MAHSA - better today     Thrombocytopenia, resolving       NKDA     Plan:   Continuing meropenem/fluconazole for diverticulitis, plan on abx for 7 days post-op    Continue acyclovir for zoster oticus  Will give IV therapy today and tomorrow change to po acyclovir. Plan for 7 days total  Will probably be able to stop the isolation tomorrow     Continue to monitor renal function closely      Discussed with patient/family, all questions answered    I will be off this weekend, will see him again Monday.   Please call with ID related questions       Sunny Phillip MD  Phone: [phone]   Fax : [phone]

## 2019-03-15 NOTE — PLAN OF CARE
Problem: Nutrition  Goal: Optimal nutrition therapy  Outcome: Ongoing     Nutrition Problem: Inadequate oral intake  Intervention: Food and/or Nutrient Delivery: Continue current diet  Nutritional Goals: Pt will tolerate diet advancement per General surgery

## 2019-03-15 NOTE — PROGRESS NOTES
[facility] GENERAL SURGERY    Surgery Progress Note           POD # 1    PATIENT NAME: Rick Murrell     TODAY'S DATE: 3/15/2019    INTERVAL HISTORY:    Pt  Doing well from surgery standpoint. Pain is controlled, no nausea. OBJECTIVE:   VITALS:  BP (!) 118/53   Pulse 108   Temp 98 °F (36.7 °C) (Oral)   Resp 18   Ht 5' 9\" (1.753 m)   Wt 182 lb (82.6 kg)   SpO2 93%   BMI 26.88 kg/m²     INTAKE/OUTPUT:    I/O last 3 completed shifts: In: 3037 [I.V.:2837; IV Piggyback:200]  Out: 0394 [Urine:2650; Drains:310; Blood:200]  No intake/output data recorded. CONSTITUTIONAL:  awake and alert  LUNGS:  no crackles or wheezing  ABDOMEN:   hypoactive bowel sounds, soft, non-distended, tenderness noted incisional, patricio serosanguinous  INCISION: clean, dry    Data:  CBC:   Recent Labs     03/13/19  0635 03/14/19  0545 03/15/19  0550   WBC 7.0 7.6 8.4   HGB 8.5* 8.7* 7.7*   HCT 24.3* 24.9* 22.3*   PLT 50* 66* 98*     BMP:    Recent Labs     03/13/19  0635 03/14/19  0545 03/15/19  0550   * 138 139   K 3.4* 4.0 4.2    104 105   CO2 23 22 24   BUN 9 10 13   CREATININE 1.1 1.6* 1.2   GLUCOSE 117* 104* 145*     Hepatic: No results for input(s): AST, ALT, ALB, BILITOT, ALKPHOS in the last 72 hours. Mag:      Recent Labs     03/13/19  0635   MG 1.70*      Phos:   No results for input(s): PHOS in the last 72 hours. INR:   Recent Labs     03/13/19  0635   INR 1.13         ASSESSMENT AND PLAN:  69 y.o. male status post Laparoscopic converted to Open left Colectomy    GI: continue with ice chips and popsicles today  : continue with nguyen today  Pain: controlled  Activity: OOB to chair today    Electronically signed by DEBRA San - CNP     Surgery Staff    I have examined this patient and read and agree with the note by Toribio Dowell CNP from today.     Meteor KEBEDE

## 2019-03-16 NOTE — PROGRESS NOTES
Spoke with Dr Rosie House regarding consult patient history given agrees with heparin gtt at this point.

## 2019-03-16 NOTE — PROGRESS NOTES
CMU contacted this RN regarding long runs of Vtach on telemetry. Pt up in chair, c/o dizziness. Denies chest pain or SOB. /81, , O2 sat 3.5 L 78%. O2 increased to 5L, O2 sat 91%, unable to sustain oxygen saturation >90%. Lungs CTA in all lobes A/P.  MD contacted, orders obtained. Pt returned to bed. Respiratory therapy and CNP at bedside. High flow O2 at 10L, O2 sat >90%. Pt states \"I do feel better, and I'm not dizzy anymore\". Paced rhythm with PVCs on telemetry.

## 2019-03-16 NOTE — PROGRESS NOTES
Notified Dr. Romero Bachelor about Mag level of 1.5 and amiodarone dose.  Asked to call hospitalist.     Electronically signed by James Mclean RN on 3/16/2019 at 4:25 PM

## 2019-03-16 NOTE — PROGRESS NOTES
MD Paged:    Family would like someone to come speak with them regarding CT results. Could you please come talk with them?     Electronically signed by Rachel Cardenas RN on 3/16/2019 at 5:10 PM

## 2019-03-16 NOTE — CONSULTS
High dose heparin [facility]   Dx: New Dx of PE   lung cancer, received multiple transfusions last week due to thrombocytopenia  Wt: 82kg  - received 5,000 units SQ ~14:30  - recommended to wait until baseline aPTT is resulted before giving bolus dose  - start infusion at 14.9ml/hr per protocol. Raven Mason/Alex. 3/16/19 5:00 PM    3/17  aPTT = 87.6 sec at 0115. Continue heparin gtt at 14.9 mL/hr. Recheck aPTT in 6 hours. Rose Benavides PharmD  3/17/2019 2:43 AM    3/17    APTT= 74.4 seconds at 0645   Continue heparin at current rate of 14.9 ml/hhr   Next aPTT with am labs 3/18   Noah Mahajan 3/17/2019 9:44 AM    3/18  Aptt at 0503 is 53.4 seconds. Per protocol, no bolus and increase heparin drip to 16.6 mL/hr. Next aptt in 6 hours at 1600. Orders entered. Aptt = 62.8 at 1645. Continue heparin drip at 16.6ml/hr. Next aptt at 2300 3/19. 6:13 PM 3/18/2019    3/19  aPTT = 65.5 sec at 0037. Continue heparin gtt at 16.6 mL/hr. Recheck aPTT in 6 hours. Rose Benavides PharmD  3/19/2019 1:00 AM    3/19  aPTT = 52.7 sec at 0634. Bolus 3304 units  Increase heparin gtt to 18.3 mL/hr. Recheck aPTT in 6 hours. Magen Carter, AntonietaD Candidate [phone]    Ramakrishna Khanna PharmD, . [phone] [facility] [phone]  CMXUP [phone]    3/19 1615  aPTT = 63.6 sec  Continue heparin drip at 18.3 mL/hr  Next aPTT at 4301 Vivek Ashley PharmD 3/19/2019 4:40 PM    3/19 2200  aPTT = 70.4 sec  Continue heparin drip at 18.3 mL/hr  Start aPTT daily 3/20 AM  Anselmo Pimentel PharmD 3/19/2019 10:31 PM      3/20  Aptt at 0621 is 75.4 seconds. Per protocol, no change in heparin drip at 18.3 mL/hr. Next aptt on 3/21, daily. Orders entered. 3/21  Aptt at 0615 is 87.4 seconds. Per protocol, no change in heparin drip at 18.3 mL/hr. Next aptt would be on 3/22, however pt will have EGD today and heparin drip may be interrupted. Anticipate aptt sooner than 3/22.   Will follow along with patient to order labs appropriately. 3/22  Aptt= 93.1 sec. Per protocol, reduce drip rate to 16.6 mL/hr. Next aptt @ 1400. Spoke with Ro HENRY. Yesica Downey PharmD 03/22/19 7:53 AM     Aptt at 1447 is 76.4 seconds. Per protocol, no change in heparin drip running at 16.6 mL/hr. Next aptt in 6 hours at 2100. Orders entered. 3/22 2126  aptt = 54.2 sec  Continue current rate. Next aptt daily  Isauro Lopez Pharm D.3/22/2019 11:06 PM    3/23 0535  aptt = 66.2 sec. Continue current rate. Next aptt daily. Isauro Lopez Pharm D.3/23/2019 6:24 AM      3/25 0643  aPTT = 47.0  Will bolus with 3,304 units and continue drip at 16.6 mL/hr  Next aPTT ~ 1500  Plan to switch to NOAC if repeat CT negative per oncology  Pooja Schuster, PharmD Candidate 2019    Lb Guzmán PharmD, . [facility] [address] [phone] [place] [phone]  [place] [phone]      3/25/19  1747  High dose Heparin GTT:  APTT at 1702 is 43.6 seconds. Give a 3300 unit IV Bolus dose and increase the infusion to 18. 3mL/hr. Recheck the aPTT at midnight. [facility] PharmD 3/25/2019 5:47 PM     3/26 0000  aptt = 108.2 sec  Hold drip for 1 hour then reduce rate to 13.3 ml/hr. Next aptt 0830  Isauro Lopez Pharm HALEY.3/26/2019 1:26 AM    3/26  Aptt at 0956 is 31.6 seconds. Heparin drip has been interrupted overnight and then restarted around 0300. Per protocol, will bolus this patient with 6600 units and then increase heparin drip to 16.6 ml/hr. Patient has had several therapeutic readings at this rate over the past week. Next aptt at 1800. Orders entered. 3/27 0055  aptt = 53.7 sec  Borderline therapeutic. Will Just increase rate to 18.3 ml/hr.   Next aptt 0730  Isauro Lopez Pharm D.3/27/2019 1:29 AM

## 2019-03-16 NOTE — PROGRESS NOTES
Assessment completed and documented. VSS. A/ox4. Currently on 3.5L NC,sating 90%. Patient has PCA pump. patricio to bulb suction. Midline incision C/D/I. Laurent cath in place, patent. Bilateral SCDs in place. IV fluids and antibiotics running. Port with brisk blood return. Denies pain. Denies further needs at this time. Bed locked and in lowest position. Bedside table and call light within reach. Will continue to monitor.

## 2019-03-16 NOTE — PROGRESS NOTES
Results for Faustino Patterson" (MRN [id number]) as of 3/16/2019 13:48   Ref. Range 3/16/2019 13:30   Hemoglobin, Art, Extended Latest Ref Range: 13.5 - 17.5 g/dL 9.8 (L)   pH, Arterial Latest Ref Range: 7.350 - 7.450  7.473 (H)   pCO2, Arterial Latest Ref Range: 35.0 - 45.0 mmHg 30.4 (L)   pO2, Arterial Latest Ref Range: 75.0 - 108.0 mmHg 66.6 (L)   HCO3, Arterial Latest Ref Range: 21.0 - 29.0 mmol/L 21.8   TCO2 (calc), Art Latest Ref Range: Not Established mmol/L 22.7   Base Excess, Arterial Latest Ref Range: -3.0 - 3.0 mmol/L -1.2   O2 Sat, Arterial Latest Ref Range: >92 % 91.8 (L)   O2 Content, Arterial Latest Ref Range: Not Established mL/dL 13   Methemoglobin, Arterial Latest Ref Range: <1.5 % 0.5   Carboxyhgb, Arterial Latest Ref Range: 0.0 - 1.5 % 0.3   Patient on 10 lpm HFNC  RR mid 20's  LT RADIAL STICK X ONE. PRESSURE HELD  DRESSING APPLIED  SENT TO LAB.

## 2019-03-16 NOTE — PROGRESS NOTES
RN with pt to CT scan. Continues on high flow O2 @ 10 liters. Tolerated CT scan well. Report given to RN on C4. Pt transported from radiology to room 443.

## 2019-03-16 NOTE — ONCOLOGY
ONCOLOGY HEMATOLOGY CARE PROGRESS NOTE      SUBJECTIVE:     The patient states he is slowly getting better every day. ROS:   The remaining 10 point review of symptoms is unremarkable. OBJECTIVE        Physical    VITALS:  /82   Pulse 118   Temp 98.7 °F (37.1 °C) (Oral)   Resp 16   Ht 5' 9\" (1.753 m)   Wt 182 lb (82.6 kg)   SpO2 (!) 89%   BMI 26.88 kg/m²   TEMPERATURE:  Current - Temp: 98.7 °F (37.1 °C); Max - Temp  Av.4 °F (36.9 °C)  Min: 97.6 °F (36.4 °C)  Max: 99.4 °F (37.4 °C)  PULSE OXIMETRY RANGE: SpO2  Av.3 %  Min: 89 %  Max: 93 %  24HR INTAKE/OUTPUT:      Intake/Output Summary (Last 24 hours) at 3/16/2019 1158  Last data filed at 3/16/2019 7195  Gross per 24 hour   Intake 3549.78 ml   Output 3130 ml   Net 419.78 ml       CONSTITUTIONAL:  awake, alert, cooperative, no apparent distress, HEENT oral pharynx , no scleral icterus  HEMATOLOGIC/LYMPHATICS:  no cervical lymphadenopathy, no supraclavicular lymphadenopathy, no axillary lymphadenopathy and no inguinal lymphadenopathy  LUNGS:  No increased work of breathing, good air exchange, clear to auscultation bilaterally, no crackles or wheezing  CARDIOVASCULAR:  , regular rate and rhythm, normal S1 and S2, no S3 or S4, and no murmur noted  ABDOMEN:  +BS, midline dressing C/D/I, distended, slightly tender, DIAN drain in place with serosanguinous drainage   MUSCULOSKELETAL:  There is no redness, warmth, or swelling of the joints. EXTREMETIES: No clubbing cynosis or edema  NEUROLOGIC:  Awake, alert, oriented to name, place and time. Cranial nerves II-XII are grossly intact. Motor is 5 out of 5 bilaterally. SKIN:  Crusting and scabbed lesions to right side lips and chin , right temporal lesion with crusting over as well.  IMPROVED FROM ADMIT ON ACYCLOVIR, LESIONS DECREASING IN SIZE        Data      Recent Labs     19  0545 03/15/19  0550 19  0655   WBC 7.6 8.4 10.7   HGB 8.7* 7.7* 8.1*   HCT 24.9* 22.3* 23.7*   PLT 66* 98* 138   MCV 87.4 89.7 89.4        Recent Labs     03/14/19  0545 03/15/19  0550 03/16/19  0655    139 139   K 4.0 4.2 3.9    105 103   CO2 22 24 24   BUN 10 13 12   CREATININE 1.6* 1.2 0.9     No results for input(s): AST, ALT, ALB, BILIDIR, BILITOT, ALKPHOS in the last 72 hours.     Magnesium:    Lab Results   Component Value Date    MG 1.70 03/13/2019    MG 1.80 03/12/2019    MG 1.70 02/21/2019         Problem List  Patient Active Problem List   Diagnosis    Elevated fasting glucose    CAD (coronary artery disease)    PAD (peripheral artery disease) ([address]    Essential hypertension    Mixed hyperlipidemia    Sprain of right foot    Sprain of anterior talofibular ligament of right ankle    Closed nondisplaced fracture of fifth right metatarsal bone    Closed fracture of proximal lateral malleolus of ankle with routine healing    Right foot pain    Right ankle pain    Nondisplaced fracture of fifth right metatarsal bone with routine healing    History of pulmonary emphysema    Chronic obstructive pulmonary disease with (acute) exacerbation (HCC)    Pulmonary nodule, left    Abnormal bone scan of thoracic spine    Back pain    Visit for monitoring Plavix therapy    History of ventricular tachycardia    Gallstones    Hemoptysis    Former smoker    Elevated PSA    Adenocarcinoma of left lung ([address]    Acute diverticulitis    Pancytopenia ([address]    Diverticulitis of large intestine with perforation and abscess without bleeding    Diverticulitis    Chemotherapy-induced neutropenia (HCC)    Mucositis    Herpes zoster oticus    Hearing loss of right ear       ASSESSMENT AND PLAN    -Acute Perforated descending colon diverticulitis (not immune mediated colitis) with free air in the abd   - per gen surgery , s/p Laparoscopic converted to Open left Colectomy POD2       - ATB support (Zosyn, Vera, Vanco) , changed to [address] [place] [address] and Diflucan now    Neutropenia secondary to chemo  - WBC is over 7, Granix stopped 3/11     Thrombocytopenia secondary to chemo  -Resolved  - Would like to resume Plavix or Integrilin when cleared by surgery      -Lung CA, stage 4 bone mets   - Will hold chemo until surgically healed. He is not due for Cycle 3 Carbo/Alimta/Keytruda until 3-22. May consider holding chemo and moving forward with Carolyn Redding only in future. Family aware. We will assess how he heals next week, but his chemo will likely need to be held for at least 2 weeks. We could start Carolyn Redding only sooner, since this does not affect wound healing. Plans for chemo/immunotherapy outpatient. - Cont folate while on alimta (folate antagonist), needs folate for 3 months after stopping Alimta. Needs B12 shot every 9 weeks at least (every 3rd cycle). - CT chest/abd/pelvis, 3/10/2019, showed improvement in the left hilar soft tissue and radiation changes. No obvious new disease.      HTN  - holding home Lisinopril due to Hyperkalemia , creat stable   - Was on Norvasc at one point, but pt admits to stopping this drug weeks ago   -He is not hypertensive at this point     Shingles to the V3 dermatome of the trigeminal nerve  - Acyclovir started 3/13 at 10 mg/kg every 8 hours  - Acyclovir dose reduced on 3/14 and creatinine improved from 1.6 to 0.9    Hearing changes  - secondary to shingles and improving     Radiation Dermatitis  -Bactroban      Mucositis  - Fluconzaole/MMW. Pacemaker  - Rhythm strip looks more like he was paced out of an arrhythmia than cardioverted. - NSR/ST on tele     I would estimate that he can safely delay chemotherapy for 2 months if necessary to allow wound healing. Also, if needed, he could transition to maintenance Pembrolizumab early, which would not effect wound healing. ONCOLOGIC DISPOSITION:  TBD, per surgery.      Aravind Mena CNP   Please contact through [facility]

## 2019-03-16 NOTE — PLAN OF CARE
Problem: Safety:  Goal: Free from accidental physical injury  Description  Free from accidental physical injury  Outcome: Ongoing     Patient remains free from falls\injury this shift. A/ox4, calls out appropriately. X1 assist. Pt calls for assistance with transfer.

## 2019-03-16 NOTE — PROGRESS NOTES
Speech Language Pathology  SLP attempting to see pt this afternoon. RN reporting pt is not appropriate for therapy at this time due to medical status. SLP will re-attempt later as possible.      Nori Sebastian M.A., [address] [facility] #[id number]  Speech-Language Pathologist

## 2019-03-16 NOTE — PROGRESS NOTES
[facility] GENERAL SURGERY    Surgery Progress Note           POD # 2    PATIENT NAME: Juan Diego Churchill     TODAY'S DATE: 3/16/2019    INTERVAL HISTORY:    Pt doing okay, had some nausea yesterday, but none this morning - taking ice chips and popsicles. No flatus yet. Pain controlled, most pain is with getting up, but he is okay sitting in the chair. OBJECTIVE:   VITALS:  BP (!) 145/88   Pulse 120   Temp 98.2 °F (36.8 °C) (Oral)   Resp 18   Ht 5' 9\" (1.753 m)   Wt 182 lb (82.6 kg)   SpO2 92%   BMI 26.88 kg/m²     INTAKE/OUTPUT:    I/O last 3 completed shifts: In: 3154.5 [P.O.:620; I.V.:2534.5]  Out: 2530 [Urine:2450; Drains:80]  I/O this shift:  In: 395.3 [I.V.:395.3]  Out: 600 [Urine:600]              CONSTITUTIONAL:  awake and alert  LUNGS:  no crackles or wheezing  ABDOMEN:   hypoactive bowel sounds, soft, non-distended, tenderness noted incisional, patricio serosanguinous  INCISION: clean, dry    Data:  CBC:   Recent Labs     03/14/19  0545 03/15/19  0550 03/16/19  0655   WBC 7.6 8.4 10.7   HGB 8.7* 7.7* 8.1*   HCT 24.9* 22.3* 23.7*   PLT 66* 98* 138     BMP:    Recent Labs     03/14/19  0545 03/15/19  0550 03/16/19  0655    139 139   K 4.0 4.2 3.9    105 103   CO2 22 24 24   BUN 10 13 12   CREATININE 1.6* 1.2 0.9   GLUCOSE 104* 145* 111*       ASSESSMENT AND PLAN:  69 y.o. male status post Laparoscopic converted to Open left Colectomy    GI: clear liquids today - back down if nausea returns. : continue with nguyen today - remove if okay with nephrology. Pain: controlled - continue with PCA 1 more day  Activity: OOB to chair today - PT/OT    Electronically signed by DEBRA Coles - CNP     I agree with the progress note and care plan of Laurie Norton CNP.      TL ARNOLD

## 2019-03-16 NOTE — PROGRESS NOTES
Speech Language Pathology  SLP attempting to see pt this afternoon. RN reports pt has discharged at this time.      Ehsan Priest M.A., [facility] #[id number]  Speech-Language Pathologist

## 2019-03-16 NOTE — PROGRESS NOTES
[facility]    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, pain, and nausea  Ca ca lung( adenocarcinoma) with vertebral mets  Sodium low side since 2/19    Cortisol- normal, TSH - 1.65, LFT normal  Echo 2/12- EF of 45-50%, AL mildly dilated    Acute Kidney Injury  Cr peaked to 1.6- now 1  Making urine  Likely hemodynamic changes   Now BP better  Surgery done  On 3/14/19    Hypotension  BP: (123-145)/(73-88) Pulse:  [118-134]   Normally hypertensive  Now has fever, neutropenic- getting better           pancytopenia  Ca lung  Diverticulitis with perforation- supportive treatment and plan for surgery this admission  Radiation dermatitis  Mucositis  [facility] would like to thank DEBRA Yung   for opportunity to serve this patient      Please call with questions at-   24 Hrs Answering service [phone] or  7 am- 5 pm via Perfect serve or cell phone  Dr. Mikaela Mcgarry for consult :     hyponatremia     HPI :     From consult note-     Irina Soriano is a 69 y.o. male presented to   the hospital on 3/7/2019 with left lower quadrant pain. He has that for a week. Was hospitalized with diverticulitis, DCed,  And pain never went away. Also low grade fever. No nausea. Mild pain in belly  Diagnosed to have fever and also diverticulitis. Planning for surgery. 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 Has pancytopenia from chemotherapy.      We are consulted for hyponatremia and related issues     Interval History:     Sodium normal now  Cr coming down  Making urine      ROS:     Seen and examined with wife and daughter at bedside  On O2 via nasal no,cyanosis- no , digital ischemia- no                           muscle strength- grossly normal , tone - grossly normal  Skin: rashes- no , ulcers- no, induration- no, tightening - no  Psychiatric:  Judgement and insight- normal           AAO X 3     LABS:     Recent Labs     03/14/19  0545 03/15/19  0550 03/16/19  0655   WBC 7.6 8.4 10.7   HGB 8.7* 7.7* 8.1*   HCT 24.9* 22.3* 23.7*   PLT 66* 98* 138     Recent Labs     03/14/19  0545 03/15/19  0550 03/16/19  0655 03/16/19  1336    139 139  --    K 4.0 4.2 3.9  --     105 103  --    CO2 22 24 24  --    BUN 10 13 12  --    CREATININE 1.6* 1.2 0.9  --    GLUCOSE 104* 145* 111*  --    MG  --   --   --  1.50*

## 2019-03-16 NOTE — PROGRESS NOTES
MD Paged:    Notified cardiology about Mag level of 1.5. Asked to call you for orders. Thanks!     Electronically signed by Melinda Carvalho RN on 3/16/2019 at 4:23 PM

## 2019-03-16 NOTE — PROGRESS NOTES
Speech Language Pathology  SLP re-attempting pt this afternoon. RN reports pt is still not appropriate for therapy services at this time. Speech to cont. To follow.      Susan Huang M.A., [address] [facility] #[id number]  Speech-Language Pathologist

## 2019-03-16 NOTE — CONSULTS
INPATIENT PULMONARY CRITICAL CARE CONSULT NOTE      Chief Complaint/Referring Provider:  Patient is being seen at the request of RASHMI Farrell for a consultation for acute hypoxia      Presenting HPI: Princess Pretty is a pleasant 44-year-old male with recently diagnosed metastatic lung cancer involving the T6 vertebra. He is under the care of Dr. Ronda Li, has undergone chemotherapy and radiation. The patient has developed facial herpes zoster. He was admitted through the ER on 3/67/19 for abdominal pain. He was recently diagnosed with diverticulitis, had persistent abdominal pain. He was cultured, placed on IV antibiotics. The patient was evaluated by vascular surgery who felt that he did not have an acute vascular problem, symptoms are related to acute diverticulitis. He has had endovascular repair of her AAA, type II endoleak was suspected. He was seen by general surgery, who felt he had acute diverticulitis due to immunosuppression. During this admission, the patient had SIADH, was seen by nephrology. The patient was taken to surgery on 3/14/19, underwent laparoscopic to open left colectomy. The patient says he was doing better as regards to abdominal pain. The patient sat up in bed, and developed an acute episode of lightheadedness, shortness of breath, severe hypoxemia. His saturation dropped to the mid 80s in spite of deep breathing and coughing induced by respiratory therapy. He was placed on 10 L high flow nasal cannula oxygen. A stat ABG was obtained and revealed significant hypoxemia on high flow oxygen. X-ray chest was ordered.        Patient Active Problem List    Diagnosis Date Noted    Herpes zoster oticus     Hearing loss of right ear     Chemotherapy-induced neutropenia ([address]     Mucositis     Diverticulitis 03/07/2019    Diverticulitis of large intestine with perforation and abscess without bleeding     Acute diverticulitis 02/19/2019    Pancytopenia ([address] BRONCHOSCOPY/TRANSBRONCHIAL LUNG BIOPSY performed by Allyson Ceja MD at  [address] N/A 2019    EBUS ULTRASOUND W/ ANESTHESIA AND NEEDLE BIOPSY performed by Steve Munson MD at  [address]  2019    BRONCHOSCOPY ALVEOLAR LAVAGE performed by Steve Munson MD at  [address]  2019    BRONCHOSCOPY BIOPSY BRONCHUS performed by Steve Munson MD at  [address]  2019    BRONCHOSCOPY BRUSHINGS performed by Steve Munson MD at  [address]  2019    BRONCHOSCOPY/TRANSBRONCHIAL NEEDLE BIOPSY performed by Steve Munson MD at  [address]  2019    BRONCHOSCOPY/TRANSBRONCHIAL NEEDLE BIOPSY ADDL LOBE performed by Steve Munson MD at [address] [facility]  10/08/01 12/20/04    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    COLONOSCOPY  14    wnl    CORONARY ANGIOPLASTY WITH STENT PLACEMENT      FINGER FRACTURE SURGERY      left middle finger    HEMICOLECTOMY N/A 3/14/2019    LAPAROSCOPIC CONVERT TO OPEN SIGMOID COLECTOMY performed by Candido Fan MD at [address] / REMOVAL / [address] 2019    RIGHT SUBCLAVIAN VEIN PORT A CATH PLACEMENT performed by Jarek Louise MD at [address] [place] [address]  2/10/2014    BX duodenum, atrum, distal esophagus    UPPER GASTROINTESTINAL ENDOSCOPY  14    BX gastric    UPPER GASTROINTESTINAL ENDOSCOPY  11/4/15        Family History   Adopted: Yes   Problem Relation Age of Onset    Stroke Mother     Schizophrenia Brother     Cancer Neg Hx         Social History     Tobacco Use    Smoking status: Former Smoker     Packs/day: 0.00     Years: 0.00     Pack years: 0.00     Last attempt to quit: 2003     Years since quittin.2    Smokeless tobacco: Never Used   Substance Use Topics    Alcohol use:  Yes     Alcohol/week: 10.8 oz Types: 18 Shots of liquor per week     Comment: social-used to drink alot         No Known Allergies    Physical Exam:  Blood pressure 124/73, pulse 134, temperature 97.7 °F (36.5 °C), temperature source Oral, resp. rate 18, height 5' 9\" (1.753 m), weight 182 lb (82.6 kg), SpO2 92 %.'   Constitutional:  Pleasant, mildly ill-appearing male. No acute distress. HENT:  Oropharynx is clear and dry, class II airway. Tongue is coated. Lesions of healing herpes zoster on his face. Eyes:  Conjunctivae are normal. Pupils equal, round, and reactive to light. No scleral icterus. Wearing glasses  Neck: No JVD. No tracheal deviation present. No obvious thyroid mass. Cardiovascular: Sinus tachycardia, normal heart sounds. No right ventricular heave. No lower extremity edema. Pulmonary/Chest: No wheezes. Few left basal rales. No accessory muscle usage or stridor. Abdominal: Soft, protuberant, with incision dressing. Mild tenderness. Musculoskeletal: No cyanosis. No clubbing. No obvious joint deformity. Mildly dusky nailbeds. Lymphadenopathy: No cervical or supraclavicular adenopathy. Skin: Skin is warm and dry. Mild pallor  Psychiatric: Normal mood and affect. Behavior is normal.  No anxiety. Neurologic: Alert, awake and oriented. PERRL. Speech fluent        Results:  CBC:   Recent Labs     03/14/19  0545 03/15/19  0550 03/16/19  0655   WBC 7.6 8.4 10.7   HGB 8.7* 7.7* 8.1*   HCT 24.9* 22.3* 23.7*   MCV 87.4 89.7 89.4   PLT 66* 98* 138     BMP:   Recent Labs     03/14/19  0545 03/15/19  0550 03/16/19  0655    139 139   K 4.0 4.2 3.9    105 103   CO2 22 24 24   BUN 10 13 12   CREATININE 1.6* 1.2 0.9     Imaging:  I have reviewed radiology images personally. CT CHEST ABDOMEN PELVIS WO CONTRAST   Final Result   Perforated sigmoid diverticulitis and pneumoperitoneum unchanged.   Stable   left [facility]ita colonic air-fluid collection may represent an intramural or   extramural abscess or giant enlarged and unchanged. Slight thickening of the wall of the urinary bladder likely represents hypertrophy from outlet obstruction. Peritoneum/Retroperitoneum: There is a patent aorto bi-iliac endograft for repair of an abdominal aortic aneurysm. There is patchy increased density in the inferior aspect of the native aneurysm, possibly contrast enhancement. Calcification not excluded. (Axial image [id number]). The native aneurysm has not changed significantly in size from 02/19/2019. Remaining aneurysm measures up to 4.7 cm. Bones/Soft Tissues: No acute bone or soft tissue abnormality. Acute perforated descending colon diverticulitis. There is free air in the abdomen and there is a small extraluminal gas and fluid collection/abscess adjacent to the descending colon measuring 4.1 cm in AP diameter and 6 cm in length. There is diverticulosis of the entire colon. Cholelithiasis. 4.7 cm infrarenal abdominal aortic aneurysm status post endograft repair with a patent aorto bi-iliac graft. There is suspicion of an endoleak with blush of contrast enhancement suspected in the inferior aspect of the native aneurysm. As a noncontrast study was not performed, calcification of the intraluminal thrombus accounting for the apparent enhancement is not excluded. Native aneurysm has not changed significantly in size from the study 3 weeks ago. Comparison with a noncontrast CT examination would be helpful. Critical results were called by Dr. Steve Frias. MD [facility] on 3/7/2019 at 20:35. Ct Abdomen Pelvis W Iv Contrast Additional Contrast? None    Result Date: 2/19/2019  EXAMINATION: CT OF THE ABDOMEN AND PELVIS WITH CONTRAST 2/19/2019 4:13 pm TECHNIQUE: CT of the abdomen and pelvis was performed with the administration of intravenous contrast. Multiplanar reformatted images are provided for review.  Dose modulation, iterative reconstruction, and/or weight based adjustment of the mA/kV was utilized to reduce the radiation dose to as low as reasonably achievable. COMPARISON: 1/2/2019, 11/23/2010 HISTORY: ORDERING SYSTEM PROVIDED HISTORY: LLQ pain with history diverticulitis TECHNOLOGIST PROVIDED HISTORY: Additional Contrast?->None Ordering Physician Provided Reason for Exam: LLQ pain with history diverticulitis, fever x 1 day Acuity: Acute Type of Exam: Initial Relevant Medical/Surgical History: hx of lung Ca 3 wks ago FINDINGS: Lower Chest: There is mild dependent atelectasis within the bilateral lower lobes. Mild consolidative opacity within the lingula and anterior basal segment of the left lower lobe likely reflects parenchymal scar. A few stable small benign scattered granulomata are noted within the bilateral lung bases. Organs: The liver is normal.  There is a new nonspecific geographic focus of low attenuation within the peripheral aspect of the inferior spleen, new from the study of 01/02/2019, which could represent a developing splenic infarct. A metastatic lesion is considered less likely, though not excluded. The pancreas is normal.  There are calcified gallstones within the gallbladder. The adrenal glands are normal bilaterally. There are stable bilateral renal cysts, the largest measuring approximately 2.1 cm within the right kidney lower pole. The bilateral kidneys are otherwise unremarkable. Renal vascular calcifications are noted. GI/Bowel: Evaluation of the hollow GI tract demonstrates a stable small sliding-type hiatal hernia. There is a localized focus of acute moderate focal wall thickening of the proximal sigmoid colon with adjacent inflammatory fat stranding. This takes place in the setting of diverticula, and is consistent with acute sigmoid diverticulitis. There is no evidence of perforation, free air, or abscess. The remainder of the colon is unremarkable. The appendix is normal.  The small bowel is unremarkable, without evidence of dilatation or obstruction. Pelvis:  The urinary bladder appears thick-walled, though is partially collapsed. There is stable mild prostatomegaly, unchanged from prior examination. There is no free pelvic fluid or pathologic pelvic lymphadenopathy. The iliac limbs of the patient's stent graft are patent without evidence of stenosis or thrombosis. Peritoneum/Retroperitoneum: No intraperitoneal free air or free fluid is identified. There is the aforementioned fat stranding adjacent to the sigmoid colon. No pathologically enlarged lymphadenopathy is identified. There is a 4.7 x 4.4 cm fusiform aneurysm of the infrarenal abdominal aorta stabilized by a patent aortobi-iliac stent graft. There is no evidence of in-stent stenosis or thrombosis. Bones/Soft Tissues: There is diffuse osteopenia. There is multilevel degenerative change throughout the thoracolumbar spine. No osteolytic or osteoblastic lesion is identified. 1. Findings are consistent with acute proximal sigmoid diverticulitis within the left lower quadrant, without evidence of perforation, free air, or abscess. 2. New nonspecific focus of geographic low attenuation within the periphery of the inferior spleen, possibly a splenic infarct, less likely a metastatic focus. Suggest clinical correlation and attention on follow-up abdominal CT studies to exclude metastatic disease. 3. Cholelithiasis. 4. Stable bilateral renal cysts. 5. 4.7 x 4.4 cm fusiform aneurysm of the infrarenal abdominal aorta, stabilized by an aortobi-iliac stent graft, without evidence of in-stent stenosis or thrombosis. 6. Stable mild prostatomegaly. Ct Chest Abdomen Pelvis Wo Contrast    Result Date: 3/10/2019  EXAMINATION: CT OF THE CHEST, ABDOMEN, AND PELVIS WITHOUT CONTRAST 3/10/2019 5:33 pm TECHNIQUE: CT of the chest, abdomen and pelvis was performed without the administration of intravenous contrast. Multiplanar reformatted images are provided for review.  Dose modulation, iterative reconstruction, and/or weight based adjustment of the mA/kV was utilized to reduce the radiation dose to as low as reasonably achievable. COMPARISON: Chest x-ray February 19, 2019 and CT chest January 17, 2019 HISTORY: ORDERING SYSTEM PROVIDED HISTORY: Hydronephrosis TECHNOLOGIST PROVIDED HISTORY: Reason for exam:->Hydronephrosis Reason for exam:->lung cancer, non-small cell staging Additional Contrast?->None Ordering Physician Provided Reason for Exam: Hydronephrosis; lung cancer, non-small cell staging Acuity: Chronic Type of Exam: Initial Relevant Medical/Surgical History: Hx MI, diverticulosis, small cell lung with with spinal bone mets FINDINGS: Chest: Mediastinum: Bipolar pacer left chest with leads terminating in the right heart. MediPort catheter right chest wall terminates in the superior vena cava. Calcific coronary artery disease. Heart and great vessels otherwise unremarkable. No pathologic mediastinal or hilar lymphadenopathy. Lungs/pleura: 3 mm nodule left lung base image number 61 appears stable. Ill-defined interstitial infiltrate left lower lobe and patchy infiltrates in the lingula. Bibasilar subsegmental atelectasis. Ill-defined infrahilar soft tissue density appears stable. Sensitivity is limited without IV contrast.  It appears to contain a calcification. Ill-defined interstitial infiltrate right middle lobe appears new and measures several mm. 2-3 mm nodule right middle lobe on image number 68 appears stable. Stable IV vertebrae at T6. Abdomen/Pelvis: Organs: The abdominal wall appears normal. The liver,, pancreas, and adrenals appear normal.  Ill-defined hypodensity inferior spleen may represent a resolving infarct and appears stable. Gallstones noted. Pneumoperitoneum appears unchanged. No free fluid. 21 mm simple right renal cortical cyst.  Smaller stable left renal cortical cyst. The bladder appears normal. GI/Bowel: Extensive sigmoid diverticulosis.   Air-fluid collection is again noted to the left of the descending chest.  Chemotherapy-induced neutropenia. Resolved. Anemia. Multifactorial.  Transfuse if hemoglobin <7 g/dL  Metastatic lung cancer. Status post chemotherapy and radiation. Followed by Dr. Gayle Reilly. Herpes zoster. On acyclovir. Aortic aneurysm repair. Was on Plavix and Integrilin. Resuming depending upon surgical recommendations. Metastatic lung cancer. Followed by oncology. As mucositis and radiation dermatitis. DVT prophylaxis. On subcutaneous heparin started today. I have examined the patient, reviewing labs, images and medical records. My impression and recommendations are as above. Discussed with Adolfo Felix and Dr. Negrita Espino. Discussed with patient and his family. We will follow with you, thank you for the consultation.         Electronically signed by:  Leanne Dhillon MD    3/16/2019    1:51 PM.

## 2019-03-17 NOTE — PROGRESS NOTES
[facility]    Surgery Progress Note           POD # 3    PATIENT NAME: Monroe Martínez     TODAY'S DATE: 3/17/2019    INTERVAL HISTORY:    Events of yesterday afternoon noted   Pt this morning reports he is passing some flatus, taking liquids and denies nausea. OBJECTIVE:   VITALS:  /77   Pulse 116   Temp 97.7 °F (36.5 °C) (Oral)   Resp 16   Ht 5' 9\" (1.753 m)   Wt 191 lb (86.6 kg)   SpO2 93%   BMI 28.21 kg/m²     INTAKE/OUTPUT:    I/O last 3 completed shifts: In: 2539.2 [P.O.:720; I.V.:919.2; IV Piggyback:900]  Out: 7893 [Urine:2900; Drains:570]  I/O this shift: In: 0.4 [I.V.:0.4]  Out: -               CONSTITUTIONAL:  awake and alert  LUNGS:  no crackles or wheezing  ABDOMEN:   hypoactive bowel sounds, soft, non-distended, tenderness noted incisional, patricio serosanguinous  INCISION: staples intact    Data:  CBC:   Recent Labs     03/16/19  0655 03/16/19  1651 03/17/19  0645   WBC 10.7 12.2* 7.5   HGB 8.1* 8.1* 10.8*   HCT 23.7* 24.3* 32.7*    145 118*     BMP:    Recent Labs     03/15/19  0550 03/16/19  0655 03/17/19  0645    139 133*   K 4.2 3.9 3.4*    103 98*   CO2 24 24 27   BUN 13 12 9   CREATININE 1.2 0.9 0.6*   GLUCOSE 145* 111* 124*       ASSESSMENT AND PLAN:  69 y.o. male status post Laparoscopic converted to Open left Colectomy    GI: continue with clear liquids only today  : continue with nguyen   PE: on heparin gtt, monitor for signs of bleeding  Pain: controlled - continue with PCA for now, transition tomorrow  Activity: OOB to chair today - PT/OT    Electronically signed by DEBRA Carlson - HUGO       I agree with the progress note and care plan of Kay Fishman CNP.      TL ARNOLD

## 2019-03-17 NOTE — PROGRESS NOTES
Hospitalist Progress Note      PCP: Charla Sahni MD    Date of Admission: 3/7/2019    Chief Complaint: Abdominal pain     Hospital Course:  69 y.o. male who presented to [facility] with LLQ abdominal pain. Pt. Was recently d/c'd for diverticulitis, states pain never went away. C/o low grade fevers for 2 weeks. States pain not as severe as previous admission. Had chemo on Friday, states received neulasta. Denies cp, n/v, sob. Subjective:   Up in bedside chair. +outpt in drain. Became acute hypoxic when getting up to chair. O2 sats mid 80's on 5L-> on NRB with o2 sats 96-98  CXR ordered stat, HR elevated-no shock   Pulm consulted to assist bedside->suspect PE->platelets up from  today  BP WNL, no c/o chestpain. His wife is present bedside.      Medications:  Reviewed    Infusion Medications    heparin (porcine) 18 Units/kg/hr (03/16/19 1729)    sodium chloride 50 mL/hr at 03/16/19 1712    HYDROmorphone      dextrose 100 mL/hr (03/08/19 1700)     Scheduled Medications    amiodarone  200 mg Oral BID    acyclovir  800 mg Oral 5x Daily    sodium chloride  250 mL Intravenous Once    sodium chloride  250 mL Intravenous Once    0.9 % sodium chloride  250 mL Intravenous Once    meropenem  1 g Intravenous Q8H    fluconazole  200 mg Intravenous Q24H    insulin lispro  0-6 Units Subcutaneous TID WC    insulin lispro  0-3 Units Subcutaneous Nightly    folic acid  1 mg Oral Daily    oyster shell calcium/vitamin D  1 tablet Oral Daily    silver sulfADIAZINE   Topical Daily    sodium chloride flush  10 mL Intravenous 2 times per day     PRN Meds: heparin (porcine), heparin (porcine), naloxone, magic (miracle) mouthwash with nystatin, glucose, dextrose, glucagon (rDNA), dextrose, sodium chloride flush, ondansetron, acetaminophen      Intake/Output Summary (Last 24 hours) at 3/16/2019 2032  Last data filed at 3/16/2019 1835  Gross per 24 hour   Intake 3773.69 ml   Output 3790 ml   Net 1.025 03/10/2019    GLUCOSEU Negative 03/10/2019       Radiology:  CT CHEST PULMONARY EMBOLISM W CONTRAST   Final Result   Bilateral pulmonary emboli      Scattered opacities throughout the lungs, greatest at the lung bases, with   tiny pleural effusions. Changes are increased compared to prior      Sclerotic osseous metastatic disease as described previously      The findings were sent to the Radiology Results [address] at 3:57   pm on 3/16/2019to be communicated to a licensed caregiver. XR CHEST 1 VW   Final Result   Shallow inflation with findings consistent with basilar atelectasis. Left   pleural thickening versus trace effusion. CT CHEST ABDOMEN PELVIS WO CONTRAST   Final Result   Perforated sigmoid diverticulitis and pneumoperitoneum unchanged. Stable   left Emerita colonic air-fluid collection may represent an intramural or   extramural abscess or giant diverticulum. Infrarenal abdominal aortic aneurysm and aorto bi-iliac stent. Other incidental findings as above. CT ABDOMEN PELVIS W IV CONTRAST Additional Contrast? None   Final Result   Acute perforated descending colon diverticulitis. There is free air in the   abdomen and there is a small extraluminal gas and fluid collection/abscess   adjacent to the descending colon measuring 4.1 cm in AP diameter and 6 cm in   length. There is diverticulosis of the entire colon. Cholelithiasis. 4.7 cm infrarenal abdominal aortic aneurysm status post endograft repair with   a patent aorto bi-iliac graft. There is suspicion of an endoleak with blush   of contrast enhancement suspected in the inferior aspect of the native   aneurysm. As a noncontrast study was not performed, calcification of the   intraluminal thrombus accounting for the apparent enhancement is not   excluded. 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   Comparison with a noncontrast CT examination would be   helpful. Critical results were called by Dr. Mando Sheikh. MD [facility] to [facility] on   3/7/2019 at 20:35. VL Extremity Venous Bilateral    (Results Pending)       Assessment/Plan:    Active Hospital Problems    Diagnosis Date Noted    Acute respiratory failure with hypoxia ([address] [J96.01]     H/O colectomy [Z90.49]     Primary malignant neoplasm of lung metastatic to other site ([address] [C34.90]     Herpes zoster oticus [B02.21]     Hearing loss of right ear [H91.91]     Chemotherapy-induced neutropenia ([address] [D70.1, T45.1X5A]     Mucositis [K12.30]     Diverticulitis [K57.92] 03/07/2019    Diverticulitis of large intestine with perforation and abscess without bleeding [K57.20]        Acute perforated recurrent diverticulitis/pneumoperitoneum/abscess in setting of immunosuppression due to chemotherapy:   - CT A/P demonstrates acute perforated colon diverticulitis with free air with fluid collection.   - Continue Merrem   - General surgery consulted/following. S/p Laparoscopic converted to Open left Colectomy  -I/O drain    Adenocarcinoma of the LLL:  - With known mets to spine. Also question of mets to spleen incidentally noted on CT, though thought to be less likely. Hem/Onc is consulted/following. Chemo on hold in preparation for surgical healing.      Acute hypoxemic respiratory Failure, suspected PE in prothrombic state. Pulm consulted, CXR noted  CTA chest now, EKG  Transfer to     Neutropenia due to chemotherapy:   - S/p Neulasta on 3/1. Hem/onc started Granix on 3/8. - Wound care for his radiation ulcer on his back. hyponatremia : likely SIADH  - [facility]   - Nephrology  - Cortisol in AM was WNL     Hypertension:  - Currently normotensive.  Holding BP meds for now.     Peripheral arterial disease:   - Per chart review on previous admission, pt cannot stop his DAPT per vascular surgery. In fact, in the past he has been admitted for prolonged perioperative eptifibitide infusions while the DAPT washes out before procedures. Has had many complications with bypasses and stents. Pt's last dose of Plavix was on 3/7/19. Integrilin infusion d/cd yesterday due to thrombocytopenia  - CT performed in ED- reading suggesting possible Type II endoleak. Vascular surgery consulted this admission -- no treatment required currently. History of VT:   Went into [facility] 2042 3/12, PMD went off. Continue amiodarone. Mucositis:  - Diflucan and nystatin [facility] per Oncology.      HSV labialis: concerns for shingles, Edu hunt Syndrome  -Continue antivirals    DVT Prophylaxis:SCDs, platelets 66 preoperatively  Diet: DIET CLEAR LIQUID;  Code Status: Full Code    PT/OT Eval Status: Not indicated     Dispo - Uncertain     DEBRA Trejo - CNP

## 2019-03-17 NOTE — PROGRESS NOTES
Hospitalist Progress Note      PCP: Ewell Hamman, MD    Date of Admission: 3/7/2019    Chief Complaint: Abdominal pain     Hospital Course:  69 y.o. male who presented to [facility] with LLQ abdominal pain. Pt. Was recently d/c'd for diverticulitis, states pain never went away. C/o low grade fevers for 2 weeks. States pain not as severe as previous admission. Had chemo on Friday, states received neulasta. Denies cp, n/v, sob. Subjective:   Lying in bed. Feels well, much better than yesterday  On O2. Heparin Gtt. No overt s/s bleeding.    HR 120s, low grade temp, 99.5    Medications:  Reviewed    Infusion Medications    heparin (porcine) 14.9 mL/hr (03/17/19 0849)    sodium chloride 50 mL/hr at 03/17/19 1137    HYDROmorphone      dextrose 100 mL/hr (03/08/19 1700)     Scheduled Medications    amiodarone  200 mg Oral BID    acyclovir  800 mg Oral 5x Daily    sodium chloride  250 mL Intravenous Once    sodium chloride  250 mL Intravenous Once    0.9 % sodium chloride  250 mL Intravenous Once    meropenem  1 g Intravenous Q8H    fluconazole  200 mg Intravenous Q24H    insulin lispro  0-6 Units Subcutaneous TID WC    insulin lispro  0-3 Units Subcutaneous Nightly    folic acid  1 mg Oral Daily    oyster shell calcium/vitamin D  1 tablet Oral Daily    silver sulfADIAZINE   Topical Daily    sodium chloride flush  10 mL Intravenous 2 times per day     PRN Meds: heparin (porcine), heparin (porcine), naloxone, magic (miracle) mouthwash with nystatin, glucose, dextrose, glucagon (rDNA), dextrose, sodium chloride flush, ondansetron, acetaminophen      Intake/Output Summary (Last 24 hours) at 3/17/2019 1358  Last data filed at 3/17/2019 1349  Gross per 24 hour   Intake 3606.51 ml   Output 2835 ml   Net 771.51 ml       Physical Exam Performed:    /82   Pulse 123   Temp 98.6 °F (37 °C) (Oral)   Resp 18   Ht 5' 9\" (1.753 m)   Wt 191 lb (86.6 kg)   SpO2 93%   BMI 28.21 kg/m² General appearance: In bed, No apparent distress, appears stated age and cooperative. HEENT: Pupils equal, round, and reactive to light. Conjunctivae/corneas clear. Vesicles in outer ear canal noted   Neck: Supple, with full range of motion. No jugular venous distention. Trachea midline. Respiratory:  increased respiratory effort. DM to auscultation, bilaterally without Rales/Wheezes/Rhonchi. on NRB  Cardiovascular: tachycardic rate and rhythm with normal S1/S2 without murmurs, rubs or gallops. Abdomen: Abd dressing d/i, DIAN drain in place with +oupt. Musculoskeletal: No clubbing, cyanosis or edema bilaterally. Full range of motion without deformity. Skin: Skin color, texture, turgor normal.  Radiation changes to back. Vesicles under lower lip, cheek. Mouth mucosa improving  Neurologic:  Neurovascularly intact without any focal sensory/motor deficits. Cranial nerves: II-XII intact, grossly non-focal.  Psychiatric: Alert and oriented, thought content appropriate, normal insight  Capillary Refill: Brisk,< 3 seconds   Peripheral Pulses: +2 palpable, equal bilaterally       Labs:   Recent Labs     03/16/19  0655 03/16/19  1651 03/17/19  0645   WBC 10.7 12.2* 7.5   HGB 8.1* 8.1* 10.8*   HCT 23.7* 24.3* 32.7*    145 118*     Recent Labs     03/15/19  0550 03/16/19  0655 03/17/19  0645    139 133*   K 4.2 3.9 3.4*    103 98*   CO2 24 24 27   BUN 13 12 9   CREATININE 1.2 0.9 0.6*   CALCIUM 7.5* 7.6* 7.6*     No results for input(s): AST, ALT, BILIDIR, BILITOT, ALKPHOS in the last 72 hours. No results for input(s): INR in the last 72 hours.   Urinalysis:      Lab Results   Component Value Date    NITRU Negative 03/10/2019    WBCUA 3-5 03/10/2019    RBCUA 0-2 03/10/2019    BLOODU Negative 03/10/2019    SPECGRAV 1.025 03/10/2019    GLUCOSEU Negative 03/10/2019       Radiology:  CT CHEST PULMONARY EMBOLISM W CONTRAST   Final Result   Bilateral pulmonary emboli      Scattered opacities throughout the lungs, greatest at the lung bases, with   tiny pleural effusions. Changes are increased compared to prior      Sclerotic osseous metastatic disease as described previously      The findings were sent to the Radiology Results [address] at 3:57   pm on 3/16/2019to be communicated to a licensed caregiver. XR CHEST 1 VW   Final Result   Shallow inflation with findings consistent with basilar atelectasis. Left   pleural thickening versus trace effusion. CT CHEST ABDOMEN PELVIS WO CONTRAST   Final Result   Perforated sigmoid diverticulitis and pneumoperitoneum unchanged. Stable   left Emerita colonic air-fluid collection may represent an intramural or   extramural abscess or giant diverticulum. Infrarenal abdominal aortic aneurysm and aorto bi-iliac stent. Other incidental findings as above. CT ABDOMEN PELVIS W IV CONTRAST Additional Contrast? None   Final Result   Acute perforated descending colon diverticulitis. There is free air in the   abdomen and there is a small extraluminal gas and fluid collection/abscess   adjacent to the descending colon measuring 4.1 cm in AP diameter and 6 cm in   length. There is diverticulosis of the entire colon. Cholelithiasis. 4.7 cm infrarenal abdominal aortic aneurysm status post endograft repair with   a patent aorto bi-iliac graft. There is suspicion of an endoleak with blush   of contrast enhancement suspected in the inferior aspect of the native   aneurysm. As a noncontrast study was not performed, calcification of the   intraluminal thrombus accounting for the apparent enhancement is not   excluded. 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 Comparison with a noncontrast CT examination would be   helpful. Critical results were called by Dr. Claudean Salts. Weber, MD to [facility] on   3/7/2019 at 20:35.          VL Extremity Venous Bilateral    (Results Pending) Assessment/Plan:    Active Hospital Problems    Diagnosis Date Noted    NSVT (nonsustained ventricular tachycardia) ([facility] [I47.2]     Other pulmonary embolism without acute cor pulmonale (HCC) [I26.99]     Acute respiratory failure with hypoxia ([address] [J96.01]     H/O colectomy [Z90.49]     Primary malignant neoplasm of lung metastatic to other site (HCC) [C34.90]     Herpes zoster oticus [B02.21]     Hearing loss of right ear [H91.91]     Chemotherapy-induced neutropenia (HCC) [D70.1, T45.1X5A]     Mucositis [K12.30]     Diverticulitis [K57.92] 03/07/2019    Diverticulitis of large intestine with perforation and abscess without bleeding [K57.20]        1. Acute perforated recurrent diverticulitis/pneumoperitoneum/abscess in setting of immunosuppression due to chemotherapy:   Contributing factors include:Adenocarcinoma of the LLL: With known mets to spine. Chemo on hold in preparation for surgical healing.   - CT A/P demonstrates acute perforated colon diverticulitis with free air with fluid collection.   - Continue Merrem   - General surgery consulted/following. S/p Laparoscopic converted to Open left Colectomy 3/14/19  - I/O drain outpt    2. Acute hypoxemic respiratory Failure, suspected PE in prothrombic state. CT Chest 3/16: Thrombus in the distal aspect of the left main pulmonary artery extending into the left upper and left lower lobe pulmonary arterial branches. Additional Thrombus is seen in the distal aspect of the right main pulmonary artery,extending into the right upper right middle and right lower lobe pulmonary  arterial branches.   -Heparin gtt started 3/16->continue for min of 48h  -Pulm consulted, appreciate inpt. -Monitor for bleeding. 3.Hyponatremia : likely SIADH 2/2 metastatic disease  - [facility]   - Nephrology following  - Monitor NA     4. Hypertension:  - Currently normotensive. Holding BP meds for now.     5.  Thrombocytopenia,labile-now hypercoagulable state  - Platelets required preoperatively,66-> 90-> 138 3/16 (Acute PE) 118 3/17  -On heparin gtt, bleeding risk high post op, monitor  -defer antiplatelet re initiation to heme given fragility     6. Peripheral arterial disease:   - Per chart review on previous admission, pt cannot stop his DAPT per vascular surgery. In fact, in the past he has been admitted for prolonged perioperative eptifibitide infusions while the DAPT washes out before procedures. Has had many complications with bypasses and stents. Pt's last dose of Plavix was on 3/7/19. Integrilin infusion d/cd 3/11 due to severe thrombocytopenia. -Defer antiplatelet management to heme/onc given the complexity of his present situation, bleeding risk and thrombotic states  - CT performed in ED- reading suggesting possible Type II endoleak. Vascular surgery consulted this admission -- no leak. 7. History of VT:   -Went into [facility]42 3/12, PMD went off.    -Continue amiodarone. Neutropenia due to chemotherapy: resolved  - S/p Neulasta on 3/1. Hem/onc started Granix on 3/8. - Wound care for his radiation ulcer on his back. Mucositis:resolved   - Diflucan and nystatin [facility] per Oncology. HSV labialis: concerns for shingles, Sterling hunt Syndrome, resolving  -Continue antivirals, ID following     DVT Prophylaxis:SCDs, platelets 66 preoperatively  Diet: DIET CLEAR LIQUID;  Code Status: Full Code    PT/OT Eval Status:   Will order when stable    [address] [facility], APRN - CNP

## 2019-03-17 NOTE — CONSULTS
VASCULAR SURGERY CONSULTATION    Wesley Rodríguez is a 69 y.o. male with actively treated Lung Ca (recently completed chemotherapy session) S/P sigmoid colectomy for perforated diverticulitis 3 days ago. Called after pt developed sudden onset of severe SOB and CP. CTPA demonstrated B PEs. Since yesterday symptoms have resolved. No previous h/o VTE. H/O R popliteal embolectomy 10 years ago. Asked by Pooja Cabrera MD to see pt re: PE and treatment approach.     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    Right popliteal artery occlusion ([address] 11/2010     Past Surgical History:   Procedure Laterality Date    ARTERIAL ANEURYSM REPAIR  11/2010    right popliteal artery aneurysm repair    BRONCHOSCOPY  01/18/2019    left endobronchial tumor    BRONCHOSCOPY N/A 1/18/2019    BRONCHOSCOPY ALVEOLAR LAVAGE performed by Henry Musa MD at [address]  1/18/2019    BRONCHOSCOPY/TRANSBRONCHIAL LUNG BIOPSY performed by Henry Musa MD at [address] N/A 1/23/2019    EBUS ULTRASOUND W/ ANESTHESIA AND NEEDLE BIOPSY performed by Dallin Thomas MD at [address]  1/23/2019    BRONCHOSCOPY ALVEOLAR LAVAGE performed by Dallin Thomas MD at [address]  1/23/2019    BRONCHOSCOPY BIOPSY BRONCHUS performed by Dallin Thomas MD at [address]  1/23/2019    BRONCHOSCOPY BRUSHINGS performed by Dallin Thomas MD at [address]  1/23/2019    BRONCHOSCOPY/TRANSBRONCHIAL NEEDLE BIOPSY performed by Dallin Thomas MD at [facility] consultation.     Goyo Solares

## 2019-03-17 NOTE — ONCOLOGY
ONCOLOGY HEMATOLOGY CARE PROGRESS NOTE      SUBJECTIVE:     The patient set up yesterday, became short of breath, and eventually was diagnosed with a pulmonary embolism. He feels back to his baseline today. ROS:   The remaining 10 point review of symptoms is unremarkable. OBJECTIVE        Physical    VITALS:  /82   Pulse 123   Temp 98.6 °F (37 °C) (Oral)   Resp 18   Ht 5' 9\" (1.753 m)   Wt 191 lb (86.6 kg)   SpO2 93%   BMI 28.21 kg/m²   TEMPERATURE:  Current - Temp: 98.6 °F (37 °C); Max - Temp  Av.4 °F (36.9 °C)  Min: 97.7 °F (36.5 °C)  Max: 99.4 °F (37.4 °C)  PULSE OXIMETRY RANGE: SpO2  Av.1 %  Min: 78 %  Max: 95 %  24HR INTAKE/OUTPUT:      Intake/Output Summary (Last 24 hours) at 3/17/2019 1154  Last data filed at 3/17/2019 1143  Gross per 24 hour   Intake 3606.51 ml   Output 2935 ml   Net 671.51 ml       CONSTITUTIONAL:  awake, alert, cooperative, no apparent distress, HEENT oral pharynx , no scleral icterus  HEMATOLOGIC/LYMPHATICS:  no cervical lymphadenopathy, no supraclavicular lymphadenopathy, no axillary lymphadenopathy and no inguinal lymphadenopathy  LUNGS:  No increased work of breathing, good air exchange, clear to auscultation bilaterally, no crackles or wheezing  CARDIOVASCULAR:  , regular rate and rhythm, normal S1 and S2, no S3 or S4, and no murmur noted  ABDOMEN:  +BS, midline dressing C/D/I, distended, slightly tender, DIAN drain in place with serosanguinous drainage   MUSCULOSKELETAL:  There is no redness, warmth, or swelling of the joints. EXTREMETIES: No clubbing cynosis or edema  NEUROLOGIC:  Awake, alert, oriented to name, place and time. Cranial nerves II-XII are grossly intact. Motor is 5 out of 5 bilaterally. SKIN:  Crusting and scabbed lesions to right side lips and chin , right temporal lesion with crusting over as well.  IMPROVED FROM ADMIT ON ACYCLOVIR, LESIONS DECREASING IN SIZE        Data      Recent Labs 03/16/19  0655 03/16/19  1651 03/17/19  0645   WBC 10.7 12.2* 7.5   HGB 8.1* 8.1* 10.8*   HCT 23.7* 24.3* 32.7*    145 118*   MCV 89.4 89.6 90.1        Recent Labs     03/15/19  0550 03/16/19  0655 03/17/19  0645    139 133*   K 4.2 3.9 3.4*    103 98*   CO2 24 24 27   BUN 13 12 9   CREATININE 1.2 0.9 0.6*     No results for input(s): AST, ALT, ALB, BILIDIR, BILITOT, ALKPHOS in the last 72 hours.     Magnesium:    Lab Results   Component Value Date    MG 1.70 03/17/2019    MG 1.50 03/16/2019    MG 1.70 03/13/2019         Problem List  Patient Active Problem List   Diagnosis    Elevated fasting glucose    CAD (coronary artery disease)    PAD (peripheral artery disease) ([address]    Essential hypertension    Mixed hyperlipidemia    Sprain of right foot    Sprain of anterior talofibular ligament of right ankle    Closed nondisplaced fracture of fifth right metatarsal bone    Closed fracture of proximal lateral malleolus of ankle with routine healing    Right foot pain    Right ankle pain    Nondisplaced fracture of fifth right metatarsal bone with routine healing    History of pulmonary emphysema    Chronic obstructive pulmonary disease with (acute) exacerbation (HCC)    Pulmonary nodule, left    Abnormal bone scan of thoracic spine    Back pain    Visit for monitoring Plavix therapy    History of ventricular tachycardia    Gallstones    Hemoptysis    Former smoker    Elevated PSA    Adenocarcinoma of left lung ([address]    Acute diverticulitis    Pancytopenia ([address]    Diverticulitis of large intestine with perforation and abscess without bleeding    Diverticulitis    Chemotherapy-induced neutropenia (HCC)    Mucositis    Herpes zoster oticus    Hearing loss of right ear    Acute respiratory failure with hypoxia ([address]    H/O colectomy    Primary malignant neoplasm of lung metastatic to other site [facility])    NSVT (nonsustained ventricular tachycardia) ([address]    Other pulmonary embolism without acute cor pulmonale (HCC)       ASSESSMENT AND PLAN    -Acute Perforated descending colon diverticulitis (not immune mediated colitis) with free air in the abd   - per gen surgery , s/p Laparoscopic converted to Open left Colectomy POD2       - ATB support (Zosyn, Maxipime, Vanco) , changed to Merrem and Diflucan now        Neutropenia secondary to chemo  - WBC is over 7, Granix stopped 3/11     Thrombocytopenia secondary to chemo  -Resolved  -dropped today post heparin  -too early for HIT    PE  -He is now on heparin  -Will discuss with surgery bleeding risk and determine coumadin versus eliquis  -agree with lifelong anticoagulation    -Lung CA, stage 4 bone mets   - Will hold chemo until surgically healed. He is not due for Cycle 3 Carbo/Alimta/Keytruda until 3-22. May consider holding chemo and moving forward with Shiva Francisco Javier only in future. Family aware. We will assess how he heals next week, but his chemo will likely need to be held for at least 2 weeks. We could start Shiva Francisco Javier only sooner, since this does not affect wound healing. Plans for chemo/immunotherapy outpatient. - Cont folate while on alimta (folate antagonist), needs folate for 3 months after stopping Alimta. Needs B12 shot every 9 weeks at least (every 3rd cycle). - CT chest/abd/pelvis, 3/10/2019, showed improvement in the left hilar soft tissue and radiation changes. No obvious new disease.      HTN  - holding home Lisinopril due to Hyperkalemia , creat stable   - Was on Norvasc at one point, but pt admits to stopping this drug weeks ago   -He is not hypertensive at this point     Shingles to the V3 dermatome of the trigeminal nerve  - Acyclovir started 3/13 at 10 mg/kg every 8 hours  - Acyclovir dose reduced on 3/14 and creatinine improved from 1.6 to 0.6  - Shingles improved.   D/c acyclovir on the 20th  Hearing changes  - secondary to shingles and improving     Radiation Dermatitis  -Bactroban      Mucositis  - Fluconzaole/MMW. Pacemaker  - Rhythm strip looks more like he was paced out of an arrhythmia than cardioverted. - NSR/ST on tele     I would estimate that he can safely delay chemotherapy for 2 months if necessary to allow wound healing. Also, if needed, he could transition to maintenance Pembrolizumab early, which would not effect wound healing. ONCOLOGIC DISPOSITION:  TBD, per surgery.      Milagros Denver, MD  May be reached through [address] [place] [address]

## 2019-03-17 NOTE — PROGRESS NOTES
lispro  0-3 Units Subcutaneous Nightly    folic acid  1 mg Oral Daily    oyster shell calcium/vitamin D  1 tablet Oral Daily    silver sulfADIAZINE   Topical Daily    sodium chloride flush  10 mL Intravenous 2 times per day       Infusion Medications:   heparin (porcine) 14.9 mL/hr (03/17/19 0849)    sodium chloride 50 mL/hr at 03/16/19 1712    HYDROmorphone      dextrose 100 mL/hr (03/08/19 1700)       Labs:  Lab Results   Component Value Date    WBC 7.5 03/17/2019    HGB 10.8 (L) 03/17/2019    HCT 32.7 (L) 03/17/2019    MCV 90.1 03/17/2019     (L) 03/17/2019     Lab Results   Component Value Date    CREATININE 0.6 (L) 03/17/2019    BUN 9 03/17/2019     (L) 03/17/2019    K 3.4 (L) 03/17/2019    CL 98 (L) 03/17/2019    CO2 27 03/17/2019     Lab Results   Component Value Date    INR 1.13 03/13/2019    PROTIME 12.9 03/13/2019        Physical Examination:    /77   Pulse 116   Temp 97.7 °F (36.5 °C) (Oral)   Resp 16   Ht 5' 9\" (1.753 m)   Wt 191 lb (86.6 kg)   SpO2 93%   BMI 28.21 kg/m²    Wt Readings from Last 3 Encounters:   03/17/19 191 lb (86.6 kg)   02/28/19 184 lb 9.6 oz (83.7 kg)   02/19/19 183 lb (83 kg)       Intake/Output Summary (Last 24 hours) at 3/17/2019 0935  Last data filed at 3/17/2019 8495  Gross per 24 hour   Intake 2144.31 ml   Output 2870 ml   Net -725.69 ml       Respiratory:  · Resp Assessment: Normal respiratory effort  · Resp Auscultation: reduced to auscultation bilaterally   Cardiovascular:  · Auscultation: regular rhythm and normal rate, normal S1S2, no murmur, rub or gallop  · Palpation:  Nl PMI  · JVP:  normal  · Extremities: trace Edema  Abdomen:  · Soft, post-surgical   · limited bowel sounds  Extremities:  ·  No Cyanosis or Clubbing  Neurological/Psychiatric:  · Oriented to time, place, and person  · Non-anxious  Skin Warm and dry    Assessment:    1. NSVT  2. PE    Plan:  1. Cardiac issues appear stable.   2. Etiology of NSVT appears to be acute PE   ~will need lifelong AC if no contraindications. 3. Continue increased amiodarone for now    All questions and concerns were addressed to the patient/family. Alternatives to my treatment were discussed. The note was completed using EMR. Every effort was made to ensure accuracy; however, inadvertent computerized transcription errors may be present.     Geovanny Pereira MD, MICHELLE, [facility], [address] [place] [address]  3/17/2019 9:35 AM

## 2019-03-17 NOTE — PROGRESS NOTES
INPATIENT PULMONARY CRITICAL CARE PROGRESS NOTE      SUBJECTIVE:  Afebrile, persistent sinus tachycardia. Hemodynamically stable. Remains on high flow O2 with SaO2 94%. Denies chest pain or shortness of breath. Physical Exam:  Blood pressure (!) 143/85, pulse 120, temperature 99.5 °F (37.5 °C), temperature source Oral, resp. rate 18, height 5' 9\" (1.753 m), weight 191 lb (86.6 kg), SpO2 94 %.'   Constitutional:  Pleasant, mildly ill-appearing male. No acute distress. HENT:  Oropharynx is clear and dry, class II airway. Tongue is coated. Lesions of healing herpes zoster on his face. Eyes:  Conjunctivae are normal. Pupils equal, round, and reactive to light. No scleral icterus. Wearing glasses  Neck: No JVD. Cardiovascular: Sinus tachycardia, normal heart sounds. No lower extremity edema. Pulmonary/Chest: No wheezes. Few left basal rales. No accessory muscle usage or stridor. Abdominal: Deferred    Musculoskeletal: No cyanosis. No clubbing. No obvious joint deformity. Lymphadenopathy: No cervical or supraclavicular adenopathy. Skin: Skin is warm and dry. Mild pallor  Psychiatric: Normal mood and affect. Behavior is normal.  No anxiety. Neurologic: Alert, awake and oriented. PERRL. Speech fluent       Results:  CBC:   Recent Labs     03/16/19  0655 03/16/19  1651 03/17/19  0645   WBC 10.7 12.2* 7.5   HGB 8.1* 8.1* 10.8*   HCT 23.7* 24.3* 32.7*   MCV 89.4 89.6 90.1    145 118*     BMP:   Recent Labs     03/15/19  0550 03/16/19  0655 03/17/19  0645    139 133*   K 4.2 3.9 3.4*    103 98*   CO2 24 24 27   BUN 13 12 9   CREATININE 1.2 0.9 0.6*     APTT:   Recent Labs     03/16/19  1651 03/17/19  0115 03/17/19  0645   APTT 29.0 87.6* 74.4*     UA:No results for input(s): NITRITE, COLORU, PHUR, LABCAST, WBCUA, RBCUA, MUCUS, TRICHOMONAS, YEAST, BACTERIA, CLARITYU, SPECGRAV, LEUKOCYTESUR, UROBILINOGEN, BILIRUBINUR, BLOODU, GLUCOSEU, AMORPHOUS in the last 72 hours.     Invalid input(s): [facility]    Imaging:  I have reviewed radiology images personally. CT CHEST PULMONARY EMBOLISM W CONTRAST   Final Result   Bilateral pulmonary emboli      Scattered opacities throughout the lungs, greatest at the lung bases, with   tiny pleural effusions. Changes are increased compared to prior      Sclerotic osseous metastatic disease as described previously      The findings were sent to the Radiology Results [address] at 3:57   pm on 3/16/2019to be communicated to a licensed caregiver. XR CHEST 1 VW   Final Result   Shallow inflation with findings consistent with basilar atelectasis. Left   pleural thickening versus trace effusion. CT CHEST ABDOMEN PELVIS WO CONTRAST   Final Result   Perforated sigmoid diverticulitis and pneumoperitoneum unchanged. Stable   left Emerita colonic air-fluid collection may represent an intramural or   extramural abscess or giant diverticulum. Infrarenal abdominal aortic aneurysm and aorto bi-iliac stent. Other incidental findings as above. CT ABDOMEN PELVIS W IV CONTRAST Additional Contrast? None   Final Result   Acute perforated descending colon diverticulitis. There is free air in the   abdomen and there is a small extraluminal gas and fluid collection/abscess   adjacent to the descending colon measuring 4.1 cm in AP diameter and 6 cm in   length. There is diverticulosis of the entire colon. Cholelithiasis. 4.7 cm infrarenal abdominal aortic aneurysm status post endograft repair with   a patent aorto bi-iliac graft. There is suspicion of an endoleak with blush   of contrast enhancement suspected in the inferior aspect of the native   aneurysm. As a noncontrast study was not performed, calcification of the   intraluminal thrombus accounting for the apparent enhancement is not   excluded. 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   Comparison with a noncontrast CT examination would be   helpful. Critical results were called by Dr. Jessica Payton. MD Elizabeth to [facility] on   3/7/2019 at 20:35. VL Extremity Venous Bilateral    (Results Pending)     Xr Chest Standard (2 Vw)    Result Date: 2/19/2019  EXAMINATION: TWO VIEWS OF THE CHEST 2/19/2019 2:10 pm COMPARISON: 01/16/2019 HISTORY: ORDERING SYSTEM PROVIDED HISTORY: cough with hx lung CA TECHNOLOGIST PROVIDED HISTORY: Reason for exam:->cough with hx lung CA Ordering Physician Provided Reason for Exam: cough with hx lung CA Acuity: Acute Type of Exam: Initial FINDINGS: Right-sided central venous Port-A-Cath has been placed in the interval with tip overlying the cavoatrial junction. Left-sided bipolar cardiac pacer/defibrillator is stable in configuration. There are mild bibasilar airspace opacities resulting in indistinctness of the hemidiaphragms. The upper lobes are clear. Heart size and vascularity are stable. There is no pneumothorax or effusion. Mild bibasilar atelectasis versus pneumonia. Ct Abdomen Pelvis W Iv Contrast Additional Contrast? None    Result Date: 3/7/2019  EXAMINATION: CT OF THE ABDOMEN AND PELVIS WITH CONTRAST 3/7/2019 7:51 pm TECHNIQUE: CT of the abdomen and pelvis was performed with the administration of intravenous contrast. Multiplanar reformatted images are provided for review. Dose modulation, iterative reconstruction, and/or weight based adjustment of the mA/kV was utilized to reduce the radiation dose to as low as reasonably achievable. COMPARISON: 02/19/2019 HISTORY: ORDERING SYSTEM PROVIDED HISTORY: [facility] abdominal pain TECHNOLOGIST PROVIDED HISTORY: If patient is on cardiac monitor and/or pulse ox, they may be taken off cardiac monitor and pulse ox, left on O2 if currently on. All monitors reattached when patient returns to room.  Additional Contrast?->None Ordering Physician Provided Reason for Exam: LEFT SIDED ABDOMINAL PAINS; CANCER PATIENT, HAS RECEIVED CHEMO AND enhancement suspected in the inferior aspect of the native aneurysm. As a noncontrast study was not performed, calcification of the intraluminal thrombus accounting for the apparent enhancement is not excluded. Native aneurysm has not changed significantly in size from the study 3 weeks ago. Comparison with a noncontrast CT examination would be helpful. Critical results were called by Dr. Greer Briones. MD Elizabeth to [facility] on 3/7/2019 at 20:35. Ct Abdomen Pelvis W Iv Contrast Additional Contrast? None    Result Date: 2/19/2019  EXAMINATION: CT OF THE ABDOMEN AND PELVIS WITH CONTRAST 2/19/2019 4:13 pm TECHNIQUE: CT of the abdomen and pelvis was performed with the administration of intravenous contrast. Multiplanar reformatted images are provided for review. Dose modulation, iterative reconstruction, and/or weight based adjustment of the mA/kV was utilized to reduce the radiation dose to as low as reasonably achievable. COMPARISON: 1/2/2019, 11/23/2010 HISTORY: ORDERING SYSTEM PROVIDED HISTORY: LLQ pain with history diverticulitis TECHNOLOGIST PROVIDED HISTORY: Additional Contrast?->None Ordering Physician Provided Reason for Exam: LLQ pain with history diverticulitis, fever x 1 day Acuity: Acute Type of Exam: Initial Relevant Medical/Surgical History: hx of lung Ca 3 wks ago FINDINGS: Lower Chest: There is mild dependent atelectasis within the bilateral lower lobes. Mild consolidative opacity within the lingula and anterior basal segment of the left lower lobe likely reflects parenchymal scar. A few stable small benign scattered granulomata are noted within the bilateral lung bases. Organs: The liver is normal.  There is a new nonspecific geographic focus of low attenuation within the peripheral aspect of the inferior spleen, new from the study of 01/02/2019, which could represent a developing splenic infarct. A metastatic lesion is considered less likely, though not excluded.   The pancreas is normal. There are calcified gallstones within the gallbladder. The adrenal glands are normal bilaterally. There are stable bilateral renal cysts, the largest measuring approximately 2.1 cm within the right kidney lower pole. The bilateral kidneys are otherwise unremarkable. Renal vascular calcifications are noted. GI/Bowel: Evaluation of the hollow GI tract demonstrates a stable small sliding-type hiatal hernia. There is a localized focus of acute moderate focal wall thickening of the proximal sigmoid colon with adjacent inflammatory fat stranding. This takes place in the setting of diverticula, and is consistent with acute sigmoid diverticulitis. There is no evidence of perforation, free air, or abscess. The remainder of the colon is unremarkable. The appendix is normal.  The small bowel is unremarkable, without evidence of dilatation or obstruction. Pelvis: The urinary bladder appears thick-walled, though is partially collapsed. There is stable mild prostatomegaly, unchanged from prior examination. There is no free pelvic fluid or pathologic pelvic lymphadenopathy. The iliac limbs of the patient's stent graft are patent without evidence of stenosis or thrombosis. Peritoneum/Retroperitoneum: No intraperitoneal free air or free fluid is identified. There is the aforementioned fat stranding adjacent to the sigmoid colon. No pathologically enlarged lymphadenopathy is identified. There is a 4.7 x 4.4 cm fusiform aneurysm of the infrarenal abdominal aorta stabilized by a patent aortobi-iliac stent graft. There is no evidence of in-stent stenosis or thrombosis. Bones/Soft Tissues: There is diffuse osteopenia. There is multilevel degenerative change throughout the thoracolumbar spine. No osteolytic or osteoblastic lesion is identified. 1. Findings are consistent with acute proximal sigmoid diverticulitis within the left lower quadrant, without evidence of perforation, free air, or abscess.  2. New nonspecific focus of geographic low attenuation within the periphery of the inferior spleen, possibly a splenic infarct, less likely a metastatic focus. Suggest clinical correlation and attention on follow-up abdominal CT studies to exclude metastatic disease. 3. Cholelithiasis. 4. Stable bilateral renal cysts. 5. 4.7 x 4.4 cm fusiform aneurysm of the infrarenal abdominal aorta, stabilized by an aortobi-iliac stent graft, without evidence of in-stent stenosis or thrombosis. 6. Stable mild prostatomegaly. Xr Chest 1 Vw    Result Date: 3/16/2019  EXAMINATION: SINGLE XRAY VIEW OF THE CHEST 3/16/2019 1:14 pm COMPARISON: Chest CT March 10, 2019. Chest radiograph February 19, 2018. HISTORY: ORDERING SYSTEM PROVIDED HISTORY: acute hypoxia TECHNOLOGIST PROVIDED HISTORY: Reason for exam:->acute hypoxia Ordering Physician Provided Reason for Exam: acute hypoxia Acuity: Acute Type of Exam: Initial FINDINGS: Shallow inflation. Mild cardiac silhouette enlargement. Left subclavian AICD and right subclavian Port-A-Cath in place. Perihilar and basilar linear opacities are noted. No pneumothorax, edema. Slight blunting of the left costophrenic angle is a chronic finding which may represent pleural thickening. Mild gaseous prominence of the colon in the upper abdomen. No acute osseous abnormality identified. Shallow inflation with findings consistent with basilar atelectasis. Left pleural thickening versus trace effusion. Ct Chest Pulmonary Embolism W Contrast    Result Date: 3/16/2019  EXAMINATION: CTA OF THE CHEST 3/16/2019 3:06 pm TECHNIQUE: CTA of the chest was performed after the administration of intravenous contrast.  Multiplanar reformatted images are provided for review. MIP images are provided for review. Dose modulation, iterative reconstruction, and/or weight based adjustment of the mA/kV was utilized to reduce the radiation dose to as low as reasonably achievable.  COMPARISON: 03/10/2019 HISTORY: ORDERING Reportedly he has never had shortness of breath, has been physically active. Diverticulitis of large intestine with perforation and abscess without bleeding. Status post left colectomy. On meropenem since 3/10, fluconazole since 3/9  Acute kidney injury. Improved. Chemotherapy-induced neutropenia. Resolved. Anemia. Multifactorial.  Transfuse if hemoglobin <7 g/dL  Metastatic lung cancer. Status post chemotherapy and radiation. Followed by Dr. Trey Pratt. Herpes zoster. On acyclovir. Aortic aneurysm repair. Was on Plavix and Integrilin. Resuming depending upon surgical recommendations. Metastatic lung cancer. Followed by oncology. As mucositis and radiation dermatitis. DVT prophylaxis. On subcutaneous heparin started today. Discussed with patient, family, Dr. Judy Deshpande.       Electronically signed by:  Laverne Hall MD    3/17/2019    4:11 PM.

## 2019-03-18 NOTE — PROGRESS NOTES
Infectious Disease Follow up Notes    CC :  Complicated diverticulitis      Antibiotics:   Fluconazole 200 IV q24  Meropenem 1g q8   Acyclovir 800mg 5x/d    Admit Date:   3/7/2019  Hospital Day: 12    Subjective:   He remains afebrile   No BM or flatus. Feels bloated. Lower abd discomfort. Some nausea with pudding this morning. His hearing in the R ear is now normal     Objective:     Patient Vitals for the past 8 hrs:   BP Temp Temp src Pulse Resp SpO2 Weight   03/18/19 1105 129/84 99 °F (37.2 °C) Oral -- 18 -- --   03/18/19 0920 -- -- Oral -- 18 -- --   03/18/19 0435 134/74 98 °F (36.7 °C) Oral 94 18 94 % 186 lb 6.4 oz (84.6 kg)       EXAM:  General:  Alert, NAD    HEENT:  Vesicular lesions temple into R ear crusted   NECK:  supple       LUNGS:  CTA jaiden    CV:  RRR   ABD: Soft, hypoactive BS.   Midline incision well approximated, DIAN x1    EXT:  Trace LE edema      LINE: Port site ok         Scheduled Meds:   amiodarone  200 mg Oral BID    acyclovir  800 mg Oral 5x Daily    sodium chloride  250 mL Intravenous Once    sodium chloride  250 mL Intravenous Once    0.9 % sodium chloride  250 mL Intravenous Once    meropenem  1 g Intravenous Q8H    fluconazole  200 mg Intravenous Q24H    insulin lispro  0-6 Units Subcutaneous TID WC    insulin lispro  0-3 Units Subcutaneous Nightly    folic acid  1 mg Oral Daily    oyster shell calcium/vitamin D  1 tablet Oral Daily    silver sulfADIAZINE   Topical Daily    sodium chloride flush  10 mL Intravenous 2 times per day       Continuous Infusions:   heparin (porcine) 16.6 mL/hr (03/18/19 0919)    sodium chloride 50 mL/hr at 03/17/19 1137    dextrose 100 mL/hr (03/08/19 1700)          Data Review:    Lab Results   Component Value Date    WBC 11.2 (H) 03/18/2019    HGB 7.4 (L) 03/18/2019    HCT 22.1 (L) 03/18/2019    MCV 89.7 03/18/2019     03/18/2019     Lab Results Component Value Date    CREATININE 0.6 (L) 03/18/2019    BUN 11 03/18/2019     03/18/2019    K 3.5 03/18/2019     03/18/2019    CO2 26 03/18/2019       Hepatic Function Panel:   Lab Results   Component Value Date    ALKPHOS 51 03/08/2019    ALT 14 03/08/2019    AST 14 03/08/2019    PROT 5.4 03/08/2019    PROT 7.5 03/02/2013    BILITOT 0.4 03/08/2019    BILIDIR 0.19 12/28/2011    IBILI 0.4 12/28/2011    LABALBU 2.8 03/08/2019       Cultures:   2/19     BC x2 neg  3/10     BC x2 neg        Radiology Review:  All pertinent images / reports were reviewed as a part of this visit. CT abd 3/10:  Impression   Perforated sigmoid diverticulitis and pneumoperitoneum unchanged.  Stable   left Emerita colonic air-fluid collection may represent an intramural or   extramural abscess or giant diverticulum.       Infrarenal abdominal aortic aneurysm and aorto bi-iliac stent.       Other incidental findings as above.      CT abd 3/7:  Impression   Acute perforated descending colon diverticulitis. Cherene Susie is free air in the   abdomen and there is a small extraluminal gas and fluid collection/abscess   adjacent to the descending colon measuring 4.1 cm in AP diameter and 6 cm in   length.       There is diverticulosis of the entire colon.       Cholelithiasis.       4.7 cm infrarenal abdominal aortic aneurysm status post endograft repair with   a patent aorto bi-iliac graft. Cherene Susie is suspicion of an endoleak with blush   of contrast enhancement suspected in the inferior aspect of the native   aneurysm.  As a noncontrast study was not performed, calcification of the   intraluminal thrombus accounting for the apparent enhancement is not   excluded.  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 Deni Cline with a noncontrast CT examination would be   helpful.      CT abd 2/19:  Impression   1.  Findings are consistent with acute proximal sigmoid diverticulitis within   the left lower List    Diagnosis Date Noted    NSVT (nonsustained ventricular tachycardia) ([address]     Other pulmonary embolism without acute cor pulmonale (HCC)     Acute respiratory failure with hypoxia ([address]     H/O colectomy     Primary malignant neoplasm of lung metastatic to other site [facility])     Herpes zoster oticus     Hearing loss of right ear     Chemotherapy-induced neutropenia (HCC)     Mucositis     Diverticulitis 03/07/2019    Diverticulitis of large intestine with perforation and abscess without bleeding     Acute diverticulitis 02/19/2019    Pancytopenia ([address] 02/19/2019    Adenocarcinoma of left lung ([address] 01/22/2019    Gallstones 01/21/2019    Hemoptysis 01/21/2019    Former smoker 01/21/2019    Elevated PSA 01/21/2019    Back pain 01/16/2019    Visit for monitoring Plavix therapy     History of ventricular tachycardia     History of pulmonary emphysema 01/09/2019    Chronic obstructive pulmonary disease with (acute) exacerbation ([address] 01/09/2019    Pulmonary nodule, left 01/09/2019    Abnormal bone scan of thoracic spine 01/09/2019    Nondisplaced fracture of fifth right metatarsal bone with routine healing 05/14/2018    Sprain of right foot 05/07/2018    Sprain of anterior talofibular ligament of right ankle 05/07/2018    Closed nondisplaced fracture of fifth right metatarsal bone 05/07/2018    Closed fracture of proximal lateral malleolus of ankle with routine healing 05/07/2018    Right foot pain 05/07/2018    Right ankle pain 05/07/2018    Essential hypertension 05/01/2017    Mixed hyperlipidemia 05/01/2017    PAD (peripheral artery disease) ([address] 05/21/2011    CAD (coronary artery disease) 04/29/2011    Elevated fasting glucose 07/24/2010     Stage IV lung cancer      Persistent vs recurrent diverticulitis with perforation and abscess in the context of chemotherapy associated cytopenias   S/p lap converted to open colectomy 3/14/19    Acute hearing loss R ear and

## 2019-03-18 NOTE — PROGRESS NOTES
Patient is  Resting comfortably at this time. Wife is at bedside, patient is refusing turns nurse attempted many times; patient verbalized I will turn in the morning despite education on poss skin breakdown. Will cont' to educate and encourage patient.

## 2019-03-18 NOTE — PROGRESS NOTES
Physical Therapy    Facility/Department: [facility] C4 PCU  Initial Assessment    NAME: Kevin Morrison  : 1945  MRN: [id number]    Date of Service: 3/18/2019    Discharge Recommendations:  24 hour supervision or assist, Home with Home health PT   PT Equipment Recommendations  Other: Will continue to assess as pt progresses with PT - may require RW if balance deficits persist    Assessment   Body structures, Functions, Activity limitations: Decreased functional mobility ; Decreased strength;Decreased balance;Decreased endurance  Assessment: Pt referred for PT evaluation during current hospital stay with dx of acute perforated recurrent diverticulitis, s/p [id number]-qizpyfjno-jg-open L colectomy on 3/14/19. Pt also with acute respiratory failure during current hospitalization 2* acute bilateral PE's. Today pt demonstrates below-baseline LOF, requiring min/CGA x 1 and use of RW for safe sit<>stand transfers and amb in hospital room. Amb distance limited to ~10-20 feet at a time due to increased fatigue and SOB with activity. Pt fatigues more quickly than usual with minimal standing activity, although participated well with PT and seems very motivated to return to prior LOF. Recommend pt return home with 24-hr sup/assist from wife, home PT, and possibly RW for home use (will continue to assess). Treatment Diagnosis: Decreased endurance and (I) with mobility  Specific instructions for Next Treatment: Progress ther ex and mobility as tolerated  Prognosis: Good  Decision Making: Medium Complexity  Patient Education: Role of PT, benefits of mobility, safety in transfers/amb with RW, monitoring of vitals with activity, LE ther ex, D/C recs - pt verbalizes understanding  REQUIRES PT FOLLOW UP: Yes  Activity Tolerance: Patient Tolerated treatment well;Patient limited by endurance; Patient limited by fatigue  Activity Tolerance:  At rest, O2 sat = 98% (while on 10L high-flow O2) and HR = 113 bpm.  After amb ~10 feet forward & backward, O2 sat = 98% (pt still on 10L high-flow O2) with HR = 117 bpm.  Pt mildly SOB midway through and after amb. After amb, BP = 119/70 (pt without c/o dizziness). Patient Diagnosis(es): The primary encounter diagnosis was Diverticulitis of large intestine with perforation and abscess without bleeding. Diagnoses of Leaking abdominal aortic aneurysm (AAA) ([address] Abdominal pain, left lower quadrant, and Chemotherapy-induced neutropenia ([address] were also pertinent to this visit. has a past medical history of Benign neoplasm of colon, Cancer ([address] Diverticulosis of colon (without mention of hemorrhage), Elevated fasting glucose, Former cigarette smoker, Herpes simplex viral infection, Hyperlipidemia, Hypertension, MI, old, 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[address] and Right popliteal artery occlusion ([address] has a past surgical history that includes Arterial aneurysm repair (11/2010); Finger fracture surgery; Coronary angioplasty with stent; Upper gastrointestinal endoscopy (2/10/2014); Colonoscopy (10/08/01 12/20/04); Colonoscopy (4/4/14); Upper gastrointestinal endoscopy (4/4/14); Upper gastrointestinal endoscopy (11/4/15); bronchoscopy (01/18/2019); bronchoscopy (N/A, 1/18/2019); bronchoscopy (1/18/2019); INSERTION / REMOVAL / REPLACEMENT VENOUS ACCESS CATHETER (Right, 1/23/2019); bronchoscopy (N/A, 1/23/2019); bronchoscopy (1/23/2019); bronchoscopy (1/23/2019); bronchoscopy (1/23/2019); bronchoscopy (1/23/2019); bronchoscopy (1/23/2019); and hemicolectomy (N/A, 3/14/2019).     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 Chito Hernandez  Referral Date : 03/18/19  Diagnosis: Acute perforated recurrent diverticulitis, s/p pyldwumjmeds-yhzqwiquw-wr-open L colectomy on 3/14/19; acute respiratory failure 2* acute bilateral PE's  Follows Commands: Within Functional Limits  General Comment  Comments: Pt sitting up in chair upon entry of PT  Subjective  Subjective: Pt agreeable to work with PT this afternoon, pleasant and cooperative. States he worked with OT earlier and feels like his gas discomfort \"is slowly subsiding now that I'm sitting up. \"  Pain Screening  Patient Currently in Pain: Denies(pt denies pain, c/o mild \"gas discomfort\" in abdomen at rest although states this has improved since transferring OOB and into chair)  Intervention List: Patient able to continue with treatment    Orientation  Orientation  Overall Orientation Status: Within Normal Limits  Social/Functional History  Social/Functional History  Lives With: Spouse(on family leave)  Type of Home: House  Home Layout: One level  Home Access: Stairs to enter without rails  Entrance Stairs - Number of Steps: 2  Bathroom Shower/Tub: Walk-in shower  Bathroom Toilet: Handicap height  Bathroom Equipment: Built-in shower seat  Home Equipment: (no home DME)  ADL Assistance: Independent  Homemaking Responsibilities: Yes  Meal Prep Responsibility: Secondary  Ambulation Assistance: Independent(without AD)  Transfer Assistance: Independent  Active : Yes  Mode of Transportation: [employer]  Occupation: Part time employment  Type of occupation: sales   Leisure & Hobbies: watching sports, walking    Objective  Observation/Palpation  Posture: Fair    RLE AROM: WFL  LLE AROM : WFL  Strength RLE: Grossly 4/5 throughout  Strength LLE: Grossly 4/5 throughout     Bed mobility  Supine to Sit: Unable to assess(pt OOB before & after therapy)  Scooting: Stand by assistance(to scoot forward<>backward in chair)     Transfers  Sit to Stand: Minimal Assistance(from lower-height chair to RW, min cues needed for safe hand placement)  Stand to sit: Contact guard assistance  Bed to Chair: Unable to assess(pt up in chair before & after therapy)     Ambulation  Surface: level tile  Device: Rolling Walker  Other Apparatus: O2(10L high-flow O2)  Assistance: Minimal assistance;Contact guard assistance  Quality of Gait: Pt amb with slow jose alberto with short, shuffling steps, requiring support of RW for stability. Pt fatigues quickly with standing activity although fairly steady despite easy fatiguability. Distance: x 10 feet forward, x 10 feet backward  Comments: Amb distance limited by increasing c/o fatigue and SOB. Balance  Posture: Fair  Sitting - Static: Good  Sitting - Dynamic: Fair;+  Standing - Static: Fair;+  Standing - Dynamic: Fair     Exercises  Comments: Reviewed LE ther ex with pt including ankle pumps, glut sets, LAQ, and seated marching; pt verbalizes/demonstrates understanding. Plan   Times per week: 3-5x/week in acute care  Times per day: Daily  Specific instructions for Next Treatment: Progress ther ex and mobility as tolerated  Current Treatment Recommendations: Strengthening, Gait Training, Balance Training, Stair training, Functional Mobility Training, ROM, Endurance Training, Transfer Training, Safety Education & Training, Patient/Caregiver Education & Training, Equipment Evaluation, Education, & procurement  Safety Devices:  All fall risk precautions in place, Left in chair, Call light within reach, Chair alarm in place, Nurse notified, Gait belt, Patient at risk for falls    AM-PAC Score  AM-PAC Inpatient Mobility Raw Score : 16  AM-PAC Inpatient T-Scale Score : 40.78  Mobility Inpatient CMS 0-100% Score: 54.16  Mobility Inpatient CMS G-Code Modifier : CK    Goals  Short term

## 2019-03-18 NOTE — PROGRESS NOTES
INPATIENT PULMONARY CRITICAL CARE PROGRESS NOTE      SUBJECTIVE:  Patient when seen this morning continues to be hypoxemic and patient requires high flow oxygen to maintain saturation, patient has no fever, patient's hemoglobin maintained, patient has sinus tachycardia on the monitor, patient also has multiple PVCs on the monitor, patient's blood sugars are not controlled, patient continues to be on IV heparin, patient continues to have some left lower abdominal pain, patient has not had any bowel movement so far, patient's glycemic control when seen was susceptible but is fluctuating, patient has had adequate urine output overnight with cumulative fluid balance of +12.4 L, patient was alert and communicative when seen, no other pertinent review of concern     Physical Exam:  Blood pressure 129/84, pulse 94, temperature 99 °F (37.2 °C), temperature source Oral, resp. rate 18, height 5' 9\" (1.753 m), weight 186 lb 6.4 oz (84.6 kg), SpO2 93 %.'   Constitutional:  Pleasant, mildly ill-appearing male. No acute distress. HENT:  Oropharynx is clear and dry, class II airway. Tongue is coated. Lesions of healing herpes zoster on his face. Eyes:  Conjunctivae are normal. Pupils equal, round, and reactive to light. No scleral icterus. Wearing glasses  Neck: No JVD. Cardiovascular: Sinus tachycardia, normal heart sounds. No lower extremity edema. Pulmonary/Chest: No wheezes. Few left basal rales. No accessory muscle usage or stridor. Right anterior chest port present  Abdominal:  distended, no palpable hepatosplenomegaly, patient has distinct tenderness with rebound tenderness in the left iliac fossa   Musculoskeletal: No cyanosis. No clubbing. No obvious joint deformity. Lymphadenopathy: No cervical or supraclavicular adenopathy. Skin: Skin is warm and dry. Mild pallor  Psychiatric: Normal mood and affect. Behavior is normal.  No anxiety. Neurologic: Alert, awake and oriented. PERRL.   Speech fluent       Results:  CBC:   Recent Labs     03/16/19  1651 03/17/19  0645 03/18/19  0448   WBC 12.2* 7.5 11.2*   HGB 8.1* 10.8* 7.4*   HCT 24.3* 32.7* 22.1*   MCV 89.6 90.1 89.7    118* 192     BMP:   Recent Labs     03/16/19  0655 03/17/19  0645 03/18/19  0448    133* 140   K 3.9 3.4* 3.5    98* 102   CO2 24 27 26   BUN 12 9 11   CREATININE 0.9 0.6* 0.6*     APTT:   Recent Labs     03/17/19  0115 03/17/19  0645 03/18/19  0448   APTT 87.6* 74.4* 53.4*       Imaging:  I have reviewed radiology images personally. VL Extremity Venous Bilateral         CT CHEST PULMONARY EMBOLISM W CONTRAST   Final Result   Bilateral pulmonary emboli      Scattered opacities throughout the lungs, greatest at the lung bases, with   tiny pleural effusions. Changes are increased compared to prior      Sclerotic osseous metastatic disease as described previously      The findings were sent to the Radiology Results [address] at 3:57   pm on 3/16/2019to be communicated to a licensed caregiver. XR CHEST 1 VW   Final Result   Shallow inflation with findings consistent with basilar atelectasis. Left   pleural thickening versus trace effusion. CT CHEST ABDOMEN PELVIS WO CONTRAST   Final Result   Perforated sigmoid diverticulitis and pneumoperitoneum unchanged. Stable   left Emerita colonic air-fluid collection may represent an intramural or   extramural abscess or giant diverticulum. Infrarenal abdominal aortic aneurysm and aorto bi-iliac stent. Other incidental findings as above. CT ABDOMEN PELVIS W IV CONTRAST Additional Contrast? None   Final Result   Acute perforated descending colon diverticulitis. There is free air in the   abdomen and there is a small extraluminal gas and fluid collection/abscess   adjacent to the descending colon measuring 4.1 cm in AP diameter and 6 cm in   length. There is diverticulosis of the entire colon. Cholelithiasis.       4.7 cm infrarenal abdominal aortic aneurysm status post endograft repair with   a patent aorto bi-iliac graft. There is suspicion of an endoleak with blush   of contrast enhancement suspected in the inferior aspect of the native   aneurysm. As a noncontrast study was not performed, calcification of the   intraluminal thrombus accounting for the apparent enhancement is not   excluded. 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 Comparison with a noncontrast CT examination would be   helpful. Critical results were called by Dr. Jessica Payton. MD [facility] to [facility] on   3/7/2019 at 20:35. Results for Grant Torres" (MRN [id number]) as of 3/18/2019 15:23   Ref. Range 3/17/2019 06:45 3/17/2019 08:33 3/17/2019 11:46 3/17/2019 17:52 3/17/2019 21:02 3/18/2019 04:48   Sodium Latest Ref Range: 136 - 145 mmol/L 133 (L)     140   Potassium Latest Ref Range: 3.5 - 5.1 mmol/L 3.4 (L)     3.5   Chloride Latest Ref Range: 99 - 110 mmol/L 98 (L)     102   CO2 Latest Ref Range: 21 - 32 mmol/L 27     26   BUN Latest Ref Range: 7 - 20 mg/dL 9     11   Creatinine Latest Ref Range: 0.8 - 1.3 mg/dL 0.6 (L)     0.6 (L)   Anion Gap Latest Ref Range: 3 - 16  8     12   GFR Non- Latest Ref Range: >60  >60     >60   GFR  Latest Ref Range: >60  >60     >60   Magnesium Latest Ref Range: 1.80 - 2.40 mg/dL 1.70 (L)     1.50 (L)   Glucose Latest Ref Range: 70 - 99 mg/dL 124 (H)     116 (H)   POC Glucose Latest Ref Range: 70 - 99 mg/dl  127 (H) 115 (H) 124 (H) 147 (H)    Calcium Latest Ref Range: 8.3 - 10.6 mg/dL 7.6 (L)     7.8 (L)     Results for Grant Torres" (MRN [id number]) as of 3/18/2019 15:23   Ref. Range 3/16/2019 06:55 3/16/2019 13:36   Pro-BNP Latest Ref Range: 0 - 124 pg/mL 8,685 (H)    Troponin Latest Ref Range: <0.01 ng/mL  0.01     Results for Grant Torres" (MRN [id number]) as of 3/18/2019 15:23   Ref.  Range 3/15/2019 05:50 3/16/2019 06:55 3/16/2019 16:51 3/17/2019 06:45 3/18/2019 04:48   WBC Latest Ref Range: 4.0 - 11.0 K/uL 8.4 10.7 12.2 (H) 7.5 11.2 (H)   RBC Latest Ref Range: 4.20 - 5.90 M/uL 2.49 (L) 2.65 (L) 2.71 (L) 3.63 (L) 2.46 (L)   Hemoglobin Quant Latest Ref Range: 13.5 - 17.5 g/dL 7.7 (L) 8.1 (L) 8.1 (L) 10.8 (L) 7.4 (L)   Hematocrit Latest Ref Range: 40.5 - 52.5 % 22.3 (L) 23.7 (L) 24.3 (L) 32.7 (L) 22.1 (L)   MCV Latest Ref Range: 80.0 - 100.0 fL 89.7 89.4 89.6 90.1 89.7   MCH Latest Ref Range: 26.0 - 34.0 pg 30.9 30.6 30.0 29.9 30.2   MCHC Latest Ref Range: 31.0 - 36.0 g/dL 34.5 34.2 33.5 33.2 33.6   MPV Latest Ref Range: 5.0 - 10.5 fL 9.2 8.9 8.7 8.7 8.2   RDW Latest Ref Range: 12.4 - 15.4 % 16.1 (H) 16.7 (H) 16.6 (H) 17.2 (H) 16.6 (H)   Platelet Count Latest Ref Range: 135 - 450 K/uL 98 (L) 138 145 118 (L) 192   Neutrophils % Latest Units: % 69.0 55.0  75.0 79.0   Lymphocyte % Latest Units: % 13.0 17.0  6.0 8.0   Monocytes % Latest Units: % 6.0 7.0  7.0 5.0   Eosinophils % Latest Units: % 0.0 2.0  0.0 0.0   Basophils % Latest Units: % 0.0 1.0  0.0 0.0     Results for Lolis Yost" (MRN [id number]) as of 3/18/2019 15:23   Ref.  Range 3/10/2019 23:20   Color, UA Latest Ref Range: Straw/Yellow  Yellow   Clarity, UA Latest Ref Range: Clear  Clear   Glucose, UA Latest Ref Range: Negative mg/dL Negative   Bilirubin, Urine Latest Ref Range: Negative  Negative   Ketones, Urine Latest Ref Range: Negative mg/dL TRACE (A)   Specific [place], UA Latest Ref Range: 1.005 - 1.030  1.025   Blood, Urine Latest Ref Range: Negative  Negative   pH, UA Latest Ref Range: 5.0 - 8.0  6.0   Protein, UA Latest Ref Range: Negative mg/dL 30 (A)   Urobilinogen, Urine Latest Ref Range: <2.0 E.U./dL 0.2   Nitrite, Urine Latest Ref Range: Negative  Negative   Leukocyte Esterase, Urine Latest Ref Range: Negative  Negative   Urine Type Unknown Not Specified   Urine Reflex to Culture Unknown Not Indicated   Mucus, UA Latest Units: /LPF Rare (A)   WBC, UA Latest Ref Range: 0 - 5 /HPF 3-5   RBC, UA Latest Ref Range: 0 - 2 /HPF 0-2   Amorphous, UA Latest Units: /HPF Rare (A)   Microscopic Examination Unknown YES   URINE RT REFLEX TO CULTURE Unknown Rpt (A)   Osmolality, Ur Latest Ref Range: 390 - 1070 mOsm/kg 599   Sodium, Ur Latest Ref Range: Not Established mmol/L 98     Results for Clovis Haddad" (MRN [id number]) as of 3/18/2019 15:23   Ref. Range 3/16/2019 13:30   Hemoglobin, Art, Extended Latest Ref Range: 13.5 - 17.5 g/dL 9.8 (L)   pH, Arterial Latest Ref Range: 7.350 - 7.450  7.473 (H)   pCO2, Arterial Latest Ref Range: 35.0 - 45.0 mmHg 30.4 (L)   pO2, Arterial Latest Ref Range: 75.0 - 108.0 mmHg 66.6 (L)   HCO3, Arterial Latest Ref Range: 21.0 - 29.0 mmol/L 21.8   TCO2 (calc), Art Latest Ref Range: Not Established mmol/L 22.7   Base Excess, Arterial Latest Ref Range: -3.0 - 3.0 mmol/L -1.2   O2 Sat, Arterial Latest Ref Range: >92 % 91.8 (L)   O2 Content, Arterial Latest Ref Range: Not Established mL/dL 13   Methemoglobin, Arterial Latest Ref Range: <1.5 % 0.5   Carboxyhgb, Arterial Latest Ref Range: 0.0 - 1.5 % 0.3     CT chest -  Bilateral pulmonary emboli       Scattered opacities throughout the lungs, greatest at the lung bases, with   tiny pleural effusions.  Changes are increased compared to prior       Sclerotic osseous metastatic disease as described previously       Venous doppler-  Veins:Lower Extremities DVT Study, VASC EXTREMITY VENOUS DUPLEX BILATERAL.       Tech Comments   Right   1. Technically limited exam due to arterial plaque shadowing. There is   hypoechoic partially occluding material present in the lumen of the femoral   vein at the distal thigh and the short saphenous vein. There is hypoechoic   totally occluding material present in the lumen of the peroneal vein (zone 6 -   7) and the greater saphenous vein (zone 1 - 3) within 0.5 cm of the femoral   saphenous junction.  There is complete compressibility of all other deep and   superficial veins seen in the lower extremity. 2. There is abnormal absent flow seen in the peroneal and greater saphenous   veins and continuous flow seen in the short saphenous vein. There is normal   spontaneous and phasic flow throughout the other deep and superficial veins of   the right lower extremity. Left   1. Technically limited exam due to arterial plaque shadowing. There is   hypoechoic totally occluding material present in the lumen of the peroneal   veins. There is complete compressibility of all other deep and superficial   veins seen throughout the lower extremity. 2. There is abnormal absent flow seen involving the peroneal veins. There is   normal spontaneous and phasic flow throughout the other deep and superficial   veins of the left lower extremity. Bone, T6, biopsy:     - Metastatic carcinoma, favor metastasis from known lung primary - see       comment. COMMENT:   The previous left lung biopsy (SAS-; 1/21/2019) involved  by adenocarcinoma (showing a predominantly lepidic pattern in the biopsy)  is noted. FINAL DIAGNOSIS:    Lung, left lower lobe, biopsy:     - Invasive moderately-differentiated lung adenocarcinoma - see       comment. Colon, left/sigmoid, resection:     - Portion of colon with diverticular disease, acute diverticulitis,       pericolonic abscess formation with associated multinucleated giant       cell reaction, acute serositis and serosal adhesions.     - Diverticular disease extends to sigmoid resection margin; surgical       resection margins viable. Assessment and plan:  Acute respiratory failure, hypoxemic. Oxygen to keep SaO2 >90%. Pulmonary embolism. Significant clots bilaterally. Hemodynamically stable except for sinus tachycardia. On heparin. Recommend no extremity venous Dopplers.  Would recommend at least a retrievable IVC filter on account of likely recurrent thrombocytopenia in the future due to chemotherapy, necessitating holding off anticoagulation. Former smoker. No PFT. Reportedly he has never had shortness of breath, has been physically active. Diverticulitis of large intestine with perforation and abscess without bleeding. Status post left colectomy. On meropenem since 3/10, fluconazole since 3/9  Acute kidney injury. Improved. Chemotherapy-induced neutropenia. Resolved. Anemia. Multifactorial.  Transfuse if hemoglobin <7 g/dL; Monitor H/H closely   Metastatic lung cancer. Status post chemotherapy and radiation. Followed by Dr. Jason Hudson. Herpes zoster. On acyclovir. Aortic aneurysm repair. Was on Plavix and Integrilin. Resuming depending upon surgical recommendations. Metastatic lung cancer. Followed by oncology. As mucositis and radiation dermatitis. Herpes skin lesion-On Acylovir    DVT prophylaxis. On subcutaneous heparin started today.     Consider palliative care consult for  goals of care ,if deemed appropriate           Electronically signed by:  Niharika Ortiz MD    3/18/2019    3:22 PM.

## 2019-03-18 NOTE — PROGRESS NOTES
[id number] [facility] Facility: [facility] From: Grisel Ortega RE: Rhea Billings 1945 RM: 930 Pt Doppler results were as follows R SVT in the upper thigh and calf and R distal thigh and calf DVT, also L calf DVT.  Need Callback: NO CALLBACK REQ C4 PROGRESSIVE CARE  Unread

## 2019-03-18 NOTE — PROGRESS NOTES
Medication:     Scheduled Meds:   amiodarone  200 mg Oral BID    acyclovir  800 mg Oral 5x Daily    sodium chloride  250 mL Intravenous Once    sodium chloride  250 mL Intravenous Once    0.9 % sodium chloride  250 mL Intravenous Once    meropenem  1 g Intravenous Q8H    fluconazole  200 mg Intravenous Q24H    insulin lispro  0-6 Units Subcutaneous TID WC    insulin lispro  0-3 Units Subcutaneous Nightly    folic acid  1 mg Oral Daily    oyster shell calcium/vitamin D  1 tablet Oral Daily    silver sulfADIAZINE   Topical Daily    sodium chloride flush  10 mL Intravenous 2 times per day     Continuous Infusions:   heparin (porcine) 16.6 mL/hr (03/18/19 0919)    sodium chloride 50 mL/hr at 03/17/19 1137    HYDROmorphone      dextrose 100 mL/hr (03/08/19 1700)     PRN Meds:.heparin (porcine), heparin (porcine), naloxone, magic (miracle) mouthwash with nystatin, glucose, dextrose, glucagon (rDNA), dextrose, sodium chloride flush, ondansetron, acetaminophen       Vitals :     Vitals:    03/18/19 0920   BP:    Pulse:    Resp: 18   Temp:    SpO2:        I & O :       Intake/Output Summary (Last 24 hours) at 3/18/2019 3999  Last data filed at 3/18/2019 0600  Gross per 24 hour   Intake 2783.6 ml   Output 1815 ml   Net 968.6 ml        Physical Examination :     General appearance: Anxious- no, distressed- no, in good spirits- no    Sitting up on chair, comfortable,  HEENT: Lips- normal, teeth- ok , oral mucosa- moist, has skin lesions in the face  Neck : Mass- no, appears symmetrical, JVD- not visible  Respiratory: Respiratory effort-  normal, wheeze- no, crackles - no  Cardiovascular:  Ausculation- No M/R/G, Edema no  Abdomen: visible mass- no, distention- no, scar- no, tenderness- no                            hepatosplenomegaly-  no  Musculoskeletal:  clubbing no,cyanosis- no , digital ischemia- no                           muscle strength- grossly normal , tone - grossly normal  Skin: rashes- no , ulcers- no, induration- no, tightening - no  Psychiatric:  Judgement and insight- normal           AAO X 3     LABS:     Recent Labs     03/16/19  1651 03/17/19  0645 03/18/19  0448   WBC 12.2* 7.5 11.2*   HGB 8.1* 10.8* 7.4*   HCT 24.3* 32.7* 22.1*    118* 192     Recent Labs     03/16/19  0655 03/16/19  1336 03/17/19  0645 03/18/19  0448     --  133* 140   K 3.9  --  3.4* 3.5     --  98* 102   CO2 24  --  27 26   BUN 12  --  9 11   CREATININE 0.9  --  0.6* 0.6*   GLUCOSE 111*  --  124* 116*   MG  --  1.50* 1.70* 1.50*

## 2019-03-18 NOTE — PROGRESS NOTES
Hospitalist Progress Note      PCP: Joslyn Montano MD    Date of Admission: 3/7/2019    Chief Complaint: Abdominal pain     Hospital Course:  69 y.o. male who presented to [facility] with LLQ abdominal pain. Pt was recently d/c'd for diverticulitis, states pain never went away. C/o low grade fevers for 2 weeks. States pain not as severe as previous admission. Had chemo on Friday, states received neulasta. Denies cp, n/v, sob. Subjective:   Pt is lying in bed. Afebrile. On 10 liters HFNC. He denies dyspnea or chest pain. No N/V/D. + flatus, no BM yet. Pt's wife is at bedside. Medications:  Reviewed    Infusion Medications    heparin (porcine) 16.6 mL/hr (03/18/19 0919)    sodium chloride 50 mL/hr at 03/17/19 1137    dextrose 100 mL/hr (03/08/19 1700)     Scheduled Medications    magnesium sulfate  2 g Intravenous Once    potassium chloride  20 mEq Oral Once    amiodarone  200 mg Oral BID    acyclovir  800 mg Oral 5x Daily    meropenem  1 g Intravenous Q8H    fluconazole  200 mg Intravenous Q24H    insulin lispro  0-6 Units Subcutaneous TID WC    insulin lispro  0-3 Units Subcutaneous Nightly    folic acid  1 mg Oral Daily    oyster shell calcium/vitamin D  1 tablet Oral Daily    silver sulfADIAZINE   Topical Daily    sodium chloride flush  10 mL Intravenous 2 times per day     PRN Meds: oxyCODONE, HYDROmorphone, heparin (porcine), heparin (porcine), magic (miracle) mouthwash with nystatin, glucose, dextrose, glucagon (rDNA), dextrose, sodium chloride flush, ondansetron, acetaminophen      Intake/Output Summary (Last 24 hours) at 3/18/2019 1308  Last data filed at 3/18/2019 0600  Gross per 24 hour   Intake 1321.4 ml   Output 1750 ml   Net -428.6 ml       Physical Exam Performed:    /84   Pulse 94   Temp 99 °F (37.2 °C) (Oral)   Resp 18   Ht 5' 9\" (1.753 m)   Wt 186 lb 6.4 oz (84.6 kg)   SpO2 93%   BMI 27.53 kg/m²     General appearance:  In bed, No apparent distress, appears stated age and cooperative. HEENT: Pupils equal, round, and reactive to light. Conjunctivae/corneas clear. Vesicles in outer ear canal noted   Neck: Supple, with full range of motion. No jugular venous distention. Trachea midline. Respiratory:  increased respiratory effort. DM to auscultation, bilaterally without Rales/Wheezes/Rhonchi. on NRB  Cardiovascular: tachycardic rate and rhythm with normal S1/S2 without murmurs, rubs or gallops. Abdomen: Abd dressing d/i, DIAN drain in place with +oupt. Musculoskeletal: No clubbing, cyanosis or edema bilaterally. Full range of motion without deformity. Skin: Skin color, texture, turgor normal.  Radiation changes to back. Vesicles under lower lip, cheek. Mouth mucosa improving  Neurologic:  Neurovascularly intact without any focal sensory/motor deficits. Cranial nerves: II-XII intact, grossly non-focal.  Psychiatric: Alert and oriented, thought content appropriate, normal insight  Capillary Refill: Brisk,< 3 seconds   Peripheral Pulses: +2 palpable, equal bilaterally       Labs:   Recent Labs     03/16/19  1651 03/17/19  0645 03/18/19  0448   WBC 12.2* 7.5 11.2*   HGB 8.1* 10.8* 7.4*   HCT 24.3* 32.7* 22.1*    118* 192     Recent Labs     03/16/19  0655 03/17/19  0645 03/18/19  0448    133* 140   K 3.9 3.4* 3.5    98* 102   CO2 24 27 26   BUN 12 9 11   CREATININE 0.9 0.6* 0.6*   CALCIUM 7.6* 7.6* 7.8*     Urinalysis:      Lab Results   Component Value Date    NITRU Negative 03/10/2019    WBCUA 3-5 03/10/2019    RBCUA 0-2 03/10/2019    BLOODU Negative 03/10/2019    SPECGRAV 1.025 03/10/2019    GLUCOSEU Negative 03/10/2019       Radiology:  VL Extremity Venous Bilateral         CT CHEST PULMONARY EMBOLISM W CONTRAST   Final Result   Bilateral pulmonary emboli      Scattered opacities throughout the lungs, greatest at the lung bases, with   tiny pleural effusions.   Changes are increased compared to prior      Sclerotic osseous metastatic disease as described previously      The findings were sent to the Radiology Results [address] at 3:57   pm on 3/16/2019to be communicated to a licensed caregiver. XR CHEST 1 VW   Final Result   Shallow inflation with findings consistent with basilar atelectasis. Left   pleural thickening versus trace effusion. CT CHEST ABDOMEN PELVIS WO CONTRAST   Final Result   Perforated sigmoid diverticulitis and pneumoperitoneum unchanged. Stable   left Emerita colonic air-fluid collection may represent an intramural or   extramural abscess or giant diverticulum. Infrarenal abdominal aortic aneurysm and aorto bi-iliac stent. Other incidental findings as above. CT ABDOMEN PELVIS W IV CONTRAST Additional Contrast? None   Final Result   Acute perforated descending colon diverticulitis. There is free air in the   abdomen and there is a small extraluminal gas and fluid collection/abscess   adjacent to the descending colon measuring 4.1 cm in AP diameter and 6 cm in   length. There is diverticulosis of the entire colon. Cholelithiasis. 4.7 cm infrarenal abdominal aortic aneurysm status post endograft repair with   a patent aorto bi-iliac graft. There is suspicion of an endoleak with blush   of contrast enhancement suspected in the inferior aspect of the native   aneurysm. As a noncontrast study was not performed, calcification of the   intraluminal thrombus accounting for the apparent enhancement is not   excluded. 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 Comparison with a noncontrast CT examination would be   helpful. Critical results were called by Dr. Jennifer Johnson MD to [facility] on   3/7/2019 at 20:35.              Assessment/Plan:    Active Hospital Problems    Diagnosis Date Noted    NSVT (nonsustained ventricular tachycardia) ([facility]) [I47.2]     Other pulmonary embolism without acute cor pulmonale (HCC) [I26.99]     his DAPT per vascular surgery. In fact, in the past he has been admitted for prolonged perioperative eptifibitide infusions while the DAPT washes out before procedures. Has had many complications with bypasses and stents. Pt's last dose of Plavix was on 3/7/19. Integrilin infusion d/cd 3/11 due to severe thrombocytopenia. -Defer antiplatelet management to heme/onc given the complexity of his present situation, bleeding risk and thrombotic states  - CT performed in ED- reading suggesting possible Type II endoleak. Vascular surgery consulted this admission -- no leak. History of VT:   - Went into [facility] 2042 3/12, PMD went off.   - Continue amiodarone. Neutropenia due to chemotherapy (resolved):  - S/p Neulasta on 3/1. Hem/onc started Granix on 3/8. - Wound care for his radiation ulcer on his back. Zoster oticus:  - Pt is acyclovir D#7/7. Management per ID.      DVT Prophylaxis: Pt is on heparin infusion   Diet: DIET FULL LIQUID;  Dietary Nutrition Supplements: Other Supplement (ensure)  Code Status: Full Code    PT/OT Eval Status:  Ordered     Dispo - Uncertain     Taryn Epps, APRN - CNP

## 2019-03-18 NOTE — ONCOLOGY
ONCOLOGY HEMATOLOGY CARE PROGRESS NOTE      SUBJECTIVE:     On 10 LPM by high flow NC. Afebrile. Still nauseated. On clear. ROS:   The remaining 10 point review of symptoms is unremarkable. OBJECTIVE        Physical    VITALS:  /74   Pulse 94   Temp 98 °F (36.7 °C) (Oral)   Resp 18   Ht 5' 9\" (1.753 m)   Wt 186 lb 6.4 oz (84.6 kg)   SpO2 94%   BMI 27.53 kg/m²   TEMPERATURE:  Current - Temp: 98 °F (36.7 °C); Max - Temp  Av.4 °F (36.9 °C)  Min: 97.7 °F (36.5 °C)  Max: 99.5 °F (37.5 °C)  PULSE OXIMETRY RANGE: SpO2  Av %  Min: 91 %  Max: 94 %  24HR INTAKE/OUTPUT:      Intake/Output Summary (Last 24 hours) at 3/18/2019 0729  Last data filed at 3/18/2019 5987  Gross per 24 hour   Intake 2783.8 ml   Output 1815 ml   Net 968.8 ml       CONSTITUTIONAL:  awake, alert, cooperative, no apparent distress, HEENT oral pharynx , no scleral icterus  HEMATOLOGIC/LYMPHATICS:  no cervical lymphadenopathy, no supraclavicular lymphadenopathy, no axillary lymphadenopathy and no inguinal lymphadenopathy  LUNGS:  No increased work of breathing, good air exchange, clear to auscultation bilaterally, no crackles or wheezing  CARDIOVASCULAR:  , regular rate and rhythm, normal S1 and S2, no S3 or S4, and no murmur noted  ABDOMEN:  Diminished BS, midline dressing C/D/I, distended, slightly tender, DIAN drain in place with serosanguinous drainage   MUSCULOSKELETAL:  There is no redness, warmth, or swelling of the joints. EXTREMETIES: No clubbing cynosis or edema  NEUROLOGIC:  Awake, alert, oriented to name, place and time. Cranial nerves II-XII are grossly intact. Motor is 5 out of 5 bilaterally. SKIN:  Crusting and scabbed lesions to right side lips and chin , right temporal lesion with crusting over as well.  IMPROVED FROM ADMIT ON ACYCLOVIR, LESIONS DECREASING IN SIZE        Data      Recent Labs     19  1651 19  0645 19  0448   WBC 12.2* 7.5 11.2*   HGB 8.1* 10.8* 7.4*   HCT 24.3* 32.7* 22.1*    118* 192   MCV 89.6 90.1 89.7        Recent Labs     03/16/19  0655 03/17/19  0645 03/18/19  0448    133* 140   K 3.9 3.4* 3.5    98* 102   CO2 24 27 26   BUN 12 9 11   CREATININE 0.9 0.6* 0.6*     No results for input(s): AST, ALT, ALB, BILIDIR, BILITOT, ALKPHOS in the last 72 hours.     Magnesium:    Lab Results   Component Value Date    MG 1.50 03/18/2019    MG 1.70 03/17/2019    MG 1.50 03/16/2019         Problem List  Patient Active Problem List   Diagnosis    Elevated fasting glucose    CAD (coronary artery disease)    PAD (peripheral artery disease) ([address]    Essential hypertension    Mixed hyperlipidemia    Sprain of right foot    Sprain of anterior talofibular ligament of right ankle    Closed nondisplaced fracture of fifth right metatarsal bone    Closed fracture of proximal lateral malleolus of ankle with routine healing    Right foot pain    Right ankle pain    Nondisplaced fracture of fifth right metatarsal bone with routine healing    History of pulmonary emphysema    Chronic obstructive pulmonary disease with (acute) exacerbation (HCC)    Pulmonary nodule, left    Abnormal bone scan of thoracic spine    Back pain    Visit for monitoring Plavix therapy    History of ventricular tachycardia    Gallstones    Hemoptysis    Former smoker    Elevated PSA    Adenocarcinoma of left lung ([address]    Acute diverticulitis    Pancytopenia ([address]    Diverticulitis of large intestine with perforation and abscess without bleeding    Diverticulitis    Chemotherapy-induced neutropenia (HCC)    Mucositis    Herpes zoster oticus    Hearing loss of right ear    Acute respiratory failure with hypoxia ([address]    H/O colectomy    Primary malignant neoplasm of lung metastatic to other site [facility])    NSVT (nonsustained ventricular tachycardia) ([address]    Other pulmonary embolism without acute cor pulmonale (HCC)       ASSESSMENT AND PLAN    -Acute Perforated descending colon diverticulitis (not immune mediated colitis) with free air in the abd   - per gen surgery , s/p Laparoscopic converted to Open left Colectomy, 3/14/2019, with Dr. Vonnie Mcgill  - ATB support (Zosyn, Maxipime, Vanco) , changed to [address] and Diflucan now     Neutropenia secondary to chemo  - WBC is over 7, Granix stopped 3/11     Thrombocytopenia secondary to chemo  -Resolved  -dropped today post heparin  -too early for HIT    PE  -Diagnosed on 3/16/2019 with bilateral PEs involving the riaght and left main pulm arteries.  -He is now on heparin  -Will discuss with surgery bleeding risk and determine coumadin versus eliquis  -agree with lifelong anticoagulation  -Would just continue Heparin for now. Would hold off on a filter at this point.    -Lung CA, stage 4 bone mets   - Will hold chemo until surgically healed. He is not due for Cycle 3 Carbo/Alimta/Keytruda until 3/22/2019. May consider holding chemo and moving forward with [address] only in future. Family aware. We will assess how he heals next week, but his chemo will likely need to be held for at least 2 weeks. We could start [address] only sooner, since this does not affect wound healing. Plans for chemo/immunotherapy outpatient. - Cont folate while on alimta (folate antagonist), needs folate for 3 months after stopping Alimta. Needs B12 shot every 9 weeks at least (every 3rd cycle). - CT chest/abd/pelvis, 3/10/2019, showed improvement in the left hilar soft tissue and radiation changes. No obvious new disease.      HTN  - holding home Lisinopril due to Hyperkalemia , creat stable   - Was on Norvasc at one point, but pt admits to stopping this drug weeks ago   - He is not hypertensive at this point     Shingles to the V3 dermatome of the trigeminal nerve  - Acyclovir started 3/13 at 10 mg/kg every 8 hours  - Acyclovir dose reduced on 3/14 and creatinine improved from 1.6 to 0.6  - Shingles improved.   D/c acyclovir on the 20th  Hearing changes  - secondary to shingles and improving     Radiation Dermatitis  -Bactroban      Mucositis  - Fluconzaole/MMW. Pacemaker  - Rhythm strip looks more like he was paced out of an arrhythmia than cardioverted. - NSR/ST on tele     I would estimate that he can safely delay chemotherapy for 2 months if necessary to allow wound healing. Also, if needed, he could transition to maintenance Pembrolizumab early, which would not effect wound healing. ONCOLOGIC DISPOSITION:  TBD, per surgery.      Anderson Sawant MD  May be reached through [address] [place] [address]

## 2019-03-18 NOTE — PROGRESS NOTES
[facility] [address]   Daily Cardiovascular Progress Note    Admit Date: 3/7/2019    Chief complaint: NSVT  HPI: no change      Medications/Labs all Reviewed:  Patient Active Problem List   Diagnosis    Elevated fasting glucose    CAD (coronary artery disease)    PAD (peripheral artery disease) (HCC)    Essential hypertension    Mixed hyperlipidemia    Sprain of right foot    Sprain of anterior talofibular ligament of right ankle    Closed nondisplaced fracture of fifth right metatarsal bone    Closed fracture of proximal lateral malleolus of ankle with routine healing    Right foot pain    Right ankle pain    Nondisplaced fracture of fifth right metatarsal bone with routine healing    History of pulmonary emphysema    Chronic obstructive pulmonary disease with (acute) exacerbation (HCC)    Pulmonary nodule, left    Abnormal bone scan of thoracic spine    Back pain    Visit for monitoring Plavix therapy    History of ventricular tachycardia    Gallstones    Hemoptysis    Former smoker    Elevated PSA    Adenocarcinoma of left lung ([address]    Acute diverticulitis    Pancytopenia ([address]    Diverticulitis of large intestine with perforation and abscess without bleeding    Diverticulitis    Chemotherapy-induced neutropenia (HCC)    Mucositis    Herpes zoster oticus    Hearing loss of right ear    Acute respiratory failure with hypoxia ([address]    H/O colectomy    Primary malignant neoplasm of lung metastatic to other site [facility])    NSVT (nonsustained ventricular tachycardia) (HCC)    Other pulmonary embolism without acute cor pulmonale (HCC)       Medications:    oxyCODONE (ROXICODONE) immediate release tablet 5 mg Q4H PRN   HYDROmorphone (DILAUDID) injection 0.5 mg Q3H PRN   heparin 25,000 unit in sodium chloride 0.45% 250 mL infusion Continuous   amiodarone (CORDARONE) tablet 200 mg BID   heparin (porcine) injection 6,610 Units PRN   heparin (porcine) injection 3,300 Units PRN 0.9 % sodium chloride infusion Continuous   acyclovir (ZOVIRAX) tablet 800 mg 5x Daily   meropenem (MERREM) 1 g in sodium chloride 0.9 % 100 mL IVPB (mini-bag) Q8H   fluconazole (DIFLUCAN) in 0.9 % sodium chloride IVPB 200 mg Q24H   magic (miracle) mouthwash with nystatin 4x Daily PRN   glucose (GLUTOSE) 40 % oral gel 15 g PRN   dextrose 50 % solution 12.5 g PRN   glucagon (rDNA) injection 1 mg PRN   dextrose 5 % solution PRN   insulin lispro (HUMALOG) injection vial 0-6 Units TID WC   insulin lispro (HUMALOG) injection vial 0-3 Units Nightly   folic acid (FOLVITE) tablet 1 mg Daily   oyster shell calcium/vitamin D 250-125 MG-UNIT per tablet 250 mg Daily   silver sulfADIAZINE (SILVADENE) 1 % cream Daily   sodium chloride flush 0.9 % injection 10 mL 2 times per day   sodium chloride flush 0.9 % injection 10 mL PRN   ondansetron (ZOFRAN) injection 4 mg Q6H PRN   acetaminophen (TYLENOL) tablet 650 mg Q4H PRN          PHYSICAL EXAM   /84   Pulse 94   Temp 99 °F (37.2 °C) (Oral)   Resp 18   Ht 5' 9\" (1.753 m)   Wt 186 lb 6.4 oz (84.6 kg)   SpO2 93%   BMI 27.53 kg/m²    Vitals:    03/18/19 0435 03/18/19 0920 03/18/19 1105 03/18/19 1108   BP: 134/74  129/84    Pulse: 94      Resp: 18 18 18    Temp: 98 °F (36.7 °C)  99 °F (37.2 °C)    TempSrc: Oral Oral Oral    SpO2: 94%   93%   Weight: 186 lb 6.4 oz (84.6 kg)      Height:             Intake/Output Summary (Last 24 hours) at 3/18/2019 1712  Last data filed at 3/18/2019 1527  Gross per 24 hour   Intake 841.2 ml   Output 1500 ml   Net -658.8 ml     Wt Readings from Last 3 Encounters:   03/18/19 186 lb 6.4 oz (84.6 kg)   02/28/19 184 lb 9.6 oz (83.7 kg)   02/19/19 183 lb (83 kg)         Gen: Patient in NAD, resting comfortably  Neck: No JVD or bruits  Respiratory: CTAB no WRR  Chest: normal without deformity  Cardiovascular:RRR, S1S2, no mrg, normal PMI  Abdomen: Soft, NTND, Normal BS  Extremities: No clubbing, cyanosis, or edema  Neurological/Psychiatric:  AxO x4, No gross motors/sensory deficits  Skin:  Warm and dry      Labs:  CBC: Recent Labs     03/16/19  1651 03/17/19  0645 03/18/19  0448   WBC 12.2* 7.5 11.2*   HGB 8.1* 10.8* 7.4*   HCT 24.3* 32.7* 22.1*   MCV 89.6 90.1 89.7    118* 192     BMP: Recent Labs     03/16/19  0655 03/17/19  0645 03/18/19  0448    133* 140   K 3.9 3.4* 3.5    98* 102   CO2 24 27 26   BUN 12 9 11   CREATININE 0.9 0.6* 0.6*     MG:    Recent Labs     03/16/19  1336 03/17/19  0645 03/18/19  0448   MG 1.50* 1.70* 1.50*      PT/INR: No results for input(s): PROTIME, INR in the last 72 hours. APTT:   Recent Labs     03/17/19  0645 03/18/19  0448 03/18/19  1645   APTT 74.4* 53.4* 62.8*     Cardiac Enzymes: Recent Labs     03/16/19  1336   TROPONINI 0.01       Cardiac Studies:          Assessment and Plan   1. NSVT   - Known hx of VT s/p ICD   - some short NSVT runs on tele  2. PE 3/16/2019  3. CAD   - s/p PCI to RCA 6/2011   - hx of PCi to LAD (post endograft AAA repair MI)1/2011  4. HTN  5. Hx of aortic aneurysm repair   - Per Dr. Darryn Espinoza, on ASA? plavix for this  6. Hx of sigmoid colectomy for perf diverticulosis 3/14/2019   - Laparoscopic converted to Open left Colectomy  7. Metastatic lung CA, stage 4 with bone mets      PLAN  1. Medical management of PE per vascular  2. Order echo for hypoxia in setting of FL  3. Keep K>4, mag >2 and sats >92% as possible   - getting additional Mag and K now  4. Cont outpt Amio for SVT/VT  5. Given CAD- pt should be on ASA, statin, BB   - resume home zocor 40mg, atenolol 100mg qday, and ASA 81mg poqday  6.  Plavix per Vascular surgery and Hem?onc    Patient Active Problem List   Diagnosis    Elevated fasting glucose    CAD (coronary artery disease)    PAD (peripheral artery disease) [facility]    Essential hypertension    Mixed hyperlipidemia    Sprain of right foot    Sprain of anterior talofibular ligament of right ankle    Closed nondisplaced fracture of fifth right metatarsal bone    Closed fracture of proximal lateral malleolus of ankle with routine healing    Right foot pain    Right ankle pain    Nondisplaced fracture of fifth right metatarsal bone with routine healing    History of pulmonary emphysema    Chronic obstructive pulmonary disease with (acute) exacerbation (HCC)    Pulmonary nodule, left    Abnormal bone scan of thoracic spine    Back pain    Visit for monitoring Plavix therapy    History of ventricular tachycardia    Gallstones    Hemoptysis    Former smoker    Elevated PSA    Adenocarcinoma of left lung (HCC)    Acute diverticulitis    Pancytopenia (HCC)    Diverticulitis of large intestine with perforation and abscess without bleeding    Diverticulitis    Chemotherapy-induced neutropenia (HCC)    Mucositis    Herpes zoster oticus    Hearing loss of right ear    Acute respiratory failure with hypoxia ([address]    H/O colectomy    Primary malignant neoplasm of lung metastatic to other site [facility])    NSVT (nonsustained ventricular tachycardia) (HCC)    Other pulmonary embolism without acute cor pulmonale ([address]         Thank you for allowing me to participate in the care of your patient. Please call me with any questions [phone].       Ke Davila MD, [facility] Cardiologist  [facility] [address]  [phone] [facility]  [phone] [address] [place] [address]  3/18/2019 5:12 PM

## 2019-03-18 NOTE — PLAN OF CARE
Problem: Pain:  Goal: Pain level will decrease  Description  Pain level will decrease  Outcome: Ongoing  Patient cont' to use PCA pump as needed. No complain or discomfort noted at this time. Will cont' to monitor       Problem: Skin Integrity:  Goal: Skin integrity will stabilize  Description  Skin integrity will stabilize  Outcome: Ongoing    Problem: Risk for Impaired Skin Integrity  Goal: Tissue integrity - skin and mucous membranes  Description  Structural intactness and normal physiological function of skin and  mucous membranes.   Outcome: Ongoing  Nurse will cont' to educate and encourage patient on the importance of turns as this prevents skin breakdown

## 2019-03-18 NOTE — PROGRESS NOTES
[address] [address] [place], [place] [address]y 54 or Facility: [facility] From: Dandy Loge RE: Anna Hewitt 1945 RM: 241 Pt Doppler results were as follows R SVT in the upper thigh and calf and R distal thigh and calf DVT, also L calf DVT. I am also going to let Dr. Prabhakar Duke know as well.  Thank you Dandy Mujica Need Callback: NO CALLBACK REQ C4 PROGRESSIVE CARE FYI  Unread

## 2019-03-18 NOTE — PROGRESS NOTES
[facility] GENERAL SURGERY    Surgery Progress Note           POD # 4    PATIENT NAME: Doris Perez     TODAY'S DATE: 3/18/2019    INTERVAL HISTORY:    Reports he is passing flatus, no nausea this AM. Tolerating clears. OBJECTIVE:   VITALS:  /74   Pulse 94   Temp 98 °F (36.7 °C) (Oral)   Resp 18   Ht 5' 9\" (1.753 m)   Wt 186 lb 6.4 oz (84.6 kg)   SpO2 94%   BMI 27.53 kg/m²     INTAKE/OUTPUT:    I/O last 3 completed shifts: In: 2784 [P.O.:1320; I.V.:1464]  Out: 0807 [Urine:1450; Drains:365]  No intake/output data recorded. CONSTITUTIONAL:  awake and alert  LUNGS:  no crackles or wheezing  ABDOMEN:   hypoactive bowel sounds, soft, non-distended, tenderness noted incisional, patricio serosanguinous  INCISION: staples intact    Data:  CBC:   Recent Labs     03/16/19  1651 03/17/19  0645 03/18/19  0448   WBC 12.2* 7.5 11.2*   HGB 8.1* 10.8* 7.4*   HCT 24.3* 32.7* 22.1*    118* 192     BMP:    Recent Labs     03/16/19  0655 03/17/19  0645 03/18/19  0448    133* 140   K 3.9 3.4* 3.5    98* 102   CO2 24 27 26   BUN 12 9 11   CREATININE 0.9 0.6* 0.6*   GLUCOSE 111* 124* 116*       ASSESSMENT AND PLAN:  69 y.o. male status post Laparoscopic converted to Open left Colectomy    GI: full liquids today  : continue with nguyen - ok to d/c from surgical standpoint  PE: on heparin gtt, h/h trending downward, monitor for signs of bleeding  Pain: stop PCA - start PO  Activity: OOB to chair today - PT/OT    Electronically signed by DEBRA Vera - CNP     Patient seen and agree with above. Full liquid diet. Transition off PCA.   Continue anticoag  Adina Traore MD

## 2019-03-18 NOTE — PROGRESS NOTES
Vascular f/u    PLT stable/increased on Heparin drip. No indication for IVC filter at this time. Please call if need arises. Will sign off.

## 2019-03-19 NOTE — CARE COORDINATION
PT/OT recs 24 hour supervision or assist, and HHPT. Pt may need RW at d/c if balance deficits persist. Will follow. Pt may benefit from SNF placement, especially if he needs IV abx. There are multiple specialties involved in the Pt's care at this time- Oncology, Nephrology, Pulmonology, Cardiology, ID, and IM. Dispo is uncertain at this time. Will plan to meet with the Pt and wife tomorrow for SNF choices if that is determined to be needed when the pt is close to d/c.

## 2019-03-19 NOTE — PROGRESS NOTES
Hospitalist Progress Note      PCP: Juanito Douglass MD    Date of Admission: 3/7/2019    Chief Complaint: Abdominal pain     Hospital Course:  69 y.o. male who presented to [facility] with LLQ abdominal pain. Pt was recently d/c'd for diverticulitis, states pain never went away. C/o low grade fevers for 2 weeks. States pain not as severe as previous admission. Had chemo on Friday, states received neulasta. Denies cp, n/v, sob. Subjective:   Pt is lying in bed. Afebrile. On 8 liters HFNC. He denies dyspnea or chest pain. No N/V. + diarrhea. Pt states he wants to go home.      Medications:  Reviewed    Infusion Medications    heparin (porcine) 18.3 mL/hr (03/19/19 1346)    sodium chloride 50 mL/hr at 03/17/19 1137    dextrose 100 mL/hr (03/08/19 1700)     Scheduled Medications    0.9 % sodium chloride  250 mL Intravenous Once    pantoprazole  40 mg Intravenous Daily    lidocaine 1 % injection  5 mL Intradermal Once    sodium chloride flush  10 mL Intravenous 2 times per day    magnesium sulfate  2 g Intravenous Once    potassium chloride  40 mEq Oral Once    potassium chloride  20 mEq Oral BID    furosemide  20 mg Intravenous BID    atenolol  100 mg Oral Daily    amiodarone  200 mg Oral BID    meropenem  1 g Intravenous Q8H    fluconazole  200 mg Intravenous Q24H    insulin lispro  0-6 Units Subcutaneous TID WC    insulin lispro  0-3 Units Subcutaneous Nightly    folic acid  1 mg Oral Daily    oyster shell calcium/vitamin D  1 tablet Oral Daily    silver sulfADIAZINE   Topical Daily    sodium chloride flush  10 mL Intravenous 2 times per day     PRN Meds: loperamide, sodium chloride flush, oxyCODONE, HYDROmorphone, heparin (porcine), heparin (porcine), magic (miracle) mouthwash with nystatin, glucose, dextrose, glucagon (rDNA), dextrose, sodium chloride flush, ondansetron, acetaminophen      Intake/Output Summary (Last 24 hours) at 3/19/2019 4027  Last data filed at 3/19/2019 Radiology:  CT ABDOMEN PELVIS WO CONTRAST Additional Contrast? None   Final Result   No evidence retroperitoneal hematoma. Interval colonic resection. Small volume loculated collection in the left   pericolic gutter, with surgical drain terminating adjacent. Mildly dilated loops of small bowel with scattered air-fluid levels, most   likely postoperative ileus. Trace pleural effusions and bibasilar airspace disease representing   atelectasis or pneumonia. VL Extremity Venous Bilateral   Final Result      CT CHEST PULMONARY EMBOLISM W CONTRAST   Final Result   Bilateral pulmonary emboli      Scattered opacities throughout the lungs, greatest at the lung bases, with   tiny pleural effusions. Changes are increased compared to prior      Sclerotic osseous metastatic disease as described previously      The findings were sent to the Radiology Results [address] at 3:57   pm on 3/16/2019to be communicated to a licensed caregiver. XR CHEST 1 VW   Final Result   Shallow inflation with findings consistent with basilar atelectasis. Left   pleural thickening versus trace effusion. CT CHEST ABDOMEN PELVIS WO CONTRAST   Final Result   Perforated sigmoid diverticulitis and pneumoperitoneum unchanged. Stable   left Emerita colonic air-fluid collection may represent an intramural or   extramural abscess or giant diverticulum. Infrarenal abdominal aortic aneurysm and aorto bi-iliac stent. Other incidental findings as above. CT ABDOMEN PELVIS W IV CONTRAST Additional Contrast? None   Final Result   Acute perforated descending colon diverticulitis. There is free air in the   abdomen and there is a small extraluminal gas and fluid collection/abscess   adjacent to the descending colon measuring 4.1 cm in AP diameter and 6 cm in   length. There is diverticulosis of the entire colon. Cholelithiasis.       4.7 cm infrarenal abdominal aortic aneurysm status post endograft repair with   a patent aorto bi-iliac graft. There is suspicion of an endoleak with blush   of contrast enhancement suspected in the inferior aspect of the native   aneurysm. As a noncontrast study was not performed, calcification of the   intraluminal thrombus accounting for the apparent enhancement is not   excluded. 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 Comparison with a noncontrast CT examination would be   helpful. Critical results were called by Dr. Perez Gudino. MD Elizabeth to [facility] on   3/7/2019 at 20:35. XR CHEST PORTABLE    (Results Pending)   IR PICC WO SQ PORT/PUMP > 5 YEARS    (Results Pending)       Assessment/Plan:    Active Hospital Problems    Diagnosis Date Noted    NSVT (nonsustained ventricular tachycardia) ([facility]) [I47.2]     Other pulmonary embolism without acute cor pulmonale (HCC) [I26.99]     Acute respiratory failure with hypoxia ([address] [J96.01]     H/O colectomy [Z90.49]     Primary malignant neoplasm of lung metastatic to other site (HCC) [C34.90]     Herpes zoster oticus [B02.21]     Hearing loss of right ear [H91.91]     Chemotherapy-induced neutropenia (HCC) [D70.1, T45.1X5A]     Mucositis [K12.30]     Diverticulitis [K57.92] 03/07/2019    Diverticulitis of large intestine with perforation and abscess without bleeding [K57.20]        Acute perforated recurrent diverticulitis/pneumoperitoneum/abscess in setting of immunosuppression due to chemotherapy:   - CT A/P on admission demonstrated acute perforated colon diverticulitis with free air and fluid collection.   - General surgery following. Pt is status post laparoscopic converted to open left colectomy on 3/14/19.  - Continue Merrem and Diflucan per ID. Plan for abx 7 days post-op. - Continue prn pain control.      Acute hypoxic respiratory failure due to bilateral pulmonary emboli:   - CT chest 3/16 showed thrombus in the distal aspect of the

## 2019-03-19 NOTE — PROGRESS NOTES
[facility]    [facility]              [phone]                 Plan :     Stable on low side, but stable  +Ve fluids, edema and sob with 5 L oxygen requirement  Start lasix- since his creatinine is normal, should respond to 20 mg bid, also low K, replace  And avoid sudden diuresis  Avoid hypotension    Assessment :     Hyponatremia  Acute on chronic  Down to 123- now 135  Suspect SIADH from ca lung, pain, and nausea  Ca ca lung( adenocarcinoma) with vertebral mets  Sodium low side since 2/19    Cortisol- normal, TSH - 1.65, LFT normal  Echo 2/12- EF of 45-50%, AL mildly dilated    Generalized Edema  High O2 requirement  +ve 14 L since admission  Echo pending  Start lasix    Sob also due to lung mass and PE    Acute Kidney Injury  Cr peaked to 1.6- now 0.6  Making urine  Likely hemodynamic changes   Now BP better  Surgery done  On 3/14/19    Hypotension  BP: (122-147)/(75-84) Pulse:  []   Normally hypertensive  Now has fever, neutropenia- getting better           pancytopenia  Ca lung  Diverticulitis with perforation- supportive treatment and plan for surgery this admission  Radiation dermatitis  Mucositis  [facility] would like to thank DEBRA Kraft   for opportunity to serve this patient      Please call with questions at-   24 Hrs Answering service [phone] or  7 am- 5 pm via Perfect serve or cell phone  Dr. Yoli Moncada for consult :     hyponatremia     HPI :     From consult note-     Rakesh Center is a 69 y.o. male presented to   the hospital on 3/7/2019 with left lower quadrant pain. He has that for a week. Was hospitalized with diverticulitis, DCed,  And pain never went away. Also low grade fever. No nausea. Mild pain in belly  Diagnosed to have fever and also diverticulitis. Planning for surgery. 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 Has pancytopenia from chemotherapy.      We are consulted for hyponatremia and related issues     Interval History:     Making urine  Still on 5 L oxygen  Seen with wife      ROS:     SOB- +Ve on exertion  Edema- none  Nausea/vomiting- none  Poor appetite-No  Confusion- no  Urinary complaints- no  Any other complaints- no  All other ROS are reviewed and are Negative           Medication:     Scheduled Meds:   0.9 % sodium chloride  250 mL Intravenous Once    cyanocobalamin  1,000 mcg Subcutaneous Once    pantoprazole  40 mg Intravenous Daily    lidocaine 1 % injection  5 mL Intradermal Once    sodium chloride flush  10 mL Intravenous 2 times per day    magnesium sulfate  2 g Intravenous Once    potassium chloride  40 mEq Oral Once    amiodarone  200 mg Oral BID    meropenem  1 g Intravenous Q8H    fluconazole  200 mg Intravenous Q24H    insulin lispro  0-6 Units Subcutaneous TID WC    insulin lispro  0-3 Units Subcutaneous Nightly    folic acid  1 mg Oral Daily    oyster shell calcium/vitamin D  1 tablet Oral Daily    silver sulfADIAZINE   Topical Daily    sodium chloride flush  10 mL Intravenous 2 times per day     Continuous Infusions:   heparin (porcine) 16.6 mL/hr (03/18/19 1951)    sodium chloride 50 mL/hr at 03/17/19 1137    dextrose 100 mL/hr (03/08/19 1700)     PRN Meds:. loperamide, sodium chloride flush, oxyCODONE, HYDROmorphone, heparin (porcine), heparin (porcine), magic (miracle) mouthwash with nystatin, glucose, dextrose, glucagon (rDNA), dextrose, sodium chloride flush, ondansetron, acetaminophen       Vitals :     Vitals:    03/19/19 1136   BP: (!) 147/84   Pulse: 101   Resp: 20   Temp: 98.7 °F (37.1 °C)   SpO2: 98%       I & O :       Intake/Output Summary (Last 24 hours) at 3/19/2019 1156  Last data filed at 3/19/2019 3850  Gross per 24 hour   Intake 3367 ml   Output 1242.5 ml   Net 2124.5 ml        Physical Examination :     General appearance: Anxious- no, distressed- no, in good spirits- no    Sitting up on chair, comfortable,  HEENT:

## 2019-03-19 NOTE — ONCOLOGY
ONCOLOGY HEMATOLOGY CARE PROGRESS NOTE      SUBJECTIVE:     On 8 liters high flow O2. Denies abd or back pain. Denies hemoptysis, hematuria, or melena/BRBPR. He is having many episodes of diarrhea. 6 in the past 24 hours- CDIFF negative. Wife at bedside. Hgb is 6.6 and PRBC ordered. Now on full liq diet. DIAN drain with minimal output. Remains net overload 14 liters since admit. Now on oral Acyclovir     ROS:   The remaining 10 point review of symptoms is unremarkable. OBJECTIVE        Physical    VITALS:  BP (!) 142/84   Pulse 98   Temp 98.1 °F (36.7 °C) (Oral)   Resp 18   Ht 5' 9\" (1.753 m)   Wt 185 lb 9.6 oz (84.2 kg)   SpO2 93%   BMI 27.41 kg/m²   TEMPERATURE:  Current - Temp: 98.1 °F (36.7 °C); Max - Temp  Av.4 °F (36.9 °C)  Min: 98.1 °F (36.7 °C)  Max: 99 °F (37.2 °C)  PULSE OXIMETRY RANGE: SpO2  Av.8 %  Min: 92 %  Max: 93 %  24HR INTAKE/OUTPUT:      Intake/Output Summary (Last 24 hours) at 3/19/2019 0947  Last data filed at 3/19/2019 1122  Gross per 24 hour   Intake 3367 ml   Output 1242.5 ml   Net 2124.5 ml       CONSTITUTIONAL:  awake, alert, cooperative, no apparent distress, HEENT oral pharynx , no scleral icterus, wearing high flow nasal canula   HEMATOLOGIC/LYMPHATICS:  no cervical lymphadenopathy, no supraclavicular lymphadenopathy, no axillary lymphadenopathy and no inguinal lymphadenopathy  LUNGS:  No increased work of breathing, good air exchange, clear to auscultation bilaterally, no crackles or wheezing  CARDIOVASCULAR:  , regular rate and rhythm, normal S1 and S2, no S3 or S4, and no murmur noted  ABDOMEN: normal BS,  dressing to DIAN drain site to RLQ is with slight serosanguinous drainage, NON distended, NON tender, DIAN drain in place with serosanguinous drainage , no rebound tenderness, SOFT   Back: no bruising, purpura, or hematomas noted   MUSCULOSKELETAL:  There is no redness, warmth, or swelling of the joints.   EXTREMETIES: BLE edema +1, non pitting   NEUROLOGIC:  Awake, alert, oriented to name, place and time. Cranial nerves II-XII are grossly intact. Motor is 5 out of 5 bilaterally. Hip flexion 5/5 bilaterally in the bed   SKIN:  Crusting and scabbed lesions to right side lips and chin , right temporal lesion with crusting over as well. IMPROVED FROM ADMIT ON ACYCLOVIR, LESIONS DECREASING IN SIZE        Data      Recent Labs     03/17/19  0645 03/18/19  0448 03/19/19  0634   WBC 7.5 11.2* 7.4   HGB 10.8* 7.4* 6.6*   HCT 32.7* 22.1* 19.5*   * 192 173   MCV 90.1 89.7 89.5        Recent Labs     03/17/19  0645 03/18/19 0448 03/19/19  0634   * 140 135*   K 3.4* 3.5 3.1*   CL 98* 102 100   CO2 27 26 26   BUN 9 11 12   CREATININE 0.6* 0.6* <0.5*     No results for input(s): AST, ALT, ALB, BILIDIR, BILITOT, ALKPHOS in the last 72 hours.     Magnesium:    Lab Results   Component Value Date    MG 1.50 03/19/2019    MG 1.50 03/18/2019    MG 1.70 03/17/2019         Problem List  Patient Active Problem List   Diagnosis    Elevated fasting glucose    CAD (coronary artery disease)    PAD (peripheral artery disease) ([address]    Essential hypertension    Mixed hyperlipidemia    Sprain of right foot    Sprain of anterior talofibular ligament of right ankle    Closed nondisplaced fracture of fifth right metatarsal bone    Closed fracture of proximal lateral malleolus of ankle with routine healing    Right foot pain    Right ankle pain    Nondisplaced fracture of fifth right metatarsal bone with routine healing    History of pulmonary emphysema    Chronic obstructive pulmonary disease with (acute) exacerbation (HCC)    Pulmonary nodule, left    Abnormal bone scan of thoracic spine    Back pain    Visit for monitoring Plavix therapy    History of ventricular tachycardia    Gallstones    Hemoptysis    Former smoker    Elevated PSA    Adenocarcinoma of left lung ([address]    Acute diverticulitis    Pancytopenia ([address]    Diverticulitis of large intestine with perforation and abscess without bleeding    Diverticulitis    Chemotherapy-induced neutropenia (HCC)    Mucositis    Herpes zoster oticus    Hearing loss of right ear    Acute respiratory failure with hypoxia (HCC)    H/O colectomy    Primary malignant neoplasm of lung metastatic to other site [facility])    NSVT (nonsustained ventricular tachycardia) (HCC)    Other pulmonary embolism without acute cor pulmonale (HCC)       ASSESSMENT AND PLAN    -Acute Perforated descending colon diverticulitis (not immune mediated colitis) with free air in the abd   - per gen surgery , s/p Laparoscopic converted to Open left Colectomy, 3/14/2019, with Dr. Ethan Chin  - ATB support (Zosyn, Maxipime, Vanco) , changed to [address] and Diflucan now, continue until POD 7, now POD 4     Neutropenia secondary to chemo  - WBC is over 7, Granix stopped 3/11     Thrombocytopenia secondary to chemo  -Resolved  - plt now 173     PE  -Diagnosed on 3/16/2019 with bilateral PEs involving the riaght and left main pulm arteries.  -He is now on heparin- Hgb 6.6. Getting CT AP to rule out bleed. Cont hep drip for now given size burden of PE.   -Will discuss with surgery bleeding risk and determine coumadin versus eliquis ( on discharge)   -agree with lifelong anticoagulation  -Would just continue Heparin for now. Would hold off on a filter at this point.   - ECHO pending for right heart strain     Anemia, possible GI bleed on hep drip  - Hgb 6.6, has downtrended and 2 days ago Hgb was over 10   - stool for occult blood   - PPI added  - Transfuse PRBC today   - CT AP stat, discussed with Dr. Kay Zapata   - due for B12 shot- ordered   - No plans for IVC filter at this time; discussed with Dr. Rich Antonio, stage 4 bone mets   - Will hold chemo until surgically healed. He is not due for Cycle 3 Carbo/Alimta/Keytruda until 3/22/2019. May consider holding chemo and moving forward with Lajoyce Levers only in future. possible R heart strain and falling Hgb on Heparin drip, rule out GI/retroperitoneal bleed.        May be reached through [address] [place] [address]

## 2019-03-19 NOTE — PROGRESS NOTES
Pulmonary & Critical Care Inpatient Progress Note   Grzegorz Cisneros MD     REASON FOR TODAY'S VISIT:  Acute on chronic resp failure    SUBJECTIVE:   Remains on high flow oxygen  No obvious source of active bleeding    Scheduled Meds:   magnesium sulfate  2 g Intravenous Once    potassium chloride  40 mEq Oral Once    0.9 % sodium chloride  250 mL Intravenous Once    amiodarone  200 mg Oral BID    acyclovir  800 mg Oral 5x Daily    meropenem  1 g Intravenous Q8H    fluconazole  200 mg Intravenous Q24H    insulin lispro  0-6 Units Subcutaneous TID WC    insulin lispro  0-3 Units Subcutaneous Nightly    folic acid  1 mg Oral Daily    oyster shell calcium/vitamin D  1 tablet Oral Daily    silver sulfADIAZINE   Topical Daily    sodium chloride flush  10 mL Intravenous 2 times per day       Continuous Infusions:   heparin (porcine) 16.6 mL/hr (03/18/19 1951)    sodium chloride 50 mL/hr at 03/17/19 1137    dextrose 100 mL/hr (03/08/19 1700)       PRN Meds:  oxyCODONE, HYDROmorphone, heparin (porcine), heparin (porcine), magic (miracle) mouthwash with nystatin, glucose, dextrose, glucagon (rDNA), dextrose, sodium chloride flush, ondansetron, acetaminophen    ALLERGIES:  Patient has No Known Allergies. Objective:   PHYSICAL EXAM:  /75   Pulse 97   Temp 98.1 °F (36.7 °C) (Oral)   Resp 18   Ht 5' 9\" (1.753 m)   Wt 185 lb 9.6 oz (84.2 kg)   SpO2 92%   BMI 27.41 kg/m²    Physical Exam   Constitutional: He appears well-developed and well-nourished. No distress. HENT:   Head: Normocephalic and atraumatic. Mouth/Throat: Oropharynx is clear and moist. No oropharyngeal exudate. Eyes: Pupils are equal, round, and reactive to light. EOM are normal.   Neck: Neck supple. No JVD present. Cardiovascular: Normal heart sounds. Exam reveals no gallop and no friction rub. No murmur heard. Pulmonary/Chest: Effort normal. He has no wheezes. He has no rales.    Equal chest rise and expansion bilaterally Abdominal: Soft. Bowel sounds are normal. He exhibits no distension. There is no tenderness. Musculoskeletal: Normal range of motion. He exhibits no edema. Lymphadenopathy:     He has no cervical adenopathy. Neurological: He is alert. No cranial nerve deficit. CN 2-12 grossly intact   Skin: Skin is warm and dry. No rash noted. He is not diaphoretic. Data Reviewed:   LABS:  CBC:  Recent Labs     03/17/19  0645 03/18/19  0448 03/19/19  0634   WBC 7.5 11.2* 7.4   HGB 10.8* 7.4* 6.6*   HCT 32.7* 22.1* 19.5*   MCV 90.1 89.7 89.5   * 192 173     BMP:  Recent Labs     03/17/19  0645 03/18/19  0448 03/19/19  0634   * 140 135*   K 3.4* 3.5 3.1*   CL 98* 102 100   CO2 27 26 26   BUN 9 11 12   CREATININE 0.6* 0.6* <0.5*     LIVER PROFILE: No results for input(s): AST, ALT, LIPASE, ALB, BILIDIR, BILITOT, ALKPHOS in the last 72 hours. Invalid input(s): AMYLASE  PT/INR:No results for input(s): PROTIME, INR in the last 72 hours. APTT:   Recent Labs     03/18/19  1645 03/19/19  0037 03/19/19  0634   APTT 62.8* 65.5* 52.7*     UA:No results for input(s): NITRITE, COLORU, PHUR, LABCAST, WBCUA, RBCUA, MUCUS, TRICHOMONAS, YEAST, BACTERIA, CLARITYU, SPECGRAV, LEUKOCYTESUR, UROBILINOGEN, BILIRUBINUR, BLOODU, GLUCOSEU, AMORPHOUS in the last 72 hours. Invalid input(s): Forrestine Oddi  Recent Labs     03/16/19  1330   PHART 7.473*   OMZ1KFT 30.4*   PO2ART 66.6*            CT chest personally reviewed, rounded filling defects in pulmonary arteries and scattered nodular opacities in lung parenchyma           Assessment:     1. Acute resp failure, hypoxic  2. Acute PE, bilateral/submassive   -? Tumor emboli syndrome vs thrombotic   3. Lung cancer with metastatic disease  4. Acute on chronic anemia, multifactorial  5. Diverticulitis/perforation s/p colectomy    Plan:      -Continue to titrate FIO2 as tolerated, persistently high requirements. Echo pending for today.  Strongly net positive 14L balance, oxygenation would benefit from fluid removal now that renal function is better  -Anticoagulation, on a heparin gtt will need lifelong therapy.  -Oncology involved, defer anemia workup to their expertise but would recommend protonix started for GI prophylaxis  -On amio, cardiology following for intermittent NSVT  -Fluconazole/merop, defer to MIGNON Jackson MD

## 2019-03-19 NOTE — PROGRESS NOTES
[facility] GENERAL SURGERY    Surgery Progress Note           POD # 5    PATIENT NAME: Ibrahima Calvert     TODAY'S DATE: 3/19/2019    INTERVAL HISTORY:    Pt without nausea, having bms. OBJECTIVE:   VITALS:  BP (!) 147/84   Pulse 101   Temp 98.7 °F (37.1 °C) (Oral)   Resp 20   Ht 5' 9\" (1.753 m)   Wt 185 lb 9.6 oz (84.2 kg)   SpO2 98%   BMI 27.41 kg/m²     INTAKE/OUTPUT:    I/O last 3 completed shifts: In: 3265 [P.O.:240; I.V.:3127]  Out: 942.5 [Urine:900; Drains:42.5]  I/O this shift:  In: -   Out: 300 [Stool:300]              CONSTITUTIONAL:  awake and alert  ABDOMEN:   normal bowel sounds, soft, non-distended, mild tender, patricio serosanguinous   INCISION: no drainage    Data:  CBC:   Recent Labs     03/17/19  0645 03/18/19  0448 03/19/19  0634   WBC 7.5 11.2* 7.4   HGB 10.8* 7.4* 6.6*   HCT 32.7* 22.1* 19.5*   * 192 173     BMP:    Recent Labs     03/17/19  0645 03/18/19  0448 03/19/19  0634   * 140 135*   K 3.4* 3.5 3.1*   CL 98* 102 100   CO2 27 26 26   BUN 9 11 12   CREATININE 0.6* 0.6* <0.5*   GLUCOSE 124* 116* 108*     Hepatic: No results for input(s): AST, ALT, ALB, BILITOT, ALKPHOS in the last 72 hours. Mag:      Recent Labs     03/17/19  0645 03/18/19  0448 03/19/19  0634   MG 1.70* 1.50* 1.50*      Phos:   No results for input(s): PHOS in the last 72 hours. INR: No results for input(s): INR in the last 72 hours. Radiology Review:  NA    ASSESSMENT AND PLAN:  69 y.o. male status post left colectomy  1. Low fiber diet today  2. Po pain control  3. Pt/ot  4.   PRBC today         Electronically signed by Lizzy Tsai MD

## 2019-03-19 NOTE — PROGRESS NOTES
[facility]           [phone]  [facility]           Quick Note     Labs and vitals noted( as given below)  Down for CT  Will try to see later today/if now am  Sodium ok but increase in Oxygen requirement  May need lasix, ok from renal point of view if BP remains stable  Hb dropped- getting work up      Vitals:    03/19/19 0809   BP: (!) 142/84   Pulse: 98   Resp: 18   Temp: 98.1 °F (36.7 °C)   SpO2: 93%     In: 3367   Out: 1242.5 [Urine:900; Drains:42.5]  Lab Results   Component Value Date     03/19/2019    K 3.1 03/19/2019     03/19/2019    CO2 26 03/19/2019    BUN 12 03/19/2019    CREATININE <0.5 03/19/2019    GLUCOSE 108 03/19/2019    CALCIUM 7.6 03/19/2019         Please call with questions at-  24 Hrs Answering service [phone]  Perfect serve, or cell phone 7 am - 5pm  Dr Jai Matta

## 2019-03-19 NOTE — PROGRESS NOTES
[facility]   Daily Cardiovascular Progress Note    Admit Date: 3/7/2019    Chief complaint: NSVT  HPI: no change      Medications/Labs all Reviewed:  Patient Active Problem List   Diagnosis    Elevated fasting glucose    CAD (coronary artery disease)    PAD (peripheral artery disease) (HCC)    Essential hypertension    Mixed hyperlipidemia    Sprain of right foot    Sprain of anterior talofibular ligament of right ankle    Closed nondisplaced fracture of fifth right metatarsal bone    Closed fracture of proximal lateral malleolus of ankle with routine healing    Right foot pain    Right ankle pain    Nondisplaced fracture of fifth right metatarsal bone with routine healing    History of pulmonary emphysema    Chronic obstructive pulmonary disease with (acute) exacerbation (HCC)    Pulmonary nodule, left    Abnormal bone scan of thoracic spine    Back pain    Visit for monitoring Plavix therapy    History of ventricular tachycardia    Gallstones    Hemoptysis    Former smoker    Elevated PSA    Adenocarcinoma of left lung ([address]    Acute diverticulitis    Pancytopenia (HCC)    Diverticulitis of large intestine with perforation and abscess without bleeding    Diverticulitis    Chemotherapy-induced neutropenia (HCC)    Mucositis    Herpes zoster oticus    Hearing loss of right ear    Acute respiratory failure with hypoxia ([address]    H/O colectomy    Primary malignant neoplasm of lung metastatic to other site [facility])    NSVT (nonsustained ventricular tachycardia) (HCC)    Other pulmonary embolism without acute cor pulmonale (HCC)       Medications:    0.9 % sodium chloride infusion 250 mL Once   cyanocobalamin injection 1,000 mcg Once   pantoprazole (PROTONIX) injection 40 mg Daily   loperamide (IMODIUM) capsule 2 mg 4x Daily PRN   oxyCODONE (ROXICODONE) immediate release tablet 5 mg Q4H PRN   HYDROmorphone (DILAUDID) injection 0.5 mg Q3H PRN   heparin 25,000 unit in normal without deformity  Cardiovascular:RRR, S1S2, no mrg, normal PMI  Abdomen: Soft, NTND, Normal BS  Extremities: No clubbing, cyanosis, or edema  Neurological/Psychiatric: AxO x4, no gross issues  Skin:  Warm and dry      Labs:  CBC:   Recent Labs     03/17/19  0645 03/18/19  0448 03/19/19  0634   WBC 7.5 11.2* 7.4   HGB 10.8* 7.4* 6.6*   HCT 32.7* 22.1* 19.5*   MCV 90.1 89.7 89.5   * 192 173     BMP:   Recent Labs     03/17/19  0645 03/18/19  0448 03/19/19  0634   * 140 135*   K 3.4* 3.5 3.1*   CL 98* 102 100   CO2 27 26 26   BUN 9 11 12   CREATININE 0.6* 0.6* <0.5*     MG:    Recent Labs     03/17/19  0645 03/18/19  0448 03/19/19  0634   MG 1.70* 1.50* 1.50*      PT/INR: No results for input(s): PROTIME, INR in the last 72 hours. APTT:   Recent Labs     03/18/19  1645 03/19/19  0037 03/19/19  0634   APTT 62.8* 65.5* 52.7*     Cardiac Enzymes:   Recent Labs     03/16/19  1336   TROPONINI 0.01       Cardiac Studies:          Assessment and Plan   1. NSVT   - Known hx of VT s/p ICD   - some short NSVT runs on tele  2. PE 3/16/2019  3. CAD   - s/p PCI to RCA 6/2011   - hx of PCi to LAD (post endograft AAA repair MI)1/2011  4. HTN  5. Hx of aortic aneurysm repair   - Per Dr. Nishant Myers, on ASA/plavix for this  6. Hx of sigmoid colectomy for perf diverticulosis 3/14/2019   - Laparoscopic converted to Open left Colectomy  7. Metastatic lung CA, stage 4 with bone mets      PLAN  1. Echo still pending  2. Needs aggressive K and mag replacement   - will order this AM   - pt with large volume diarrhea  3. Known hx of NSVT, appears stable for now. 4. Cont outpt Amio for SVT/VT  5.  Given CAD- pt should be on ASA, statin, BB   - resume home zocor 40mg, atenolol 100mg qday, and ASA 81mg poqday (will start BB)  6. Plavix per Vascular surgery and Hem/onc    Patient Active Problem List   Diagnosis    Elevated fasting glucose    CAD (coronary artery disease)    PAD (peripheral artery disease) ([facility]    Essential hypertension    Mixed hyperlipidemia    Sprain of right foot    Sprain of anterior talofibular ligament of right ankle    Closed nondisplaced fracture of fifth right metatarsal bone    Closed fracture of proximal lateral malleolus of ankle with routine healing    Right foot pain    Right ankle pain    Nondisplaced fracture of fifth right metatarsal bone with routine healing    History of pulmonary emphysema    Chronic obstructive pulmonary disease with (acute) exacerbation (HCC)    Pulmonary nodule, left    Abnormal bone scan of thoracic spine    Back pain    Visit for monitoring Plavix therapy    History of ventricular tachycardia    Gallstones    Hemoptysis    Former smoker    Elevated PSA    Adenocarcinoma of left lung (HCC)    Acute diverticulitis    Pancytopenia (HCC)    Diverticulitis of large intestine with perforation and abscess without bleeding    Diverticulitis    Chemotherapy-induced neutropenia (HCC)    Mucositis    Herpes zoster oticus    Hearing loss of right ear    Acute respiratory failure with hypoxia ([address]    H/O colectomy    Primary malignant neoplasm of lung metastatic to other site [facility])    NSVT (nonsustained ventricular tachycardia) ([address]    Other pulmonary embolism without acute cor pulmonale ([address]         Thank you for allowing me to participate in the care of your patient. Please call me with any questions [phone].       Tara Goldberg, MD, [address] [facility] Cardiologist  [facility] [address]  [phone] [facility]  [phone] [address] [place] [address]  3/19/2019 11:26 AM

## 2019-03-20 NOTE — CONSULTS
Gastroenterology Consult Note    Patient:   Shelley Thorne   YOB: 1945   Facility:   [address] [place] [address]   Referring/PCP: Lindsey Maddox MD  Date:     3/20/2019  Consultant:   Kevin Ortiz MD    Subjective: This 69 y.o. male was admitted 3/7/2019 with a diagnosis of \"Diverticulitis [K57.92]  Diverticulitis [K57.92]  Diverticulitis [K57.92]\" and is seen in consultation regarding \"anemia\". Information was obtained from interview of  the patient, examination of the patient, and review of records. I did  update the past medical, surgical, social and / or family history. Anemia moderate in blood for 2 days associated w blackish heme-pos stools    Current status  Present  Diet Order: Dietary Nutrition Supplements: Other Supplement (ensure)  DIET LOW FIBER; and he is tolerating diet. Recently, he has experienced little, maximum 2/10 LLQ abdominal  Pain and he has not required Intravenous narcotic analgesics. The patient has also experienced no constipation, fever, hematochezia, nausea and vomiting      Prior to Admission medications    Medication Sig Start Date End Date Taking?  Authorizing Provider   amiodarone (CORDARONE) 200 MG tablet Take 200 mg by mouth daily   Yes Historical Provider, MD   atenolol (TENORMIN) 100 MG tablet Take 200 mg by mouth daily Verified with [facility]   Yes Historical Provider, MD   vitamin D3 (CHOLECALCIFEROL) 400 units TABS tablet Take 400 Units by mouth daily   Yes Historical Provider, MD   prochlorperazine (COMPAZINE) 25 MG suppository Place 25 mg rectally every 12 hours as needed for Nausea   Yes Historical Provider, MD   ondansetron (ZOFRAN) 8 MG tablet 02/01/2019Ondansetron 8 MG Oral Tablet [Zofran] orally1.0 tabletevery 8 hours02/01/2019Active 2/1/19  Yes Historical Provider, MD   lisinopril (PRINIVIL;ZESTRIL) 10 MG tablet Take 10 mg by mouth 2/15/19  Yes Historical Provider, MD   amLODIPine (NORVASC) 5 MG tablet Take 1 tablet by mouth neoplasm of colon 2001    Cancer [facility])     lung cancer with mets with spine     Diverticulosis of colon (without mention of hemorrhage) 2001    Elevated fasting glucose     Former cigarette smoker     Herpes simplex viral infection 03/07/2019    orolabial    Hyperlipidemia     Hypertension     MI, old     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    Right popliteal artery occlusion ([facility] [address] 11/2010     Past Surgical History:   Procedure Laterality Date    ARTERIAL ANEURYSM REPAIR  11/2010    right popliteal artery aneurysm repair    BRONCHOSCOPY  01/18/2019    left endobronchial tumor    BRONCHOSCOPY N/A 1/18/2019    BRONCHOSCOPY ALVEOLAR LAVAGE performed by Chelsea Jean-Baptiste MD at [address]  1/18/2019    BRONCHOSCOPY/TRANSBRONCHIAL LUNG BIOPSY performed by Chelsea Jean-Baptiste MD at [address] N/A 1/23/2019    EBUS ULTRASOUND W/ ANESTHESIA AND NEEDLE BIOPSY performed by Jen Steele MD at [address]  1/23/2019    BRONCHOSCOPY ALVEOLAR LAVAGE performed by Jen Steele MD at [address]  1/23/2019    BRONCHOSCOPY BIOPSY BRONCHUS performed by Jen Steele MD at [address]  1/23/2019    BRONCHOSCOPY BRUSHINGS performed by Jen Steele MD at [address]  1/23/2019    BRONCHOSCOPY/TRANSBRONCHIAL NEEDLE BIOPSY performed by Jen Steele MD at [address]  1/23/2019    BRONCHOSCOPY/TRANSBRONCHIAL NEEDLE BIOPSY ADDL LOBE performed by Jen Steele MD at [place] [address]  10/08/01 12/20/04    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    COLONOSCOPY  4/4/14    wnl    CORONARY ANGIOPLASTY WITH STENT PLACEMENT      FINGER FRACTURE SURGERY      left middle finger    HEMICOLECTOMY N/A 3/14/2019    LAPAROSCOPIC CONVERT TO OPEN SIGMOID COLECTOMY performed by Wesly Rajan MD at [facility] / REMOVAL / [address] [place] [address] CATHETER Right 2019    RIGHT SUBCLAVIAN VEIN PORT A CATH PLACEMENT performed by Jonny Palacios MD at [facility]  2/10/2014    BX duodenum, atrum, distal esophagus    UPPER GASTROINTESTINAL ENDOSCOPY  14    BX gastric    UPPER GASTROINTESTINAL ENDOSCOPY  11/4/15       Social:   Social History     Tobacco Use    Smoking status: Former Smoker     Packs/day: 0.00     Years: 0.00     Pack years: 0.00     Last attempt to quit: 2003     Years since quittin.2    Smokeless tobacco: Never Used   Substance Use Topics    Alcohol use: Yes     Alcohol/week: 10.8 oz     Types: 18 Shots of liquor per week     Comment: social-used to drink alot      Family:   Family History   Adopted: Yes   Problem Relation Age of Onset    Stroke Mother     Schizophrenia Brother     Cancer Neg Hx        ROS: Pertinent items are noted in HPI.     Objective:   Vital Signs:  Temp (24hrs), Av °F (36.7 °C), Min:97.3 °F (36.3 °C), Max:98.4 °F (70.2 °C)     Systolic (80HDE), OA , Min:121 , RXE:698      Diastolic (03RRG), NRH:13, Min:70, Max:90     Pulse  Av.8  Min: 78  Max: 89  /85   Pulse 85   Temp 97.3 °F (36.3 °C) (Axillary)   Resp 16   Ht 5' 9\" (1.753 m)   Wt 177 lb 8 oz (80.5 kg)   SpO2 99%   BMI 26.21 kg/m²      Physical Exam:   /85   Pulse 85   Temp 97.3 °F (36.3 °C) (Axillary)   Resp 16   Ht 5' 9\" (1.753 m)   Wt 177 lb 8 oz (80.5 kg)   SpO2 99%   BMI 26.21 kg/m²   General appearance: alert, appears stated age and cooperative  Lungs: clear to auscultation bilaterally  Chest wall: no tenderness  Heart: regular rate and rhythm, S1, S2 normal, no murmur, click, rub or gallop  Abdomen: abnormal findings:  tenderness mild in the LLQ  Extremities: extremities normal, atraumatic, no cyanosis or edema  Skin: Skin color, texture, turgor normal. No rashes or lesions  Neurologic: Grossly normal    Lab and Imaging Review   Recent Labs     19  0466 03/19/19  0634 03/19/19  2200 03/20/19  0600   WBC 11.2* 7.4  --  9.0   HGB 7.4* 6.6* 8.1* 8.0*   MCV 89.7 89.5  --  88.9    173  --  180    135*  --  139   K 3.5 3.1*  --  3.7    100  --  102   CO2 26 26  --  26   BUN 11 12  --  11   CREATININE 0.6* <0.5*  --  0.6*   GLUCOSE 116* 108*  --  137*   CALCIUM 7.8* 7.6*  --  7.8*   MG 1.50* 1.50*  --  1.60*       Assessment:     Patient Active Problem List    Diagnosis Date Noted    Cardiomyopathy [facility]     NSVT (nonsustained ventricular tachycardia) (HCC)     Other pulmonary embolism without acute cor pulmonale (HCC)     Acute respiratory failure with hypoxia (HCC)     H/O colectomy     Primary malignant neoplasm of lung metastatic to other site [facility])     Herpes zoster oticus     Hearing loss of right ear     Chemotherapy-induced neutropenia (HCC)     Mucositis     Diverticulitis 03/07/2019    Diverticulitis of large intestine with perforation and abscess without bleeding     Acute diverticulitis 02/19/2019    Pancytopenia ([address] 02/19/2019    Adenocarcinoma of left lung ([facility] 01/22/2019    Gallstones 01/21/2019    Hemoptysis 01/21/2019    Former smoker 01/21/2019    Elevated PSA 01/21/2019    Back pain 01/16/2019    Visit for monitoring Plavix therapy     History of ventricular tachycardia     History of pulmonary emphysema 01/09/2019    Chronic obstructive pulmonary disease with (acute) exacerbation (HCC) 01/09/2019    Pulmonary nodule, left 01/09/2019    Abnormal bone scan of thoracic spine 01/09/2019    Nondisplaced fracture of fifth right metatarsal bone with routine healing 05/14/2018    Sprain of right foot 05/07/2018    Sprain of anterior talofibular ligament of right ankle 05/07/2018    Closed nondisplaced fracture of fifth right metatarsal bone 05/07/2018    Closed fracture of proximal lateral malleolus of ankle with routine healing 05/07/2018    Right foot pain 05/07/2018    Right ankle pain 05/07/2018    Essential hypertension 05/01/2017    Mixed hyperlipidemia 05/01/2017    PAD (peripheral artery disease) ([facility] [facility] 05/21/2011    CAD (coronary artery disease) 04/29/2011    Elevated fasting glucose 07/24/2010     68 yo w HTN, CAD, metastatic lung CA on chemoRx, shingles and P.E., admitted for acute perforated diverticulitis, s/p surgical repair, who has had a drop in his H/H on 3/19 to H/H 7/20, and was found to have blacksih heme-pos diarrheal stools. Is on Heparin drip. Most likely UGI source. Plan:   1. Will increase PPI to bid  2. EGD in am   3. F/U H/H  4.  Will follow    James Altamirano MD       (O) [phone]

## 2019-03-20 NOTE — PROGRESS NOTES
1-25%  · Oral Nutrition Supplement (ONS) Orders: Standard High Calorie Oral Supplement  · ONS intake: Unable to assess  · Anthropometric Measures:  · Ht: 5' 9\" (175.3 cm)   · Current Body Wt: 177 lb (80.3 kg)  · Usual Body Wt: 194 lb (88 kg)(in january )  · % Weight Change:  ,  -12 lbs (6.2% wt loss) x past 2 months.    · Ideal Body Wt: 160 lb (72.6 kg)   · BMI Classification: BMI 25.0 - 29.9 Overweight    Nutrition Interventions:   Continue current diet, Continue current ONS  Continued Inpatient Monitoring    Nutrition Evaluation:   · Evaluation: Progressing toward goals   · Goals: Pt will tolerate diet advancement per General surgery     · Monitoring: Nutrition Progression, Meal Intake, Supplement Intake, Diet Tolerance, Weight, Pertinent Labs      Electronically signed by Ruiz Amanda RD, LD on 3/20/19 at 2:41 PM    Contact Number: Office: [phone]; [address]: [facility]

## 2019-03-20 NOTE — PROGRESS NOTES
[address]   Daily Progress Note    Admit Date:  3/7/2019  HPI:    Chief Complaint   Patient presents with    Abdominal Pain     LLQ pain, was seen last week for Diverticulitis, patient states he cannot get rid of the pain, had Chemo on Friday       Presented with abdominal pain, low grade fevers, recent hospitalization for diverticulitis -> acute perforated colon diverticulitis, s/p left colectomy 3/14/19. Found to have large bilateral PE as well as DVT. Hx of metastatic lung cancer with mets to bones on chemo with neutropenia, anemia and thrombocytopenia. Cardiology consulted for NSVT. Lytes being replaced. Hx of CAD/ stents, PAD, NSVT s/p ICD on amiodarone. Subjective:  Mr. Loya Screws up in chair. Denies chest pain or palpitations. No shortness of breath at rest. NO BM's overnight except 1 this am. Wife at bedside. Discussed results of echocardiogram.     Objective:   /80   Pulse 85   Temp 97.3 °F (36.3 °C) (Axillary)   Resp 14   Ht 5' 9\" (1.753 m)   Wt 177 lb 8 oz (80.5 kg)   SpO2 99%   BMI 26.21 kg/m²       Intake/Output Summary (Last 24 hours) at 3/20/2019 1319  Last data filed at 3/20/2019 1303  Gross per 24 hour   Intake 864.3 ml   Output 3185 ml   Net -2320.7 ml     Wt Readings from Last 3 Encounters:   03/20/19 177 lb 8 oz (80.5 kg)   02/28/19 184 lb 9.6 oz (83.7 kg)   02/19/19 183 lb (83 kg)     Active Problems:    Diverticulitis of large intestine with perforation and abscess without bleeding    Diverticulitis    Chemotherapy-induced neutropenia (HCC)    Mucositis    Herpes zoster oticus    Hearing loss of right ear    Acute respiratory failure with hypoxia (HCC)    H/O colectomy    Primary malignant neoplasm of lung metastatic to other site [facility])    NSVT (nonsustained ventricular tachycardia) ([address]    Other pulmonary embolism without acute cor pulmonale ([address]  Resolved Problems:    * No resolved hospital problems. *       ASSESSMENT:   1.  NSVT - improved, continue to replete lytes, continue amiodarone, change BB  2. CAD - no angina, hx of stents but LHC in 2016 without obstructive disease, on statin and BB, ASA resumed today  3. Cardiomyopathy, unknown etiology - EF has gradually dropped from 45-50% (echo,2011), 47% (stress, 2014), 40-45% (LVG, 2016) to 30-35% now (echo), s/p ICD  4. Large PE - on IV Heparin to eventually change to DOAC per hem/onc (agree)  5. Perforated diverticulitis s/p colectomy - now with diarrhea though improved and hem + stool  6. Metastatic lung cancer with mets to bone  7.  AAA s/p endograft repair - stable by CT abd/ pelvis with contrast on 3/7/19      PLAN:  Change Lasix to po  Add back lisinopril for LV dysfunction  Add spironolactone for LV dysfunction, K sparing and adjuvant diuretic  Agree with resstarting ASA  Change atenolol to toprol for LV dysfunction and arrhythmia  Start po magox, continue replacements    DEBRA Woods - CNP, 3/20/2019, 1:19 PM  [address]   [phone]       Telemetry: SR with PVC's in single and pairs, rates 80'a  NYHA: III    Physical Exam:  General:  Awake, alert, NAD  Skin:  Warm and dry  Neck:  JVP 8-9 cm  Chest:  right basilar rales   Cardiovascular:  RRR, normal S1S2, no m/g/r  Abdomen:  Soft, nontender, +bowel sounds  Extremities:  No BLE edema      Medications:    magnesium sulfate  2 g Intravenous Once    aspirin  81 mg Oral Daily    pantoprazole  40 mg Intravenous Daily    sodium chloride flush  10 mL Intravenous 2 times per day    potassium chloride  20 mEq Oral BID    furosemide  20 mg Intravenous BID    atenolol  100 mg Oral Daily    simvastatin  40 mg Oral Nightly    amiodarone  200 mg Oral BID    meropenem  1 g Intravenous Q8H    fluconazole  200 mg Intravenous Q24H    insulin lispro  0-6 Units Subcutaneous TID WC    insulin lispro  0-3 Units Subcutaneous Nightly    folic acid  1 mg Oral Daily    oyster shell calcium/vitamin D  1 tablet Oral Daily    silver sulfADIAZINE Topical Daily      eptifibatide      heparin (porcine) 18.3 mL/hr (03/20/19 1258)    dextrose 100 mL/hr (03/08/19 1700)       Lab Data: Lab results independently reviewed by myself 3/20/19   CBC:   Recent Labs     03/18/19  0448 03/19/19  0634 03/19/19  2200 03/20/19  0600   WBC 11.2* 7.4  --  9.0   HGB 7.4* 6.6* 8.1* 8.0*    173  --  180     BMP:    Recent Labs     03/18/19  0448 03/19/19  0634 03/20/19  0600    135* 139   K 3.5 3.1* 3.7   CO2 26 26 26   BUN 11 12 11   CREATININE 0.6* <0.5* 0.6*     INR:  No results for input(s): INR in the last 72 hours. BNP:  No results for input(s): PROBNP in the last 72 hours. Cardiac Enzymes: No results for input(s): TROPONINI in the last 72 hours. Lipids:   Lab Results   Component Value Date    TRIG 178 05/22/2018    TRIG 183 11/21/2017    HDL 39 05/22/2018    HDL 46 11/21/2017    HDL 31 12/28/2011    HDL 31 05/17/2011    LDLCALC 72 05/22/2018    LDLCALC 97 11/21/2017       Cardiac Imaging:   ECHO 3/19/19:    Summary   Definity contrast was used, but port not working properly.  Adra Hoots left ventricular systolic function is moderately reduced with an ejection fraction of 30-35%. Inferolateral hypokinesia. Mid anteroseptal, apical lateral and apical septal akinesia.   There is mild concentric left ventricular hypertrophy.   Normal left ventricular filling pressure.   Left ventricular cavity size is normal.   Mild thickening of the anterior leaflet of mitral valve.   Mild mitral regurgitation.   The left atrium is severely dilated. CARDIAC CATH MAR 2016 [facility])   Summary Comments:    3 Vessel CAD  Mild LV systolic dysfunction  Patent mid and distal LAD stents  Patent proximal, mid, and distal RCA stents  Normal LVEDP  Normal systemic pressures   Plan:  Cardiac risk factor modification. Treat with medical therapy.     Findings/Summary:   CORONARY ANGIOGRAPHY FINDINGS  DOMINANCE:  Right dominant        LEFT MAIN: Luminal irregularities                     LEFT ANTERIOR DESCENDING:  Luminal irregularities, 30 % mid lesion, patent mid and distal overlapping stents                   LEFT CIRCUMFLEX: Luminal irregularities    20-30% mid lesion, OM1: small caliber vessel with severe diffuse disease                  RIGHT CORONARY:  Stenotic, overlapping proximal, mid and distal stents with mid focal 50% in-stent restenosis, diffuse distal 50% stenosis, RPAVGA: distal 70-80% stenosis                LEFT VENTRICULAR FUNCTION:        LVEF: 40-45%        LVEDP: Normal pre-angio        LV Systolic Pressure: Normal         LV to AO Gradient: none         LV Wall Motion:  Abnormal, inferior basal severe hypokinesis    .        MITRAL VALVE: No mitral insufficiency            STRESS MPI MAR 2014: Interpetation Summary:  The LV EF is 47%  There is mild inferior wall hypokinesis. There is a medium sized, fixed defect in the inferior wall. There is a small sized, reversible defect in the anterior wall. There is a medium sized, partially reversible defect in the anteroapical wall. 1.Negative ECG for ischemia with pharmocologic stress. 2.Positive nuclear stress imaging suggestive of inducible ischemia in the anteroapical wall.

## 2019-03-20 NOTE — PROGRESS NOTES
[facility]    [facility]              [phone]                 Plan :     Sodium improved  Still on high flow oxygen  Continue on lasix- increase to 40 mg po bid( pulse and HR stable)    Assessment :     Hyponatremia  Acute on chronic  Down to 123- now 139  Suspect SIADH from ca lung, pain, and nausea  Ca ca lung( adenocarcinoma) with vertebral mets  Sodium low side since 2/19    Cortisol- normal, TSH - 1.65, LFT normal  Echo 2/12- EF of 45-50%, AL mildly dilated    Generalized Edema  High O2 requirement  +ve 12 L since admission( peak 14 L +ve)  Echo pending  Start lasix    Sob also due to lung mass and PE    Acute Kidney Injury  Cr peaked to 1.6- now 0.6  Making urine  Likely hemodynamic changes   Now BP better  Surgery done  On 3/14/19    Hypotension  BP: (121-143)/(82-90) Pulse:  [86-89]   Normally hypertensive  Now has fever, neutropenia- getting better           pancytopenia  Ca lung  Diverticulitis with perforation- supportive treatment and plan for surgery this admission  Radiation dermatitis  Mucositis  [facility] would like to thank DEBRA Kraft   for opportunity to serve this patient      Please call with questions at-   24 Hrs Answering service [phone] or  7 am- 5 pm via Perfect serve or cell phone  Dr. Yoli Moncada for consult :     hyponatremia     HPI :     From consult note-     Rakesh Center is a 69 y.o. male presented to   the hospital on 3/7/2019 with left lower quadrant pain. He has that for a week. Was hospitalized with diverticulitis, DCed,  And pain never went away. Also low grade fever. No nausea. Mild pain in belly  Diagnosed to have fever and also diverticulitis. Planning for surgery. 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 Has pancytopenia from chemotherapy.      We are consulted for hyponatremia and related issues     Interval History:     Still on high flow oxygen      ROS: no  Abdomen: visible mass- no, distention- no, scar- no, tenderness- no                            hepatosplenomegaly-  no  Musculoskeletal:  clubbing no,cyanosis- no , digital ischemia- no                           muscle strength- grossly normal , tone - grossly normal  Skin: rashes- no , ulcers- no, induration- no, tightening - no  Psychiatric:  Judgement and insight- normal           AAO X 3     LABS:     Recent Labs     03/18/19  0448 03/19/19  0634 03/19/19  2200 03/20/19  0600   WBC 11.2* 7.4  --  9.0   HGB 7.4* 6.6* 8.1* 8.0*   HCT 22.1* 19.5* 23.7* 23.2*    173  --  180     Recent Labs     03/18/19  0448 03/19/19  0634 03/20/19  0600    135* 139   K 3.5 3.1* 3.7    100 102   CO2 26 26 26   BUN 11 12 11   CREATININE 0.6* <0.5* 0.6*   GLUCOSE 116* 108* 137*   MG 1.50* 1.50* 1.60*

## 2019-03-20 NOTE — PROGRESS NOTES
Notified Dr. Jennifer Earl' office, Markus Martin, @ [phone] 3/20/19 re: GI consult. -[phone]

## 2019-03-20 NOTE — PROGRESS NOTES
Speech Language Pathology  Therapy Attempt Note    Attempted to see pt for dysphagia therapy follow-up. Completed chart review, spoke with RN. Pt currently receiving care and unavailable for tx at this time. Will re-attempt as able.     Francisco Weller M.A., [address]  Speech-Language Pathologist

## 2019-03-20 NOTE — PROGRESS NOTES
cleared by other medical services (still w/ active diuresis by Nephrology).     ANGELA [employer] KEBEDE

## 2019-03-20 NOTE — PROGRESS NOTES
Infectious Disease Follow up Notes    CC :  Complicated diverticulitis      Antibiotics:   Fluconazole 200 IV q24  Meropenem 1g q8     Admit Date:   3/7/2019  Hospital Day: 14    Subjective:   He remains afebrile   Currently on 7L NC   Responding to diuresis with good UOP and decreased oxygen requirement     Objective:     Patient Vitals for the past 8 hrs:   BP Temp Temp src Pulse Resp SpO2   03/20/19 1145 (!) 143/90 98.2 °F (36.8 °C) Oral 86 16 100 %   03/20/19 1015 121/82 98.3 °F (36.8 °C) Oral 89 16 99 %       EXAM:  Resting comfortably  Port, PICC in place       Scheduled Meds:   magnesium sulfate  2 g Intravenous Once    aspirin  81 mg Oral Daily    pantoprazole  40 mg Intravenous Daily    sodium chloride flush  10 mL Intravenous 2 times per day    potassium chloride  20 mEq Oral BID    furosemide  20 mg Intravenous BID    atenolol  100 mg Oral Daily    simvastatin  40 mg Oral Nightly    amiodarone  200 mg Oral BID    meropenem  1 g Intravenous Q8H    fluconazole  200 mg Intravenous Q24H    insulin lispro  0-6 Units Subcutaneous TID WC    insulin lispro  0-3 Units Subcutaneous Nightly    folic acid  1 mg Oral Daily    oyster shell calcium/vitamin D  1 tablet Oral Daily    silver sulfADIAZINE   Topical Daily       Continuous Infusions:   eptifibatide      heparin (porcine) 18.3 mL/hr (03/19/19 2342)    dextrose 100 mL/hr (03/08/19 1700)          Data Review:    Lab Results   Component Value Date    WBC 9.0 03/20/2019    HGB 8.0 (L) 03/20/2019    HCT 23.2 (L) 03/20/2019    MCV 88.9 03/20/2019     03/20/2019     Lab Results   Component Value Date    CREATININE 0.6 (L) 03/20/2019    BUN 11 03/20/2019     03/20/2019    K 3.7 03/20/2019     03/20/2019    CO2 26 03/20/2019       Hepatic Function Panel:   Lab Results   Component Value Date    ALKPHOS 51 03/08/2019    ALT 14 03/08/2019    AST 14 03/08/2019    PROT 5.4 03/08/2019    PROT 7.5 03/02/2013    BILITOT 0.4 03/08/2019    BILIDIR 0.19 12/28/2011    IBILI 0.4 12/28/2011    LABALBU 2.8 03/08/2019       Cultures:   2/19     BC x2 neg  3/10     BC x2 neg        Radiology Review:  All pertinent images / reports were reviewed as a part of this visit. CT abd 3/10:  Impression   Perforated sigmoid diverticulitis and pneumoperitoneum unchanged.  Stable   left Emerita colonic air-fluid collection may represent an intramural or   extramural abscess or giant diverticulum.       Infrarenal abdominal aortic aneurysm and aorto bi-iliac stent.       Other incidental findings as above.      CT abd 3/7:  Impression   Acute perforated descending colon diverticulitis. Marce Kocher is free air in the   abdomen and there is a small extraluminal gas and fluid collection/abscess   adjacent to the descending colon measuring 4.1 cm in AP diameter and 6 cm in   length.       There is diverticulosis of the entire colon.       Cholelithiasis.       4.7 cm infrarenal abdominal aortic aneurysm status post endograft repair with   a patent aorto bi-iliac graft. Marce Kocher is suspicion of an endoleak with blush   of contrast enhancement suspected in the inferior aspect of the native   aneurysm.  As a noncontrast study was not performed, calcification of the   intraluminal thrombus accounting for the apparent enhancement is not   excluded.  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 Quenten Duster with a noncontrast CT examination would be   helpful.      CT abd 2/19:  Impression   1. Findings are consistent with acute proximal sigmoid diverticulitis within   the left lower quadrant, without evidence of perforation, free air, or   abscess.    2. New nonspecific focus of geographic low attenuation within the periphery   of the inferior spleen, possibly a splenic infarct, less likely a metastatic   focus.  Suggest clinical correlation and attention on follow-up abdominal CT   studies to exclude metastatic disease. 3. Cholelithiasis. 4. Stable bilateral renal cysts. 5. 4.7 x 4.4 cm fusiform aneurysm of the infrarenal abdominal aorta,   stabilized by an aortobi-iliac stent graft, without evidence of in-stent   stenosis or thrombosis. 6. Stable mild prostatomegaly.      Doppler LE 3/18:  Tech Comments   Right   1. Technically limited exam due to arterial plaque shadowing. There is   hypoechoic partially occluding material present in the lumen of the femoral   vein at the distal thigh and the short saphenous vein. There is hypoechoic   totally occluding material present in the lumen of the peroneal vein (zone 6 -   7) and the greater saphenous vein (zone 1 - 3) within 0.5 cm of the femoral   saphenous junction. There is complete compressibility of all other deep and   superficial veins seen in the lower extremity. 2. There is abnormal absent flow seen in the peroneal and greater saphenous   veins and continuous flow seen in the short saphenous vein. There is normal   spontaneous and phasic flow throughout the other deep and superficial veins of   the right lower extremity. Left   1. Technically limited exam due to arterial plaque shadowing. There is   hypoechoic totally occluding material present in the lumen of the peroneal   veins. There is complete compressibility of all other deep and superficial   veins seen throughout the lower extremity. 2. There is abnormal absent flow seen involving the peroneal veins. There is   normal spontaneous and phasic flow throughout the other deep and superficial   veins of the left lower extremity. TTE 3/19:  Summary   Definity contrast was used, but port not working properly.  Kasi Whitmore left ventricular systolic function is moderately reduced with an   ejection fraction of 30-35 %. Inferolateral hypokinesia. Mid anteroseptal,   apical lateral and apical septal akinesia.   There is mild concentric left ventricular hypertrophy.   Normal left ventricular filling pressure.   Left ventricular cavity size is normal.   Mild thickening of the anterior leaflet of mitral valve.   Mild mitral regurgitation.   The left atrium is severely dilated. CXR 3/20:  Impression   Interval development of mild pulmonary edema.       There is a small left-sided pleural effusion with more focal consolidation   the left lung base, atelectasis versus pneumonia.        Assessment:     Patient Active Problem List    Diagnosis Date Noted    NSVT (nonsustained ventricular tachycardia) ([address]     Other pulmonary embolism without acute cor pulmonale (HCC)     Acute respiratory failure with hypoxia ([address]     H/O colectomy     Primary malignant neoplasm of lung metastatic to other site [facility])     Herpes zoster oticus     Hearing loss of right ear     Chemotherapy-induced neutropenia (HCC)     Mucositis     Diverticulitis 03/07/2019    Diverticulitis of large intestine with perforation and abscess without bleeding     Acute diverticulitis 02/19/2019    Pancytopenia ([address] 02/19/2019    Adenocarcinoma of left lung ([address] 01/22/2019    Gallstones 01/21/2019    Hemoptysis 01/21/2019    Former smoker 01/21/2019    Elevated PSA 01/21/2019    Back pain 01/16/2019    Visit for monitoring Plavix therapy     History of ventricular tachycardia     History of pulmonary emphysema 01/09/2019    Chronic obstructive pulmonary disease with (acute) exacerbation ([address] 01/09/2019    Pulmonary nodule, left 01/09/2019    Abnormal bone scan of thoracic spine 01/09/2019    Nondisplaced fracture of fifth right metatarsal bone with routine healing 05/14/2018    Sprain of right foot 05/07/2018    Sprain of anterior talofibular ligament of right ankle 05/07/2018    Closed nondisplaced fracture of fifth right metatarsal bone 05/07/2018    Closed fracture of proximal lateral malleolus of ankle with routine healing 05/07/2018    Right foot pain 05/07/2018    Right ankle pain 05/07/2018    Essential hypertension 05/01/2017    Mixed hyperlipidemia 05/01/2017    PAD (peripheral artery disease) [facility] 05/21/2011    CAD (coronary artery disease) 04/29/2011    Elevated fasting glucose 07/24/2010     Stage IV lung cancer      Persistent vs recurrent diverticulitis with perforation and abscess in the context of chemotherapy associated cytopenias   S/p lap converted to open colectomy 3/14/19    Acute hearing loss R ear and vesicular/purpuric rash consistent with zoster oticus = resolved     MAHSA - resolved      Thrombocytopenia, resolving      Acute hypoxemic respiratory failure 3/16  Vipul DVT and PE, heparin infusion started   Component of heart failure/pulm edema      NKDA     Plan:   Continuing meropenem/fluconazole for diverticulitis, plan on abx for 7 days post-op.   Today is POD #6  I put stop date on those abx for tomorrow     Since the C diff DNA was negative, contact isolation can be discontinued     Little more to add  Please call with further questions        Higinio Wiley MD  Phone: [phone]   Fax : [phone]

## 2019-03-20 NOTE — PROGRESS NOTES
Physical Therapy  Pt declined PT for this pm.  Stated he is fatigued from being up earlier today, but also says he is eager to do PT in the am.  Will continue to follow. Thank you.   Sundeep Szymanski PTA [id number]

## 2019-03-20 NOTE — PROGRESS NOTES
Pulmonary & Critical Care Inpatient Progress Note   Marisol Becerril MD     REASON FOR TODAY'S VISIT:  Acute on chronic resp failure    SUBJECTIVE:   Remains on high flow oxygen but slightly decreased requirements  Denies any shortness of breath or congestion at rest    Scheduled Meds:   0.9 % sodium chloride  250 mL Intravenous Once    pantoprazole  40 mg Intravenous Daily    sodium chloride flush  10 mL Intravenous 2 times per day    potassium chloride  20 mEq Oral BID    furosemide  20 mg Intravenous BID    atenolol  100 mg Oral Daily    simvastatin  40 mg Oral Nightly    amiodarone  200 mg Oral BID    meropenem  1 g Intravenous Q8H    fluconazole  200 mg Intravenous Q24H    insulin lispro  0-6 Units Subcutaneous TID WC    insulin lispro  0-3 Units Subcutaneous Nightly    folic acid  1 mg Oral Daily    oyster shell calcium/vitamin D  1 tablet Oral Daily    silver sulfADIAZINE   Topical Daily       Continuous Infusions:   heparin (porcine) 18.3 mL/hr (03/19/19 2342)    dextrose 100 mL/hr (03/08/19 1700)       PRN Meds:  loperamide, sodium chloride flush, oxyCODONE, HYDROmorphone, heparin (porcine), heparin (porcine), magic (miracle) mouthwash with nystatin, glucose, dextrose, glucagon (rDNA), dextrose, ondansetron, acetaminophen    ALLERGIES:  Patient has No Known Allergies. Objective:   PHYSICAL EXAM:  /76   Pulse 84   Temp 98 °F (36.7 °C) (Oral)   Resp 14   Ht 5' 9\" (1.753 m)   Wt 177 lb 8 oz (80.5 kg)   SpO2 92%   BMI 26.21 kg/m²    Physical Exam   Constitutional: He appears well-developed and well-nourished. No distress. HENT:   Head: Normocephalic and atraumatic. Mouth/Throat: Oropharynx is clear and moist. No oropharyngeal exudate. Eyes: Pupils are equal, round, and reactive to light. EOM are normal.   Neck: Neck supple. No JVD present. Cardiovascular: Normal heart sounds. Exam reveals no gallop and no friction rub. No murmur heard.   Pulmonary/Chest: Effort normal. He has no wheezes. He has no rales. Equal chest rise and expansion bilaterally   Abdominal: Soft. Bowel sounds are normal. He exhibits no distension. There is no tenderness. Musculoskeletal: Normal range of motion. He exhibits no edema. Lymphadenopathy:     He has no cervical adenopathy. Neurological: He is alert. No cranial nerve deficit. CN 2-12 grossly intact   Skin: Skin is warm and dry. No rash noted. He is not diaphoretic. Data Reviewed:   LABS:  CBC:  Recent Labs     03/18/19  0448 03/19/19  0634 03/19/19  2200 03/20/19  0600   WBC 11.2* 7.4  --  9.0   HGB 7.4* 6.6* 8.1* 8.0*   HCT 22.1* 19.5* 23.7* 23.2*   MCV 89.7 89.5  --  88.9    173  --  180     BMP:  Recent Labs     03/18/19  0448 03/19/19  0634 03/20/19  0600    135* 139   K 3.5 3.1* 3.7    100 102   CO2 26 26 26   BUN 11 12 11   CREATININE 0.6* <0.5* 0.6*     LIVER PROFILE: No results for input(s): AST, ALT, LIPASE, ALB, BILIDIR, BILITOT, ALKPHOS in the last 72 hours. Invalid input(s): AMYLASE  PT/INR:No results for input(s): PROTIME, INR in the last 72 hours. APTT:   Recent Labs     03/19/19  1615 03/19/19 2200 03/20/19  0600   APTT 63.6* 70.4* 75.4*     UA:No results for input(s): NITRITE, COLORU, PHUR, LABCAST, WBCUA, RBCUA, MUCUS, TRICHOMONAS, YEAST, BACTERIA, CLARITYU, SPECGRAV, LEUKOCYTESUR, UROBILINOGEN, BILIRUBINUR, BLOODU, GLUCOSEU, AMORPHOUS in the last 72 hours. Invalid input(s): KETONESU  No results for input(s): PHART, LKX1KKA, PO2ART in the last 72 hours. CT chest personally reviewed, rounded filling defects in pulmonary arteries and scattered nodular opacities in lung parenchyma           Assessment:     1. Acute resp failure, hypoxic  2. Acute PE, bilateral/submassive   -? Tumor emboli syndrome vs thrombotic   3. Lung cancer with metastatic disease  4. Acute on chronic anemia, multifactorial  5.  Diverticulitis/perforation s/p colectomy    Plan:      -Continue to titrate FIO2 as tolerated, persistently high requirements. Strongly net positive 12.8L balance, oxygenation would benefit from fluid removal now that renal function is better; on lasix.  Anticipate long term need for oxygen   -Encouraged incentive rick use   -Anticoagulation per oncology, H/H being monitored  -On amio, cardiology following for intermittent NSVT  -Fluconazole/merop, defer to ID    No additional recommendations from a pulmonary standpoint at this time, please contact with questions     Becky Santos MD

## 2019-03-20 NOTE — PLAN OF CARE
Problem: Infection:  Goal: Will remain free from infection  Description  Will remain free from infection  Outcome: Ongoing     Problem: Skin Integrity:  Goal: Skin integrity will stabilize  Description  Skin integrity will stabilize  Outcome: Ongoing

## 2019-03-20 NOTE — CARE COORDINATION
[address] Transitions Interview     3/20/2019    Patient: [facility] Patient : 1945   MRN: [id number]  Reason for Admission: Diverticulitis  RARS: Readmission Risk Score: 30       nHpredict outcome report received and reviewed with , KELVIN Bassett RN. Discussed possibility of need for SNF for IVATB if ordered at discharge.        Follow Up  Future Appointments   Date Time Provider Bogdan Batista   6/3/2019  9:20 AM Emili Quiroz MD [facility]  Health Maintenance Due   Topic Date Due    Diabetic microalbuminuria test  1963       Warrick Lanes, RN

## 2019-03-20 NOTE — PROGRESS NOTES
-1520.7 ml       Physical Exam Performed:    /80   Pulse 85   Temp 97.3 °F (36.3 °C) (Axillary)   Resp 14   Ht 5' 9\" (1.753 m)   Wt 177 lb 8 oz (80.5 kg)   SpO2 99%   BMI 26.21 kg/m²     General appearance: In bed, No apparent distress, appears stated age and cooperative. HEENT: Pupils equal, round, and reactive to light. Conjunctivae/corneas clear. Vesicles in outer ear canal noted   Neck: Supple, with full range of motion. No jugular venous distention. Trachea midline. Respiratory:  increased respiratory effort. DM to auscultation, bilaterally without Rales/Wheezes/Rhonchi. on NRB  Cardiovascular: tachycardic rate and rhythm with normal S1/S2 without murmurs, rubs or gallops. Abdomen: Abd dressing d/i, DIAN drain in place with +oupt. Musculoskeletal: No clubbing, cyanosis or edema bilaterally. Full range of motion without deformity. Skin: Skin color, texture, turgor normal.  Radiation changes to back. Vesicles under lower lip, cheek. Mouth mucosa improving  Neurologic:  Neurovascularly intact without any focal sensory/motor deficits.  Cranial nerves: II-XII intact, grossly non-focal.  Psychiatric: Alert and oriented, thought content appropriate, normal insight  Capillary Refill: Brisk,< 3 seconds   Peripheral Pulses: +2 palpable, equal bilaterally       Labs:   Recent Labs     03/18/19  0448 03/19/19  0634 03/19/19  2200 03/20/19  0600   WBC 11.2* 7.4  --  9.0   HGB 7.4* 6.6* 8.1* 8.0*   HCT 22.1* 19.5* 23.7* 23.2*    173  --  180     Recent Labs     03/18/19  0448 03/19/19  0634 03/20/19  0600    135* 139   K 3.5 3.1* 3.7    100 102   CO2 26 26 26   BUN 11 12 11   CREATININE 0.6* <0.5* 0.6*   CALCIUM 7.8* 7.6* 7.8*     Urinalysis:      Lab Results   Component Value Date    NITRU Negative 03/10/2019    WBCUA 3-5 03/10/2019    RBCUA 0-2 03/10/2019    BLOODU Negative 03/10/2019    SPECGRAV 1.025 03/10/2019    GLUCOSEU Negative 03/10/2019       Radiology:  XR CHEST PORTABLE   Final Result   Interval development of mild pulmonary edema. There is a small left-sided pleural effusion with more focal consolidation   the left lung base, atelectasis versus pneumonia. IR PICC WO SQ PORT/PUMP > 5 YEARS   Final Result   Successful placement of PICC line. CT ABDOMEN PELVIS WO CONTRAST Additional Contrast? None   Final Result   No evidence retroperitoneal hematoma. Interval colonic resection. Small volume loculated collection in the left   pericolic gutter, with surgical drain terminating adjacent. Mildly dilated loops of small bowel with scattered air-fluid levels, most   likely postoperative ileus. Trace pleural effusions and bibasilar airspace disease representing   atelectasis or pneumonia. VL Extremity Venous Bilateral   Final Result      CT CHEST PULMONARY EMBOLISM W CONTRAST   Final Result   Bilateral pulmonary emboli      Scattered opacities throughout the lungs, greatest at the lung bases, with   tiny pleural effusions. Changes are increased compared to prior      Sclerotic osseous metastatic disease as described previously      The findings were sent to the Radiology Results [address] at 3:57   pm on 3/16/2019to be communicated to a licensed caregiver. XR CHEST 1 VW   Final Result   Shallow inflation with findings consistent with basilar atelectasis. Left   pleural thickening versus trace effusion. CT CHEST ABDOMEN PELVIS WO CONTRAST   Final Result   Perforated sigmoid diverticulitis and pneumoperitoneum unchanged. Stable   left Emerita colonic air-fluid collection may represent an intramural or   extramural abscess or giant diverticulum. Infrarenal abdominal aortic aneurysm and aorto bi-iliac stent. Other incidental findings as above. CT ABDOMEN PELVIS W IV CONTRAST Additional Contrast? None   Final Result   Acute perforated descending colon diverticulitis.   There is free air in the   abdomen and there is a small extraluminal gas and fluid collection/abscess   adjacent to the descending colon measuring 4.1 cm in AP diameter and 6 cm in   length. There is diverticulosis of the entire colon. Cholelithiasis. 4.7 cm infrarenal abdominal aortic aneurysm status post endograft repair with   a patent aorto bi-iliac graft. There is suspicion of an endoleak with blush   of contrast enhancement suspected in the inferior aspect of the native   aneurysm. As a noncontrast study was not performed, calcification of the   intraluminal thrombus accounting for the apparent enhancement is not   excluded. 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 Comparison with a noncontrast CT examination would be   helpful. Critical results were called by Dr. Deepa Johnson MD to [facility] on   3/7/2019 at 20:35. Assessment/Plan:    Active Hospital Problems    Diagnosis Date Noted    Cardiomyopathy ([address] [I42.9]     NSVT (nonsustained ventricular tachycardia) (HCC) [I47.2]     Other pulmonary embolism without acute cor pulmonale (HCC) [I26.99]     Acute respiratory failure with hypoxia ([address] [J96.01]     H/O colectomy [Z90.49]     Primary malignant neoplasm of lung metastatic to other site (HCC) [C34.90]     Herpes zoster oticus [B02.21]     Hearing loss of right ear [H91.91]     Chemotherapy-induced neutropenia ([address] [D70.1, T45.1X5A]     Mucositis [K12.30]     Diverticulitis [K57.92] 03/07/2019    Diverticulitis of large intestine with perforation and abscess without bleeding [K57.20]        Acute perforated recurrent diverticulitis/pneumoperitoneum/abscess in setting of immunosuppression due to chemotherapy:   - CT A/P on admission demonstrated acute perforated colon diverticulitis with free air and fluid collection.   - General surgery following.  Pt is status post laparoscopic converted to open left colectomy on 3/14/19.  - Continue Merrem and Diflucan per ID, D#6/7.   - Continue prn pain control. Acute hypoxic respiratory failure due to bilateral pulmonary emboli:   - CT chest 3/16 showed thrombus in the distal aspect of the left main pulmonary artery extending into the left upper and left lower lobe pulmonary arterial branches. Additional Thrombus is seen in the distal aspect of the right main pulmonary artery,extending into the right upper right middle and right lower lobe pulmonary arterial branches. - Continue heparin gtt started 3/16.   - Wean supplemental O2 as tolerated. He is not normally on home O2. He is currently requiring 7 liters HFNC.   - Lower extremity dopplers +DVT. Vascular surgery consulted, no indication for IVC filter at this time. - Pulmonary consulted/following. Metastatic lung cancer, stage 4 with bone mets:  - Hem/onc is following. Chemo on hold to allow for surgical healing. Thrombocytopenia (resolved) now with hypercoagulable state:  - Platelets required preoperatively. - On heparin gtt, bleeding risk high post op, monitor.  - Defer antiplatelet reinitiation to heme given fragility. Neutropenia due to chemotherapy (resolved):  - S/p Neulasta on 3/1. S/p Granix on 3/8. - Wound care for his radiation ulcer on his back. Anemia:  -  Pt is s/p 1 unit PBRC for Hgb 6.6 on 3/19. No overt signs of bleeding. CT A/P showed no RP bleed. PPI added per Hem/Onc. Pt is s/p B12 injection. Stool for occult blood is +. Consult GI.   - Transfuse for Hgb less than 8 given history of CAD. PAD/CAD/HTN:   -Defer antiplatelet (aspirin and plavix) management to hem/onc given the complexity of his present situation, bleeding risk and thrombotic states. Pt to be restarted on integrillin infusion - monitor for bleeding.   - ECHO shows reduced EF of 30-35%. Previous ECHO shows EF 40-45%. - Cardiology is following. Restarting on lisinopril, aldactone, changing atenolol to Toprol. Continue statin.     NSVT:   - Known history of VT s/p ICD. Went into VT on 3/12, PMD went off. Continue amiodarone. Hyponatremia likely SIADH secondary to lung cancer (resolved):  - S/p samsca. Nephrology following. Monitor daily BMP. Hypokalemia/hypomagnesemia:  - Monitor and replete as needed. Zoster oticus:  - Completed 7 days of acyclovir.       DVT Prophylaxis: Pt is on heparin infusion   Diet: Dietary Nutrition Supplements: Other Supplement (ensure)  DIET LOW FIBER;  Code Status: Full Code    PT/OT Eval Status:  Ordered     Dispo - Uncertain     Taryn Rico, APRN - CNP

## 2019-03-20 NOTE — ONCOLOGY
ONCOLOGY HEMATOLOGY CARE PROGRESS NOTE      SUBJECTIVE:     Afebrile. On 7 LPM by high flow NC. Started Lasix 20 mg IV BID on 3/19/2019. UOP 2820 mL on 3/19. Weight is trending down. Low fiber diet. Loose stools this AM but none overnight. ROS:   The remaining 10 point review of symptoms is unremarkable. OBJECTIVE        Physical    VITALS:  /76   Pulse 84   Temp 98 °F (36.7 °C) (Oral)   Resp 14   Ht 5' 9\" (1.753 m)   Wt 177 lb 8 oz (80.5 kg)   SpO2 92%   BMI 26.21 kg/m²   TEMPERATURE:  Current - Temp: 98 °F (36.7 °C); Max - Temp  Av.4 °F (36.9 °C)  Min: 98 °F (36.7 °C)  Max: 98.7 °F (37.1 °C)  PULSE OXIMETRY RANGE: SpO2  Av.6 %  Min: 90 %  Max: 98 %  24HR INTAKE/OUTPUT:      Intake/Output Summary (Last 24 hours) at 3/20/2019 0747  Last data filed at 3/20/2019 0444  Gross per 24 hour   Intake 1224.3 ml   Output 3210 ml   Net -1985.7 ml       CONSTITUTIONAL:  awake, alert, cooperative, no apparent distress, HEENT oral pharynx , no scleral icterus, wearing high flow nasal canula   HEMATOLOGIC/LYMPHATICS:  no cervical lymphadenopathy, no supraclavicular lymphadenopathy, no axillary lymphadenopathy and no inguinal lymphadenopathy  LUNGS:  No increased work of breathing, good air exchange, clear to auscultation bilaterally, no crackles or wheezing  CARDIOVASCULAR:  , regular rate and rhythm, normal S1 and S2, no S3 or S4, and no murmur noted  ABDOMEN: normal BS,  dressing to DIAN drain site to RLQ is with slight serosanguinous drainage, NON distended, NON tender, DIAN drain in place with serosanguinous drainage , no rebound tenderness, SOFT   Back: no bruising, purpura, or hematomas noted   MUSCULOSKELETAL:  There is no redness, warmth, or swelling of the joints. EXTREMETIES: BLE edema +1, non pitting   NEUROLOGIC:  Awake, alert, oriented to name, place and time. Cranial nerves II-XII are grossly intact. Motor is 5 out of 5 bilaterally.  Hip flexion 5/5 bilaterally in the bed   SKIN:  Crusting and scabbed lesions to right side lips and chin , right temporal lesion with crusting over as well. IMPROVED FROM ADMIT ON ACYCLOVIR, LESIONS DECREASING IN SIZE        Data      Recent Labs     03/18/19  0448 03/19/19  0634 03/19/19  2200 03/20/19  0600   WBC 11.2* 7.4  --  9.0   HGB 7.4* 6.6* 8.1* 8.0*   HCT 22.1* 19.5* 23.7* 23.2*    173  --  180   MCV 89.7 89.5  --  88.9        Recent Labs     03/18/19  0448 03/19/19  0634 03/20/19  0600    135* 139   K 3.5 3.1* 3.7    100 102   CO2 26 26 26   BUN 11 12 11   CREATININE 0.6* <0.5* 0.6*     No results for input(s): AST, ALT, ALB, BILIDIR, BILITOT, ALKPHOS in the last 72 hours.     Magnesium:    Lab Results   Component Value Date    MG 1.50 03/19/2019    MG 1.50 03/18/2019    MG 1.70 03/17/2019         Problem List  Patient Active Problem List   Diagnosis    Elevated fasting glucose    CAD (coronary artery disease)    PAD (peripheral artery disease) ([address]    Essential hypertension    Mixed hyperlipidemia    Sprain of right foot    Sprain of anterior talofibular ligament of right ankle    Closed nondisplaced fracture of fifth right metatarsal bone    Closed fracture of proximal lateral malleolus of ankle with routine healing    Right foot pain    Right ankle pain    Nondisplaced fracture of fifth right metatarsal bone with routine healing    History of pulmonary emphysema    Chronic obstructive pulmonary disease with (acute) exacerbation (HCC)    Pulmonary nodule, left    Abnormal bone scan of thoracic spine    Back pain    Visit for monitoring Plavix therapy    History of ventricular tachycardia    Gallstones    Hemoptysis    Former smoker    Elevated PSA    Adenocarcinoma of left lung ([address]    Acute diverticulitis    Pancytopenia ([address]    Diverticulitis of large intestine with perforation and abscess without bleeding    Diverticulitis    Chemotherapy-induced neutropenia every 9 weeks at least (every 3rd cycle). - CT chest/abd/pelvis, 3/10/2019, showed improvement in the left hilar soft tissue and radiation changes. No obvious new disease.      HTN  - holding home Lisinopril due to Hyperkalemia , creat stable   - Was on Norvasc at one point, but pt admits to stopping this drug weeks ago   - He is not hypertensive at this point     Shingles to the V3 dermatome of the trigeminal nerve  - Now on oral Acyclovir 800 5 times a day (deescalated from IV dosing)  - Acyclovir finished today 3/19     Radiation Dermatitis  -Silvadene     Hypokalemia  - K is 3.1 and Mag 1.5, being replaced   - Tight lyte support due to underlying SVT per cardiology      Mucositis  - Fluconzaole/MMW. SVT  - cardiology following, Amiodarone     Pacemaker  - Rhythm strip looks more like he was paced out of an arrhythmia than cardioverted. - NSR/ST on tele     Diarrhea  - CDIFF neg   - Imodium PRN     Volume Overload  - He is over 14 liters  - pulm following, on 7 liters high flow   - Chest XRAY in AM   - Hold on diuresing the patient until acute bleed ruled out - defer diuresis to nephrology team Dr. Chaudhari Persons     PAD/CAD  - off Plavix for now. Perhaps resume 3/21?  - holding ASA due to possible bleed   - s/p PCI 2011   - hx of aortic aneurysm repair Dr. Neal Coronel     I would estimate that he can safely delay chemotherapy for 2 months if necessary to allow wound healing. Also, if needed, he could transition to maintenance Pembrolizumab early, which would not effect wound healing. ONCOLOGIC DISPOSITION:  TBD, per surgery. Likely not to dc for at least a week due to new PE/DVT possible R heart strain and falling Hgb on Heparin drip, rule out GI/retroperitoneal bleed.        May be reached through [address] [place] [address]

## 2019-03-20 NOTE — PROGRESS NOTES
Discussed with general surgery Dr. Ama Shi starting Integrilin drip. Okay to start anti-platelet drip per general surgery. Will resume baby ASA today as well. Will plan to transition to Plavix once closer to dc. Integrilin drip has a shorter half life; therefore, will be easier to stop drug if he develops a bleeding episode. Goal is to prevent clotting off his endovascular aneurysm endograft endograft repair site 2011 (Peripheral arterial disease secondary to smoking). He has a history of right pop aneurysm repair and endovascular aneurysm repair of infrarenal aortic aneurysm, and thrombectomy of right popliteal stent in 2011.

## 2019-03-21 NOTE — PROGRESS NOTES
[facility] GENERAL SURGERY    Surgery Progress Note           POD # 7    PATIENT NAME: Lu Tello     TODAY'S DATE: 3/21/2019    INTERVAL HISTORY:    Pt  Had EGD this AM.  Overall feeling better, but noting increased pain in L flank where his pain had been pre-op. Having loose stools, taking PO. OBJECTIVE:   VITALS:  /78   Pulse 80   Temp 98.2 °F (36.8 °C) (Oral)   Resp 16   Ht 5' 9\" (1.753 m)   Wt 179 lb 6.4 oz (81.4 kg)   SpO2 100%   BMI 26.49 kg/m²     INTAKE/OUTPUT:    I/O last 3 completed shifts: In: 604.6 [P.O.:5; I.V.:499.6; IV Piggyback:100]  Out: 0831 [Urine:1625; Drains:41]  No intake/output data recorded. CONSTITUTIONAL:  awake and alert  LUNGS:     ABDOMEN:    , soft, non-distended, tenderness noted in the left lower quadrant   INCISION: clean, dry, no drainage, healing    Data:  CBC:   Recent Labs     03/19/19  0634 03/19/19  2200 03/20/19  0600 03/21/19  0615   WBC 7.4  --  9.0 11.6*   HGB 6.6* 8.1* 8.0* 8.0*   HCT 19.5* 23.7* 23.2* 23.4*     --  180 197     BMP:    Recent Labs     03/19/19  0634 03/20/19  0600 03/21/19  0615   * 139 140   K 3.1* 3.7 3.3*    102 101   CO2 26 26 26   BUN 12 11 12   CREATININE <0.5* 0.6* 0.6*   GLUCOSE 108* 137* 123*     Hepatic: No results for input(s): AST, ALT, ALB, BILITOT, ALKPHOS in the last 72 hours. Mag:      Recent Labs     03/19/19  0634 03/20/19  0600 03/21/19  0615   MG 1.50* 1.60* 1.30*      Phos:   No results for input(s): PHOS in the last 72 hours. INR: No results for input(s): INR in the last 72 hours.       Radiology Review:       ASSESSMENT AND PLAN:  69 y.o. male status post left colectomy   - GI - resume low fiber diet   - If L flank pain worsens and/or WBC rises further, will need to check CT abd/pel to r/o postop abscess   - Continue Heparin gtt for now until no further acute medical issues (ie possible abscess)   - cont IV abx   - PT/OT         Electronically signed by Sandy Hinojosa MD

## 2019-03-21 NOTE — PROGRESS NOTES
[facility]    [facility]              [phone]                 Plan :     Sodium improved  Still on high flow oxygen- but coming down  Now on po lasix  Dr Jesusita Peoples will be seeing the patient on 3/22/19  Dr Sergio Newman is on call for the weekend- 3/23/19- 3/24/19    Assessment :     Hyponatremia  Acute on chronic  Down to 123- now 140  Suspect SIADH from ca lung, pain, and nausea  Ca ca lung( adenocarcinoma) with vertebral mets  Sodium low side since 2/19    Cortisol- normal, TSH - 1.65, LFT normal  Echo 2/12- EF of 45-50%, AL mildly dilated    Generalized Edema  High O2 requirement  +ve 11 L since admission( peak 14 L +ve)  Echo: 3/19- EF of 30-35%, mild LVH, LV severely dilated  On lasix    Sob also due to lung mass and PE    Acute Kidney Injury  Cr peaked to 1.6- now 0.6  Making urine  Likely hemodynamic changes   Now BP better  Surgery done  On 3/14/19    Hypotension  BP: ()/(43-80) Pulse:  [79-82]   Normally hypertensive  Now has fever, neutropenia- getting better    Hypokalemia  Getting potassium and mg replaced           pancytopenia  Ca lung  Diverticulitis with perforation- sp surgery, chato-colectomy  Radiation dermatitis  Has has acyclovir IV   Mucositis  AAA      [facility] would like to thank DEBRA Gale   for opportunity to serve this patient      Please call with questions at-   24 Hrs Answering service [phone] or  7 am- 5 pm via Perfect serve or cell phone  Dr. Wheatley Apo for consult :     hyponatremia     HPI :     From consult note-     Doris Perez is a 69 y.o. male presented to   the hospital on 3/7/2019 with left lower quadrant pain. He has that for a week. Was hospitalized with diverticulitis, DCed,  And pain never went away. Also low grade fever. No nausea. Mild pain in belly  Diagnosed to have fever and also diverticulitis. Planning for surgery. 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  Has pancytopenia from chemotherapy. We are consulted for hyponatremia and related issues     Interval History:     Oxygen still on 5 L         ROS:     SOB- +Ve on exertion  Edema- none  Nausea/vomiting- none  Poor appetite-No  Confusion- no  Urinary complaints- no  Any other complaints- no  All other ROS are reviewed and are Negative           Medication:     Scheduled Meds:   magnesium sulfate  4 g Intravenous Once    pantoprazole  40 mg Intravenous Daily    furosemide  40 mg Oral BID    potassium chloride  20 mEq Intravenous Q1H    aspirin  81 mg Oral Daily    metoprolol succinate  100 mg Oral Daily    lisinopril  5 mg Oral Nightly    spironolactone  25 mg Oral Daily    magnesium oxide  400 mg Oral Daily    sodium chloride flush  10 mL Intravenous 2 times per day    potassium chloride  20 mEq Oral BID    simvastatin  40 mg Oral Nightly    amiodarone  200 mg Oral BID    meropenem  1 g Intravenous Q8H    fluconazole  200 mg Intravenous Q24H    insulin lispro  0-6 Units Subcutaneous TID WC    insulin lispro  0-3 Units Subcutaneous Nightly    folic acid  1 mg Oral Daily    oyster shell calcium/vitamin D  1 tablet Oral Daily    silver sulfADIAZINE   Topical Daily     Continuous Infusions:   sodium chloride      eptifibatide 2 mcg/kg/min (03/21/19 0505)    heparin (porcine) 18.3 mL/hr (03/21/19 0133)    dextrose 100 mL/hr (03/08/19 1700)     PRN Meds:. loperamide, sodium chloride flush, oxyCODONE, HYDROmorphone, heparin (porcine), heparin (porcine), magic (miracle) mouthwash with nystatin, glucose, dextrose, glucagon (rDNA), dextrose, ondansetron, acetaminophen       Vitals :     Vitals:    03/21/19 1002   BP: 122/77   Pulse: 82   Resp: 18   Temp: 98.7 °F (37.1 °C)   SpO2: 96%       I & O :       Intake/Output Summary (Last 24 hours) at 3/21/2019 1214  Last data filed at 3/21/2019 0945  Gross per 24 hour   Intake 255 ml   Output 1415 ml   Net -1160 ml        Physical Examination :     General

## 2019-03-21 NOTE — PROGRESS NOTES
Occupational Therapy    OT/PT treatment session attempted, patient off floor for EGD. Will continue to follow.   Claudene Cowing, OTR/AJAY Gloria, PT, DPT

## 2019-03-21 NOTE — PLAN OF CARE
Problem: Infection:  Goal: Will remain free from infection  Description  Will remain free from infection  Outcome: Ongoing   Pt remains afebrile. Still on antibiotics  Problem: Pain:  Goal: Control of acute pain  Description  Control of acute pain  Outcome: Ongoing   Controlled with available pain meds. Problem: Skin Integrity:  Goal: Skin integrity will stabilize  Description  Skin integrity will stabilize  Outcome: Ongoing   Remains free from any new breakdown. Turn q 2 hours .

## 2019-03-21 NOTE — PROGRESS NOTES
Hospitalist Progress Note      PCP: Lindsey Maddox MD    Date of Admission: 3/7/2019    Chief Complaint: Abdominal pain     Hospital Course:  69 y.o. male who presented to [facility] with LLQ abdominal pain. Pt was recently d/c'd for diverticulitis, states pain never went away. C/o low grade fevers for 2 weeks. States pain not as severe as previous admission. Had chemo on Friday, states received neulasta. Denies cp, n/v, sob. Subjective:   Pt is sitting up in bed and eating lunch. On 6 liters HFNC. Afebrile. He denies dyspnea or chest pain. No N/V. + diarrhea. Pt's wife is at bedside.      Medications:  Reviewed    Infusion Medications    eptifibatide 2 mcg/kg/min (03/21/19 1230)    heparin (porcine) 18.3 mL/hr (03/21/19 1442)    dextrose 100 mL/hr (03/08/19 1700)     Scheduled Medications    pantoprazole  40 mg Intravenous Daily    furosemide  40 mg Oral BID    aspirin  81 mg Oral Daily    metoprolol succinate  100 mg Oral Daily    lisinopril  5 mg Oral Nightly    spironolactone  25 mg Oral Daily    magnesium oxide  400 mg Oral Daily    sodium chloride flush  10 mL Intravenous 2 times per day    potassium chloride  20 mEq Oral BID    simvastatin  40 mg Oral Nightly    amiodarone  200 mg Oral BID    meropenem  1 g Intravenous Q8H    insulin lispro  0-6 Units Subcutaneous TID WC    insulin lispro  0-3 Units Subcutaneous Nightly    folic acid  1 mg Oral Daily    oyster shell calcium/vitamin D  1 tablet Oral Daily    silver sulfADIAZINE   Topical Daily     PRN Meds: loperamide, sodium chloride flush, oxyCODONE, HYDROmorphone, heparin (porcine), heparin (porcine), magic (miracle) mouthwash with nystatin, glucose, dextrose, glucagon (rDNA), dextrose, ondansetron, acetaminophen      Intake/Output Summary (Last 24 hours) at 3/21/2019 1551  Last data filed at 3/21/2019 1435  Gross per 24 hour   Intake 604.6 ml   Output 1666 ml   Net -1061.4 ml       Physical Exam Performed:    /80 Pulse 78   Temp 98 °F (36.7 °C) (Oral)   Resp 16   Ht 5' 9\" (1.753 m)   Wt 179 lb 6.4 oz (81.4 kg)   SpO2 99%   BMI 26.49 kg/m²     General appearance: In bed, No apparent distress, appears stated age and cooperative. HEENT: Pupils equal, round, and reactive to light. Conjunctivae/corneas clear. Vesicles in outer ear canal noted   Neck: Supple, with full range of motion. No jugular venous distention. Trachea midline. Respiratory:  increased respiratory effort. DM to auscultation, bilaterally without Rales/Wheezes/Rhonchi. on NRB  Cardiovascular: tachycardic rate and rhythm with normal S1/S2 without murmurs, rubs or gallops. Abdomen: Abd dressing d/i, DIAN drain in place with +oupt. Musculoskeletal: No clubbing, cyanosis or edema bilaterally. Full range of motion without deformity. Skin: Skin color, texture, turgor normal.  Radiation changes to back. Vesicles under lower lip, cheek. Mouth mucosa improving  Neurologic:  Neurovascularly intact without any focal sensory/motor deficits. Cranial nerves: II-XII intact, grossly non-focal.  Psychiatric: Alert and oriented, thought content appropriate, normal insight  Capillary Refill: Brisk,< 3 seconds   Peripheral Pulses: +2 palpable, equal bilaterally       Labs:   Recent Labs     03/19/19  0634 03/19/19  2200 03/20/19  0600 03/21/19  0615   WBC 7.4  --  9.0 11.6*   HGB 6.6* 8.1* 8.0* 8.0*   HCT 19.5* 23.7* 23.2* 23.4*     --  180 197     Recent Labs     03/19/19  0634 03/20/19  0600 03/21/19  0615   * 139 140   K 3.1* 3.7 3.3*    102 101   CO2 26 26 26   BUN 12 11 12   CREATININE <0.5* 0.6* 0.6*   CALCIUM 7.6* 7.8* 7.8*     Urinalysis:      Lab Results   Component Value Date    NITRU Negative 03/10/2019    WBCUA 3-5 03/10/2019    RBCUA 0-2 03/10/2019    BLOODU Negative 03/10/2019    SPECGRAV 1.025 03/10/2019    GLUCOSEU Negative 03/10/2019       Radiology:  XR CHEST PORTABLE   Final Result   Interval development of mild pulmonary edema. There is a small left-sided pleural effusion with more focal consolidation   the left lung base, atelectasis versus pneumonia. IR PICC WO SQ PORT/PUMP > 5 YEARS   Final Result   Successful placement of PICC line. CT ABDOMEN PELVIS WO CONTRAST Additional Contrast? None   Final Result   No evidence retroperitoneal hematoma. Interval colonic resection. Small volume loculated collection in the left   pericolic gutter, with surgical drain terminating adjacent. Mildly dilated loops of small bowel with scattered air-fluid levels, most   likely postoperative ileus. Trace pleural effusions and bibasilar airspace disease representing   atelectasis or pneumonia. VL Extremity Venous Bilateral   Final Result      CT CHEST PULMONARY EMBOLISM W CONTRAST   Final Result   Bilateral pulmonary emboli      Scattered opacities throughout the lungs, greatest at the lung bases, with   tiny pleural effusions. Changes are increased compared to prior      Sclerotic osseous metastatic disease as described previously      The findings were sent to the Radiology Results [address] at 3:57   pm on 3/16/2019to be communicated to a licensed caregiver. XR CHEST 1 VW   Final Result   Shallow inflation with findings consistent with basilar atelectasis. Left   pleural thickening versus trace effusion. CT CHEST ABDOMEN PELVIS WO CONTRAST   Final Result   Perforated sigmoid diverticulitis and pneumoperitoneum unchanged. Stable   left [facility] colonic air-fluid collection may represent an intramural or   extramural abscess or giant diverticulum. Infrarenal abdominal aortic aneurysm and aorto bi-iliac stent. Other incidental findings as above. CT ABDOMEN PELVIS W IV CONTRAST Additional Contrast? None   Final Result   Acute perforated descending colon diverticulitis.   There is free air in the   abdomen and there is a small extraluminal gas and fluid collection/abscess adjacent to the descending colon measuring 4.1 cm in AP diameter and 6 cm in   length. There is diverticulosis of the entire colon. Cholelithiasis. 4.7 cm infrarenal abdominal aortic aneurysm status post endograft repair with   a patent aorto bi-iliac graft. There is suspicion of an endoleak with blush   of contrast enhancement suspected in the inferior aspect of the native   aneurysm. As a noncontrast study was not performed, calcification of the   intraluminal thrombus accounting for the apparent enhancement is not   excluded. 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 Comparison with a noncontrast CT examination would be   helpful. Critical results were called by Dr. Sergio Johnson MD to [facility] on   3/7/2019 at 20:35. Assessment/Plan:    Active Hospital Problems    Diagnosis Date Noted    Cardiomyopathy ([address] [I42.9]     NSVT (nonsustained ventricular tachycardia) ([facility]) [I47.2]     Other pulmonary embolism without acute cor pulmonale (HCC) [I26.99]     Acute respiratory failure with hypoxia ([address] [J96.01]     H/O colectomy [Z90.49]     Primary malignant neoplasm of lung metastatic to other site (HCC) [C34.90]     Herpes zoster oticus [B02.21]     Hearing loss of right ear [H91.91]     Chemotherapy-induced neutropenia ([address] [D70.1, T45.1X5A]     Mucositis [K12.30]     Diverticulitis [K57.92] 03/07/2019    Diverticulitis of large intestine with perforation and abscess without bleeding [K57.20]        Acute perforated recurrent diverticulitis/pneumoperitoneum/abscess in setting of immunosuppression due to chemotherapy:   - CT A/P on admission demonstrated acute perforated colon diverticulitis with free air and fluid collection.   - General surgery following. Pt is status post laparoscopic converted to open left colectomy on 3/14/19.  - Continue Merrem and Diflucan per ID, D#7/7.   - Continue prn pain control. Acute hypoxic respiratory failure due to bilateral pulmonary emboli:   - CT chest 3/16 showed thrombus in the distal aspect of the left main pulmonary artery extending into the left upper and left lower lobe pulmonary arterial branches. Additional Thrombus is seen in the distal aspect of the right main pulmonary artery,extending into the right upper right middle and right lower lobe pulmonary arterial branches. - Continue heparin gtt started 3/16.   - Wean supplemental O2 as tolerated. He is not normally on home O2. He is currently requiring 6 liters HFNC.   - Lower extremity dopplers +DVT. Vascular surgery consulted, no indication for IVC filter at this time. - Pulmonary consulted/following. Metastatic lung cancer, stage 4 with bone mets:  - Hem/onc is following. Chemo on hold to allow for surgical healing. Thrombocytopenia (resolved) now with hypercoagulable state:  - Platelets required preoperatively. - On heparin gtt, bleeding risk high post op, monitor.  - Defer antiplatelet reinitiation to heme given fragility. Neutropenia due to chemotherapy (resolved):  - S/p Neulasta on 3/1. S/p Granix on 3/8. - Wound care for his radiation ulcer on his back. Anemia:  -  Pt is s/p 1 unit PBRC for Hgb 6.6 on 3/19. No overt signs of bleeding. CT A/P showed no RP bleed. PPI added per Hem/Onc. Pt is s/p B12 injection. Stool for occult blood is +. GI consulted, s/p EGD on 3/21 which was normal.   - Transfuse for Hgb less than 8 given history of CAD. PAD/CAD/HTN:   -Defer antiplatelet (aspirin and plavix) management to hem/onc given the complexity of his present situation, bleeding risk and thrombotic states. Pt to be restarted on integrillin infusion on 3/20 - monitor for bleeding.   - ECHO shows reduced EF of 30-35%. Previous ECHO shows EF 40-45%. - Cardiology is following. Continue on lisinopril, aldactone, Toprol and statin. NSVT:   - Known history of VT s/p ICD.  Went into VT on 3/12, PMD went off. Continue amiodarone. Hyponatremia likely SIADH secondary to lung cancer (resolved):  - S/p samsca. Nephrology following. Monitor daily BMP. Hypokalemia/hypomagnesemia:  - Monitor and replete as needed. Zoster oticus:  - Completed 7 days of acyclovir.       DVT Prophylaxis: Pt is on heparin infusion   Diet: Dietary Nutrition Supplements: Other Supplement (ensure)  DIET LOW FIBER;  Code Status: Full Code    PT/OT Eval Status:  Ordered     Dispo - Uncertain     Taryn Yancey, APRN - CNP

## 2019-03-21 NOTE — ONCOLOGY
Crusting and scabbed lesions to right side lips and chin , right temporal lesion with crusting over as well. IMPROVED FROM ADMIT ON ACYCLOVIR, LESIONS DECREASING IN SIZE        Data      Recent Labs     03/19/19  0634 03/19/19  2200 03/20/19  0600 03/21/19  0615   WBC 7.4  --  9.0 11.6*   HGB 6.6* 8.1* 8.0* 8.0*   HCT 19.5* 23.7* 23.2* 23.4*     --  180 197   MCV 89.5  --  88.9 88.4        Recent Labs     03/19/19  0634 03/20/19  0600 03/21/19  0615   * 139 140   K 3.1* 3.7 3.3*    102 101   CO2 26 26 26   BUN 12 11 12   CREATININE <0.5* 0.6* 0.6*     No results for input(s): AST, ALT, ALB, BILIDIR, BILITOT, ALKPHOS in the last 72 hours.     Magnesium:    Lab Results   Component Value Date    MG 1.30 03/21/2019    MG 1.60 03/20/2019    MG 1.50 03/19/2019         Problem List  Patient Active Problem List   Diagnosis    Elevated fasting glucose    CAD (coronary artery disease)    PAD (peripheral artery disease) ([address]    Essential hypertension    Mixed hyperlipidemia    Sprain of right foot    Sprain of anterior talofibular ligament of right ankle    Closed nondisplaced fracture of fifth right metatarsal bone    Closed fracture of proximal lateral malleolus of ankle with routine healing    Right foot pain    Right ankle pain    Nondisplaced fracture of fifth right metatarsal bone with routine healing    History of pulmonary emphysema    Chronic obstructive pulmonary disease with (acute) exacerbation (HCC)    Pulmonary nodule, left    Abnormal bone scan of thoracic spine    Back pain    Visit for monitoring Plavix therapy    History of ventricular tachycardia    Gallstones    Hemoptysis    Former smoker    Elevated PSA    Adenocarcinoma of left lung ([address]    Acute diverticulitis    Pancytopenia ([address]    Diverticulitis of large intestine with perforation and abscess without bleeding    Diverticulitis    Chemotherapy-induced neutropenia (HCC)    Mucositis    Herpes zoster sooner, since this does not affect wound healing. Plans for chemo/immunotherapy outpatient. - Cont folate while on alimta (folate antagonist), needs folate for 3 months after stopping Alimta. Needs B12 shot every 9 weeks at least (every 3rd cycle). - CT chest/abd/pelvis, 3/10/2019, showed improvement in the left hilar soft tissue and radiation changes. No obvious new disease.      HTN  - holding home Lisinopril due to Hyperkalemia , creat stable   - Was on Norvasc at one point, but pt admits to stopping this drug weeks ago   - He is not hypertensive at this point     Shingles to the V3 dermatome of the trigeminal nerve  - Now on oral Acyclovir 800 5 times a day (deescalated from IV dosing)  - Acyclovir finished today 3/19     Radiation Dermatitis  -Silvadene     Hypokalemia  - K is 3.3 and Mag 1.3, being replaced   - Tight lyte support due to underlying SVT per cardiology      Mucositis  - Fluconzaole/MMW. SVT  - cardiology following, Amiodarone     Pacemaker  - Rhythm strip looks more like he was paced out of an arrhythmia than cardioverted. - NSR/ST on tele     Diarrhea  - CDIFF neg   - Imodium PRN     Volume Overload  - He is over 14 liters  - pulm following, on 6 liters high flow   - Lasix BID now, off loading well     PAD/CAD  - off Plavix for now  - ASA restarted on 3/20   - s/p PCI 2011   - hx of aortic aneurysm repair Dr. Femi Brewer   - Cont hep drip for PE  - Integrillin drip started 3/20, continue, no bleeding     I would estimate that he can safely delay chemotherapy for 2 months if necessary to allow wound healing. Also, if needed, he could transition to maintenance Pembrolizumab early, which would not effect wound healing. ONCOLOGIC DISPOSITION:  TBD, per surgery. Can dc from heme onc standpoint.      Brenda Juarez CNP   May be reached through [facility]

## 2019-03-21 NOTE — PROGRESS NOTES
[address]   Daily Progress Note    Admit Date:  3/7/2019  HPI:    Chief Complaint   Patient presents with    Abdominal Pain     LLQ pain, was seen last week for Diverticulitis, patient states he cannot get rid of the pain, had Chemo on Friday       Presented with abdominal pain, low grade fevers, recent hospitalization for diverticulitis -> acute perforated colon diverticulitis, s/p left colectomy 3/14/19. Found to have large bilateral PE as well as DVT. Hx of metastatic lung cancer with mets to bones on chemo with neutropenia, anemia and thrombocytopenia. Cardiology consulted for NSVT. Lytes being replaced. Hx of CAD/ stents, PAD, NSVT s/p ICD on amiodarone. Echo resulted EF 30-35%. Meds adjusted (atenolo ->Toprol, resume lisinopril, start spironolactone). Subjective:  Mr. Raj De La Rosa s/p EGD this am, no bleeding source found. Hungry, appetite good. Denies chest pain or shortness of breath or palpitations.    Objective:   /77   Pulse 82   Temp 98.7 °F (37.1 °C) (Oral)   Resp 18   Ht 5' 9\" (1.753 m)   Wt 179 lb 6.4 oz (81.4 kg)   SpO2 96%   BMI 26.49 kg/m²       Intake/Output Summary (Last 24 hours) at 3/21/2019 1111  Last data filed at 3/21/2019 0945  Gross per 24 hour   Intake 255 ml   Output 1490 ml   Net -1235 ml     Wt Readings from Last 3 Encounters:   03/21/19 179 lb 6.4 oz (81.4 kg)   02/28/19 184 lb 9.6 oz (83.7 kg)   02/19/19 183 lb (83 kg)     Active Problems:    Diverticulitis of large intestine with perforation and abscess without bleeding    Diverticulitis    Chemotherapy-induced neutropenia (HCC)    Mucositis    Herpes zoster oticus    Hearing loss of right ear    Acute respiratory failure with hypoxia (HCC)    H/O colectomy    Primary malignant neoplasm of lung metastatic to other site [facility])    NSVT (nonsustained ventricular tachycardia) [facility]    Other pulmonary embolism without acute cor pulmonale (HCC)    Cardiomyopathy ([address]  Resolved Problems:    * No resolved hospital problems. *       ASSESSMENT:   1. NSVT - improved, continue amiodarone, continue toprol  2. CAD - no angina, hx of stents but C in 2016 without obstructive disease, on statin and BB, ASA resumed   3. Cardiomyopathy, unknown etiology - EF has gradually dropped from 45-50% (echo,2011), 47% (stress, 2014), 40-45% (LVG, 2016) to 30-35% now (echo), s/p ICD  4. Large PE - on IV Heparin, to eventually change to DOAC per hem/onc (agree)  5. Perforated diverticulitis s/p colectomy - diarrhea again yesterday but stopped with immodium  6. Metastatic lung cancer with mets to bone - per hem/onc  7. AAA s/p endograft repair - stable by CT abd/ pelvis with contrast on 3/7/19  8. Volume overload - net neg 1.5 liters overnight, weight up, still overall + 11 liters      PLAN:  1. Continue to replete lytes  2. Continue Toprol, lisinopril, spironolactone for LV dysfunction  3. Increase Lasix to bid  4. Discussed with Dr. Nidia Ghosh. Recommend [facility] given drop in EF but would wait 4-6 weeks post PE on anticoagulation  5. Consider change to [facility] if tolerates resumption of ACEi.      DEBRA Page - CNP, 3/21/2019, 11:11 AM  [address]   [phone]       Telemetry: SR with PVC's single to triplet, no NSVT  NYHA: III    Physical Exam:  General:  Awake, alert, NAD  Skin:  Warm and dry  Neck:  JVP 8-9 cm  Chest:  Clear to auscultation    Cardiovascular:  RRR, normal S1S2, no m/g/r  Abdomen:  Soft, nontender, +bowel sounds  Extremities:  No BLE edema      Medications:    magnesium sulfate  4 g Intravenous Once    pantoprazole  40 mg Intravenous Daily    aspirin  81 mg Oral Daily    metoprolol succinate  100 mg Oral Daily    lisinopril  5 mg Oral Nightly    spironolactone  25 mg Oral Daily    magnesium oxide  400 mg Oral Daily    furosemide  40 mg Oral Daily    sodium chloride flush  10 mL Intravenous 2 times per day    potassium chloride  20 mEq Oral BID    simvastatin  40 mg Oral Nightly  amiodarone  200 mg Oral BID    meropenem  1 g Intravenous Q8H    fluconazole  200 mg Intravenous Q24H    insulin lispro  0-6 Units Subcutaneous TID WC    insulin lispro  0-3 Units Subcutaneous Nightly    folic acid  1 mg Oral Daily    oyster shell calcium/vitamin D  1 tablet Oral Daily    silver sulfADIAZINE   Topical Daily      sodium chloride      eptifibatide 2 mcg/kg/min (03/21/19 0505)    heparin (porcine) 18.3 mL/hr (03/21/19 0133)    dextrose 100 mL/hr (03/08/19 1700)       Lab Data: Lab results independently reviewed by myself 3/20/19   CBC:   Recent Labs     03/19/19  0634 03/19/19  2200 03/20/19  0600 03/21/19  0615   WBC 7.4  --  9.0 11.6*   HGB 6.6* 8.1* 8.0* 8.0*     --  180 197     BMP:    Recent Labs     03/19/19  0634 03/20/19  0600 03/21/19  0615   * 139 140   K 3.1* 3.7 3.3*   CO2 26 26 26   BUN 12 11 12   CREATININE <0.5* 0.6* 0.6*     INR:  No results for input(s): INR in the last 72 hours. BNP:  No results for input(s): PROBNP in the last 72 hours. Cardiac Enzymes: No results for input(s): TROPONINI in the last 72 hours. Lipids:   Lab Results   Component Value Date    TRIG 178 05/22/2018    TRIG 183 11/21/2017    HDL 39 05/22/2018    HDL 46 11/21/2017    HDL 31 12/28/2011    HDL 31 05/17/2011    LDLCALC 72 05/22/2018    LDLCALC 97 11/21/2017       Cardiac Imaging:   ECHO 3/19/19:    Summary   Definity contrast was used, but port not working properly.  Gerardo Martines left ventricular systolic function is moderately reduced with an ejection fraction of 30-35%. Inferolateral hypokinesia. Mid anteroseptal, apical lateral and apical septal akinesia.   There is mild concentric left ventricular hypertrophy.   Normal left ventricular filling pressure.   Left ventricular cavity size is normal.   Mild thickening of the anterior leaflet of mitral valve.   Mild mitral regurgitation.   The left atrium is severely dilated.       CARDIAC CATH MAR 2016 [facility])   Summary Comments:    3 Vessel CAD  Mild LV systolic dysfunction  Patent mid and distal LAD stents  Patent proximal, mid, and distal RCA stents  Normal LVEDP  Normal systemic pressures   Plan:  Cardiac risk factor modification. Treat with medical therapy. Findings/Summary:   CORONARY ANGIOGRAPHY FINDINGS  DOMINANCE:  Right dominant        LEFT MAIN: Luminal irregularities                       LEFT ANTERIOR DESCENDING:  Luminal irregularities, 30 % mid lesion, patent mid and distal overlapping stents                   LEFT CIRCUMFLEX: Luminal irregularities    20-30% mid lesion, OM1: small caliber vessel with severe diffuse disease                  RIGHT CORONARY:  Stenotic, overlapping proximal, mid and distal stents with mid focal 50% in-stent restenosis, diffuse distal 50% stenosis, RPAVGA: distal 70-80% stenosis                LEFT VENTRICULAR FUNCTION:        LVEF: 40-45%        LVEDP: Normal pre-angio        LV Systolic Pressure: Normal         LV to AO Gradient: none         LV Wall Motion:  Abnormal, inferior basal severe hypokinesis    .        MITRAL VALVE: No mitral insufficiency            STRESS MPI MAR 2014: Interpetation Summary:  The LV EF is 47%  There is mild inferior wall hypokinesis. There is a medium sized, fixed defect in the inferior wall. There is a small sized, reversible defect in the anterior wall. There is a medium sized, partially reversible defect in the anteroapical wall. 1.Negative ECG for ischemia with pharmocologic stress. 2.Positive nuclear stress imaging suggestive of inducible ischemia in the anteroapical wall.

## 2019-03-21 NOTE — PROGRESS NOTES
Speech Language Pathology  Facility/Department: [facility] C4  Dysphagia Daily Treatment Note    NAME: Elle Montemayor  : 1945  MRN: [id number]    Patient Diagnosis(es):   Patient Active Problem List    Diagnosis Date Noted    Cardiomyopathy [facility])     NSVT (nonsustained ventricular tachycardia) ([address]     Other pulmonary embolism without acute cor pulmonale (HCC)     Acute respiratory failure with hypoxia ([address]     H/O colectomy     Primary malignant neoplasm of lung metastatic to other site [facility])     Herpes zoster oticus     Hearing loss of right ear     Chemotherapy-induced neutropenia ([address]     Mucositis     Diverticulitis 2019    Diverticulitis of large intestine with perforation and abscess without bleeding     Acute diverticulitis 2019    Pancytopenia ([address] 2019    Adenocarcinoma of left lung ([address] 2019    Gallstones 2019    Hemoptysis 2019    Former smoker 2019    Elevated PSA 2019    Back pain 2019    Visit for monitoring Plavix therapy     History of ventricular tachycardia     History of pulmonary emphysema 2019    Chronic obstructive pulmonary disease with (acute) exacerbation ([address] 2019    Pulmonary nodule, left 2019    Abnormal bone scan of thoracic spine 2019    Nondisplaced fracture of fifth right metatarsal bone with routine healing 2018    Sprain of right foot 2018    Sprain of anterior talofibular ligament of right ankle 2018    Closed nondisplaced fracture of fifth right metatarsal bone 2018    Closed fracture of proximal lateral malleolus of ankle with routine healing 2018    Right foot pain 2018    Right ankle pain 2018    Essential hypertension 2017    Mixed hyperlipidemia 2017    PAD (peripheral artery disease) ([address] 2011    CAD (coronary artery disease) 2011    Elevated fasting glucose 2010 Allergies: No Known Allergies    Pain: Pt denied    Current Diet: Diet low fiber per MD, thin liquids    Diet Tolerance:  Patient tolerating current diet level without signs/symptoms of penetration / aspiration per chart, pt. P.O. Trials: Pt observed with thin liquids and regular solid trials this date. Recommendations: Continue current diet    Dysphagia Therapy:  Pt seen upright in chair, alert and agreeable to dysphagia f/u, RN OK'd SLP entry and therapy. Pt had EGD earlier this date with normal findings noted (see EMR for full findings). Pt currently on 6 L O2 NC. Pt denied dysphagia, reported appetite \"is getting better\". Pt reported occasional difficulty with meds. He stated that he has to \"cut those bigger ones in half\". SLP also recommended pt trial larger meds in puree. Pt observed with thin liquid trials via straw with grossly timely swallow initiation noted, no overt s/s of aspiration/penetration throughout -- no coughing, throat clearing, wet vocal quality, or change in O2. Pt also observed with regular solid trials --adequate A-P bolus transit with rotary chew pattern noted, no oral/lingual residue post-swallow. SLP re: role of ST, aspiration precautions. Pt verbalized understanding, denied concerns / questions. Patient/Family/Caregiver Education:  See above    Compensatory Strategies:  Small bites/sips;Upright as possible for all oral intake;Remain upright for 30-45 minutes after meals;Eat/Feed slowly    Plan:    Continued Dysphagia treatment with goals per plan of care. Discharge Recommendations: ST follow up TBD. If pt discharges from hospital prior to Speech/Swallowing discharge, this note serves as tx and discharge summary.     Total Treatment Time:  Dysphagia Tx 10\" (3835-0147)    Koffi Holder M.S. [address] [facility] [place], operated by [facility]  Speech-language pathologist  [phone]

## 2019-03-21 NOTE — H&P
Pre-sedation Assessment    History and Physical / Pre-Sedation Assessment  Patient:  Maria L Benavides   :   1945     Intended Procedure: EGD      HPI: 68 yo w HTN, CAD, metastatic lung CA on chemoRx, shingles and P.E., admitted for acute perforated diverticulitis, s/p surgical repair, who has had a drop in his H/H on 3/19 to H/H , and was found to have blacksih heme-pos diarrheal stools. Most likely UGI source.     Plan:   1. Will increase PPI to bid  2.  EGD       Past Medical History:   Diagnosis Date    Benign neoplasm of colon     Cancer [facility])     lung cancer with mets with spine     Diverticulosis of colon (without mention of hemorrhage)     Elevated fasting glucose     Former cigarette smoker     Herpes simplex viral infection 2019    orolabial    Hyperlipidemia     Hypertension     MI, old     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[facility]     right    Right popliteal artery occlusion ([facility] 2010         Procedure Laterality Date    ARTERIAL ANEURYSM REPAIR  2010    right popliteal artery aneurysm repair    BRONCHOSCOPY  2019    left endobronchial tumor    BRONCHOSCOPY N/A 2019    BRONCHOSCOPY ALVEOLAR LAVAGE performed by Geovanna Chowdary MD at [address] [place] [address]  2019    BRONCHOSCOPY/TRANSBRONCHIAL LUNG BIOPSY performed by Geovanna Chowdary MD at [address] [place] [address] N/A 2019    EBUS ULTRASOUND W/ ANESTHESIA AND NEEDLE BIOPSY performed by Milena Pereira MD at [address] [place] [address]  2019    BRONCHOSCOPY ALVEOLAR LAVAGE performed by Milena Pereira MD at [address] [place] [address]  2019    BRONCHOSCOPY BIOPSY BRONCHUS performed by Milena Pereira MD at [address] [place] [address]  2019    BRONCHOSCOPY BRUSHINGS performed by Milena Pereira MD at [address] [place] [address]  2019    BRONCHOSCOPY/TRANSBRONCHIAL NEEDLE BIOPSY performed by Milena Pereira MD at [facility] BRONCHOSCOPY  1/23/2019    BRONCHOSCOPY/TRANSBRONCHIAL NEEDLE BIOPSY ADDL LOBE performed by Gemma Matrines MD at [address]  10/08/01 12/20/04    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    COLONOSCOPY  4/4/14    wnl    CORONARY ANGIOPLASTY WITH STENT PLACEMENT      FINGER FRACTURE SURGERY      left middle finger    HEMICOLECTOMY N/A 3/14/2019    LAPAROSCOPIC CONVERT TO OPEN SIGMOID COLECTOMY performed by Kelvin Quan MD at [address] / REMOVAL / [address] Right 1/23/2019    RIGHT SUBCLAVIAN VEIN PORT A CATH PLACEMENT performed by Antoni Dixon MD at [facility]  2/10/2014    BX duodenum, atrum, distal esophagus    UPPER GASTROINTESTINAL ENDOSCOPY  4/4/14    BX gastric    UPPER GASTROINTESTINAL ENDOSCOPY  11/4/15       Nurses notes reviewed and agreed.   Medications reviewed  Current Facility-Administered Medications   Medication Dose Route Frequency Provider Last Rate Last Dose    aspirin chewable tablet 81 mg  81 mg Oral Daily Boneta Zamora, APRN - CNP   81 mg at 03/20/19 1305    eptifibatide (INTEGRILIN) 0.75 mg/mL infusion  2 mcg/kg/min Intravenous Continuous Boneta Zamora, APRN - CNP 12.9 mL/hr at 03/21/19 0505 2 mcg/kg/min at 03/21/19 0505    metoprolol succinate (TOPROL XL) extended release tablet 100 mg  100 mg Oral Daily Dimple Hawthorne APRN - CNP        lisinopril (PRINIVIL;ZESTRIL) tablet 5 mg  5 mg Oral Nightly Dimple Hawthorne APRN - CNP   5 mg at 03/20/19 2048    spironolactone (ALDACTONE) tablet 25 mg  25 mg Oral Daily Dimple Hawthorne APRN - CNP   25 mg at 03/20/19 1558    magnesium oxide (MAG-OX) tablet 400 mg  400 mg Oral Daily Dimple Hawthorne, APRN - CNP   400 mg at 03/20/19 1634    furosemide (LASIX) tablet 40 mg  40 mg Oral Daily Dimple Hawthorne, APRN - CNP   40 mg at 03/20/19 1558    pantoprazole (PROTONIX) injection 40 mg  40 mg Intravenous BID Kevin Ortiz MD   40 mg at MD        glucagon (rDNA) injection 1 mg  1 mg Intramuscular PRN Rehana Rodriguez MD        dextrose 5 % solution  100 mL/hr Intravenous PRN Rehana Rodriguez  mL/hr at 03/08/19 1700 100 mL/hr at 03/08/19 1700    insulin lispro (HUMALOG) injection vial 0-6 Units  0-6 Units Subcutaneous TID WC Rehana Rodriguez MD   Stopped at 03/20/19 1056    insulin lispro (HUMALOG) injection vial 0-3 Units  0-3 Units Subcutaneous Nightly Rehana Rodriguez MD   1 Units at 58/71/33 8270    folic acid (FOLVITE) tablet 1 mg  1 mg Oral Daily Rehana Rodriguez MD   1 mg at 03/20/19 1159    oyster shell calcium/vitamin D 250-125 MG-UNIT per tablet 250 mg  1 tablet Oral Daily Rehana Rodriguez MD   250 mg at 03/20/19 1159    silver sulfADIAZINE (SILVADENE) 1 % cream   Topical Daily Rehana Rodriguez MD        ondansetron [facility]) injection 4 mg  4 mg Intravenous Q6H PRN Rehana Rodriguez MD   4 mg at 03/21/19 0120    acetaminophen (TYLENOL) tablet 650 mg  650 mg Oral Q4H PRN Rehana Rodriguez MD   650 mg at 03/20/19 1158       Allergies: No Known Allergies        Physical Exam:  Vital Signs: /85   Pulse 89   Temp 98.8 °F (37.1 °C) (Oral)   Resp 14   Ht 5' 9\" (1.753 m)   Wt 179 lb 6.4 oz (81.4 kg)   SpO2 94%   BMI 26.49 kg/m²  Body mass index is 26.49 kg/m². Airway:Normal  Cardiac:Normal  Pulmonary:Normal  Abdomen:Normal  Specific to procedure: none      Pre-Procedure Assessment/Plan:  ASA 3 - Patient with moderate systemic disease with functional limitations  Malampati class II  Level of Sedation Plan: Moderate sedation    Post Procedure plan: Return to same level of care    I assessed the patient and find that the patient is in satisfactory condition to proceed with the planned procedure and sedation plan. I have explained the risk, benefits, and alternatives to the procedure. The patient understands and agrees to proceed.   Yes    Chaim Newman Josesito Kim MD       (O) [phone]        [facility] Scale  7:48 AM 3/21/2019

## 2019-03-21 NOTE — PLAN OF CARE
Problem: Infection:  Goal: Will remain free from infection  Description  Will remain free from infection  Outcome: Ongoing

## 2019-03-22 NOTE — PROGRESS NOTES
Bedside report received from [facility]. Patient resting comfortably, sitting in chair watching tv. Family at bedside. No signs of discomfort or distress. Chair alarm in place. Bedside table and call light within reach. White board updated. Will continue to monitor patient. Guille Givens RN    9:23 PM  Shift assessment complete. (See findings in flowsheet). Med pass complete. (See MAR). VSS. Patient with no complaints at this time. Patient resting in bed comfortably, watching television. No signs or symptoms of distress or discomfort noted at this time. Bed in lowest position, brakes locked. Nonskid footwear in place. 5 P's addressed, no needs at this time. Pt calls out appropriately. Will continue to monitor. Guille Givens RN    3:35 AM  VSS, patient resting in bed watching television. Currently on 4L nasal canula. No complaints. Guille Givens RN    5:53 AM  Blood drawn per orders and sent to lab via tube system. Patient tolerated well, no complications. Guille Givens RN    6:57 AM  Rec'd call from lab with critical aptt. Spoke to pharmacy, they want a redraw to confirm. Hgb is 7.2. Sent message to MD to notify and to ask about ordering [address] as well.  GO [place]

## 2019-03-22 NOTE — PROGRESS NOTES
Hospitalist Progress Note      PCP: Charla Sahni MD    Date of Admission: 3/7/2019    Chief Complaint: Abdominal pain     Hospital Course:  69 y.o. male who presented to [facility] with LLQ abdominal pain. Pt was recently d/c'd for diverticulitis, states pain never went away. C/o low grade fevers for 2 weeks. States pain not as severe as previous admission. Had chemo on Friday, states received neulasta. Denies cp, n/v, sob. Subjective:   Pt is sitting up in the chair. On 3 lpm. Afebrile. He denies dyspnea or chest pain. No N/V/D. Pt's wife is at bedside.      Medications:  Reviewed    Infusion Medications    eptifibatide 2 mcg/kg/min (03/22/19 1150)    heparin (porcine) 16.6 mL/hr (03/22/19 0826)    dextrose 100 mL/hr (03/08/19 1700)     Scheduled Medications    sodium chloride  250 mL Intravenous Once    magnesium oxide  400 mg Oral BID    pantoprazole  40 mg Intravenous Daily    furosemide  40 mg Oral BID    aspirin  81 mg Oral Daily    metoprolol succinate  100 mg Oral Daily    lisinopril  5 mg Oral Nightly    spironolactone  25 mg Oral Daily    sodium chloride flush  10 mL Intravenous 2 times per day    potassium chloride  20 mEq Oral BID    simvastatin  40 mg Oral Nightly    amiodarone  200 mg Oral BID    insulin lispro  0-6 Units Subcutaneous TID WC    insulin lispro  0-3 Units Subcutaneous Nightly    folic acid  1 mg Oral Daily    oyster shell calcium/vitamin D  1 tablet Oral Daily    silver sulfADIAZINE   Topical Daily     PRN Meds: loperamide, sodium chloride flush, oxyCODONE, HYDROmorphone, heparin (porcine), heparin (porcine), magic (miracle) mouthwash with nystatin, glucose, dextrose, glucagon (rDNA), dextrose, ondansetron, acetaminophen      Intake/Output Summary (Last 24 hours) at 3/22/2019 1553  Last data filed at 3/22/2019 1436  Gross per 24 hour   Intake 1720.7 ml   Output 2035 ml   Net -314.3 ml       Physical Exam Performed:    /71   Pulse 79   Temp 97.5 °F (36.4 °C) (Axillary)   Resp 16   Ht 5' 9\" (1.753 m)   Wt 182 lb 8 oz (82.8 kg)   SpO2 95%   BMI 26.95 kg/m²     General appearance: In bed, No apparent distress, appears stated age and cooperative. HEENT: Pupils equal, round, and reactive to light. Conjunctivae/corneas clear. Vesicles in outer ear canal noted   Neck: Supple, with full range of motion. No jugular venous distention. Trachea midline. Respiratory:  increased respiratory effort. DM to auscultation, bilaterally without Rales/Wheezes/Rhonchi. on NRB  Cardiovascular: tachycardic rate and rhythm with normal S1/S2 without murmurs, rubs or gallops. Abdomen: Abd dressing d/i, DIAN drain in place with +oupt. Musculoskeletal: No clubbing, cyanosis or edema bilaterally. Full range of motion without deformity. Skin: Skin color, texture, turgor normal.  Radiation changes to back. Vesicles under lower lip, cheek. Mouth mucosa improving  Neurologic:  Neurovascularly intact without any focal sensory/motor deficits. Cranial nerves: II-XII intact, grossly non-focal.  Psychiatric: Alert and oriented, thought content appropriate, normal insight  Capillary Refill: Brisk,< 3 seconds   Peripheral Pulses: +2 palpable, equal bilaterally       Labs:   Recent Labs     03/20/19  0600 03/21/19  0615 03/22/19  0551   WBC 9.0 11.6* 10.5   HGB 8.0* 8.0* 7.2*   HCT 23.2* 23.4* 21.2*    197 166     Recent Labs     03/20/19  0600 03/21/19  0615 03/22/19  0551    140 136   K 3.7 3.3* 3.2*    101 96*   CO2 26 26 28   BUN 11 12 11   CREATININE 0.6* 0.6* 0.6*   CALCIUM 7.8* 7.8* 7.3*     Urinalysis:      Lab Results   Component Value Date    NITRU Negative 03/10/2019    WBCUA 3-5 03/10/2019    RBCUA 0-2 03/10/2019    BLOODU Negative 03/10/2019    SPECGRAV 1.025 03/10/2019    GLUCOSEU Negative 03/10/2019       Radiology:  XR CHEST PORTABLE   Final Result   Interval development of mild pulmonary edema.       There is a small left-sided pleural effusion with AP diameter and 6 cm in   length. There is diverticulosis of the entire colon. Cholelithiasis. 4.7 cm infrarenal abdominal aortic aneurysm status post endograft repair with   a patent aorto bi-iliac graft. There is suspicion of an endoleak with blush   of contrast enhancement suspected in the inferior aspect of the native   aneurysm. As a noncontrast study was not performed, calcification of the   intraluminal thrombus accounting for the apparent enhancement is not   excluded. 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 Comparison with a noncontrast CT examination would be   helpful. Critical results were called by Dr. Shanae Campbell. MD Elizabeth to [facility] on   3/7/2019 at 20:35. Assessment/Plan:    Active Hospital Problems    Diagnosis Date Noted    Cardiomyopathy ([address] [I42.9]     NSVT (nonsustained ventricular tachycardia) (HCC) [I47.2]     Other pulmonary embolism without acute cor pulmonale (HCC) [I26.99]     Acute respiratory failure with hypoxia ([address] [J96.01]     H/O colectomy [Z90.49]     Primary malignant neoplasm of lung metastatic to other site (HCC) [C34.90]     Herpes zoster oticus [B02.21]     Hearing loss of right ear [H91.91]     Chemotherapy-induced neutropenia ([address] [D70.1, T45.1X5A]     Mucositis [K12.30]     Diverticulitis [K57.92] 03/07/2019    Diverticulitis of large intestine with perforation and abscess without bleeding [K57.20]        Acute perforated recurrent diverticulitis/pneumoperitoneum/abscess in setting of immunosuppression due to chemotherapy:   - CT A/P on admission demonstrated acute perforated colon diverticulitis with free air and fluid collection.   - General surgery following. Pt is status post laparoscopic converted to open left colectomy on 3/14/19.  - Completed 7 days of Merrem and Diflucan. - Continue prn pain control.      Acute hypoxic respiratory failure due to bilateral pulmonary emboli:   - CT chest 3/16 showed thrombus in the distal aspect of the left main pulmonary artery extending into the left upper and left lower lobe pulmonary arterial branches. Additional Thrombus is seen in the distal aspect of the right main pulmonary artery,extending into the right upper right middle and right lower lobe pulmonary arterial branches. - Continue heparin gtt started 3/16. Management per Hem/onc. - Wean supplemental O2 as tolerated. He is not normally on home O2. He is currently requiring 3 liters HFNC.   - Lower extremity dopplers +DVT. Vascular surgery consulted, no indication for IVC filter at this time. - Pulmonary consulted/following. Metastatic lung cancer, stage 4 with bone mets:  - Hem/onc is following. Chemo on hold to allow for surgical healing. Thrombocytopenia (resolved) now with hypercoagulable state:  - Platelets required preoperatively. - On heparin gtt, bleeding risk high post op, monitor.  - Defer antiplatelet reinitiation to heme given fragility. Neutropenia due to chemotherapy (resolved):  - S/p Neulasta on 3/1. S/p Granix on 3/8. - Wound care for his radiation ulcer on his back. Anemia:  -  Pt is s/p 1 unit [facility] for Hgb 6.6 on 3/19 and 1 unit on 3/22. . No overt signs of bleeding. CT A/P showed no RP bleed. PPI added per Hem/Onc. Pt is s/p B12 injection. Stool for occult blood is +. GI consulted, s/p EGD on 3/21 which was normal.   - Transfuse for Hgb less than 8 given history of CAD. PAD/CAD/HTN/Cardiomyopathy:   -Defer antiplatelet (aspirin and plavix) management to hem/onc given the complexity of his present situation, bleeding risk and thrombotic states. Pt to be restarted on integrillin infusion on 3/20 - monitor for bleeding.   - ECHO shows reduced EF of 30-35%. Previous ECHO shows EF 40-45%. - Cardiology is following. Continue on lisinopril, aldactone, Toprol and statin. Lasix per Cards. NSVT:   - Known history of VT s/p ICD.  Went into VT on

## 2019-03-22 NOTE — CARE COORDINATION
Case Management/Follow up:    Chart reviewed for length of stay. Hospital day #  15     Unit:  C-4    Diagnosis and current status as per MD progress: \"Acute perforated recurrent diverticulitis/pneumoperitoneum/abscess in setting of immunosuppression due to chemotherapy:   - CT A/P on admission demonstrated acute perforated colon diverticulitis with free air and fluid collection.   - General surgery following. Pt is status post laparoscopic converted to open left colectomy on 3/14/19.  - Continue Merrem and Diflucan per ID, D#7/7.   - Continue prn pain control.      Acute hypoxic respiratory failure due to bilateral pulmonary emboli:   - CT chest 3/16 showed thrombus in the distal aspect of the left main pulmonary artery extending into the left upper and left lower lobe pulmonary arterial branches. Additional Thrombus is seen in the distal aspect of the right main pulmonary artery,extending into the right upper right middle and right lower lobe pulmonary arterial branches. - Continue heparin gtt started 3/16.   - Wean supplemental O2 as tolerated. He is not normally on home O2. He is currently requiring 6 liters HFNC.   - Lower extremity dopplers +DVT. Vascular surgery consulted, no indication for IVC filter at this time. - Pulmonary consulted/following.     Metastatic lung cancer, stage 4 with bone mets:  - Hem/onc is following. Chemo on hold to allow for surgical healing.      Thrombocytopenia (resolved) now with hypercoagulable state:  - Platelets required preoperatively. - On heparin gtt, bleeding risk high post op, monitor.  - Defer antiplatelet reinitiation to heme given fragility.     Neutropenia due to chemotherapy (resolved):  - S/p Neulasta on 3/1. S/p Granix on 3/8. - Wound care for his radiation ulcer on his back.      Anemia:  -  Pt is s/p 1 unit [facility] for Hgb 6.6 on 3/19. No overt signs of bleeding. CT A/P showed no RP bleed. PPI added per Hem/Onc. Pt is s/p B12 injection.   Stool for occult blood is +. GI consulted, s/p EGD on 3/21 which was normal.   - Transfuse for Hgb less than 8 given history of CAD.      PAD/CAD/HTN:   -Defer antiplatelet (aspirin and plavix) management to hem/onc given the complexity of his present situation, bleeding risk and thrombotic states. Pt to be restarted on integrillin infusion on 3/20 - monitor for bleeding.   - ECHO shows reduced EF of 30-35%. Previous ECHO shows EF 40-45%. - Cardiology is following. Continue on lisinopril, aldactone, Toprol and statin.      NSVT:   - Known history of VT s/p ICD. Went into VT on 3/12, PMD went off. Continue amiodarone.      Hyponatremia likely SIADH secondary to lung cancer (resolved):  - S/p samsca. Nephrology following. Monitor daily BMP.      Hypokalemia/hypomagnesemia:  - Monitor and replete as needed. \"     Anticipated d/c date: uncertain per CNP note yesterday. Expected plan for discharge:HHC vs. SNF placement ( if IV abx needed at d/c) . Referrals to [facility] [place]. PT/OT recommend home with [facility]. [facility] following    Potential barriers: none identified    Precert required/date initiated:n/a medicare three midnights met    Confirmed plan with patient and/or family:yes, prior    Comments: will follow Pt progress. Pt seems to be improving and hopefully will be able to return home with [facility].  Weaning oxygen requirement, down to 4 lpm via NC.

## 2019-03-22 NOTE — PROGRESS NOTES
[facility] GENERAL SURGERY    Surgery Progress Note           POD  8    PATIENT NAME: Nayely Davis     TODAY'S DATE: 3/22/2019    INTERVAL HISTORY:    Feeling well this AM - left sided pain has resolved - stools less frequent. Eating. OBJECTIVE:   VITALS:  /64   Pulse 93   Temp 97.7 °F (36.5 °C) (Oral)   Resp 16   Ht 5' 9\" (1.753 m)   Wt 182 lb 8 oz (82.8 kg)   SpO2 97%   BMI 26.95 kg/m²     INTAKE/OUTPUT:    I/O last 3 completed shifts: In: 1324.6 [P.O.:725; I.V.:499.6; IV Piggyback:100]  Out: 4636 [Urine:2250; Drains:1]  No intake/output data recorded. CONSTITUTIONAL:  awake and alert  ABDOMEN:  soft, non-distended, minimally tender, patricio in place serosanguinous  INCISION: clean, dry, no drainage, healing    Data:  CBC:   Recent Labs     03/20/19  0600 03/21/19  0615 03/22/19  0551   WBC 9.0 11.6* 10.5   HGB 8.0* 8.0* 7.2*   HCT 23.2* 23.4* 21.2*    197 166     BMP:    Recent Labs     03/20/19  0600 03/21/19  0615 03/22/19  0551    140 136   K 3.7 3.3* 3.2*    101 96*   CO2 26 26 28   BUN 11 12 11   CREATININE 0.6* 0.6* 0.6*   GLUCOSE 137* 123* 109*     Hepatic: No results for input(s): AST, ALT, ALB, BILITOT, ALKPHOS in the last 72 hours. Mag:      Recent Labs     03/20/19  0600 03/21/19  0615 03/22/19  0551   MG 1.60* 1.30* 1.50*        ASSESSMENT AND PLAN:  69 y.o. male status post left colectomy  GI: continue with low fiber diet  Pain: resolved and leukocytosis trend back down. ID: continue with antibiotics  : okay to remove nguyen from surgical perspective. Electronically signed by DEBRA Rodriguez - CNP     Surgery Staff    I have examined this patient and read and agree with the note by Alfonso Asif CNP from today. D/C Anastasiia when clear w/ Nephrology. OK to resume any oral anticoagulants from surgical perspective. Will follow peripherally over the weekend. Will remove PATRICIO at time of D/C home.     [facility]

## 2019-03-22 NOTE — FLOWSHEET NOTE
Orientation: Mid  Wound Description (Comments): Radiation burn    Wound Burn   Odor None   Wound 03/08/19 Sacrum Mid   Date First Assessed/Time First Assessed: 03/08/19 0028   Present on Hospital Admission: Yes  Wound Approximate Age at First Assessment (Weeks): 1.5 weeks  Primary Wound Type: Pressure Injury  Location: Sacrum  Wound Location Orientation: Mid   Dressing Status Clean;Dry; Intact   Wound Assessment Purple;Dry   Drainage Amount None   Wound 03/08/19 Sacrum Mid;Right   Date First Assessed/Time First Assessed: 03/08/19 0032   Present on Hospital Admission: Yes  Wound Approximate Age at First Assessment (Weeks): 1.5 weeks  Primary Wound Type: Pressure Injury  Location: Sacrum  Wound Location Orientation: Mid;Right   Dressing Status Clean;Dry; Intact   Urethral Catheter Straight-tip;Latex 16 fr   Placement Date/Time: 03/14/19 0830   Urethral Catheter Timeout: Patient  Inserted by: Fortunato Falcon RN  Catheter Type: Straight-tip;Latex  Tube Size (fr): 16 fr  Catheter Balloon Size: 10 mL  Collection Container: Standard  Securement Method: (c) Othe. .. Catheter Indications Need for fluid management in critically ill patients in a critical care setting not able to be managed by other means such as BSC with hat, bedpan, urinal, condom catheter, or short term intermittent urethral catherization   Site Assessment No urethral drainage   Urine Color Yellow   Urine Appearance Clear   Incision 03/14/19 Abdomen   Date First Assessed/Time First Assessed: 03/14/19 1052   Location: Abdomen   Wound Assessment Clean;Dry; Intact   Emerita-wound Assessment Clean;Dry   Closure Staples; Open to air   Drainage Amount None   Dressing/Treatment Open to air   Incision 03/14/19 Abdomen Right   Date First Assessed/Time First Assessed: 03/14/19 1052   Location: Abdomen  Wound Location Orientation: Right   Wound Assessment Clean;Dry; Intact   Emerita-wound Assessment Clean; Intact   Closure Staples; Open to air   Drainage Amount None   Psychosocial Psychosocial (WDL) WDL   Provider Notification   Reason for Communication   (aptt 106.9)

## 2019-03-22 NOTE — PROGRESS NOTES
[facility] NEPHROLOGY    [facility]              [phone]                 Plan :     Sodium improved  Replace KCL  Replace magnesium  Still on high flow oxygen- but coming down  Now on po lasix  Laurent removed  Dr Gabriela Chacko is on call for the weekend- 3/23/19- 3/24/19    Assessment :     Hyponatremia  Acute on chronic  Down to 123- now 136  Suspect SIADH from ca lung, pain, and nausea  Ca ca lung( adenocarcinoma) with vertebral mets  Sodium low side since 2/19    Cortisol- normal, TSH - 1.65, LFT normal  Echo 2/12- EF of 45-50%, AL mildly dilated    Generalized Edema  High O2 requirement  +ve 11 L since admission( peak 14 L +ve)  Echo: 3/19- EF of 30-35%, mild LVH, LV severely dilated  On lasix    Sob also due to lung mass and PE    Acute Kidney Injury  Cr peaked to 1.6- now 0.6  Making urine  Likely hemodynamic changes   Now BP better  Surgery done  On 3/14/19    Hypotension  BP: (115-116)/(70-72) Pulse:  [78-79]   Normally hypertensive  Now has fever, neutropenia- getting better    Hypokalemia  Getting potassium and mg replaced           pancytopenia  Ca lung  Diverticulitis with perforation- sp surgery, chato-colectomy  Radiation dermatitis  Has has acyclovir IV   Mucositis  AAA      [facility] Nephrology would like to thank [place] People, APRN   for opportunity to serve this patient      Please call with questions at-   24 Hrs Answering service [phone] or  7 am- 5 pm via Perfect serve or cell phone  Dr. Turcios Sit for consult :     hyponatremia     HPI :     From consult note-     Lucy Douglas is a 69 y.o. male presented to   the hospital on 3/7/2019 with left lower quadrant pain. He has that for a week. Was hospitalized with diverticulitis, DCed,  And pain never went away. Also low grade fever. No nausea. Mild pain in belly  Diagnosed to have fever and also diverticulitis. Planning for surgery. 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  Has pancytopenia from chemotherapy. We are consulted for hyponatremia and related issues     Interval History:     Oxygen still on 5 L   Urine is 2250 ml        ROS:     SOB- +Ve on exertion  Edema- none  Nausea/vomiting- none  Poor appetite-No  Confusion- no  Urinary complaints- no  Any other complaints- no  All other ROS are reviewed and are Negative           Medication:     Scheduled Meds:   magnesium sulfate  4 g Intravenous Once    sodium chloride  250 mL Intravenous Once    pantoprazole  40 mg Intravenous Daily    furosemide  40 mg Oral BID    aspirin  81 mg Oral Daily    metoprolol succinate  100 mg Oral Daily    lisinopril  5 mg Oral Nightly    spironolactone  25 mg Oral Daily    magnesium oxide  400 mg Oral Daily    sodium chloride flush  10 mL Intravenous 2 times per day    potassium chloride  20 mEq Oral BID    simvastatin  40 mg Oral Nightly    amiodarone  200 mg Oral BID    insulin lispro  0-6 Units Subcutaneous TID WC    insulin lispro  0-3 Units Subcutaneous Nightly    folic acid  1 mg Oral Daily    oyster shell calcium/vitamin D  1 tablet Oral Daily    silver sulfADIAZINE   Topical Daily     Continuous Infusions:   eptifibatide 2 mcg/kg/min (03/22/19 0404)    heparin (porcine) 16.6 mL/hr (03/22/19 0826)    dextrose 100 mL/hr (03/08/19 1700)     PRN Meds:. loperamide, sodium chloride flush, oxyCODONE, HYDROmorphone, heparin (porcine), heparin (porcine), magic (miracle) mouthwash with nystatin, glucose, dextrose, glucagon (rDNA), dextrose, ondansetron, acetaminophen       Vitals :     Vitals:    03/22/19 0826   BP: 115/72   Pulse: 79   Resp: 16   Temp: 98.2 °F (36.8 °C)   SpO2: 93%       I & O :       Intake/Output Summary (Last 24 hours) at 3/22/2019 1000  Last data filed at 3/22/2019 0530  Gross per 24 hour   Intake 1069.6 ml   Output 2251 ml   Net -1181.4 ml        Physical Examination :     General appearance: Anxious- no, distressed- no, in good spirits- no    Sitting up on

## 2019-03-22 NOTE — PLAN OF CARE
Problem: Infection:  Goal: Will remain free from infection  Description  Will remain free from infection  Outcome: Ongoing     Problem: Safety:  Goal: Free from accidental physical injury  Description  Free from accidental physical injury  Outcome: Ongoing   Pt bed in lowest position. Call bell within reach. Problem: Skin Integrity:  Goal: Skin integrity will stabilize  Description  Skin integrity will stabilize  Outcome: Ongoing   Pt remains free from any new breakdown. Barrier cream applied. Pt able to turn self q2h.    Problem: Nutrition  Goal: Optimal nutrition therapy  3/22/2019 1258 by Toni Juarez RD, LD  Outcome: Ongoing

## 2019-03-22 NOTE — PROGRESS NOTES
Speech Language Pathology  Attempt Note    SLP attempted dysphagia f/u, spoke with RN. Pt seated upright in chair, wife at bedside. Pt denied dysphagia, pleasantly declined all PO trials at this time. SLP reviewed role of ST, aspiration precautions. Pt and pt's wife verbalized understanding. ST to continue to follow.     Thank you,  Viral Ortiz M.S. [id number] [facility]  Speech-language pathologist  [phone]

## 2019-03-22 NOTE — FLOWSHEET NOTE
03/22/19 1434   Vitals   /63   Temp 98 °F (36.7 °C)   Pulse 82   Resp 16   blood administration completed. Pt tolerated well.

## 2019-03-22 NOTE — PLAN OF CARE
Nutrition Problem: Inadequate oral intake  Intervention: Food and/or Nutrient Delivery: Continue current diet, Continue current ONS  Nutritional Goals: Pt will tolerate diet advancement per General surgery

## 2019-03-22 NOTE — PROGRESS NOTES
Physical Therapy  Facility/Department: [place] [id number] [facility]  Daily Treatment Note  NAME: Ben Porras  : 1945  MRN: [id number]    Date of Service: 3/22/2019    Discharge Recommendations:  24 hour supervision or assist, Home with Home health PT   PT Equipment Recommendations  Other: Will continue to assess as pt progresses with PT - may require RW if balance deficits persist    Patient Diagnosis(es): The primary encounter diagnosis was Diverticulitis of large intestine with perforation and abscess without bleeding. Diagnoses of Leaking abdominal aortic aneurysm (AAA) ([address] Abdominal pain, left lower quadrant, and Chemotherapy-induced neutropenia ([address] were also pertinent to this visit. has a past medical history of Benign neoplasm of colon, Cancer ([address] Diverticulosis of colon (without mention of hemorrhage), Elevated fasting glucose, Former cigarette smoker, Herpes simplex viral infection, Hyperlipidemia, Hypertension, MI, old, 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[address] and Right popliteal artery occlusion ([address] has a past surgical history that includes Arterial aneurysm repair (2010); Finger fracture surgery; Coronary angioplasty with stent; Upper gastrointestinal endoscopy (2/10/2014); Colonoscopy (10/08/01 12/20/04); Colonoscopy (14); Upper gastrointestinal endoscopy (14); Upper gastrointestinal endoscopy (11/4/15); bronchoscopy (2019); bronchoscopy (N/A, 2019); bronchoscopy (2019); INSERTION / REMOVAL / REPLACEMENT VENOUS ACCESS CATHETER (Right, 2019); bronchoscopy (N/A, 2019); bronchoscopy (2019); bronchoscopy (2019); bronchoscopy (2019); bronchoscopy (2019); bronchoscopy (2019); hemicolectomy (N/A, 3/14/2019); and Upper gastrointestinal endoscopy (N/A, 3/21/2019).     Restrictions  Restrictions/Precautions  Restrictions/Precautions: General Precautions, Fall Risk  Position Activity Restriction  Other position/activity restrictions: Medium fall risk per pt verbalizes understanding  REQUIRES PT FOLLOW UP: Yes  Activity Tolerance  Activity Tolerance: Patient Tolerated treatment well;Patient limited by endurance; Patient limited by fatigue       AM-PAC Score  AM-PAC Inpatient Mobility Raw Score : 19  AM-PAC Inpatient T-Scale Score : 45.44  Mobility Inpatient CMS 0-100% Score: 41.77  Mobility Inpatient CMS G-Code Modifier : CK          Goals  Short term goals  Time Frame for Short term goals: 1 week (unless otherwise specified)  Short term goal 1: Pt will transfer supine <-> sit with modified(I)  Short term goal 2: Pt will transfer sit <-> stand and bed>chair using RW or least AD with supervision  Short term goal 3: Pt will ambulate x 150 feet using RW or least AD with supervision  Short term goal 4: By 3/20/19: Pt will tolerate 12-15 reps BLE exercise for strengthening, balance, and endurance  Short term goal 5: Pt will ambulate up/down 2 steps without use of handrails (using appropriate AD as needed) with SBA  Patient Goals   Patient goals : \"To get my strength back and go home at discharge. \"    Plan    Plan  Times per week: 3-5x/week in acute care  Times per day: Daily  Specific instructions for Next Treatment: Progress ther ex and mobility as tolerated  Current Treatment Recommendations: Strengthening, Gait Training, Balance Training, Stair training, Functional Mobility Training, ROM, Endurance Training, Transfer Training, Safety Education & Training, Patient/Caregiver Education & Training, Equipment Evaluation, Education, & procurement  Safety Devices  Type of devices: Call light within reach, Gait belt, Nurse notified(refused chair alarm)     Therapy Time   Individual Concurrent Group Co-treatment   Time In 915; 1000         Time Out 920; 1030         Minutes [address] [place], [place] [id number]

## 2019-03-22 NOTE — PROGRESS NOTES
pulmonary embolism without acute cor pulmonale (HCC)    Cardiomyopathy ([facility]  Resolved Problems:    * No resolved hospital problems. *       ASSESSMENT:   1. NSVT - improved, continue amiodarone and toprol  2. CAD - no angina, hx of stents but [facility] in 2016 without obstructive disease, on statin and BB, ASA resumed   3. Cardiomyopathy, unknown etiology - EF has gradually dropped from 45-50% (echo,2011), 47% (stress, 2014), 40-45% (LVG, 2016) to 30-35% now (echo), s/p ICD  4. Large PE - on IV Heparin, to eventually change to DOAC per hem/onc (agree)  5. Perforated diverticulitis s/p colectomy - diarrhea again yesterday but stopped with immodium  6. Metastatic lung cancer with mets to bone - per hem/onc  7. AAA s/p endograft repair - stable by CT abd/ pelvis with contrast on 3/7/19  8. Volume overload - net neg 1.5 liters overnight, weight up, still overall + 10 liters  9. Anemia - receiving 1 unit PRBC's today      PLAN:  1. Continue to replete lytes  2. Continue Toprol, lisinopril, spironolactone for LV dysfunction  3. Continue Lasix 40 mg bid  4. Discussed with Dr. Antione Patel. Recommend [facility] given drop in EF but would wait 4-6 weeks post PE on anticoagulation  5. Consider change to [facility] if tolerates resumption of ACEi. 6. Amiodarone was increased to bid on 3/16/19 - consider decreasing to once daily  7.  Heparin and Integrelin IV per hem/onc - okay to transition to po from cardiac perspective    Torin Medina, DEBRA - CNP, 3/22/2019, 12:12 PM  [facility]   [phone]       Telemetry: SR with isolated PVC's, no NSVT  NYHA: III    Physical Exam:  General:  Awake, alert, NAD  Skin:  Warm and dry  Neck:  JVP 8 cm upright  Chest:  Clear to auscultation    Cardiovascular:  RRR, normal S1S2, no m/g/r  Abdomen:  Soft, nontender, +bowel sounds  Extremities:  No BLE edema      Medications:    magnesium sulfate  4 g Intravenous Once    sodium chloride  250 mL Intravenous Once    pantoprazole  40 mg Intravenous Daily    furosemide  40 mg Oral BID    aspirin  81 mg Oral Daily    metoprolol succinate  100 mg Oral Daily    lisinopril  5 mg Oral Nightly    spironolactone  25 mg Oral Daily    magnesium oxide  400 mg Oral Daily    sodium chloride flush  10 mL Intravenous 2 times per day    potassium chloride  20 mEq Oral BID    simvastatin  40 mg Oral Nightly    amiodarone  200 mg Oral BID    insulin lispro  0-6 Units Subcutaneous TID WC    insulin lispro  0-3 Units Subcutaneous Nightly    folic acid  1 mg Oral Daily    oyster shell calcium/vitamin D  1 tablet Oral Daily    silver sulfADIAZINE   Topical Daily      eptifibatide 2 mcg/kg/min (03/22/19 1150)    heparin (porcine) 16.6 mL/hr (03/22/19 0826)    dextrose 100 mL/hr (03/08/19 1700)       Lab Data: Lab results independently reviewed by myself 3/20/19   CBC:   Recent Labs     03/20/19  0600 03/21/19  0615 03/22/19  0551   WBC 9.0 11.6* 10.5   HGB 8.0* 8.0* 7.2*    197 166     BMP:    Recent Labs     03/20/19  0600 03/21/19  0615 03/22/19  0551    140 136   K 3.7 3.3* 3.2*   CO2 26 26 28   BUN 11 12 11   CREATININE 0.6* 0.6* 0.6*     INR:  No results for input(s): INR in the last 72 hours. BNP:  No results for input(s): PROBNP in the last 72 hours. Cardiac Enzymes: No results for input(s): TROPONINI in the last 72 hours. Lipids:   Lab Results   Component Value Date    TRIG 178 05/22/2018    TRIG 183 11/21/2017    HDL 39 05/22/2018    HDL 46 11/21/2017    HDL 31 12/28/2011    HDL 31 05/17/2011    LDLCALC 72 05/22/2018    LDLCALC 97 11/21/2017       Cardiac Imaging:   ECHO 3/19/19:    Summary   Definity contrast was used, but port not working properly.  Celester Route left ventricular systolic function is moderately reduced with an ejection fraction of 30-35%. Inferolateral hypokinesia.  Mid anteroseptal, apical lateral and apical septal akinesia.   There is mild concentric left ventricular hypertrophy.   Normal left ventricular filling pressure.   Left ventricular cavity size is normal.   Mild thickening of the anterior leaflet of mitral valve.   Mild mitral regurgitation.   The left atrium is severely dilated. CARDIAC CATH MAR 2016 [facility])   Summary Comments:    3 Vessel CAD  Mild LV systolic dysfunction  Patent mid and distal LAD stents  Patent proximal, mid, and distal RCA stents  Normal LVEDP  Normal systemic pressures   Plan:  Cardiac risk factor modification. Treat with medical therapy. Findings/Summary:   CORONARY ANGIOGRAPHY FINDINGS  DOMINANCE:  Right dominant        LEFT MAIN: Luminal irregularities                       LEFT ANTERIOR DESCENDING:  Luminal irregularities, 30 % mid lesion, patent mid and distal overlapping stents                   LEFT CIRCUMFLEX: Luminal irregularities    20-30% mid lesion, OM1: small caliber vessel with severe diffuse disease                  RIGHT CORONARY:  Stenotic, overlapping proximal, mid and distal stents with mid focal 50% in-stent restenosis, diffuse distal 50% stenosis, RPAVGA: distal 70-80% stenosis                LEFT VENTRICULAR FUNCTION:        LVEF: 40-45%        LVEDP: Normal pre-angio        LV Systolic Pressure: Normal         LV to AO Gradient: none         LV Wall Motion:  Abnormal, inferior basal severe hypokinesis    .        MITRAL VALVE: No mitral insufficiency            STRESS MPI MAR 2014: Interpetation Summary:  The LV EF is 47%  There is mild inferior wall hypokinesis. There is a medium sized, fixed defect in the inferior wall. There is a small sized, reversible defect in the anterior wall. There is a medium sized, partially reversible defect in the anteroapical wall. 1.Negative ECG for ischemia with pharmocologic stress. 2.Positive nuclear stress imaging suggestive of inducible ischemia in the anteroapical wall.

## 2019-03-23 NOTE — PROGRESS NOTES
appears stated age and cooperative. HEENT: Pupils equal, round, and reactive to light. Conjunctivae/corneas clear. Vesicles in outer ear canal noted   Neck: Supple, with full range of motion. No jugular venous distention. Trachea midline. Respiratory:  increased respiratory effort. DM to auscultation, bilaterally without Rales/Wheezes/Rhonchi. on NRB  Cardiovascular: tachycardic rate and rhythm with normal S1/S2 without murmurs, rubs or gallops. Abdomen: Abd dressing d/i, DIAN drain in place with +oupt. Musculoskeletal: No clubbing, cyanosis or edema bilaterally. Full range of motion without deformity. Skin: Skin color, texture, turgor normal.  Radiation changes to back. Vesicles under lower lip, cheek. Mouth mucosa improving  Neurologic:  Neurovascularly intact without any focal sensory/motor deficits. Cranial nerves: II-XII intact, grossly non-focal.  Psychiatric: Alert and oriented, thought content appropriate, normal insight  Capillary Refill: Brisk,< 3 seconds   Peripheral Pulses: +2 palpable, equal bilaterally       Labs:   Recent Labs     03/21/19  0615 03/22/19  0551 03/23/19  0535   WBC 11.6* 10.5 8.9   HGB 8.0* 7.2* 7.7*   HCT 23.4* 21.2* 22.7*    166 154     Recent Labs     03/21/19  0615 03/22/19  0551 03/23/19  0535    136 132*   K 3.3* 3.2* 3.3*    96* 94*   CO2 26 28 28   BUN 12 11 12   CREATININE 0.6* 0.6* 0.6*   CALCIUM 7.8* 7.3* 7.3*     Urinalysis:      Lab Results   Component Value Date    NITRU Negative 03/10/2019    WBCUA 3-5 03/10/2019    RBCUA 0-2 03/10/2019    BLOODU Negative 03/10/2019    SPECGRAV 1.025 03/10/2019    GLUCOSEU Negative 03/10/2019       Radiology:  XR CHEST PORTABLE   Final Result   Interval development of mild pulmonary edema. There is a small left-sided pleural effusion with more focal consolidation   the left lung base, atelectasis versus pneumonia. IR PICC WO SQ PORT/PUMP > 5 YEARS   Final Result   Successful placement of PICC line. CT ABDOMEN PELVIS WO CONTRAST Additional Contrast? None   Final Result   No evidence retroperitoneal hematoma. Interval colonic resection. Small volume loculated collection in the left   pericolic gutter, with surgical drain terminating adjacent. Mildly dilated loops of small bowel with scattered air-fluid levels, most   likely postoperative ileus. Trace pleural effusions and bibasilar airspace disease representing   atelectasis or pneumonia. VL Extremity Venous Bilateral   Final Result      CT CHEST PULMONARY EMBOLISM W CONTRAST   Final Result   Bilateral pulmonary emboli      Scattered opacities throughout the lungs, greatest at the lung bases, with   tiny pleural effusions. Changes are increased compared to prior      Sclerotic osseous metastatic disease as described previously      The findings were sent to the Radiology Results [address] at 3:57   pm on 3/16/2019to be communicated to a licensed caregiver. XR CHEST 1 VW   Final Result   Shallow inflation with findings consistent with basilar atelectasis. Left   pleural thickening versus trace effusion. CT CHEST ABDOMEN PELVIS WO CONTRAST   Final Result   Perforated sigmoid diverticulitis and pneumoperitoneum unchanged. Stable   left [facility] colonic air-fluid collection may represent an intramural or   extramural abscess or giant diverticulum. Infrarenal abdominal aortic aneurysm and aorto bi-iliac stent. Other incidental findings as above. CT ABDOMEN PELVIS W IV CONTRAST Additional Contrast? None   Final Result   Acute perforated descending colon diverticulitis. There is free air in the   abdomen and there is a small extraluminal gas and fluid collection/abscess   adjacent to the descending colon measuring 4.1 cm in AP diameter and 6 cm in   length. There is diverticulosis of the entire colon. Cholelithiasis.       4.7 cm infrarenal abdominal aortic aneurysm status post endograft repair with   a patent aorto bi-iliac graft. There is suspicion of an endoleak with blush   of contrast enhancement suspected in the inferior aspect of the native   aneurysm. As a noncontrast study was not performed, calcification of the   intraluminal thrombus accounting for the apparent enhancement is not   excluded. 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 Comparison with a noncontrast CT examination would be   helpful. Critical results were called by Dr. Sergio Johnson MD to [facility] on   3/7/2019 at 20:35. Assessment/Plan:    Active Hospital Problems    Diagnosis Date Noted    Cardiomyopathy ([address] [I42.9]     NSVT (nonsustained ventricular tachycardia) ([facility] [I47.2]     Other pulmonary embolism without acute cor pulmonale (HCC) [I26.99]     Acute respiratory failure with hypoxia ([address] [J96.01]     H/O colectomy [Z90.49]     Primary malignant neoplasm of lung metastatic to other site (HCC) [C34.90]     Herpes zoster oticus [B02.21]     Hearing loss of right ear [H91.91]     Chemotherapy-induced neutropenia ([address] [D70.1, T45.1X5A]     Mucositis [K12.30]     Diverticulitis [K57.92] 03/07/2019    Diverticulitis of large intestine with perforation and abscess without bleeding [K57.20]        Acute perforated recurrent diverticulitis/pneumoperitoneum/abscess in setting of immunosuppression due to chemotherapy:   - CT A/P on admission demonstrated acute perforated colon diverticulitis with free air and fluid collection.   - General surgery following. Pt is status post laparoscopic converted to open left colectomy on 3/14/19.  - Completed 7 days of Merrem and Diflucan. - Continue prn pain control. Acute hypoxic respiratory failure due to bilateral pulmonary emboli:   - CT chest 3/16 showed thrombus in the distal aspect of the left main pulmonary artery extending into the left upper and left lower lobe pulmonary arterial branches. Additional Thrombus is seen in the distal aspect of the right main pulmonary artery,extending into the right upper right middle and right lower lobe pulmonary arterial branches. - Continue heparin gtt started 3/16. Management per Hem/onc. - Wean supplemental O2 as tolerated. He is not normally on home O2. He is currently requiring 3-4 liters HFNC.   - Lower extremity dopplers +DVT. Vascular surgery consulted, no indication for IVC filter at this time. - Pulmonary consulted/following. Metastatic lung cancer, stage 4 with bone mets:  - Hem/onc is following. Chemo on hold to allow for surgical healing. Thrombocytopenia (resolved) now with hypercoagulable state:  - Platelets required preoperatively. - On heparin gtt, bleeding risk high post op, monitor.  - Defer antiplatelet reinitiation to heme given fragility. Neutropenia due to chemotherapy (resolved):  - S/p Neulasta on 3/1. S/p Granix on 3/8. - Wound care for his radiation ulcer on his back. Anemia:  -  Pt is s/p 1 unit PBRC for Hgb 6.6 on 3/19 and 1 unit on 3/22. No overt signs of bleeding. CT A/P showed no RP bleed. PPI added per Hem/Onc. Pt is s/p B12 injection. Stool for occult blood is +. GI consulted, s/p EGD on 3/21 which was normal.   - Monitor daily CBC. Transfuse for Hgb less than 8 given history of CAD. PAD/CAD/HTN/Cardiomyopathy:   -Defer antiplatelet (aspirin and plavix) management to hem/onc given the complexity of his present situation, bleeding risk and thrombotic states. Pt was restarted on integrillin infusion on 3/20 - monitor for bleeding.   - ECHO showed reduced EF of 30-35%. Previous ECHO shows EF 40-45%. - Cardiology is following. Continue on lisinopril, aldactone, Toprol and statin. Lasix per Cards. NSVT:   - Known history of VT s/p ICD. Went into VT on 3/12, PMD went off. Continue amiodarone. Hyponatremia likely SIADH secondary to lung cancer (resolved):  - S/p samsca. Nephrology following.  Monitor daily BMP.     Hypokalemia/hypomagnesemia:  - Monitor and replete as needed. Zoster oticus:  - Completed 7 days of acyclovir.       DVT Prophylaxis: Pt is on heparin infusion   Diet: Dietary Nutrition Supplements: Other Supplement (ensure)  DIET LOW FIBER;  Code Status: Full Code    PT/OT Eval Status:  Ordered     Dispo - Uncertain     Taryn Tate, APRN - CNP

## 2019-03-23 NOTE — PROGRESS NOTES
PROGRESS NOTE  S:74 yrs Patient  admitted on 3/7/2019 with Diverticulitis [K57.92]  Diverticulitis [K57.92]  Diverticulitis [K57.92] . Today he feels well. No further bleeding. Tolerating diet. Exam:   Vitals:    03/23/19 1302   BP: 109/65   Pulse: 86   Resp: 16   Temp: 98.3 °F (36.8 °C)   SpO2: 97%      General appearance: alert, appears stated age and cooperative  HEENT: Oropharynx clear, no lesions  Neck: no adenopathy and supple, symmetrical, trachea midline  Lungs: clear to auscultation bilaterally  Heart: regular rate and rhythm, S1, S2 normal, no murmur, click, rub or gallop  Abdomen: soft, non-tender; bowel sounds normal; no masses,  no organomegaly  Extremities: extremities normal, atraumatic, no cyanosis or edema     Medications: Reviewed    Labs:  CBC:   Recent Labs     03/21/19  0615 03/22/19  0551 03/23/19  0535   WBC 11.6* 10.5 8.9   HGB 8.0* 7.2* 7.7*   HCT 23.4* 21.2* 22.7*   MCV 88.4 88.5 88.4    166 154     BMP:   Recent Labs     03/21/19  0615 03/22/19  0551 03/23/19  0535    136 132*   K 3.3* 3.2* 3.3*    96* 94*   CO2 26 28 28   BUN 12 11 12   CREATININE 0.6* 0.6* 0.6*     Impression:69year old male with history of HTN, CAD, metastatic lung CA on chemoRx, shingles and PE admitted for acute perforated diverticulitis, s/p L hemicolectomy recently noted to have dark stools and decline in Hgb. EGD negative    Recommendation:  1. Continue supportive care  2. Continue PPI   3. Advance diet  4. Monitor Hgb  5. Observe for signs of bleeding  6.  Ok to resume anticoagulation      Rebecca Torre MD  2:44 PM 3/23/2019

## 2019-03-23 NOTE — ONCOLOGY
ONCOLOGY HEMATOLOGY CARE PROGRESS NOTE      SUBJECTIVE:     Pain controlled. No bleeding and Hgb is stable. ROS:   The remaining 10 point review of symptoms is unremarkable. OBJECTIVE        Physical    VITALS:  /68   Pulse 85   Temp 98.7 °F (37.1 °C) (Oral)   Resp 18   Ht 5' 9\" (1.753 m)   Wt 176 lb 1.6 oz (79.9 kg)   SpO2 93%   BMI 26.01 kg/m²   TEMPERATURE:  Current - Temp: 98.7 °F (37.1 °C); Max - Temp  Av.1 °F (36.7 °C)  Min: 97.5 °F (36.4 °C)  Max: 98.7 °F (37.1 °C)  PULSE OXIMETRY RANGE: SpO2  Av.4 %  Min: 93 %  Max: 98 %  24HR INTAKE/OUTPUT:      Intake/Output Summary (Last 24 hours) at 3/23/2019 1413  Last data filed at 3/23/2019 0530  Gross per 24 hour   Intake 1788.08 ml   Output 1286 ml   Net 502.08 ml       CONSTITUTIONAL:  awake, alert, cooperative, no apparent distress, HEENT oral pharynx , no scleral icterus, wearing high flow nasal canula   HEMATOLOGIC/LYMPHATICS:  no cervical lymphadenopathy, no supraclavicular lymphadenopathy, no axillary lymphadenopathy and no inguinal lymphadenopathy  LUNGS:  No increased work of breathing, good air exchange, clear to auscultation bilaterally, no crackles or wheezing  CARDIOVASCULAR:  , regular rate and rhythm, normal S1 and S2, no S3 or S4, and no murmur noted  ABDOMEN: normal BS,  dressing to DIAN drain site to RLQ is with slight serosanguinous drainage, NON distended, NON tender, DIAN drain in place with serosanguinous drainage , no rebound tenderness, SOFT   Back: no bruising, purpura, or hematomas noted   MUSCULOSKELETAL:  There is no redness, warmth, or swelling of the joints. EXTREMETIES: BLE edema +1, non pitting   NEUROLOGIC:  Awake, alert, oriented to name, place and time. Cranial nerves II-XII are grossly intact. Motor is 5 out of 5 bilaterally.  Hip flexion 5/5 bilaterally in the bed     Data      Recent Labs     19  0615 19  0551 19  0535   WBC 11.6* 10.5 8.9   HGB 8.0* 7. 2* 7.7*   HCT 23.4* 21.2* 22.7*    166 154   MCV 88.4 88.5 88.4        Recent Labs     03/21/19  0615 03/22/19  0551 03/23/19  0535    136 132*   K 3.3* 3.2* 3.3*    96* 94*   CO2 26 28 28   BUN 12 11 12   CREATININE 0.6* 0.6* 0.6*     No results for input(s): AST, ALT, ALB, BILIDIR, BILITOT, ALKPHOS in the last 72 hours.     Magnesium:    Lab Results   Component Value Date    MG 1.60 03/23/2019    MG 1.50 03/22/2019    MG 1.30 03/21/2019         Problem List  Patient Active Problem List   Diagnosis    Elevated fasting glucose    CAD (coronary artery disease)    PAD (peripheral artery disease) ([address]    Essential hypertension    Mixed hyperlipidemia    Sprain of right foot    Sprain of anterior talofibular ligament of right ankle    Closed nondisplaced fracture of fifth right metatarsal bone    Closed fracture of proximal lateral malleolus of ankle with routine healing    Right foot pain    Right ankle pain    Nondisplaced fracture of fifth right metatarsal bone with routine healing    History of pulmonary emphysema    Chronic obstructive pulmonary disease with (acute) exacerbation (HCC)    Pulmonary nodule, left    Abnormal bone scan of thoracic spine    Back pain    Visit for monitoring Plavix therapy    History of ventricular tachycardia    Gallstones    Hemoptysis    Former smoker    Elevated PSA    Adenocarcinoma of left lung ([address]    Acute diverticulitis    Pancytopenia ([address]    Diverticulitis of large intestine with perforation and abscess without bleeding    Diverticulitis    Chemotherapy-induced neutropenia (HCC)    Mucositis    Herpes zoster oticus    Hearing loss of right ear    Acute respiratory failure with hypoxia ([address]    H/O colectomy    Primary malignant neoplasm of lung metastatic to other site [facility])    NSVT (nonsustained ventricular tachycardia) (HCC)    Other pulmonary embolism without acute cor pulmonale ([address]    Cardiomyopathy ([address] ASSESSMENT AND PLAN    -Acute Perforated descending colon diverticulitis (not immune mediated colitis) with free air in the abd   - per gen surgery , s/p Laparoscopic converted to Open left Colectomy, 3/14/2019, with Dr. Jovanna Singh  - ATB support (Zosyn, Vera, Vanco) , changed to [address] now, continue until POD 7    Neutropenia secondary to chemo  -  Granix stopped 3/11   - resolved     Thrombocytopenia secondary to chemo  -Resolved  - plt now 154    PE  -Diagnosed on 3/16/2019 with bilateral PEs involving the riaght and left main pulm arteries. -plan Eliquis on dc   -will need lifelong anticoagulation  -Would continue Heparin for now    Anemia, possible GI bleed on hep drip  - Hgb 6.6 on 3/19, now 8 s/p PRBC on 3/19, stabilized and EGD 3/21 negative   - PPI added, now BID dosing per GI 3/21  - No transfusion today  - CT AP stat - no signs of bleeding   - due for B12 shot- given 3/20- due every 9 weeks with chemo.     -Lung CA, stage 4 bone mets   - Will hold chemo until surgically healed  - due for Cycle 3 Carbo/Alimta/Keytruda until 3/22/2019  - consider holding chemo and moving forward with Carolyn Montgomery only in future. - Plans for chemo/immunotherapy outpatient with Dr. Marcela Salgado  - CT chest/abd/pelvis, 3/10/2019, showed improvement in the left hilar soft tissue and radiation changes. No obvious new disease.       Shingles to the V3 dermatome of the trigeminal nerve  - Now on oral Acyclovir 800 5 times a day (deescalated from IV dosing)  - Acyclovir finished 3/19      ONCOLOGIC DISPOSITION:  TBD, per surgery. Can dc from heme onc standpoint.      Darion Kim MD

## 2019-03-23 NOTE — PROGRESS NOTES
[facility] called to make RN aware that pt had 4 beats vtach. Pt resting with eyes closed and asymptomatic. Will monitor.

## 2019-03-23 NOTE — PROGRESS NOTES
Speech Language Pathology  Facility/Department: [facility]  Dysphagia Daily Treatment Note    NAME: Leonel Query  : 1945  MRN: [id number]    Patient Diagnosis(es):   Patient Active Problem List    Diagnosis Date Noted    Cardiomyopathy [facility])     NSVT (nonsustained ventricular tachycardia) ([address]     Other pulmonary embolism without acute cor pulmonale (HCC)     Acute respiratory failure with hypoxia ([address]     H/O colectomy     Primary malignant neoplasm of lung metastatic to other site [facility])     Herpes zoster oticus     Hearing loss of right ear     Chemotherapy-induced neutropenia ([address]     Mucositis     Diverticulitis 2019    Diverticulitis of large intestine with perforation and abscess without bleeding     Acute diverticulitis 2019    Pancytopenia ([address] 2019    Adenocarcinoma of left lung ([address] 2019    Gallstones 2019    Hemoptysis 2019    Former smoker 2019    Elevated PSA 2019    Back pain 2019    Visit for monitoring Plavix therapy     History of ventricular tachycardia     History of pulmonary emphysema 2019    Chronic obstructive pulmonary disease with (acute) exacerbation ([address] 2019    Pulmonary nodule, left 2019    Abnormal bone scan of thoracic spine 2019    Nondisplaced fracture of fifth right metatarsal bone with routine healing 2018    Sprain of right foot 2018    Sprain of anterior talofibular ligament of right ankle 2018    Closed nondisplaced fracture of fifth right metatarsal bone 2018    Closed fracture of proximal lateral malleolus of ankle with routine healing 2018    Right foot pain 2018    Right ankle pain 2018    Essential hypertension 2017    Mixed hyperlipidemia 2017    PAD (peripheral artery disease) ([address] 2011    CAD (coronary artery disease) 2011    Elevated fasting glucose 2010

## 2019-03-23 NOTE — PROGRESS NOTES
[address] Daily Progress Note      Admit Date:  3/7/2019    Subjective:  Mr. Mack Sotomayor is seen today for the first time by me for follow up visit. He is followed for asymptomatic NSVT  He is sitting in chair no symptoms of chest pain palpitations dizziness or passing out. Slept well he says. Wife at bedside very appreciative of Tello Bishop    Patient seen yesterday by my associate Demarco Martin yesterday   Follow history   Presented with abdominal pain, low grade fevers, recent hospitalization for diverticulitis -> acute perforated colon diverticulitis, s/p left colectomy 3/14/19. Found to have large bilateral PE as well as DVT. Hx of metastatic lung cancer with mets to bones on chemo with neutropenia, anemia and thrombocytopenia. Cardiology consulted for NSVT. Lytes being replaced. Hx of CAD/ stents, PAD, NSVT s/p ICD on amiodarone. Echo resulted EF 30-35%.   Meds adjusted (atenolo ->Toprol, resume lisinopril, start spironolactone).         ROS:  12 point ROS negative in all areas as listed below except as in [place]  Constitutional, EENT, Cardiovascular, pulmonary, GI, , Musculoskeletal, skin, neurological, hematological, endocrine, Psychiatric    Past Medical History:   Diagnosis Date    Benign neoplasm of colon 2001    Cancer [facility])     lung cancer with mets with spine     Diverticulosis of colon (without mention of hemorrhage) 2001    Elevated fasting glucose     Former cigarette smoker     Herpes simplex viral infection 03/07/2019    orolabial    Hyperlipidemia     Hypertension     MI, old     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 Eyes:  Sclera nonicteric  Nose/Sinuses:  negative findings: nose shows no deformity, asymmetry, or inflammation, nasal mucosa normal, septum midline with no perforation or bleeding  Back:  no pain to palpation  Joint:  no active joint inflammation  Musculoskeletal:  negative  Skin:  Warm and dry  Neck:  Negative for JVD and Carotid Bruits. Chest:  Clear to auscultation, respiration easy  Cardiovascular:  RRR, S1S2 normal, no murmur, no rub or thrill.   Extremities:   No edema, clubbing, cyanosis,  Neuro: intact    Medications:    magnesium sulfate  2 g Intravenous Once    magnesium oxide  400 mg Oral BID    pantoprazole  40 mg Intravenous Daily    furosemide  40 mg Oral BID    aspirin  81 mg Oral Daily    metoprolol succinate  100 mg Oral Daily    lisinopril  5 mg Oral Nightly    spironolactone  25 mg Oral Daily    sodium chloride flush  10 mL Intravenous 2 times per day    potassium chloride  20 mEq Oral BID    simvastatin  40 mg Oral Nightly    amiodarone  200 mg Oral BID    folic acid  1 mg Oral Daily    oyster shell calcium/vitamin D  1 tablet Oral Daily    silver sulfADIAZINE   Topical Daily      eptifibatide 2 mcg/kg/min (03/23/19 1022)    heparin (porcine) 16.6 mL/hr (03/23/19 0846)    dextrose 100 mL/hr (03/08/19 1700)     loperamide, sodium chloride flush, oxyCODONE, HYDROmorphone, heparin (porcine), heparin (porcine), magic (miracle) mouthwash with nystatin, glucose, dextrose, glucagon (rDNA), dextrose, ondansetron, acetaminophen    Lab Data:  CBC:   Recent Labs     03/21/19  0615 03/22/19  0551 03/23/19  0535   WBC 11.6* 10.5 8.9   HGB 8.0* 7.2* 7.7*   HCT 23.4* 21.2* 22.7*   MCV 88.4 88.5 88.4    166 154     BMP:   Recent Labs     03/21/19  0615 03/22/19  0551 03/23/19  0535    pain 01/16/2019    Visit for monitoring Plavix therapy     History of ventricular tachycardia     History of pulmonary emphysema 01/09/2019    Chronic obstructive pulmonary disease with (acute) exacerbation (HCC) 01/09/2019    Pulmonary nodule, left 01/09/2019    Abnormal bone scan of thoracic spine 01/09/2019    Nondisplaced fracture of fifth right metatarsal bone with routine healing 05/14/2018    Sprain of right foot 05/07/2018    Sprain of anterior talofibular ligament of right ankle 05/07/2018    Closed nondisplaced fracture of fifth right metatarsal bone 05/07/2018    Closed fracture of proximal lateral malleolus of ankle with routine healing 05/07/2018    Right foot pain 05/07/2018    Right ankle pain 05/07/2018    Essential hypertension 05/01/2017    Mixed hyperlipidemia 05/01/2017    PAD (peripheral artery disease) ([facility] [address] 05/21/2011    CAD (coronary artery disease) 04/29/2011    Elevated fasting glucose 07/24/2010       Plan:  1. Guideline based medical therapy for low EF and heart failure  2. Nonsustained VT will improve with treatment for CHF and correction of lytes  3. Patient on beta blockers and amiodarone  4. Anticoagulation for pulm embolism,  5. I will check TSH since on amiodarone  6.  EKG    Core Measures:  · Discharge instructions:   · LVEF documented: 30-35  · ACEI for LV dysfunction: yes  · Smoking Cessation:na Darel Fothergill, MD 3/23/2019 12:21 PM

## 2019-03-23 NOTE — PROGRESS NOTES
Bedside report received from [facility]. Patient sitting up in the chair, watching television. Wife at bedside. Bedside table and call light within reach. White board updated. Will continue to monitor patient. Marlon Tucker RN    [id number]  Shift assessment complete. (See findings in flowsheet). Med pass complete. (See MAR). VSS. Patient with no complaints at this time. Patient resting in bed comfortably. No signs or symptoms of distress or discomfort noted at this time. Bed in lowest position, brakes locked. Nonskid footwear in place. 5 P's addressed, no needs at this time. Pt calls out appropriately. Will continue to monitor. Marlon Tucker RN      6:30 AM  Blood drawn per orders and sent to lab via tube system.  Marlon Tucker RN

## 2019-03-23 NOTE — PROGRESS NOTES
Akash Rojas is a 69 y.o. male patient.     Current Facility-Administered Medications   Medication Dose Route Frequency Provider Last Rate Last Dose    magnesium oxide (MAG-OX) tablet 400 mg  400 mg Oral BID DEBRA Deng CNP   400 mg at 03/22/19 2113    pantoprazole (PROTONIX) injection 40 mg  40 mg Intravenous Daily Maribeth Moran MD   40 mg at 03/22/19 0956    furosemide (LASIX) tablet 40 mg  40 mg Oral BID DEBRA Deng CNP   40 mg at 03/22/19 1716    aspirin chewable tablet 81 mg  81 mg Oral Daily Maribeth Moran MD   81 mg at 03/22/19 0957    eptifibatide (INTEGRILIN) 0.75 mg/mL infusion  2 mcg/kg/min Intravenous Continuous Maribeth Moran MD 12.9 mL/hr at 03/22/19 1859 2 mcg/kg/min at 03/22/19 1859    metoprolol succinate (TOPROL XL) extended release tablet 100 mg  100 mg Oral Daily Maribeth Moran MD   100 mg at 03/22/19 0957    lisinopril (PRINIVIL;ZESTRIL) tablet 5 mg  5 mg Oral Nightly Maribeth Moran MD   5 mg at 03/22/19 2113    spironolactone (ALDACTONE) tablet 25 mg  25 mg Oral Daily Maribeth Moran MD   25 mg at 03/22/19 0957    loperamide (IMODIUM) capsule 2 mg  2 mg Oral 4x Daily PRN Maribeth Moran MD   2 mg at 03/21/19 1709    sodium chloride flush 0.9 % injection 10 mL  10 mL Intravenous 2 times per day Maribeth Moran MD   10 mL at 03/22/19 2114    sodium chloride flush 0.9 % injection 10 mL  10 mL Intravenous PRN Maribeth Moran MD   10 mL at 03/21/19 1132    potassium chloride (KLOR-CON M) extended release tablet 20 mEq  20 mEq Oral BID Norman Villasenor MD   20 mEq at 03/22/19 2113    heparin 25,000 units in dextrose 5% 250 mL infusion  16.6 mL/hr Intravenous Continuous DEBRA Angeles  mL/hr at 03/22/19 1821 100 mL/hr at 03/22/19 1821    simvastatin (ZOCOR) tablet 40 mg  40 mg Oral Nightly Maribeth Moran MD   40 mg at 03/22/19 2113    oxyCODONE (ROXICODONE) immediate release tablet 5 mg  5 mg Oral Q4H PRN Maribeth Moran MD        HYDROmorphone (DILAUDID) injection 0.5 mg  0.5 mg Intravenous Q3H PRN Pennye Karel, MD   0.5 mg at 03/21/19 0407    amiodarone (CORDARONE) tablet 200 mg  200 mg Oral BID Camille Vinson, MD   200 mg at 03/22/19 2113    heparin (porcine) injection 6,610 Units  80 Units/kg Intravenous PRN Pennye Karel, MD        heparin (porcine) injection 3,300 Units  40 Units/kg Intravenous PRN Pennye Karel, MD        magic (miracle) mouthwash with nystatin  5 mL Swish & Spit 4x Daily PRN Pennye Median, MD   5 mL at 03/13/19 1250    glucose (GLUTOSE) 40 % oral gel 15 g  15 g Oral PRN Pennye Karel, MD        dextrose 50 % solution 12.5 g  12.5 g Intravenous PRN Pennye Karel, MD        glucagon (rDNA) injection 1 mg  1 mg Intramuscular PRN Pennye Karel, MD        dextrose 5 % solution  100 mL/hr Intravenous PRN Pennye Karel,  mL/hr at 03/08/19 1700 100 mL/hr at 03/08/19 1700    insulin lispro (HUMALOG) injection vial 0-6 Units  0-6 Units Subcutaneous TID WC Pennye Karel, MD   Stopped at 03/20/19 1056    insulin lispro (HUMALOG) injection vial 0-3 Units  0-3 Units Subcutaneous Nightly Pennye Karel, MD   1 Units at 73/95/27 9744    folic acid (FOLVITE) tablet 1 mg  1 mg Oral Daily Pennye Karel, MD   1 mg at 03/22/19 0957    oyster shell calcium/vitamin D 250-125 MG-UNIT per tablet 250 mg  1 tablet Oral Daily Pennye Karel, MD   250 mg at 03/22/19 0957    silver sulfADIAZINE (SILVADENE) 1 % cream   Topical Daily Pennye Karel, MD        ondansetron [facility]) injection 4 mg  4 mg Intravenous Q6H PRN Pennye Median, MD   4 mg at 03/22/19 1716    acetaminophen (TYLENOL) tablet 650 mg  650 mg Oral Q4H PRN Pennye Median, MD   650 mg at 03/20/19 1158     No Known Allergies  Active Problems:    Diverticulitis of large intestine with perforation and abscess without bleeding    Diverticulitis    Chemotherapy-induced neutropenia (HCC)    Mucositis    Herpes zoster oticus    Hearing loss of right ear    Acute respiratory failure with hypoxia ([address]    H/O colectomy    Primary malignant neoplasm of lung metastatic to other site [facility])    NSVT (nonsustained ventricular tachycardia) [facility]    Other pulmonary embolism without acute cor pulmonale (HCC)    Cardiomyopathy (HCC)  Resolved Problems:    * No resolved hospital problems. *    Blood pressure 113/74, pulse 83, temperature 98.2 °F (36.8 °C), temperature source Oral, resp. rate 18, height 5' 9\" (1.753 m), weight 182 lb 8 oz (82.8 kg), SpO2 93 %. Subjective:  Symptoms:  Stable. Pain:  He reports no pain. Objective:  General Appearance: In no acute distress and not in pain. Vital signs: (most recent): Blood pressure 113/74, pulse 83, temperature 98.2 °F (36.8 °C), temperature source Oral, resp. rate 18, height 5' 9\" (1.753 m), weight 182 lb 8 oz (82.8 kg), SpO2 93 %. Abdomen: Abdomen is soft. Bowel sounds are normal.   There is no abdominal tenderness. Assessment & Plan  68 yo w HTN, CAD, metastatic lung CA on chemoRx, shingles and P.E., admitted for acute perforated diverticulitis, s/p surgical repair, who has had a drop in his H/H on 3/19 to H/H 7/20, and was found to have blacksih heme-pos diarrheal stools. Is on Heparin drip. EGD was neg.     Plan:   1. Continue daiky PPI  2. F/U H/H  3.  Will follow     MD Han Fan MD  3/23/2019

## 2019-03-24 NOTE — PROGRESS NOTES
Call from [facility] bed rental.  [employer] mattress is not available at this time. Instructed to deliver when able. Reports should be available tomorrow.

## 2019-03-24 NOTE — PROGRESS NOTES
Hospitalist Progress Note      PCP: Corby Flaherty MD    Date of Admission: 3/7/2019    Chief Complaint: Abdominal pain     Hospital Course:  69 y.o. male who presented to [facility] with LLQ abdominal pain. Pt was recently d/c'd for diverticulitis, states pain never went away. C/o low grade fevers for 2 weeks. States pain not as severe as previous admission. Had chemo on Friday, states received neulasta. Denies cp, n/v, sob. Subjective:   Pt is lying in bed. On 4 lpm. Afebrile. He denies dyspnea or chest pain. No N/V/D. He remains on heparin and Integrilin infusions.      Medications:  Reviewed    Infusion Medications    eptifibatide 2 mcg/kg/min (03/24/19 0810)    heparin (porcine) 16.6 mL/hr (03/23/19 2336)    dextrose 100 mL/hr (03/08/19 1700)     Scheduled Medications    magnesium oxide  400 mg Oral BID    pantoprazole  40 mg Intravenous Daily    furosemide  40 mg Oral BID    aspirin  81 mg Oral Daily    metoprolol succinate  100 mg Oral Daily    lisinopril  5 mg Oral Nightly    spironolactone  25 mg Oral Daily    sodium chloride flush  10 mL Intravenous 2 times per day    potassium chloride  20 mEq Oral BID    simvastatin  40 mg Oral Nightly    amiodarone  200 mg Oral BID    folic acid  1 mg Oral Daily    oyster shell calcium/vitamin D  1 tablet Oral Daily    silver sulfADIAZINE   Topical Daily     PRN Meds: menthol-zinc oxide, loperamide, sodium chloride flush, oxyCODONE, HYDROmorphone, heparin (porcine), heparin (porcine), magic (miracle) mouthwash with nystatin, glucose, dextrose, glucagon (rDNA), dextrose, ondansetron, acetaminophen      Intake/Output Summary (Last 24 hours) at 3/24/2019 1416  Last data filed at 3/24/2019 0809  Gross per 24 hour   Intake 360 ml   Output 1400 ml   Net -1040 ml       Physical Exam Performed:    /62   Pulse 81   Temp 98.4 °F (36.9 °C) (Oral)   Resp 17   Ht 5' 9\" (1.753 m)   Wt 172 lb 4.8 oz (78.2 kg)   SpO2 92%   BMI 25.44 kg/m² cm infrarenal abdominal aortic aneurysm status post endograft repair with   a patent aorto bi-iliac graft. There is suspicion of an endoleak with blush   of contrast enhancement suspected in the inferior aspect of the native   aneurysm. As a noncontrast study was not performed, calcification of the   intraluminal thrombus accounting for the apparent enhancement is not   excluded. 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 Comparison with a noncontrast CT examination would be   helpful. Critical results were called by Dr. Tori Mcdowell. MD [facility] to [facility] on   3/7/2019 at 20:35. Assessment/Plan:    Active Hospital Problems    Diagnosis Date Noted    Cardiomyopathy ([address] [I42.9]     NSVT (nonsustained ventricular tachycardia) ([facility]) [I47.2]     Other pulmonary embolism without acute cor pulmonale ([facility]) [I26.99]     Acute respiratory failure with hypoxia ([address] [J96.01]     H/O colectomy [Z90.49]     Primary malignant neoplasm of lung metastatic to other site (HCC) [C34.90]     Herpes zoster oticus [B02.21]     Hearing loss of right ear [H91.91]     Chemotherapy-induced neutropenia ([address] [D70.1, T45.1X5A]     Mucositis [K12.30]     Diverticulitis [K57.92] 03/07/2019    Diverticulitis of large intestine with perforation and abscess without bleeding [K57.20]        Acute perforated recurrent diverticulitis/pneumoperitoneum/abscess in setting of immunosuppression due to chemotherapy:   - CT A/P on admission demonstrated acute perforated colon diverticulitis with free air and fluid collection.   - General surgery consulted. Pt is status post laparoscopic converted to open left colectomy on 3/14/19.  - Pt completed 7 days of Merrem and Diflucan. - Continue prn pain control.      Acute hypoxic respiratory failure due to bilateral pulmonary emboli:   - CT chest 3/16 showed thrombus in the distal aspect of the left main pulmonary artery extending into the left upper and left lower lobe pulmonary arterial branches. Additional Thrombus is seen in the distal aspect of the right main pulmonary artery,extending into the right upper right middle and right lower lobe pulmonary arterial branches. - Continue heparin gtt started 3/16. If H&H remains stable on 3/25 consider changing to NOAC. Will defer to Hem/Onc. Okay with GI and Cards to start on oral AC. - Wean supplemental O2 as tolerated. He is not normally on home O2. He is currently requiring 3-4 liters HFNC.   - Lower extremity dopplers +DVT. Vascular surgery consulted, no indication for IVC filter at this time. - Pulmonary consulted/following. Metastatic lung cancer, stage 4 with bone mets:  - Hem/onc is following. Chemo on hold to allow for surgical healing. Thrombocytopenia (resolved) now with hypercoagulable state:  - Platelets required preoperatively. Pt currently on heparin gtt. Neutropenia due to chemotherapy (resolved):  - S/p Neulasta on 3/1. S/p Granix on 3/8. Anemia:  -  Pt is s/p 1 unit PBRC for Hgb 6.6 on 3/19 and 1 unit for Hgb 7.2 on 3/22. No overt signs of bleeding. CT A/P showed no RP bleed. PPI added per Hem/Onc. Pt is s/p B12 injection. Stool for occult blood was +. GI consulted, s/p EGD on 3/21 which was normal. Monitor daily CBC. H&H is stable. PAD/CAD/HTN/Cardiomyopathy:   - Defer antiplatelet management to hem/onc given the complexity of his present situation, bleeding risk and thrombotic states. Pt was restarted on integrillin infusion on 3/20 per Hem/Onc. If H&H remains stable consider resuming plavix -- will defer to Hem/Onc.   - ECHO showed reduced EF of 30-35%. Previous ECHO showed EF 40-45%. - Cardiology is following. Continue on aspirin, lisinopril, lasix, aldactone, Toprol and statin. - Cards recommending LHC given drop in EF but would like to wait 4-6 weeks post PE on AC. NSVT:   - Known history of VT s/p ICD. Went into VT on 3/12, PMD went off.  Continue amiodarone. Keep K > 4 and Mag > 2. 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 Nephrology following. Monitor daily BMP. Hypokalemia/hypomagnesemia:  - Monitor and replete as needed. Zoster oticus:  - Completed 7 days of acyclovir. DVT Prophylaxis: Pt is on heparin infusion   Diet: Dietary Nutrition Supplements: Other Supplement (ensure)  DIET LOW FIBER;  Code Status: Full Code    PT/OT Eval Status:  Ordered with recs for [facility] [address] - 1-3 days, discussed disposition with pt's wife. Currently she is in favor of pt going home if S4 program would be available. Pt will most likely go home with supplemental O2.      [address], APRN - CNP

## 2019-03-24 NOTE — PROGRESS NOTES
[facility] Wound Ostomy Continence Nurse  Follow-up Progress Note       NAME:  [facility]  MEDICAL RECORD NUMBER:  [id number]  AGE:  69 y.o. GENDER:  male  :  1945  TODAY'S DATE:  3/24/2019    Subjective:  Buttocks is sore and burns when lying on it. Wound Identification:  Wound Type: Radiation burn mid back along spine. Dermatitis mid & right sacrum as well as right and left buttocks. Contributing Factors: immunosuppression and low platelets, recent radiation and chemotherapy. Cancer of spine. Patient Goal of Care:  [x] Wound Healing  [] Odor Control   [] Palliative Care  [] Pain Control   [] Other:     Objective:  lying in bed. /72   Pulse 81   Temp 98.6 °F (37 °C) (Oral)   Resp 16   Ht 5' 9\" (1.753 m)   Wt 172 lb 4.8 oz (78.2 kg)   SpO2 (!) 88%   BMI 25.44 kg/m²   Jared Risk Score: Jared Scale Score: 20  Assessment:  Wounds all mostly dry red or pink. No drainage noted. See photo's. Measurements:  Wound 19 Back Mid Radiation burn  (Active)   Wound Image   3/24/2019 11:38 AM   Wound Burn 3/24/2019 11:38 AM   Dressing Status Clean;Dry; Intact 3/20/2019  2:08 AM   Dressing Changed Changed/New 3/19/2019  3:50 PM   Dressing/Treatment Open to air 3/24/2019 11:38 AM   Wound Cleansed Wound cleanser 3/24/2019 11:38 AM   Dressing Change Due 03/18/19 3/19/2019  3:50 PM   Wound Length (cm) 13 cm 3/24/2019 11:38 AM   Wound Width (cm) 5 cm 3/24/2019 11:38 AM   Wound Depth (cm) 0 cm 3/24/2019 11:38 AM   Wound Surface Area (cm^2) 65 cm^2 3/24/2019 11:38 AM   Change in Wound Size % (l*w) 54.39 3/24/2019 11:38 AM   Wound Volume (cm^3) 0 cm^3 3/24/2019 11:38 AM   Wound Healing % 100 3/24/2019 11:38 AM   Distance Tunneling (cm) 0 cm 3/24/2019 11:38 AM   Tunneling Position ___ O'Clock 0 3/24/2019 11:38 AM   Undermining Starts ___ O'Clock 0 3/24/2019 11:38 AM   Undermining Ends___ O'Clock 0 3/24/2019 11:38 AM   Undermining Maxium Distance (cm) 0 3/24/2019 11:38 AM   Wound Assessment Pink;Brown 3/24/2019 11:38 AM   Drainage Amount None 3/24/2019 11:38 AM   Drainage Description Serosanguinous 3/19/2019  3:50 PM   Odor None 3/24/2019 11:38 AM   Margins Attached edges; Defined edges 3/24/2019 11:38 AM   Emerita-wound Assessment Clean;Dry; Intact 3/24/2019 11:38 AM   Non-staged Wound Description Partial thickness 3/19/2019  3:50 PM   [place]%Wound Bed 80 3/24/2019 11:38 AM   Red%Wound Bed 100 3/19/2019  3:50 PM   Other%Wound Bed 20% brown 3/24/2019 11:38 AM   Culture Taken No 3/24/2019 11:38 AM   Number of days: 32       Wound 03/08/19 Sacrum Mid;Right (Active)   Wound Image   3/24/2019 11:38 AM   Wound Other 3/24/2019 11:38 AM   Dressing Status Other (Comment) 3/24/2019 11:38 AM   Dressing Changed Changed/New 3/20/2019  2:08 AM   Dressing/Treatment Pharmaceutical agent (see MAR) 3/24/2019 11:38 AM   Wound Cleansed Wound cleanser 3/24/2019 11:38 AM   Dressing Change Due 03/18/19 3/19/2019  3:50 PM   Wound Length (cm) 11 cm 3/24/2019 11:38 AM   Wound Width (cm) 14 cm 3/24/2019 11:38 AM   Wound Depth (cm) 0 cm 3/24/2019 11:38 AM   Wound Surface Area (cm^2) 154 cm^2 3/24/2019 11:38 AM   Wound Volume (cm^3) 0 cm^3 3/24/2019 11:38 AM   Distance Tunneling (cm) 0 cm 3/24/2019 11:38 AM   Tunneling Position ___ O'Clock 0 3/24/2019 11:38 AM   Undermining Starts ___ O'Clock 0 3/24/2019 11:38 AM   Undermining Ends___ O'Clock 0 3/24/2019 11:38 AM   Undermining Maxium Distance (cm) 0 3/24/2019 11:38 AM   Wound Assessment Red;Dry;Purple;Brown 3/24/2019 11:38 AM   Drainage Amount None 3/24/2019 11:38 AM   Odor None 3/24/2019 11:38 AM   Margins Attached edges; Defined edges 3/24/2019 11:38 AM   Emerita-wound Assessment Clean;Dry; Intact 3/24/2019 11:38 AM   [place]%Wound Bed 100 3/19/2019  3:50 PM   Red%Wound Bed 60 3/24/2019 11:38 AM   Purple%Wound Bed 20 3/24/2019 11:38 AM   Other%Wound Bed brown 20% 3/24/2019 11:38 AM   Culture Taken No 3/24/2019 11:38 AM   Number of days: 16     Mid & right Sacrum + R & L buttocks: Incision 01/23/19 Chest Right (Active)   Number of days: 59       Incision 03/14/19 Abdomen (Active)   Wound Assessment Clean;Dry; Intact 3/24/2019  8:12 AM   Emerita-wound Assessment Clean;Dry 3/24/2019  8:12 AM   Closure Staples; Open to air 3/24/2019  8:12 AM   Drainage Amount None 3/24/2019  8:12 AM   Dressing/Treatment Open to air 3/24/2019  8:12 AM   Dressing Changed Changed/New 3/19/2019  4:12 AM   Dressing Status Clean;Dry; Intact 3/19/2019  4:12 AM   Number of days: 10       Incision 03/14/19 Abdomen Right (Active)   Wound Assessment Clean;Dry; Intact 3/24/2019  8:12 AM   Emerita-wound Assessment Clean; Intact 3/24/2019  8:12 AM   Closure Staples; Open to air 3/24/2019  8:12 AM   Drainage Amount None 3/24/2019  8:12 AM   Dressing/Treatment Tegaderm 3/19/2019  4:12 AM   Dressing Changed Changed/New 3/19/2019  4:12 AM   Dressing Status Clean;Dry; Intact 3/19/2019  4:12 AM   Number of days: 10       Response to treatment:  Well tolerated by patient. Pain Assessment:  Severity:  4 / 10  Quality of pain: burning - when lying on buttocks. Wound Pain Timing/Severity: intermittent  Premedicated: No  Plan:   Plan of Care: Incision 03/14/19 Abdomen-Dressing/Treatment: Open to air  Incision 03/14/19 Abdomen Right-Dressing/Treatment: Tegaderm  Wound 02/19/19 Back Mid Radiation burn -Dressing/Treatment: Open to air  [REMOVED] Wound 03/08/19 Sacrum Mid-Dressing/Treatment: Pharmaceutical agent (see MAR)  Wound 03/08/19 Sacrum Mid;Right-Dressing/Treatment: Pharmaceutical agent (see MAR)(calmoseptine added)     Recommend:  Clean all wounds with foamy cleanser. Pat dry. Apply silvadene cream 1%  to back radiation burns daily. Apply calmoseptine ointment to mid + right sacrum and R & L Buttocks bid. Pressure redistribution cushion when up in chair. Switch to dolphin mattress. Call wound care for deterioration [phone], Pager [phone]. Wound care to follow.     Specialty Bed Required :

## 2019-03-24 NOTE — PROGRESS NOTES
PROGRESS NOTE  S:74 yrs Patient  admitted on 3/7/2019 with Diverticulitis [K57.92]  Diverticulitis [K57.92]  Diverticulitis [K57.92] . Today he complains of back pain at the decubitus ulcer    Exam:   Vitals:    03/24/19 0813   BP:    Pulse:    Resp:    Temp:    SpO2: (!) 88%      General appearance: alert, appears stated age and cooperative  HEENT: Oropharynx clear, no lesions  Neck: no adenopathy and supple, symmetrical, trachea midline  Lungs: clear to auscultation bilaterally  Heart: regular rate and rhythm, S1, S2 normal, no murmur, click, rub or gallop  Abdomen: soft, non-tender; bowel sounds normal; no masses,  no organomegaly  Extremities: extremities normal, atraumatic, no cyanosis or edema     Medications: Reviewed    Labs:  CBC:   Recent Labs     03/22/19  0551 03/23/19  0535 03/24/19  0635   WBC 10.5 8.9 6.9   HGB 7.2* 7.7* 7.9*   HCT 21.2* 22.7* 23.3*   MCV 88.5 88.4 88.7    154 166     BMP:   Recent Labs     03/22/19  0551 03/23/19  0535 03/24/19  0635    132* 134*   K 3.2* 3.3* 3.2*   CL 96* 94* 96*   CO2 28 28 27   BUN 11 12 10   CREATININE 0.6* 0.6* 0.6*     Impression: 69year old male with history of HTN, CAD, metastatic lung CA on chemoRx, shingles and PE admitted for acute perforated diverticulitis, s/p L hemicolectomy recently noted to have dark stools and decline in Hgb. EGD negative    Recommendation:  1. Continue supportive care  2. PPI daily  3. Monitor Hgb  4. Resume oral anticoagulation in preperation for discharge  5. Low residue diet  6.  Will follow      Mayito Nicole MD  10:09 AM 3/24/2019

## 2019-03-24 NOTE — PROGRESS NOTES
Labs reviewed. Chronic intermittent  hyponatremia- better sodium 134,  Low K 3.2, Mg 1.5.  Likely secondary to lasix- on supplemets  No new recommendations  Dr. Rosalynd Fleischer will assume care in AM 3/25

## 2019-03-24 NOTE — PROGRESS NOTES
TELEMETRY: appears to be junctional rhythm  Physical Exam:  General: No Respiratory distress, appears well developed and well nourished. Eyes:  Sclera nonicteric  Nose/Sinuses:  negative findings: nose shows no deformity, asymmetry, or inflammation, nasal mucosa normal, septum midline with no perforation or bleeding  Back:  no pain to palpation  Joint:  no active joint inflammation  Musculoskeletal:  negative  Skin:  Warm and dry  Neck:  Negative for JVD and Carotid Bruits. Chest:  Clear to auscultation, respiration easy  Cardiovascular:  RRR, S1S2 normal, no murmur, no rub or thrill.   Neuro: intact    Medications:    [START ON 3/25/2019] amiodarone  200 mg Oral Daily    [START ON 3/25/2019] furosemide  40 mg Oral Daily    magnesium oxide  400 mg Oral BID    pantoprazole  40 mg Intravenous Daily    aspirin  81 mg Oral Daily    metoprolol succinate  100 mg Oral Daily    lisinopril  5 mg Oral Nightly    spironolactone  25 mg Oral Daily    sodium chloride flush  10 mL Intravenous 2 times per day    potassium chloride  20 mEq Oral BID    simvastatin  40 mg Oral Nightly    folic acid  1 mg Oral Daily    oyster shell calcium/vitamin D  1 tablet Oral Daily    silver sulfADIAZINE   Topical Daily      eptifibatide 2 mcg/kg/min (03/24/19 0810)    heparin (porcine) 16.6 mL/hr (03/23/19 2336)    dextrose 100 mL/hr (03/08/19 1700)     menthol-zinc oxide, loperamide, sodium chloride flush, oxyCODONE, HYDROmorphone, heparin (porcine), heparin (porcine), magic (miracle) mouthwash with nystatin, glucose, dextrose, glucagon (rDNA), dextrose, ondansetron, acetaminophen    Lab Data:  CBC:   Recent Labs     03/22/19  0551 03/23/19  0535 03/24/19  0635   WBC 10.5 8.9 6.9   HGB 7.2* 7.7* 7.9*   HCT 21.2* 22.7* 23.3*   MCV 88.5 88.4 88.7    154 166     BMP:   Recent Labs     03/22/19  0551 03/23/19  0535 03/24/19  0635    132* 134*   K 3.2* 3.3* 3.2*   CL 96* 94* 96*   CO2 28 28 27   BUN 11 12 10 CREATININE 0.6* 0.6* 0.6*     LIVER PROFILE: No results for input(s): AST, ALT, LIPASE, BILIDIR, BILITOT, ALKPHOS in the last 72 hours. Invalid input(s): AMYLASE,  ALB  PT/INR: No results for input(s): PROTIME, INR in the last 72 hours. APTT:   Recent Labs     03/22/19  2126 03/23/19  0535 03/24/19  0635   APTT 54.2* 66.2* 80.5*     BNP:  No results for input(s): BNP in the last 72 hours. IMAGING:   Echo doppler     Summary   Definity contrast was used, but port not working properly.  Zac Tavera left ventricular systolic function is moderately reduced with an   ejection fraction of 30-35 %. Inferolateral hypokinesia. Mid anteroseptal,   apical lateral and apical septal akinesia.   There is mild concentric left ventricular hypertrophy.   Normal left ventricular filling pressure.   Left ventricular cavity size is normal.   Mild thickening of the anterior leaflet of mitral valve.   Mild mitral regurgitation.   The left atrium is severely dilated.     Assessment and plan  Cardiomyopathy probably ischemic based on echo ( segmental wall motion abn.)  Nonsustained VT  Change amiodarone to once daily  May stop Integrilin if ok with oncology  Reduce lasix to once daily  Patient Active Problem List    Diagnosis Date Noted    Cardiomyopathy [facility])     NSVT (nonsustained ventricular tachycardia) ([address]     Other pulmonary embolism without acute cor pulmonale (HCC)     Acute respiratory failure with hypoxia ([address]     H/O colectomy     Primary malignant neoplasm of lung metastatic to other site [facility])     Herpes zoster oticus     Hearing loss of right ear     Chemotherapy-induced neutropenia (HCC)     Mucositis     Diverticulitis 03/07/2019    Diverticulitis of large intestine with perforation and abscess without bleeding     Acute diverticulitis 02/19/2019    Pancytopenia ([address] 02/19/2019    Adenocarcinoma of left lung ([address] 01/22/2019    Gallstones 01/21/2019    Hemoptysis 01/21/2019    Former smoker 01/21/2019  Elevated PSA 01/21/2019    Back pain 01/16/2019    Visit for monitoring Plavix therapy     History of ventricular tachycardia     History of pulmonary emphysema 01/09/2019    Chronic obstructive pulmonary disease with (acute) exacerbation (HCC) 01/09/2019    Pulmonary nodule, left 01/09/2019    Abnormal bone scan of thoracic spine 01/09/2019    Nondisplaced fracture of fifth right metatarsal bone with routine healing 05/14/2018    Sprain of right foot 05/07/2018    Sprain of anterior talofibular ligament of right ankle 05/07/2018    Closed nondisplaced fracture of fifth right metatarsal bone 05/07/2018    Closed fracture of proximal lateral malleolus of ankle with routine healing 05/07/2018    Right foot pain 05/07/2018    Right ankle pain 05/07/2018    Essential hypertension 05/01/2017    Mixed hyperlipidemia 05/01/2017    PAD (peripheral artery disease) ([facility] 05/21/2011    CAD (coronary artery disease) 04/29/2011    Elevated fasting glucose 07/24/2010       Plan:  1. Guideline based medical therapy for low EF and heart failure  2. Nonsustained VT will improve with treatment for CHF and correction of lytes  3. Patient on beta blockers and amiodarone  4. Anticoagulation for pulm embolism,  5. I will check TSH since on amiodarone TSH normal   6.  EKG shows sinus rhythm    Core Measures:  · Discharge instructions:   · LVEF documented: 30-35  · ACEI for LV dysfunction: yes  · Smoking Cessation:lexy Mayes MD 3/24/2019 2:30 PM

## 2019-03-25 NOTE — PROGRESS NOTES
1. The patient is 11 days status post partial sigmoid colectomy.  Surgical   drain remains in place with a small ill-defined fluid collection that is   stable from prior exam.  This does not appear to represent an abscess and may   represent postoperative hematoma or seroma. 2. There is a new collection of free intraperitoneal air immediately anterior   to the prior anastomosis site, contained within the left lower quadrant. This indicates an anastomosis leak.  No distant free air. 3. 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 4. Cholelithiasis with no evidence of acute cholecystitis. 5. Worsening small pleural effusions and airspace changes in the lower chest.   Pattern may represent underlying pulmonary edema or perhaps pneumonitis. CT results reviewed with Dr. Guille Alberto. Patient with presumed anastomotic leak at previous surgical site at [facility] (site of pain this AM). I have alerted general surgery- Dr. Melinda Carvalho. Cont hep drip for now, no plans for Eliquis or Plavix until surgical plan is defined.

## 2019-03-25 NOTE — PROGRESS NOTES
Physical Therapy  Facility/Department: [facility] C4 PCU  Daily Treatment Note  NAME: Ibrahima Calvert  : 1945  MRN: [id number]    Date of Service: 3/25/2019    Discharge Recommendations:  24 hour supervision or assist, Home with Home health PT   PT Equipment Recommendations  Equipment Needed: Yes  Mobility Devices: [employer] Juan: Rolling    Patient Diagnosis(es): The primary encounter diagnosis was Diverticulitis of large intestine with perforation and abscess without bleeding. Diagnoses of Leaking abdominal aortic aneurysm (AAA) ([address] Abdominal pain, left lower quadrant, and Chemotherapy-induced neutropenia ([address] were also pertinent to this visit. has a past medical history of Benign neoplasm of colon, Cancer ([address] Diverticulosis of colon (without mention of hemorrhage), Elevated fasting glucose, Former cigarette smoker, Herpes simplex viral infection, Hyperlipidemia, Hypertension, MI, old, 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[address] and Right popliteal artery occlusion ([address] has a past surgical history that includes Arterial aneurysm repair (2010); Finger fracture surgery; Coronary angioplasty with stent; Upper gastrointestinal endoscopy (2/10/2014); Colonoscopy (10/08/01 12/20/04); Colonoscopy (14); Upper gastrointestinal endoscopy (14); Upper gastrointestinal endoscopy (11/4/15); bronchoscopy (2019); bronchoscopy (N/A, 2019); bronchoscopy (2019); INSERTION / REMOVAL / REPLACEMENT VENOUS ACCESS CATHETER (Right, 2019); bronchoscopy (N/A, 2019); bronchoscopy (2019); bronchoscopy (2019); bronchoscopy (2019); bronchoscopy (2019); bronchoscopy (2019); hemicolectomy (N/A, 3/14/2019); and Upper gastrointestinal endoscopy (N/A, 3/21/2019).     Restrictions  Restrictions/Precautions  Restrictions/Precautions: General Precautions, Fall Risk  Position Activity Restriction  Other position/activity restrictions: Medium fall risk per nursing assessment, up with assistance, 10L high-flow O2, Laurent catheter, telemetry, continuous pulse oximeter  Subjective   General  Chart Reviewed: Yes  Additional Pertinent Hx: Stage IV lung CA with bone mets  Family / Caregiver Present: Yes(wife)  Referring Practitioner: Dr. Promise Salinas  Subjective  Subjective: Pt agreeable to work with PT; flank pain has returned, pt to go for CT scan. has diarrhea  Pain Screening  Patient Currently in Pain: Yes(flank pain, not rated)  Vital Signs  Patient Currently in Pain: Yes(flank pain, not rated)         Objective   Bed mobility  Supine to Sit: Stand by assistance(verbal cues)  Sit to Supine: Stand by assistance  Transfers  Sit to Stand: Minimal Assistance;Contact guard assistance(min form commode, CGA from bed)  Stand to sit: Contact guard assistance  Ambulation  Ambulation?: Yes  Ambulation 1  Surface: level tile  Device: Standard Walker  Other Apparatus: O2  Assistance: Contact guard assistance  Quality of Gait: difficulty sequencing with SW v. RW, unsteady but no LOB  Distance: 15' X 2        Exercises  Straight Leg Raise: X 10 B supine  Quad Sets: X 10 B  Heelslides: X 10 B supine  Hip Abduction: X 10 B supine  Ankle Pumps: X 15 BLE                        Assessment   Body structures, Functions, Activity limitations: Decreased functional mobility ; Decreased strength;Decreased balance;Decreased endurance  Assessment: Pt discouraged by return of flank pain, able to get up and ambulate a short distance with walker and A of 1. Performed bed ex. Pt will need RW and strict 24 hr supervision at home due to balance deficits and unsteady gait.   Treatment Diagnosis: Decreased endurance and (I) with mobility  Specific instructions for Next Treatment: Progress ther ex and mobility as tolerated  Prognosis: Good  Patient Education: Role of PT, benefits of mobility, safety in transfers/amb with RW, monitoring of vitals with activity, LE ther ex, D/C recs - pt verbalizes understanding  REQUIRES PT FOLLOW UP: Yes  Activity Tolerance  Activity Tolerance: Patient Tolerated treatment well       AM-PAC Score  AM-PAC Inpatient Mobility Raw Score : 19  AM-PAC Inpatient T-Scale Score : 45.44  Mobility Inpatient CMS 0-100% Score: 41.77  Mobility Inpatient CMS G-Code Modifier : CK          Goals  Short term goals  Time Frame for Short term goals: 1 week (unless otherwise specified)  Short term goal 1: Pt will transfer supine <-> sit with modified(I)  Short term goal 2: Pt will transfer sit <-> stand and bed>chair using RW or least AD with supervision  Short term goal 3: Pt will ambulate x 150 feet using RW or least AD with supervision  Short term goal 4: By 3/20/19: Pt will tolerate 12-15 reps BLE exercise for strengthening, balance, and endurance  Short term goal 5: Pt will ambulate up/down 2 steps without use of handrails (using appropriate AD as needed) with SBA  Patient Goals   Patient goals : \"To get my strength back and go home at discharge. \"    Plan    Plan  Times per week: 3-5x/week in acute care  Times per day: Daily  Specific instructions for Next Treatment: Progress ther ex and mobility as tolerated  Current Treatment Recommendations: Strengthening, Gait Training, Balance Training, Stair training, Functional Mobility Training, ROM, Endurance Training, Transfer Training, Safety Education & Training, Patient/Caregiver Education & Training, Equipment Evaluation, Education, & procurement  Safety Devices  Type of devices: Call light within reach, Left in bed, Nurse notified     Therapy Time   Individual Concurrent Group Co-treatment   Time In 1028         Time Out 1054         Minutes [address], [place] [id number]

## 2019-03-25 NOTE — PROGRESS NOTES
Bedside report received from [facility]. Patient resting comfortably in bed. No signs of discomfort or distress. Bed is in lowest position, wheels locked, 2/2 side rails up. Bedside table and call light within reach. White board updated. Will continue to monitor patient.  Juany Erazo RN

## 2019-03-25 NOTE — CARE COORDINATION
Misty Heimlich has already discussed home care with pt and family, see previous note. Pt did have SNF rec's, pt has been admitted for 18 days to date. PT most recent rec if for HHPT. Will continue to follow and arrange [facility] and the [facility] services upon DC. Pt will be enrolled in [facility] S4 Program.  Writer has reviewed wound care's note, [facility]'s note, most recent MD progress note and Cardiology note.    Babak Estrada RN CTN with [address]  VPC:[phone]

## 2019-03-25 NOTE — PROGRESS NOTES
Pt being seen by out patient MD wound care team. Floor RN called to request an MD wound care consult. RN wound care consult completed. Shift assessment complete. (See findings in flowsheet). Med pass complete. (See MAR). VSS. Patient with no complaints at this time. Patient resting in bed comfortably. No signs or symptoms of distress or discomfort noted at this time. Bed in lowest position, brakes locked. Nonskid footwear in place. 5 P's addressed, no needs at this time. Pt calls out appropriately. Will continue to monitor. Cam Arevalo RN    5:25 AM   Blood drawn per orders and taken to lab. Cam Arevalo RN    6:33 AM  Rec'd call from lab, aptt 171.1. Sample redrawn and sent to lab - Heparin was not stopped prior to drawing blood the first time.  Cam Arevalo RN
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
## 2019-03-25 NOTE — PROGRESS NOTES
Ben Porras is a 69 y.o. male patient.     Current Facility-Administered Medications   Medication Dose Route Frequency Provider Last Rate Last Dose    meropenem (MERREM) 1 g in sodium chloride 0.9 % 100 mL IVPB (mini-bag)  1 g Intravenous Q8H Olinda German  mL/hr at 03/25/19 1350 1 g at 03/25/19 1350    0.9 % sodium chloride infusion   Intravenous Continuous Olinda German MD 75 mL/hr at 03/25/19 1356      menthol-zinc oxide (CALMOSEPTINE) 0.44-20.625 % ointment   Topical BID PRN DEBRA Rodriguez - CNP        amiodarone (CORDARONE) tablet 200 mg  200 mg Oral Daily Koko Benavides MD   200 mg at 03/25/19 0834    furosemide (LASIX) tablet 40 mg  40 mg Oral Daily Koko Benavides MD   40 mg at 03/25/19 0834    magnesium oxide (MAG-OX) tablet 400 mg  400 mg Oral BID DEBRA Sullivan - CNP   400 mg at 03/25/19 0834    pantoprazole (PROTONIX) injection 40 mg  40 mg Intravenous Daily Екатерина Richey MD   40 mg at 03/25/19 7696    aspirin chewable tablet 81 mg  81 mg Oral Daily Екатерина Richey MD   81 mg at 03/25/19 3797    metoprolol succinate (TOPROL XL) extended release tablet 100 mg  100 mg Oral Daily Екатерина Richey MD   100 mg at 03/25/19 0834    lisinopril (PRINIVIL;ZESTRIL) tablet 5 mg  5 mg Oral Nightly Екатерина Richey MD   5 mg at 03/24/19 2038    spironolactone (ALDACTONE) tablet 25 mg  25 mg Oral Daily Екатерина Richey MD   25 mg at 03/25/19 6699    loperamide (IMODIUM) capsule 2 mg  2 mg Oral 4x Daily PRN Екатерина Richey MD   2 mg at 03/24/19 2247    sodium chloride flush 0.9 % injection 10 mL  10 mL Intravenous 2 times per day Екатерина Richey MD   10 mL at 03/25/19 0835    sodium chloride flush 0.9 % injection 10 mL  10 mL Intravenous PRN Екатерина Richey MD   10 mL at 03/21/19 1132    potassium chloride (KLOR-CON M) extended release tablet 20 mEq  20 mEq Oral BID Claudell Garner, MD   20 mEq at 03/25/19 0834    heparin 25,000 units in dextrose 5% 250 mL infusion  16.6 Primary malignant neoplasm of lung metastatic to other site [facility])    NSVT (nonsustained ventricular tachycardia) ([facility] [address]    Other pulmonary embolism without acute cor pulmonale (HCC)    Cardiomyopathy (HCC)  Resolved Problems:    * No resolved hospital problems. *    Blood pressure (!) 135/91, pulse 82, temperature 98.1 °F (36.7 °C), temperature source Oral, resp. rate 16, height 5' 9\" (1.753 m), weight 172 lb (78 kg), SpO2 92 %. Subjective:  Symptoms:  Stable. Pain:  He complains of pain that is mild. Objective:  General Appearance: In no acute distress and not in pain. Vital signs: (most recent): Blood pressure (!) 135/91, pulse 82, temperature 98.1 °F (36.7 °C), temperature source Oral, resp. rate 16, height 5' 9\" (1.753 m), weight 172 lb (78 kg), SpO2 92 %. Abdomen: Abdomen is soft. Bowel sounds are normal.   There is left lower quadrant tenderness. Assessment & Plan  70 yo w HTN, CAD, metastatic lung CA on chemoRx, shingles and P.E., admitted for acute perforated diverticulitis, s/p surgical repair, who has had a drop in his H/H on 3/19 to H/H 7/20, and was found to have blacksih heme-pos diarrheal stools. Is on Heparin drip. EGD was neg. He was found to have what looks like a small anastomotic leak on CT on 3/25/19.     Plan:   1. Continue daily PPI  2. Further management per Surgery  3. F/U H/H   4.  Will follow  MD Camden Castro MD  3/25/2019

## 2019-03-25 NOTE — PROGRESS NOTES
Hospitalist Progress Note      PCP: Joslyn Montano MD    Date of Admission: 3/7/2019    Chief Complaint: Abdominal pain     Hospital Course:  69 y.o. male who presented to [facility] with LLQ abdominal pain. Pt was recently d/c'd for diverticulitis, states pain never went away. C/o low grade fevers for 2 weeks. States pain not as severe as previous admission. Had chemo on Friday, states received neulasta. Denies cp, n/v, sob. Subjective:   Pt is lying in bed. On 4 lpm. Afebrile. He denies dyspnea or chest pain. No N/V/D. He remains on heparin and Integrilin infusions. He complaints of LLQ pain, returned overnight.      Medications:  Reviewed    Infusion Medications    heparin (porcine) 16.6 mL/hr (03/25/19 0522)    dextrose 100 mL/hr (03/08/19 1700)     Scheduled Medications    amiodarone  200 mg Oral Daily    furosemide  40 mg Oral Daily    magnesium oxide  400 mg Oral BID    pantoprazole  40 mg Intravenous Daily    aspirin  81 mg Oral Daily    metoprolol succinate  100 mg Oral Daily    lisinopril  5 mg Oral Nightly    spironolactone  25 mg Oral Daily    sodium chloride flush  10 mL Intravenous 2 times per day    potassium chloride  20 mEq Oral BID    simvastatin  40 mg Oral Nightly    folic acid  1 mg Oral Daily    oyster shell calcium/vitamin D  1 tablet Oral Daily    silver sulfADIAZINE   Topical Daily     PRN Meds: menthol-zinc oxide, loperamide, sodium chloride flush, oxyCODONE, HYDROmorphone, heparin (porcine), heparin (porcine), magic (miracle) mouthwash with nystatin, glucose, dextrose, glucagon (rDNA), dextrose, ondansetron, acetaminophen      Intake/Output Summary (Last 24 hours) at 3/25/2019 0949  Last data filed at 3/25/2019 0522  Gross per 24 hour   Intake 0 ml   Output 1050 ml   Net -1050 ml       Physical Exam Performed:    /74   Pulse 86   Temp 98.3 °F (36.8 °C) (Oral)   Resp 16   Ht 5' 9\" (1.753 m)   Wt 172 lb (78 kg)   SpO2 92%   BMI 25.40 kg/m² General appearance: In bed, No apparent distress, appears stated age and cooperative. HEENT: Pupils equal, round, and reactive to light. Conjunctivae/corneas clear. Vesicles in outer ear canal noted   Neck: Supple, with full range of motion. No jugular venous distention. Trachea midline. Respiratory:  Normal respiratory effort. Diminished to auscultation, bilaterally without Rales/Wheezes/Rhonchi. Cardiovascular: regular rate and rhythm with normal S1/S2 without murmurs, rubs or gallops. Abdomen: Soft, non-tender, incision site C/D/I, DIAN drain in place. Musculoskeletal: No clubbing, cyanosis or edema bilaterally. Full range of motion without deformity. Skin: Skin color, texture, turgor normal.  Radiation changes to back. Vesicles under lower lip, cheek. Mouth mucosa improving. Excoriation to coccyx. Neurologic:  Neurovascularly intact without any focal sensory/motor deficits. Cranial nerves: II-XII intact, grossly non-focal.  Psychiatric: Alert and oriented, thought content appropriate, normal insight  Capillary Refill: Brisk,< 3 seconds   Peripheral Pulses: +2 palpable, equal bilaterally       Labs:   Recent Labs     03/23/19  0535 03/24/19  0635 03/25/19  0527   WBC 8.9 6.9 7.4   HGB 7.7* 7.9* 8.0*   HCT 22.7* 23.3* 23.7*    166 174     Recent Labs     03/23/19  0535 03/24/19  0635 03/25/19  0527   * 134* 134*   K 3.3* 3.2* 3.8   CL 94* 96* 98*   CO2 28 27 26   BUN 12 10 10   CREATININE 0.6* 0.6* 0.7*   CALCIUM 7.3* 7.2* 7.5*     Urinalysis:      Lab Results   Component Value Date    NITRU Negative 03/10/2019    WBCUA 3-5 03/10/2019    RBCUA 0-2 03/10/2019    BLOODU Negative 03/10/2019    SPECGRAV 1.025 03/10/2019    GLUCOSEU Negative 03/10/2019       Radiology:  XR CHEST PORTABLE   Final Result   Interval development of mild pulmonary edema. There is a small left-sided pleural effusion with more focal consolidation   the left lung base, atelectasis versus pneumonia.          IR PICC WO SQ PORT/PUMP > 5 YEARS   Final Result   Successful placement of PICC line. CT ABDOMEN PELVIS WO CONTRAST Additional Contrast? None   Final Result   No evidence retroperitoneal hematoma. Interval colonic resection. Small volume loculated collection in the left   pericolic gutter, with surgical drain terminating adjacent. Mildly dilated loops of small bowel with scattered air-fluid levels, most   likely postoperative ileus. Trace pleural effusions and bibasilar airspace disease representing   atelectasis or pneumonia. VL Extremity Venous Bilateral   Final Result      CT CHEST PULMONARY EMBOLISM W CONTRAST   Final Result   Bilateral pulmonary emboli      Scattered opacities throughout the lungs, greatest at the lung bases, with   tiny pleural effusions. Changes are increased compared to prior      Sclerotic osseous metastatic disease as described previously      The findings were sent to the Radiology Results [address] at 3:57   pm on 3/16/2019to be communicated to a licensed caregiver. XR CHEST 1 VW   Final Result   Shallow inflation with findings consistent with basilar atelectasis. Left   pleural thickening versus trace effusion. CT CHEST ABDOMEN PELVIS WO CONTRAST   Final Result   Perforated sigmoid diverticulitis and pneumoperitoneum unchanged. Stable   left [facility]ita colonic air-fluid collection may represent an intramural or   extramural abscess or giant diverticulum. Infrarenal abdominal aortic aneurysm and aorto bi-iliac stent. Other incidental findings as above. CT ABDOMEN PELVIS W IV CONTRAST Additional Contrast? None   Final Result   Acute perforated descending colon diverticulitis. There is free air in the   abdomen and there is a small extraluminal gas and fluid collection/abscess   adjacent to the descending colon measuring 4.1 cm in AP diameter and 6 cm in   length. There is diverticulosis of the entire colon. Cholelithiasis. 4.7 cm infrarenal abdominal aortic aneurysm status post endograft repair with   a patent aorto bi-iliac graft. There is suspicion of an endoleak with blush   of contrast enhancement suspected in the inferior aspect of the native   aneurysm. As a noncontrast study was not performed, calcification of the   intraluminal thrombus accounting for the apparent enhancement is not   excluded. 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 Comparison with a noncontrast CT examination would be   helpful. Critical results were called by Dr. Flavio Tian. MD Elizabeth to [facility] on   3/7/2019 at 20:35. CT ABDOMEN PELVIS W IV CONTRAST Additional Contrast? Radiologist Recommendation    (Results Pending)       Assessment/Plan:    Active Hospital Problems    Diagnosis Date Noted    Cardiomyopathy ([address] [I42.9]     NSVT (nonsustained ventricular tachycardia) (HCC) [I47.2]     Other pulmonary embolism without acute cor pulmonale (HCC) [I26.99]     Acute respiratory failure with hypoxia ([address] [J96.01]     H/O colectomy [Z90.49]     Primary malignant neoplasm of lung metastatic to other site (HCC) [C34.90]     Herpes zoster oticus [B02.21]     Hearing loss of right ear [H91.91]     Chemotherapy-induced neutropenia ([address] [D70.1, T45.1X5A]     Mucositis [K12.30]     Diverticulitis [K57.92] 03/07/2019    Diverticulitis of large intestine with perforation and abscess without bleeding [K57.20]        Acute perforated recurrent diverticulitis/pneumoperitoneum/abscess in setting of immunosuppression due to chemotherapy:   - CT A/P on admission demonstrated acute perforated colon diverticulitis with free air and fluid collection.   - General surgery consulted. Pt is status post laparoscopic converted to open left colectomy on 3/14/19.  - Pt completed 7 days of Merrem and Diflucan. - Continue prn pain control.   -New onset LLQ pain with diarrhea.  CT abdomen and pelvis with contrast today, results pending. Acute hypoxic respiratory failure due to bilateral pulmonary emboli:   - CT chest 3/16 showed thrombus in the distal aspect of the left main pulmonary artery extending into the left upper and left lower lobe pulmonary arterial branches. Additional Thrombus is seen in the distal aspect of the right main pulmonary artery,extending into the right upper right middle and right lower lobe pulmonary arterial branches. - Continue heparin gtt started 3/16. If H&H remains stable on 3/25 consider changing to NOAC. Will defer to Hem/Onc. Okay with GI and Cards to start on oral AC. - Wean supplemental O2 as tolerated. He is not normally on home O2. He is currently requiring 3-4 liters HFNC.   - Lower extremity dopplers +DVT. Vascular surgery consulted, no indication for IVC filter at this time. - Pulmonary consulted/following. Metastatic lung cancer, stage 4 with bone mets:  - Hem/onc is following. Chemo on hold to allow for surgical healing. Thrombocytopenia (resolved) now with hypercoagulable state:  - Platelets required preoperatively. Pt currently on heparin gtt. Neutropenia due to chemotherapy (resolved):  - S/p Neulasta on 3/1. S/p Granix on 3/8. Anemia:  -  Pt is s/p 1 unit PBRC for Hgb 6.6 on 3/19 and 1 unit for Hgb 7.2 on 3/22. No overt signs of bleeding. CT A/P showed no RP bleed. PPI added per Hem/Onc. Pt is s/p B12 injection. Stool for occult blood was +. GI consulted, s/p EGD on 3/21 which was normal. Monitor daily CBC. H&H is stable. PAD/CAD/HTN/Cardiomyopathy:   - Defer antiplatelet management to hem/onc given the complexity of his present situation, bleeding risk and thrombotic states. Pt was restarted on integrillin infusion on 3/20 per Hem/Onc. If H&H remains stable consider resuming plavix -- will defer to Hem/Onc.   - ECHO showed reduced EF of 30-35%. Previous ECHO showed EF 40-45%. - Cardiology is following.  Continue on aspirin, lisinopril, lasix, aldactone, Toprol and statin. - Cards recommending LHC given drop in EF but would like to wait 4-6 weeks post PE on AC. NSVT:   - Known history of VT s/p ICD. Went into VT on 3/12, PMD went off. Continue amiodarone. Keep K > 4 and Mag > 2. 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 Nephrology following. Monitor daily BMP. Hypokalemia/hypomagnesemia:  - Monitor and replete as needed. Zoster oticus:  - Completed 7 days of acyclovir. DVT Prophylaxis: Pt is on heparin infusion   Diet: Dietary Nutrition Supplements: Other Supplement (ensure)  DIET LOW FIBER;  Code Status: Full Code    PT/OT Eval Status:  Ordered with recs for [address] - 2-4 days, discussed disposition with pt's wife. Currently she is in favor of pt going home if S4 program would be available. Pt will most likely go home with supplemental O2.      DEBRA Cota - CNP

## 2019-03-25 NOTE — PROGRESS NOTES
[facility]    [facility]              [phone]                 Plan :     Oxygen requirement coming down  Edema improved  Low grade hyponatremia, likely SIADH  On po lasix daily  No changes    Assessment :     Hyponatremia  Acute on chronic  Down to 123- now 134  Suspect SIADH from ca lung, pain, and nausea  Ca ca lung( adenocarcinoma) with vertebral mets  Sodium low side since 2/19    Cortisol- normal, TSH - 1.65, LFT normal  Echo 2/12- EF of 45-50%, AL mildly dilated    Generalized Edema  High O2 requirement  +ve 3.6 L since admission( peak 14 L +ve)  Echo: 3/19- EF of 30-35%, mild LVH, LV severely dilated  On lasix iv for several days, now on po    Sob also due to lung mass and PE    Acute Kidney Injury  Cr peaked to 1.6- now 0.7  Making urine  Likely hemodynamic changes   Now BP better  Surgery done  On 3/14/19    Hypotension  BP: (121-127)/(74-75) Pulse:  [86-87]   Normally hypertensive  Now has fever, neutropenia- getting better    Hypokalemia  Getting potassium and mg replaced           pancytopenia  Ca lung  Diverticulitis with perforation- sp surgery, chato-colectomy  Radiation dermatitis  Has has acyclovir IV   Mucositis  AAA      [facility] would like to thank DEBRA Landis   for opportunity to serve this patient      Please call with questions at-   24 Hrs Answering service [phone] or  7 am- 5 pm via Perfect serve or cell phone  Dr. Adilene Moreno for consult :     hyponatremia     HPI :     From consult note-     Jose Guzmán is a 69 y.o. male presented to   the hospital on 3/7/2019 with left lower quadrant pain. He has that for a week. Was hospitalized with diverticulitis, DCed,  And pain never went away. Also low grade fever. No nausea. Mild pain in belly  Diagnosed to have fever and also diverticulitis. Planning for surgery. 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 Has pancytopenia from chemotherapy. We are consulted for hyponatremia and related issues     Interval History:     Oxygen down to 4 L  Edema coming down  On po lasix        ROS:     SOB- +Ve on exertion  Edema- none  Nausea/vomiting- none  Poor appetite-No  Confusion- no  Urinary complaints- no  Any other complaints- no  All other ROS are reviewed and are Negative           Medication:     Scheduled Meds:   amiodarone  200 mg Oral Daily    furosemide  40 mg Oral Daily    magnesium oxide  400 mg Oral BID    pantoprazole  40 mg Intravenous Daily    aspirin  81 mg Oral Daily    metoprolol succinate  100 mg Oral Daily    lisinopril  5 mg Oral Nightly    spironolactone  25 mg Oral Daily    sodium chloride flush  10 mL Intravenous 2 times per day    potassium chloride  20 mEq Oral BID    simvastatin  40 mg Oral Nightly    folic acid  1 mg Oral Daily    oyster shell calcium/vitamin D  1 tablet Oral Daily    silver sulfADIAZINE   Topical Daily     Continuous Infusions:   heparin (porcine) 16.6 mL/hr (03/25/19 0522)    dextrose 100 mL/hr (03/08/19 1700)     PRN Meds:.menthol-zinc oxide, loperamide, sodium chloride flush, oxyCODONE, HYDROmorphone, heparin (porcine), heparin (porcine), magic (miracle) mouthwash with nystatin, glucose, dextrose, glucagon (rDNA), dextrose, ondansetron, acetaminophen       Vitals :     Vitals:    03/25/19 0830   BP: 121/74   Pulse: 86   Resp: 16   Temp: 98.3 °F (36.8 °C)   SpO2: 92%       I & O :       Intake/Output Summary (Last 24 hours) at 3/25/2019 1013  Last data filed at 3/25/2019 1000  Gross per 24 hour   Intake 0 ml   Output 1325 ml   Net -1325 ml        Physical Examination :     General appearance: Anxious- no, distressed- no, in good spirits- no    Sitting up on chair, comfortable,  HEENT: Lips- normal, teeth- ok , oral mucosa- moist, has skin lesions in the face  Neck : Mass- no, appears symmetrical, JVD- not visible  Respiratory: Respiratory effort-  normal, wheeze- no, crackles - no  Cardiovascular:

## 2019-03-25 NOTE — PROGRESS NOTES
magnesium oxide (MAG-OX) tablet 400 mg BID   pantoprazole (PROTONIX) injection 40 mg Daily   aspirin chewable tablet 81 mg Daily   metoprolol succinate (TOPROL XL) extended release tablet 100 mg Daily   lisinopril (PRINIVIL;ZESTRIL) tablet 5 mg Nightly   spironolactone (ALDACTONE) tablet 25 mg Daily   loperamide (IMODIUM) capsule 2 mg 4x Daily PRN   sodium chloride flush 0.9 % injection 10 mL 2 times per day   sodium chloride flush 0.9 % injection 10 mL PRN   potassium chloride (KLOR-CON M) extended release tablet 20 mEq BID   heparin 25,000 units in dextrose 5% 250 mL infusion Continuous   simvastatin (ZOCOR) tablet 40 mg Nightly   oxyCODONE (ROXICODONE) immediate release tablet 5 mg Q4H PRN   HYDROmorphone (DILAUDID) injection 0.5 mg Q3H PRN   heparin (porcine) injection 6,610 Units PRN   heparin (porcine) injection 3,300 Units PRN   magic (miracle) mouthwash with nystatin 4x Daily PRN   glucose (GLUTOSE) 40 % oral gel 15 g PRN   dextrose 50 % solution 12.5 g PRN   glucagon (rDNA) injection 1 mg PRN   dextrose 5 % solution PRN   folic acid (FOLVITE) tablet 1 mg Daily   oyster shell calcium/vitamin D 250-125 MG-UNIT per tablet 250 mg Daily   silver sulfADIAZINE (SILVADENE) 1 % cream Daily   ondansetron (ZOFRAN) injection 4 mg Q6H PRN   acetaminophen (TYLENOL) tablet 650 mg Q4H PRN          PHYSICAL EXAM   BP (!) 135/91   Pulse 82   Temp 98.1 °F (36.7 °C) (Oral)   Resp 16   Ht 5' 9\" (1.753 m)   Wt 172 lb (78 kg)   SpO2 92%   BMI 25.40 kg/m²    Vitals:    03/25/19 0506 03/25/19 0830 03/25/19 1122 03/25/19 1328   BP: 127/75 121/74 122/77 (!) 135/91   Pulse: 87 86 84 82   Resp: 16 16 16 16   Temp: 98.1 °F (36.7 °C) 98.3 °F (36.8 °C) 98.3 °F (36.8 °C) 98.1 °F (36.7 °C)   TempSrc: Oral Oral Oral Oral   SpO2: 92% 92% 94% 92%   Weight: 172 lb (78 kg)      Height:             Intake/Output Summary (Last 24 hours) at 3/25/2019 1552  Last data filed at 3/25/2019 1551  Gross per 24 hour   Intake 0 ml   Output 1425 ml cardiomyopathy: LVEF 30-35%  5. HTN  6. Hx of aortic aneurysm repair   - Per Dr. Wild Dasilva, on ASA/plavix for this  7. Hx of sigmoid colectomy for perf diverticulosis 3/14/2019   - Laparoscopic converted to Open left Colectomy  8. Metastatic lung CA, stage 4 with bone mets      PLAN  1. Pt will need LHC but would like to treat acute PE first, given lack of RV strain, may be able to go sooner if needed   - Persistent NSVT concerning for ischemia/scar   - After ischemic eval, may stop Amio  2. Cont ASA Po lasix, lisinopril, toprol, zocor, aldactone  3. Cont outpt Amio for SVT/VT  4. Plavix/ Antiplatelet per Vascular surgery and Hem/onc  5. CT with suspected anastomotic leak and free air   - CAUTION fluids with CHF, will follow for hypervolemia.      Patient Active Problem List   Diagnosis    Elevated fasting glucose    CAD (coronary artery disease)    PAD (peripheral artery disease) (HCC)    Essential hypertension    Mixed hyperlipidemia    Sprain of right foot    Sprain of anterior talofibular ligament of right ankle    Closed nondisplaced fracture of fifth right metatarsal bone    Closed fracture of proximal lateral malleolus of ankle with routine healing    Right foot pain    Right ankle pain    Nondisplaced fracture of fifth right metatarsal bone with routine healing    History of pulmonary emphysema    Chronic obstructive pulmonary disease with (acute) exacerbation (HCC)    Pulmonary nodule, left    Abnormal bone scan of thoracic spine    Back pain    Visit for monitoring Plavix therapy    History of ventricular tachycardia    Gallstones    Hemoptysis    Former smoker    Elevated PSA    Adenocarcinoma of left lung ([address]    Acute diverticulitis    Pancytopenia (HCC)    Diverticulitis of large intestine with perforation and abscess without bleeding    Diverticulitis    Chemotherapy-induced neutropenia (HCC)    Mucositis    Herpes zoster oticus    Hearing loss of right ear    Acute respiratory failure with hypoxia [facility]    H/O colectomy    Primary malignant neoplasm of lung metastatic to other site [facility])    NSVT (nonsustained ventricular tachycardia) (HCC)    Other pulmonary embolism without acute cor pulmonale ([facility]    Cardiomyopathy ([facility]         Thank you for allowing me to participate in the care of your patient. Please call me with any questions [phone].       Abigail Escalera MD, [facility]ologist  [address]  [phone] [facility]  [phone] [address] [place] [address]  3/25/2019 3:52 PM

## 2019-03-25 NOTE — PROGRESS NOTES
Occupational Therapy  Facility/Department: [facility] C4 PCU  Daily Treatment Note  NAME: Lucy Douglas  : 1945  MRN: [id number]    Date of Service: 3/25/2019    Discharge Recommendations:  24 hour supervision or assist       Assessment   Performance deficits / Impairments: Decreased functional mobility ; Decreased ADL status; Decreased balance;Decreased strength  Assessment: Patient CGA for toilet transfers with SBA for doff/donning briefs this session. Pt. met both STG's today. Pt. not SOB during session, but does need mild encouragment to participate. Prognosis: Good  Decision Making: Low Complexity  Patient Education: role of OT, safety, ADLs, functional transfers and role of OT  REQUIRES OT FOLLOW UP: Yes  Activity Tolerance  Activity Tolerance: Patient Tolerated treatment well  Safety Devices  Safety Devices in place: Yes  Type of devices: Call light within reach; Bed alarm in place; Left in bed;Nurse notified;Gait belt         Patient Diagnosis(es): The primary encounter diagnosis was Diverticulitis of large intestine with perforation and abscess without bleeding. Diagnoses of Leaking abdominal aortic aneurysm (AAA) ([address] Abdominal pain, left lower quadrant, and Chemotherapy-induced neutropenia ([address] were also pertinent to this visit. has a past medical history of Benign neoplasm of colon, Cancer ([address] Diverticulosis of colon (without mention of hemorrhage), Elevated fasting glucose, Former cigarette smoker, Herpes simplex viral infection, Hyperlipidemia, Hypertension, MI, old, 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[address] and Right popliteal artery occlusion ([address] has a past surgical history that includes Arterial aneurysm repair (2010); Finger fracture surgery; Coronary angioplasty with stent; Upper gastrointestinal endoscopy (2/10/2014); Colonoscopy (10/08/01 12/20/04); Colonoscopy (14); Upper gastrointestinal endoscopy (14);  Upper gastrointestinal endoscopy (11/4/15); bronchoscopy (2019); bronchoscopy (N/A, 1/18/2019); bronchoscopy (1/18/2019); INSERTION / REMOVAL / REPLACEMENT VENOUS ACCESS CATHETER (Right, 1/23/2019); bronchoscopy (N/A, 1/23/2019); bronchoscopy (1/23/2019); bronchoscopy (1/23/2019); bronchoscopy (1/23/2019); bronchoscopy (1/23/2019); bronchoscopy (1/23/2019); hemicolectomy (N/A, 3/14/2019); and Upper gastrointestinal endoscopy (N/A, 3/21/2019). Restrictions  Restrictions/Precautions  Restrictions/Precautions: General Precautions, Fall Risk  Position Activity Restriction  Other position/activity restrictions: Medium fall risk per nursing assessment, up with assistance, 10L high-flow O2, Laurent catheter, telemetry, continuous pulse oximeter  Subjective   General  Chart Reviewed: Yes  Patient assessed for rehabilitation services?: Yes  Family / Caregiver Present: Yes(spouse)  Referring Practitioner: Dr. Flores Legacy  Diagnosis: diverticulosis; s/p Laparoscopic converted to Open left Colectomy 3-14-19  Subjective  Subjective: Pt. pleasant and with encouragement agreeable to participate in OT. Pt. to go to CT soon. Pain: yes, on flank/side unable to rate numerically.         Orientation  Orientation  Overall Orientation Status: Within Functional Limits  Objective    ADL  LE Dressing: Stand by assistance(doffing briefs and donning clean briefs)  Toileting: Supervision        Balance  Sitting Balance: Supervision  Standing Balance  Sit to stand: Minimal assistance  Stand to sit: Contact guard assistance    Functional Mobility  Functional - Mobility Device: Rolling Walker  Activity: To/from bathroom  Assist Level: Contact guard assistance    Bed mobility  Supine to Sit: Stand by assistance  Sit to Supine: Stand by assistance  Transfers  Sit to stand: Minimal assistance  Stand to sit: Contact guard assistance      Type of ROM/Therapeutic Exercise  Type of ROM/Therapeutic Exercise: AROM  Exercises  Shoulder Elevation: x 12 reps  Shoulder Flexion: x 12 reps  Elbow Flexion: x 12 reps  Elbow

## 2019-03-25 NOTE — PROGRESS NOTES
immediately anterior  to the prior anastomosis site, contained within the left lower quadrant. This indicates an anastomosis leak.  No distant free air. 3. 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 4. Cholelithiasis with no evidence of acute cholecystitis. 5. Worsening small pleural effusions and airspace changes in the lower chest.  Pattern may represent underlying pulmonary edema or perhaps pneumonitis. As vitals are stable, WBC normal, pain mild and leak is contained on CT will make NPO, restart IV abx and IVF and monitor exam.  Discussed plan with patient and wife. If signs of infection worsen then would need return to OR for diverting ostomy. If no worsening then plan repeat of CT in several days.     Izabela Armstrong MD

## 2019-03-26 NOTE — PROGRESS NOTES
40 mg Daily   magnesium oxide (MAG-OX) tablet 400 mg BID   pantoprazole (PROTONIX) injection 40 mg Daily   aspirin chewable tablet 81 mg Daily   metoprolol succinate (TOPROL XL) extended release tablet 100 mg Daily   lisinopril (PRINIVIL;ZESTRIL) tablet 5 mg Nightly   spironolactone (ALDACTONE) tablet 25 mg Daily   loperamide (IMODIUM) capsule 2 mg 4x Daily PRN   sodium chloride flush 0.9 % injection 10 mL 2 times per day   sodium chloride flush 0.9 % injection 10 mL PRN   potassium chloride (KLOR-CON M) extended release tablet 20 mEq BID   heparin 25,000 units in dextrose 5% 250 mL infusion Continuous   simvastatin (ZOCOR) tablet 40 mg Nightly   oxyCODONE (ROXICODONE) immediate release tablet 5 mg Q4H PRN   HYDROmorphone (DILAUDID) injection 0.5 mg Q3H PRN   heparin (porcine) injection 6,610 Units PRN   heparin (porcine) injection 3,300 Units PRN   magic (miracle) mouthwash with nystatin 4x Daily PRN   glucose (GLUTOSE) 40 % oral gel 15 g PRN   dextrose 50 % solution 12.5 g PRN   glucagon (rDNA) injection 1 mg PRN   dextrose 5 % solution PRN   folic acid (FOLVITE) tablet 1 mg Daily   oyster shell calcium/vitamin D 250-125 MG-UNIT per tablet 250 mg Daily   silver sulfADIAZINE (SILVADENE) 1 % cream Daily   ondansetron (ZOFRAN) injection 4 mg Q6H PRN   acetaminophen (TYLENOL) tablet 650 mg Q4H PRN          PHYSICAL EXAM   BP (!) 148/79   Pulse 84   Temp 99 °F (37.2 °C) (Oral)   Resp 18   Ht 5' 9\" (1.753 m)   Wt 170 lb 3.2 oz (77.2 kg)   SpO2 93%   BMI 25.13 kg/m²    Vitals:    03/26/19 0517 03/26/19 0745 03/26/19 0914 03/26/19 1119   BP:  (!) 146/80  (!) 148/79   Pulse:  84  84   Resp:  18  18   Temp:  99 °F (37.2 °C)  99 °F (37.2 °C)   TempSrc:  Oral  Oral   SpO2:  93% 95% 93%   Weight: 170 lb 3.2 oz (77.2 kg)      Height:             Intake/Output Summary (Last 24 hours) at 3/26/2019 1413  Last data filed at 3/26/2019 1142  Gross per 24 hour   Intake 0 ml   Output 2050 ml   Net -2050 ml     Wt Readings from Last 3 Encounters:   03/26/19 170 lb 3.2 oz (77.2 kg)   02/28/19 184 lb 9.6 oz (83.7 kg)   02/19/19 183 lb (83 kg)         Gen: Patient in NAD, resting comfortably  Neck: No JVD or bruits  Respiratory: CTAB no WRR  Chest: normal without deformity  Cardiovascular:RRR, S1S2, no mrg, normal PMI  Abdomen: Soft, mildly tender  Extremities: No clubbing, cyanosis, or edema  Neurological/Psychiatric: AxO x4, no gross issues  Skin:  Warm and dry      Labs:  CBC:   Recent Labs     03/24/19  0635 03/25/19  0527 03/26/19  0440   WBC 6.9 7.4 7.4   HGB 7.9* 8.0* 8.1*   HCT 23.3* 23.7* 23.8*   MCV 88.7 90.0 88.1    174 178     BMP:   Recent Labs     03/24/19  0635 03/25/19  0527 03/26/19  0440   * 134* 133*   K 3.2* 3.8 4.0   CL 96* 98* 99   CO2 27 26 23   BUN 10 10 7   CREATININE 0.6* 0.7* 0.6*     MG:    Recent Labs     03/24/19  0635   MG 1.50*      PT/INR: No results for input(s): PROTIME, INR in the last 72 hours. APTT:   Recent Labs     03/25/19  1702 03/26/19  0000 03/26/19  0956   APTT 43.6* 108.2* 31.6     Cardiac Enzymes:   No results for input(s): CKTOTAL, CKMB, CKMBINDEX, TROPONINI in the last 72 hours. Cardiac Studies:    ECHO   Definity contrast was used, but port not working properly.  [facility] left ventricular systolic function is moderately reduced with an   ejection fraction of 30-35 %. Inferolateral hypokinesia. Mid anteroseptal,   apical lateral and apical septal akinesia.   There is mild concentric left ventricular hypertrophy.   Normal left ventricular filling pressure.   Left ventricular cavity size is normal.   Mild thickening of the anterior leaflet of mitral valve.   Mild mitral regurgitation.   The left atrium is severely dilated.       Assessment and Plan   1. NSVT   - Known hx of VT s/p ICD, on AMio  2. PE 3/16/2019   - RV is normal  3. CAD   - s/p PCI to RCA 6/2011   - hx of PCi to LAD (post endograft AAA repair MI)1/2011  4. Ischemic cardiomyopathy: LVEF 30-35%  5. HTN  6.  Hx of aortic aneurysm repair   - Per Dr. Zhang Norris, on ASA/plavix for this  7. Hx of sigmoid colectomy for perf diverticulosis 3/14/2019   - Laparoscopic converted to Open left Colectomy  8. Metastatic lung CA, stage 4 with bone mets      PLAN  1. Increasing anastomotic leak   - likely surgery in AM  2. [facility] once surgical issues stabilize, moderate- high risk ( but not prohibitive to proceed)   - check ECG with any CP   - Persistent NSVT concerning for ischemia/scar   - After ischemic eval, may stop Amio  3. Cont ASA Po lasix, lisinopril, toprol, zocor, aldactone  4. Cont outpt Amio for SVT/VT  5. Plavix is NOT currently on board for cardiac reason, will defer use to Vascular   - CAUTION fluids with CHF, will follow for hypervolemia.      Patient Active Problem List   Diagnosis    Elevated fasting glucose    CAD (coronary artery disease)    PAD (peripheral artery disease) (HCC)    Essential hypertension    Mixed hyperlipidemia    Sprain of right foot    Sprain of anterior talofibular ligament of right ankle    Closed nondisplaced fracture of fifth right metatarsal bone    Closed fracture of proximal lateral malleolus of ankle with routine healing    Right foot pain    Right ankle pain    Nondisplaced fracture of fifth right metatarsal bone with routine healing    History of pulmonary emphysema    Chronic obstructive pulmonary disease with (acute) exacerbation (HCC)    Pulmonary nodule, left    Abnormal bone scan of thoracic spine    Back pain    Visit for monitoring Plavix therapy    History of ventricular tachycardia    Gallstones    Hemoptysis    Former smoker    Elevated PSA    Adenocarcinoma of left lung ([address]    Acute diverticulitis    Pancytopenia ([address]    Diverticulitis of large intestine with perforation and abscess without bleeding    Diverticulitis    Chemotherapy-induced neutropenia (HCC)    Mucositis    Herpes zoster oticus    Hearing loss of right ear    Acute respiratory failure with hypoxia [facility]    H/O colectomy    Primary malignant neoplasm of lung metastatic to other site [facility])    NSVT (nonsustained ventricular tachycardia) (HCC)    Other pulmonary embolism without acute cor pulmonale ([facility]    Cardiomyopathy ([facility]         Thank you for allowing me to participate in the care of your patient. Please call me with any questions [phone].       Denisa Rowley MD, [facility] Cardiologist  [facility]  [phone] [facility]  [phone] [address] [place] [address]  3/26/2019 2:13 PM

## 2019-03-26 NOTE — PROGRESS NOTES
are consulted for hyponatremia and related issues     Interval History:     Oxygen down to 4 L  Edema coming down  On po lasix        ROS:     SOB- +Ve on exertion  Edema- none  Nausea/vomiting- none  Poor appetite-No  Confusion- no  Urinary complaints- no  Any other complaints- no  All other ROS are reviewed and are Negative           Medication:     Scheduled Meds:   meropenem  1 g Intravenous Q8H    amiodarone  200 mg Oral Daily    furosemide  40 mg Oral Daily    magnesium oxide  400 mg Oral BID    pantoprazole  40 mg Intravenous Daily    aspirin  81 mg Oral Daily    metoprolol succinate  100 mg Oral Daily    lisinopril  5 mg Oral Nightly    spironolactone  25 mg Oral Daily    sodium chloride flush  10 mL Intravenous 2 times per day    potassium chloride  20 mEq Oral BID    simvastatin  40 mg Oral Nightly    folic acid  1 mg Oral Daily    oyster shell calcium/vitamin D  1 tablet Oral Daily    silver sulfADIAZINE   Topical Daily     Continuous Infusions:   sodium chloride 75 mL/hr at 03/26/19 0425    heparin (porcine) 13.3 mL/hr (03/26/19 0257)    dextrose 100 mL/hr (03/08/19 1700)     PRN Meds:.menthol-zinc oxide, loperamide, sodium chloride flush, oxyCODONE, HYDROmorphone, heparin (porcine), heparin (porcine), magic (miracle) mouthwash with nystatin, glucose, dextrose, glucagon (rDNA), dextrose, ondansetron, acetaminophen       Vitals :     Vitals:    03/26/19 0438   BP: 129/71   Pulse: 85   Resp: 16   Temp: 97.9 °F (36.6 °C)   SpO2: 90%       I & O :       Intake/Output Summary (Last 24 hours) at 3/26/2019 4102  Last data filed at 3/26/2019 0544  Gross per 24 hour   Intake 0 ml   Output 1525 ml   Net -1525 ml        Physical Examination :     General appearance: Anxious- no, distressed- no, in good spirits- no    Sitting up on chair, comfortable,  HEENT: Lips- normal, teeth- ok , oral mucosa- moist, has skin lesions in the face  Neck : Mass- no, appears symmetrical, JVD- not visible  Respiratory: Respiratory effort-  normal, wheeze- no, crackles - no  Cardiovascular:  Ausculation- No M/R/G, Edema no  Abdomen: visible mass- no, distention- no, scar- no, tenderness- no                            hepatosplenomegaly-  no  Musculoskeletal:  clubbing no,cyanosis- no , digital ischemia- no                           muscle strength- grossly normal , tone - grossly normal  Skin: rashes- no , ulcers- no, induration- no, tightening - no  Psychiatric:  Judgement and insight- normal           AAO X 3     LABS:     Recent Labs     03/24/19  0635 03/25/19  0527 03/26/19  0440   WBC 6.9 7.4 7.4   HGB 7.9* 8.0* 8.1*   HCT 23.3* 23.7* 23.8*    174 178     Recent Labs     03/24/19  0635 03/25/19  0527 03/26/19  0440   * 134* 133*   K 3.2* 3.8 4.0   CL 96* 98* 99   CO2 27 26 23   BUN 10 10 7   CREATININE 0.6* 0.7* 0.6*   GLUCOSE 99 121* 125*   MG 1.50*  --   --

## 2019-03-26 NOTE — PROGRESS NOTES
Physical Therapy  Pt refused follow PT today and requested that this wait until tomorrow. Will continue to follow. Thank you.   Darell Awan PTA

## 2019-03-26 NOTE — PROGRESS NOTES
[facility] GENERAL SURGERY    Surgery Progress Note           POD 12    PATIENT NAME: Akash Rojas     TODAY'S DATE: 3/26/2019    INTERVAL HISTORY:    Had large BM this AM - left sided pain continues. No acute events        OBJECTIVE:   VITALS:  BP (!) 146/80   Pulse 84   Temp 99 °F (37.2 °C) (Oral)   Resp 18   Ht 5' 9\" (1.753 m)   Wt 170 lb 3.2 oz (77.2 kg)   SpO2 95%   BMI 25.13 kg/m²     INTAKE/OUTPUT:    I/O last 3 completed shifts: In: 0   Out: 1525 [OTUCK:7095]  I/O this shift: In: 0   Out: 450 [Urine:450]              CONSTITUTIONAL:  awake and alert  ABDOMEN:  soft, non-distended, tender left abd, patricio in place serosanguinous  INCISION: staples intact    Data:  CBC:   Recent Labs     03/24/19  0635 03/25/19  0527 03/26/19  0440   WBC 6.9 7.4 7.4   HGB 7.9* 8.0* 8.1*   HCT 23.3* 23.7* 23.8*    174 178     BMP:    Recent Labs     03/24/19  0635 03/25/19  0527 03/26/19  0440   * 134* 133*   K 3.2* 3.8 4.0   CL 96* 98* 99   CO2 27 26 23   BUN 10 10 7   CREATININE 0.6* 0.7* 0.6*   GLUCOSE 99 121* 125*     Hepatic: No results for input(s): AST, ALT, ALB, BILITOT, ALKPHOS in the last 72 hours. Mag:      Recent Labs     03/24/19  0635   MG 1.50*        ASSESSMENT AND PLAN:  69 y.o. male status post left colectomy now with suspected anastomotic leak  Plan for Fluoro enema study today followed by CT to evaluate leak further - if this shows large leak, then will plan on surgery tomorrow  GI: NPO - may need TPN  ID: continue with antibiotics     Electronically signed by DEBRA Mccarthy - CNP     Surgery Staff    I have examined this patient and read and agree with the note by Shivani Parham CNP from today. Discussed these issues in detail w/ patient, who appears to understand, asks appropriate questions, and agrees to proceed.     Scoreoid KEBEDE

## 2019-03-26 NOTE — PLAN OF CARE
Problem: Safety:  Goal: Free from accidental physical injury  Description  Free from accidental physical injury  Outcome: Ongoing    Call light in reach and pt instructed to call with needs. Wife at bedside. Bed in lowest position and belongings in reach. Will continue to monitor. Problem: Pain:  Goal: Patient's pain/discomfort is manageable  Description  Patient's pain/discomfort is manageable  Outcome: Ongoing    Pain scale discussed, current pain 1/10. Order to contact MD if pain increases. Will continue to monitor. Problem: Risk for Impaired Skin Integrity  Goal: Tissue integrity - skin and mucous membranes  Description  Structural intactness and normal physiological function of skin and  mucous membranes. Outcome: Ongoing   Skin assessed, Silvadene cream applied as ordered. Pt instructed to turn in bed, refuses turns by staff. Positioned with pillows for comfort.

## 2019-03-26 NOTE — FLOWSHEET NOTE
03/25/19 2030   Assessment   Charting Type Shift assessment   Neurological   Neuro (WDL) WDL   Level of Consciousness 0   Orientation Level Oriented X4   Cognition Appropriate judgement; Appropriate safety awareness; Appropriate attention/concentration; Follows commands   Language Clear   [place] Coma Scale   Eye Opening 4   Best Verbal Response 5   Best Motor Response 6   [place]ati Coma Scale Score 15   HEENT   HEENT (WDL) X   Right Eye Impaired vision   Left Eye Impaired vision   Throat Intact   Tongue Dry   Voice Normal   Teeth Missing teeth   Respiratory   Respiratory (WDL) X   R Breath Sounds Diminished   L Breath Sounds Diminished   Respiratory Quality/Effort Unlabored   Chest Assessment Chest expansion symmetrical;Trachea midline   Respiratory Pattern Regular   Respiratory Depth Normal   Breath Sounds   Right Upper Lobe Diminished   Right Middle Lobe Diminished   Right Lower Lobe Diminished   Left Upper Lobe Diminished   Left Lower Lobe Diminished   Cardiac   Cardiac (WDL) WDL   Cardiac Regularity Regular   Heart Sounds S1, S2   Cardiac Rhythm NSR   Cardiac Monitor   Telemetry Monitor On Yes   Telemetry Audible Yes   Telemetry Alarms Set Yes   Telemetry Box Number 118   Gastrointestinal   Abdominal (WDL) X   RUQ Bowel Sounds Active   RLQ Bowel Sounds Active   LUQ Bowel Sounds Active   LLQ Bowel Sounds Active   Tenderness Soft;Nontender   Abdomen Inspection Rounded; Soft  (surgical incisions)   Peripheral Vascular   Peripheral Vascular (WDL) WDL   Edema None   RLE Edema None   LLE Edema None   Skin Color/Condition   Skin Color/Condition (WDL) WDL   Skin Color Appropriate for ethnicity   Skin Condition/Temp Warm;Dry   Skin Integrity   Skin Integrity (WDL) X   Location scattered   Skin Integrity Bruising   Multiple Skin Integrity Sites Yes   Musculoskeletal   Musculoskeletal (WDL) X   RL Extremity Weakness; Full movement   LL Extremity Weakness; Full movement   Genitourinary   Genitourinary (WDL) WDL   Flank
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
## 2019-03-26 NOTE — PROGRESS NOTES
Fernando Jha is a 69 y.o. male patient.     Current Facility-Administered Medications   Medication Dose Route Frequency Provider Last Rate Last Dose    meropenem (MERREM) 1 g in sodium chloride 0.9 % 100 mL IVPB (mini-bag)  1 g Intravenous Q8H Piper Dale MD   Stopped at 03/26/19 1442    0.9 % sodium chloride infusion   Intravenous Continuous Piper Dale MD 75 mL/hr at 03/26/19 1545      menthol-zinc oxide (CALMOSEPTINE) 0.44-20.625 % ointment   Topical BID PRN DEBRA Simmons - HUGO        amiodarone (CORDARONE) tablet 200 mg  200 mg Oral Daily Angel Cueto MD   200 mg at 03/26/19 0856    furosemide (LASIX) tablet 40 mg  40 mg Oral Daily Angel Cueto MD   40 mg at 03/26/19 0856    magnesium oxide (MAG-OX) tablet 400 mg  400 mg Oral BID DEBRA Cardoso - CNP   400 mg at 03/26/19 0856    pantoprazole (PROTONIX) injection 40 mg  40 mg Intravenous Daily Indu Lambert MD   40 mg at 03/26/19 0856    aspirin chewable tablet 81 mg  81 mg Oral Daily Indu Lambert MD   81 mg at 03/26/19 0856    metoprolol succinate (TOPROL XL) extended release tablet 100 mg  100 mg Oral Daily Indu Lambert MD   100 mg at 03/26/19 0857    lisinopril (PRINIVIL;ZESTRIL) tablet 5 mg  5 mg Oral Nightly Indu Lambert MD   5 mg at 03/25/19 2110    spironolactone (ALDACTONE) tablet 25 mg  25 mg Oral Daily Indu Lambert MD   25 mg at 03/26/19 0856    loperamide (IMODIUM) capsule 2 mg  2 mg Oral 4x Daily PRN Indu Lambert MD   2 mg at 03/24/19 2247    sodium chloride flush 0.9 % injection 10 mL  10 mL Intravenous 2 times per day Indu Lambert MD   10 mL at 03/26/19 0857    sodium chloride flush 0.9 % injection 10 mL  10 mL Intravenous PRN Indu Lambert MD   10 mL at 03/21/19 1132    potassium chloride (KLOR-CON M) extended release tablet 20 mEq  20 mEq Oral BID Caleb Rodriguez MD   20 mEq at 03/26/19 0857    heparin 25,000 units in dextrose 5% 250 mL infusion  16.6 mL/hr Intravenous Continuous Rehana Rodriguez MD 16.6 mL/hr at 03/26/19 1052 16.6 mL/hr at 03/26/19 1052    simvastatin (ZOCOR) tablet 40 mg  40 mg Oral Nightly Rosio Zamora MD   40 mg at 03/25/19 2110    oxyCODONE (ROXICODONE) immediate release tablet 5 mg  5 mg Oral Q4H PRN Rosio Zamora MD        HYDROmorphone (DILAUDID) injection 0.5 mg  0.5 mg Intravenous Q3H PRN Rosio Zamora MD   0.5 mg at 03/21/19 0620    heparin (porcine) injection 6,610 Units  80 Units/kg Intravenous PRN Rosio Zamora MD   6,610 Units at 03/26/19 1052    heparin (porcine) injection 3,300 Units  40 Units/kg Intravenous PRN Rosio Zamora MD   3,300 Units at 03/25/19 1007    magic (miracle) mouthwash with nystatin  5 mL Swish & Spit 4x Daily PRN Rosio Zamora MD   5 mL at 03/13/19 1250    glucose (GLUTOSE) 40 % oral gel 15 g  15 g Oral PRN Rosio Zamora MD        dextrose 50 % solution 12.5 g  12.5 g Intravenous PRN Rosio Zamora MD        glucagon (rDNA) injection 1 mg  1 mg Intramuscular PRN Rosio Zamora MD        dextrose 5 % solution  100 mL/hr Intravenous PRN Rosio Zamora  mL/hr at 03/08/19 1700 100 mL/hr at 86/05/23 9117    folic acid (FOLVITE) tablet 1 mg  1 mg Oral Daily Rosio Zamora MD   1 mg at 03/26/19 0856    oyster shell calcium/vitamin D 250-125 MG-UNIT per tablet 250 mg  1 tablet Oral Daily Rosio Zamora MD   250 mg at 03/26/19 0856    silver sulfADIAZINE (SILVADENE) 1 % cream   Topical Daily Rosio Zamora MD        ondansetron [facility]) injection 4 mg  4 mg Intravenous Q6H PRN Rosio Zamora MD   4 mg at 03/22/19 1716    acetaminophen (TYLENOL) tablet 650 mg  650 mg Oral Q4H PRN Rosio Zamora MD   650 mg at 03/24/19 1031     No Known Allergies  Active Problems:    Diverticulitis of large intestine with perforation and abscess without bleeding    Diverticulitis    Chemotherapy-induced neutropenia (HCC)    Mucositis    Herpes zoster oticus    Hearing loss of right ear    Acute respiratory failure with hypoxia ([address]    H/O colectomy    Primary malignant neoplasm of lung metastatic to other site [facility])    NSVT (nonsustained ventricular tachycardia) ([facility]    Other pulmonary embolism without acute cor pulmonale (HCC)    Cardiomyopathy (HCC)  Resolved Problems:    * No resolved hospital problems. *    Blood pressure 125/79, pulse 81, temperature 98.1 °F (36.7 °C), temperature source Oral, resp. rate 18, height 5' 9\" (1.753 m), weight 170 lb 3.2 oz (77.2 kg), SpO2 93 %. Subjective:  Symptoms:  Stable. Pain:  He complains of pain that is mild. Objective  Assessment & Plan  70 yo w HTN, CAD, metastatic lung CA on chemoRx, shingles and P.E., admitted for acute perforated diverticulitis, s/p surgical repair, who has had a drop in his H/H on 3/19 to H/H 7/20, and was found to have blacksih heme-pos diarrheal stools. Is on Heparin drip. EGD was neg. He was found to have what looks like an anastomotic leak on CT 3/25/19 and B.E. 3/26/19.     Plan:   1. Continue daily PPI  2. Further management per Surgery  3. F/U H/H   4.  Will follow  MD Indu Jasso MD  3/26/2019

## 2019-03-26 NOTE — PROGRESS NOTES
Hospitalist Progress Note      PCP: Dedra Briceno MD    Date of Admission: 3/7/2019    Chief Complaint: Abdominal pain    Hospital Course: 69 y. o. male who presented to [facility] with LLQ abdominal pain. Pt was recently d/c'd for diverticulitis, states pain never went away. C/o low grade fevers for 2 weeks. States pain not as severe as previous admission. Had 2 chemo treatments, states received neulasta. Subjective: Patient in the bathroom on exam. Still complaining of LLQ pain. No new medical concerns  Bedside rounding with nursing completed.     Medications:  Reviewed    Infusion Medications    sodium chloride 75 mL/hr at 03/26/19 0425    heparin (porcine) 16.6 mL/hr (03/26/19 1052)    dextrose 100 mL/hr (03/08/19 1700)     Scheduled Medications    meropenem  1 g Intravenous Q8H    amiodarone  200 mg Oral Daily    furosemide  40 mg Oral Daily    magnesium oxide  400 mg Oral BID    pantoprazole  40 mg Intravenous Daily    aspirin  81 mg Oral Daily    metoprolol succinate  100 mg Oral Daily    lisinopril  5 mg Oral Nightly    spironolactone  25 mg Oral Daily    sodium chloride flush  10 mL Intravenous 2 times per day    potassium chloride  20 mEq Oral BID    simvastatin  40 mg Oral Nightly    folic acid  1 mg Oral Daily    oyster shell calcium/vitamin D  1 tablet Oral Daily    silver sulfADIAZINE   Topical Daily     PRN Meds: menthol-zinc oxide, loperamide, sodium chloride flush, oxyCODONE, HYDROmorphone, heparin (porcine), heparin (porcine), magic (miracle) mouthwash with nystatin, glucose, dextrose, glucagon (rDNA), dextrose, ondansetron, acetaminophen      Intake/Output Summary (Last 24 hours) at 3/26/2019 1359  Last data filed at 3/26/2019 1142  Gross per 24 hour   Intake 0 ml   Output 2050 ml   Net -2050 ml       Physical Exam Performed:    BP (!) 148/79   Pulse 84   Temp 99 °F (37.2 °C) (Oral)   Resp 18   Ht 5' 9\" (1.753 m)   Wt 170 lb 3.2 oz (77.2 kg)   SpO2 ABDOMEN PELVIS WO CONTRAST Additional Contrast? None   Final Result   Interval increase in size of the left lower quadrant collection which now   measures up to 7.8 cm. Administered rectal contrast extends into the   collection. Right anterior approach drain courses adjacent to the collection. No significant change in pleural effusions and associated airspace disease. The findings were sent to the Radiology Results [address] at 12:53   pm on 3/26/2019to be communicated to a licensed caregiver. [place] BARIUM ENEMA   Final Result   Active extravasation of contrast from the sigmoid colon, compatible with   anastomotic leak. Please refer to accompanying CT. Diverticulosis. CT ABDOMEN PELVIS W IV CONTRAST Additional Contrast? Radiologist Recommendation   Final Result   1. The patient is 11 days status post partial sigmoid colectomy. Surgical   drain remains in place with a small ill-defined fluid collection that is   stable from prior exam.  This does not appear to represent an abscess and may   represent postoperative hematoma or seroma. 2. There is a new collection of free intraperitoneal air immediately anterior   to the prior anastomosis site, contained within the left lower quadrant. This indicates an anastomosis leak. No distant free air. 3. 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 4. Cholelithiasis with no evidence of acute cholecystitis. 5. Worsening small pleural effusions and airspace changes in the lower chest.   Pattern may represent underlying pulmonary edema or perhaps pneumonitis. XR CHEST PORTABLE   Final Result   Interval development of mild pulmonary edema. There is a small left-sided pleural effusion with more focal consolidation   the left lung base, atelectasis versus pneumonia. IR PICC WO SQ PORT/PUMP > 5 YEARS   Final Result   Successful placement of PICC line.          CT ABDOMEN PELVIS WO CONTRAST Additional Contrast? None   Final Result   No evidence retroperitoneal hematoma. Interval colonic resection. Small volume loculated collection in the left   pericolic gutter, with surgical drain terminating adjacent. Mildly dilated loops of small bowel with scattered air-fluid levels, most   likely postoperative ileus. Trace pleural effusions and bibasilar airspace disease representing   atelectasis or pneumonia. VL Extremity Venous Bilateral   Final Result      CT CHEST PULMONARY EMBOLISM W CONTRAST   Final Result   Bilateral pulmonary emboli      Scattered opacities throughout the lungs, greatest at the lung bases, with   tiny pleural effusions. Changes are increased compared to prior      Sclerotic osseous metastatic disease as described previously      The findings were sent to the Radiology Results [address] at 3:57   pm on 3/16/2019to be communicated to a licensed caregiver. XR CHEST 1 VW   Final Result   Shallow inflation with findings consistent with basilar atelectasis. Left   pleural thickening versus trace effusion. CT CHEST ABDOMEN PELVIS WO CONTRAST   Final Result   Perforated sigmoid diverticulitis and pneumoperitoneum unchanged. Stable   left [facility] colonic air-fluid collection may represent an intramural or   extramural abscess or giant diverticulum. Infrarenal abdominal aortic aneurysm and aorto bi-iliac stent. Other incidental findings as above. CT ABDOMEN PELVIS W IV CONTRAST Additional Contrast? None   Final Result   Acute perforated descending colon diverticulitis. There is free air in the   abdomen and there is a small extraluminal gas and fluid collection/abscess   adjacent to the descending colon measuring 4.1 cm in AP diameter and 6 cm in   length. There is diverticulosis of the entire colon. Cholelithiasis.       4.7 cm infrarenal abdominal aortic aneurysm status post endograft repair with   a patent aorto bi-iliac graft. There is suspicion of an endoleak with blush   of contrast enhancement suspected in the inferior aspect of the native   aneurysm. As a noncontrast study was not performed, calcification of the   intraluminal thrombus accounting for the apparent enhancement is not   excluded. 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 Comparison with a noncontrast CT examination would be   helpful. Critical results were called by Dr. Bharat Johnson MD to [facility] on   3/7/2019 at 20:35. Assessment/Plan:    Active Hospital Problems    Diagnosis Date Noted    Cardiomyopathy ([address] [I42.9]     NSVT (nonsustained ventricular tachycardia) ([facility]) [I47.2]     Other pulmonary embolism without acute cor pulmonale ([facility]) [I26.99]     Acute respiratory failure with hypoxia ([address] [J96.01]     H/O colectomy [Z90.49]     Primary malignant neoplasm of lung metastatic to other site ([facility]) [C34.90]     Herpes zoster oticus [B02.21]     Hearing loss of right ear [H91.91]     Chemotherapy-induced neutropenia ([address] [D70.1, T45.1X5A]     Mucositis [K12.30]     Diverticulitis [K57.92] 03/07/2019    Diverticulitis of large intestine with perforation and abscess without bleeding [K57.20]        Acute perforated recurrent diverticulitis/pneumoperitoneum/abscess in setting of immunosuppression due to chemotherapy:   - CT A/P on admission demonstrated acute perforated colon diverticulitis with free air and fluid collection.   - General surgery consulted. Pt is status post laparoscopic converted to open left colectomy on 3/14/19.  -New onset LLQ pain with diarrhea yesterday. CT abdomen and pelvis with contrast 3/25 revealed a new collection of free intraperitoneal air immediately anterior to the prior anastomosis site contained within the left lower quadrant indicative of anastomosis leak.  Barium enema  today revealed an interval increase in size of the left statin. - Cards recommending LHC given drop in EF but would like to wait 4-6 weeks post PE on AC.      NSVT:   - Known history of VT s/p ICD. Went into VT on 3/12, PMD went off. Continue amiodarone. Keep K > 4 and Mag > 2.     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 Nephrology following. Monitor daily BMP.      Hypokalemia/hypomagnesemia:  - Monitor and replete as needed.      Zoster oticus:  - Completed 7 days of acyclovir.     DVT Prophylaxis: Pt is on heparin infusion   Diet: Dietary Nutrition Supplements: Other Supplement (ensure)  DIET LOW FIBER;  Code Status: Full Code     PT/OT Eval Status:  Ordered with recs for [address] - 2-4 days, discussed disposition with pt's wife. Currently she is in favor of pt going home if S4 program would be available.  Pt will most likely go home with supplemental O2.      Aly Stuart, APRN - CNP

## 2019-03-26 NOTE — ONCOLOGY
ONCOLOGY HEMATOLOGY CARE PROGRESS NOTE      SUBJECTIVE:     He is having a fluro enema and CT today to eval leak. Wife in room. Chart reviewed. ROS:   The remaining 10 point review of symptoms is unremarkable. OBJECTIVE        Physical    VITALS:  BP (!) 148/79   Pulse 84   Temp 99 °F (37.2 °C) (Oral)   Resp 18   Ht 5' 9\" (1.753 m)   Wt 170 lb 3.2 oz (77.2 kg)   SpO2 93%   BMI 25.13 kg/m²   TEMPERATURE:  Current - Temp: 99 °F (37.2 °C); Max - Temp  Av.4 °F (36.9 °C)  Min: 97.9 °F (36.6 °C)  Max: 99 °F (37.2 °C)  PULSE OXIMETRY RANGE: SpO2  Av.1 %  Min: 90 %  Max: 95 %  24HR INTAKE/OUTPUT:      Intake/Output Summary (Last 24 hours) at 3/26/2019 1248  Last data filed at 3/26/2019 1142  Gross per 24 hour   Intake 0 ml   Output 2300 ml   Net -2300 ml       CONSTITUTIONAL:  awake, alert, cooperative, no apparent distress, HEENT oral pharynx , no scleral icterus, wearing high flow nasal canula   HEMATOLOGIC/LYMPHATICS:  no cervical lymphadenopathy, no supraclavicular lymphadenopathy, no axillary lymphadenopathy and no inguinal lymphadenopathy  LUNGS:  No increased work of breathing, good air exchange, clear to auscultation bilaterally, no crackles or wheezing  CARDIOVASCULAR:  , regular rate and rhythm, normal S1 and S2, no S3 or S4, and no murmur noted  ABDOMEN: normal BS,  dressing to DIAN drain site to RLQ is with slight serosanguinous drainage, NON distended, NON tender, DIAN drain in place with serosanguinous drainage , no rebound tenderness, SOFT   Back: no bruising, purpura, or hematomas noted   MUSCULOSKELETAL:  There is no redness, warmth, or swelling of the joints. EXTREMETIES: BLE edema +1, non pitting   NEUROLOGIC:  Awake, alert, oriented to name, place and time. Cranial nerves II-XII are grossly intact. Motor is 5 out of 5 bilaterally.  Hip flexion 5/5 bilaterally in the bed     Data      Recent Labs     19  0635 19  0527 19  6849 WBC 6.9 7.4 7.4   HGB 7.9* 8.0* 8.1*   HCT 23.3* 23.7* 23.8*    174 178   MCV 88.7 90.0 88.1        Recent Labs     03/24/19  0635 03/25/19  0527 03/26/19  0440   * 134* 133*   K 3.2* 3.8 4.0   CL 96* 98* 99   CO2 27 26 23   BUN 10 10 7   CREATININE 0.6* 0.7* 0.6*     No results for input(s): AST, ALT, ALB, BILIDIR, BILITOT, ALKPHOS in the last 72 hours.     Magnesium:    Lab Results   Component Value Date    MG 1.50 03/24/2019    MG 1.60 03/23/2019    MG 1.50 03/22/2019         Problem List  Patient Active Problem List   Diagnosis    Elevated fasting glucose    CAD (coronary artery disease)    PAD (peripheral artery disease) ([address]    Essential hypertension    Mixed hyperlipidemia    Sprain of right foot    Sprain of anterior talofibular ligament of right ankle    Closed nondisplaced fracture of fifth right metatarsal bone    Closed fracture of proximal lateral malleolus of ankle with routine healing    Right foot pain    Right ankle pain    Nondisplaced fracture of fifth right metatarsal bone with routine healing    History of pulmonary emphysema    Chronic obstructive pulmonary disease with (acute) exacerbation (HCC)    Pulmonary nodule, left    Abnormal bone scan of thoracic spine    Back pain    Visit for monitoring Plavix therapy    History of ventricular tachycardia    Gallstones    Hemoptysis    Former smoker    Elevated PSA    Adenocarcinoma of left lung ([address]    Acute diverticulitis    Pancytopenia ([address]    Diverticulitis of large intestine with perforation and abscess without bleeding    Diverticulitis    Chemotherapy-induced neutropenia (HCC)    Mucositis    Herpes zoster oticus    Hearing loss of right ear    Acute respiratory failure with hypoxia ([address]    H/O colectomy    Primary malignant neoplasm of lung metastatic to other site [facility])    NSVT (nonsustained ventricular tachycardia) ([address]    Other pulmonary embolism without acute cor pulmonale ([address]    Cardiomyopathy [facility]       ASSESSMENT AND PLAN    -Acute Perforated descending colon diverticulitis (not immune mediated colitis) with free air in the abd   - per gen surgery , s/p Laparoscopic converted to Open left Colectomy, 3/14/2019, with Dr. Ivelisse Lagos  - ATB support (Zosyn, Vera, Vanco) , changed to [address] until POD 7, now on Merrem only. - CT scan done 3/25 with anastomotic leak at the [facility] surgical site. Flouro enema and repeat CT today, if large leak plans for surgery in the AM.     Nutrition  - may need TPN  - check phos and prealbumin today     Neutropenia secondary to chemo  -  Granix stopped 3/11   - resolved     Thrombocytopenia secondary to chemo  -Resolved      PE  -Diagnosed on 3/16/2019 with bilateral PEs involving the riaght and left main pulm arteries. -plan Eliquis on dc   -will need lifelong anticoagulation  -Would continue Heparin for now - change to Eliquis and Plavix once surgery eval complete. Anemia, possible GI bleed on hep drip  - Hgb 6.6 on 3/19, now 8 s/p PRBC on 3/19, stabilized and EGD 3/21 negative   - PPI added, now BID dosing per GI 3/21  - No transfusion today, Hgb is 8.1   - CT AP 3/21 - no signs of bleeding   - due for B12 shot- given 3/20- due every 9 weeks with chemo.     -Lung CA, stage 4 bone mets   - Will hold chemo until surgically healed  - Cycle 3 carbo/alimta Surya Jessika was due on 3/22- held due to acute issues, cont to hold. Plans to resume [facility] Freeman only outpatient.   - CT chest/abd/pelvis, 3/10/2019, showed improvement in the left hilar soft tissue and radiation changes. No obvious new disease. Not hospice appropriate at this time from an oncology standpoint.       Shingles to the V3 dermatome of the trigeminal nerve  - Now on oral Acyclovir 800 5 times a day (deescalated from IV dosing)  - Acyclovir finished 3/19      ONCOLOGIC DISPOSITION:  TBD, per surgery. Can dc from heme onc standpoint.  I discussed with wife last week that patient prognosis is guarded given significant co-morbidities such as CAD/PAD and recurrent complications from diverticulitis. She is aware that his disease is NOT CURABLE. I told her we will cont to treat with aggressive measures as we hope for the best, but ultimately may have to prepare for the worst. His prognosis per Dr. Maryjane Dowling is more than 6 months even off treatment- his cancer is slow growing at present and does not pose an imminent threat to death at present.      Aurora Gallegos, CNP

## 2019-03-27 NOTE — PROGRESS NOTES
No apparent distress, appears stated age and cooperative. HEENT: Pupils equal, round, and reactive to light. Conjunctivae/corneas clear. Vesicles in outer ear canal noted   Neck: Supple, with full range of motion. No jugular venous distention. Trachea midline. Respiratory:  Normal respiratory effort. Diminished to auscultation, bilaterally without Rales/Wheezes/Rhonchi. Cardiovascular: regular rate and rhythm with normal S1/S2 without murmurs, rubs or gallops. Abdomen: Soft, non-tender, incision site C/D/I, DIAN drain in place. Musculoskeletal: No clubbing, cyanosis or edema bilaterally. Full range of motion without deformity. Skin: Skin color, texture, turgor normal.  Radiation changes to back. Vesicles under lower lip, cheek. Mouth mucosa improving. Excoriation to coccyx. Neurologic:  Neurovascularly intact without any focal sensory/motor deficits. Cranial nerves: II-XII intact, grossly non-focal.  Psychiatric: Alert and oriented, thought content appropriate, normal insight  Capillary Refill: Brisk,< 3 seconds   Peripheral Pulses: +2 palpable, equal bilaterally        Labs:   Recent Labs     03/25/19  0527 03/26/19  0440 03/27/19  0645   WBC 7.4 7.4 7.1   HGB 8.0* 8.1* 7.3*   HCT 23.7* 23.8* 22.4*    178 174     Recent Labs     03/25/19  0527 03/26/19  0440 03/27/19  0645   * 133* 144   K 3.8 4.0 3.5   CL 98* 99 109   CO2 26 23 22   BUN 10 7 6*   CREATININE 0.7* 0.6* <0.5*   CALCIUM 7.5* 7.7* 6.7*   PHOS  --   --  1.1*     No results for input(s): AST, ALT, BILIDIR, BILITOT, ALKPHOS in the last 72 hours. No results for input(s): INR in the last 72 hours. No results for input(s): Grisel Mahtrung in the last 72 hours.     Urinalysis:      Lab Results   Component Value Date    NITRU Negative 03/10/2019    WBCUA 3-5 03/10/2019    RBCUA 0-2 03/10/2019    BLOODU Negative 03/10/2019    SPECGRAV 1.025 03/10/2019    GLUCOSEU Negative 03/10/2019       Radiology:  CT ABDOMEN PELVIS WO CONTRAST There is suspicion of an endoleak with blush   of contrast enhancement suspected in the inferior aspect of the native   aneurysm. As a noncontrast study was not performed, calcification of the   intraluminal thrombus accounting for the apparent enhancement is not   excluded. 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 Comparison with a noncontrast CT examination would be   helpful. Critical results were called by Dr. Shanae Campbell. MD [facility] to [facility] on   3/7/2019 at 20:35. Assessment/Plan:    Active Hospital Problems    Diagnosis Date Noted    Cardiomyopathy ([address] [I42.9]     NSVT [facility]nonsustained ventricular tachycardia) ([facility]) [I47.2]     Other pulmonary embolism without acute cor pulmonale ([facility]) [I26.99]     Acute respiratory failure with hypoxia ([address] [J96.01]     H/O colectomy [Z90.49]     Primary malignant neoplasm of lung metastatic to other site ([facility]) [C34.90]     Herpes zoster oticus [B02.21]     Hearing loss of right ear [H91.91]     Chemotherapy-induced neutropenia ([address] [D70.1, T45.1X5A]     Mucositis [K12.30]     Diverticulitis [K57.92] 03/07/2019    Diverticulitis of large intestine with perforation and abscess without bleeding [K57.20]        Acute perforated recurrent diverticulitis/pneumoperitoneum/abscess in setting of immunosuppression due to chemotherapy:   - CT A/P on admission demonstrated acute perforated colon diverticulitis with free air and fluid collection.   - General surgery consulted. Pt is status post laparoscopic converted to open left colectomy on 3/14/19.  -New onset LLQ pain with diarrhea yesterday. CT abdomen and pelvis with contrast 3/25 revealed a new collection of free intraperitoneal air immediately anterior to the prior anastomosis site contained within the left lower quadrant indicative of anastomosis leak.  Barium enema  today revealed an interval increase in size of the left lower quadrant collection which now measures up to 7.8 cm.     - Pt completed 7 days of Merrem and Diflucan. - Continue prn pain control. Acute hypoxic respiratory failure due to bilateral pulmonary emboli:   - CT chest 3/16 showed thrombus in the distal aspect of the left main pulmonary artery extending into the left upper and left lower lobe pulmonary arterial branches. Additional Thrombus is seen in the distal aspect of the right main pulmonary artery,extending into the right upper right middle and right lower lobe pulmonary arterial branches. - Continue heparin gtt started 3/16.  -Will defer to vascular for Integrillin drip management  - Wean supplemental O2 as tolerated. He is not normally on home O2. He is currently requiring 3-4 liters HFNC.   - Lower extremity dopplers +DVT. Vascular surgery consulted, no indication for IVC filter at this time. - Pulmonary consulted/following.     Metastatic lung cancer, stage 4 with bone mets:  - Hem/onc is following. Chemo on hold to allow for surgical healing.      Thrombocytopenia (resolved) now with hypercoagulable state:  - Platelets required preoperatively. Pt currently on heparin gtt.     Neutropenia due to chemotherapy (resolved):  - S/p Neulasta on 3/1. S/p Granix on 3/8.      Anemia:  -  Pt is s/p 1 unit PBRC for Hgb 6.6 on 3/19 and 1 unit for Hgb 7.2 on 3/22. No overt signs of bleeding. CT A/P showed no RP bleed. PPI added per Hem/Onc. Pt is s/p B12 injection. Stool for occult blood was +. GI consulted, s/p EGD on 3/21 which was normal. Monitor daily CBC. H&H is stable.      PAD/CAD/HTN/Cardiomyopathy:   - Defer antiplatelet management to hem/onc given the complexity of his present situation, bleeding risk and thrombotic states. Pt was restarted on integrillin infusion on 3/20 per Hem/Onc. D/C 3/24. - ECHO showed reduced EF of 30-35%. Previous ECHO showed EF 40-45%. - Cardiology is following. Continue on aspirin, lisinopril, lasix, aldactone, Toprol and statin.    - Cards recommending LHC given drop in EF but would like to wait 4-6 weeks post PE on [facility].      NSVT:   - Known history of VT s/p ICD. Went into VT on 3/12, PMD went off. Continue amiodarone. Keep K > 4 and Mag > 2.     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 Nephrology following. Monitor daily BMP.      Hypokalemia/hypomagnesemia:  - Monitor and replete as needed.      Zoster oticus:  - Completed 7 days of acyclovir.     DVT Prophylaxis: Pt is on heparin infusion   Diet: Dietary Nutrition Supplements: Other Supplement (ensure)  DIET LOW FIBER;  Code Status: Full Code     PT/OT Eval Status:  Ordered with recs for [address] - 2-4 days, discussed disposition with pt's wife. Currently she is in favor of pt going home if S4 program would be available.  Pt will most likely go home with supplemental O2.      Pieter Palmer, APRN - CNP

## 2019-03-27 NOTE — PROGRESS NOTES
(CORDARONE) tablet 200 mg Daily   furosemide (LASIX) tablet 40 mg Daily   magnesium oxide (MAG-OX) tablet 400 mg BID   pantoprazole (PROTONIX) injection 40 mg Daily   aspirin chewable tablet 81 mg Daily   metoprolol succinate (TOPROL XL) extended release tablet 100 mg Daily   lisinopril (PRINIVIL;ZESTRIL) tablet 5 mg Nightly   spironolactone (ALDACTONE) tablet 25 mg Daily   loperamide (IMODIUM) capsule 2 mg 4x Daily PRN   sodium chloride flush 0.9 % injection 10 mL 2 times per day   sodium chloride flush 0.9 % injection 10 mL PRN   potassium chloride (KLOR-CON M) extended release tablet 20 mEq BID   simvastatin (ZOCOR) tablet 40 mg Nightly   oxyCODONE (ROXICODONE) immediate release tablet 5 mg Q4H PRN   HYDROmorphone (DILAUDID) injection 0.5 mg Q3H PRN   heparin (porcine) injection 6,610 Units PRN   heparin (porcine) injection 3,300 Units PRN   magic (miracle) mouthwash with nystatin 4x Daily PRN   glucose (GLUTOSE) 40 % oral gel 15 g PRN   dextrose 50 % solution 12.5 g PRN   glucagon (rDNA) injection 1 mg PRN   dextrose 5 % solution PRN   folic acid (FOLVITE) tablet 1 mg Daily   oyster shell calcium/vitamin D 250-125 MG-UNIT per tablet 250 mg Daily   silver sulfADIAZINE (SILVADENE) 1 % cream Daily   ondansetron (ZOFRAN) injection 4 mg Q6H PRN   acetaminophen (TYLENOL) tablet 650 mg Q4H PRN          PHYSICAL EXAM   /75   Pulse 79   Temp 98.5 °F (36.9 °C) (Oral)   Resp 18   Ht 5' 9\" (1.753 m)   Wt 165 lb 11.2 oz (75.2 kg)   SpO2 93%   BMI 24.47 kg/m²    Vitals:    03/27/19 0102 03/27/19 0541 03/27/19 0641 03/27/19 0747   BP: 132/74  131/79 132/75   Pulse: 80  79 79   Resp: 17 17 18   Temp: 98.1 °F (36.7 °C)  98 °F (36.7 °C) 98.5 °F (36.9 °C)   TempSrc: Oral  Oral Oral   SpO2: 90%  93% 93%   Weight:  165 lb 11.2 oz (75.2 kg)     Height:             Intake/Output Summary (Last 24 hours) at 3/27/2019 0848  Last data filed at 3/27/2019 0541  Gross per 24 hour   Intake 820 ml   Output 1055 ml   Net -235 ml Wt Readings from Last 3 Encounters:   03/27/19 165 lb 11.2 oz (75.2 kg)   02/28/19 184 lb 9.6 oz (83.7 kg)   02/19/19 183 lb (83 kg)         Gen: Patient in NAD, resting comfortably  Neck: No JVD or bruits  Respiratory: CTAB no WRR  Chest: normal without deformity  Cardiovascular:RRR, S1S2, no mrg, normal PMI  Abdomen: Soft, mildly tender  Extremities: No clubbing, cyanosis, or edema  Neurological/Psychiatric: AxO x4, no gross issues  Skin:  Warm and dry      Labs:  CBC:   Recent Labs     03/25/19  0527 03/26/19  0440 03/27/19  0645   WBC 7.4 7.4 7.1   HGB 8.0* 8.1* 7.3*   HCT 23.7* 23.8* 22.4*   MCV 90.0 88.1 89.5    178 174     BMP:   Recent Labs     03/25/19  0527 03/26/19  0440 03/27/19  0645   * 133* 144   K 3.8 4.0 3.5   CL 98* 99 109   CO2 26 23 22   PHOS  --   --  1.1*   BUN 10 7 6*   CREATININE 0.7* 0.6* <0.5*     MG:    Recent Labs     03/27/19  0645   MG 1.50*      PT/INR: No results for input(s): PROTIME, INR in the last 72 hours. APTT:   Recent Labs     03/26/19  1845 03/27/19  0055 03/27/19  0645   APTT 62.3* 53.7* 77.6*     Cardiac Enzymes:   No results for input(s): CKTOTAL, CKMB, CKMBINDEX, TROPONINI in the last 72 hours. Cardiac Studies:    ECHO   Definity contrast was used, but port not working properly.  Bobie Him left ventricular systolic function is moderately reduced with an   ejection fraction of 30-35 %. Inferolateral hypokinesia. Mid anteroseptal,   apical lateral and apical septal akinesia.   There is mild concentric left ventricular hypertrophy.   Normal left ventricular filling pressure.   Left ventricular cavity size is normal.   Mild thickening of the anterior leaflet of mitral valve.   Mild mitral regurgitation.   The left atrium is severely dilated.       Assessment and Plan   1. NSVT   - Known hx of VT s/p ICD, on AMio  2. PE 3/16/2019   - RV is normal  3. CAD   - s/p PCI to RCA 6/2011   - hx of PCi to LAD (post endograft AAA repair MI)1/2011  4.  Ischemic failure with hypoxia ([facility]    H/O colectomy    Primary malignant neoplasm of lung metastatic to other site [facility])    NSVT (nonsustained ventricular tachycardia) (HCC)    Other pulmonary embolism without acute cor pulmonale ([facility]    Cardiomyopathy ([facility]         Thank you for allowing me to participate in the care of your patient. Please call me with any questions [phone].       Ke Davila MD, [address] [facility] Cardiologist  [facility]  [phone] [facility]  [phone] [address] [place] [address]  3/27/2019 8:48 AM

## 2019-03-27 NOTE — PROGRESS NOTES
Pt arrived to PACU. SpO2 81% on 3 lpm O2 per nasal canula, titrating to achieve SpO2 > 92%. Scant amount drainage noted to midline dressing. Pt resting.   Stephen Runner RN

## 2019-03-27 NOTE — PROGRESS NOTES
Pt assessed and charted, family at bedside. Pt educated on PCA pump. NG connected to wall suction as ordered. Pt resting comfortably in bed. Laurent emptied, DIAN drain assessed. Will continue to monitor.

## 2019-03-27 NOTE — PROGRESS NOTES
1305 [facility] notified pt leaving unit for OR. Pt transferred by bed with transport on 3L O2 nasal cannula. 1500psi in O2 tank for transfer.

## 2019-03-27 NOTE — PROGRESS NOTES
Speech Language Pathology  Attempt Note    SLP reviewed chart and pt currently in OR for surgery at this time. SLP to re-attempt dysphagia tx on 3/28 as medically appropriate. Thank you.     Daniella Hall, [facility]  Speech-Language Pathologist

## 2019-03-27 NOTE — PLAN OF CARE
Problem: Safety:  Goal: Free from accidental physical injury  Description  Free from accidental physical injury  Outcome: Ongoing   Pt assisted with ambulation to bedside commode. Call light in reach and pt instructed to call for assistance. Bed in lowest position. Will continue to monitor. Problem: Pain:  Goal: Patient's pain/discomfort is manageable  Description  Patient's pain/discomfort is manageable  Outcome: Ongoing    Pain level assessed using 0-10 pain scale. Pt states pain level maintains a 1-2. Will continue to assess/monitor.

## 2019-03-27 NOTE — PLAN OF CARE
Problem: Falls - Risk of:  Goal: Will remain free from falls  Description  Will remain free from falls  3/26/2019 2219 by Sejal Kim RN  Note:   Pt will remain free from falls this shift. Bedside table and call light within reach, bed alarm in place. Pt instructed to call out when in need of assistance, verbalized understanding. Will continue to monitor.

## 2019-03-27 NOTE — PROGRESS NOTES
Physical Therapy  Facility/Department: [place] [address] [facility]  Daily Treatment Note  NAME: Lucy Douglas  : 1945  MRN: [id number]    Date of Service: 3/27/2019    Discharge Recommendations:  24 hour supervision or assist, Home with Home health PT   PT Equipment Recommendations  Equipment Needed: Yes  Walker: Rolling  Other: Will continue to assess as pt progresses with PT - may require RW if balance deficits persist    Patient Diagnosis(es): The primary encounter diagnosis was Diverticulitis of large intestine with perforation and abscess without bleeding. Diagnoses of Leaking abdominal aortic aneurysm (AAA) ([address] Abdominal pain, left lower quadrant, and Chemotherapy-induced neutropenia ([address] were also pertinent to this visit. has a past medical history of Benign neoplasm of colon, Cancer ([address] Diverticulosis of colon (without mention of hemorrhage), Elevated fasting glucose, Former cigarette smoker, Herpes simplex viral infection, Hyperlipidemia, Hypertension, MI, old, 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[address] and Right popliteal artery occlusion ([address] has a past surgical history that includes Arterial aneurysm repair (2010); Finger fracture surgery; Coronary angioplasty with stent; Upper gastrointestinal endoscopy (2/10/2014); Colonoscopy (10/08/01 12/20/04); Colonoscopy (14); Upper gastrointestinal endoscopy (14); Upper gastrointestinal endoscopy (11/4/15); bronchoscopy (2019); bronchoscopy (N/A, 2019); bronchoscopy (2019); INSERTION / REMOVAL / REPLACEMENT VENOUS ACCESS CATHETER (Right, 2019); bronchoscopy (N/A, 2019); bronchoscopy (2019); bronchoscopy (2019); bronchoscopy (2019); bronchoscopy (2019); bronchoscopy (2019); hemicolectomy (N/A, 3/14/2019); and Upper gastrointestinal endoscopy (N/A, 3/21/2019).     Restrictions  Restrictions/Precautions  Restrictions/Precautions: General Precautions, Fall Risk  Position Activity Restriction  Other position/activity restrictions: Medium fall risk per nursing assessment, up with assistance, 10L high-flow O2, Laurent catheter, telemetry, continuous pulse oximeter  Subjective   General  Chart Reviewed: Yes  Additional Pertinent Hx: Stage IV lung CA with bone mets  Family / Caregiver Present: Yes  Referring Practitioner: Dr. Gutierrez Benton  Subjective  Subjective: pt minimally agreeable to PT. General Comment  Comments: RN cleared pt to participate. Pain Screening  Patient Currently in Pain: Yes  Pain Assessment  Pain Assessment: 0-10  Pain Level: 0  Pain Type: Surgical pain  Pain Location: Abdomen  Pain Orientation: Left  Vital Signs  Patient Currently in Pain: Yes       Orientation  Orientation  Overall Orientation Status: Within Normal Limits  Cognition      Objective   Bed mobility  Rolling to Right: Moderate assistance  Sit to Supine: Unable to assess  Comment: declined to partiicpate in further bed mobility. Transfers  Sit to Stand: Unable to assess  Stand to sit: Unable to assess  Comment: declined to participate in transfers            Exercises  Quad Sets: x15 BLE  Ankle Pumps: X 15 BLE  Comments: completed supine. declined to participate further                         Assessment   Body structures, Functions, Activity limitations: Decreased functional mobility ; Decreased strength;Decreased balance;Decreased endurance  Assessment: pt minimally agreeable to therapy. agreeable to bed mobility 2* pt's request to roll to assist with pressure relief on coccyx. mod A for rolling L and R and max A to scoot. pt agreeable to QS and AP prior to ceasing therapy session. pt to recive sx this pm, Will re-evaluate as appropraite.    Treatment Diagnosis: Decreased endurance and (I) with mobility  Specific instructions for Next Treatment: Progress ther ex and mobility as tolerated  Prognosis: Good  Patient Education: role of PT, bed mobility   REQUIRES PT FOLLOW UP: Yes  Activity Tolerance  Activity Tolerance: Patient limited by fatigue;Patient limited by endurance;Treatment limited secondary to agitation     G-Code     OutComes Score                                                  AM-PAC Score  AM-PAC Inpatient Mobility Raw Score : 17  AM-PAC Inpatient T-Scale Score : 42.13  Mobility Inpatient CMS 0-100% Score: 50.57  Mobility Inpatient CMS G-Code Modifier : CK          Goals  Short term goals  Time Frame for Short term goals: 1 week (unless otherwise specified)  Short term goal 1: Pt will transfer supine <-> sit with modified(I)  Short term goal 2: Pt will transfer sit <-> stand and bed>chair using RW or least AD with supervision  Short term goal 3: Pt will ambulate x 150 feet using RW or least AD with supervision  Short term goal 4: By 3/20/19: Pt will tolerate 12-15 reps BLE exercise for strengthening, balance, and endurance  Short term goal 5: Pt will ambulate up/down 2 steps without use of handrails (using appropriate AD as needed) with SBA  Patient Goals   Patient goals : \"To get my strength back and go home at discharge. \"    Plan    Plan  Times per week: 3-5x/week in acute care  Times per day: Daily  Specific instructions for Next Treatment: Progress ther ex and mobility as tolerated  Current Treatment Recommendations: Strengthening, Gait Training, Balance Training, Stair training, Functional Mobility Training, ROM, Endurance Training, Transfer Training, Safety Education & Training, Patient/Caregiver Education & Training, Equipment Evaluation, Education, & procurement  Safety Devices  Type of devices: Bed alarm in place, Call light within reach, Left in bed, Nurse notified(wife present)     Therapy Time   Individual Concurrent Group Co-treatment   Time In [facility]         Time Out 0843         Minutes 8         Timed Code Treatment Minutes: 8 Minutes       Dorcas Vazquez PT

## 2019-03-27 NOTE — ONCOLOGY
ONCOLOGY HEMATOLOGY CARE PROGRESS NOTE      SUBJECTIVE:     Afebrile. NPO. Surgery today. ROS:   The remaining 10 point review of symptoms is unremarkable. OBJECTIVE        Physical    VITALS:  /79   Pulse 79   Temp 98 °F (36.7 °C) (Oral)   Resp 17   Ht 5' 9\" (1.753 m)   Wt 165 lb 11.2 oz (75.2 kg)   SpO2 93%   BMI 24.47 kg/m²   TEMPERATURE:  Current - Temp: 98 °F (36.7 °C); Max - Temp  Av.4 °F (36.9 °C)  Min: 98 °F (36.7 °C)  Max: 99 °F (37.2 °C)  PULSE OXIMETRY RANGE: SpO2  Av.9 %  Min: 90 %  Max: 95 %  24HR INTAKE/OUTPUT:      Intake/Output Summary (Last 24 hours) at 3/27/2019 0709  Last data filed at 3/27/2019 0541  Gross per 24 hour   Intake 820 ml   Output 1255 ml   Net -435 ml       CONSTITUTIONAL:  awake, alert, cooperative, no apparent distress, HEENT oral pharynx , no scleral icterus, wearing high flow nasal canula   HEMATOLOGIC/LYMPHATICS:  no cervical lymphadenopathy, no supraclavicular lymphadenopathy, no axillary lymphadenopathy and no inguinal lymphadenopathy  LUNGS:  No increased work of breathing, good air exchange, clear to auscultation bilaterally, no crackles or wheezing  CARDIOVASCULAR:  , regular rate and rhythm, normal S1 and S2, no S3 or S4, and no murmur noted  ABDOMEN: normal BS,  dressing to DIAN drain site to RLQ is with slight serosanguinous drainage, NON distended, NON tender, DIAN drain in place with serosanguinous drainage , no rebound tenderness, SOFT   Back: no bruising, purpura, or hematomas noted   MUSCULOSKELETAL:  There is no redness, warmth, or swelling of the joints. EXTREMETIES: BLE edema +1, non pitting   NEUROLOGIC:  Awake, alert, oriented to name, place and time. Cranial nerves II-XII are grossly intact. Motor is 5 out of 5 bilaterally.  Hip flexion 5/5 bilaterally in the bed     Data      Recent Labs     19  0527 19  0440 19  0645   WBC 7.4 7.4 7.1   HGB 8.0* 8.1* 7.3*   HCT 23.7* 23.8* 22.4*  178 174   MCV 90.0 88.1 89.5        Recent Labs     03/25/19  0527 03/26/19  0440   * 133*   K 3.8 4.0   CL 98* 99   CO2 26 23   BUN 10 7   CREATININE 0.7* 0.6*     No results for input(s): AST, ALT, ALB, BILIDIR, BILITOT, ALKPHOS in the last 72 hours.     Magnesium:    Lab Results   Component Value Date    MG 1.50 03/24/2019    MG 1.60 03/23/2019    MG 1.50 03/22/2019         Problem List  Patient Active Problem List   Diagnosis    Elevated fasting glucose    CAD (coronary artery disease)    PAD (peripheral artery disease) ([address]    Essential hypertension    Mixed hyperlipidemia    Sprain of right foot    Sprain of anterior talofibular ligament of right ankle    Closed nondisplaced fracture of fifth right metatarsal bone    Closed fracture of proximal lateral malleolus of ankle with routine healing    Right foot pain    Right ankle pain    Nondisplaced fracture of fifth right metatarsal bone with routine healing    History of pulmonary emphysema    Chronic obstructive pulmonary disease with (acute) exacerbation (HCC)    Pulmonary nodule, left    Abnormal bone scan of thoracic spine    Back pain    Visit for monitoring Plavix therapy    History of ventricular tachycardia    Gallstones    Hemoptysis    Former smoker    Elevated PSA    Adenocarcinoma of left lung ([address]    Acute diverticulitis    Pancytopenia ([address]    Diverticulitis of large intestine with perforation and abscess without bleeding    Diverticulitis    Chemotherapy-induced neutropenia (HCC)    Mucositis    Herpes zoster oticus    Hearing loss of right ear    Acute respiratory failure with hypoxia ([address]    H/O colectomy    Primary malignant neoplasm of lung metastatic to other site [facility])    NSVT (nonsustained ventricular tachycardia) ([address]    Other pulmonary embolism without acute cor pulmonale (HCC)    Cardiomyopathy (HCC)       ASSESSMENT AND PLAN    Acute Perforated descending colon diverticulitis (not immune mediated colitis) with free air in the abd   - per gen surgery , s/p Laparoscopic converted to Open left Colectomy, 3/14/2019, with Dr. Rachel Cardenas  - ATB support (Zosyn, Maxipime, Vanco) , changed to [address] until POD 7, now on Merrem only. - CT scan done 3/25 with [place]otic [place] [facility] [place]. Flouro enema and repeat CT today, if large leak plans for surgery in the AM.     Nutrition  - may need TPN    Neutropenia secondary to chemo  - Granix stopped 3/11   - resolved     Thrombocytopenia secondary to chemo  -Resolved    PE  -Diagnosed on 3/16/2019 with bilateral PEs involving the riaght and left main pulm arteries.   -Plan Eliquis on dc   -Will need lifelong anticoagulation  -Would continue Heparin for now - change to Eliquis and Plavix once surgery eval complete. Anemia, possible GI bleed on hep drip  - Hgb 6.6 on 3/19, now 8 s/p PRBC on 3/19, stabilized and EGD 3/21 negative   - PPI added, now BID dosing per GI 3/21  - No transfusion today, Hgb is 7.3   - CT AP 3/21 - no signs of bleeding   - due for B12 shot - given 3/19/2019 - required every 9 weeks with this chemo. Lung CA, stage 4 bone mets   - Will hold chemo until surgically healed  - Cycle 3 carbo/alimta Scott Dukes was due on 3/22- held due to acute issues, cont to hold. Plans to resume Aniket Kidd only outpatient.   - CT chest/abd/pelvis, 3/10/2019, showed improvement in the left hilar soft tissue and radiation changes. No obvious new disease. Not hospice appropriate at this time from an oncology standpoint.       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 Will require surgical management of his anastomotic leak. Presently, he has low burden metastatic non-small cell lung cancer that appears stable on his multiple recent CT scans.   This does not change the fact that he has multiple comorbid conditions that must be managed and present a challenge to wound healing/increase the chances of complications.     Ze Mendez MD

## 2019-03-27 NOTE — PROGRESS NOTES
Handoff report to [facility]. Abdominal dressing remains with scant amount drainage. Pt educated on PCA pump. Daughter at bedside.   Leonora Richards RN

## 2019-03-27 NOTE — CARE COORDINATION
Chart reviewed for length of stay. Hospital day #20. Pt remains on C4. Per general surgery note on 3/26, pt is going to OR today for suspected anastomotic leak of left colectomy. Per previous case mgmt notes, referral was made to [facility] home care to start services after discharge. However, discharge plan may change/pt may have more needs post op. Case mgmt will continue to follow post op and assess for needs.      Darian Ortiz RN DCP

## 2019-03-27 NOTE — PROGRESS NOTES
Dr. Lutricia Gaucher paged, Ostomy RN up to esther pt for surgery yesterday. No notes or orders for consent placed, pt has not been spoken to about today's procedure by surgical team regarding plan. Can you talk to pt and place order for surgical consent? Waiting for call back.

## 2019-03-27 NOTE — PROGRESS NOTES
Bedside report received from [facility]. Dimed size drainage noted on Surgical dressing. Stoma pink with no output at this time. PCA orders discussed. Pt in bed, mild shaking noted. DIAN drain with moderate output to be emtied by [facility]. Pt to be transferred back to room 443 by transport.

## 2019-03-27 NOTE — OP NOTE
Date of Surgery: 3/27/19    Preop Dx:  Prior Left Colectomy with Anastomotic Leak    Postop Dx:  Same    Procedure:  Gerda's Procedure    Surgeon:  Rochelle Duncan    Assistant:      Anesthesia:  GETA    EBL:   100ml    Specimen:  Partial colon    Complications: none    Drains/Lines:  15F patricio in pelvis    Indications:  68 yo with prior left colectomy two weeks prior with development of anastomotic leak. Description:       Patient was given adequate description of the risks and rewards of the procedure, including bleeding, infection, injury to surrounding structures, need for further operations and freely consented. He was given appropriate antibiotics and brought to the OR where GETA anesthesia was induced. He was placed in supine position. Prepped and draped in usual sterile fashion. Prior staples were removed and incision opened without issue. Small amount of adhesions of small intestine to peritoneal surface were taken down bluntly and with sharp dissection. There was omentum adherent to the prior anastomosis and pelvic peritoneum. Prior patricio was removed. NG placed and felt in correct position. Omental adhesions taken down with blunt dissection and anterior portion of anastomosis was open. With further mobilization the rest of the anastomosis was opened completely. The rectum was further mobilized and using a contour stapler the rectal stump was closed. The omentum was then further mobilized off of the anastomosis. A site free of inflammation was chosen and using another load on the contour stapler it was stapled across. This partial colon was then sent to pathology with the distal end remaining open. The rectal stump was then oversewn with 2-0 silk sutures. Through the prior PATRICIO site a new 15F patricio was inserted and positioned in the pelvis. It was secured to the skin with 2-0 silk suture. The skin at the colostomy site was grasped with a kocher and a quarter sized incision made.   This was carried down to rectus fascia using electrocautery and a cruciate incision made in the fascia with electrocautery. The rectus was split using retractors and peritoneum incised with electrocautery. This allowed for passage of two fingers. The descending colon was then grasped with a alejandro clamp and brought through the abdominal wall in a tension free, nontwisting manner. Abdomen was saline lavaged with two liters of saline. The midline incision was then closed at the fascial level with looped 0-0 PDS suture x 2. Staples were used to close the epidermis. This wound was covered with a sterile towel. The descending colon then had its staple line removed with electrocautery. The colostomy was then created in a brooked fashion using interrupted 3-0 vicryl suture. An ostomy device was then placed. Sterile dressing placed. All suture, sponge and instrument count correct times two at end of case. Transferred to PACU in stable condition.     Jen Fofana MD

## 2019-03-27 NOTE — FLOWSHEET NOTE
03/26/19 2200   Assessment   Charting Type Shift assessment   Neurological   Neuro (WDL) WDL   Level of Consciousness 0   Orientation Level Oriented X4   Cognition Appropriate judgement; Appropriate safety awareness; Appropriate attention/concentration; Follows commands   Language Clear   Harris Coma Scale   Eye Opening 4   Best Verbal Response 5   Best Motor Response 6   Nati Coma Scale Score 15   HEENT   HEENT (WDL) X   Right Eye Impaired vision   Left Eye Impaired vision   Throat Intact   Tongue Dry   Voice Normal   Teeth Missing teeth   Respiratory   Respiratory (WDL) X   R Breath Sounds Diminished   L Breath Sounds Diminished   Respiratory Quality/Effort Unlabored   Chest Assessment Chest expansion symmetrical;Trachea midline   Respiratory Pattern Regular   Respiratory Depth Normal   Breath Sounds   Right Upper Lobe Diminished   Right Middle Lobe Diminished   Right Lower Lobe Diminished   Left Upper Lobe Diminished   Left Lower Lobe Diminished   Cardiac   Cardiac (WDL) WDL   Cardiac Regularity Regular   Heart Sounds S1, S2   Cardiac Rhythm NSR   Cardiac Monitor   Telemetry Monitor On Yes   Telemetry Audible Yes   Telemetry Alarms Set Yes   Telemetry Box Number 118   Gastrointestinal   Abdominal (WDL) X   RUQ Bowel Sounds Active   RLQ Bowel Sounds Active   LUQ Bowel Sounds Active   LLQ Bowel Sounds Active   Tenderness Soft;Nontender   Abdomen Inspection Rounded; Soft  (surgical incisions)   Peripheral Vascular   Peripheral Vascular (WDL) WDL   Edema None   RLE Edema None   LLE Edema None   Skin Color/Condition   Skin Color/Condition (WDL) WDL   Skin Color Appropriate for ethnicity   Skin Condition/Temp Warm;Dry   Skin Integrity   Skin Integrity (WDL) X   Location scattered   Skin Integrity Bruising   Musculoskeletal   Musculoskeletal (WDL) X   RL Extremity Weakness; Full movement   LL Extremity Weakness; Full movement   Genitourinary   Genitourinary (WDL) WDL   Flank Tenderness No   Suprapubic Tenderness No
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
## 2019-03-28 NOTE — PROGRESS NOTES
[facility] GENERAL SURGERY    Surgery Progress Note           POD # 14 & 1    PATIENT NAME: Ivette Median     TODAY'S DATE: 3/28/2019    INTERVAL HISTORY:    Pt  Doing okay, pain controlled. No ostomy output. OBJECTIVE:   VITALS:  BP 94/68   Pulse 108   Temp 98 °F (36.7 °C) (Oral)   Resp 18   Ht 5' 9\" (1.753 m)   Wt 143 lb 8.3 oz (65.1 kg)   SpO2 91%   BMI 21.19 kg/m²     INTAKE/OUTPUT:    I/O last 3 completed shifts: In: 3121.6 [I.V.:3121.6]  Out: 2900 [Urine:2300; Emesis/NG output:100; Drains:350; Blood:150]  I/O this shift:  In: 100 [IV Piggyback:100]  Out: -               CONSTITUTIONAL:  awake and alert  LUNGS:  no crackles or wheezing  ABDOMEN:   hypoactive bowel sounds, soft, non-distended, tenderness noted incisional, stoma edematous but healthy and pink - no gas or stool in bag   INCISION: surgical dressing in place    Data:  CBC:   Recent Labs     03/26/19  0440 03/27/19  0645 03/28/19  0435   WBC 7.4 7.1 16.9*   HGB 8.1* 7.3* 8.9*   HCT 23.8* 22.4* 27.7*    174 166     BMP:    Recent Labs     03/26/19  0440 03/27/19  0645 03/28/19  0435   * 144 137   K 4.0 3.5 5.4*  5.4*   CL 99 109 103   CO2 23 22 20*   BUN 7 6* 16   CREATININE 0.6* <0.5* 1.2   GLUCOSE 125* 71 143*     Hepatic: No results for input(s): AST, ALT, ALB, BILITOT, ALKPHOS in the last 72 hours. Mag:      Recent Labs     03/27/19  0645   MG 1.50*      Phos:     Recent Labs     03/27/19  0645   PHOS 1.1*       ASSESSMENT AND PLAN:  69 y.o. male status post left colectomy with anastomotic leak requiring second OR for [facility]  GI: continue with ngt to suction  : continue with nguyen today  Pain: continue with PCA  Heme - PE/DVT: hold anticoagulation for now   New ostomy: WOCN to follow for teaching. Discussed with pt and daughter at bedside.          Electronically signed by DEBRA Hernández - HUGO

## 2019-03-28 NOTE — PLAN OF CARE
Nutrition Problem: Severe malnutrition  Intervention: Food and/or Nutrient Delivery: Start Parenteral Nutrition(po diet when medically appropriate)  Nutritional Goals: Pt will tolerate diet advancement per General surgery

## 2019-03-28 NOTE — PROGRESS NOTES
Hospitalist Progress Note      PCP: Lindsey Maddox MD    Date of Admission: 3/7/2019    Chief Complaint: Abdominal pain    Hospital Course: 69 y. o. male who presented to [facility] with LLQ abdominal pain. Pt was recently d/c'd for diverticulitis, states pain never went away. C/o low grade fevers for 2 weeks. States pain not as severe as previous admission. Had 2 chemo treatments, states received neulasta. Subjective: To ORThis AM        No new medical concerns  Bedside rounding with nursing completed. Medications:  Reviewed    Infusion Medications    sodium chloride 75 mL/hr at 03/28/19 0919    HYDROmorphone      dextrose 100 mL/hr (03/08/19 1700)     Scheduled Medications    [START ON 3/29/2019] metoprolol succinate  50 mg Oral Daily    meropenem  1 g Intravenous Q8H    amiodarone  200 mg Oral Daily    pantoprazole  40 mg Intravenous Daily    aspirin  81 mg Oral Daily    sodium chloride flush  10 mL Intravenous 2 times per day    simvastatin  40 mg Oral Nightly    folic acid  1 mg Oral Daily    oyster shell calcium/vitamin D  1 tablet Oral Daily    silver sulfADIAZINE   Topical Daily     PRN Meds: naloxone, menthol-zinc oxide, sodium chloride flush, magic (miracle) mouthwash with nystatin, glucose, dextrose, glucagon (rDNA), dextrose, ondansetron, acetaminophen      Intake/Output Summary (Last 24 hours) at 3/28/2019 1513  Last data filed at 3/28/2019 1222  Gross per 24 hour   Intake 2321.61 ml   Output 2050 ml   Net 271.61 ml       Physical Exam Performed:    BP 94/68   Pulse 108   Temp 98 °F (36.7 °C) (Oral)   Resp 18   Ht 5' 9\" (1.753 m)   Wt 143 lb 8.3 oz (65.1 kg)   SpO2 91%   BMI 21.19 kg/m²     General appearance: In bed, No apparent distress, appears stated age and cooperative. HEENT: Pupils equal, round, and reactive to light. Conjunctivae/corneas clear. Vesicles in outer ear canal noted   Neck: Supple, with full range of motion. No jugular venous distention. Trachea midline. Respiratory:  Normal respiratory effort. Diminished to auscultation, bilaterally without Rales/Wheezes/Rhonchi. Cardiovascular: regular rate and rhythm with normal S1/S2 without murmurs, rubs or gallops. Abdomen: Soft, non-tender, incision site C/D/I, DIAN drain in place. Musculoskeletal: No clubbing, cyanosis or edema bilaterally. Full range of motion without deformity. Skin: Skin color, texture, turgor normal.  Radiation changes to back. Vesicles under lower lip, cheek. Mouth mucosa improving. Excoriation to coccyx. Neurologic:  Neurovascularly intact without any focal sensory/motor deficits. Cranial nerves: II-XII intact, grossly non-focal.  Psychiatric: Alert and oriented, thought content appropriate, normal insight  Capillary Refill: Brisk,< 3 seconds   Peripheral Pulses: +2 palpable, equal bilaterally        Labs:   Recent Labs     03/26/19  0440 03/27/19  0645 03/28/19  0435   WBC 7.4 7.1 16.9*   HGB 8.1* 7.3* 8.9*   HCT 23.8* 22.4* 27.7*    174 166     Recent Labs     03/26/19  0440 03/27/19  0645 03/28/19  0435   * 144 137   K 4.0 3.5 5.4*  5.4*   CL 99 109 103   CO2 23 22 20*   BUN 7 6* 16   CREATININE 0.6* <0.5* 1.2   CALCIUM 7.7* 6.7* 7.5*   PHOS  --  1.1*  --      No results for input(s): AST, ALT, BILIDIR, BILITOT, ALKPHOS in the last 72 hours. No results for input(s): INR in the last 72 hours. No results for input(s): Kimberley Jubilee in the last 72 hours. Urinalysis:      Lab Results   Component Value Date    NITRU Negative 03/10/2019    WBCUA 3-5 03/10/2019    RBCUA 0-2 03/10/2019    BLOODU Negative 03/10/2019    SPECGRAV 1.025 03/10/2019    GLUCOSEU Negative 03/10/2019       Radiology:  CT ABDOMEN PELVIS WO CONTRAST Additional Contrast? None   Final Result   Interval increase in size of the left lower quadrant collection which now   measures up to 7.8 cm. Administered rectal contrast extends into the   collection.   Right anterior approach drain courses adjacent to the collection. No significant change in pleural effusions and associated airspace disease. The findings were sent to the Radiology Results [address] at 12:53   pm on 3/26/2019to be communicated to a licensed caregiver. [place] BARIUM ENEMA   Final Result   Active extravasation of contrast from the sigmoid colon, compatible with   anastomotic leak. Please refer to accompanying CT. Diverticulosis. CT ABDOMEN PELVIS W IV CONTRAST Additional Contrast? Radiologist Recommendation   Final Result   1. The patient is 11 days status post partial sigmoid colectomy. Surgical   drain remains in place with a small ill-defined fluid collection that is   stable from prior exam.  This does not appear to represent an abscess and may   represent postoperative hematoma or seroma. 2. There is a new collection of free intraperitoneal air immediately anterior   to the prior anastomosis site, contained within the left lower quadrant. This indicates an anastomosis leak. No distant free air. 3. 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 4. Cholelithiasis with no evidence of acute cholecystitis. 5. Worsening small pleural effusions and airspace changes in the lower chest.   Pattern may represent underlying pulmonary edema or perhaps pneumonitis. XR CHEST PORTABLE   Final Result   Interval development of mild pulmonary edema. There is a small left-sided pleural effusion with more focal consolidation   the left lung base, atelectasis versus pneumonia. IR PICC WO SQ PORT/PUMP > 5 YEARS   Final Result   Successful placement of PICC line. CT ABDOMEN PELVIS WO CONTRAST Additional Contrast? None   Final Result   No evidence retroperitoneal hematoma. Interval colonic resection. Small volume loculated collection in the left   pericolic gutter, with surgical drain terminating adjacent.       Mildly dilated loops of small enhancement is not   excluded. 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 Comparison with a noncontrast CT examination would be   helpful. Critical results were called by Dr. Sayra Johnson MD to [facility] on   3/7/2019 at 20:35. Assessment/Plan:    Active Hospital Problems    Diagnosis Date Noted    Cardiomyopathy ([address] [I42.9]     NSVT [facility]onsustained [facility]cardia[facility] [I47.2]     Other pulmonary embolism without acute cor pulmonale ([facility]) [I26.99]     Acute respiratory failure with hypoxia ([address] [J96.01]     H/O colectomy [Z90.49]     Primary malignant neoplasm of lung metastatic to other site (HCC) [C34.90]     Herpes zoster oticus [B02.21]     Hearing loss of right ear [H91.91]     Chemotherapy-induced neutropenia ([address] [D70.1, T45.1X5A]     Mucositis [K12.30]     Diverticulitis [K57.92] 03/07/2019    Diverticulitis of large intestine with perforation and abscess without bleeding [K57.20]        Acute perforated recurrent diverticulitis/pneumoperitoneum/abscess in setting of immunosuppression due to chemotherapy:   - CT A/P on admission demonstrated acute perforated colon diverticulitis with free air and fluid collection.   - General surgery consulted. Pt is status post laparoscopic converted to open left colectomy on 3/14/19.  -New onset LLQ pain with diarrhea yesterday. CT abdomen and pelvis with contrast 3/25 revealed a new collection of free intraperitoneal air immediately anterior to the prior anastomosis site contained within the left lower quadrant indicative of anastomosis leak. Barium enema  today revealed an interval increase in size of the left lower quadrant collection which now measures up to 7.8 cm.     - Pt completed 7 days of Merrem and Diflucan. - Continue prn pain control.      Acute hypoxic respiratory failure due to bilateral pulmonary emboli:   - CT chest 3/16 showed thrombus in the distal aspect of the left main pulmonary artery extending into the left upper and left lower lobe pulmonary arterial branches. Additional Thrombus is seen in the distal aspect of the right main pulmonary artery,extending into the right upper right middle and right lower lobe pulmonary arterial branches. - Continue heparin gtt started 3/16.  -Will defer to vascular for Integrillin drip management  - Wean supplemental O2 as tolerated. He is not normally on home O2. He is currently requiring 3-4 liters HFNC.   - Lower extremity dopplers +DVT. Vascular surgery consulted, no indication for IVC filter at this time. - Pulmonary consulted/following.     Metastatic lung cancer, stage 4 with bone mets:  - Hem/onc is following. Chemo on hold to allow for surgical healing.      Thrombocytopenia (resolved) now with hypercoagulable state:  - Platelets required preoperatively. Pt currently on heparin gtt.     Neutropenia due to chemotherapy (resolved):  - S/p Neulasta on 3/1. S/p Granix on 3/8.      Anemia:  -  Pt is s/p 1 unit PBRC for Hgb 6.6 on 3/19 and 1 unit for Hgb 7.2 on 3/22. No overt signs of bleeding. CT A/P showed no RP bleed. PPI added per Hem/Onc. Pt is s/p B12 injection. Stool for occult blood was +. GI consulted, s/p EGD on 3/21 which was normal. Monitor daily CBC. H&H is stable.      PAD/CAD/HTN/Cardiomyopathy:   - Defer antiplatelet management to hem/onc given the complexity of his present situation, bleeding risk and thrombotic states. Pt was restarted on integrillin infusion on 3/20 per Hem/Onc. D/C 3/24. - ECHO showed reduced EF of 30-35%. Previous ECHO showed EF 40-45%. - Cardiology is following. Continue on aspirin, lisinopril, lasix, aldactone, Toprol and statin. - Cards recommending LHC given drop in EF but would like to wait 4-6 weeks post PE on AC.      NSVT:   - Known history of VT s/p ICD. Went into VT on 3/12, PMD went off. Continue amiodarone.  Keep K > 4 and Mag > 2.     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 Nephrology following. Monitor daily BMP.      Hypokalemia/hypomagnesemia:  - Monitor and replete as needed.      Zoster oticus:  - Completed 7 days of acyclovir.     DVT Prophylaxis: Pt is on heparin infusion   Diet: Dietary Nutrition Supplements: Other Supplement (ensure)  DIET LOW FIBER;  Code Status: Full Code     PT/OT Eval Status:  Ordered with recs for [address] - 2-4 days, discussed disposition with pt's wife. Currently she is in favor of pt going home if S4 program would be available.  Pt will most likely go home with supplemental O2.      Janeen Morales, APRN - CNP

## 2019-03-28 NOTE — CONSULTS
Ostomy Referral Progress Note      [facility]:  [id number]  AGE: 69 y.o. GENDER:  male  :  1945  TODAY'S DATE:  3/28/2019    Subjective     Ck Doe is a 69 y.o. male referred by:   [] Physician  [] Nursing  [] Other:     Hx: Diverticulitis with prior Left Colectomy (3/14/19) with Anastomotic Leak. Patient with new colostomy with Gerda's Procedure 3/27/19 by Dr. Solitario Stuart. Stoma size: Not measured  Flange size: 2 3/4 inch. Using two piece 2 3/4 inch with flat flange # [id number] and lock and roll bag # [id number]. PAST MEDICAL HISTORY:        Diagnosis Date    Benign neoplasm of colon     Cancer [facility])     lung cancer with mets with spine     Diverticulosis of colon (without mention of hemorrhage)     Elevated fasting glucose     Former cigarette smoker     Herpes simplex viral infection 2019    orolabial    Hyperlipidemia     Hypertension     MI, old     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     right    Right popliteal artery occlusion ([facility] 2010       MEDICATIONS:    No current facility-administered medications on file prior to encounter.       Current Outpatient Medications on File Prior to Encounter   Medication Sig Dispense Refill    amiodarone (CORDARONE) 200 MG tablet Take 200 mg by mouth daily      atenolol (TENORMIN) 100 MG tablet Take 200 mg by mouth daily Verified with [facility]      vitamin D3 (CHOLECALCIFEROL) 400 units TABS tablet Take 400 Units by mouth daily      prochlorperazine (COMPAZINE) 25 MG suppository Place 25 mg rectally every 12 hours as needed for Nausea      ondansetron (ZOFRAN) 8 MG tablet 2019Ondansetron 8 MG Oral Tablet [Zofran] orally1.0 tabletevery 8 hours2019Active      lisinopril (PRINIVIL;ZESTRIL) 10 MG tablet Take 10 mg by mouth      amLODIPine (NORVASC) 5 MG tablet Take 1 tablet by mouth daily 30 tablet 3    simvastatin (ZOCOR) 40 MG tablet TAKE 1 TABLET BY MOUTH NIGHTLY 90 tablet 1    famotidine (PEPCID) 20 MG tablet Take 20 mg by mouth 2 times daily.  aspirin 81 MG EC tablet Take 81 mg by mouth daily.  clopidogrel (PLAVIX) 75 MG tablet Take 75 mg by mouth daily.  Multiple Vitamin (MULTIVITAMIN PO) Take  by mouth.  folic acid (FOLVITE) 1 MG tablet Take 1 tablet by mouth daily 30 tablet 3    Psyllium (METAMUCIL) WAFR Take 6 tablets by mouth 2 times daily.          ALLERGIES:    No Known Allergies    PAST SURGICAL HISTORY:    Past Surgical History:   Procedure Laterality Date    ARTERIAL ANEURYSM REPAIR  11/2010    right popliteal artery aneurysm repair    BRONCHOSCOPY  01/18/2019    left endobronchial tumor    BRONCHOSCOPY N/A 1/18/2019    BRONCHOSCOPY ALVEOLAR LAVAGE performed by Simran Quiroz MD at [address] [place] [address]  1/18/2019    BRONCHOSCOPY/TRANSBRONCHIAL LUNG BIOPSY performed by Simran Quiroz MD at [address] [place] [address] N/A 1/23/2019    EBUS ULTRASOUND W/ ANESTHESIA AND NEEDLE BIOPSY performed by Lynne Bonds MD at [address] [place] [address]  1/23/2019    BRONCHOSCOPY ALVEOLAR LAVAGE performed by Lynne Bonds MD at [address] [place] [address]  1/23/2019    BRONCHOSCOPY BIOPSY BRONCHUS performed by Lynne Bonds MD at [address] [place] [address]  1/23/2019    BRONCHOSCOPY BRUSHINGS performed by Lynne Bonds MD at [address] [place] [address]  1/23/2019    BRONCHOSCOPY/TRANSBRONCHIAL NEEDLE BIOPSY performed by Lynne Bonsd MD at [address] [place] [address]  1/23/2019    BRONCHOSCOPY/TRANSBRONCHIAL NEEDLE BIOPSY ADDL LOBE performed by Lynne Bonds MD at [place] [address]  10/08/01 12/20/04    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 NG in place. On PCA for pain. /74   Pulse 104   Temp 98.2 °F (36.8 °C) (Oral)   Resp 20   Ht 5' 9\" (1.753 m)   Wt 143 lb 8.3 oz (65.1 kg)   SpO2 91%   BMI 21.19 kg/m²     Jared Risk Score Jared Scale Score: 17    Assessment  Stoma swollen pink and moist.  Current bag in place and with secure seal.  ABD dressing dry and intact. DIAN in place to bulb suction. Surgeon Lois Cisneros    Colostomy      Colostomy LUQ Descending/sigmoid (Active)   Stomal Appliance 2 piece 3/28/2019 10:41 AM   Flange Size (inches) 2.75 Inches 3/28/2019 10:41 AM   Stoma  Assessment Pink;Moist;Swelling 3/28/2019 10:41 AM   Mucocutaneous Junction Intact 3/28/2019  9:15 AM   Peristomal Assessment JOY 3/28/2019 10:41 AM   Stool Appearance Watery 3/28/2019 10:41 AM   Stool Color Red 3/28/2019 10:41 AM   Stool Amount Small 3/28/2019 10:41 AM   Output (mL) 0 ml 3/27/2019  8:40 PM   Number of days: 0       Intake/Output Summary (Last 24 hours) at 3/28/2019 1042  Last data filed at 3/28/2019 0515  Gross per 24 hour   Intake 3121.61 ml   Output 2900 ml   Net 221.61 ml       Plan   Plan for Ostomy Care:  Spouse Keyla Wilde) and daughter Aldair Montiel) at bed side. Patient awake but drowsy. Teaching folder and contents reviewed with family. Plan to change appliance tomorrow. Wife is willing to learn ostomy care. Reviewed basic anatomy and ostomy care. Questions answered. Supplies left in room. Empty and record colostomy bag when 1/3 to 1/2 full. Change appliance 1-2 times a week for every 3-5 days. Using two piece 2 3/4 inch with flat flange # [id number] and lock and roll bag # [id number]. Ostomy Plan of Care  [x] Supplies/Instructions left in room 2 3/4 bag and flange.   [] Patient using home supplies  [x] Brand/supplies at bedside [employer]    Current Diet: Diet NPO Effective Now Exceptions are: Sips with Meds  Dietician consult:  No    Discharge Plan:  Placement for patient upon discharge: Unknown at this

## 2019-03-28 NOTE — PROGRESS NOTES
[address]   Daily Progress Note    Admit Date:  3/7/2019  HPI:    Chief Complaint   Patient presents with    Abdominal Pain     LLQ pain, was seen last week for Diverticulitis, patient states he cannot get rid of the pain, had Chemo on Friday         Interval history: Elle Montemayor is being followed for NSVT. Previously followed  with [facility] cardiology  Hx of Ischemic cardiomyopathy, NSVT s/p ICD and on amiodarone, CAD, PAD, HTN. Hx of stage 4 lung cancer with mets. Admitted with perforated bowel s/p open left colectomy 3/14/19. Also with bilateral PE    Subjective:  Mr. Ce Higginbotham denies any chest pain. Breathing is stable. Main complaint is abd pain.      Objective:   /74   Pulse 104   Temp 98.2 °F (36.8 °C) (Oral)   Resp 20   Ht 5' 9\" (1.753 m)   Wt 143 lb 8.3 oz (65.1 kg)   SpO2 91%   BMI 21.19 kg/m²       Intake/Output Summary (Last 24 hours) at 3/28/2019 1000  Last data filed at 3/28/2019 0515  Gross per 24 hour   Intake 3121.61 ml   Output 2900 ml   Net 221.61 ml       NYHA: IV  Tele: SR/ST with 1st deg avb, LBBB  Physical Exam:  General:  Awake, alert, NAD  Skin:  Warm and dry  Neck:  JVD<8  Chest:  Dim to auscultation, no wheezes/rhonchi/rales  Cardiovascular:  RRR S1S2, no m/r/g  Abdomen:  Soft, tender, midline dressing, + colostomy, + patricio drain  Extremities:  No BLE edema    Medications:    meropenem  1 g Intravenous Q8H    amiodarone  200 mg Oral Daily    pantoprazole  40 mg Intravenous Daily    aspirin  81 mg Oral Daily    metoprolol succinate  100 mg Oral Daily    sodium chloride flush  10 mL Intravenous 2 times per day    simvastatin  40 mg Oral Nightly    folic acid  1 mg Oral Daily    oyster shell calcium/vitamin D  1 tablet Oral Daily    silver sulfADIAZINE   Topical Daily      sodium chloride 75 mL/hr at 03/28/19 0919    HYDROmorphone      dextrose 100 mL/hr (03/08/19 1700)       Lab Data:  CBC:   Recent Labs     03/26/19  0440 03/27/19  0645 right ear    Acute respiratory failure with hypoxia (HCC)    H/O colectomy    Primary malignant neoplasm of lung metastatic to other site [facility])    NSVT (nonsustained ventricular tachycardia) ([address]    Other pulmonary embolism without acute cor pulmonale (HCC)    Cardiomyopathy (HCC)  Resolved Problems:    * No resolved hospital problems. *      Assessment/Plan:   NSVT              - Known hx of VT s/p ICD, on AMio     PE 3/16/2019              - RV is normal     CAD              - s/p PCI to RCA 6/2011              - hx of PCi to LAD (post endograft AAA repair MI)1/2011  Ischemic cardiomyopathy: LVEF 30-35%  HTN  Hx of aortic aneurysm repair              - Per Dr. Zaria Watkins, on ASA/plavix for this   Hx of sigmoid colectomy for perf diverticulosis 3/14/2019              - Laparoscopic converted to Open left Colectomy   Metastatic lung CA, stage 4 with bone mets    Plan:  Agree with IVF's today, monitor for volume overload  Repeat potassium level this afternoon  Metoprolol held this am due to NPO; decrease dose and restart tomorrow if able to take in PO.   Holding Lisinopril due to MAHSA    Alberto Boateng CNP, 3/28/2019, 10:00 AM

## 2019-03-28 NOTE — PROGRESS NOTES
Wasted 7ml of dilaudid with Brandon VELOZ from PCA pump while replacing with new syringe of dilaudid.

## 2019-03-28 NOTE — PROGRESS NOTES
[facility]    [facility]              [phone]                 Plan :     Sodium is stable  On fluids  Potassium high- likely due to supplement and MAHSA  Hold potassium supplement  Repeat potassium later today  Decrease fluids because of CHF and positive balance  May need to decrease metoprolol ( but has Afib) if BP continues to be low    Assessment :     Hyponatremia  Acute on chronic  Down to 123- now 137  Suspect SIADH from ca lung, pain, and nausea  Ca  lung( adenocarcinoma) with vertebral mets  Sodium low side since 2/19    Cortisol- normal, TSH - 1.65, LFT normal  Echo 2/12- EF of 45-50%, AL mildly dilated    Generalized Edema  High O2 requirement  +ve 7.3 L since admission( peak 14 L +ve)  Echo: 3/19- EF of 30-35%, mild LVH, LV severely dilated  On lasix iv for several days, now on po    Sob also due to lung mass and PE    Acute Kidney Injury  Cr peaked to 1.6- now 0.6- now going upto 1.2 with surgery- expect improvement     Now BP better  Surgery done  On 3/14/19    Hypotension  BP: (102)/(63) Pulse:  [103]   Normally hypertensive  BP down post surgery  Now coming up    Hypokalemia  Now high K  Due to MAHSA and also replacement  Hold potassium        pancytopenia  Ca lung  Diverticulitis with perforation- sp surgery, chato-colectomy, also revision surgery      Done on 3/27/19  Radiation dermatitis  Has had acyclovir IV   Mucositis  AAA      [facility] would like to thank DEBRA Salgado   for opportunity to serve this patient      Please call with questions at-   24 Hrs Answering service [phone] or  7 am- 5 pm via Perfect serve or cell phone  Dr. Jg Wang for consult :     hyponatremia     HPI :     From consult note-     Kosta Aviles is a 69 y.o. male presented to   the hospital on 3/7/2019 with left lower quadrant pain. He has that for a week. Was hospitalized with diverticulitis, DCed,  And pain never went away. Also low grade fever.   No - no  Cardiovascular:  Ausculation- No M/R/G, Edema no  Abdomen: visible mass- no, distention- no, scar- no, tenderness- no                            hepatosplenomegaly-  no  Musculoskeletal:  clubbing no,cyanosis- no , digital ischemia- no                           muscle strength- grossly normal , tone - grossly normal  Skin: rashes- no , ulcers- no, induration- no, tightening - no  Psychiatric:  Judgement and insight- normal           AAO X 3    Has nguyen- made 2.3 L urine yesterday     LABS:     Recent Labs     03/26/19  0440 03/27/19  0645 03/28/19  0435   WBC 7.4 7.1 16.9*   HGB 8.1* 7.3* 8.9*   HCT 23.8* 22.4* 27.7*    174 166     Recent Labs     03/26/19 0440 03/27/19  0645 03/28/19  0435   * 144 137   K 4.0 3.5 5.4*  5.4*   CL 99 109 103   CO2 23 22 20*   BUN 7 6* 16   CREATININE 0.6* <0.5* 1.2   GLUCOSE 125* 71 143*   MG  --  1.50*  --    PHOS  --  1.1*  --

## 2019-03-28 NOTE — CARE COORDINATION
Sw reviewed chart on this day and notes that pt has a new colostomy. Pt is also currently on 6 L of o2. A referral has been made to [facility] and they are following for needs at discharge. Sw will continue to follow for any potential needs.

## 2019-03-28 NOTE — ONCOLOGY
ONCOLOGY HEMATOLOGY CARE PROGRESS NOTE      SUBJECTIVE:     Afebrile. On 6 LPM by high flow NC.  NPO. Ostomy is empty. ROS:   The remaining 10 point review of symptoms is unremarkable. OBJECTIVE        Physical    VITALS:  /63   Pulse 103   Temp 98.3 °F (36.8 °C) (Oral)   Resp 20   Ht 5' 9\" (1.753 m)   Wt 143 lb 8.3 oz (65.1 kg)   SpO2 93%   BMI 21.19 kg/m²   TEMPERATURE:  Current - Temp: 98.3 °F (36.8 °C); Max - Temp  Av.3 °F (36.8 °C)  Min: 96.8 °F (36 °C)  Max: 99.8 °F (37.7 °C)  PULSE OXIMETRY RANGE: SpO2  Av %  Min: 81 %  Max: 100 %  24HR INTAKE/OUTPUT:      Intake/Output Summary (Last 24 hours) at 3/28/2019 8885  Last data filed at 3/28/2019 0515  Gross per 24 hour   Intake 3121.61 ml   Output 2900 ml   Net 221.61 ml       CONSTITUTIONAL:  awake, alert, cooperative, no apparent distress, HEENT oral pharynx , no scleral icterus, wearing high flow nasal canula   HEMATOLOGIC/LYMPHATICS:  no cervical lymphadenopathy, no supraclavicular lymphadenopathy, no axillary lymphadenopathy and no inguinal lymphadenopathy  LUNGS:  No increased work of breathing, good air exchange, clear to auscultation bilaterally, no crackles or wheezing  CARDIOVASCULAR:  , regular rate and rhythm, normal S1 and S2, no S3 or S4, and no murmur noted  ABDOMEN: Absent BS.  + colostomy (empty). Back: no bruising, purpura, or hematomas noted   MUSCULOSKELETAL:  There is no redness, warmth, or swelling of the joints. EXTREMETIES: BLE edema +1, non pitting   NEUROLOGIC:  Awake, alert, oriented to name, place and time. Cranial nerves II-XII are grossly intact. Motor is 5 out of 5 bilaterally.  Hip flexion 5/5 bilaterally in the bed     Data      Recent Labs     19  0440 19  0645 19  0435   WBC 7.4 7.1 16.9*   HGB 8.1* 7.3* 8.9*   HCT 23.8* 22.4* 27.7*    174 166   MCV 88.1 89.5 89.5        Recent Labs     19  0440 19  0645 19  0435 multiple comorbid conditions that must be managed and present a challenge to wound healing/increase the chances of complications.     Jada Pavon MD

## 2019-03-28 NOTE — PROGRESS NOTES
[facility] GENERAL SURGERY    Surgery Progress Note           POD # 14/1    PATIENT NAME: Ziggy Zhao     TODAY'S DATE: 3/28/2019    INTERVAL HISTORY:    Pt with pain controlled. OBJECTIVE:   VITALS:  BP 94/68   Pulse 108   Temp 98 °F (36.7 °C) (Oral)   Resp 18   Ht 5' 9\" (1.753 m)   Wt 143 lb 8.3 oz (65.1 kg)   SpO2 91%   BMI 21.19 kg/m²     INTAKE/OUTPUT:    I/O last 3 completed shifts: In: 3121.6 [I.V.:3121.6]  Out: 5 [Urine:2300; Emesis/NG output:100; Drains:350; Blood:150]  I/O this shift:  In: 100 [IV Piggyback:100]  Out: -               CONSTITUTIONAL:  awake and alert  ABDOMEN:   hypoactive bowel sounds, soft, non-distended, tender, ostomy edematous, patricio serosanguinous   INCISION: dry    Data:  CBC:   Recent Labs     03/26/19  0440 03/27/19  0645 03/28/19  0435   WBC 7.4 7.1 16.9*   HGB 8.1* 7.3* 8.9*   HCT 23.8* 22.4* 27.7*    174 166     BMP:    Recent Labs     03/26/19  0440 03/27/19  0645 03/28/19  0435   * 144 137   K 4.0 3.5 5.4*  5.4*   CL 99 109 103   CO2 23 22 20*   BUN 7 6* 16   CREATININE 0.6* <0.5* 1.2   GLUCOSE 125* 71 143*     Hepatic: No results for input(s): AST, ALT, ALB, BILITOT, ALKPHOS in the last 72 hours. Mag:      Recent Labs     03/27/19  0645   MG 1.50*      Phos:     Recent Labs     03/27/19  0645   PHOS 1.1*      INR: No results for input(s): INR in the last 72 hours. Radiology Review:  NA    ASSESSMENT AND PLAN:  69 y.o. male status post left colectomy, takeback for Gerda's for anastomotic leak  1. Continue NG to LCWS  2. Increase IVF rate to 100 and monitor uop  3. IV abx and PCA continued  4.   Laurent to monitor ins and outs         Electronically signed by Jadyn Mitchell MD

## 2019-03-28 NOTE — PROGRESS NOTES
Nutrition Assessment (Parenteral Nutrition)    Type and Reason for Visit: Reassess    Nutrition Recommendations:   Consider Parenteral nutrition with history of poor intake and significant weight loss this admission to supplement oral diet as progressed  To meet nutritional needs recommend Clinimix 5/20 E start at 42 ml/hr for first bag then increase to goal of 83 ml/hr  High risk for refeeding syndrome, closely monitor electrolyte trends as nutrition provided and progressed  Monitor oral diet progression and swallow function once speech evaluation completed    Nutrition Assessment: Follow up:  Nutritional status with significant decline AEB eating less than 25% several days this admission, multiple interruptions in nutrition delivery with current GI condition, and increased nutrient needs. Status post Gerda's procedure on 3/27/19. NPO at this time. Swallow evaluation pending. Consider parenteral nutrition to supplement     Malnutrition Assessment:  · Malnutrition Status: Meets the criteria for severe malnutrition  · Context: Acute illness or injury  · Findings of the 6 clinical characteristics of malnutrition (Minimum of 2 out of 6 clinical characteristics is required to make the diagnosis of moderate or severe Protein Calorie Malnutrition based on AND/ASPEN Guidelines):  1. Energy Intake-Less than or equal to 75% of estimated energy requirement, Greater than or equal to 1 month    2. Weight Loss-20% loss or greater, in 1 month  3. Fat Loss-Unable to assess,    4. Muscle Loss-Unable to assess, Thigh (quadriceps), Clavicles (pectoralis and deltoids)  5. Fluid Accumulation-No significant fluid accumulation,    6.   Strength-Not measured    Nutrition Risk Level: High    Nutrient Needs:  · Estimated Daily Total Kcal: 8132-8217  · Estimated Daily Protein (g): 109-131  · Estimated Daily Total Fluid (ml/day): 1 mL/kcal     Nutrition Diagnosis:   · Problem: Severe malnutrition  · Etiology: related to Insufficient energy/nutrient consumption, Alteration in GI function, Increased demand for energy/nutrients     Signs and symptoms:  as evidenced by Intake 0-25%, Intake 25-50%, Weight loss    Objective Information:  · Nutrition-Focused Physical Findings: BM x 2 on 3/27  · Wound Type: Surgical Wound(redness; scabs, wound care seeing patient)  · Current Nutrition Therapies:  · Oral Diet Orders: NPO   · Oral Diet intake: 51-75%, 26-50%, 1-25%  · Oral Nutrition Supplement (ONS) Orders: None  · ONS intake: Unable to assess  · Parenteral Nutrition Orders:  · Goal PN Orders Provides: Recommend Clinimix 5/20 E at goal rate of 83 ml/hr with lipids providing 1912 calories, 100 grams of protein, total volume 1992 ml, GIR 4 mg/kg/min  · Additional Calories:    · Anthropometric Measures:  · Ht: 5' 9\" (175.3 cm)   · Current Body Wt: 143 lb 8 oz (65.1 kg)  · Admission Body Wt:    · Usual Body Wt: 194 lb (88 kg)(in january )  · % Weight Change:  ,  -12 lbs (6.2% wt loss) x past 2 months.    · Ideal Body Wt: 160 lb (72.6 kg), % Ideal Body    · BMI Classification: BMI 25.0 - 29.9 Overweight    Nutrition Interventions:   Start Parenteral Nutrition(po diet when medically appropriate)  Continued Inpatient Monitoring    Nutrition Evaluation:   · Evaluation: No progress toward goals   · Goals: Pt will tolerate diet advancement per General surgery    · Monitoring: Nutrition Progression, Meal Intake, Supplement Intake, Diet Tolerance, Weight, Pertinent Labs      Electronically signed by Harmony Elizabeth RD, ALTON on 3/28/19 at 11:28 AM    Contact Number: Office: [phone]; [address]: [phone]

## 2019-03-29 NOTE — PROGRESS NOTES
and reactive to light. Conjunctivae/corneas clear. Vesicles in outer ear canal noted   Neck: Supple, with full range of motion. No jugular venous distention. Trachea midline. Respiratory:  Normal respiratory effort. Diminished to auscultation, bilaterally without Rales/Wheezes/Rhonchi. Cardiovascular: regular rate and rhythm with normal S1/S2 without murmurs, rubs or gallops. Abdomen: Soft, non-tender, incision site C/D/I, DIAN drain in place. Musculoskeletal: No clubbing, cyanosis or edema bilaterally. Full range of motion without deformity. Skin: Skin color, texture, turgor normal.  Radiation changes to back. Vesicles under lower lip, cheek. Mouth mucosa improving. Excoriation to coccyx. Neurologic:  Neurovascularly intact without any focal sensory/motor deficits. Cranial nerves: II-XII intact, grossly non-focal.  Psychiatric: Alert and oriented, thought content appropriate, normal insight  Capillary Refill: Brisk,< 3 seconds   Peripheral Pulses: +2 palpable, equal bilaterally        Labs:   Recent Labs     03/27/19  0645 03/28/19  0435 03/29/19  0500   WBC 7.1 16.9* 12.9*   HGB 7.3* 8.9* 7.7*   HCT 22.4* 27.7* 24.0*    166 147     Recent Labs     03/27/19  0645 03/28/19  0435 03/29/19  0500    137 142   K 3.5 5.4*  5.4* 4.4    103 107   CO2 22 20* 23   BUN 6* 16 17   CREATININE <0.5* 1.2 0.7*   CALCIUM 6.7* 7.5* 7.8*   PHOS 1.1*  --   --      No results for input(s): AST, ALT, BILIDIR, BILITOT, ALKPHOS in the last 72 hours. No results for input(s): INR in the last 72 hours. No results for input(s): Clarance Barnett in the last 72 hours.     Urinalysis:      Lab Results   Component Value Date    NITRU Negative 03/10/2019    WBCUA 3-5 03/10/2019    RBCUA 0-2 03/10/2019    BLOODU Negative 03/10/2019    SPECGRAV 1.025 03/10/2019    GLUCOSEU Negative 03/10/2019       Radiology:  CT ABDOMEN PELVIS WO CONTRAST Additional Contrast? None   Final Result   Interval increase in size of the left colonic resection. Small volume loculated collection in the left   pericolic gutter, with surgical drain terminating adjacent. Mildly dilated loops of small bowel with scattered air-fluid levels, most   likely postoperative ileus. Trace pleural effusions and bibasilar airspace disease representing   atelectasis or pneumonia. VL Extremity Venous Bilateral   Final Result      CT CHEST PULMONARY EMBOLISM W CONTRAST   Final Result   Bilateral pulmonary emboli      Scattered opacities throughout the lungs, greatest at the lung bases, with   tiny pleural effusions. Changes are increased compared to prior      Sclerotic osseous metastatic disease as described previously      The findings were sent to the Radiology Results [address] at 3:57   pm on 3/16/2019to be communicated to a licensed caregiver. XR CHEST 1 VW   Final Result   Shallow inflation with findings consistent with basilar atelectasis. Left   pleural thickening versus trace effusion. CT CHEST ABDOMEN PELVIS WO CONTRAST   Final Result   Perforated sigmoid diverticulitis and pneumoperitoneum unchanged. Stable   left Emerita colonic air-fluid collection may represent an intramural or   extramural abscess or giant diverticulum. Infrarenal abdominal aortic aneurysm and aorto bi-iliac stent. Other incidental findings as above. CT ABDOMEN PELVIS W IV CONTRAST Additional Contrast? None   Final Result   Acute perforated descending colon diverticulitis. There is free air in the   abdomen and there is a small extraluminal gas and fluid collection/abscess   adjacent to the descending colon measuring 4.1 cm in AP diameter and 6 cm in   length. There is diverticulosis of the entire colon. Cholelithiasis. 4.7 cm infrarenal abdominal aortic aneurysm status post endograft repair with   a patent aorto bi-iliac graft.   There is suspicion of an endoleak with blush   of contrast enhancement suspected in the inferior aspect of the native   aneurysm. As a noncontrast study was not performed, calcification of the   intraluminal thrombus accounting for the apparent enhancement is not   excluded. 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 Comparison with a noncontrast CT examination would be   helpful. Critical results were called by Dr. Jennifer Johnson MD to [facility] on   3/7/2019 at 20:35. Assessment/Plan:    Active Hospital Problems    Diagnosis Date Noted    Cardiomyopathy ([address] [I42.9]     NSVT (nonsustained ventricular tachycardia) ([facility]) [I47.2]     Other pulmonary embolism without acute cor pulmonale (HCC) [I26.99]     Acute respiratory failure with hypoxia ([address] [J96.01]     H/O colectomy [Z90.49]     Primary malignant neoplasm of lung metastatic to other site (HCC) [C34.90]     Herpes zoster oticus [B02.21]     Hearing loss of right ear [H91.91]     Chemotherapy-induced neutropenia ([address] [D70.1, T45.1X5A]     Mucositis [K12.30]     Diverticulitis [K57.92] 03/07/2019    Diverticulitis of large intestine with perforation and abscess without bleeding [K57.20]        Acute perforated recurrent diverticulitis/pneumoperitoneum/abscess in setting of immunosuppression due to chemotherapy:   - CT A/P on admission demonstrated acute perforated colon diverticulitis with free air and fluid collection.   - General surgery consulted. Pt is status post laparoscopic converted to open left colectomy on 3/14/19.  -New onset LLQ pain with diarrhea yesterday. CT abdomen and pelvis with contrast 3/25 revealed a new collection of free intraperitoneal air immediately anterior to the prior anastomosis site contained within the left lower quadrant indicative of anastomosis leak. Barium enema  today revealed an interval increase in size of the left lower quadrant collection which now measures up to 7.8 cm.     - Pt completed 7 days of Merrem and Diflucan.   - Continue prn pain control. Acute hypoxic respiratory failure due to bilateral pulmonary emboli:   - CT chest 3/16 showed thrombus in the distal aspect of the left main pulmonary artery extending into the left upper and left lower lobe pulmonary arterial branches. Additional Thrombus is seen in the distal aspect of the right main pulmonary artery,extending into the right upper right middle and right lower lobe pulmonary arterial branches. - Continue heparin gtt started 3/16.  -Will defer to vascular for Integrillin drip management  - Wean supplemental O2 as tolerated. He is not normally on home O2. He is currently requiring 3-4 liters HFNC.   - Lower extremity dopplers +DVT. Vascular surgery consulted, no indication for IVC filter at this time. - Pulmonary consulted/following.     Metastatic lung cancer, stage 4 with bone mets:  - Hem/onc is following. Chemo on hold to allow for surgical healing.      Thrombocytopenia (resolved) now with hypercoagulable state:  - Platelets required preoperatively. Pt currently off heparin gtt.     Neutropenia due to chemotherapy (resolved):  - S/p Neulasta on 3/1. S/p Granix on 3/8.      Anemia:  -  Pt is s/p 1 unit PBRC for Hgb 6.6 on 3/19 and 1 unit for Hgb 7.2 on 3/22. No overt signs of bleeding. CT A/P showed no RP bleed. PPI added per Hem/Onc. Pt is s/p B12 injection. Stool for occult blood was +. GI consulted, s/p EGD on 3/21 which was normal. Monitor daily CBC. H&H is stable.      PAD/CAD/HTN/Cardiomyopathy:   - Defer antiplatelet management to hem/onc given the complexity of his present situation, bleeding risk and thrombotic states. Pt was restarted on integrillin infusion on 3/20 per Hem/Onc. D/C 3/24. - ECHO showed reduced EF of 30-35%. Previous ECHO showed EF 40-45%. - Cardiology is following. Continue on aspirin, lisinopril, lasix, aldactone, Toprol and statin.    - Cards recommending LHC given drop in EF but would like to wait 4-6 weeks post PE on [facility].      NSVT: - Known history of VT s/p ICD. Went into VT on 3/12, PMD went off. Continue amiodarone. Keep K > 4 and Mag > 2.     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 Nephrology following. Monitor daily BMP.      Hypokalemia/hypomagnesemia:  - Monitor and replete as needed.      Zoster oticus:  - Completed 7 days of acyclovir.     DVT Prophylaxis: None, high risk for stenosing pop stent- PE POD #2 (original surgery) Unclear who will manage resuming AC/AP. Discussed with Dr Giovany Reynaga (vascular) and Heme/ONC. Will discuss when to resume from a surgical perspective with Surgery.       Diet: Dietary Nutrition Supplements: Other Supplement (ensure)  DIET LOW FIBER;  Code Status: Full Code     PT/OT Eval Status:  Ordered with recs for [address] - uncertain     Mauri Shearer, DEBRA - CNP

## 2019-03-29 NOTE — PLAN OF CARE
Problem: Infection:  Goal: Will remain free from infection  Description  Will remain free from infection  Outcome: Ongoing     Problem: Safety:  Goal: Free from accidental physical injury  Description  Free from accidental physical injury  Outcome: Ongoing     Problem: Daily Care:  Goal: Daily care needs are met  Description  Daily care needs are met  Outcome: Ongoing     Problem: Nutrition  Goal: Optimal nutrition therapy  3/28/2019 1135 by Stoney Matthews RD, LD  Outcome: Ongoing

## 2019-03-29 NOTE — PROGRESS NOTES
Ostomy Referral Follow-up Progress Note      NAME:  Melissa Chapa  MEDICAL RECORD NUMBER:  [id number]  AGE: 69 y.o. GENDER:  male  :  1945  TODAY'S DATE:  3/29/2019    Subjective:  I am enjoying the popscicle. Dr Ivelisse Lagos was just here and OK'ed clamping my NG tube. Melissa Chapa is a 69 y.o. male referred by:   [x] Physician  [] Nursing  [] Other:     Hx: Diverticulitis with prior Left Colectomy (3/14/19) with Anastomotic Leak. Patient with new colostomy with Gerda's Procedure 3/27/19 by Dr. Gibson.     Stoma size: 2 inch = 51 mm  Flange size: 2 3/4 inch. Using two piece 2 3/4 inch with flat flange # [id number] and lock and roll bag # [id number]. Objective  Lying in bed. NG to suction. Still on PCA. /65   Pulse 114   Temp 98.3 °F (36.8 °C) (Axillary)   Resp 15   Ht 5' 9\" (1.753 m)   Wt 141 lb 15.6 oz (64.4 kg)   SpO2 94%   BMI 20.97 kg/m²     Jared Risk Score Jared Scale Score: 16    Assessment  Stoma red moist and swollen. Emerita stomal juncture and skin intact. Draining light red fluid. Surgeon  Ivelisse Lagos    Colostomy      Colostomy LUQ Descending/sigmoid (Active)   Stomal Appliance 2 piece 3/29/2019  9:52 AM   Flange Size (inches) 2.75 Inches 3/29/2019  9:52 AM   Stoma  Assessment Moist;Red;Protrudes 3/29/2019  9:52 AM   Mucocutaneous Junction Intact 3/29/2019  9:52 AM   Peristomal Assessment Clean; Intact 3/29/2019  9:52 AM   Treatment Bag change;Site care; Heat applied 3/29/2019  9:52 AM   Stool Appearance Watery 3/29/2019  9:52 AM   Stool Color Red 3/29/2019  9:52 AM   Stool Amount Small 3/29/2019  9:52 AM   Output (mL) 75 ml 3/29/2019  9:52 AM   Number of days: 1         Intake/Output Summary (Last 24 hours) at 3/29/2019 0957  Last data filed at 3/29/2019 8995  Gross per 24 hour   Intake 148.6 ml   Output 815 ml   Net -666.4 ml       Plan:   Plan for Ostomy Care:  Post op Day # 2:  Wife Caryn Potts) here. Demonstrated how to empty and clean out spout.   Reviewed how and demonstrated how to change colostomy with patient and wife. Supplies gathered. Current bag removed. Instructed on wafer management. Cleansed with warm water. Pat dry. Measured and cut flange. Applied around stoma. Bag attached. Closed devise. Wife verbalized understanding. Plan to allow wife to practice on Monday. Mid abd and DIAN dressings changed per protocol. Ostomy care to continue to follow.       Ostomy Plan of Care  [x] Supplies/Instructions left in room Flange, bags, wipes, paste, powder ordered from central.  [] Patient using home supplies  [x] Brand/supplies at bedside  Marvel    Current Diet: Diet NPO Effective Now Exceptions are: Sips with Meds, Popsicles  Dietician consult:  N/A    Discharge Plan:  Placement for patient upon discharge: skilled nursing    Outpatient visit plan No  Supplies given Yes   Samples requested No    Referrals:  []  active  [] [place]  [facility]  [] Supplies  [] Other      Patient/Caregiver Teaching:  Written Instructions given to patient/family WIfe Deon Higginbotham and patient  Teaching provided:  [] Reviewed GI and A&P        [x] Supplies  [x] Pouch emptying      [x] Manipulate closure  [x] Routine Care         [] Comment  [x] Pouch maintenance           Level of patient/caregiver understanding able to:  [x] Indicates understanding       [x] Needs reinforcement  [] Unsuccessful      [] Verbal Understanding  [] Demonstrated understanding       [] No evidence of learning  [] Refused teaching         [] N/A    Electronically signed by Gifty Deras, RN, MSN, Sonia Deal on 3/29/2019 at 9:57 AM

## 2019-03-29 NOTE — ONCOLOGY
ONCOLOGY HEMATOLOGY CARE PROGRESS NOTE      SUBJECTIVE:     Trying to wean off PCA. He would like to have the nguyen cath removed tomorrow. No family present. Ostomy is still empty. Stoma is red. ROS:   The remaining 10 point review of symptoms is unremarkable. OBJECTIVE        Physical    VITALS:  /67   Pulse 110   Temp 97.8 °F (36.6 °C) (Oral)   Resp 16   Ht 5' 9\" (1.753 m)   Wt 141 lb 15.6 oz (64.4 kg)   SpO2 94%   BMI 20.97 kg/m²   TEMPERATURE:  Current - Temp: 97.8 °F (36.6 °C); Max - Temp  Av.1 °F (36.7 °C)  Min: 97.6 °F (36.4 °C)  Max: 98.4 °F (36.9 °C)  PULSE OXIMETRY RANGE: SpO2  Av %  Min: 92 %  Max: 94 %  24HR INTAKE/OUTPUT:      Intake/Output Summary (Last 24 hours) at 3/29/2019 1356  Last data filed at 3/29/2019 1330  Gross per 24 hour   Intake 602.6 ml   Output 1333 ml   Net -730.4 ml       CONSTITUTIONAL:  awake, alert, cooperative, no apparent distress, HEENT oral pharynx , no scleral icterus, wearing high flow nasal canula   HEMATOLOGIC/LYMPHATICS:  no cervical lymphadenopathy, no supraclavicular lymphadenopathy, no axillary lymphadenopathy and no inguinal lymphadenopathy  LUNGS:  No increased work of breathing, good air exchange, clear to auscultation bilaterally, no crackles or wheezing  CARDIOVASCULAR:  , regular rate and rhythm, normal S1 and S2, no S3 or S4, and no murmur noted  ABDOMEN: Absent BS.  + colostomy (empty). Red stoma    Back: no bruising, purpura, or hematomas noted   MUSCULOSKELETAL:  There is no redness, warmth, or swelling of the joints. EXTREMETIES: BLE edema +1, non pitting   NEUROLOGIC:  Awake, alert, oriented to name, place and time. Cranial nerves II-XII are grossly intact. Motor is 5 out of 5 bilaterally.  Hip flexion 5/5 bilaterally in the bed     Data      Recent Labs     19  0645 19  0435 19  0500   WBC 7.1 16.9* 12.9*   HGB 7.3* 8.9* 7.7*   HCT 22.4* 27.7* 24.0*    166 147   MCV 89.5 89.5 89.7        Recent Labs     03/27/19  0645 03/28/19  0435 03/29/19  0500    137 142   K 3.5 5.4*  5.4* 4.4    103 107   CO2 22 20* 23   PHOS 1.1*  --   --    BUN 6* 16 17   CREATININE <0.5* 1.2 0.7*     No results for input(s): AST, ALT, ALB, BILIDIR, BILITOT, ALKPHOS in the last 72 hours.     Magnesium:    Lab Results   Component Value Date    MG 1.50 03/27/2019    MG 1.50 03/24/2019    MG 1.60 03/23/2019         Problem List  Patient Active Problem List   Diagnosis    Elevated fasting glucose    CAD (coronary artery disease)    PAD (peripheral artery disease) ([address]    Essential hypertension    Mixed hyperlipidemia    Sprain of right foot    Sprain of anterior talofibular ligament of right ankle    Closed nondisplaced fracture of fifth right metatarsal bone    Closed fracture of proximal lateral malleolus of ankle with routine healing    Right foot pain    Right ankle pain    Nondisplaced fracture of fifth right metatarsal bone with routine healing    History of pulmonary emphysema    Chronic obstructive pulmonary disease with (acute) exacerbation (HCC)    Pulmonary nodule, left    Abnormal bone scan of thoracic spine    Back pain    Visit for monitoring Plavix therapy    History of ventricular tachycardia    Gallstones    Hemoptysis    Former smoker    Elevated PSA    Adenocarcinoma of left lung ([address]    Acute diverticulitis    Pancytopenia ([address]    Diverticulitis of large intestine with perforation and abscess without bleeding    Diverticulitis    Chemotherapy-induced neutropenia (HCC)    Mucositis    Herpes zoster oticus    Hearing loss of right ear    Acute respiratory failure with hypoxia ([address]    H/O colectomy    Primary malignant neoplasm of lung metastatic to other site [facility])    NSVT (nonsustained ventricular tachycardia) ([address]    Other pulmonary embolism without acute cor pulmonale (HCC)    Cardiomyopathy (HCC)       ASSESSMENT AND PLAN    Acute Perforated descending colon diverticulitis (not immune mediated colitis) with free air in the abd   - per gen surgery , s/p Laparoscopic converted to Open left Colectomy, 3/14/2019, with Dr. James Mclean  - ATB support (Zosyn, Vera, Vanco) , changed to [address] until POD 7, now on Merrem only. - CT scan done 3/25 with anastomotic leak [place] [facility] [place]. - Status post Gerda procedure on 3/27/2019 with Dr. James Mclean. Nutrition  - May need TPN? Await trial of clear liq diet     Neutropenia secondary to chemo  - Granix stopped 3/11   - resolved     Thrombocytopenia secondary to chemo  -Resolved    PE  -Diagnosed on 3/16/2019 with bilateral PEs involving the right and left main pulm arteries.   -Also had thrombus in the right femoral, peroneal, greater saphenous, short saphenous veins and in the left peroneal veins.  -Plan Eliquis on discharge.   -Will need lifelong anticoagulation.  - Does not need a filter per Dr. Sony Mera on 3/18   -Hold on Heparin or prophylactic dose Lovenox until cleared by surgery. I have placed a call to Dr. James Mclean to see if we can start Hep drip now. Okay to start Lovenox 40 mg today 3/29 and transition to Hep drip on Gokul 3/31 if Hgb stable. Anemia, possible GI bleed on hep drip  -EGD 3/21 negative   - PPI added, now BID dosing per GI 3/21  - No transfusion today, Hgb is 7.7   - CT AP 3/21 - no signs of bleeding   - due for B12 shot - given 3/19/2019 - required every 9 weeks with this chemo. Lung CA, stage 4 bone mets   - Will hold chemo until surgically healed  - Cycle 3 carbo/alimta Clovia Monks was due on 3/22- held due to acute issues, cont to hold. Plans to resume Bhumika Aguilar only outpatient.   - CT chest/abd/pelvis, 3/10/2019, showed improvement in the left hilar soft tissue and radiation changes. No obvious new disease.  Not hospice appropriate at this time from an oncology standpoint.       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 May need to resume- hospitalist is speaking with vascular surgery for clarification. (Dr. Tano Daly)   - Resuming full dose [facility] will be difficult given surgical burden and recent leak;       ONCOLOGIC DISPOSITION:  TBD.   Per surgical team.     Susi Beauchamp, CNP   [facility]  Perfect Serve

## 2019-03-29 NOTE — PROGRESS NOTES
[facility] GENERAL SURGERY    Surgery Progress Note           POD # 15/2    PATIENT NAME: Demond Ramos     TODAY'S DATE: 3/29/2019    INTERVAL HISTORY:    Pt without any NG output recorded, no nausea. OBJECTIVE:   VITALS:  /65   Pulse 114   Temp 98.3 °F (36.8 °C) (Axillary)   Resp 18   Ht 5' 9\" (1.753 m)   Wt 141 lb 15.6 oz (64.4 kg)   SpO2 94%   BMI 20.97 kg/m²     INTAKE/OUTPUT:    I/O last 3 completed shifts: In: 148.6 [I.V.:48.6; IV Piggyback:100]  Out: 730 [Urine:650; Drains:80]  I/O this shift:  In: -   Out: 10 [Drains:10]              CONSTITUTIONAL:  fatigued and alert  ABDOMEN:   hypoactive bowel sounds, soft, non-distended, ostomy viable, patricio serosanguinous   INCISION: mild serosanguinous drainage    Data:  CBC:   Recent Labs     03/27/19  0645 03/28/19  0435 03/29/19  0500   WBC 7.1 16.9* 12.9*   HGB 7.3* 8.9* 7.7*   HCT 22.4* 27.7* 24.0*    166 147     BMP:    Recent Labs     03/27/19  0645 03/28/19  0435 03/29/19  0500    137 142   K 3.5 5.4*  5.4* 4.4    103 107   CO2 22 20* 23   BUN 6* 16 17   CREATININE <0.5* 1.2 0.7*   GLUCOSE 71 143* 92     Hepatic: No results for input(s): AST, ALT, ALB, BILITOT, ALKPHOS in the last 72 hours. Mag:      Recent Labs     03/27/19  0645   MG 1.50*      Phos:     Recent Labs     03/27/19  0645   PHOS 1.1*      INR: No results for input(s): INR in the last 72 hours. Radiology Review:  NA    ASSESSMENT AND PLAN:  69 y.o. male status post sigmoid colectomy, return to OR for Gerda's  1. Clamp ng and possible removal later today. [id number] [address] for [employer]. 2.  Out of bed to chair today  3. Renal function improved. Continue IVF  4.   Continue IV abx        Electronically signed by Pankaj Harris MD

## 2019-03-29 NOTE — PROGRESS NOTES
hyponatremia and related issues     Interval History: On NG suction  Doing better      ROS:     SOB- +Ve on exertion  Edema- none  Nausea/vomiting- on ng suction  Poor appetite-No  Confusion- no  Urinary complaints- no  Any other complaints- no  All other ROS are reviewed and are Negative           Medication:     Scheduled Meds:   enoxaparin  40 mg Subcutaneous Daily    metoprolol succinate  50 mg Oral Daily    meropenem  1 g Intravenous Q8H    amiodarone  200 mg Oral Daily    pantoprazole  40 mg Intravenous Daily    aspirin  81 mg Oral Daily    sodium chloride flush  10 mL Intravenous 2 times per day    simvastatin  40 mg Oral Nightly    folic acid  1 mg Oral Daily    oyster shell calcium/vitamin D  1 tablet Oral Daily     Continuous Infusions:   sodium chloride 100 mL/hr at 03/28/19 1613    HYDROmorphone      dextrose 100 mL/hr (03/08/19 1700)     PRN Meds:.naloxone, menthol-zinc oxide, sodium chloride flush, magic (miracle) mouthwash with nystatin, glucose, dextrose, glucagon (rDNA), dextrose, ondansetron, acetaminophen       Vitals :     Vitals:    03/29/19 1134   BP: 110/67   Pulse: 110   Resp: 16   Temp: 97.8 °F (36.6 °C)   SpO2: 94%       I & O :       Intake/Output Summary (Last 24 hours) at 3/29/2019 1452  Last data filed at 3/29/2019 1330  Gross per 24 hour   Intake 602.6 ml   Output 1333 ml   Net -730.4 ml        Physical Examination :     General appearance: Anxious- no, distressed- no, in good spirits- no  Lying in bed,on NG suction  HEENT: Lips- normal, teeth- ok , oral mucosa- moist, has skin lesions in the face  Neck : Mass- no, appears symmetrical, JVD- not visible  Respiratory: Respiratory effort-  normal, wheeze- no, crackles - no  Cardiovascular:  Ausculation- No M/R/G, Edema no  Abdomen: visible mass- no, distention- no, scar- no, tenderness- no                            hepatosplenomegaly-  no  Musculoskeletal:  clubbing no,cyanosis- no , digital ischemia- no muscle strength- grossly normal , tone - grossly normal  Skin: rashes- no , ulcers- no, induration- no, tightening - no  Psychiatric:  Judgement and insight- normal           AAO X 3    Has nguyen- made 2.3 L urine yesterday     LABS:     Recent Labs     03/27/19  0645 03/28/19  0435 03/29/19  0500   WBC 7.1 16.9* 12.9*   HGB 7.3* 8.9* 7.7*   HCT 22.4* 27.7* 24.0*    166 147     Recent Labs     03/27/19  0645 03/28/19  0435 03/29/19  0500    137 142   K 3.5 5.4*  5.4* 4.4    103 107   CO2 22 20* 23   BUN 6* 16 17   CREATININE <0.5* 1.2 0.7*   GLUCOSE 71 143* 92   MG 1.50*  --   --    PHOS 1.1*  --   --

## 2019-03-29 NOTE — PROGRESS NOTES
[facility]  Cardiology  Progress Note    Admission date:  3/7/2019    Reason for follow up visit: NSVT    HPI/CC: Lucy Hernandez is a 69 y.o. male who was admitted 3/7/2019 for LLQ pain. Treated for recurrent episode of sigmoid diverticulitis. 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 Underwent open left colectomy for perforated diverticulitis 3/14/19. Cardiology consulted for NSVT on 3/16/19. Increased shortness of breath, CT showed bilateral PEs, treated with heparin. Subjective: Denies chest pain, shortness of breath, palpitations and dizziness. Vitals:  Blood pressure 126/65, pulse 114, temperature 98.3 °F (36.8 °C), temperature source Axillary, resp. rate 15, height 5' 9\" (1.753 m), weight 141 lb 15.6 oz (64.4 kg), SpO2 94 %.   Temp  Av.1 °F (36.7 °C)  Min: 97.6 °F (36.4 °C)  Max: 98.4 °F (36.9 °C)  Pulse  Av.2  Min: 108  Max: 121  BP  Min: 94/68  Max: 133/66  SpO2  Av.6 %  Min: 91 %  Max: 94 %    24 hour I/O    Intake/Output Summary (Last 24 hours) at 3/29/2019 1004  Last data filed at 3/29/2019 2427  Gross per 24 hour   Intake 148.6 ml   Output 815 ml   Net -666.4 ml     Current Facility-Administered Medications   Medication Dose Route Frequency Provider Last Rate Last Dose    metoprolol succinate (TOPROL XL) extended release tablet 50 mg  50 mg Oral Daily DEBRA Reeves - CNP   Stopped at 19 0746    0.9 % sodium chloride infusion   Intravenous Continuous Jim Yeung  mL/hr at 19 1613      naloxone [facility]) injection 0.4 mg  0.4 mg Intravenous PRN Jim Yeung MD        HYDROmorphone (DILAUDID) 10 mg/50 mL PCA   Intravenous Continuous Jim Yeung MD        meropenem [facility]) 1 g in sodium chloride 0.9 % 100 mL IVPB (mini-bag)  1 g Intravenous Q8H Jim Yeung MD   Stopped at 19 8636    menthol-zinc oxide (CALMOSEPTINE) 0.44-20.625 % ointment   Topical BID PRN Bj Leonard Jovanna Singh MD        amiodarone (CORDARONE) tablet 200 mg  200 mg Oral Daily Vitaly Rodríguez MD   Stopped at 03/28/19 0759    pantoprazole (PROTONIX) injection 40 mg  40 mg Intravenous Daily Vitaly Rodríguez MD   40 mg at 03/29/19 6053    aspirin chewable tablet 81 mg  81 mg Oral Daily Vitaly Rodríguez MD   Stopped at 03/27/19 7385    sodium chloride flush 0.9 % injection 10 mL  10 mL Intravenous 2 times per day Vitaly Rodríguez MD   10 mL at 03/29/19 0939    sodium chloride flush 0.9 % injection 10 mL  10 mL Intravenous PRN Vitaly Rodríguez MD   10 mL at 03/21/19 1132    simvastatin (ZOCOR) tablet 40 mg  40 mg Oral Nightly Vitaly Rodríguez MD   Stopped at 03/28/19 2113    magic (miracle) mouthwash with nystatin  5 mL Swish & Spit 4x Daily PRN Vitaly Rodríguez MD   5 mL at 03/13/19 1250    glucose (GLUTOSE) 40 % oral gel 15 g  15 g Oral PRN Vitaly Rodríguez MD        dextrose 50 % solution 12.5 g  12.5 g Intravenous PRN Vitaly Rodríguez MD        glucagon (rDNA) injection 1 mg  1 mg Intramuscular PRN Vitaly Rodríguez MD        dextrose 5 % solution  100 mL/hr Intravenous PRN Vitaly Rodríguez  mL/hr at 03/08/19 1700 100 mL/hr at 68/39/10 5352    folic acid (FOLVITE) tablet 1 mg  1 mg Oral Daily Vitaly Rodríguez MD   Stopped at 03/28/19 0759    oyster shell calcium/vitamin D 250-125 MG-UNIT per tablet 250 mg  1 tablet Oral Daily Vitaly Rodríguez MD   Stopped at 03/28/19 0759    silver sulfADIAZINE (SILVADENE) 1 % cream   Topical Daily Vitaly Rodríguez MD        ondansetron [facility]) injection 4 mg  4 mg Intravenous Q6H PRN Vitaly Rodríguez MD   4 mg at 03/22/19 1716    acetaminophen (TYLENOL) tablet 650 mg  650 mg Oral Q4H PRN Vitaly Rodríguez MD   650 mg at 03/24/19 1031     Review of Systems   Constitutional: Negative. Respiratory: Negative. Cardiovascular: Negative. Gastrointestinal: Positive for abdominal pain and nausea. Neurological: Negative. Objective:     Telemetry monitor: SR-ST BBB    Physical Exam:  Constitutional:  Alert, NAD, appears stated age  Eyes: PERRL, sclera nonicteric  Neck:  Supple, no masses, no thyroidmegaly, no JVD  Skin:  Warm and dry; no rash or lesions  Heart:  Regular, tachycardic, normal apex, S1 and S2 normal, no M/G/R  Lungs:  Normal respiratory effort; diminished; no wheezing/rhonchi/rales  Abdomen: soft, non tender, + bowel sounds; +DIAN drain, +colostomy  Extremities:  No edema or cyanosis; no clubbing  Neuro: alert and oriented, moves legs and arms equally, normal mood and affect    Data Reviewed:    Echo 3/19/2019:  Definity contrast was used, but port not working properly.  [facility] function is moderately reduced with an   ejection fraction of 30-35 %. Inferolateral hypokinesia. Mid anteroseptal,   apical lateral and apical septal akinesia.   There is mild concentric left ventricular hypertrophy.   Normal left ventricular filling pressure.   Left ventricular cavity size is normal.   Mild thickening of the anterior leaflet of mitral valve.   Mild mitral regurgitation.   The left atrium is severely dilated.     Lab Reviewed:     Renal Profile:  Lab Results   Component Value Date    CREATININE 0.7 03/29/2019    BUN 17 03/29/2019     03/29/2019    K 4.4 03/29/2019     03/29/2019    CO2 23 03/29/2019     CBC:    Lab Results   Component Value Date    WBC 12.9 03/29/2019    RBC 2.68 03/29/2019    HGB 7.7 03/29/2019    HCT 24.0 03/29/2019    MCV 89.7 03/29/2019    RDW 16.3 03/29/2019     03/29/2019     BNP:    Lab Results   Component Value Date    PROBNP 8,685 03/16/2019     Fasting Lipid Panel:    Lab Results   Component Value Date    CHOL 147 05/22/2018    HDL 39 05/22/2018    HDL 31 12/28/2011    TRIG 178 05/22/2018     Cardiac Enzymes:  CK/MbTroponin  Lab Results Component Value Date    CKTOTAL 65 11/23/2010    CKMB 1.39 11/23/2010    TROPONINI 0.01 03/16/2019     PT/ INR   Lab Results   Component Value Date    INR 1.13 03/13/2019    INR 1.25 03/08/2019    INR 1.28 03/07/2019    PROTIME 12.9 03/13/2019    PROTIME 14.3 03/08/2019    PROTIME 14.6 03/07/2019     PTT No results found for: PTT   Lab Results   Component Value Date    MG 1.50 03/27/2019      Lab Results   Component Value Date    TSH 1.65 11/21/2017     All labs and imaging reviewed today    Assessment:  NSVT: stable, on amiodarone   - s/p ICD   CAD: history of PCI to RCA and LAD (2011)  Cardiomyopathy: presumed ischemic, EF moderately decreased 30-35%, known history but further decreased EF  HTN: controlled  PE and DVT: plan for heparin to transition to [address]  Perforated diverticulitis: s/p Gerda's procedure for anastomotic leak 3/27/19; s/p left colectomy 3/14/19  Stage IV lung cancer: bone mets  Hyponatremia: improved, SIADH  Anemia  Thrombocytopenia  PAD  History of AAA repair: 2011    Plan:   1. Continue aspirin, amiodarone, toprol, simvastatin (lisinopril and aldactone discontinued due to MAHSA/hypotension)  2. Monitor for volume overload with IVF given CHF  3. Keep K>4 and Mg >2  4. Plavix and heparin on hold due to surgery, plan to restart heparin and transition to Eliquis, plavix per vascular  5. Consider [facility] when surgical issues stabilized for cardiomyopathy and NSVT  6.  Cardiology will not see this weekend, please call with questions     Chitra Mccarthy APRN-CNP  [facility]  [phone]

## 2019-03-29 NOTE — PROGRESS NOTES
Pt declined to be repositioned. Reminded pt of importance of being repositioned to help with skin break down. Pt still refused.  Will continue to educate pt and family
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
## 2019-03-29 NOTE — PROGRESS NOTES
Physical Therapy  Facility/Department: [facility]  Daily Treatment Note  NAME: Kris Thomason  : 1945  MRN: [id number]    Date of Service: 3/29/2019    Discharge Recommendations:  24 hour supervision or assist, Home with Home health PT   PT Equipment Recommendations  Equipment Needed: Yes  Mobility Devices: Freddrick Coast: Rolling    Patient Diagnosis(es): The primary encounter diagnosis was Diverticulitis of large intestine with perforation and abscess without bleeding. Diagnoses of Leaking abdominal aortic aneurysm (AAA) ([address] Abdominal pain, left lower quadrant, and Chemotherapy-induced neutropenia ([address] were also pertinent to this visit. has a past medical history of Benign neoplasm of colon, Cancer ([address] Diverticulosis of colon (without mention of hemorrhage), Elevated fasting glucose, Former cigarette smoker, Herpes simplex viral infection, Hyperlipidemia, Hypertension, MI, old, 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[address] and Right popliteal artery occlusion ([address] has a past surgical history that includes Arterial aneurysm repair (2010); Finger fracture surgery; Coronary angioplasty with stent; Upper gastrointestinal endoscopy (2/10/2014); Colonoscopy (10/08/01 12/20/04); Colonoscopy (14); Upper gastrointestinal endoscopy (14); Upper gastrointestinal endoscopy (11/4/15); bronchoscopy (2019); bronchoscopy (N/A, 2019); bronchoscopy (2019); INSERTION / REMOVAL / REPLACEMENT VENOUS ACCESS CATHETER (Right, 2019); bronchoscopy (N/A, 2019); bronchoscopy (2019); bronchoscopy (2019); bronchoscopy (2019); bronchoscopy (2019); bronchoscopy (2019); hemicolectomy (N/A, 3/14/2019); Upper gastrointestinal endoscopy (N/A, 3/21/2019); and LAPAROTOMY EXPLORATORY (N/A, 3/27/2019).     Restrictions  Restrictions/Precautions  Restrictions/Precautions: General Precautions, Fall Risk  Position Activity Restriction  Other position/activity restrictions: Medium fall risk per nursing assessment, up with assistance, 10L high-flow O2, Laurent catheter, telemetry, continuous pulse oximeter     Subjective   General  Chart Reviewed: Yes  Additional Pertinent Hx: Stage IV lung CA with bone mets  Family / Caregiver Present: No  Referring Practitioner: Dr. Oneida Albright  Subjective  Subjective:  Pt reports pain with mobility so therefore does not want to particpate, encouraged to get OOB to chair  General Comment  Comments: RN cleared pt to participate. Pain Screening  Patient Currently in Pain: Yes  Pain Assessment  Pain Assessment: 0-10  Pain Level: 4(with mobility)  Vital Signs  Patient Currently in Pain: Yes            Objective   Bed mobility  Rolling to Right: Minimal assistance  Supine to Sit: Moderate assistance  Sit to Supine: Unable to assess  Transfers  Sit to Stand: Minimal Assistance;Contact guard assistance  Stand to sit: Contact guard assistance  Bed to Chair: Minimal assistance;Contact guard assistance  Ambulation  Ambulation?: Yes  Ambulation 1  Surface: level tile  Device: Standard Walker  Other Apparatus: O2  Assistance: Contact guard assistance  Distance: 3-4 steps to chair  Comments: stood at EOB with RW and marches in place X 30 seconds        Exercises  Quad Sets: x15 BLE  Heelslides: X 10 B supine  Gluteal Sets: X 10 B  Hip Abduction: X 10 B supine  Knee Long Arc Quad: X 15 BLE  Ankle Pumps: X 15 BLE                        Assessment   Body structures, Functions, Activity limitations: Decreased functional mobility ; Decreased strength;Decreased balance;Decreased endurance  Assessment: Pt required mod A for bed mobility and CGA/ min A to stand and march at the side of bed with walker and transfer to chair.  con't PT 3-5x week then home with 24 hr supervision/ assist.  Treatment Diagnosis: Decreased endurance and (I) with mobility  Specific instructions for Next Treatment: Progress ther ex and mobility as tolerated  Prognosis: Good  Patient Education: role of PT, bed mobility   REQUIRES PT FOLLOW UP: Yes  Activity Tolerance  Activity Tolerance: Patient Tolerated treatment well;Patient limited by pain     AM-PAC Score  AM-PAC Inpatient Mobility Raw Score : 16  AM-PAC Inpatient T-Scale Score : 40.78  Mobility Inpatient CMS 0-100% Score: 54.16  Mobility Inpatient CMS G-Code Modifier : CK          Goals  Short term goals  Time Frame for Short term goals: 1 week (unless otherwise specified)  Short term goal 1: Pt will transfer supine <-> sit with modified(I)  Short term goal 2: Pt will transfer sit <-> stand and bed>chair using RW or least AD with supervision  Short term goal 3: Pt will ambulate x 150 feet using RW or least AD with supervision  Short term goal 4: By 3/20/19: Pt will tolerate 12-15 reps BLE exercise for strengthening, balance, and endurance  Short term goal 5: Pt will ambulate up/down 2 steps without use of handrails (using appropriate AD as needed) with SBA  Patient Goals   Patient goals : \"To get my strength back and go home at discharge. \"    Plan    Plan  Times per week: 3-5x/week in acute care  Times per day: Daily  Specific instructions for Next Treatment: Progress ther ex and mobility as tolerated  Current Treatment Recommendations: Strengthening, Gait Training, Balance Training, Stair training, Functional Mobility Training, ROM, Endurance Training, Transfer Training, Safety Education & Training, Patient/Caregiver Education & Training, Equipment Evaluation, Education, & procurement  Safety Devices  Type of devices: Bed alarm in place, Call light within reach, Left in bed, Nurse notified     Therapy Time   Individual Concurrent Group Co-treatment   Time In 1052         Time Out 1115         Minutes 0548 AMA [address]oto#[id number]

## 2019-03-29 NOTE — CARE COORDINATION
[facility] continues to follow for home health care needs upon DC. I have previously discussed home care services with pt, agency to call pt's wife to set up services. Writer has reviewed most recent NP progress note, General Surgery, OHC and Cardiology note. PT has rec's for HHPT. Pt remains on O2 and writer is aware pt will likely have his nguyen cath removed 3/30. [facility] to provide Skilled Nursing, MSW consult, HHA if needed, Physical Therapy and Occupational Therapy services. SN to also assist with wound/ new colostomy care management as well as wound prevention- have reviewed Wound Care note. Diverticulitis with prior Left Colectomy (3/14/19) with Anastomotic Leak.  Patient with new colostomy with Gerda's Procedure 3/27/19 by Dr. Gibson. This MD to likely follow for home care services. Pt is also seen by MD with [facility]. Pt would be appropriate for [facility]'s S4 Program upon DC. Should pt dc over the weekend please fax facesheet, order, CORINNE, and H&P to [facility].      HOME HEALTH CARE: LEVEL 4 SICK   Home health agency to establish plan of care for patient over 60 day period  Nursing:  Initial home SN evaluation visit to occur within 24-48 hours of hospital discharge  [facility] nursing visits daily, then QOD with progression as warranted for:  medication management  VS and clinical assessment  S&S chronic disease exacerbation education + when to contact MD / NP  care coordination  Medication Reconciliation during 1st SN visit  Nurse telephone visit on the days that visit is not being made in person for first 30 days                                     PT/OT/Speech   Evaluations in home within 24-48 hours of hospital discharge to include DME and home safety   Frontload therapy 5 days, then 3x a week   OT evaluation for [address] needs for personal care      Palliative Care referral within 5 days of hospital discharge      evaluation within 24-48 hours of hospital discharge to evaluate resources & insurance for potential  [place], [place], LTC, and Medicaid options      Dietician evaluation within 5 days of hospital discharge      PCP visit within 3 days of hospital discharge, followed by PCP visit @ 10 days, and 21 days     [address] with [address]  [phone]
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
## 2019-03-30 NOTE — PROGRESS NOTES
[facility] GENERAL SURGERY    Surgery Progress Note           POD # 16/3    PATIENT NAME: Bird Trinidad     TODAY'S DATE: 3/30/2019    INTERVAL HISTORY:    Pt with some bloating today . OBJECTIVE:   VITALS:  BP (!) 151/88   Pulse 117   Temp 98 °F (36.7 °C) (Oral)   Resp 16   Ht 5' 9\" (1.753 m)   Wt 132 lb 0.9 oz (59.9 kg)   SpO2 91%   BMI 19.50 kg/m²     INTAKE/OUTPUT:    I/O last 3 completed shifts: In: 2599.5 [P.O.:720; I.V.:1829.5; IV Piggyback:50]  Out: 7987 [Urine:1175; Drains:66; Stool:75]  No intake/output data recorded. CONSTITUTIONAL:  awake and alert  ABDOMEN:   hypoactive bowel sounds, soft, distended, tender, patricio serosanguinous, ostomy without output   INCISION: clean    Data:  CBC:   Recent Labs     03/28/19  0435 03/29/19  0500 03/30/19  0520   WBC 16.9* 12.9* 11.1*   HGB 8.9* 7.7* 7.4*   HCT 27.7* 24.0* 23.1*    147 146     BMP:    Recent Labs     03/28/19  0435 03/29/19  0500 03/30/19  0520    142 144   K 5.4*  5.4* 4.4 3.8    107 111*   CO2 20* 23 22   BUN 16 17 13   CREATININE 1.2 0.7* <0.5*   GLUCOSE 143* 92 79     Hepatic: No results for input(s): AST, ALT, ALB, BILITOT, ALKPHOS in the last 72 hours. Mag:    No results for input(s): MG in the last 72 hours. Phos:   No results for input(s): PHOS in the last 72 hours. INR: No results for input(s): INR in the last 72 hours. Radiology Review:  NA    ASSESSMENT AND PLAN:  69 y.o. male status post sigmoid colectomy, return for fariha's  1. Ice chips only today until postop ileus improved  2. Continue IV abx  3. Laurent to monitor ins and outs  4.   Should be ok for heparin drip tomorrow         Electronically signed by Linda Franklin MD

## 2019-03-30 NOTE — PLAN OF CARE
Problem: Infection:  Goal: Will remain free from infection  Description  Will remain free from infection  Outcome: Ongoing     Problem: Safety:  Goal: Free from accidental physical injury  Description  Free from accidental physical injury  Outcome: Ongoing     Problem: Pain:  Goal: Control of chronic pain  Description  Control of chronic pain  Outcome: Ongoing

## 2019-03-30 NOTE — FLOWSHEET NOTE
03/30/19 0732   Assessment   Charting Type Shift assessment   Neurological   Neuro (WDL) WDL   Level of Consciousness 0   Orientation Level Oriented X4   Cognition Appropriate judgement; Appropriate safety awareness; Appropriate attention/concentration; Appropriate for developmental age; Follows commands   Language Clear   Nati Coma Scale   Eye Opening 4   Best Verbal Response 5   Best Motor Response 6   Nati Coma Scale Score 15   NIH/MNHISS Stroke Scale   NIH/MNIHSS Stroke Scale Assessed No   HEENT   HEENT (WDL) X   Right Eye Impaired vision   Left Eye Impaired vision   Throat Intact   Tongue Dry   Voice Normal   Teeth Missing teeth   Respiratory   Respiratory (WDL) X   R Breath Sounds Diminished   L Breath Sounds Diminished   Respiratory Quality/Effort Unlabored   Chest Assessment Chest expansion symmetrical;Trachea midline   Respiratory Pattern Regular   Respiratory Depth Normal   Breath Sounds   Right Upper Lobe Diminished; Expiratory Wheezes   Right Middle Lobe Diminished   Right Lower Lobe Diminished   Left Upper Lobe Diminished; Expiratory Wheezes   Left Lower Lobe Diminished   Cardiac   Cardiac (WDL) X   Cardiac Regularity Regular   Heart Sounds S1, S2   Cardiac Rhythm ST   Rhythm Interpretation   Pulse 117   Cardiac Monitor   Telemetry Monitor On Yes   Telemetry Audible Yes   Telemetry Alarms Set Yes   Telemetry Box Number 118   Gastrointestinal   Abdominal (WDL) X   RUQ Bowel Sounds Active   RLQ Bowel Sounds Active   LUQ Bowel Sounds Active   LLQ Bowel Sounds Active   Tenderness Soft;Tenderness   Abdomen Inspection Ostomy tube;Surgical scar   Peripheral Vascular   Peripheral Vascular (WDL) WDL   Edema Left upper extremity; None   LUE Edema +1;Non-pitting   RLE Edema None   LLE Edema None   Facial Edema Trace   Skin Color/Condition   Skin Color/Condition (WDL) WDL   Skin Integrity   Skin Integrity (WDL) X   Musculoskeletal   Musculoskeletal (WDL) X   RL Extremity Weakness; Full movement   LL Extremity condom catheter, or short term intermittent urethral catherization   Site Assessment Pink   Urine Color Yellow   Urine Appearance Clear   Incision 03/14/19 Abdomen Right   Date First Assessed/Time First Assessed: 03/14/19 1052   Location: Abdomen  Wound Location Orientation: Right   Wound Assessment JOY   Emerita-wound Assessment JOY   Dressing Status Clean;Dry; Intact   Incision 03/27/19 Abdomen   Date First Assessed/Time First Assessed: 03/27/19 1400   Location: Abdomen   Wound Assessment JOY   Emerita-wound Assessment JOY   Dressing Status Clean;Dry; Intact; Old drainage   Psychosocial   Psychosocial (WDL) WDL

## 2019-03-30 NOTE — PROGRESS NOTES
Hospitalist Progress Note      PCP: Jose Gil MD    Date of Admission: 3/7/2019    Chief Complaint: Abdominal pain    Hospital Course: 69 y. o. male who presented to [facility] with LLQ abdominal pain. Pt was recently d/c'd for diverticulitis, states pain never went away. C/o low grade fevers for 2 weeks. States pain not as severe as previous admission. Had 2 chemo treatments, states received neulasta. Subjective:   No sig change. He is frustrated. Pain controlled  Plan for hep gtt tomorrow, Integrilin gtt not resumed yet      No new medical concerns  Bedside rounding with nursing completed. Medications:  Reviewed    Infusion Medications    sodium chloride 50 mL/hr at 03/30/19 1020    HYDROmorphone      dextrose 100 mL/hr (03/08/19 1700)     Scheduled Medications    enoxaparin  40 mg Subcutaneous Daily    metoprolol succinate  50 mg Oral Daily    meropenem  1 g Intravenous Q8H    amiodarone  200 mg Oral Daily    pantoprazole  40 mg Intravenous Daily    aspirin  81 mg Oral Daily    sodium chloride flush  10 mL Intravenous 2 times per day    simvastatin  40 mg Oral Nightly    folic acid  1 mg Oral Daily    oyster shell calcium/vitamin D  1 tablet Oral Daily     PRN Meds: naloxone, menthol-zinc oxide, sodium chloride flush, magic (miracle) mouthwash with nystatin, glucose, dextrose, glucagon (rDNA), dextrose, ondansetron, acetaminophen      Intake/Output Summary (Last 24 hours) at 3/30/2019 1745  Last data filed at 3/30/2019 1343  Gross per 24 hour   Intake 2045.5 ml   Output 1063 ml   Net 982.5 ml       Physical Exam Performed:    /78   Pulse 103   Temp 98 °F (36.7 °C) (Oral)   Resp 16   Ht 5' 9\" (1.753 m)   Wt 132 lb 0.9 oz (59.9 kg)   SpO2 90%   BMI 19.50 kg/m²     General appearance: In bed, No apparent distress, appears stated age and cooperative. HEENT: Pupils equal, round, and reactive to light. Conjunctivae/corneas clear. Vesicles in outer ear canal noted Neck: Supple, with full range of motion. No jugular venous distention. Trachea midline. Respiratory:  Normal respiratory effort. Diminished to auscultation, bilaterally without Rales/Wheezes/Rhonchi. Cardiovascular: regular rate and rhythm with normal S1/S2 without murmurs, rubs or gallops. Abdomen: Soft, non-tender, incision site C/D/I, DIAN drain in place. Musculoskeletal: No clubbing, cyanosis or edema bilaterally. Full range of motion without deformity. Skin: Skin color, texture, turgor normal.  Radiation changes to back. Vesicles under lower lip, cheek. Mouth mucosa improving. Excoriation to coccyx. Neurologic:  Neurovascularly intact without any focal sensory/motor deficits. Cranial nerves: II-XII intact, grossly non-focal.  Psychiatric: Alert and oriented, thought content appropriate, normal insight  Capillary Refill: Brisk,< 3 seconds   Peripheral Pulses: +2 palpable, equal bilaterally        Labs:   Recent Labs     03/28/19  0435 03/29/19  0500 03/30/19  0520   WBC 16.9* 12.9* 11.1*   HGB 8.9* 7.7* 7.4*   HCT 27.7* 24.0* 23.1*    147 146     Recent Labs     03/28/19  0435 03/29/19  0500 03/30/19  0520    142 144   K 5.4*  5.4* 4.4 3.8    107 111*   CO2 20* 23 22   BUN 16 17 13   CREATININE 1.2 0.7* <0.5*   CALCIUM 7.5* 7.8* 7.8*     No results for input(s): AST, ALT, BILIDIR, BILITOT, ALKPHOS in the last 72 hours. No results for input(s): INR in the last 72 hours. No results for input(s): Madalynn Ciara in the last 72 hours. Urinalysis:      Lab Results   Component Value Date    NITRU Negative 03/10/2019    WBCUA 3-5 03/10/2019    RBCUA 0-2 03/10/2019    BLOODU Negative 03/10/2019    SPECGRAV 1.025 03/10/2019    GLUCOSEU Negative 03/10/2019       Radiology:  CT ABDOMEN PELVIS WO CONTRAST Additional Contrast? None   Final Result   Interval increase in size of the left lower quadrant collection which now   measures up to 7.8 cm.   Administered rectal contrast extends into the   collection. Right anterior approach drain courses adjacent to the collection. No significant change in pleural effusions and associated airspace disease. The findings were sent to the Radiology Results [address] at 12:53   pm on 3/26/2019to be communicated to a licensed caregiver. FL BARIUM ENEMA   Final Result   Active extravasation of contrast from the sigmoid colon, compatible with   anastomotic leak. Please refer to accompanying CT. Diverticulosis. CT ABDOMEN PELVIS W IV CONTRAST Additional Contrast? Radiologist Recommendation   Final Result   1. The patient is 11 days status post partial sigmoid colectomy. Surgical   drain remains in place with a small ill-defined fluid collection that is   stable from prior exam.  This does not appear to represent an abscess and may   represent postoperative hematoma or seroma. 2. There is a new collection of free intraperitoneal air immediately anterior   to the prior anastomosis site, contained within the left lower quadrant. This indicates an anastomosis leak. No distant free air. 3. 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 4. Cholelithiasis with no evidence of acute cholecystitis. 5. Worsening small pleural effusions and airspace changes in the lower chest.   Pattern may represent underlying pulmonary edema or perhaps pneumonitis. XR CHEST PORTABLE   Final Result   Interval development of mild pulmonary edema. There is a small left-sided pleural effusion with more focal consolidation   the left lung base, atelectasis versus pneumonia. IR PICC WO SQ PORT/PUMP > 5 YEARS   Final Result   Successful placement of PICC line. CT ABDOMEN PELVIS WO CONTRAST Additional Contrast? None   Final Result   No evidence retroperitoneal hematoma. Interval colonic resection.   Small volume loculated collection in the left   pericolic gutter, with surgical drain terminating adjacent. Mildly dilated loops of small bowel with scattered air-fluid levels, most   likely postoperative ileus. Trace pleural effusions and bibasilar airspace disease representing   atelectasis or pneumonia. VL Extremity Venous Bilateral   Final Result      CT CHEST PULMONARY EMBOLISM W CONTRAST   Final Result   Bilateral pulmonary emboli      Scattered opacities throughout the lungs, greatest at the lung bases, with   tiny pleural effusions. Changes are increased compared to prior      Sclerotic osseous metastatic disease as described previously      The findings were sent to the Radiology Results [address] at 3:57   pm on 3/16/2019to be communicated to a licensed caregiver. XR CHEST 1 VW   Final Result   Shallow inflation with findings consistent with basilar atelectasis. Left   pleural thickening versus trace effusion. CT CHEST ABDOMEN PELVIS WO CONTRAST   Final Result   Perforated sigmoid diverticulitis and pneumoperitoneum unchanged. Stable   left [facility] colonic air-fluid collection may represent an intramural or   extramural abscess or giant diverticulum. Infrarenal abdominal aortic aneurysm and aorto bi-iliac stent. Other incidental findings as above. CT ABDOMEN PELVIS W IV CONTRAST Additional Contrast? None   Final Result   Acute perforated descending colon diverticulitis. There is free air in the   abdomen and there is a small extraluminal gas and fluid collection/abscess   adjacent to the descending colon measuring 4.1 cm in AP diameter and 6 cm in   length. There is diverticulosis of the entire colon. Cholelithiasis. 4.7 cm infrarenal abdominal aortic aneurysm status post endograft repair with   a patent aorto bi-iliac graft. There is suspicion of an endoleak with blush   of contrast enhancement suspected in the inferior aspect of the native   aneurysm.   As a noncontrast study was not performed, calcification of the   intraluminal thrombus accounting for the apparent enhancement is not   excluded. 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 Comparison with a noncontrast CT examination would be   helpful. Critical results were called by Dr. Jonelle Johnson MD to [facility] on   3/7/2019 at 20:35. Assessment/Plan:    Active Hospital Problems    Diagnosis Date Noted    Cardiomyopathy ([address] [I42.9]     NSVT (nonsustained ventricular tachycardia) ([facility]) [I47.2]     Other pulmonary embolism without acute cor pulmonale (HCC) [I26.99]     Acute respiratory failure with hypoxia ([address] [J96.01]     H/O colectomy [Z90.49]     Primary malignant neoplasm of lung metastatic to other site (HCC) [C34.90]     Herpes zoster oticus [B02.21]     Hearing loss of right ear [H91.91]     Chemotherapy-induced neutropenia ([address] [D70.1, T45.1X5A]     Mucositis [K12.30]     Diverticulitis [K57.92] 03/07/2019    Diverticulitis of large intestine with perforation and abscess without bleeding [K57.20]        Acute perforated recurrent diverticulitis/pneumoperitoneum/abscess in setting of immunosuppression due to chemotherapy:   - CT A/P on admission demonstrated acute perforated colon diverticulitis with free air and fluid collection.   - General surgery consulted. Pt is status post laparoscopic converted to open left colectomy on 3/14/19.  -New onset LLQ pain with diarrhea yesterday. CT abdomen and pelvis with contrast 3/25 revealed a new collection of free intraperitoneal air immediately anterior to the prior anastomosis site contained within the left lower quadrant indicative of anastomosis leak. Barium enema  today revealed an interval increase in size of the left lower quadrant collection which now measures up to 7.8 cm.     - Pt completed 7 days of Merrem and Diflucan. - Continue prn pain control.      Acute hypoxic respiratory failure due to bilateral pulmonary emboli:   - CT chest 3/16 showed thrombus in the distal aspect of the left main pulmonary artery extending into the left upper and left lower lobe pulmonary arterial branches. Additional Thrombus is seen in the distal aspect of the right main pulmonary artery,extending into the right upper right middle and right lower lobe pulmonary arterial branches. - Continue heparin gtt started 3/16.  -Will defer to vascular for Integrillin drip management  - Wean supplemental O2 as tolerated. He is not normally on home O2. He is currently requiring 3-4 liters HFNC.   - Lower extremity dopplers +DVT. Vascular surgery consulted, no indication for IVC filter at this time. - Pulmonary consulted/following.     Metastatic lung cancer, stage 4 with bone mets:  - Hem/onc is following. Chemo on hold to allow for surgical healing.      Thrombocytopenia (resolved) now with hypercoagulable state:  - Platelets required preoperatively. Pt currently off heparin gtt.     Neutropenia due to chemotherapy (resolved):  - S/p Neulasta on 3/1. S/p Granix on 3/8.      Anemia:  -  Pt is s/p 1 unit PBRC for Hgb 6.6 on 3/19 and 1 unit for Hgb 7.2 on 3/22. No overt signs of bleeding. CT A/P showed no RP bleed. PPI added per Hem/Onc. Pt is s/p B12 injection. Stool for occult blood was +. GI consulted, s/p EGD on 3/21 which was normal. Monitor daily CBC. H&H is stable.      PAD/CAD/HTN/Cardiomyopathy:   - Defer antiplatelet management to hem/onc given the complexity of his present situation, bleeding risk and thrombotic states. Pt was restarted on integrillin infusion on 3/20 per Hem/Onc. D/C 3/24. - ECHO showed reduced EF of 30-35%. Previous ECHO showed EF 40-45%. - Cardiology is following. Continue on aspirin, lisinopril, lasix, aldactone, Toprol and statin. - Cards recommending LHC given drop in EF but would like to wait 4-6 weeks post PE on AC. -Should resume Integrilin ASAP per vascular, defer to surgery       NSVT:   - Known history of VT s/p ICD.  [employer] into VT on 3/12, PMD went off. Continue amiodarone. Keep K > 4 and Mag > 2.     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 Nephrology following. Monitor daily BMP.      Hypokalemia/hypomagnesemia:  - Monitor and replete as needed.      Zoster oticus:  - Completed 7 days of acyclovir.     DVT Prophylaxis: None, high risk for stenosing pop stent- PE POD #2 (original surgery) Unclear who will manage resuming AC/AP. Discussed with Dr Zhang Norris (vascular) and pt is high risk for pop stent thrombosis which would require AKA.       Diet: Dietary Nutrition Supplements: Other Supplement (ensure)  DIET LOW FIBER;  Code Status: Full Code     PT/OT Eval Status:  Ordered with recs for [address] - uncertain     Vanessa Edwards, APRN - CNP

## 2019-03-30 NOTE — PLAN OF CARE
Problem: Infection:  Goal: Will remain free from infection  Description  Will remain free from infection  Outcome: Ongoing     Problem: Safety:  Goal: Free from accidental physical injury  Description  Free from accidental physical injury  Outcome: Ongoing  Goal: Free from intentional harm  Description  Free from intentional harm  Outcome: Ongoing     Problem: Daily Care:  Goal: Daily care needs are met  Description  Daily care needs are met  Outcome: Ongoing     Problem: Pain:  Goal: Patient's pain/discomfort is manageable  Description  Patient's pain/discomfort is manageable  Outcome: Ongoing  Goal: Pain level will decrease  Description  Pain level will decrease  Outcome: Ongoing  Goal: Control of acute pain  Description  Control of acute pain  Outcome: Ongoing  Goal: Control of chronic pain  Description  Control of chronic pain  Outcome: Ongoing     Problem: Skin Integrity:  Goal: Skin integrity will stabilize  Description  Skin integrity will stabilize  Outcome: Ongoing     Problem: Discharge Planning:  Goal: Patients continuum of care needs are met  Description  Patients continuum of care needs are met  Outcome: Ongoing     Problem: Nutrition  Goal: Optimal nutrition therapy  Outcome: Ongoing     Problem: Risk for Impaired Skin Integrity  Goal: Tissue integrity - skin and mucous membranes  Description  Structural intactness and normal physiological function of skin and  mucous membranes.   Outcome: Ongoing     Problem: Falls - Risk of:  Goal: Will remain free from falls  Description  Will remain free from falls  Outcome: Ongoing

## 2019-03-31 NOTE — FLOWSHEET NOTE
03/31/19 0825   Assessment   Charting Type Shift assessment   Neurological   Neuro (WDL) WDL   Level of Consciousness 0   Orientation Level Oriented X4   Cognition Appropriate judgement; Appropriate safety awareness; Appropriate attention/concentration; Appropriate for developmental age; Follows commands   Language Clear   Nati Coma Scale   Eye Opening 4   Best Verbal Response 5   Best Motor Response 6   Nati Coma Scale Score 15   NIH/MNHISS Stroke Scale   NIH/MNIHSS Stroke Scale Assessed No   HEENT   HEENT (WDL) X   Right Eye Impaired vision   Left Eye Impaired vision   Throat Intact   Tongue Dry   Voice Normal   Teeth Missing teeth   Respiratory   Respiratory (WDL) X   R Breath Sounds Diminished   L Breath Sounds Diminished   Respiratory Quality/Effort Unlabored   Chest Assessment Chest expansion symmetrical;Trachea midline   Respiratory Pattern Regular   Respiratory Depth Normal   Breath Sounds   Right Upper Lobe Diminished   Right Middle Lobe Diminished   Right Lower Lobe Diminished   Left Upper Lobe Diminished   Left Lower Lobe Diminished   Cough/Sputum   Sputum Amount None   Cardiac   Cardiac (WDL) X   Cardiac Regularity Regular   Heart Sounds S1, S2   Cardiac Rhythm ST   Rhythm Interpretation   Pulse 98   Cardiac Monitor   Telemetry Monitor On Yes   Telemetry Audible Yes   Telemetry Alarms Set Yes   Telemetry Box Number 118   Gastrointestinal   Abdominal (WDL) X   RUQ Bowel Sounds Active   RLQ Bowel Sounds Active   LUQ Bowel Sounds Active   LLQ Bowel Sounds Active   Tenderness Soft;Tenderness   Abdomen Inspection Ostomy tube;Surgical scar   Peripheral Vascular   Peripheral Vascular (WDL) WDL   Edema Left upper extremity; None   LUE Edema +1;Non-pitting   RLE Edema None   LLE Edema None   Facial Edema Trace   Skin Color/Condition   Skin Color/Condition (WDL) WDL   Skin Integrity   Skin Integrity (WDL) X  (surgical incision, DIAN insertion site.)   Musculoskeletal   Musculoskeletal (WDL) X   RL Extremity Weakness; Full movement   LL Extremity Weakness; Full movement   Genitourinary   Genitourinary (WDL) X  (nguyen)   Flank Tenderness No   Suprapubic Tenderness No   Dysuria No   Urine Assessment   Urine Color Rocio   Urine Appearance Clear   Urine Odor No odor   Incontinence No   Anus/Rectum   Anus/Rectum (WDL) WDL   Wound 02/19/19 Back Mid Radiation burn    Date First Assessed/Time First Assessed: 02/19/19 2100   Present on Hospital Admission: Yes  Primary Wound Type: Burn  Location: Back  Wound Location Orientation: Mid  Wound Description (Comments): Radiation burn    Wound Assessment Pink;Brown   Drainage Amount None   [facility]-wound Assessment Dry   Wound 03/08/19 Sacrum Mid;Right   Date First Assessed/Time First Assessed: 03/08/19 0032   Present on Hospital Admission: Yes  Wound Approximate Age at First Assessment (Weeks): 1.5 weeks  Primary Wound Type: Pressure Injury  Location: Sacrum  Wound Location Orientation: Mid;Right   Dressing Status Clean;Dry; Intact   Wound Assessment Purple;Dry   Drainage Amount None   Odor None   [facility]-wound Assessment Clean;Dry; Intact   Colostomy LUQ Descending/sigmoid   Placement Date/Time: 03/27/19 1401   Pre-existing: No  Timeout: Patient;Procedure; Consent Confirmed;Site prepped with chlorhexidine; Availability of Implant; Correct Position  Inserted by: Dr. Ethan Chin  Location: LUQ  Colostomy Type: Descending/sigmoid   Stoma  Assessment Pink;Swelling   Peristomal Assessment Clean; Intact   Stool Appearance Bloody; Watery   Stool Color Red   Stool Amount Small   Urethral Catheter Non-latex 16 fr   Placement Date/Time: 03/27/19 1430   Urethral Catheter Timeout: Sterile technique  Inserted by: Leena Enriquez RN  Catheter Type: Non-latex  Tube Size (fr): 16 fr  Catheter Balloon Size: 10 mL  Collection Container: Standard  Securement Method: Securing de. ..    Catheter Indications Need for fluid management in critically ill patients in a critical care setting not able to be managed by other means such as BSC with hat, bedpan, urinal, condom catheter, or short term intermittent urethral catherization   Site Assessment Pink   Urine Color Rocio   Urine Appearance Clear   Incision 03/14/19 Abdomen Right   Date First Assessed/Time First Assessed: 03/14/19 1052   Location: Abdomen  Wound Location Orientation: Right   Wound Assessment JOY   [facility]-wound Assessment JOY   Dressing Status Clean;Dry; Intact   Incision 03/27/19 Abdomen   Date First Assessed/Time First Assessed: 03/27/19 1400   Location: Abdomen   Wound Assessment JOY   [facility]-wound Assessment JOY   Dressing Status Clean;Dry; Intact; Old drainage   Psychosocial   Psychosocial (WDL) WDL

## 2019-03-31 NOTE — PROGRESS NOTES
[facility] GENERAL SURGERY    Surgery Progress Note           POD # 17/4    PATIENT NAME: Tamia Ocasio     TODAY'S DATE: 3/31/2019    INTERVAL HISTORY:    Pt with improving pain, having gas in ostomy. OBJECTIVE:   VITALS:  BP (!) 148/87   Pulse 100   Temp 97.8 °F (36.6 °C) (Axillary)   Resp 18   Ht 5' 9\" (1.753 m)   Wt 132 lb 4.4 oz (60 kg)   SpO2 91%   BMI 19.53 kg/m²     INTAKE/OUTPUT:    I/O last 3 completed shifts: In: 3212 [P.O.:560; I.V.:1188]  Out: 1330 [Urine:1175; Drains:150; Stool:5]  I/O this shift:  In: -   Out: 175 [Urine:75; Drains:100]              CONSTITUTIONAL:  awake and alert  LUNGS:  no crackles or wheezing  ABDOMEN:   hypoactive bowel sounds, soft, less distended, tender, ostomy with gas, patricio serosanguinous  INCISION: clean    Data:  CBC:   Recent Labs     03/29/19  0500 03/30/19  0520 03/31/19  0805   WBC 12.9* 11.1* 9.3   HGB 7.7* 7.4* 7.6*   HCT 24.0* 23.1* 23.5*    146 129*     BMP:    Recent Labs     03/29/19  0500 03/30/19  0520 03/31/19  0805    144 140   K 4.4 3.8 3.3*    111* 105   CO2 23 22 24   BUN 17 13 13   CREATININE 0.7* <0.5* <0.5*   GLUCOSE 92 79 76     Hepatic: No results for input(s): AST, ALT, ALB, BILITOT, ALKPHOS in the last 72 hours. Mag:      Recent Labs     03/31/19  0805   MG 1.60*      Phos:   No results for input(s): PHOS in the last 72 hours. INR: No results for input(s): INR in the last 72 hours. Radiology Review:  NA    ASSESSMENT AND PLAN:  69 y.o. male status post left colectomy, return for fariha's  1. Clear liquid diet  2. Continue pain control  3.   IV abx         Electronically signed by Dee Vega MD

## 2019-03-31 NOTE — PROGRESS NOTES
Hospitalist Progress Note      PCP: Joslyn Montano MD    Date of Admission: 3/7/2019    Chief Complaint: Abdominal pain    Hospital Course: 69 y. o. male who presented to [facility] with LLQ abdominal pain. Pt was recently d/c'd for diverticulitis, states pain never went away. C/o low grade fevers for 2 weeks. States pain not as severe as previous admission. Had 2 chemo treatments, states received neulasta. Subjective: No complaints.  +gas noted in ostomy bag. Medications:  Reviewed    Infusion Medications    heparin (porcine)      sodium chloride 50 mL/hr at 03/31/19 0544    HYDROmorphone      dextrose 100 mL/hr (03/08/19 1700)     Scheduled Medications    acyclovir  800 mg Oral TID    metoprolol succinate  50 mg Oral Daily    meropenem  1 g Intravenous Q8H    amiodarone  200 mg Oral Daily    pantoprazole  40 mg Intravenous Daily    aspirin  81 mg Oral Daily    sodium chloride flush  10 mL Intravenous 2 times per day    simvastatin  40 mg Oral Nightly    folic acid  1 mg Oral Daily    oyster shell calcium/vitamin D  1 tablet Oral Daily     PRN Meds: heparin (porcine), heparin (porcine), naloxone, menthol-zinc oxide, sodium chloride flush, magic (miracle) mouthwash with nystatin, glucose, dextrose, glucagon (rDNA), dextrose, ondansetron, acetaminophen      Intake/Output Summary (Last 24 hours) at 3/31/2019 1401  Last data filed at 3/31/2019 1335  Gross per 24 hour   Intake 1989.5 ml   Output 1475 ml   Net 514.5 ml       Physical Exam Performed:    BP (!) 148/87   Pulse 100   Temp 97.8 °F (36.6 °C) (Axillary)   Resp 18   Ht 5' 9\" (1.753 m)   Wt 132 lb 4.4 oz (60 kg)   SpO2 91%   BMI 19.53 kg/m²     General appearance: Male in no apparent distress, appears stated age and cooperative. HEENT: Pupils equal, round, and reactive to light. Conjunctivae/corneas clear. Vesicles in outer ear canal noted   Neck: Supple, with full range of motion. No jugular venous distention.  Trachea to 7.8 cm. Administered rectal contrast extends into the   collection. Right anterior approach drain courses adjacent to the collection. No significant change in pleural effusions and associated airspace disease. The findings were sent to the Radiology Results [address] at 12:53   pm on 3/26/2019to be communicated to a licensed caregiver. [place] BARIUM ENEMA   Final Result   Active extravasation of contrast from the sigmoid colon, compatible with   anastomotic leak. Please refer to accompanying CT. Diverticulosis. CT ABDOMEN PELVIS W IV CONTRAST Additional Contrast? Radiologist Recommendation   Final Result   1. The patient is 11 days status post partial sigmoid colectomy. Surgical   drain remains in place with a small ill-defined fluid collection that is   stable from prior exam.  This does not appear to represent an abscess and may   represent postoperative hematoma or seroma. 2. There is a new collection of free intraperitoneal air immediately anterior   to the prior anastomosis site, contained within the left lower quadrant. This indicates an anastomosis leak. No distant free air. 3. 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 4. Cholelithiasis with no evidence of acute cholecystitis. 5. Worsening small pleural effusions and airspace changes in the lower chest.   Pattern may represent underlying pulmonary edema or perhaps pneumonitis. XR CHEST PORTABLE   Final Result   Interval development of mild pulmonary edema. There is a small left-sided pleural effusion with more focal consolidation   the left lung base, atelectasis versus pneumonia. IR PICC WO SQ PORT/PUMP > 5 YEARS   Final Result   Successful placement of PICC line. CT ABDOMEN PELVIS WO CONTRAST Additional Contrast? None   Final Result   No evidence retroperitoneal hematoma. Interval colonic resection.   Small volume loculated collection noncontrast study was not performed, calcification of the   intraluminal thrombus accounting for the apparent enhancement is not   excluded. 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 Comparison with a noncontrast CT examination would be   helpful. Critical results were called by Dr. Krystian Ryan. MD Elizabeth to [facility] on   3/7/2019 at 20:35. Assessment/Plan:    Active Hospital Problems    Diagnosis Date Noted    Cardiomyopathy ([address] [I42.9]     NSVT (nonsustained ventricular tachycardia) ([facility]) [I47.2]     Other pulmonary embolism without acute cor pulmonale ([facility]) [I26.99]     Acute respiratory failure with hypoxia ([address] [J96.01]     H/O colectomy [Z90.49]     Primary malignant neoplasm of lung metastatic to other site (HCC) [C34.90]     Herpes zoster oticus [B02.21]     Hearing loss of right ear [H91.91]     Chemotherapy-induced neutropenia ([address] [D70.1, T45.1X5A]     Mucositis [K12.30]     Diverticulitis [K57.92] 03/07/2019    Diverticulitis of large intestine with perforation and abscess without bleeding [K57.20]        Acute perforated recurrent diverticulitis/pneumoperitoneum/abscess in setting of immunosuppression due to chemotherapy:   - CT A/P on admission demonstrated acute perforated colon diverticulitis with free air and fluid collection.   - General surgery consulted. Pt is status post laparoscopic converted to open left colectomy on 3/14/19.  - New onset LLQ pain with diarrhea yesterday. CT abdomen and pelvis with contrast 3/25 revealed a new collection of free intraperitoneal air immediately anterior to the prior anastomosis site contained within the left lower quadrant indicative of anastomosis leak. Barium enema today revealed an interval increase in size of the left lower quadrant collection which now measures up to 7.8 cm.     - continue Merrem   - completed course of Diflucan  - Continue prn pain control.      Acute hypoxic respiratory failure due to bilateral pulmonary emboli:   - CT chest 3/16 showed thrombus in the distal aspect of the left main pulmonary artery extending into the left upper and left lower lobe pulmonary arterial branches. Additional Thrombus is seen in the distal aspect of the right main pulmonary artery, extending into the right upper right middle and right lower lobe pulmonary arterial branches. - Wean supplemental O2 as tolerated. He is not normally on home O2. He is currently requiring 3-4 liters HFNC.   - Lower extremity dopplers +DVT. Vascular surgery consulted, no indication for IVC filter at this time. - Pulmonary consulted/following.  - resume Heparin gtt on 3/31 - ok with gen surgery - no bolus.     Metastatic lung cancer, stage 4 with bone mets:  - Hem/onc is following. Chemo on hold to allow for surgical healing.      Thrombocytopenia (resolved) now with hypercoagulable state:  - Platelets required preoperatively. - follow platelets now that Hep gtt resumed 3/31     Neutropenia due to chemotherapy (resolved):  - S/p Neulasta on 3/1. S/p Granix on 3/8.      Anemia:  -  Pt is s/p 1 unit PBRC for Hgb 6.6 on 3/19 and 1 unit for Hgb 7.2 on 3/22. No overt signs of bleeding. CT A/P showed no RP bleed. PPI added per Hem/Onc. Pt is s/p B12 injection. Stool for occult blood was +. GI consulted, s/p EGD on 3/21 which was normal. Monitor daily CBC. H&H is stable.      PAD/CAD/HTN/Cardiomyopathy:   - Defer antiplatelet management to hem/onc given the complexity of his present situation, bleeding risk and thrombotic states. Pt was restarted on integrillin infusion on 3/20 per Hem/Onc. D/C 3/24.   - ECHO showed reduced EF of 30-35%. Previous ECHO showed EF 40-45%. - Cardiology is following. Continue on aspirin, lisinopril, lasix, aldactone, Toprol and statin. - Cards recommending LHC given drop in EF but would like to wait 4-6 weeks post PE on AC.      NSVT:   - Known history of VT s/p ICD.  Went into VT on 3/12, PMD went off. Continue amiodarone. Keep K > 4 and Mag > 2.     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 Nephrology following.  Monitor daily BMP.      Hypokalemia/hypomagnesemia:  - Monitor and replete as needed.      Zoster oticus:  - resume acyclovir 3/31.     DVT Prophylaxis: Resume Hep gtt 3/31     Diet: Dietary Nutrition Supplements: Other Supplement (ensure)  DIET LOW FIBER;  Code Status: Full Code     PT/OT Eval Status:  Ordered with recs for [facility] - uncertain     Electronically signed by DEBRA Carolina - CNP on 3/31/2019 at 2:08 PM

## 2019-03-31 NOTE — ONCOLOGY
ONCOLOGY HEMATOLOGY CARE PROGRESS NOTE      SUBJECTIVE:     Afebrile. On 6 LPM by NC.    NPO. Had an XR that showed ileus vs obstruction last night, but now seems to have bowel sounds, is hungry, and has some air in his colostomy bag. ROS:   The remaining 10 point review of symptoms is unremarkable. OBJECTIVE        Physical    VITALS:  BP (!) 142/86   Pulse 100   Temp 97.9 °F (36.6 °C) (Oral)   Resp 17   Ht 5' 9\" (1.753 m)   Wt 132 lb 4.4 oz (60 kg)   SpO2 90%   BMI 19.53 kg/m²   TEMPERATURE:  Current - Temp: 97.9 °F (36.6 °C); Max - Temp  Av.1 °F (36.7 °C)  Min: 97.9 °F (36.6 °C)  Max: 98.4 °F (36.9 °C)  PULSE OXIMETRY RANGE: SpO2  Av %  Min: 90 %  Max: 94 %  24HR INTAKE/OUTPUT:      Intake/Output Summary (Last 24 hours) at 3/31/2019 1080  Last data filed at 3/31/2019 0417  Gross per 24 hour   Intake 250 ml   Output 1120 ml   Net -870 ml       CONSTITUTIONAL:  awake, alert, cooperative, no apparent distress, HEENT oral pharynx , no scleral icterus, wearing high flow nasal canula   HEMATOLOGIC/LYMPHATICS:  no cervical lymphadenopathy, no supraclavicular lymphadenopathy, no axillary lymphadenopathy and no inguinal lymphadenopathy  LUNGS:  No increased work of breathing, good air exchange, clear to auscultation bilaterally, no crackles or wheezing  CARDIOVASCULAR:  , regular rate and rhythm, normal S1 and S2, no S3 or S4, and no murmur noted  ABDOMEN: Absent BS.  + colostomy (empty). Red stoma    Back: no bruising, purpura, or hematomas noted   MUSCULOSKELETAL:  There is no redness, warmth, or swelling of the joints. EXTREMETIES: BLE edema +1, non pitting   NEUROLOGIC:  Awake, alert, oriented to name, place and time. Cranial nerves II-XII are grossly intact. Motor is 5 out of 5 bilaterally.  Hip flexion 5/5 bilaterally in the bed     Data      Recent Labs     19  0500 19  0520   WBC 12.9* 11.1*   HGB 7.7* 7.4*   HCT 24.0* 23.1*    146 MCV 89.7 89.3        Recent Labs     03/29/19  0500 03/30/19  0520    144   K 4.4 3.8    111*   CO2 23 22   BUN 17 13   CREATININE 0.7* <0.5*     No results for input(s): AST, ALT, ALB, BILIDIR, BILITOT, ALKPHOS in the last 72 hours.     Magnesium:    Lab Results   Component Value Date    MG 1.50 03/27/2019    MG 1.50 03/24/2019    MG 1.60 03/23/2019         Problem List  Patient Active Problem List   Diagnosis    Elevated fasting glucose    CAD (coronary artery disease)    PAD (peripheral artery disease) (HCC)    Essential hypertension    Mixed hyperlipidemia    Sprain of right foot    Sprain of anterior talofibular ligament of right ankle    Closed nondisplaced fracture of fifth right metatarsal bone    Closed fracture of proximal lateral malleolus of ankle with routine healing    Right foot pain    Right ankle pain    Nondisplaced fracture of fifth right metatarsal bone with routine healing    History of pulmonary emphysema    Chronic obstructive pulmonary disease with (acute) exacerbation (HCC)    Pulmonary nodule, left    Abnormal bone scan of thoracic spine    Back pain    Visit for monitoring Plavix therapy    History of ventricular tachycardia    Gallstones    Hemoptysis    Former smoker    Elevated PSA    Adenocarcinoma of left lung ([address]    Acute diverticulitis    Pancytopenia ([address]    Diverticulitis of large intestine with perforation and abscess without bleeding    Diverticulitis    Chemotherapy-induced neutropenia (HCC)    Mucositis    Herpes zoster oticus    Hearing loss of right ear    Acute respiratory failure with hypoxia ([address]    H/O colectomy    Primary malignant neoplasm of lung metastatic to other site [facility])    NSVT (nonsustained ventricular tachycardia) ([address]    Other pulmonary embolism without acute cor pulmonale (HCC)    Cardiomyopathy (HCC)       ASSESSMENT AND PLAN    Acute Perforated descending colon diverticulitis (not immune mediated colitis) with free air in the abd   - per gen surgery , s/p Laparoscopic converted to Open left Colectomy, 3/14/2019, with Dr. Ophelia Chatterjee  - ATB support (Zosyn, Maxsam, Vanco) , changed to [address] until POD 7, now on Merrem only. - CT scan done 3/25 with anastomotic leak at [facility]. - Status post Gerda procedure on 3/27/2019 with Dr. Ophelia Chatterjee. Nutrition  - Await trial of clear liq diet. Likely today? Neutropenia secondary to chemo  - Granix stopped 3/11   - resolved     Thrombocytopenia secondary to chemo  -Resolved    PE  -Diagnosed on 3/16/2019 with bilateral PEs involving the right and left main pulm arteries.   -Also had thrombus in the right femoral, peroneal, greater saphenous, short saphenous veins and in the left peroneal veins.  -Plan Eliquis on discharge.   -Will need lifelong anticoagulation.  -Does not need a filter per Dr. Emily Negrete on 3/18   -Started Lovenox 40 mg on 3/29. May transition to Hep drip today (3/31) if Hgb stable. Anemia, possible GI bleed on hep drip  -EGD 3/21 negative   - PPI added, now BID dosing per GI 3/21  - CBC is pending.  - CT AP 3/21 - no signs of bleeding   - due for B12 shot - given 3/19/2019 - required every 9 weeks with this chemo. Lung CA, stage 4 bone mets   - Will hold chemo until surgically healed  - Cycle 3 carbo/alimta Leonora Copeland was due on 3/22- held due to acute issues, cont to hold. Plans to resume Kady Cape only outpatient.   - CT chest/abd/pelvis, 3/10/2019, showed improvement in the left hilar soft tissue and radiation changes. No obvious new disease. Not hospice appropriate at this time from an oncology standpoint.       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  May need to resume- hospitalist is speaking with vascular surgery for clarification. (Dr. Selma John)   - [facility] given surgical burden and recent leak;       ONCOLOGIC DISPOSITION:  TBD.   Per surgical team.     Nani Landeros MD  [facility]

## 2019-03-31 NOTE — PLAN OF CARE
continuum of care needs are met  Description  Patients continuum of care needs are met  3/31/2019 1110 by Collin Knight RN  Outcome: Completed  3/31/2019 1000 by Kavin Barrera RN  Outcome: Ongoing     Problem: Nutrition  Goal: Optimal nutrition therapy  Outcome: Completed     Problem: Risk for Impaired Skin Integrity  Goal: Tissue integrity - skin and mucous membranes  Description  Structural intactness and normal physiological function of skin and  mucous membranes.   3/31/2019 1110 by Collin Knight RN  Outcome: Completed  3/31/2019 1000 by Kavin Barrera RN  Outcome: Ongoing     Problem: Falls - Risk of:  Goal: Will remain free from falls  Description  Will remain free from falls  3/31/2019 1110 by Collin Knight RN  Outcome: Completed  3/31/2019 1000 by Kavin Barrera RN  Outcome: Met This Shift

## 2019-03-31 NOTE — CONSULTS
High Dose Heparin Protocol  Aptt = 32.4 secs  No Initial Bolus  Initial drip rate = 10.8 ml/hr  Next Aptt ordered for 2100 today  Fiordaliza Boyd [facility] 3/31/2019 3:19 PM    3/31  aPTT = 51.3 sec at 2200. Increase heparin gtt to 12 mL/hr. Recheck aPTT in 6 hours. Maisie Aschoff, PharmD  3/31/2019 10:52 PM    4/1  aPTT = 57.8 sec at 0529. Continue heparin gtt at 12 mL/hr. Check aPTT in 6 hours. Maisie Aschoff, PharmD  4/1/2019 6:22 AM    Aptt= 39.2 sec @ 1316. Redraw @ 1421= 35.1 sec. Will increase drip rate to 13.2 mL/hr  Next aptt in 6 hours ~2200  Reba Glass PharmD 04/01/19 3:28 PM     4/2  Aptt= 46.9 sec at 0500  Give 2400 unit bolus and increase drip rate to 14.4 mL/hr  Next aptt in 6 hours  Reba Glass PharmD 04/02/19 8:01 AM     4/2  APTT = 63 sec at 1740  Continue with current rate of 14.4 mL/hr  Next aPTT due 4/3 @ 0000  [facility]     4/3  aPTT = 48.1 sec at 0220. Give heparin bolus of 2400 units and increase heparin gtt to 15.6 mL/hr. Recheck aPTT in 6 hours. Maisie Aschoff, PharmD  4/3/2019 2:54 AM    Aptt= 53.6 sec @ 0955  Per protocol, give 2400 unit bolus and increase drip rate to 16.8 mL/hr  Next aptt in 6 hours. Reba Glass PharmD 04/03/19 10:54 AM     4/3 2300  aptt = 66.2 sec  Continue current rate. Next aptt daily. Adriane Wu, Pharm [facility].4/4/2019 1:23 AM    4/4  Aptt= 58.5 sec @ 0532   Per protocol, no changes  Will check another aptt in 6 hours, since he has been very difficult to keep therapeutic. Reba Glass PharmD 04/04/19 8:20 AM     Aptt= 60.4 sec @ 1315  Per protocol, no changes. Continue drip rate of 16.8 mL/hr  Daily aptt ordered, starting tomorrow. Reba Glass PharmD 04/04/19 1:40 PM     4/5  Aptt= 86.3 sec @ 0425  No changes, continue drip rate of 16.8 mL/hr  Continue daily aptt  Reba Glass PharmD 04/05/19 8:04 AM     4/7 0320  aptt = 63.5 sec  Continue current rate.   Next aptt daily  Adriane Wu Pharm D.4/7/2019 6:06 AM    4/8 0403  aptt = 80.5 sec  Continue current rate. Next aptt 4/9  Vielka Moran Pharm [facility].4/8/2019 6:40 AM    aPTT = 59.5 seconds at 0502  No change to Heparin. Recheck aPTT tomorrow AM.  Donnell FungD, [facility]   4/9/2019 9:12 AM    aPTT = 61.4 seconds at 0452  No change to Heparin. Recheck aPTT tomorrow AM.  Donnell Case PharmD, [facility]   4/10/2019 8:19 AM    4/10/19  Low Dose Heparin GTT:  APTT recheck (first chk possibly contaminated/ rejected) is 85.3 seconds at 1510, continue infusion at 16.8mL/hr and recheck at 2200. Martha FungD 4/10/2019 4:05 PM    4/11  aPTT = 94.6 sec at 2332. Hold heparin gtt for 1 hr and lower heparin gtt to 15 mL/hr,  Recheck aPTT 6 hours after restart. Olivia Santiago PharmD  4/11/2019 12:05 AM    aPTT = 72.1 seconds at 0749. No change to Heparin. Recheck aPTT in 6 hours. Donnell Case PharmD, [facility]   4/11/2019 8:27 AM    Aptt= 47.2 sec @ 1431  Per protocol, give 2400 unit bolus and increase drip rate to 16.2 mL/hr  Next aptt in 6 hours  Rodrigo James PharmD 04/11/19 3:07 PM     4/11/19  2200  Heparin Infusion protocol:  APTT at 2100 is 73.5 seconds. No changes, no bolus. Continue infusion at 16. 2mL/hr and recheck the PTT at 0400 4/12/19 per protocol. Martha Weber PharmD 4/11/2019 9:54 PM    4/12  aPTT = 75.7 sec at 0420. Continue heparin gtt at 16.2 mL/hr. Recheck aPTT in 6 hours.   Olivia Santiago PharmD  4/12/2019 6:55 AM

## 2019-04-01 NOTE — PROGRESS NOTES
Dr. James Mclean returned paged with verbal order and repeat. NO d/c Pca pump. NO 1mg dilauded Q2hr PRN, CXR Portable   Xray nottified.  Will cont' to monitor

## 2019-04-01 NOTE — PROGRESS NOTES
Dr. Trisha thomas served the following:    \"74yM s/p Gerda's on 3/27. O2 requirement has gone from 6 to 10-11L in past hour. Can we have CXR? Any other orders? Also can we switch PCA to IVP medication, as EtCO2 monitoring is impossible with high flow cannula and pump shuts down without it (he has only pushed it three times today)? Please call Ismael Ruelas at [phone].  Thanks much\"

## 2019-04-01 NOTE — PROGRESS NOTES
hospitalized with diverticulitis, DCed,  And pain never went away. Also low grade fever. No nausea. Mild pain in belly  Diagnosed to have fever and also diverticulitis. Planning for surgery. 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 Has pancytopenia from chemotherapy. We are consulted for hyponatremia and related issues     Interval History:      On NG suction  Doing better      ROS:     SOB- +Ve on exertion  Edema- none  Nausea/vomiting- on ng suction  Poor appetite-No  Confusion- no  Urinary complaints- no  Any other complaints- no  All other ROS are reviewed and are Negative           Medication:     Scheduled Meds:   potassium chloride  10 mEq Intravenous Q1H    potassium chloride  10 mEq Oral TID    magnesium sulfate  4 g Intravenous Once    acyclovir  800 mg Oral TID    vancomycin  750 mg Intravenous Q18H    metoprolol succinate  50 mg Oral Daily    meropenem  1 g Intravenous Q8H    amiodarone  200 mg Oral Daily    pantoprazole  40 mg Intravenous Daily    aspirin  81 mg Oral Daily    sodium chloride flush  10 mL Intravenous 2 times per day    simvastatin  40 mg Oral Nightly    folic acid  1 mg Oral Daily    oyster shell calcium/vitamin D  1 tablet Oral Daily     Continuous Infusions:   heparin (porcine) 12 mL/hr (04/01/19 0851)    dextrose 100 mL/hr (03/08/19 1700)     PRN Meds:.heparin (porcine), heparin (porcine), HYDROmorphone, menthol-zinc oxide, sodium chloride flush, magic (miracle) mouthwash with nystatin, glucose, dextrose, glucagon (rDNA), dextrose, ondansetron, acetaminophen       Vitals :     Vitals:    04/01/19 0840   BP: 139/79   Pulse: 93   Resp: 20   Temp: 97.6 °F (36.4 °C)   SpO2:        I & O :       Intake/Output Summary (Last 24 hours) at 4/1/2019 0909  Last data filed at 4/1/2019 0389  Gross per 24 hour   Intake 720.6 ml   Output 1440 ml   Net -719.4 ml        Physical Examination :     General appearance: Anxious- no, distressed- no, in good spirits- no  Lying in bed,on NG suction  HEENT: Lips- normal, teeth- ok , oral mucosa- moist, has skin lesions in the face  Neck : Mass- no, appears symmetrical, JVD- not visible  Respiratory: Respiratory effort-  normal, wheeze- no, crackles - no  Cardiovascular:  Ausculation- No M/R/G, Edema no  Abdomen: visible mass- no, distention- no, scar- no, tenderness- no                            hepatosplenomegaly-  no  Musculoskeletal:  clubbing no,cyanosis- no , digital ischemia- no                           muscle strength- grossly normal , tone - grossly normal  Skin: rashes- no , ulcers- no, induration- no, tightening - no  Psychiatric:  Judgement and insight- normal           AAO X 3    Has nguyen- made 2.3 L urine yesterday     LABS:     Recent Labs     03/31/19  0805 03/31/19  1410 04/01/19  0529   WBC 9.3 9.4 7.4   HGB 7.6* 8.2* 7.2*   HCT 23.5* 25.0* 21.6*   * 159 133*     Recent Labs     03/30/19  0520 03/31/19  0805 04/01/19  0529    140 136   K 3.8 3.3* 2.9*   * 105 101   CO2 22 24 28   BUN 13 13 8   CREATININE <0.5* <0.5* <0.5*   GLUCOSE 79 76 113*   MG  --  1.60* 1.40*

## 2019-04-01 NOTE — CARE COORDINATION
4/1/19 Pt's wife does not feel comfortable taking him home at this time. The ultimate goal is home after therapy she requested referral be made to [facility]. Referrals made.  Will follow

## 2019-04-01 NOTE — PROGRESS NOTES
REQUIRES PT FOLLOW UP: Yes  Activity Tolerance  Activity Tolerance: Patient Tolerated treatment well;Patient limited by pain; Patient limited by endurance       AM-PAC Score  AM-PAC Inpatient Mobility Raw Score : 16  AM-PAC Inpatient T-Scale Score : 40.78  Mobility Inpatient CMS 0-100% Score: 54.16  Mobility Inpatient CMS G-Code Modifier : CK          Goals  Short term goals  Time Frame for Short term goals: 1 week (unless otherwise specified)  Short term goal 1: Pt will transfer supine <-> sit with modified(I)  Short term goal 2: Pt will transfer sit <-> stand and bed>chair using RW or least AD with supervision  Short term goal 3: Pt will ambulate x 150 feet using RW or least AD with supervision  Short term goal 4: By 3/20/19: Pt will tolerate 12-15 reps BLE exercise for strengthening, balance, and endurance  Short term goal 5: Pt will ambulate up/down 2 steps without use of handrails (using appropriate AD as needed) with SBA  Patient Goals   Patient goals : \"To get my strength back and go home at discharge. \"    Plan    Plan  Times per week: 3-5x/week in acute care  Times per day: Daily  Specific instructions for Next Treatment: Progress ther ex and mobility as tolerated  Current Treatment Recommendations: Strengthening, Gait Training, Balance Training, Stair training, Functional Mobility Training, ROM, Endurance Training, Transfer Training, Safety Education & Training, Patient/Caregiver Education & Training, Equipment Evaluation, Education, & procurement  Safety Devices  Type of devices:  All fall risk precautions in place, Call light within reach, Gait belt, Left in chair, Nurse notified     Therapy Time   Individual Concurrent Group Co-treatment   Time In 1153         Time Out 1222         Minutes 130 [facility], [facility]0 [facility]

## 2019-04-01 NOTE — PROGRESS NOTES
Roma Rose had a CXR done earlier. here is the result: Nonspecific consolidation mid and lower left lung with left pleural effusion  presumably related to sequela from pulmonary edema. Pneumonia is a  consideration. Calcific atherosclerosis aorta. Cardiomegaly. I paged Dr. Denney Slovenian he wants me to page the hospitalist to see if you want to do any changes since he is on abx (Merrem) at this time    Waiting for response.

## 2019-04-01 NOTE — PROGRESS NOTES
Called to bedside by primary RN, SpO2 83-84% on 6L O2. Pt immediately switched to high flow cannula and PCA pump placed on hold, as EtCO2 monitor not compatible with high flow. Able to get SpO2 to 90% on 9L, pt stated to be mouth breather but during writer's time in room his mouth was closed. Discussion with pt and wife about next steps including paging MD to discuss, possible CXR, getting PCA pump switched over to IVP medication since lack of EtCO2 monitoring disables PCA, and other testing as ordered by physician. After above discussion pt states \"I got an idea, let's call a doctor! \" Writer reiterated that we would be speaking with physician immediately. Wife then states \". ..but we're going to address it tonight right? Not wait until morning? \" Reassurance again provided that nursing would be paging immediately to address concerns with MD.

## 2019-04-01 NOTE — PROGRESS NOTES
[facility]   Daily Cardiovascular Progress Note    Admit Date: 3/7/2019    Chief complaint: NSVT  HPI: doing ok, passing gas        Medications/Labs all Reviewed:  Patient Active Problem List   Diagnosis    Elevated fasting glucose    CAD (coronary artery disease)    PAD (peripheral artery disease) ([address]    Essential hypertension    Mixed hyperlipidemia    Sprain of right foot    Sprain of anterior talofibular ligament of right ankle    Closed nondisplaced fracture of fifth right metatarsal bone    Closed fracture of proximal lateral malleolus of ankle with routine healing    Right foot pain    Right ankle pain    Nondisplaced fracture of fifth right metatarsal bone with routine healing    History of pulmonary emphysema    Chronic obstructive pulmonary disease with (acute) exacerbation (HCC)    Pulmonary nodule, left    Abnormal bone scan of thoracic spine    Back pain    Visit for monitoring Plavix therapy    History of ventricular tachycardia    Gallstones    Hemoptysis    Former smoker    Elevated PSA    Adenocarcinoma of left lung ([address]    Acute diverticulitis    Pancytopenia ([address]    Diverticulitis of large intestine with perforation and abscess without bleeding    Diverticulitis    Chemotherapy-induced neutropenia (HCC)    Mucositis    Herpes zoster oticus    Hearing loss of right ear    Acute respiratory failure with hypoxia ([address]    H/O colectomy    Primary malignant neoplasm of lung metastatic to other site [facility])    NSVT (nonsustained ventricular tachycardia) (HCC)    Other pulmonary embolism without acute cor pulmonale (HCC)    Cardiomyopathy (HCC)       Medications:    magnesium sulfate 2 GM/50ML infusion    potassium chloride (KLOR-CON M) extended release tablet 10 mEq TID   magnesium sulfate 4 g in 100 mL IVPB premix Once   oxyCODONE (ROXICODONE) immediate release tablet 5 mg Q4H PRN   Or    oxyCODONE (ROXICODONE) immediate release tablet 10 mg Q4H PRN   acyclovir (ZOVIRAX) tablet 800 mg TID   heparin (porcine) injection 4,800 Units PRN   heparin (porcine) injection 2,400 Units PRN   HYDROmorphone (DILAUDID) injection 1 mg Q2H PRN   heparin 25,000 units in dextrose 5% 250 mL infusion Continuous   vancomycin (VANCOCIN) 750 mg in dextrose 5 % 250 mL IVPB Q18H   metoprolol succinate (TOPROL XL) extended release tablet 50 mg Daily   meropenem (MERREM) 1 g in sodium chloride 0.9 % 100 mL IVPB (mini-bag) Q8H   menthol-zinc oxide (CALMOSEPTINE) 0.44-20.625 % ointment BID PRN   amiodarone (CORDARONE) tablet 200 mg Daily   pantoprazole (PROTONIX) injection 40 mg Daily   aspirin chewable tablet 81 mg Daily   sodium chloride flush 0.9 % injection 10 mL 2 times per day   sodium chloride flush 0.9 % injection 10 mL PRN   simvastatin (ZOCOR) tablet 40 mg Nightly   magic (miracle) mouthwash with nystatin 4x Daily PRN   glucose (GLUTOSE) 40 % oral gel 15 g PRN   dextrose 50 % solution 12.5 g PRN   glucagon (rDNA) injection 1 mg PRN   dextrose 5 % solution PRN   folic acid (FOLVITE) tablet 1 mg Daily   oyster shell calcium/vitamin D 250-125 MG-UNIT per tablet 250 mg Daily   ondansetron (ZOFRAN) injection 4 mg Q6H PRN   acetaminophen (TYLENOL) tablet 650 mg Q4H PRN          PHYSICAL EXAM   /67   Pulse 92   Temp 97.6 °F (36.4 °C) (Oral)   Resp 18   Ht 5' 9\" (1.753 m)   Wt 132 lb 4.4 oz (60 kg)   SpO2 99%   BMI 19.53 kg/m²    Vitals:    04/01/19 0439 04/01/19 0752 04/01/19 0840 04/01/19 1210   BP: 120/68  139/79 105/67   Pulse: 93  93 92   Resp: 22 22 20 18   Temp: 97.9 °F (36.6 °C)  97.6 °F (36.4 °C) 97.6 °F (36.4 °C)   TempSrc: Oral  Oral Oral   SpO2: 91% 90%  99%   Weight:       Height:             Intake/Output Summary (Last 24 hours) at 4/1/2019 1307  Last data filed at 4/1/2019 1102  Gross per 24 hour   Intake 840.6 ml   Output 1340 ml   Net -499.4 ml     Wt Readings from Last 3 Encounters:   03/31/19 132 lb 4.4 oz (60 kg)   02/28/19 184 lb 9.6 oz (83.7 kg) 02/19/19 183 lb (83 kg)         Gen: Patient in NAD, resting comfortably  Neck: No JVD or bruits  Respiratory: CTAB no WRR  Chest: normal without deformity  Cardiovascular:RRR, S1S2, no mrg, normal PMI  Abdomen: Soft, mildly tender  Extremities: No clubbing, cyanosis, or edema  Neurological/Psychiatric: AxO x4, no gross issues  Skin:  Warm and dry      Labs:  CBC:   Recent Labs     03/31/19  0805 03/31/19  1410 04/01/19  0529   WBC 9.3 9.4 7.4   HGB 7.6* 8.2* 7.2*   HCT 23.5* 25.0* 21.6*   MCV 89.0 88.4 86.1   * 159 133*     BMP:   Recent Labs     03/30/19  0520 03/31/19  0805 04/01/19  0529    140 136   K 3.8 3.3* 2.9*   * 105 101   CO2 22 24 28   BUN 13 13 8   CREATININE <0.5* <0.5* <0.5*     MG:    Recent Labs     03/31/19  0805 04/01/19  0529   MG 1.60* 1.40*      PT/INR: No results for input(s): PROTIME, INR in the last 72 hours. APTT:   Recent Labs     03/31/19  1410 03/31/19  2200 04/01/19  0529   APTT 32.4 51.3* 57.8*     Cardiac Enzymes:   No results for input(s): CKTOTAL, CKMB, CKMBINDEX, TROPONINI in the last 72 hours. Cardiac Studies:    ECHO   Definity contrast was used, but port not working properly.  Ganga Rodriguez left ventricular systolic function is moderately reduced with an   ejection fraction of 30-35 %. Inferolateral hypokinesia. Mid anteroseptal,   apical lateral and apical septal akinesia.   There is mild concentric left ventricular hypertrophy.   Normal left ventricular filling pressure.   Left ventricular cavity size is normal.   Mild thickening of the anterior leaflet of mitral valve.   Mild mitral regurgitation.   The left atrium is severely dilated.       Assessment and Plan   1. NSVT   - Known hx of VT s/p ICD, on Amio  2. New  PE 3/16/2019   - RV is normal  3. CAD   - s/p PCI to RCA 6/2011   - hx of PCi to LAD (post endograft AAA repair MI)1/2011  4. Ischemic cardiomyopathy: LVEF 30-35%  5. HTN  6. Hx of aortic aneurysm repair   - Per Dr. Sandra Alexandre, on ASA/plavix for this  7. Hx of sigmoid colectomy for perf diverticulosis 3/14/2019   - Laparoscopic converted to Open left Colectomy   s/p Gerda's procedure for anastomotic leak   8. Metastatic lung CA, stage 4 with bone mets  9. Hypokalemia  10. Hypomag        PLAN  1. Agree with 4 gram mag  2. Aggressive KCL replace  3. Cardiac cath likely as outpt once surgical issues resolve  4. Cont ASA ,PO lasix, lisinopril, toprol, zocor, aldactone  5. Cont outpt Amio for SVT/VT (TSH normal)  6. HOLD diuresis until hypokalemia is improved  7. Plavix is NOT currently on board for cardiac reason, will defer use to Vascular   - however given need for anticoagulation and Plavix, would hold ASA to prevent triple tx.     Patient Active Problem List   Diagnosis    Elevated fasting glucose    CAD (coronary artery disease)    PAD (peripheral artery disease) (HCC)    Essential hypertension    Mixed hyperlipidemia    Sprain of right foot    Sprain of anterior talofibular ligament of right ankle    Closed nondisplaced fracture of fifth right metatarsal bone    Closed fracture of proximal lateral malleolus of ankle with routine healing    Right foot pain    Right ankle pain    Nondisplaced fracture of fifth right metatarsal bone with routine healing    History of pulmonary emphysema    Chronic obstructive pulmonary disease with (acute) exacerbation (HCC)    Pulmonary nodule, left    Abnormal bone scan of thoracic spine    Back pain    Visit for monitoring Plavix therapy    History of ventricular tachycardia    Gallstones    Hemoptysis    Former smoker    Elevated PSA    Adenocarcinoma of left lung ([address]    Acute diverticulitis    Pancytopenia ([address]    Diverticulitis of large intestine with perforation and abscess without bleeding    Diverticulitis    Chemotherapy-induced neutropenia (HCC)    Mucositis    Herpes zoster oticus    Hearing loss of right ear    Acute respiratory failure with hypoxia ([address]    H/O colectomy    Primary malignant neoplasm of lung metastatic to other site [facility])    NSVT (nonsustained ventricular tachycardia) (HCC)    Other pulmonary embolism without acute cor pulmonale ([facility]    Cardiomyopathy ([facility]         Thank you for allowing me to participate in the care of your patient. Please call me with any questions [phone].       Ruba Prince MD, [address] Cardiologist  [address]  [phone] [facility]  [phone] [address] [place] [address]  4/1/2019 1:07 PM

## 2019-04-01 NOTE — PROGRESS NOTES
[facility] GENERAL SURGERY    Surgery Progress Note           POD # 18/5    PATIENT NAME: Juan Diego Churchill     TODAY'S DATE: 4/1/2019    INTERVAL HISTORY:    Pt  states he feels better. He is having some gas and stool in his ostomy bag. OBJECTIVE:   VITALS:  /79   Pulse 93   Temp 97.6 °F (36.4 °C) (Oral)   Resp 20   Ht 5' 9\" (1.753 m)   Wt 132 lb 4.4 oz (60 kg)   SpO2 90%   BMI 19.53 kg/m²     INTAKE/OUTPUT:    I/O last 3 completed shifts: In: 480.6 [P.O.:480; I.V.:0.6]  Out: 1515 [Urine:925; Drains:290; Stool:300]  I/O this shift:  In: 240 [P.O.:240]  Out: -               CONSTITUTIONAL:  awake and alert  ABDOMEN:   normal bowel sounds, soft, DIAN drainage serosanguineous, ostomy with gas and stool   INCISION: Clean with minimal periumbilical drainage    Data:  CBC:   Recent Labs     03/31/19  0805 03/31/19  1410 04/01/19  0529   WBC 9.3 9.4 7.4   HGB 7.6* 8.2* 7.2*   HCT 23.5* 25.0* 21.6*   * 159 133*     BMP:    Recent Labs     03/30/19  0520 03/31/19  0805 04/01/19  0529    140 136   K 3.8 3.3* 2.9*   * 105 101   CO2 22 24 28   BUN 13 13 8   CREATININE <0.5* <0.5* <0.5*   GLUCOSE 79 76 113*     Hepatic: No results for input(s): AST, ALT, ALB, BILITOT, ALKPHOS in the last 72 hours. Mag:      Recent Labs     03/31/19  0805 04/01/19  0529   MG 1.60* 1.40*      Phos:   No results for input(s): PHOS in the last 72 hours. INR: No results for input(s): INR in the last 72 hours. ASSESSMENT AND PLAN:  69 y.o. male status post left colectomy with return for [facility]. Advance diet to full liquid. Continue IV antibiotics.          Electronically signed by Charlene Minor MD

## 2019-04-01 NOTE — CONSULTS
Pharmacy to Dose Vancomycin    Dx: HCAP? Goal trough = 15-20 mcg/mL  Pt wt = 60 kg  CrCl cannot be calculated (This lab value cannot be used to calculate CrCl because it is not a number: <0.5). Start Vancomycin 750 mg IVPB q18h tomorrow at. Vancomycin trough prior to 4th dose (4/3 at 0500). Ashley Cabrera PharmD  3/31/2019 11:12 PM    4/3  Vanc trough = 5.7 mcg/mL at 0451. Will increase vancomycin to 1000 mg q12. Recheck vanc trough prior to the 4th dose (4/4 at 1800).   Ashley Cabrera PharmHALEY  4/3/2019 7:09 AM    4/5  Trough drawn an hour and a half early, was 13.5 mcg/mL @ 0425  Continue current dosing, will monitor  Terence Jarquin PharmD 04/05/19 8:13 AM

## 2019-04-01 NOTE — PROGRESS NOTES
The following Perfect Serv message sent to [facility] - EAT CNP. [address] or Facility: A From: Bobbi Enter RE: Isauro Hummel 1945 RM: 385 pt having increased oxygen demand. was on 6 liters with sats in the low 90's, then he started dropping into the high 70's - 80's. he is on a PCA pump. we put him on 9 liters high flow and he is just sitting at 90%. Can we dc the pca pump and get Dilaudid IVP so we can keep him on the high flow? and can we get a CXR? Thanks, Elis.  Please call Celi Argueta Need Callback: NEEDS CALLBACK C4 PROGRESSIVE CARE  Unread

## 2019-04-01 NOTE — PROGRESS NOTES
Hospitalist Progress Note      PCP: Carleen Nguyen MD    Date of Admission: 3/7/2019    Chief Complaint: Abdominal pain     Hospital Course:   69 y. o. male who presented to [facility] with LLQ abdominal pain. Pt was recently d/c'd for diverticulitis, states pain never went away. C/o low grade fevers for 2 weeks. States pain not as severe as previous admission. Had chemo on Friday, states received neulasta. Denies cp, n/v, sob. Subjective:   Pt is sitting up in bed. On 10 liters HFNC. Afebrile. VSS. He denies dyspnea or chest pain. No N/V. + flatus per ostomy.      Medications:  Reviewed    Infusion Medications    heparin (porcine) 12 mL/hr (04/01/19 0851)    dextrose 100 mL/hr (03/08/19 1700)     Scheduled Medications    potassium chloride  10 mEq Oral TID    magnesium sulfate  4 g Intravenous Once    magnesium sulfate        acyclovir  800 mg Oral TID    vancomycin  750 mg Intravenous Q18H    metoprolol succinate  50 mg Oral Daily    meropenem  1 g Intravenous Q8H    amiodarone  200 mg Oral Daily    pantoprazole  40 mg Intravenous Daily    aspirin  81 mg Oral Daily    sodium chloride flush  10 mL Intravenous 2 times per day    simvastatin  40 mg Oral Nightly    folic acid  1 mg Oral Daily    oyster shell calcium/vitamin D  1 tablet Oral Daily     PRN Meds: oxyCODONE **OR** oxyCODONE, heparin (porcine), heparin (porcine), HYDROmorphone, menthol-zinc oxide, sodium chloride flush, magic (miracle) mouthwash with nystatin, glucose, dextrose, glucagon (rDNA), dextrose, ondansetron, acetaminophen      Intake/Output Summary (Last 24 hours) at 4/1/2019 1532  Last data filed at 4/1/2019 1353  Gross per 24 hour   Intake 600 ml   Output 1745 ml   Net -1145 ml       Physical Exam Performed:    /67   Pulse 92   Temp 97.6 °F (36.4 °C) (Oral)   Resp 18   Ht 5' 9\" (1.753 m)   Wt 132 lb 4.4 oz (60 kg)   SpO2 99%   BMI 19.53 kg/m²     General appearance: No apparent distress, appears stated age and cooperative. HEENT: Pupils equal, round, and reactive to light. Conjunctivae/corneas clear. Neck: Supple, with full range of motion. No jugular venous distention. Trachea midline. Respiratory:  Normal respiratory effort. Clear to auscultation, bilaterally without Rales/Wheezes/Rhonchi. Cardiovascular: Regular rate and rhythm with normal S1/S2 without murmurs, rubs or gallops. Abdomen: Soft, non-tender, non-distended with normal bowel sounds. Musculoskeletal: No clubbing, cyanosis or edema bilaterally. Full range of motion without deformity. Skin: Skin color, texture, turgor normal.  No rashes or lesions. Neurologic:  Neurovascularly intact without any focal sensory/motor deficits. Cranial nerves: II-XII intact, grossly non-focal.  Psychiatric: Alert and oriented, thought content appropriate, normal insight  Capillary Refill: Brisk,< 3 seconds   Peripheral Pulses: +2 palpable, equal bilaterally       Labs:   Recent Labs     03/31/19  0805 03/31/19  1410 04/01/19  0529   WBC 9.3 9.4 7.4   HGB 7.6* 8.2* 7.2*   HCT 23.5* 25.0* 21.6*   * 159 133*     Recent Labs     03/30/19  0520 03/31/19  0805 04/01/19  0529    140 136   K 3.8 3.3* 2.9*   * 105 101   CO2 22 24 28   BUN 13 13 8   CREATININE <0.5* <0.5* <0.5*   CALCIUM 7.8* 7.7* 7.2*       Urinalysis:      Lab Results   Component Value Date    NITRU Negative 03/10/2019    WBCUA 3-5 03/10/2019    RBCUA 0-2 03/10/2019    BLOODU Negative 03/10/2019    SPECGRAV 1.025 03/10/2019    GLUCOSEU Negative 03/10/2019       Radiology:  XR CHEST PORTABLE   Final Result   Nonspecific consolidation mid and lower left lung with left pleural effusion   presumably related to sequela from pulmonary edema. Pneumonia is a   consideration. Calcific atherosclerosis aorta. Cardiomegaly. XR ABDOMEN (KUB) (SINGLE AP VIEW)   Final Result   Dilated small bowel, may reflect ileus or obstruction. Recommend follow-up   study.          CT ABDOMEN PELVIS WO CONTRAST Additional Contrast? None   Final Result   Interval increase in size of the left lower quadrant collection which now   measures up to 7.8 cm. Administered rectal contrast extends into the   collection. Right anterior approach drain courses adjacent to the collection. No significant change in pleural effusions and associated airspace disease. The findings were sent to the Radiology Results [address] at 12:53   pm on 3/26/2019to be communicated to a licensed caregiver. FL BARIUM ENEMA   Final Result   Active extravasation of contrast from the sigmoid colon, compatible with   anastomotic leak. Please refer to accompanying CT. Diverticulosis. CT ABDOMEN PELVIS W IV CONTRAST Additional Contrast? Radiologist Recommendation   Final Result   1. The patient is 11 days status post partial sigmoid colectomy. Surgical   drain remains in place with a small ill-defined fluid collection that is   stable from prior exam.  This does not appear to represent an abscess and may   represent postoperative hematoma or seroma. 2. There is a new collection of free intraperitoneal air immediately anterior   to the prior anastomosis site, contained within the left lower quadrant. This indicates an anastomosis leak. No distant free air. 3. 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 4. Cholelithiasis with no evidence of acute cholecystitis. 5. Worsening small pleural effusions and airspace changes in the lower chest.   Pattern may represent underlying pulmonary edema or perhaps pneumonitis. XR CHEST PORTABLE   Final Result   Interval development of mild pulmonary edema. There is a small left-sided pleural effusion with more focal consolidation   the left lung base, atelectasis versus pneumonia. IR PICC WO SQ PORT/PUMP > 5 YEARS   Final Result   Successful placement of PICC line.          CT ABDOMEN PELVIS WO CONTRAST Additional Contrast? None   Final Result   No evidence retroperitoneal hematoma. Interval colonic resection. Small volume loculated collection in the left   pericolic gutter, with surgical drain terminating adjacent. Mildly dilated loops of small bowel with scattered air-fluid levels, most   likely postoperative ileus. Trace pleural effusions and bibasilar airspace disease representing   atelectasis or pneumonia. VL Extremity Venous Bilateral   Final Result      CT CHEST PULMONARY EMBOLISM W CONTRAST   Final Result   Bilateral pulmonary emboli      Scattered opacities throughout the lungs, greatest at the lung bases, with   tiny pleural effusions. Changes are increased compared to prior      Sclerotic osseous metastatic disease as described previously      The findings were sent to the Radiology Results [address] at 3:57   pm on 3/16/2019to be communicated to a licensed caregiver. XR CHEST 1 VW   Final Result   Shallow inflation with findings consistent with basilar atelectasis. Left   pleural thickening versus trace effusion. CT CHEST ABDOMEN PELVIS WO CONTRAST   Final Result   Perforated sigmoid diverticulitis and pneumoperitoneum unchanged. Stable   left [facility] colonic air-fluid collection may represent an intramural or   extramural abscess or giant diverticulum. Infrarenal abdominal aortic aneurysm and aorto bi-iliac stent. Other incidental findings as above. CT ABDOMEN PELVIS W IV CONTRAST Additional Contrast? None   Final Result   Acute perforated descending colon diverticulitis. There is free air in the   abdomen and there is a small extraluminal gas and fluid collection/abscess   adjacent to the descending colon measuring 4.1 cm in AP diameter and 6 cm in   length. There is diverticulosis of the entire colon. Cholelithiasis.       4.7 cm infrarenal abdominal aortic aneurysm status post endograft repair with   a patent aorto bi-iliac graft. There is suspicion of an endoleak with blush   of contrast enhancement suspected in the inferior aspect of the native   aneurysm. As a noncontrast study was not performed, calcification of the   intraluminal thrombus accounting for the apparent enhancement is not   excluded. 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 Comparison with a noncontrast CT examination would be   helpful. Critical results were called by Dr. Shanae Campbell. MD [facility] on   3/7/2019 at 20:35. CT CHEST WO CONTRAST    (Results Pending)       Assessment/Plan:    Active Hospital Problems    Diagnosis Date Noted    Cardiomyopathy ([address] [I42.9]     NSVT (nonsustained ventricular tachycardia) ([facility]) [I47.2]     Other pulmonary embolism without acute cor pulmonale ([facility]) [I26.99]     Acute respiratory failure with hypoxia ([address] [J96.01]     H/O colectomy [Z90.49]     Primary malignant neoplasm of lung metastatic to other site ([facility]) [C34.90]     Herpes zoster oticus [B02.21]     Hearing loss of right ear [H91.91]     Chemotherapy-induced neutropenia ([facility]) [D70.1, T45.1X5A]     Mucositis [K12.30]     Diverticulitis [K57.92] 03/07/2019    Diverticulitis of large intestine with perforation and abscess without bleeding [K57.20]        Acute perforated recurrent diverticulitis/pneumoperitoneum/abscess in setting of immunosuppression due to chemotherapy:   - CT A/P on admission demonstrated acute perforated colon diverticulitis with free air and fluid collection.   - General surgery consulted. Pt is status post laparoscopic converted to open left colectomy on 3/14/19 with return for Harding's procedure with colostomy placement on    - Continue IV Merrem and Vanc. Completed course of Diflucan  - Continue prn pain control.      Acute hypoxic respiratory failure due to bilateral pulmonary emboli:   - CT chest 3/16 showed acute PE. Lower extremity dopplers +DVT. Vascular surgery consulted, no indication for IVC filter at this time. - Wean supplemental O2 as tolerated. He is not normally on home O2. He is currently requiring 10 liters HFNC. Pulmonary re-consulted given increasing FIO2 requirements. Obtain chest CT.  - Continue heparin infusion. Will clarify with Gen Surg if okay for heparin bolus.      Metastatic lung cancer, stage 4 with bone mets:  - Hem/onc is following. Chemo on hold to allow for surgical healing.      Thrombocytopenia (resolved) now with hypercoagulable state:  - Platelets required preoperatively. Monitor daily CBC.      Neutropenia due to chemotherapy (resolved):  - S/p Neulasta on 3/1. S/p Granix on 3/8.      Anemia:  -  Pt is s/p 1 unit PBRC for Hgb 6.6 on 3/19 and 1 unit for Hgb 7.2 on 3/22. No overt signs of bleeding. CT A/P showed no RP bleed. PPI added per Hem/Onc. Pt is s/p B12 injection. Stool for occult blood was +. GI consulted, s/p EGD on 3/21 which was normal. Monitor daily CBC. H&H is stable.      PAD/CAD/HTN/Cardiomyopathy:   - Defer antiplatelet management to hem/onc given the complexity of his present situation, bleeding risk and thrombotic states. Pt was restarted on integrillin infusion on 3/20 per Hem/Onc and was dc'd on 3/24.   - ECHO showed reduced EF of 30-35%. Previous ECHO showed EF 40-45%. - Cardiology is following. Continue on aspirin, lisinopril, aldactone, Toprol and statin. Lasix prn.  - Cards recommending LHC given drop in EF but would like to wait 4-6 weeks post PE on AC.      NSVT:   - Known history of VT s/p ICD. Went into VT on 3/12, PMD went off. Continue amiodarone. Keep K > 4 and Mag > 2.     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 Nephrology following. Monitor daily BMP.      Hypokalemia/hypomagnesemia:  - Monitor and replete as needed.      Zoster oticus:  - Resumed acyclovir 3/31.       DVT Prophylaxis: Pt is on heparin infusion   Diet: DIET FULL LIQUID;  Dietary Nutrition Supplements: Standard High Calorie Oral Supplement  Code Status: Full Code    PT/OT Eval Status:  Will re-order tomorrow     Dispo - Uncertain     Taryn Alejandra, APRN - CNP

## 2019-04-01 NOTE — PLAN OF CARE
Nutrition Problem: Severe malnutrition  Intervention: Food and/or Nutrient Delivery: Continue current diet, Start ONS  Nutritional Goals: Pt will tolerate diet advancement per General surgery
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
## 2019-04-01 NOTE — PROGRESS NOTES
Dr. Mercado Proper returned page with phone call, requesting to page the hospitalist to see if there is a need for any changes since pat  is on abx (Merrem) at this time. Will page [place] .
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
## 2019-04-01 NOTE — FLOWSHEET NOTE
03/31/19 2027   Vital Signs   Temp 99 °F (37.2 °C)   Temp Source Oral   Pulse 98   Heart Rate Source Monitor   Resp 24   BP (!) 143/75   BP Location Right upper arm   BP Upper/Lower Upper   MAP (mmHg) 98   Patient Position Semi fowlers   Level of Consciousness 0   MEWS Score 2   Patient Currently in Pain Denies   Oxygen Therapy   SpO2 (!) 87 %   O2 Device High flow nasal cannula   O2 Flow Rate (L/min) 9 L/min  (Increased to 10)   Patient has a sudden O2 demand roght at shift changed, placed on 9L of O2 still sating<90. [facility] is paged. [facility] returned call and recommended to page Dr. Melinda Carvalho. Patient is a mouth breather is encouraged to take deep breathe in nose and out mouth.  Will cont' to monitor

## 2019-04-01 NOTE — PROGRESS NOTES
Nutrition Assessment    Type and Reason for Visit: Reassess    Nutrition Recommendations:   · Continue full liquid diet, advance as tolerated per General Surgery  · Start Ensure TID  · Monitor and encourage PO intakes  · Please re-weight pt, ( prefer standing scale if able)  · Monitor nutrition adequacy, weights, pertinent labs, BMs and clinical progress    Nutrition Assessment: Follow up: Pt nutrition status slightly improved AEB advancement to full liquids this AM and pt report of improving appetite. However, remains at risk for nutrition decline d/t poor nutrition intake this admission and pt with increased nutritional needs d/t catabolic illness. Will order Ensure, pt is favorable. Reports + output in ostomy. Encouraged liquids as tolerated to improve nutrition status, promote weight gain. CBW of 132 lb ( bed scale), question accuracy. Pt believes CBW is in 170s. Please re-weigh as able. Will continue to monitor nutrition status. Malnutrition Assessment:  · Malnutrition Status: Meets the criteria for severe malnutrition  · Context: Acute illness or injury  · Findings of the 6 clinical characteristics of malnutrition (Minimum of 2 out of 6 clinical characteristics is required to make the diagnosis of moderate or severe Protein Calorie Malnutrition based on AND/ASPEN Guidelines):  1. Energy Intake-Less than or equal to 75% of estimated energy requirement, Greater than or equal to 1 month    2. Weight Loss-20% loss or greater, in 1 month  3. Fat Loss-Unable to assess,    4. Muscle Loss-Unable to assess, Thigh (quadriceps), Clavicles (pectoralis and deltoids)  5. Fluid Accumulation-No significant fluid accumulation,    6.   Strength-Not measured    Nutrition Risk Level: High    Nutrient Needs:  · Estimated Daily Total Kcal: 5964-5258  · Estimated Daily Protein (g): 109-131  · Estimated Daily Total Fluid (ml/day): 1 mL/kcal     Nutrition Diagnosis:   · Problem: Severe malnutrition  · Etiology: related to Insufficient energy/nutrient consumption, Alteration in GI function, Increased demand for energy/nutrients     Signs and symptoms:  as evidenced by Intake 0-25%, Intake 25-50%, Weight loss    Objective Information:  · Nutrition-Focused Physical Findings: New ostomy, +gas and stool output  · Wound Type: Surgical Wound(redness; scabs, wound care seeing patient)  · Current Nutrition Therapies:  · Oral Diet Orders: Full Liquid   · Oral Diet intake: Unable to assess, 51-75%(one intake so far since diet advanced)  · Oral Nutrition Supplement (ONS) Orders: None  · Anthropometric Measures:  · Ht: 5' 9\" (175.3 cm)   · Current Body Wt: 132 lb (59.9 kg)(questions accuracy)  · Admission Body Wt: 191 lb (86.6 kg)  · Usual Body Wt: 194 lb (88 kg)(in january )  · % Weight Change:  -12 lbs (6.2% wt loss) x past 2 months.    · Ideal Body Wt: 160 lb (72.6 kg)   · BMI Classification: BMI 25.0 - 29.9 Overweight    Nutrition Interventions:   Continue current diet, Start ONS  Continued Inpatient Monitoring    Nutrition Evaluation:   · Evaluation: Progressing toward goals   · Goals: Pt will tolerate diet advancement per General surgery     · Monitoring: Nutrition Progression, Meal Intake, Supplement Intake, Diet Tolerance, Weight, Pertinent Labs      Electronically signed by Jose Ortiz RD, LD on 4/1/19 at 11:46 AM    Contact Number: Office: [phone]; [address] [place] [address] Road: [phone]

## 2019-04-01 NOTE — PROGRESS NOTES
PT working with patient. Wife not present. Therapy to get patient in chair. Will revisit later today. Plan to change appliance today. Bag currently full of air. Burped.

## 2019-04-01 NOTE — PROGRESS NOTES
INPATIENT PULMONARY CRITICAL CARE PROGRESS NOTE      Reason for visit: Patient seen by us earlier during this hospitalization for acute respiratory failure related to pulmonary embolism, also had DVT. We had signed off as the patient was stable. Repeat consult placed by Dr. Camden Julio for possible HCAP. SUBJECTIVE: The patient remains afebrile and hemodynamically stable. He had been on oxygen at 6 LPM on 3/30, has had increased oxygen requirement since yesterday, up to 10 LPM today. The patient is not having much expectoration. After his initial surgery on 3/14/19, he had to go back to the OR on 3/27/19 for anastomotic leak, underwent a Gerda procedure. Physical Exam:  Blood pressure 139/79, pulse 93, temperature 97.6 °F (36.4 °C), temperature source Oral, resp. rate 20, height 5' 9\" (1.753 m), weight 132 lb 4.4 oz (60 kg), SpO2 90 %.'   Constitutional:  Ill-appearing, sitting up in a chair,underweight. No acute distress. HENT:  Oropharynx is clear and dry. Eyes:  Conjunctivae are normal. Pupils equal, round, and reactive to light. No scleral icterus. Wearing glasses. Neck: No JVD. No tracheal deviation present. No obvious thyroid mass. Cardiovascular: Normal rate, regular rhythm, normal heart sounds. No right ventricular heave. No lower extremity edema. Pulmonary/Chest: Diminished air entry left hemithorax, few scattered crackles. No accessory muscle usage or stridor. Abdominal: Soft, distended, no tenderness. Musculoskeletal: No cyanosis. No clubbing. No obvious joint deformity. Lymphadenopathy: Deferred. Skin: Skin is warm and dry. No rash or nodules on the exposed extremities. Psychiatric: Normal mood and affect. Behavior is normal.  No anxiety. Neurologic: Alert, awake and oriented. PERRL.   Speech fluent        Results:  CBC:   Recent Labs     03/31/19  0805 03/31/19  1410 04/01/19  0529   WBC 9.3 9.4 7.4   HGB 7.6* 8.2* 7.2*   HCT 23.5* 25.0* 21.6*   MCV 89.0 88.4 86.1 * 159 133*     BMP:   Recent Labs     03/30/19  0520 03/31/19  0805 04/01/19  0529    140 136   K 3.8 3.3* 2.9*   * 105 101   CO2 22 24 28   BUN 13 13 8   CREATININE <0.5* <0.5* <0.5*     LIVER PROFILE: No results for input(s): AST, ALT, LIPASE, BILIDIR, BILITOT, ALKPHOS in the last 72 hours. Invalid input(s): AMYLASE,  ALB  PT/INR: No results for input(s): PROTIME, INR in the last 72 hours. APTT:   Recent Labs     03/31/19  1410 03/31/19  2200 04/01/19  0529   APTT 32.4 51.3* 57.8*     UA:No results for input(s): NITRITE, COLORU, PHUR, LABCAST, WBCUA, RBCUA, MUCUS, TRICHOMONAS, YEAST, BACTERIA, CLARITYU, SPECGRAV, LEUKOCYTESUR, UROBILINOGEN, BILIRUBINUR, BLOODU, GLUCOSEU, AMORPHOUS in the last 72 hours. Invalid input(s): KETONESU    Imaging:  I have reviewed radiology images personally. XR CHEST PORTABLE   Final Result   Nonspecific consolidation mid and lower left lung with left pleural effusion   presumably related to sequela from pulmonary edema. Pneumonia is a   consideration. Calcific atherosclerosis aorta. Cardiomegaly. XR ABDOMEN (KUB) (SINGLE AP VIEW)   Final Result   Dilated small bowel, may reflect ileus or obstruction. Recommend follow-up   study. CT ABDOMEN PELVIS WO CONTRAST Additional Contrast? None   Final Result   Interval increase in size of the left lower quadrant collection which now   measures up to 7.8 cm. Administered rectal contrast extends into the   collection. Right anterior approach drain courses adjacent to the collection. No significant change in pleural effusions and associated airspace disease. The findings were sent to the Radiology Results [address] at 12:53   pm on 3/26/2019to be communicated to a licensed caregiver. [place] BARIUM ENEMA   Final Result   Active extravasation of contrast from the sigmoid colon, compatible with   anastomotic leak. Please refer to accompanying CT. Contrast? None    Result Date: 3/19/2019  EXAMINATION: CT OF THE ABDOMEN AND PELVIS WITHOUT CONTRAST 3/19/2019 10:27 am TECHNIQUE: CT of the abdomen and pelvis was performed without the administration of intravenous contrast. Multiplanar reformatted images are provided for review. Dose modulation, iterative reconstruction, and/or weight based adjustment of the mA/kV was utilized to reduce the radiation dose to as low as reasonably achievable. COMPARISON: CT abdomen and pelvis without contrast 03/10/2019. HISTORY: ORDERING SYSTEM PROVIDED HISTORY: rule out post op bleed, retroperitoneal hematoma TECHNOLOGIST PROVIDED HISTORY: Additional Contrast?->None Ordering Physician Provided Reason for Exam: eval  bleeding post op colon surgery, no new pain Acuity: Acute Type of Exam: Initial FINDINGS: Lower Chest: Trace pleural effusions. Patchy bibasilar ground-glass and consolidation. Small hiatal hernia. Organs: Evaluation of the intra-abdominal solid and hollow viscera is limited without IV contrast. No focal liver lesion is visible. There is no evidence of biliary ductal dilation. Cholelithiasis. Unchanged hypodensity within spleen with subtle volume loss, possibly indicating infarct. The adrenal glands are unremarkable without evidence of mass. The pancreas is grossly unremarkable in noncontrast appearance. Bilateral renal cysts. No hydronephrosis or hydroureter. Atherosclerotic calcification. No evidence of urolithiasis. The urinary bladder is grossly unremarkable. A Laurent catheter is present. GI/Bowel: Interval distal colonic resection. Mild fat stranding near the anastomosis without focal fluid collection appreciated. Colonic diverticulosis. Terminal ileum is grossly unremarkable. The appendix is normal. Small hiatal hernia. Stomach is unremarkable. Borderline-mildly dilated loops of small bowel with scattered air-fluid levels. Pelvis: Prostatomegaly. Trace pelvic free fluid.   There is no pelvic adenopathy. Peritoneum/Retroperitoneum: Small volume loculated fluid in the left pericolic gutter. The surgical drain terminates adjacent this fluid. Mild left pelvic and abdominal fat stranding. There is no adenopathy. There is no pneumoperitoneum. Dilation of infrarenal aorta, measuring up to 4.5 cm in AP dimension. Aorta bi-iliac grafting. Bones/Soft Tissues: Tiny sclerotic foci within the pelvis. No evidence retroperitoneal hematoma. Interval colonic resection. Small volume loculated collection in the left pericolic gutter, with surgical drain terminating adjacent. Mildly dilated loops of small bowel with scattered air-fluid levels, most likely postoperative ileus. Trace pleural effusions and bibasilar airspace disease representing atelectasis or pneumonia. Xr Abdomen (kub) (single Ap View)    Result Date: 3/31/2019  EXAMINATION: SINGLE SUPINE XRAY VIEW(S) OF THE ABDOMEN 3/31/2019 12:09 am COMPARISON: None. HISTORY: ORDERING SYSTEM PROVIDED HISTORY: abdominal pain and distention TECHNOLOGIST PROVIDED HISTORY: Reason for exam:->abdominal pain and distention Ordering Physician Provided Reason for Exam: abdominal pain and distention Acuity: Unknown Type of Exam: Unknown FINDINGS: There are gas filled dilated loops of small bowel. There is small amount of retained contrast cecum which is not dilated, large bowel is otherwise essentially void of contents. Multiple vascular stents noted. No free air appreciated. Midline laparotomy skin staples again noted. Dilated small bowel, may reflect ileus or obstruction. Recommend follow-up study. Ct Abdomen Pelvis W Iv Contrast Additional Contrast? Radiologist Recommendation    Result Date: 3/25/2019  EXAMINATION: CT OF THE ABDOMEN AND PELVIS WITH CONTRAST 3/25/2019 11:52 am TECHNIQUE: CT of the abdomen and pelvis was performed with the administration of intravenous contrast. Multiplanar reformatted images are provided for review.  Dose modulation, iterative reconstruction, and/or weight based adjustment of the mA/kV was utilized to reduce the radiation dose to as low as reasonably achievable. COMPARISON: 03/19/2019 CT HISTORY: ORDERING SYSTEM PROVIDED HISTORY: ABD PAIN, FEVER OR DIARRHEA, CROHN DISEASE SUSPECTED, INITIAL PRESENTATION TECHNOLOGIST PROVIDED HISTORY: Additional Contrast?->Radiologist Recommendation Ordering Physician Provided Reason for Exam: recent colon surg for diverticulitis 1 wk ago-now has pain/diarrhea Acuity: Acute Type of Exam: Ongoing Additional signs and symptoms: hx-Lung CA with rad/chemo tx FINDINGS: Lower Chest:  Slightly increased pleural fluid and worsening consolidation in both visualized lung bases. The base of the heart demonstrates cardiomegaly. AICD lead wires are visualized. Organs: Cholelithiasis with no inflammation or biliary dilatation. The liver, pancreas and adrenal glands are normal.  Wedge-shaped focus of low attenuation in the posterior body of the spleen, stable. This has been present since 02/19/2019, and appears decreased in extent. Multiple tiny cysts are present in the bilateral kidneys measuring up to 2.1 cm on the right. GI/Bowel: Stomach and duodenum are normal.  Enteric contrast opacifies the small bowel. There are no areas of mucosal thickening. Several air-fluid levels are noted without distention or evidence of obstruction. The terminal ileum is normal.  Diffuse scattered diverticular changes in the colon. There is a primary anastomosis at the level of the proximal sigmoid colon. There is mild mucosal thickening of the remaining colon at this site with worsening inflammation in the adjacent peritoneum. Surgical drain remains in place with an ill-defined fluid collection that is stable measuring approximately 4.6 x 2.1 cm. However, there is a new collection of free intraperitoneal air just anterior to the prior anastomosis site that was not present on 03/19/2019.   No obvious fluid at this location, but the air measures approximately 5.4 x 2.7 cm. Pelvis: The bladder is normal.  The prostate is mildly enlarged. Peritoneum/Retroperitoneum: AAA present status post endograft. Maximum transverse diameter is 4.3 x 4.2 cm. No lymphadenopathy. Bones/Soft Tissues: No significant skeletal abnormalities. 1. The patient is 11 days status post partial sigmoid colectomy. Surgical drain remains in place with a small ill-defined fluid collection that is stable from prior exam.  This does not appear to represent an abscess and may represent postoperative hematoma or seroma. 2. There is a new collection of free intraperitoneal air immediately anterior to the prior anastomosis site, contained within the left lower quadrant. This indicates an anastomosis leak. No distant free air. 3. 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 4. Cholelithiasis with no evidence of acute cholecystitis. 5. Worsening small pleural effusions and airspace changes in the lower chest. Pattern may represent underlying pulmonary edema or perhaps pneumonitis. Ct Abdomen Pelvis W Iv Contrast Additional Contrast? None    Result Date: 3/7/2019  EXAMINATION: CT OF THE ABDOMEN AND PELVIS WITH CONTRAST 3/7/2019 7:51 pm TECHNIQUE: CT of the abdomen and pelvis was performed with the administration of intravenous contrast. Multiplanar reformatted images are provided for review. Dose modulation, iterative reconstruction, and/or weight based adjustment of the mA/kV was utilized to reduce the radiation dose to as low as reasonably achievable. COMPARISON: 02/19/2019 HISTORY: ORDERING SYSTEM PROVIDED HISTORY: [facility] abdominal pain TECHNOLOGIST PROVIDED HISTORY: If patient is on cardiac monitor and/or pulse ox, they may be taken off cardiac monitor and pulse ox, left on O2 if currently on. All monitors reattached when patient returns to room.  Additional Contrast?->None Ordering Physician Provided Reason for Exam: LEFT SIDED ABDOMINAL PAINS; CANCER PATIENT, HAS RECEIVED CHEMO AND RADIATION TREATMENTS Acuity: Acute Type of Exam: Initial History of lung cancer. FINDINGS: Lower Chest: There is left-greater-than-right bibasilar dependent airspace disease, likely atelectasis. There is an AICD. Organs: The liver, pancreas, adrenal glands and kidneys are normal.  There are few bilateral renal cysts unchanged from the recent study. There is a resolving splenic infarct. There are small calcified gallstones and thick bile/sludge. Bile ducts are not dilated. GI/Bowel: There is descending colon diverticulitis. There an extraluminal gas and fluid collection with an air-fluid level lateral to the mid descending colon consistent with an abscess. This abscess measures up to 4.1 cm in AP diameter. There is free fluid in the abdomen consistent with perforated viscus/perforated diverticulitis. There are diverticula throughout the colon. Pelvis: Prostate gland is enlarged and unchanged. Slight thickening of the wall of the urinary bladder likely represents hypertrophy from outlet obstruction. Peritoneum/Retroperitoneum: There is a patent aorto bi-iliac endograft for repair of an abdominal aortic aneurysm. There is patchy increased density in the inferior aspect of the native aneurysm, possibly contrast enhancement. Calcification not excluded. (Axial image [id number]). The native aneurysm has not changed significantly in size from 02/19/2019. Remaining aneurysm measures up to 4.7 cm. Bones/Soft Tissues: No acute bone or soft tissue abnormality. Acute perforated descending colon diverticulitis. There is free air in the abdomen and there is a small extraluminal gas and fluid collection/abscess adjacent to the descending colon measuring 4.1 cm in AP diameter and 6 cm in length. There is diverticulosis of the entire colon. Cholelithiasis.  4.7 cm infrarenal abdominal aortic aneurysm status post endograft repair with a patent aorto bi-iliac graft.  There is suspicion of an endoleak with blush of contrast enhancement suspected in the inferior aspect of the native aneurysm. As a noncontrast study was not performed, calcification of the intraluminal thrombus accounting for the apparent enhancement is not excluded. Native aneurysm has not changed significantly in size from the study 3 weeks ago. Comparison with a noncontrast CT examination would be helpful. Critical results were called by Dr. Flavio Tian. MD [facility] to [facility] on 3/7/2019 at 20:35. Xr Chest Portable    Result Date: 3/31/2019  EXAMINATION: SINGLE XRAY VIEW OF THE CHEST 3/31/2019 9:14 pm COMPARISON: Chest 03/20/2019 HISTORY: ORDERING SYSTEM PROVIDED HISTORY: Increased O2 demand TECHNOLOGIST PROVIDED HISTORY: [place] Lateral Reason for exam:->Increased O2 demand FINDINGS: The cardiac silhouette is enlarged. Calcifications involving the aorta reflect atherosclerosis. The mediastinal and hilar silhouettes appear unremarkable. Consolidation mid and lower left lung. Left pleural effusion. Vascular engorgement and cephalization is demonstrated with bilateral peribronchial cuffing and perivascular haziness. No pneumothorax is seen. No acute osseous abnormality is identified. Stable right subclavian Port-A-Cath, tip at the distal superior vena cava level. Unremarkable pacemaker. Nonspecific consolidation mid and lower left lung with left pleural effusion presumably related to sequela from pulmonary edema. Pneumonia is a consideration. Calcific atherosclerosis aorta. Cardiomegaly. Xr Chest Portable    Result Date: 3/20/2019  EXAMINATION: SINGLE XRAY VIEW OF THE CHEST 3/20/2019 6:51 am COMPARISON: Chest radiograph March 16, 2019. HISTORY: ORDERING SYSTEM PROVIDED HISTORY: resp failure TECHNOLOGIST PROVIDED HISTORY: Reason for exam:->resp failure Ordering Physician Provided Reason for Exam: resp failure FINDINGS: The lung volumes are low.   There are increasing perihilar interstitial PROVIDED HISTORY: hypoxia, SOB, increased O2-post op day 2 FINDINGS: Thyroid gland appears normal.  Atherosclerotic change seen in aorta. No intimal flap seen in aorta. Coronary artery calcification is seen. There is streak artifact from pacer. Thrombus is seen in the distal aspect of the left main pulmonary artery extending into the left upper and left lower lobe pulmonary arterial branches Thrombus is seen in the distal aspect of the right main pulmonary artery, extending into the right upper right middle and right lower lobe pulmonary arterial branches. No evidence of right heart strain. Respiratory motion artifact limits evaluation of fine pulmonary parenchymal change. There is underlying emphysema Bandlike opacity seen in the left upper lobe. Small left-sided pleural effusion is seen. De pendant, curvilinear, and peripheral opacities are seen at the left lung base. Hazy ground-glass opacity is seen in the lingula. . Ground-glass and parenchymal opacity in the lingula is increased Patchy ground-glass opacity seen posteriorly in the right upper lobe. Patchy ground-glass opacity seen anteriorly in the right middle lobe. De pendant and peripheral ground-glass and parenchymal opacity is seen in the right lower lobe. .  Small right-sided pleural effusion is seen Diffuse bronchiectasis is seen When compared to prior, consolidation at the lung bases appears increased. Degenerative changes are seen in the spine. Patchy sclerosis is seen in the T6 vertebral body. Scattered colonic diverticula are seen. Scattered dilated loops of small bowel are seen in the upper abdomen. A few tiny foci of free air are seen in the abdomen, likely postoperative. Increased density is seen in the gallbladder, either contrast, or sludge     Bilateral pulmonary emboli Scattered opacities throughout the lungs, greatest at the lung bases, with tiny pleural effusions.   Changes are increased compared to prior Sclerotic osseous metastatic disease as described previously The findings were sent to the Radiology Results [address] at 3:57 pm on 3/16/2019to be communicated to a licensed caregiver. Ir Picc Wo Sq Port/pump > 5 Years    Result Date: 3/19/2019  EXAMINATION: LIMITED ULTRASOUND OF THE ARM FOR PICC ACCESS, 3/19/2019 TECHNIQUE: The PICC team used ultrasound and VPS guidance to place a PICC line. HISTORY: ORDERING SYSTEM PROVIDED HISTORY: Limited Access TECHNOLOGIST PROVIDED HISTORY: Reason for exam:->Limited Access How many lumens are being requested?->2 What site is the preferred site? ->No preference What side should this line be placed? ->Either FLUOROSCOPY DOSE AND TYPE OR TIME AND EXPOSURES: None. 3 ultrasound images obtained. Follow-up chest film to be obtained. FINDINGS: Ultrasound images demonstrate patency of the left basilic vein which was used for access for placement of a PICC by the PICC team, without a radiologist present. VPS guidance was used by the PICC team By report, a 45 cm dual lumen PICC was placed. Successful placement of PICC line. Vl Extremity Venous Bilateral    Result Date: 3/18/2019  Lower Extremities DVT Study  Demographics   Patient Name       Jo-Ann Blocker   Date of Study      03/18/2019        Gender              Male   Patient Number     [id number]        Date of Birth       1945   Visit Number       [id number]         Age                 69 year(s)   Accession Number   [id number]         Room Number         3896   Corporate ID       [id number]           Sonographer         Rich Conn RVT   Ordering Physician Ira Levine MD Interpreting        Janeth Macdonald [facility]                                       Physician           Readers                                                           Erum Nix MD  Procedure Type of Study:   Veins:Lower Extremities DVT Study, VASC EXTREMITY VENOUS DUPLEX BILATERAL. Electronically signed by Marialuisa Lowe MD (Interpreting  physician) on 03/18/2019 at 08:37 PM  ------------------------------------------------------------------  Patient Status:Routine. Study [facility] - Vascular Lab. Technical Quality:Limited visualization due to poor acoustical window. - Results were reported to:Gina Darryle Mallory RN @ 9:50 am. Risk Factors History +---------+----------+-------------------------------------------------------+ ! Diagnosis! Date      ! Comments                                               ! +---------+----------+-------------------------------------------------------+ ! Other    !03/18/2019! Bilateral pulmonary embolism. Patient denies pain or   ! !         !          !swelling involving legs. ! +---------+----------+-------------------------------------------------------+   - The patient's risk factor(s) include: dyslipidemia and arterial     hypertension.   - The patient has a former tobacco history. - The patient has cancer. Velocities are measured in cm/s ; Diameters are measured in mm Right Lower Extremities DVT Study Measurements Right 2D Measurements +------------------------+----------+---------------+----------+ ! Location                ! Visualized! Compressibility! Thrombosis! +------------------------+----------+---------------+----------+ ! Sapheno Femoral Junction! Yes       ! No             !Acute     ! +------------------------+----------+---------------+----------+ ! GSV Thigh               ! Yes       ! Yes            ! None      ! +------------------------+----------+---------------+----------+ ! Common Femoral          !Yes       ! Yes            ! None      ! +------------------------+----------+---------------+----------+ ! Prox Femoral            !Yes       ! Yes            ! None      ! +------------------------+----------+---------------+----------+ ! Mid Femoral             !Yes       ! Yes            ! None      ! +------------------------+----------+---------------+----------+ ! Dist Femoral            !Yes       ! Partial        !Acute     ! +------------------------+----------+---------------+----------+ ! Deep Femoral            !Yes       ! Yes            ! None      ! +------------------------+----------+---------------+----------+ ! Popliteal               !Yes       ! Yes            ! None      ! +------------------------+----------+---------------+----------+ ! GSV Below Knee          ! Yes       ! Yes            ! None      ! +------------------------+----------+---------------+----------+ ! Gastroc                 ! Yes       ! Yes            ! None      ! +------------------------+----------+---------------+----------+ ! PTV                     ! Yes       ! Yes            ! None      ! +------------------------+----------+---------------+----------+ ! Peroneal                !Yes       ! No             !Acute     ! +------------------------+----------+---------------+----------+ ! GSV Calf                ! Yes       ! Yes            ! None      ! +------------------------+----------+---------------+----------+ ! SSV                     ! Yes       ! Partial        !Acute     ! +------------------------+----------+---------------+----------+ Right Doppler Measurements +------------------------+----------+------+------------+ ! Location                ! Signal    !Reflux! Reflux (sec)! +------------------------+----------+------+------------+ ! Sapheno Femoral Junction! Absent    !      !            ! +------------------------+----------+------+------------+ ! Common Femoral          !Phasic    !      !            ! +------------------------+----------+------+------------+ ! Femoral                 !Phasic    !      !            ! +------------------------+----------+------+------------+ ! Prox Femoral            !Phasic    !      !            ! +------------------------+----------+------+------------+ ! Deep Femoral            !Phasic    !      ! ! +------------------------+----------+------+------------+ ! Popliteal               !Phasic    !      !            ! +------------------------+----------+------+------------+ ! SSV                     ! Continuous!      !            ! +------------------------+----------+------+------------+ Left Lower Extremities DVT Study Measurements Left 2D Measurements +------------------------+----------+---------------+----------+ ! Location                ! Visualized! Compressibility! Thrombosis! +------------------------+----------+---------------+----------+ ! Sapheno Femoral Junction! Yes       ! Yes            ! None      ! +------------------------+----------+---------------+----------+ ! GSV Thigh               ! Yes       ! Yes            ! None      ! +------------------------+----------+---------------+----------+ ! Common Femoral          !Yes       ! Yes            ! None      ! +------------------------+----------+---------------+----------+ ! Prox Femoral            !Yes       ! Yes            ! None      ! +------------------------+----------+---------------+----------+ ! Mid Femoral             !Yes       ! Yes            ! None      ! +------------------------+----------+---------------+----------+ ! Dist Femoral            !Yes       ! Yes            ! None      ! +------------------------+----------+---------------+----------+ ! Deep Femoral            !Yes       ! Yes            ! None      ! +------------------------+----------+---------------+----------+ ! Popliteal               !Yes       ! Yes            ! None      ! +------------------------+----------+---------------+----------+ ! GSV Below Knee          ! Yes       ! Yes            ! None      ! +------------------------+----------+---------------+----------+ ! Gastroc                 ! Yes       ! Yes            ! None      ! +------------------------+----------+---------------+----------+ ! PTV                     ! Partial   !Yes            ! None      ! 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 MediPort catheter right chest wall terminates in the superior vena cava. Calcific coronary artery disease. Heart and great vessels otherwise unremarkable. No pathologic mediastinal or hilar lymphadenopathy. Lungs/pleura: 3 mm nodule left lung base image number 61 appears stable. Ill-defined interstitial infiltrate left lower lobe and patchy infiltrates in the lingula. Bibasilar subsegmental atelectasis. Ill-defined infrahilar soft tissue density appears stable. Sensitivity is limited without IV contrast.  It appears to contain a calcification. Ill-defined interstitial infiltrate right middle lobe appears new and measures several mm. 2-3 mm nodule right middle lobe on image number 68 appears stable. Stable IV vertebrae at T6. Abdomen/Pelvis: Organs: The abdominal wall appears normal. The liver,, pancreas, and adrenals appear normal.  Ill-defined hypodensity inferior spleen may represent a resolving infarct and appears stable. Gallstones noted. Pneumoperitoneum appears unchanged. No free fluid. 21 mm simple right renal cortical cyst.  Smaller stable left renal cortical cyst. The bladder appears normal. GI/Bowel: Extensive sigmoid diverticulosis. Air-fluid collection is again noted to the left of the descending colon measuring up to 63 x 30 mm in AP and transverse dimensions. This appears essentially unchanged allowing for difference in technique. Small hiatal hernia. Small bowel normal.  Appendix normal. Pelvis: Bilateral fat containing inguinal hernias. Peritoneum/Retroperitoneum: Infrarenal abdominal aortic aneurysm measuring up to 42 x 44 mm in AP and transverse dimensions. Endovascular aorto bi-iliac stent again noted. Previously suspected endoleak appears less conspicuous on this noncontrast exam.  There is scattered plaque along the aorta and its branches. There is no retroperitoneal hematoma. Bones/Soft Tissues: Normal     Perforated sigmoid diverticulitis and pneumoperitoneum unchanged. Stable left Emerita colonic air-fluid collection may represent an intramural or extramural abscess or giant diverticulum. Infrarenal abdominal aortic aneurysm and aorto bi-iliac stent. Other incidental findings as above. Fl Barium Enema    Result Date: 3/26/2019  EXAMINATION: SINGLE CONTRAST BARIUM ENEMA  3/26/2019 TECHNIQUE: Barium enema was performed with thin barium contrast. FLUOROSCOPY DOSE AND TYPE OR TIME AND EXPOSURES: 0.6 minutes fluoroscopy, 10 fluoroscopic images COMPARISON: CT dated 03/25/2019, 03/26/2019 HISTORY: ORDERING SYSTEM PROVIDED HISTORY: evaluate anastomotic leak TECHNOLOGIST PROVIDED HISTORY: Reason for exam:->evaluate anastomotic leak FINDINGS:  radiograph demonstrates residual contrast within the colon from prior contrast administration. Aortoiliac stent graft demonstrated. Staples along the abdominal midline. Drain is seen within the lower abdomen. With patient in the left lateral decubitus position, rectal tube was inserted and balloon inflated by technologist.  Water-soluble contrast was administered retrograde into the colon. There was opacification of the rectum with immediate extravasation of contrast in a pouchlike collection in the left lower quadrant. Contrast is seen within the descending colon and distal transverse colon, where diverticula are demonstrated. Active extravasation of contrast from the sigmoid colon, compatible with anastomotic leak. Please refer to accompanying CT. Diverticulosis. Assessment and plan:  Acute respiratory failure, hypoxemic. Had increased oxygen requirement, although decreased since last night.   Possible healthcare associated pneumonia. CXR with new left lung infiltrate, will obtain CT chest.  He does not have fever, chills, purulent phlegm. We will evaluate after CT chest obtained. On vancomycin and meropenem  Diverticulitis perforation and abscess, anastomotic leak. Followed by surgery. Metastatic lung cancer, herpes zoster. Followed by Dr. Jasmina Mullins. DVT, pulmonary embolism.   On heparin    Electronically signed by:  Carisa Marley MD    4/1/2019    12:00 PM.

## 2019-04-01 NOTE — PROGRESS NOTES
Speech Language Pathology  Facility/Department: [facility]   Dysphagia Daily Treatment Note    NAME: Parker Abarca  : 1945  MRN: [id number]    Patient Diagnosis(es):   Patient Active Problem List    Diagnosis Date Noted    Cardiomyopathy [facility])     NSVT (nonsustained ventricular tachycardia) ([address]     Other pulmonary embolism without acute cor pulmonale (HCC)     Acute respiratory failure with hypoxia ([address]     H/O colectomy     Primary malignant neoplasm of lung metastatic to other site [facility])     Herpes zoster oticus     Hearing loss of right ear     Chemotherapy-induced neutropenia ([address]     Mucositis     Diverticulitis 2019    Diverticulitis of large intestine with perforation and abscess without bleeding     Acute diverticulitis 2019    Pancytopenia ([address] 2019    Adenocarcinoma of left lung ([address] 2019    Gallstones 2019    Hemoptysis 2019    Former smoker 2019    Elevated PSA 2019    Back pain 2019    Visit for monitoring Plavix therapy     History of ventricular tachycardia     History of pulmonary emphysema 2019    Chronic obstructive pulmonary disease with (acute) exacerbation ([address] 2019    Pulmonary nodule, left 2019    Abnormal bone scan of thoracic spine 2019    Nondisplaced fracture of fifth right metatarsal bone with routine healing 2018    Sprain of right foot 2018    Sprain of anterior talofibular ligament of right ankle 2018    Closed nondisplaced fracture of fifth right metatarsal bone 2018    Closed fracture of proximal lateral malleolus of ankle with routine healing 2018    Right foot pain 2018    Right ankle pain 2018    Essential hypertension 2017    Mixed hyperlipidemia 2017    PAD (peripheral artery disease) ([address] 2011    CAD (coronary artery disease) 2011    Elevated fasting glucose 2010 Allergies: No Known Allergies    Pain: Pt denied    Current Diet: full liquid diet    Diet Tolerance:  Patient tolerating current diet level without signs/symptoms of penetration / aspiration per chart, pt. P.O. Trials: Pt observed with ~6 oz Gatorade via cup sip    Recommendations: Continue current diet    Dysphagia Therapy:  Pt seen upright in bed, alert and agreeable to dysphagia f/u, RN OK'd SLP entry and therapy, pt's wife at bedside. Pt currently on full liquid diet only per MD.  Pt currently on 7 L O2 NC (was on 10 L O2 earlier this date per chart). Pt denied dysphagia. Concern for possible HCAP per chart-- pulmonology consulted. Pt observed with thin liquid trials via cup sip with grossly timely swallow initiation noted throughout, no overt s/s of aspiration/penetration -- no coughing, throat clearing, wet vocal quality, or change in O2. Pt's RR 20/min before and after TL trials. O2 sats 92-93% throughout. SLP re: role of ST, aspiration precautions. Pt and pt's wife verbalized understanding, denied concerns / questions. Continue current full liquid diet per MD.  ST to continue to follow for diet tolerance monitoring. Patient/Family/Caregiver Education:  See above    Compensatory Strategies:  Small bites/sips;Upright as possible for all oral intake;Remain upright for 30-45 minutes after meals;Eat/Feed slowly    Plan:    Continued Dysphagia treatment with goals per plan of care. Discharge Recommendations: ST follow up TBD. If pt discharges from hospital prior to Speech/Swallowing discharge, this note serves as tx and discharge summary.     Total Treatment Time:  9372-8128 (14 minutes); dysphagia tx    Candida Cat M.S. [id number] [facility]  Speech-language pathologist  [phone]

## 2019-04-02 NOTE — ONCOLOGY
ONCOLOGY HEMATOLOGY CARE PROGRESS NOTE      SUBJECTIVE:     Now on 12 liters High flow O2. Pulm following, CT chest done with multi-lobular PNA. Possible bronch today. Despite the above, patient denies pain, SOB, or distress. No family present. Hgb 6.7 this AM. 1 unit PRBC ordered. Plt 123k. ROS:   The remaining 10 point review of symptoms is unremarkable. OBJECTIVE        Physical    VITALS:  /77   Pulse 91   Temp 98.4 °F (36.9 °C) (Oral)   Resp 16   Ht 5' 9\" (1.753 m)   Wt 151 lb 3.8 oz (68.6 kg)   SpO2 94%   BMI 22.33 kg/m²   TEMPERATURE:  Current - Temp: 98.4 °F (36.9 °C); Max - Temp  Av.2 °F (36.8 °C)  Min: 97.6 °F (36.4 °C)  Max: 98.4 °F (36.9 °C)  PULSE OXIMETRY RANGE: SpO2  Av %  Min: 90 %  Max: 99 %  24HR INTAKE/OUTPUT:      Intake/Output Summary (Last 24 hours) at 2019 0947  Last data filed at 2019 0700  Gross per 24 hour   Intake 440 ml   Output 1485 ml   Net -1045 ml       CONSTITUTIONAL:  awake, alert, cooperative, no apparent distress, HEENT oral pharynx , no scleral icterus, wearing high flow nasal canula   HEMATOLOGIC/LYMPHATICS:  no cervical lymphadenopathy, no supraclavicular lymphadenopathy, no axillary lymphadenopathy and no inguinal lymphadenopathy  LUNGS:  Scattered rhonchi and crackles to JACOBO and LLL, diminished BS to RLL, RUL is clear no wheezing   CARDIOVASCULAR:  , regular rate and rhythm, normal S1 and S2, no S3 or S4, and no murmur noted  ABDOMEN: Absent BS.  + colostomy (empty). Red stoma    Back: no bruising, purpura, or hematomas noted   MUSCULOSKELETAL:  There is no redness, warmth, or swelling of the joints. EXTREMETIES: BLE edema +1, non pitting   NEUROLOGIC:  Awake, alert, oriented to name, place and time. Cranial nerves II-XII are grossly intact. Motor is 5 out of 5 bilaterally.  Hip flexion 5/5 bilaterally in the bed     Data      Recent Labs     19  1410 19  0529 19  0410 04/02/19  0500   WBC 9.4 7.4 8.2  --    HGB 8.2* 7.2* 6.8* 6.7*   HCT 25.0* 21.6* 20.5* 20.2*    133* 123*  --    MCV 88.4 86.1 85.8  --         Recent Labs     03/31/19  0805 04/01/19  0529 04/02/19  0410    136 133*   K 3.3* 2.9* 3.7    101 99   CO2 24 28 27   BUN 13 8 10   CREATININE <0.5* <0.5* <0.5*     No results for input(s): AST, ALT, ALB, BILIDIR, BILITOT, ALKPHOS in the last 72 hours.     Magnesium:    Lab Results   Component Value Date    MG 1.80 04/02/2019    MG 1.40 04/01/2019    MG 1.60 03/31/2019         Problem List  Patient Active Problem List   Diagnosis    Elevated fasting glucose    CAD (coronary artery disease)    PAD (peripheral artery disease) ([address]    Essential hypertension    Mixed hyperlipidemia    Sprain of right foot    Sprain of anterior talofibular ligament of right ankle    Closed nondisplaced fracture of fifth right metatarsal bone    Closed fracture of proximal lateral malleolus of ankle with routine healing    Right foot pain    Right ankle pain    Nondisplaced fracture of fifth right metatarsal bone with routine healing    History of pulmonary emphysema    Chronic obstructive pulmonary disease with (acute) exacerbation (HCC)    Pulmonary nodule, left    Abnormal bone scan of thoracic spine    Back pain    Visit for monitoring Plavix therapy    History of ventricular tachycardia    Gallstones    Hemoptysis    Former smoker    Elevated PSA    Adenocarcinoma of left lung ([address]    Acute diverticulitis    Pancytopenia ([address]    Diverticulitis of large intestine with perforation and abscess without bleeding    Diverticulitis    Chemotherapy-induced neutropenia (HCC)    Mucositis    Herpes zoster oticus    Hearing loss of right ear    Acute respiratory failure with hypoxia ([address]    H/O colectomy    Primary malignant neoplasm of lung metastatic to other site [facility])    NSVT (nonsustained ventricular tachycardia) ([address]    Other pulmonary embolism without acute cor pulmonale (HCC)    Cardiomyopathy (HCC)    Pneumonia of left lung due to infectious organism       ASSESSMENT AND PLAN    Acute Perforated descending colon diverticulitis (not immune mediated colitis) with free air in the abd   - per gen surgery , s/p Laparoscopic converted to Open left Colectomy, 3/14/2019, with Dr. Prema Patrick  - ATB support (Zosyn, Maxipime, Vanco) , changed to [address] until POD 18, now on Merrem,  Vanco added for PNA.   - CT scan done 3/25 with anastomotic leak at the [facility] surgical site. - Status post Gerda procedure on 3/27/2019 with Dr. Prema Patrick. POD6    Nutrition  - FULL LIQ     Neutropenia secondary to chemo  - Granix stopped 3/11   - resolved     TCP  - resolved earlier in admission due to chemo  - plt now 123 4/2- DIC? Check coags     PE  -Diagnosed on 3/16/2019 with bilateral PEs involving the right and left main pulm arteries.   -Also had thrombus in the right femoral, peroneal, greater saphenous, short saphenous veins and in the left peroneal veins.  -Plan Eliquis on discharge.   -Will need lifelong anticoagulation.  -Does not need a filter per Dr. Rachelle Santana on 3/18   -On Hep drip now     Anemia, possible GI bleed on hep drip  -EGD 3/21 negative   - PPI added, now BID dosing per GI 3/21  - CBC today with Hgb 6.7- 1 unit PRBC ordered- likely anemia of chronic disease with DIC/inflammatory loses from recurrent infections, now PNA   - CT AP 3/21 - no signs of bleeding   - due for B12 shot - given 3/19/2019 - required every 9 weeks with this chemo. Lung CA, stage 4 bone mets   - Will hold chemo until surgically healed  - Cycle 3 carbo/alimta Sdynee Kins was due on 3/22- held due to acute issues, cont to hold. Plans to resume [place] only outpatient.   - CT chest/abd/pelvis, 3/10/2019, showed improvement in the left hilar soft tissue and radiation changes. No obvious new disease. Not hospice appropriate at this time from an oncology standpoint.     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 May need to resume- hospitalist is speaking with vascular surgery for clarification. (Dr. Cesar Snellen)   - Resuming full dose [facility] [place] will be difficult given surgical burden and recent leak;      ONCOLOGIC DISPOSITION:  TBD.   Per surgical team.     Bill Herrera, MICHAEL   THOMASProvidence Mount Carmel HospitalVILMA LeavittsburgERICK  Perfect Serve

## 2019-04-02 NOTE — PROGRESS NOTES
[facility] GENERAL SURGERY    Surgery Progress Note           POD # 19-=/6    PATIENT NAME: Dayana Tijerina     TODAY'S DATE: 4/2/2019    INTERVAL HISTORY:    Pt without nausea, pain improving. OBJECTIVE:   VITALS:  /70   Pulse 87   Temp 97.7 °F (36.5 °C) (Axillary)   Resp 16   Ht 5' 9\" (1.753 m)   Wt 151 lb 3.8 oz (68.6 kg)   SpO2 91%   BMI 22.33 kg/m²     INTAKE/OUTPUT:    I/O last 3 completed shifts: In: 320 [P.O.:120; I.V.:200]  Out: 650 [Urine:500; Drains:25; Stool:125]  No intake/output data recorded. CONSTITUTIONAL:  awake and alert  ABDOMEN:   normal bowel sounds, soft, non-distended, tender, patricio serosanguinous, ostomy functional   INCISION: clean, dry, no drainage    Data:  CBC:   Recent Labs     03/31/19  1410 04/01/19  0529 04/02/19  0410 04/02/19  0500 04/02/19  1210   WBC 9.4 7.4 8.2  --   --    HGB 8.2* 7.2* 6.8* 6.7*  --    HCT 25.0* 21.6* 20.5* 20.2* 26.1*    133* 123*  --   --      BMP:    Recent Labs     03/31/19  0805 04/01/19  0529 04/02/19  0410    136 133*   K 3.3* 2.9* 3.7    101 99   CO2 24 28 27   BUN 13 8 10   CREATININE <0.5* <0.5* <0.5*   GLUCOSE 76 113* 140*     Hepatic: No results for input(s): AST, ALT, ALB, BILITOT, ALKPHOS in the last 72 hours. Mag:      Recent Labs     03/31/19  0805 04/01/19  0529 04/02/19  0410   MG 1.60* 1.40* 1.80      Phos:   No results for input(s): PHOS in the last 72 hours. INR:   Recent Labs     04/02/19  1210   INR 1.40*         Radiology Review:  NA    ASSESSMENT AND PLAN:  69 y.o. male status post left colectomy, return for fariha's  1. Advance to low fiber diet  2. Continue abx  3. PRBC today  4.   OOB         Electronically signed by Eddie Chance MD

## 2019-04-02 NOTE — PROGRESS NOTES
Wound care saw patient today and Order new cream for patient wound to buttock. Patient has been refusing turns and refused to turn for new med application. Pat stated\" I'm comfortable  now can we do this in the morning\" despite education on skin condition pat cont' to refuse. Will cont' to offer turns this shift.

## 2019-04-02 NOTE — PROGRESS NOTES
Status: At this time patient wishes to be Full Code      Time Spent:     Total time spent face-to-face in education and discussion: 25 minutes. Electronically signed by DEBRA Kennedy CNP on 4/2/2019 at 4:33 PM    Thank you Jan Holly MD for the opportunity to be involved in this patient's care. If you have any questions or concerns please feel free to contact me at [phone].

## 2019-04-02 NOTE — FLOWSHEET NOTE
04/01/19 2326   Assessment   Charting Type Shift assessment   Neurological   Neuro (WDL) WDL   Level of Consciousness 0   Orientation Level Oriented X4   Lynd Coma Scale   Eye Opening 4   Best Verbal Response 5   Best Motor Response 6   Nati Coma Scale Score 15   HEENT   HEENT (WDL) X   Right Eye Impaired vision   Left Eye Impaired vision   Voice Normal   Nose Intact   Respiratory   Respiratory (WDL) X   R Breath Sounds Diminished   L Breath Sounds Diminished   Respiratory Quality/Effort Unlabored   Chest Assessment Chest expansion symmetrical;Trachea midline   Respiratory Pattern Regular   Respiratory Depth Normal   Breath Sounds   Right Upper Lobe Diminished   Right Middle Lobe Diminished   Right Lower Lobe Diminished   Left Upper Lobe Diminished   Left Lower Lobe Diminished   Cardiac   Cardiac (WDL) X   Cardiac Regularity Regular   Heart Sounds S1, S2   Cardiac Rhythm ST   Heart Block Type 1st degree AVB   Pacemaker   Pacemaker Yes   Pacemaker Type Permanent   Gastrointestinal   Abdominal (WDL) X   RUQ Bowel Sounds Active   RLQ Bowel Sounds Active   LUQ Bowel Sounds Active   LLQ Bowel Sounds Active   Abdomen Inspection Ostomy tube;Surgical scar   GI Symptoms Distention   Peripheral Vascular   Peripheral Vascular (WDL) X   LUE Edema +1   RLE Edema None   LLE Edema None   Skin Color/Condition   Skin Color/Condition (WDL) WDL   Skin Color Appropriate for ethnicity   Skin Condition/Temp Warm;Dry   Skin Integrity   Skin Integrity (WDL) X   Location coccyx    Skin Integrity Redness   Preventative Dressing Yes   Dressing Site Sacrum   Assessed this shift? Yes   Skin Integrity Site 2   Skin Integrity Location 2 Redness;Rash   Skin Integrity Site 3   Skin Integrity Location 3 Bruising    Location 3 scattered   Musculoskeletal   Musculoskeletal (WDL) X   RL Extremity Weakness; Full movement   LL Extremity Weakness; Full movement   Genitourinary   Genitourinary (WDL) X  (nguyen)   Flank Tenderness No   Suprapubic Tenderness No   Dysuria JOY   Anus/Rectum   Anus/Rectum (WDL) WDL   Wound 03/08/19 Sacrum Mid;Right   Date First Assessed/Time First Assessed: 03/08/19 0032   Present on Hospital Admission: Yes  Wound Approximate Age at First Assessment (Weeks): 1.5 weeks  Primary Wound Type: Pressure Injury  Location: Sacrum  Wound Location Orientation: Mid;Right   Wound Pressure Unstageable   Dressing Status Clean;Dry; Intact   Dressing Changed Changed/New   Dressing Change Due 04/02/19   Colostomy LUQ Descending/sigmoid   Placement Date/Time: 03/27/19 1401   Pre-existing: No  Timeout: Patient;Procedure; Consent Confirmed;Site prepped with chlorhexidine; Availability of Implant; Correct Position  Inserted by: Dr. Denney [facility]  Location: LUQ  Colostomy Type: Descending/sigmoid   Stomal Appliance 2 piece   Stoma  Assessment Pink;Swelling   Stool Appearance Watery   Stool Color Green;Brown   Urethral Catheter Non-latex 16 fr   Placement Date/Time: 03/27/19 1430   Urethral Catheter Timeout: Sterile technique  Inserted by: Quita Arzola RN  Catheter Type: Non-latex  Tube Size (fr): 16 fr  Catheter Balloon Size: 10 mL  Collection Container: Standard  Securement Method: Securing de. ..    Catheter Indications Need for fluid management in critically ill patients in a critical care setting not able to be managed by other means such as BSC with hat, bedpan, urinal, condom catheter, or short term intermittent urethral catherization

## 2019-04-02 NOTE — PROGRESS NOTES
Physical Therapy  Facility/Department: [place] [address] [facility]  Daily Treatment Note  NAME: Rick Murrell  : 1945  MRN: [id number]    Date of Service: 2019    Discharge Recommendations:  24 hour supervision or assist, Home with Home health PT   PT Equipment Recommendations  Equipment Needed: Yes  Mobility Devices: Lois [place]: Rolling    Patient Diagnosis(es): The primary encounter diagnosis was Diverticulitis of large intestine with perforation and abscess without bleeding. Diagnoses of Leaking abdominal aortic aneurysm (AAA) ([address] Abdominal pain, left lower quadrant, and Chemotherapy-induced neutropenia ([address] were also pertinent to this visit. has a past medical history of Benign neoplasm of colon, Cancer ([address] Diverticulosis of colon (without mention of hemorrhage), Elevated fasting glucose, Former cigarette smoker, Herpes simplex viral infection, Hyperlipidemia, Hypertension, MI, old, 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[address] and Right popliteal artery occlusion ([address] has a past surgical history that includes Arterial aneurysm repair (2010); Finger fracture surgery; Coronary angioplasty with stent; Upper gastrointestinal endoscopy (2/10/2014); Colonoscopy (10/08/01 12/20/04); Colonoscopy (14); Upper gastrointestinal endoscopy (14); Upper gastrointestinal endoscopy (11/4/15); bronchoscopy (2019); bronchoscopy (N/A, 2019); bronchoscopy (2019); INSERTION / REMOVAL / REPLACEMENT VENOUS ACCESS CATHETER (Right, 2019); bronchoscopy (N/A, 2019); bronchoscopy (2019); bronchoscopy (2019); bronchoscopy (2019); bronchoscopy (2019); bronchoscopy (2019); hemicolectomy (N/A, 3/14/2019); Upper gastrointestinal endoscopy (N/A, 3/21/2019); and LAPAROTOMY EXPLORATORY (N/A, 3/27/2019).     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 Family / Caregiver Present: Yes  Referring Practitioner: Dr. Abad Barcenas: Pt resting in bed upon entry, reports feeling \"about half\", when asked how he is feeling, does not rate pain  General Comment  Comments: RN cleared pt to participate. Pain Screening  Patient Currently in Pain: Yes  Pain Assessment  Pain Assessment: Faces  Strong-Baker Pain Rating: Hurts little more  Pain Type: Acute pain  Pain Location: Abdomen  Pain Orientation: Left  Non-Pharmaceutical Pain Intervention(s): Therapeutic presence; Ambulation/Increased Activity;Repositioned  Vital Signs  Patient Currently in Pain: Yes       Orientation  Orientation  Overall Orientation Status: Within Functional Limits       Objective   Bed mobility  Supine to Sit: Minimal assistance  Sit to Supine: Unable to assess  Scooting: Stand by assistance(to scoot forward and backward in chair)  Transfers  Sit to Stand: Contact guard assistance  Stand to sit: Contact guard assistance  Bed to Chair: Contact guard assistance(with RW)  Ambulation  Ambulation?: Yes  Ambulation 1  Surface: level tile  Device: Rolling Walker  Other Apparatus: O2(high flow 14L)  Assistance: Contact guard assistance  Quality of Gait: Unsteady but no LOB, small shuffling steps with minimal foot clearance B  Distance: 4 ft to chair  Comments: stood at EOB with RW and marched in place X 30 seconds x 3 trials        Exercises  Straight Leg Raise: X 10 B supine  Quad Sets: x15 BLE  Heelslides: X 10 B supine  Gluteal Sets: X 10 B  Hip Flexion: X 15 BLE seated  Hip Abduction: X 10 B supine  Knee Long Arc Quad: X 15 BLE  Knee Short Arc Quad: x 10 B  Ankle Pumps: X 15 BLE  Comments: x 2 sit<>stand, declined further trials due to left abdominal pain at colostomy site                        Assessment   Body structures, Functions, Activity limitations: Decreased functional mobility ; Decreased strength;Decreased balance;Decreased endurance  Assessment: Pt required min A for bed mobility and CGA to stand  at the side of bed with walker and transfer to chair. Recommend continued PT 3-5x week to improve endurance and strength, then home with 24 hr supervision/ assist.  Treatment Diagnosis: Decreased endurance and (I) with mobility  Specific instructions for Next Treatment: Progress ther ex and mobility as tolerated  Prognosis: Good  Decision Making: Medium Complexity  Patient Education: role of PT, bed mobility   REQUIRES PT FOLLOW UP: Yes  Activity Tolerance  Activity Tolerance: Patient Tolerated treatment well;Patient limited by pain; Patient limited by endurance       AM-PAC Score  AM-PAC Inpatient Mobility Raw Score : 17  AM-PAC Inpatient T-Scale Score : 42.13  Mobility Inpatient CMS 0-100% Score: 50.57  Mobility Inpatient CMS G-Code Modifier : CK          Goals  Short term goals  Time Frame for Short term goals: 1 week (unless otherwise specified)  Short term goal 1: Pt will transfer supine <-> sit with modified(I)  Short term goal 2: Pt will transfer sit <-> stand and bed>chair using RW or least AD with supervision  Short term goal 3: Pt will ambulate x 150 feet using RW or least AD with supervision  Short term goal 4: By 3/20/19: Pt will tolerate 12-15 reps BLE exercise for strengthening, balance, and endurance  Short term goal 5: Pt will ambulate up/down 2 steps without use of handrails (using appropriate AD as needed) with SBA  Patient Goals   Patient goals : \"To get my strength back and go home at discharge. \"    Plan    Plan  Times per week: 3-5x/week in acute care  Times per day: Daily  Specific instructions for Next Treatment: Progress ther ex and mobility as tolerated  Current Treatment Recommendations: Strengthening, Gait Training, Balance Training, Stair training, Functional Mobility Training, ROM, Endurance Training, Transfer Training, Safety Education & Training, Patient/Caregiver Education & Training, Equipment Evaluation, Education, & procurement  Safety Devices  Type of devices: All fall risk precautions in place, Call light within reach, Gait belt, Left in chair, Nurse notified, Chair alarm in place, Patient at risk for falls     Therapy Time   Individual Concurrent Group Co-treatment   Time In 1052         Time Out 1117         Minutes 25              If pt is discharged prior to next therapy session, this note will serve as discharge summary.     Antoine Guan, PT

## 2019-04-02 NOTE — PROGRESS NOTES
INPATIENT PULMONARY CRITICAL CARE PROGRESS NOTE      Reason for visit: Patient seen by us earlier during this hospitalization for acute respiratory failure related to pulmonary embolism, also had DVT. We had signed off as the patient was stable. Repeat consult placed by Dr. Ronda Li for possible HCAP. SUBJECTIVE: Patient when seen this morning was feeling better, patient was not having any significant cough or expectoration, patient was not having shortness of breath or wheezing, patient was not having any chest pain, patient had some nonspecific abdominal pain, patient has a colostomy in place, patient was afebrile and hemodynamically maintained, patient continues to be on high oxygen requirements and patient was on 14 L of high flow oxygen to keep oxygen saturation, patient was saturating 94% when seen, patient was having a cumulative fluid balance of -8.2 L, patient's blood sugar was slightly on the higher side but acceptable, patient was alert and communicative, no other pertinent review of system of concern      Physical Exam:  Blood pressure 128/77, pulse 91, temperature 98.4 °F (36.9 °C), temperature source Oral, resp. rate 16, height 5' 9\" (1.753 m), weight 151 lb 3.8 oz (68.6 kg), SpO2 94 %.'   Constitutional:  Ill-appearing, sitting up in a chair,underweight. No acute distress. HENT:  Oropharynx is clear and dry. Eyes:  Conjunctivae are normal. Pupils equal, round, and reactive to light. No scleral icterus. Wearing glasses. Neck: No JVD. No tracheal deviation present. No obvious thyroid mass. Cardiovascular: Normal rate, regular rhythm, normal heart sounds. No right ventricular heave. No lower extremity edema. Pulmonary/Chest: Diminished air entry left hemithorax, few scattered crackles. No accessory muscle usage or stridor. Abdominal: Soft, distended, no tenderness. Colostomy present    Musculoskeletal: No cyanosis. No clubbing. No obvious joint deformity. Lymphadenopathy: Deferred. Skin: Skin is warm and dry. No rash or nodules on the exposed extremities. Psychiatric: Normal mood and affect. Behavior is normal.  No anxiety. Neurologic: Alert, awake and oriented. PERRL. Speech fluent        Results:  CBC:   Recent Labs     03/31/19  1410 04/01/19  0529 04/02/19  0410 04/02/19  0500 04/02/19  1210   WBC 9.4 7.4 8.2  --   --    HGB 8.2* 7.2* 6.8* 6.7*  --    HCT 25.0* 21.6* 20.5* 20.2* 26.1*   MCV 88.4 86.1 85.8  --   --     133* 123*  --   --      BMP:   Recent Labs     03/31/19  0805 04/01/19  0529 04/02/19  0410    136 133*   K 3.3* 2.9* 3.7    101 99   CO2 24 28 27   BUN 13 8 10   CREATININE <0.5* <0.5* <0.5*     APTT:   Recent Labs     04/01/19  1421 04/01/19  2245 04/02/19  0500   APTT 35.1 69.5* 46.9*       Imaging:  I have reviewed radiology images personally. CT CHEST WO CONTRAST   Final Result   1. Bilateral upper lobe ground-glass opacities, left greater than right, with   developing left perihilar consolidation. Findings are most concerning for   multifocal pneumonia with asymmetric pulmonary edema a less likely   possibility. 2. Small bilateral pleural effusions which are slightly increased from   03/26/2019.   3. Trace ascites in the upper abdomen. 4. Sclerotic metastasis in the T6 vertebral body. 5. Coronary atherosclerosis. XR CHEST PORTABLE   Final Result   Nonspecific consolidation mid and lower left lung with left pleural effusion   presumably related to sequela from pulmonary edema. Pneumonia is a   consideration. Calcific atherosclerosis aorta. Cardiomegaly. XR ABDOMEN (KUB) (SINGLE AP VIEW)   Final Result   Dilated small bowel, may reflect ileus or obstruction. Recommend follow-up   study. CT ABDOMEN PELVIS WO CONTRAST Additional Contrast? None   Final Result   Interval increase in size of the left lower quadrant collection which now   measures up to 7.8 cm.   Administered rectal contrast extends into the collection. Right anterior approach drain courses adjacent to the collection. No significant change in pleural effusions and associated airspace disease. The findings were sent to the Radiology Results [address] at 12:53   pm on 3/26/2019to be communicated to a licensed caregiver. FL BARIUM ENEMA   Final Result   Active extravasation of contrast from the sigmoid colon, compatible with   anastomotic leak. Please refer to accompanying CT. Diverticulosis. CT ABDOMEN PELVIS W IV CONTRAST Additional Contrast? Radiologist Recommendation   Final Result   1. The patient is 11 days status post partial sigmoid colectomy. Surgical   drain remains in place with a small ill-defined fluid collection that is   stable from prior exam.  This does not appear to represent an abscess and may   represent postoperative hematoma or seroma. 2. There is a new collection of free intraperitoneal air immediately anterior   to the prior anastomosis site, contained within the left lower quadrant. This indicates an anastomosis leak. No distant free air. 3. 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 4. Cholelithiasis with no evidence of acute cholecystitis. 5. Worsening small pleural effusions and airspace changes in the lower chest.   Pattern may represent underlying pulmonary edema or perhaps pneumonitis. XR CHEST PORTABLE   Final Result   Interval development of mild pulmonary edema. There is a small left-sided pleural effusion with more focal consolidation   the left lung base, atelectasis versus pneumonia. IR PICC WO SQ PORT/PUMP > 5 YEARS   Final Result   Successful placement of PICC line. CT ABDOMEN PELVIS WO CONTRAST Additional Contrast? None   Final Result   No evidence retroperitoneal hematoma. Interval colonic resection.   Small volume loculated collection in the left   pericolic gutter, with surgical drain terminating adjacent. Mildly dilated loops of small bowel with scattered air-fluid levels, most   likely postoperative ileus. Trace pleural effusions and bibasilar airspace disease representing   atelectasis or pneumonia. VL Extremity Venous Bilateral   Final Result      CT CHEST PULMONARY EMBOLISM W CONTRAST   Final Result   Bilateral pulmonary emboli      Scattered opacities throughout the lungs, greatest at the lung bases, with   tiny pleural effusions. Changes are increased compared to prior      Sclerotic osseous metastatic disease as described previously      The findings were sent to the Radiology Results [address] at 3:57   pm on 3/16/2019to be communicated to a licensed caregiver. XR CHEST 1 VW   Final Result   Shallow inflation with findings consistent with basilar atelectasis. Left   pleural thickening versus trace effusion. CT CHEST ABDOMEN PELVIS WO CONTRAST   Final Result   Perforated sigmoid diverticulitis and pneumoperitoneum unchanged. Stable   left [facility] colonic air-fluid collection may represent an intramural or   extramural abscess or giant diverticulum. Infrarenal abdominal aortic aneurysm and aorto bi-iliac stent. Other incidental findings as above. CT ABDOMEN PELVIS W IV CONTRAST Additional Contrast? None   Final Result   Acute perforated descending colon diverticulitis. There is free air in the   abdomen and there is a small extraluminal gas and fluid collection/abscess   adjacent to the descending colon measuring 4.1 cm in AP diameter and 6 cm in   length. There is diverticulosis of the entire colon. Cholelithiasis. 4.7 cm infrarenal abdominal aortic aneurysm status post endograft repair with   a patent aorto bi-iliac graft. There is suspicion of an endoleak with blush   of contrast enhancement suspected in the inferior aspect of the native   aneurysm.   As a noncontrast study was not performed, calcification of the   intraluminal thrombus accounting for the apparent enhancement is not   excluded. 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 Comparison with a noncontrast CT examination would be   helpful. Critical results were called by Dr. Demar Mcbride. MD [facility] on   3/7/2019 at 20:35. Results for Jemima Cedeno" (MRN [id number]) as of 4/2/2019 12:59   Ref. Range 3/31/2019 08:05 4/1/2019 05:29 4/2/2019 04:10   Sodium Latest Ref Range: 136 - 145 mmol/L 140 136 133 (L)   Potassium Latest Ref Range: 3.5 - 5.1 mmol/L 3.3 (L) 2.9 (LL) 3.7   Chloride Latest Ref Range: 99 - 110 mmol/L 105 101 99   CO2 Latest Ref Range: 21 - 32 mmol/L 24 28 27   BUN Latest Ref Range: 7 - 20 mg/dL 13 8 10   Creatinine Latest Ref Range: 0.8 - 1.3 mg/dL <0.5 (L) <0.5 (L) <0.5 (L)   Anion Gap Latest Ref Range: 3 - 16  11 7 7   GFR Non- Latest Ref Range: >60  >60 >60 >60   GFR  Latest Ref Range: >60  >60 >60 >60   Magnesium Latest Ref Range: 1.80 - 2.40 mg/dL 1.60 (L) 1.40 (L) 1.80   Glucose Latest Ref Range: 70 - 99 mg/dL 76 113 (H) 140 (H)   Calcium Latest Ref Range: 8.3 - 10.6 mg/dL 7.7 (L) 7.2 (L) 7.0 (L)   Results for Jemima Cedeno" (MRN [id number]) as of 4/2/2019 12:59   Ref. Range 3/16/2019 06:55 3/16/2019 13:36   Pro-BNP Latest Ref Range: 0 - 124 pg/mL 8,685 (H)    Troponin Latest Ref Range: <0.01 ng/mL  0.01     Results for Jemima Cedeno" (MRN [id number]) as of 4/2/2019 12:59   Ref.  Range 3/31/2019 14:10 4/1/2019 05:29 4/2/2019 04:10 4/2/2019 05:00   WBC Latest Ref Range: 4.0 - 11.0 K/uL 9.4 7.4 8.2    RBC Latest Ref Range: 4.20 - 5.90 M/uL 2.83 (L) 2.51 (L) 2.39 (L)    Hemoglobin Quant Latest Ref Range: 13.5 - 17.5 g/dL 8.2 (L) 7.2 (L) 6.8 (LL) 6.7 (LL)   Hematocrit Latest Ref Range: 40.5 - 52.5 % 25.0 (L) 21.6 (L) 20.5 (LL) 20.2 (LL)   MCV Latest Ref Range: 80.0 - 100.0 fL 88.4 86.1 85.8    MCH Latest and coronary atherosclerosis.  Small hiatal hernia.  Patient may be anemic. No mediastinal lymphadenopathy.       Lungs/pleura: Small bilateral pleural effusions with bibasilar atelectasis. Patchy ground-glass opacities in the upper lobes bilaterally, right greater   than left, with perihilar consolidation.  A small amount of fluid is present   along the left major fissure.  The central airways are patent.       Upper Abdomen: Excreted contrast within the gallbladder.  Tiny left adrenal   adenoma.  Trace ascites.       Soft Tissues/Bones: No acute abnormality.  Advanced sclerotic changes of the   T6 vertebral body.           Impression   1. Bilateral upper lobe ground-glass opacities, left greater than right, with   developing left perihilar consolidation.  Findings are most concerning for   multifocal pneumonia with asymmetric pulmonary edema a less likely   possibility. 2. Small bilateral pleural effusions which are slightly increased from   03/26/2019.   3. Trace ascites in the upper abdomen. 4. Sclerotic metastasis in the T6 vertebral body. 5. Coronary atherosclerosis. ECHO- Conclusions      Summary   Definity contrast was used, but port not working properly.  Shabnam left ventricular systolic function is moderately reduced with an   ejection fraction of 30-35 %. Inferolateral hypokinesia. Mid anteroseptal,   apical lateral and apical septal akinesia.   There is mild concentric left ventricular hypertrophy.   Normal left ventricular filling pressure.   Left ventricular cavity size is normal.   Mild thickening of the anterior leaflet of mitral valve.   Mild mitral regurgitation.   The left atrium is severely dilated. Results for Sharyn Johnson" (MRN [id number]) as of 4/2/2019 12:59   Ref. Range 2/19/2019 14:13 2/20/2019 05:22 3/7/2019 18:20 3/10/2019 12:40   Lactic Acid Latest Ref Range: 0.4 - 2.0 mmol/L 1.3 0.9 1.0 1.9       Assessment and plan:  Acute respiratory failure, hypoxemic.  O2 supplementation to keep Sao2 between 90-4% ONLY  Patient continues to require high flow oxygenation   Pulmonary toilet  Possible healthcare associated pneumonia. CXR with new left lung infiltrate, will obtain CT chest.  He does not have fever, chills, purulent phlegm. We will evaluate after CT chest obtained. On vancomycin and meropenem  CT chest reviewed  Echocardiogram reviewed-patient has cardiomyopathy with EF of 35%   Will give one dose of lasix and reassess  Monitor I/O and BMP  Bronchodilators started   Correct electrolytes on PRN basis   If patient is not clinically better -will consider bronchoscopy for diagnostic and therapeutic purposes   Diverticulitis perforation and abscess, anastomotic leak. Followed by surgery. colostomy care as per wound care team   Metastatic lung cancer, herpes zoster. Followed by Dr. Jania Singleton. DVT, pulmonary embolism.   On heparin  PUD prophylaxis     Electronically signed by:  Toni Ramirez MD    4/2/2019    12:58 PM.

## 2019-04-02 NOTE — PROGRESS NOTES
04/02/19 1631   Oxygen Therapy/Pulse Ox   O2 Device Heated high flow cannula   O2 Flow Rate (L/min) 14 L/min  (decreased down to 12 lpm)   SpO2 95 %   Treatment   Treatment Type HHN;Vibratory Mucous Clearing Therapy or Intervention;IS   $Bronchial Hygiene $Subsequent   $Treatment Type $Medication Delivery   Medications Albuterol/Ipratropium   Duration 10   Is patient on O2?  Y   Pre-Tx Pulse 91   Pre-Tx Resps 18   Breath Sounds Pre-Tx Left Diminished   Breath Sounds Pre-Tx Right Diminished   Breath Sounds Post-Tx Left Diminished   Breath Sounds Post-Tx Right Diminished   Post-Tx Pulse 90   Post-Tx Resps 18   Delivery Source Oxygen   Position Semi-Loera's   Tx Tolerance Well   Incentive Spirometry Tx   Treatment Tolerance Well   Incentive Spirometry Goal (mL) 993 mL   Incentive Spirometry Achieved (mL) 1000 mL   Cough/Sputum   Sputum How Obtained Spontaneous cough   Cough Non-productive   Patient Observation   Observations aerobika in line with tx

## 2019-04-02 NOTE — PROGRESS NOTES
[address]   Daily Progress Note    Admit Date:  3/7/2019  HPI:    Chief Complaint   Patient presents with    Abdominal Pain     LLQ pain, was seen last week for Diverticulitis, patient states he cannot get rid of the pain, had Chemo on Friday        Lu Tello is a 69 y.o. male who was admitted 3/7/2019 for LLQ pain. Treated for recurrent episode of sigmoid diverticulitis. 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 Underwent open left colectomy for perforated diverticulitis 3/14/19. Cardiology consulted for NSVT on 3/16/19. Hx CAD/ PCI to RCA and LAD, VT s/p ICD on Amio, PAD s/p AAA repair [facility]). Increased shortness of breath, CT showed bilateral PEs, treated with heparin. Returned to OR for anastomotic leak 3/27/19, s/p Gerda's procedure. Subjective:  Mr. Nehemias Storey sitting up in chair, denies chest pain or shortness of breath. Remains on high flow O2.  complains of left abdominal pain. Objective:   /77   Pulse 91   Temp 98.4 °F (36.9 °C) (Oral)   Resp 16   Ht 5' 9\" (1.753 m)   Wt 151 lb 3.8 oz (68.6 kg)   SpO2 94%   BMI 22.33 kg/m²       Intake/Output Summary (Last 24 hours) at 4/2/2019 1253  Last data filed at 4/2/2019 1124  Gross per 24 hour   Intake 320 ml   Output 1500 ml   Net -1180 ml     Wt Readings from Last 3 Encounters:   04/02/19 151 lb 3.8 oz (68.6 kg)   02/28/19 184 lb 9.6 oz (83.7 kg)   02/19/19 183 lb (83 kg)         ASSESSMENT:   1. NSVT - no recurrence, Amio 200 qd  2. CAD - s/p PCI to RCA and LAD 2011, last [facility] 2016 without obstructive dz; on statin, BB, ASA  3. Cardiomyopathy, unknown etiology - 30-35% by echo, on BB, statin, ASA, ACEi and Leighton Ant stopped due to MAHSA  4. PE, large bilateral - on IV Heparin, plan to eventually transition to DOAC  5. Perforated divertic, s/p sigmoid colectomy 3/14/19, s/p Gerda's procedure 3/29  6. Metastatic lung cancer with mets - per hem/once  7.  AAA s/p endograft repair [facility], 2011) - IV Heparin, No results for input(s): PROBNP in the last 72 hours. Cardiac Enzymes: No results for input(s): TROPONINI in the last 72 hours. Lipids:   Lab Results   Component Value Date    TRIG 178 05/22/2018    TRIG 183 11/21/2017    HDL 39 05/22/2018    HDL 46 11/21/2017    HDL 31 12/28/2011    HDL 31 05/17/2011    LDLCALC 72 05/22/2018    LDLCALC 97 11/21/2017       Cardiac Imaging:    Echo 3/19/2019:  Definity contrast was used, but port not working properly. The left ventricular systolic function is moderately reduced with an ejection fraction of 30-35%. Inferolateral hypokinesia. Mid anteroseptal, apical lateral and apical septal akinesia. There is mild concentric left ventricular hypertrophy. Normal left ventricular filling pressure. Left ventricular cavity size is normal.   Mild thickening of the anterior leaflet of mitral valve. Mild mitral regurgitation. The left atrium is severely dilated. CARDIAC CATH MAR 2016 [facility])   Summary Comments:    3 Vessel CAD  Mild LV systolic dysfunction  Patent mid and distal LAD stents  Patent proximal, mid, and distal RCA stents  Normal LVEDP  Normal systemic pressures   Plan:  Cardiac risk factor modification. Treat with medical therapy.     Findings/Summary:  CORONARY ANGIOGRAPHY FINDINGS  DOMINANCE:  Right dominant        LEFT MAIN: Luminal irregularities                       LEFT ANTERIOR DESCENDING:  Luminal irregularities, 30 % mid lesion, patent mid and distal overlapping stents                   LEFT CIRCUMFLEX: Luminal irregularities    20-30% mid lesion, OM1: small caliber vessel with severe diffuse disease                  RIGHT CORONARY:  Stenotic, overlapping proximal, mid and distal stents with mid focal 50% in-stent restenosis, diffuse distal 50% stenosis, RPAVGA: distal 70-80% stenosis                LEFT VENTRICULAR FUNCTION:        LVEF: 40-45%        LVEDP: Normal pre-angio        LV Systolic Pressure: Normal         LV to AO Gradient: none

## 2019-04-02 NOTE — PROGRESS NOTES
RESPIRATORY THERAPY ASSESSMENT    Name:  Huong Santiago Record Number:  [id number]  Age: 69 y.o. Gender: male  : 1945  Today's Date:  2019  Room:  [facility]/2426-04    Assessment     Is the patient being admitted for a COPD or Asthma exacerbation? No   (If yes the patient will be seen every 4 hours for the first 24 hours and then reassessed)    Patient Admission Diagnosis      Allergies  No Known Allergies    Minimum Predicted Vital Capacity:     993          Actual Vital Capacity:      1000              Pulmonary History:former smoker  Home Oxygen Therapy:  room air  Home Respiratory Therapy:None   Current Respiratory Therapy:  Duoneb txs Q4H w/a  Treatment Type: HHN, Vibratory Mucous Clearing Therapy or Intervention, IS  Medications: Albuterol/Ipratropium    Respiratory Severity Index(RSI)   Patients with orders for inhalation medications, oxygen, or any therapeutic treatment modality will be placed on Respiratory Protocol. They will be assessed with the first treatment and at least every 72 hours thereafter. The following severity scale will be used to determine frequency of treatment intervention. Smoking History: Pulmonary Disease or Smoking History, Greater than 15 pack year = 2    Social History  Social History     Tobacco Use    Smoking status: Former Smoker     Packs/day: 0.00     Years: 0.00     Pack years: 0.00     Last attempt to quit: 2003     Years since quittin.2    Smokeless tobacco: Never Used   Substance Use Topics    Alcohol use:  Yes     Alcohol/week: 10.8 oz     Types: 18 Shots of liquor per week     Comment: social-used to drink alot     Drug use: No       Recent Surgical History: None = 0  Past Surgical History  Past Surgical History:   Procedure Laterality Date    ARTERIAL ANEURYSM REPAIR  2010    right popliteal artery aneurysm repair    BRONCHOSCOPY  2019    left endobronchial tumor    BRONCHOSCOPY N/A 2019    BRONCHOSCOPY ALVEOLAR LAVAGE performed by William Tinsley MD at [address] [place] [address]  1/18/2019    BRONCHOSCOPY/TRANSBRONCHIAL LUNG BIOPSY performed by William Tinsley MD at [address] [place] [address] N/A 1/23/2019    EBUS ULTRASOUND W/ ANESTHESIA AND NEEDLE BIOPSY performed by Angelica Osorio MD at [address] [place] [address]  1/23/2019    BRONCHOSCOPY ALVEOLAR LAVAGE performed by Angelica Osorio MD at [address] [place] [address]  1/23/2019    BRONCHOSCOPY BIOPSY BRONCHUS performed by Angelica Osorio MD at [address] [place] [address]  1/23/2019    BRONCHOSCOPY BRUSHINGS performed by Angelica Osorio MD at [address] [place] [address]  1/23/2019    BRONCHOSCOPY/TRANSBRONCHIAL NEEDLE BIOPSY performed by Angelica Osorio MD at [address] [place] [address]  1/23/2019    BRONCHOSCOPY/TRANSBRONCHIAL NEEDLE BIOPSY ADDL LOBE performed by Angelica Osorio MD at [facility]  10/08/01 12/20/04    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    COLONOSCOPY  4/4/14    wnl    CORONARY ANGIOPLASTY WITH STENT PLACEMENT      FINGER FRACTURE SURGERY      left middle finger    HEMICOLECTOMY N/A 3/14/2019    LAPAROSCOPIC CONVERT TO OPEN SIGMOID COLECTOMY performed by Yolette Yang MD at [address] / REMOVAL / [address] 1/23/2019    RIGHT SUBCLAVIAN VEIN PORT A CATH PLACEMENT performed by Izaiah Cardenas MD at [facility] N/A 3/27/2019    EXPLORATORY LAPAROTOMY, SIGMOID COLECTOMY, COLOSTOMY performed by Yolette Yang MD at [address] [facility]  2/10/2014    BX duodenum, atrum, distal esophagus    UPPER GASTROINTESTINAL ENDOSCOPY  4/4/14    BX gastric    UPPER GASTROINTESTINAL ENDOSCOPY  11/4/15    UPPER GASTROINTESTINAL ENDOSCOPY N/A 3/21/2019    EGD ESOPHAGOGASTRODUODENOSCOPY performed by Han Perez MD at [address]       Level of Consciousness: Alert, Oriented, and Cooperative = 0    Level of Activity: Mostly sedentary, minimal walking = 2    Respiratory Pattern: Regular Pattern; RR 8-20 = 0    Breath Sounds: Diminshed bilaterally and/or crackles = 2    Sputum  Sputum Color: Creamy, Tenacity: Thin, Sputum How Obtained: Spontaneous cough  Cough: Strong, spontaneous, non-productive = 0    Vital Signs   /73   Pulse 97   Temp 98 °F (36.7 °C) (Axillary)   Resp 16   Ht 5' 9\" (1.753 m)   Wt 151 lb 3.8 oz (68.6 kg)   SpO2 94%   BMI 22.33 kg/m²   SPO2 (COPD values may differ): Less than 86% on room air or greater than 92% on FiO2 greater than 50% = 4    Peak Flow (asthma only): not applicable = 0    RSI: 0-69 = TID (three times daily) and Q4hr PRN for dyspnea        Plan       Goals: medication delivery and improve oxygenation    Patient/caregiver was educated on the proper method of use for Respiratory Care Devices:  Yes      Level of patient/caregiver understanding able to:   ? Verbalize understanding   ? Demonstrate understanding       ? Teach back        ? Needs reinforcement       ? No available caregiver               ? Other:     Response to education:  Good     Is patient being placed on Home Treatment Regimen? No     Does the patient have everything they need prior to discharge? NA     Comments: chart reviewed and pt assessed    Plan of Care: current regimen    Electronically signed by Mitul Correia RCP on 4/2/2019 at 6:21 PM    Respiratory Protocol Guidelines     1. Assessment and treatment by Respiratory Therapy will be initiated for medication and therapeutic interventions upon initiation of aerosolized medication. 2. Physician will be contacted for respiratory rate (RR) greater than 35 breaths per minute. Therapy will be held for heart rate (HR) greater than 140 beats per minute, pending direction from physician. 3. Bronchodilators will be administered via Metered Dose Inhaler (MDI) with spacer when the following criteria are met:  a.  Alert and cooperative     b. HR < 140 bpm  c. RR < 30 bpm                d. Can demonstrate a 2-3 second inspiratory hold  4. Bronchodilators will be administered via Hand Held Nebulizer [facility]) to patients when ANY of the following criteria are met  a. Incognizant or uncooperative          b. Patients treated with HHN at Home        c. Unable to demonstrate proper use of MDI with spacer     d. RR > 30 bpm   5. Bronchodilators will be delivered via Metered Dose Inhaler (MDI), HHN, Aerogen to intubated patients on mechanical ventilation. 6. Inhalation medication orders will be delivered and/or substituted as outlined below. Aerosolized Medications Ordering and Administration Guidelines:    1. All Medications will be ordered by a physician, and their frequency and/or modality will be adjusted as defined by the patients Respiratory Severity Index (RSI) score. 2. If the patient does not have documented COPD, consider discontinuing anticholinergics when RSI is less than 9.  3. If the bronchospasm worsens (increased RSI), then the bronchodilator frequency can be increased to a maximum of every 4 hours. If greater than every 4 hours is required, the physician will be contacted. 4. If the bronchospasm improves, the frequency of the bronchodilator can be decreased, based on the patient's RSI, but not less than home treatment regimen frequency. 5. Bronchodilator(s) will be discontinued if patient has a RSI less than 9 and has received no scheduled or as needed treatment for 72  Hrs. Patients Ordered on a Mucolytic Agent:    1. Must always be administered with a bronchodilator. 2. Discontinue if patient experiences worsened bronchospasm, or secretions have lessened to the point that the patient is able to clear them with a cough. Anti-inflammatory and Combination Medications:    1.  If the patient lacks prior history of lung disease, is not using inhaled anti-inflammatory medication at home, and lacks wheezing by

## 2019-04-02 NOTE — CARE COORDINATION
4/2/19 The ARU still following, Referral also made to to [facility] for [facility] screen, will discuss options with wife as he stabilizes, wife also interested in [facility] [place]

## 2019-04-02 NOTE — PROGRESS NOTES
[facility]    [facility]              [phone]                 Plan :     K coming up  Mg low yesterday  Repeat pending  Will follow and order mg if needed    Assessment :       Hypokalemia  Now low K  Due to NG suction  replaced  Also has low mg- given iv  Repeat 4/2/19    CO2 is high- due to NG suction( now DCed),correction of hypokalemia   Helps metabolic alkalosis    Alkalosis  Metabolic  Due to low potassium and also due to Gi suction  On IV pantoprazole, off GI suction, K+ getting replaced      Hyponatremia  Acute on chronic  Down to 123- now 133  Suspect SIADH from ca lung, pain, and nausea  Ca  lung( adenocarcinoma) with vertebral mets  Sodium low side since 2/19    Cortisol- normal, TSH - 1.65, LFT normal  Echo 2/12- EF of 45-50%, AL mildly dilated    Generalized Edema  High O2 requirement  +ve 6.6 L since admission( peak 14 L +ve)  Echo: 3/19- EF of 30-35%, mild LVH, LV severely dilated  On lasix iv for several days, now on po intermittent dose    Sob also due to lung mass and PE    Acute Kidney Injury  Cr peaked to 1.6- now < 0.5  Now BP better  Surgery done  On 3/14/19    Hypotension  BP: (119-128)/(75-77) Pulse:  [88-91]   Normally hypertensive  BP down post surgery  Now coming up  Tachycardic             pancytopenia  Ca lung  Diverticulitis with perforation- sp surgery, chato-colectomy, also revision surgery      Done on 3/27/19  Radiation dermatitis  Has had acyclovir IV   Mucositis  [facility] would like to thank DEBRA Bahena Aas   for opportunity to serve this patient      Please call with questions at-   24 Hrs Answering service [phone] or  7 am- 5 pm via Perfect serve or cell phone  Dr. Sharona Pizarro for consult :     hyponatremia     HPI :     From consult note-     Ben Porras is a 69 y.o. male presented to   the hospital on 3/7/2019 with left lower quadrant pain. He has that for a week.  Was hospitalized with appearance: Anxious- no, distressed- no, in good spirits- yes  Lying in bed, in good spirits  HEENT: Lips- normal, teeth- ok , oral mucosa- moist, has skin lesions in the face  Neck : Mass- no, appears symmetrical, JVD- not visible  Respiratory: Respiratory effort-  normal, wheeze- no, crackles - no  Cardiovascular:  Ausculation- No M/R/G, Edema no  Abdomen: visible mass- no, distention- no, scar- no, tenderness- no                            hepatosplenomegaly-  no  Musculoskeletal:  clubbing no,cyanosis- no , digital ischemia- no                           muscle strength- grossly normal , tone - grossly normal  Skin: rashes- no , ulcers- no, induration- no, tightening - no  Psychiatric:  Judgement and insight- normal           AAO X 3        LABS:     Recent Labs     03/31/19  1410 04/01/19  0529 04/02/19  0410 04/02/19  0500   WBC 9.4 7.4 8.2  --    HGB 8.2* 7.2* 6.8* 6.7*   HCT 25.0* 21.6* 20.5* 20.2*    133* 123*  --      Recent Labs     03/31/19  0805 04/01/19  0529 04/02/19  0410    136 133*   K 3.3* 2.9* 3.7    101 99   CO2 24 28 27   BUN 13 8 10   CREATININE <0.5* <0.5* <0.5*   GLUCOSE 76 113* 140*   MG 1.60* 1.40*  --

## 2019-04-02 NOTE — PROGRESS NOTES
Hospitalist Progress Note      PCP: Linda Yang MD    Date of Admission: 3/7/2019    Chief Complaint: Abdominal pain     Hospital Course:   69 y. o. male who presented to [facility] with LLQ abdominal pain. Pt was recently d/c'd for diverticulitis, states pain never went away. C/o low grade fevers for 2 weeks. States pain not as severe as previous admission. Had chemo on Friday, states received neulasta. Denies cp, n/v, sob. Subjective:   Pt is sitting in chair. On 15 liters HFNC. Afebrile. He denies dyspnea or chest pain. No N/V.      Medications:  Reviewed    Infusion Medications    sodium chloride      sodium chloride      heparin (porcine) 14.4 mL/hr (04/02/19 0850)    dextrose 100 mL/hr (03/08/19 1700)     Scheduled Medications    sodium chloride flush  10 mL Intravenous Q12H    furosemide  40 mg Intravenous Once    ipratropium-albuterol  1 ampule Inhalation Q4H WA    potassium chloride  10 mEq Oral 4x Daily    zinc oxide  1 applicator Topical Daily    acyclovir  800 mg Oral TID    vancomycin  750 mg Intravenous Q18H    metoprolol succinate  50 mg Oral Daily    meropenem  1 g Intravenous Q8H    amiodarone  200 mg Oral Daily    pantoprazole  40 mg Intravenous Daily    aspirin  81 mg Oral Daily    sodium chloride flush  10 mL Intravenous 2 times per day    simvastatin  40 mg Oral Nightly    folic acid  1 mg Oral Daily    oyster shell calcium/vitamin D  1 tablet Oral Daily     PRN Meds: oxyCODONE **OR** oxyCODONE, heparin (porcine), heparin (porcine), HYDROmorphone, sodium chloride flush, magic (miracle) mouthwash with nystatin, glucose, dextrose, glucagon (rDNA), dextrose, ondansetron, acetaminophen      Intake/Output Summary (Last 24 hours) at 4/2/2019 1620  Last data filed at 4/2/2019 1124  Gross per 24 hour   Intake 320 ml   Output 650 ml   Net -330 ml       Physical Exam Performed:    /70   Pulse 87   Temp 97.7 °F (36.5 °C) (Axillary)   Resp 16   Ht 5' 9\" (1.753 m)   Wt 151 lb 3.8 oz (68.6 kg)   SpO2 91%   BMI 22.33 kg/m²     General appearance: No apparent distress, appears stated age and cooperative. HEENT: Pupils equal, round, and reactive to light. Conjunctivae/corneas clear. Neck: Supple, with full range of motion. No jugular venous distention. Trachea midline. Respiratory:  Normal respiratory effort. Clear to auscultation, bilaterally without Rales/Wheezes/Rhonchi. Cardiovascular: Regular rate and rhythm with normal S1/S2 without murmurs, rubs or gallops. Abdomen: Soft, non-tender, non-distended with normal bowel sounds. Musculoskeletal: No clubbing, cyanosis or edema bilaterally. Full range of motion without deformity. Skin: Skin color, texture, turgor normal.  No rashes or lesions. Neurologic:  Neurovascularly intact without any focal sensory/motor deficits. Cranial nerves: II-XII intact, grossly non-focal.  Psychiatric: Alert and oriented, thought content appropriate, normal insight  Capillary Refill: Brisk,< 3 seconds   Peripheral Pulses: +2 palpable, equal bilaterally       Labs:   Recent Labs     03/31/19  1410 04/01/19  0529 04/02/19  0410 04/02/19  0500 04/02/19  1210   WBC 9.4 7.4 8.2  --   --    HGB 8.2* 7.2* 6.8* 6.7*  --    HCT 25.0* 21.6* 20.5* 20.2* 26.1*    133* 123*  --   --      Recent Labs     03/31/19  0805 04/01/19  0529 04/02/19  0410    136 133*   K 3.3* 2.9* 3.7    101 99   CO2 24 28 27   BUN 13 8 10   CREATININE <0.5* <0.5* <0.5*   CALCIUM 7.7* 7.2* 7.0*       Urinalysis:      Lab Results   Component Value Date    NITRU Negative 03/10/2019    WBCUA 3-5 03/10/2019    RBCUA 0-2 03/10/2019    BLOODU Negative 03/10/2019    SPECGRAV 1.025 03/10/2019    GLUCOSEU Negative 03/10/2019       Radiology:  CT CHEST WO CONTRAST   Final Result   1. Bilateral upper lobe ground-glass opacities, left greater than right, with   developing left perihilar consolidation.   Findings are most concerning for   multifocal pneumonia with asymmetric pulmonary edema a less likely   possibility. 2. Small bilateral pleural effusions which are slightly increased from   03/26/2019.   3. Trace ascites in the upper abdomen. 4. Sclerotic metastasis in the T6 vertebral body. 5. Coronary atherosclerosis. XR CHEST PORTABLE   Final Result   Nonspecific consolidation mid and lower left lung with left pleural effusion   presumably related to sequela from pulmonary edema. Pneumonia is a   consideration. Calcific atherosclerosis aorta. Cardiomegaly. XR ABDOMEN (KUB) (SINGLE AP VIEW)   Final Result   Dilated small bowel, may reflect ileus or obstruction. Recommend follow-up   study. CT ABDOMEN PELVIS WO CONTRAST Additional Contrast? None   Final Result   Interval increase in size of the left lower quadrant collection which now   measures up to 7.8 cm. Administered rectal contrast extends into the   collection. Right anterior approach drain courses adjacent to the collection. No significant change in pleural effusions and associated airspace disease. The findings were sent to the Radiology Results [address] at 12:53   pm on 3/26/2019to be communicated to a licensed caregiver. [place] BARIUM ENEMA   Final Result   Active extravasation of contrast from the sigmoid colon, compatible with   anastomotic leak. Please refer to accompanying CT. Diverticulosis. CT ABDOMEN PELVIS W IV CONTRAST Additional Contrast? Radiologist Recommendation   Final Result   1. The patient is 11 days status post partial sigmoid colectomy. Surgical   drain remains in place with a small ill-defined fluid collection that is   stable from prior exam.  This does not appear to represent an abscess and may   represent postoperative hematoma or seroma. 2. There is a new collection of free intraperitoneal air immediately anterior   to the prior anastomosis site, contained within the left lower quadrant.    This indicates collection may represent an intramural or   extramural abscess or giant diverticulum. Infrarenal abdominal aortic aneurysm and aorto bi-iliac stent. Other incidental findings as above. CT ABDOMEN PELVIS W IV CONTRAST Additional Contrast? None   Final Result   Acute perforated descending colon diverticulitis. There is free air in the   abdomen and there is a small extraluminal gas and fluid collection/abscess   adjacent to the descending colon measuring 4.1 cm in AP diameter and 6 cm in   length. There is diverticulosis of the entire colon. Cholelithiasis. 4.7 cm infrarenal abdominal aortic aneurysm status post endograft repair with   a patent aorto bi-iliac graft. There is suspicion of an endoleak with blush   of contrast enhancement suspected in the inferior aspect of the native   aneurysm. As a noncontrast study was not performed, calcification of the   intraluminal thrombus accounting for the apparent enhancement is not   excluded. 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 Comparison with a noncontrast CT examination would be   helpful. Critical results were called by Dr. Romana Sy. MD [facility] to [facility] on   3/7/2019 at 20:35.              Assessment/Plan:    Active Hospital Problems    Diagnosis Date Noted    Iron deficiency anemia due to chronic blood loss [D50.0]     Pneumonia of left lung due to infectious organism [J18.9]     Cardiomyopathy ([address] [I42.9]     NSVT (nonsustained ventricular tachycardia) ([facility]) [I47.2]     Other pulmonary embolism without acute cor pulmonale ([facility]) [I26.99]     Acute respiratory failure with hypoxia ([address] [J96.01]     H/O colectomy [Z90.49]     Primary malignant neoplasm of lung metastatic to other site (HCC) [C34.90]     Herpes zoster oticus [B02.21]     Hearing loss of right ear [H91.91]     Chemotherapy-induced neutropenia (HCC) [D70.1, T45.1X5A]     Mucositis [K12.30]     Diverticulitis [K57.92] 03/07/2019    Diverticulitis of large intestine with perforation and abscess without bleeding [K57.20]        Acute perforated recurrent diverticulitis/pneumoperitoneum/abscess in setting of immunosuppression due to chemotherapy:   - CT A/P on admission demonstrated acute perforated colon diverticulitis with free air and fluid collection.   - General surgery consulted. Pt is status post laparoscopic converted to open left colectomy on 3/14/19 with return for Harding's procedure with colostomy placement on  3/27.  - Continue IV Merrem and Vanc. Completed course of Diflucan  - Continue prn pain control.      Acute hypoxic respiratory failure due to bilateral pulmonary emboli:   - CT chest 3/16 showed acute PE. Lower extremity dopplers +DVT. Vascular surgery consulted, no indication for IVC filter at this time. - Wean supplemental O2 as tolerated. He is not normally on home O2. He is currently requiring 15 liters HFNC.  - Pulmonary re-consulted. Chest CT obtained bilateral upper lobe GGO, concerning for multifocal pneumonia. Pt on [facility]. Pulm planning for possible bronch. - Continue heparin infusion.      Metastatic lung cancer, stage 4 with bone mets:  - Hem/onc is following. Chemo on hold to allow for surgical healing.      Thrombocytopenia (resolved) now with hypercoagulable state:  - Platelets required preoperatively. Monitor daily CBC.      Neutropenia due to chemotherapy (resolved):  - S/p Neulasta on 3/1. S/p Granix on 3/8.      Anemia:  -  Pt is s/p 1 unit PBRC for Hgb 6.6 on 3/19, 1 unit for Hgb 7.2 on 3/22, 1 unit on 4/2. No overt signs of bleeding. CT A/P showed no RP bleed. PPI added per Hem/Onc. Pt is s/p B12 injection. Stool for occult blood was +. GI consulted, s/p EGD on 3/21 which was normal. Monitor daily CBC.  H&H is stable.      PAD/CAD/HTN/Cardiomyopathy:   - Defer antiplatelet management to hem/onc given the complexity of his present situation, bleeding risk and thrombotic states. Pt was restarted on integrillin infusion on 3/20 per Hem/Onc and was dc'd on 3/24.   - ECHO showed reduced EF of 30-35%. Previous ECHO showed EF 40-45%. - Cardiology is following. Continue on aspirin, lisinopril, aldactone, Toprol and statin. Lasix prn.  - Cards recommending LHC given drop in EF but would like to wait 4-6 weeks post PE on AC.      NSVT:   - Known history of VT s/p ICD. Went into VT on 3/12, PMD went off. Continue amiodarone. Keep K > 4 and Mag > 2.     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 Nephrology following. Monitor daily BMP.      Hypokalemia/hypomagnesemia:  - Monitor and replete as needed.      Zoster oticus:  - Resumed acyclovir 3/31. DVT Prophylaxis: Pt is on heparin infusion   Diet: Dietary Nutrition Supplements: Standard High Calorie Oral Supplement  DIET GENERAL;  Code Status: Full Code    PT/OT Eval Status: Ordered     Dispo - Uncertain, dicussed palliative care with pt and his wife. They are open to meeting with palliative care to further discuss goals of care and code status. Discussed with Oncology NP.      DEBRA Estrada - CNP

## 2019-04-03 NOTE — CARE COORDINATION
4/3/19 [facility]3] has accepted this pt, Medicare, no precert needed, Pt would like -[place] location notified [employer], will follow

## 2019-04-03 NOTE — PROGRESS NOTES
Physical Therapy  Facility/Department: [place] [address] [facility]  Daily Treatment Note  NAME: Alan Gaytan  : 1945  MRN: [id number]    Date of Service: 4/3/2019    Discharge Recommendations:  LTAC  PT Equipment Recommendations  Equipment Needed: No(defer to rehab facility)    Patient Diagnosis(es): The primary encounter diagnosis was Diverticulitis of large intestine with perforation and abscess without bleeding. Diagnoses of Leaking abdominal aortic aneurysm (AAA) ([address] Abdominal pain, left lower quadrant, and Chemotherapy-induced neutropenia ([address] were also pertinent to this visit. has a past medical history of Benign neoplasm of colon, Cancer ([address] Diverticulosis of colon (without mention of hemorrhage), Elevated fasting glucose, Former cigarette smoker, Herpes simplex viral infection, Hyperlipidemia, Hypertension, MI, old, 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[address] and Right popliteal artery occlusion ([address] has a past surgical history that includes Arterial aneurysm repair (2010); Finger fracture surgery; Coronary angioplasty with stent; Upper gastrointestinal endoscopy (2/10/2014); Colonoscopy (10/08/01 12/20/04); Colonoscopy (14); Upper gastrointestinal endoscopy (14); Upper gastrointestinal endoscopy (11/4/15); bronchoscopy (2019); bronchoscopy (N/A, 2019); bronchoscopy (2019); INSERTION / REMOVAL / REPLACEMENT VENOUS ACCESS CATHETER (Right, 2019); bronchoscopy (N/A, 2019); bronchoscopy (2019); bronchoscopy (2019); bronchoscopy (2019); bronchoscopy (2019); bronchoscopy (2019); hemicolectomy (N/A, 3/14/2019); Upper gastrointestinal endoscopy (N/A, 3/21/2019); and LAPAROTOMY EXPLORATORY (N/A, 3/27/2019).     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 Family / Caregiver Present: Yes(wife)  Referring Practitioner: Dr. Francisco Hameed  Subjective  Subjective: Pt about to have bronch, agrees to bed exs only  General Comment  Comments: RN cleared pt to participate. Pain Screening  Patient Currently in Pain: Denies  Vital Signs  Patient Currently in Pain: Denies       Orientation  Orientation  Overall Orientation Status: Within Functional Limits  Cognition      Objective   Bed mobility  Supine to Sit: Unable to assess(refused, leaving for bronch in a few minutes)              Exercises  Quad Sets: x15 BLE  Heelslides: X 10 B supine  Gluteal Sets: X 10 B  Hip Abduction: X 15 B supine  Knee Short Arc Quad: x 15 B  Ankle Pumps: X 15 BLE                        Assessment   Body structures, Functions, Activity limitations: Decreased functional mobility ; Decreased strength;Decreased balance;Decreased endurance  Assessment: pt performed bed exs today and delined OOB due to scheduled for bronch in a few minutes. Family agreeable to LTAC at this point.   will con't PT as IP 3-5x/ week  Treatment Diagnosis: Decreased endurance and (I) with mobility  Specific instructions for Next Treatment: Progress ther ex and mobility as tolerated  Prognosis: Good  Patient Education: role of PT, bed mobility   REQUIRES PT FOLLOW UP: Yes  Activity Tolerance  Activity Tolerance: Patient Tolerated treatment well;Patient limited by fatigue       AM-PAC Score  AM-PAC Inpatient Mobility Raw Score : 17  AM-PAC Inpatient T-Scale Score : 42.13  Mobility Inpatient CMS 0-100% Score: 50.57  Mobility Inpatient CMS G-Code Modifier : CK          Goals  Short term goals  Time Frame for Short term goals: 1 week (unless otherwise specified)  Short term goal 1: Pt will transfer supine <-> sit with modified(I)  Short term goal 2: Pt will transfer sit <-> stand and bed>chair using RW or least AD with supervision  Short term goal 3: Pt will ambulate x 150 feet using RW or least AD with supervision  Short term goal 4: By 3/20/19: Pt will tolerate 12-15 reps BLE exercise for strengthening, balance, and endurance  Short term goal 5: Pt will ambulate up/down 2 steps without use of handrails (using appropriate AD as needed) with SBA  Patient Goals   Patient goals : \"To get my strength back and go home at discharge. \"    Plan    Plan  Times per week: 3-5x/week in acute care  Times per day: Daily  Specific instructions for Next Treatment: Progress ther ex and mobility as tolerated  Current Treatment Recommendations: Strengthening, Gait Training, Balance Training, Stair training, Functional Mobility Training, ROM, Endurance Training, Transfer Training, Safety Education & Training, Patient/Caregiver Education & Training, Equipment Evaluation, Education, & procurement  Safety Devices  Type of devices: Call light within reach, Left in bed     Therapy Time   Individual Concurrent Group Co-treatment   Time In 1428         Time Out 1441         Minutes [address], [address]

## 2019-04-03 NOTE — PROGRESS NOTES
[address]   Daily Progress Note    Admit Date:  3/7/2019  HPI:    Chief Complaint   Patient presents with    Abdominal Pain     LLQ pain, was seen last week for Diverticulitis, patient states he cannot get rid of the pain, had Chemo on Friday        Demond Ramos is a 69 y.o. male who was admitted 3/7/2019 for LLQ pain. Treated for recurrent episode of sigmoid diverticulitis. 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 Underwent open left colectomy for perforated diverticulitis 3/14/19. Cardiology consulted for NSVT on 3/16/19. Hx CAD/ PCI to RCA and LAD, VT s/p ICD on Amio, PAD s/p AAA repair [facility]). Increased shortness of breath, CT showed bilateral PEs, treated with heparin. Returned to OR for anastomotic leak 3/27/19, s/p Gerda's procedure. Increased O2 requirements on 3/31/19, CXR and then CT chest concerning for HCAP. Subjective:  Mr. Mesha Lay lying in bed, wife at bedside, discussion with palliative care. He denies any pain. Feels breathing is stable. Objective:   /74   Pulse 98   Temp 98.4 °F (36.9 °C) (Oral)   Resp 18   Ht 5' 9\" (1.753 m)   Wt 151 lb 3.8 oz (68.6 kg)   SpO2 91%   BMI 22.33 kg/m²       Intake/Output Summary (Last 24 hours) at 4/3/2019 1218  Last data filed at 4/3/2019 2012  Gross per 24 hour   Intake 1456 ml   Output 3130 ml   Net -1674 ml     Wt Readings from Last 3 Encounters:   04/02/19 151 lb 3.8 oz (68.6 kg)   02/28/19 184 lb 9.6 oz (83.7 kg)   02/19/19 183 lb (83 kg)         ASSESSMENT:   1. NSVT - increased PVC. NSVT burden over past 24 hrs, Amio 200 qd, K 3.4, Mag 1.4  2. CAD - s/p PCI to RCA and LAD 2011, last [facility] 2016 without obstructive dz; on statin, BB, ASA  3. Cardiomyopathy, unknown etiology - 30-35% by echo, on BB, statin, ASA; ACEi and Leighton Ant stopped due to MAHSA  4. PE, large bilateral - on IV Heparin, plan to eventually transition to DOAC  5.  Perforated divertic, s/p sigmoid colectomy 3/14/19, s/p Gerda's procedure

## 2019-04-03 NOTE — PROGRESS NOTES
Bedside report received from [facility]. Patient resting comfortably in bed, wife at bedside. No signs of discomfort or distress. VSS. Bed is in lowest position, wheels locked, 2/2 side rails up. Bedside table and call light within reach. White board updated. Will continue to monitor patient. Roger Kuhn RN    7:56 PM  Laurent bag completely full, 2300ml emptied. Roger Kuhn RN      10:09 PM  CMU calling, o2 at 76%. RN to room, oxygen tubing had come out of patients nose. Replaced and monitored, o2 immediately back up to 90-92%. Roger Kuhn RN    10:25 PM  RT at bedside, patient placed on bipap. Roger Kuhn RN    12:40 AM  RN to room, patient removed bipap, says he cannot tolerate it. o2 86-88%. Turned up to 12, then 15L. o2 staying 88-90%. RT and MD notified. Roger Kuhn RN    [address]pt[address] 66.2, no change to hep gtt.     1:41 AM  Spoke to [place], will try bipap again in the am. Okay to leave on high flow for now as long as o2 is okay, and patient isnt struggling.  Roger Kuhn RN

## 2019-04-03 NOTE — PROGRESS NOTES
Pt placed on cardiac monitor. Vitals obtained during procedure, remained WNL. Bedside report to Timoteo Sanchez primary RN.

## 2019-04-03 NOTE — PROGRESS NOTES
Speech Language Pathology  Attempt Note    SLP attempted dysphagia f/u, however, pt currently NPO for possible bronch this afternoon. ST to continue to follow and re-attempt f/u at a later time.     Thank you,  Boone Yu M.S. [id number] [facility]  Speech-language pathologist  [phone]

## 2019-04-03 NOTE — PROGRESS NOTES
Bedside report received from [facility]. Patient resting comfortably in bed. Wife at bedside. No signs of discomfort or distress. Bed is in lowest position, wheels locked, 2/2 side rails up. Bedside table and call light within reach. White board updated. Will continue to monitor patient. Deon Higginbotham RN    8:53 PM  PRN oxycodone given for pain. Deon Higginbotham RN    9:39 PM  Shift assessment complete. (See findings in flowsheet). Med pass complete. (See MAR). VSS. Patient with no complaints at this time. Patient resting in bed comfortably. No signs or symptoms of distress or discomfort noted at this time. Bed in lowest position, brakes locked. Nonskid footwear in place. 5 P's addressed, no needs at this time. Pt calls out appropriately. Will continue to monitor. Deon Higginbotham RN    1:15 AM  Blood drawn per orders and sent to lab via tube system.  Deon Higginbotham RN

## 2019-04-03 NOTE — PRE SEDATION
Sedation Pre-Procedure Note    Patient Name: Ibrahima Calvert   YOB: 1945  Room/Bed: 6767/9261-58  Medical Record Number: [id number]  Date: 4/3/2019   Time: 4:28 PM       Indication:  Persistent respiratory failure with hypoxemia, multifocal pneumonia with pulmonary infiltrates and atelectasis    Consent: I have discussed with the patient and/or the patient representative the indication, alternatives, and the possible risks and/or complications of the planned procedure and the anesthesia methods. The patient and/or patient representative appear to understand and agree to proceed. Vital Signs:   Vitals:    04/03/19 1557   BP:    Pulse:    Resp: 18   Temp:    SpO2: 94%       Past Medical History:   has a past medical history of Benign neoplasm of colon, Cancer ([address] Diverticulosis of colon (without mention of hemorrhage), Elevated fasting glucose, Former cigarette smoker, Herpes simplex viral infection, Hyperlipidemia, Hypertension, MI, old, 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[address] and Right popliteal artery occlusion ([address] Past Surgical History:   has a past surgical history that includes Arterial aneurysm repair (11/2010); Finger fracture surgery; Coronary angioplasty with stent; Upper gastrointestinal endoscopy (2/10/2014); Colonoscopy (10/08/01 12/20/04); Colonoscopy (4/4/14); Upper gastrointestinal endoscopy (4/4/14); Upper gastrointestinal endoscopy (11/4/15); bronchoscopy (01/18/2019); bronchoscopy (N/A, 1/18/2019); bronchoscopy (1/18/2019); INSERTION / REMOVAL / REPLACEMENT VENOUS ACCESS CATHETER (Right, 1/23/2019); bronchoscopy (N/A, 1/23/2019); bronchoscopy (1/23/2019); bronchoscopy (1/23/2019); bronchoscopy (1/23/2019); bronchoscopy (1/23/2019); bronchoscopy (1/23/2019); hemicolectomy (N/A, 3/14/2019); Upper gastrointestinal endoscopy (N/A, 3/21/2019); and LAPAROTOMY EXPLORATORY (N/A, 3/27/2019).     Medications:   Scheduled Meds:    vancomycin  1,000 mg Intravenous Q12H    furosemide  40 mg Intravenous Once    sodium chloride flush  10 mL Intravenous Q12H    ipratropium-albuterol  1 ampule Inhalation Q4H WA    potassium chloride  10 mEq Oral 4x Daily    zinc oxide  1 applicator Topical Daily    acyclovir  800 mg Oral TID    metoprolol succinate  50 mg Oral Daily    meropenem  1 g Intravenous Q8H    amiodarone  200 mg Oral Daily    pantoprazole  40 mg Intravenous Daily    aspirin  81 mg Oral Daily    sodium chloride flush  10 mL Intravenous 2 times per day    simvastatin  40 mg Oral Nightly    folic acid  1 mg Oral Daily    oyster shell calcium/vitamin D  1 tablet Oral Daily     Continuous Infusions:    heparin (porcine) 16.8 mL/hr (04/03/19 1308)    dextrose 100 mL/hr (03/08/19 1700)     PRN Meds: oxyCODONE **OR** oxyCODONE, heparin (porcine), heparin (porcine), HYDROmorphone, sodium chloride flush, magic (miracle) mouthwash with nystatin, glucose, dextrose, glucagon (rDNA), dextrose, ondansetron, acetaminophen  Home Meds:   Prior to Admission medications    Medication Sig Start Date End Date Taking?  Authorizing Provider   amiodarone (CORDARONE) 200 MG tablet Take 200 mg by mouth daily   Yes Historical Provider, MD   atenolol (TENORMIN) 100 MG tablet Take 200 mg by mouth daily Verified with [facility]   Yes Historical Provider, MD   vitamin D3 (CHOLECALCIFEROL) 400 units TABS tablet Take 400 Units by mouth daily   Yes Historical Provider, MD   prochlorperazine (COMPAZINE) 25 MG suppository Place 25 mg rectally every 12 hours as needed for Nausea   Yes Historical Provider, MD   ondansetron (ZOFRAN) 8 MG tablet 02/01/2019Ondansetron 8 MG Oral Tablet [Zofran] orally1.0 tabletevery 8 hours02/01/2019Active 2/1/19  Yes Historical Provider, MD   lisinopril (PRINIVIL;ZESTRIL) 10 MG tablet Take 10 mg by mouth 2/15/19  Yes Historical Provider, MD   amLODIPine (NORVASC) 5 MG tablet Take 1 tablet by mouth daily 1/25/19  Yes Romana Leyland, MD   simvastatin (ZOCOR) 40 MG tablet TAKE 1 TABLET BY MOUTH NIGHTLY 10/24/18  Yes Aida Khan MD   famotidine (PEPCID) 20 MG tablet Take 20 mg by mouth 2 times daily. Yes Historical Provider, MD   aspirin 81 MG EC tablet Take 81 mg by mouth daily. Yes Historical Provider, MD   clopidogrel (PLAVIX) 75 MG tablet Take 75 mg by mouth daily. Yes Historical Provider, MD   Multiple Vitamin (MULTIVITAMIN PO) Take  by mouth. Yes Historical Provider, MD   folic acid (FOLVITE) 1 MG tablet Take 1 tablet by mouth daily 2/24/19   Garrett Johnson MD   Psyllium (METAMUCIL) WAFR Take 6 tablets by mouth 2 times daily. 12/30/10   Historical Provider, MD           Pre-Sedation Documentation and Exam:   Vital signs have been reviewed (see flow sheet for vitals).     Mallampati Airway Assessment:  Mallampati Class III - (soft palate & base of uvula are visible)    Prior History of Anesthesia Complications:   none    ASA Classification:  Class 3 - A patient with severe systemic disease that limits activity but is not incapacitating    Sedation/ Anesthesia Plan:   intravenous sedation    Medications Planned:   midazolam (Versed) intravenously and fentanyl intravenously    Patient is an appropriate candidate for plan of sedation: yes    Electronically signed by Maci Farr MD on 4/3/2019 at 4:28 PM

## 2019-04-03 NOTE — ONCOLOGY
ONCOLOGY HEMATOLOGY CARE PROGRESS NOTE      SUBJECTIVE:     Getting bronch today. Sleeping. Wife at bedside. Has questions about whether or not his cancer is spreading. Has seen palliative today as well. Still on high flow O2. ROS:   The remaining 10 point review of symptoms is unremarkable. OBJECTIVE        Physical    VITALS:  /76   Pulse 92   Temp 100 °F (37.8 °C) (Oral)   Resp 18   Ht 5' 9\" (1.753 m)   Wt 151 lb 3.8 oz (68.6 kg)   SpO2 94%   BMI 22.33 kg/m²   TEMPERATURE:  Current - Temp: 100 °F (37.8 °C); Max - Temp  Av.5 °F (36.9 °C)  Min: 97.5 °F (36.4 °C)  Max: 100 °F (37.8 °C)  PULSE OXIMETRY RANGE: SpO2  Av.4 %  Min: 89 %  Max: 98 %  24HR INTAKE/OUTPUT:      Intake/Output Summary (Last 24 hours) at 4/3/2019 1400  Last data filed at 4/3/2019 5240  Gross per 24 hour   Intake 1456 ml   Output 2150 ml   Net -694 ml       CONSTITUTIONAL:  awake, alert, cooperative, no apparent distress, HEENT oral pharynx , no scleral icterus, wearing high flow nasal canula   HEMATOLOGIC/LYMPHATICS:  no cervical lymphadenopathy, no supraclavicular lymphadenopathy, no axillary lymphadenopathy and no inguinal lymphadenopathy  LUNGS:  No increased work of breathing, good air exchange, clear to auscultation bilaterally, no crackles or wheezing  CARDIOVASCULAR:  , regular rate and rhythm, normal S1 and S2, no S3 or S4, and no murmur noted  ABDOMEN: Absent BS.  + colostomy (empty). Red stoma    Back: no bruising, purpura, or hematomas noted   MUSCULOSKELETAL:  There is no redness, warmth, or swelling of the joints. EXTREMETIES: BLE edema +1, non pitting   NEUROLOGIC:  Awake, alert, oriented to name, place and time. Cranial nerves II-XII are grossly intact. Motor is 5 out of 5 bilaterally.  Hip flexion 5/5 bilaterally in the bed     Data      Recent Labs     19  0529 19  0410 19  0500 19  1210 19  0451   WBC 7.4 8.2  --   -- 11.3*   HGB 7.2* 6.8* 6.7*  --  8.1*   HCT 21.6* 20.5* 20.2* 26.1* 24.4*   * 123*  --   --  125*   MCV 86.1 85.8  --   --  86.1        Recent Labs     04/01/19  0529 04/02/19  0410 04/03/19  0451    133* 132*   K 2.9* 3.7 3.4*    99 95*   CO2 28 27 26   BUN 8 10 7   CREATININE <0.5* <0.5* <0.5*     No results for input(s): AST, ALT, ALB, BILIDIR, BILITOT, ALKPHOS in the last 72 hours.     Magnesium:    Lab Results   Component Value Date    MG 1.40 04/03/2019    MG 1.80 04/02/2019    MG 1.40 04/01/2019         Problem List  Patient Active Problem List   Diagnosis    Elevated fasting glucose    CAD (coronary artery disease)    PAD (peripheral artery disease) ([address]    Essential hypertension    Mixed hyperlipidemia    Sprain of right foot    Sprain of anterior talofibular ligament of right ankle    Closed nondisplaced fracture of fifth right metatarsal bone    Closed fracture of proximal lateral malleolus of ankle with routine healing    Right foot pain    Right ankle pain    Nondisplaced fracture of fifth right metatarsal bone with routine healing    History of pulmonary emphysema    Chronic obstructive pulmonary disease with (acute) exacerbation (HCC)    Pulmonary nodule, left    Abnormal bone scan of thoracic spine    Back pain    Visit for monitoring Plavix therapy    History of ventricular tachycardia    Gallstones    Hemoptysis    Former smoker    Elevated PSA    Adenocarcinoma of left lung ([address]    Acute diverticulitis    Pancytopenia ([address]    Diverticulitis of large intestine with perforation and abscess without bleeding    Diverticulitis    Chemotherapy-induced neutropenia (HCC)    Mucositis    Herpes zoster oticus    Hearing loss of right ear    Acute respiratory failure with hypoxia ([address]    H/O colectomy    Primary malignant neoplasm of lung metastatic to other site [facility])    NSVT (nonsustained ventricular tachycardia) (HCC)    Other pulmonary embolism without acute cor pulmonale (HCC)    Cardiomyopathy (HCC)    Pneumonia of left lung due to infectious organism    Iron deficiency anemia due to chronic blood loss       ASSESSMENT AND PLAN    Acute Perforated descending colon diverticulitis (not immune mediated colitis) with free air in the abd   - per gen surgery , s/p Laparoscopic converted to open left Colectomy, 3/14/2019, with Dr. Ap Pelaez  - ATB support (Zosyn, Maxipime, Vanco) , changed to [address] until POD 9, now on Merrem only. - CT scan done 3/25 with anastomotic leak at the [facility] surgical site. - Status post Gerda procedure on 3/27/2019 with Dr. Ap Pelaez. Nutrition  - NPO for bronch     Neutropenia secondary to chemo  - Granix stopped 3/11   - resolved     Thrombocytopenia secondary to chemo  -Resolved    PE  -Diagnosed on 3/16/2019 with bilateral PEs involving the right and left main pulm arteries.   -Also had thrombus in the right femoral, peroneal, greater saphenous, short saphenous veins and in the left peroneal veins.  -Plan Eliquis on discharge.   -Will need lifelong anticoagulation.  -Does not need a filter per Dr. Rachele Munguia on 3/18   -Started on a heparin drip on 3/31/2019. HCAP  - Vanco/Rolando.  - BRONCH TODAY     Lytes  - Replete K PRN     Anemia, possible GI bleed on hep drip  - EGD 3/21 negative   - PPI added, now BID dosing per GI 3/21  - CT AP 3/21 - no signs of bleeding   - due for B12 shot - given 3/19/2019 - required every 9 weeks with this chemo. Lung CA, stage 4 bone mets   - Will hold chemo until surgically healed  - Cycle 3 carbo/alimta Derekolyn Shutters was due on 3/22- held due to acute issues, cont to hold. Plans to resume Marce Sequin only outpatient.   - CT chest/abd/pelvis, 3/10/2019, showed improvement in the left hilar soft tissue and radiation changes. No obvious new disease. Not hospice appropriate at this time from an oncology standpoint.     - CT Chest from 4-1 reviewed.  L sided disease seems consistent with PNA and not cancer progression. Wife aware.       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 May need to resume- hospitalist is speaking with vascular surgery for clarification. (Dr. Ward Mendoza)   - Resuming full dose [facility] will be difficult given surgical burden and recent leak;       ONCOLOGIC DISPOSITION:  TBD.   Per surgical team.     nAgel Burns, MICHAEL   SEAN FLANAGAN  Perfect [employer]

## 2019-04-03 NOTE — PROGRESS NOTES
[facility] GENERAL SURGERY    Surgery Progress Note           PO    PATIENT NAME: Leonel Query     TODAY'S DATE: 4/3/2019    INTERVAL HISTORY:    Did well with eating yesterday - currently NPO for bronch today     OBJECTIVE:   VITALS:  /76   Pulse 92   Temp 100 °F (37.8 °C) (Oral)   Resp 18   Ht 5' 9\" (1.753 m)   Wt 151 lb 3.8 oz (68.6 kg)   SpO2 94%   BMI 22.33 kg/m²     INTAKE/OUTPUT:    I/O last 3 completed shifts: In: 1336 [P.O.:660; Blood:326; IV Piggyback:350]  Out: 5042 [Urine:3100; Drains:30; Stool:15]  I/O this shift:  In: 120 [P.O.:120]  Out: -               CONSTITUTIONAL:  awake and alert  ABDOMEN:   normal bowel sounds, soft, non-distended, tender, patricio serosanguinous, ostomy functional   INCISION: clean, dry, no drainage    Data:  CBC:   Recent Labs     04/01/19  0529 04/02/19  0410 04/02/19  0500 04/02/19  1210 04/03/19  0451   WBC 7.4 8.2  --   --  11.3*   HGB 7.2* 6.8* 6.7*  --  8.1*   HCT 21.6* 20.5* 20.2* 26.1* 24.4*   * 123*  --   --  125*     BMP:    Recent Labs     04/01/19  0529 04/02/19  0410 04/03/19  0451    133* 132*   K 2.9* 3.7 3.4*    99 95*   CO2 28 27 26   BUN 8 10 7   CREATININE <0.5* <0.5* <0.5*   GLUCOSE 113* 140* 144*     Hepatic: No results for input(s): AST, ALT, ALB, BILITOT, ALKPHOS in the last 72 hours. Mag:      Recent Labs     04/01/19  0529 04/02/19  0410 04/03/19  0451   MG 1.40* 1.80 1.40*      Phos:   No results for input(s): PHOS in the last 72 hours. INR:   Recent Labs     04/02/19  1210   INR 1.40*         ASSESSMENT AND PLAN:  69 y.o. male status post left colectomy, return for fariha's  GI: okay to resume low fiber diet after bronch  ID: continue with antibiotics. Anemia: appropriate response with blood yesterday. Discussed with pt, family as well as palliative care today. Electronically signed by DEBRA Phillips CNP     Patient seen and agree with above.     Sterling Fan MD

## 2019-04-03 NOTE — PROGRESS NOTES
Hospitalist Progress Note      PCP: Zack Mckeon MD    Date of Admission: 3/7/2019    Chief Complaint: Abdominal pain     Hospital Course:   69 y. o. male who presented to [facility] with LLQ abdominal pain. Pt was recently d/c'd for diverticulitis, states pain never went away. C/o low grade fevers for 2 weeks. States pain not as severe as previous admission. Had chemo on Friday, states received neulasta. Denies cp, n/v, sob. Subjective:   Pt is sitting in chair. On 11 liters HFNC. Afebrile. He denies dyspnea or chest pain. No N/V.      Medications:  Reviewed    Infusion Medications    heparin (porcine) 16.8 mL/hr (04/03/19 1308)    dextrose 100 mL/hr (03/08/19 1700)     Scheduled Medications    vancomycin  1,000 mg Intravenous Q12H    potassium chloride  20 mEq Intravenous Q1H    sodium chloride flush  10 mL Intravenous Q12H    ipratropium-albuterol  1 ampule Inhalation Q4H WA    potassium chloride  10 mEq Oral 4x Daily    zinc oxide  1 applicator Topical Daily    acyclovir  800 mg Oral TID    metoprolol succinate  50 mg Oral Daily    meropenem  1 g Intravenous Q8H    amiodarone  200 mg Oral Daily    pantoprazole  40 mg Intravenous Daily    aspirin  81 mg Oral Daily    sodium chloride flush  10 mL Intravenous 2 times per day    simvastatin  40 mg Oral Nightly    folic acid  1 mg Oral Daily    oyster shell calcium/vitamin D  1 tablet Oral Daily     PRN Meds: oxyCODONE **OR** oxyCODONE, heparin (porcine), heparin (porcine), HYDROmorphone, sodium chloride flush, magic (miracle) mouthwash with nystatin, glucose, dextrose, glucagon (rDNA), dextrose, ondansetron, acetaminophen      Intake/Output Summary (Last 24 hours) at 4/3/2019 1422  Last data filed at 4/3/2019 0927  Gross per 24 hour   Intake 1456 ml   Output 2150 ml   Net -694 ml       Physical Exam Performed:    /76   Pulse 92   Temp 100 °F (37.8 °C) (Oral)   Resp 18   Ht 5' 9\" (1.753 m)   Wt 151 lb 3.8 oz (68.6 kg) SpO2 94%   BMI 22.33 kg/m²     General appearance: No apparent distress, appears stated age and cooperative. HEENT: Pupils equal, round, and reactive to light. Conjunctivae/corneas clear. Neck: Supple, with full range of motion. No jugular venous distention. Trachea midline. Respiratory:  Normal respiratory effort. Clear to auscultation, bilaterally without Rales/Wheezes/Rhonchi. Cardiovascular: Regular rate and rhythm with normal S1/S2 without murmurs, rubs or gallops. Abdomen: Soft, non-tender, non-distended with normal bowel sounds. Musculoskeletal: No clubbing, cyanosis or edema bilaterally. Full range of motion without deformity. Skin: Skin color, texture, turgor normal.  No rashes or lesions. Neurologic:  Neurovascularly intact without any focal sensory/motor deficits. Cranial nerves: II-XII intact, grossly non-focal.  Psychiatric: Alert and oriented, thought content appropriate, normal insight  Capillary Refill: Brisk,< 3 seconds   Peripheral Pulses: +2 palpable, equal bilaterally       Labs:   Recent Labs     04/01/19  0529 04/02/19  0410 04/02/19  0500 04/02/19  1210 04/03/19  0451   WBC 7.4 8.2  --   --  11.3*   HGB 7.2* 6.8* 6.7*  --  8.1*   HCT 21.6* 20.5* 20.2* 26.1* 24.4*   * 123*  --   --  125*     Recent Labs     04/01/19  0529 04/02/19  0410 04/03/19  0451    133* 132*   K 2.9* 3.7 3.4*    99 95*   CO2 28 27 26   BUN 8 10 7   CREATININE <0.5* <0.5* <0.5*   CALCIUM 7.2* 7.0* 6.8*       Urinalysis:      Lab Results   Component Value Date    NITRU Negative 03/10/2019    WBCUA 3-5 03/10/2019    RBCUA 0-2 03/10/2019    BLOODU Negative 03/10/2019    SPECGRAV 1.025 03/10/2019    GLUCOSEU Negative 03/10/2019       Radiology:  CT CHEST WO CONTRAST   Final Result   1. Bilateral upper lobe ground-glass opacities, left greater than right, with   developing left perihilar consolidation.   Findings are most concerning for   multifocal pneumonia with asymmetric pulmonary edema a less likely   possibility. 2. Small bilateral pleural effusions which are slightly increased from   03/26/2019.   3. Trace ascites in the upper abdomen. 4. Sclerotic metastasis in the T6 vertebral body. 5. Coronary atherosclerosis. XR CHEST PORTABLE   Final Result   Nonspecific consolidation mid and lower left lung with left pleural effusion   presumably related to sequela from pulmonary edema. Pneumonia is a   consideration. Calcific atherosclerosis aorta. Cardiomegaly. XR ABDOMEN (KUB) (SINGLE AP VIEW)   Final Result   Dilated small bowel, may reflect ileus or obstruction. Recommend follow-up   study. CT ABDOMEN PELVIS WO CONTRAST Additional Contrast? None   Final Result   Interval increase in size of the left lower quadrant collection which now   measures up to 7.8 cm. Administered rectal contrast extends into the   collection. Right anterior approach drain courses adjacent to the collection. No significant change in pleural effusions and associated airspace disease. The findings were sent to the Radiology Results [address] at 12:53   pm on 3/26/2019to be communicated to a licensed caregiver. FL BARIUM ENEMA   Final Result   Active extravasation of contrast from the sigmoid colon, compatible with   anastomotic leak. Please refer to accompanying CT. Diverticulosis. CT ABDOMEN PELVIS W IV CONTRAST Additional Contrast? Radiologist Recommendation   Final Result   1. The patient is 11 days status post partial sigmoid colectomy. Surgical   drain remains in place with a small ill-defined fluid collection that is   stable from prior exam.  This does not appear to represent an abscess and may   represent postoperative hematoma or seroma. 2. There is a new collection of free intraperitoneal air immediately anterior   to the prior anastomosis site, contained within the left lower quadrant. This indicates an anastomosis leak.   No distant free air. 3. 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 4. Cholelithiasis with no evidence of acute cholecystitis. 5. Worsening small pleural effusions and airspace changes in the lower chest.   Pattern may represent underlying pulmonary edema or perhaps pneumonitis. XR CHEST PORTABLE   Final Result   Interval development of mild pulmonary edema. There is a small left-sided pleural effusion with more focal consolidation   the left lung base, atelectasis versus pneumonia. IR PICC WO SQ PORT/PUMP > 5 YEARS   Final Result   Successful placement of PICC line. CT ABDOMEN PELVIS WO CONTRAST Additional Contrast? None   Final Result   No evidence retroperitoneal hematoma. Interval colonic resection. Small volume loculated collection in the left   pericolic gutter, with surgical drain terminating adjacent. Mildly dilated loops of small bowel with scattered air-fluid levels, most   likely postoperative ileus. Trace pleural effusions and bibasilar airspace disease representing   atelectasis or pneumonia. VL Extremity Venous Bilateral   Final Result      CT CHEST PULMONARY EMBOLISM W CONTRAST   Final Result   Bilateral pulmonary emboli      Scattered opacities throughout the lungs, greatest at the lung bases, with   tiny pleural effusions. Changes are increased compared to prior      Sclerotic osseous metastatic disease as described previously      The findings were sent to the Radiology Results [address] at 3:57   pm on 3/16/2019to be communicated to a licensed caregiver. XR CHEST 1 VW   Final Result   Shallow inflation with findings consistent with basilar atelectasis. Left   pleural thickening versus trace effusion. CT CHEST ABDOMEN PELVIS WO CONTRAST   Final Result   Perforated sigmoid diverticulitis and pneumoperitoneum unchanged.   Stable   left [facility] colonic air-fluid collection may represent an intramural or   extramural abscess or giant diverticulum. Infrarenal abdominal aortic aneurysm and aorto bi-iliac stent. Other incidental findings as above. CT ABDOMEN PELVIS W IV CONTRAST Additional Contrast? None   Final Result   Acute perforated descending colon diverticulitis. There is free air in the   abdomen and there is a small extraluminal gas and fluid collection/abscess   adjacent to the descending colon measuring 4.1 cm in AP diameter and 6 cm in   length. There is diverticulosis of the entire colon. Cholelithiasis. 4.7 cm infrarenal abdominal aortic aneurysm status post endograft repair with   a patent aorto bi-iliac graft. There is suspicion of an endoleak with blush   of contrast enhancement suspected in the inferior aspect of the native   aneurysm. As a noncontrast study was not performed, calcification of the   intraluminal thrombus accounting for the apparent enhancement is not   excluded. 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 Comparison with a noncontrast CT examination would be   helpful. Critical results were called by Dr. J Luis Goldman. MD Elizabeth to [facility] on   3/7/2019 at 20:35.              Assessment/Plan:    Active Hospital Problems    Diagnosis Date Noted    Iron deficiency anemia due to chronic blood loss [D50.0]     Pneumonia of left lung due to infectious organism [J18.9]     Cardiomyopathy ([address] [I42.9]     NSVT (nonsustained ventricular tachycardia) ([facility]) [I47.2]     Other pulmonary embolism without acute cor pulmonale (HCC) [I26.99]     Acute respiratory failure with hypoxia ([address] [J96.01]     H/O colectomy [Z90.49]     Primary malignant neoplasm of lung metastatic to other site (HCC) [C34.90]     Herpes zoster oticus [B02.21]     Hearing loss of right ear [H91.91]     Chemotherapy-induced neutropenia (HCC) [D70.1, T45.1X5A]     Mucositis [K12.30]     Diverticulitis [K57.92] 03/07/2019    Diverticulitis of large intestine with perforation and abscess without bleeding [K57.20]        Acute perforated recurrent diverticulitis/pneumoperitoneum/abscess in setting of immunosuppression due to chemotherapy:   - CT A/P on admission demonstrated acute perforated colon diverticulitis with free air and fluid collection. General surgery consulted. Pt is status post laparoscopic converted to open left colectomy on 3/14/19. CT scan on 3/25/19 showed anastomotic leak at the [facility]. Pt is now s/p Harding's procedure with colostomy placement on 3/27/19.  - Continue IV Merrem and Vanc. Completed course of Diflucan  - Continue prn pain control.      Acute hypoxic respiratory failure due to bilateral pulmonary emboli and pneumonia:   - CT chest 3/16/19 showed acute PE. Lower extremity dopplers +DVT. Vascular surgery consulted, no indication for IVC filter at this time. - Wean supplemental O2 as tolerated. He is not normally on home O2. He is currently requiring HFNC.  - Pulmonary re-consulted. Chest CT obtained bilateral upper lobe GGO, concerning for multifocal pneumonia. Pt on [facility]. Pulm planning for bronch on 4/3.   - Continue heparin infusion. Plan for Eliquis at discharge. - Inhaled bronchodilator therapy. Pulmonary toilet with IS and acapella.      Metastatic lung cancer, stage 4 with bone mets:  - Hem/onc is following. Chemo on hold to allow for surgical healing.      Thrombocytopenia (resolved) now with hypercoagulable state:  - Platelets required preoperatively. Monitor daily CBC.      Neutropenia due to chemotherapy (resolved):  - S/p Neulasta on 3/1. S/p Granix on 3/8.      Anemia, likely due to acute illness:  -  Pt is s/p 1 unit PBR for Hgb 6.6 on 3/19, 1 unit for Hgb 7.2 on 3/22, 1 unit on 4/2 for Hgb 6.7. No overt signs of bleeding. CT A/P showed no RP bleed. Pt is s/p B12 injection. Stool for occult blood was +.  GI consulted, s/p EGD on 3/21 which was normal. Continue PPI. Monitor daily CBC.      PAD/CAD/HTN/Cardiomyopathy:   - Defer antiplatelet management to hem/onc given the complexity of his present situation, bleeding risk and thrombotic states. Previously used intergrillin infusion during this admission, stopped due to bleeding concerns.   - ECHO showed reduced EF of 30-35%. Previous ECHO showed EF 40-45%. - Cardiology is following. Continue on aspirin, lisinopril, aldactone, Toprol and statin. Lasix prn.  - Cards recommending LHC given drop in EF but would like to wait 4-6 weeks post PE on AC.      NSVT:   - Known history of VT s/p ICD. Went into VT on 3/12, PMD went off. Continue amiodarone. Keep K > 4 and Mag > 2.     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 Nephrology following. Monitor daily BMP.      Hypokalemia/hypomagnesemia:  - Monitor and replete as needed.      Zoster oticus:  - Resumed acyclovir 3/31. Goals of care:  - Palliative care consulted 4/3 -- appreciate. Maintain full code for now. Discussions will be ongoing. DVT Prophylaxis: Pt is on heparin infusion   Diet: Diet NPO Effective Now Exceptions are: Sips with Meds, Popsicles  Code Status: Full Code    PT/OT Eval Status: Ordered     Dispo - Uncertain. Possible plan for LTAC at discharge.      DEBRA Rowell - CNP

## 2019-04-03 NOTE — PROGRESS NOTES
Current appliance intact. CWOCN missed wife today. She was here earlier. Patient reports he is eating now and just had a sandwich. Stoma out put watery liquid brown in bag. Will attempt to change appliance with wife tomorrow. Patient a little sleepy right now. Currently patient is lying supine. Asked how turning was going today. Patient states \"The nurses are helping me turn\"  Wedges at bed side in chair. Remined patient importance of off loading. Ostomy care to follow.

## 2019-04-03 NOTE — PROGRESS NOTES
INPATIENT PULMONARY CRITICAL CARE PROGRESS NOTE      Reason for visit: Patient seen by us earlier during this hospitalization for acute respiratory failure related to pulmonary embolism, also had DVT. We had signed off as the patient was stable. Repeat consult placed by Dr. Emeli Devi for possible HCAP. SUBJECTIVE: Patient when seen this morning was not having any significant cough or expectoration, patient was not having shortness of breath or wheezing, patient was not having any chest pain, patient had some nonspecific abdominal pain, patient has a colostomy in place, patient was afebrile and hemodynamically maintained, patient continues to be on high oxygen requirements and patient was on 11 L of high flow oxygen to keep oxygen saturation, patient was saturating 91% when seen, patient was having a cumulative fluid balance of -7.9 L, patient's blood sugar was slightly on the higher side but acceptable, patient was alert and communicative, no other pertinent review of system of concern      Physical Exam:  Blood pressure 133/74, pulse 98, temperature 98.4 °F (36.9 °C), temperature source Oral, resp. rate 18, height 5' 9\" (1.753 m), weight 151 lb 3.8 oz (68.6 kg), SpO2 91 %.'   Constitutional:  Ill-appearing, sitting up in a chair,underweight. No acute distress. HENT:  Oropharynx is clear and dry. Eyes:  Conjunctivae are normal. Pupils equal, round, and reactive to light. No scleral icterus. Wearing glasses. Neck: No JVD. No tracheal deviation present. No obvious thyroid mass. Cardiovascular: Normal rate, regular rhythm, normal heart sounds. No right ventricular heave. No lower extremity edema. Pulmonary/Chest: Diminished air entry left hemithorax, B/L  scattered crackles. No accessory muscle usage or stridor. Abdominal: Soft, distended, no tenderness. Colostomy present    Musculoskeletal: No cyanosis. No clubbing. No obvious joint deformity.    Lymphadenopathy: No cervical adenopathy    Skin: Skin is warm and dry. No rash or nodules on the exposed extremities. Psychiatric: Normal mood and affect. Behavior is normal.  No anxiety. Neurologic: Alert, awake and oriented. PERRL. Speech fluent        Results:  CBC:   Recent Labs     04/01/19  0529 04/02/19  0410 04/02/19  0500 04/02/19  1210 04/03/19  0451   WBC 7.4 8.2  --   --  11.3*   HGB 7.2* 6.8* 6.7*  --  8.1*   HCT 21.6* 20.5* 20.2* 26.1* 24.4*   MCV 86.1 85.8  --   --  86.1   * 123*  --   --  125*     BMP:   Recent Labs     04/01/19  0529 04/02/19  0410 04/03/19  0451    133* 132*   K 2.9* 3.7 3.4*    99 95*   CO2 28 27 26   BUN 8 10 7   CREATININE <0.5* <0.5* <0.5*     APTT:   Recent Labs     04/02/19  1740 04/03/19  0220 04/03/19  0955   APTT 63.0* 48.1* 53.6*       Imaging:  I have reviewed radiology images personally. CT CHEST WO CONTRAST   Final Result   1. Bilateral upper lobe ground-glass opacities, left greater than right, with   developing left perihilar consolidation. Findings are most concerning for   multifocal pneumonia with asymmetric pulmonary edema a less likely   possibility. 2. Small bilateral pleural effusions which are slightly increased from   03/26/2019.   3. Trace ascites in the upper abdomen. 4. Sclerotic metastasis in the T6 vertebral body. 5. Coronary atherosclerosis. XR CHEST PORTABLE   Final Result   Nonspecific consolidation mid and lower left lung with left pleural effusion   presumably related to sequela from pulmonary edema. Pneumonia is a   consideration. Calcific atherosclerosis aorta. Cardiomegaly. XR ABDOMEN (KUB) (SINGLE AP VIEW)   Final Result   Dilated small bowel, may reflect ileus or obstruction. Recommend follow-up   study. CT ABDOMEN PELVIS WO CONTRAST Additional Contrast? None   Final Result   Interval increase in size of the left lower quadrant collection which now   measures up to 7.8 cm.   Administered rectal contrast extends into the terminating adjacent. Mildly dilated loops of small bowel with scattered air-fluid levels, most   likely postoperative ileus. Trace pleural effusions and bibasilar airspace disease representing   atelectasis or pneumonia. VL Extremity Venous Bilateral   Final Result      CT CHEST PULMONARY EMBOLISM W CONTRAST   Final Result   Bilateral pulmonary emboli      Scattered opacities throughout the lungs, greatest at the lung bases, with   tiny pleural effusions. Changes are increased compared to prior      Sclerotic osseous metastatic disease as described previously      The findings were sent to the Radiology Results [address] at 3:57   pm on 3/16/2019to be communicated to a licensed caregiver. XR CHEST 1 VW   Final Result   Shallow inflation with findings consistent with basilar atelectasis. Left   pleural thickening versus trace effusion. CT CHEST ABDOMEN PELVIS WO CONTRAST   Final Result   Perforated sigmoid diverticulitis and pneumoperitoneum unchanged. Stable   left [facility] colonic air-fluid collection may represent an intramural or   extramural abscess or giant diverticulum. Infrarenal abdominal aortic aneurysm and aorto bi-iliac stent. Other incidental findings as above. CT ABDOMEN PELVIS W IV CONTRAST Additional Contrast? None   Final Result   Acute perforated descending colon diverticulitis. There is free air in the   abdomen and there is a small extraluminal gas and fluid collection/abscess   adjacent to the descending colon measuring 4.1 cm in AP diameter and 6 cm in   length. There is diverticulosis of the entire colon. Cholelithiasis. 4.7 cm infrarenal abdominal aortic aneurysm status post endograft repair with   a patent aorto bi-iliac graft. There is suspicion of an endoleak with blush   of contrast enhancement suspected in the inferior aspect of the native   aneurysm.   As a noncontrast study was not performed, calcification of the   intraluminal thrombus accounting for the apparent enhancement is not   excluded. 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 Comparison with a noncontrast CT examination would be   helpful. Critical results were called by Dr. [place]. MD Elizabeth to [facility] on   3/7/2019 at 20:35. CT OF THE CHEST WITHOUT CONTRAST 4/1/2019 4:10 pm       TECHNIQUE:   CT of the chest was performed without the administration of intravenous   contrast. Multiplanar reformatted images are provided for review. Dose   modulation, iterative reconstruction, and/or weight based adjustment of the   mA/kV was utilized to reduce the radiation dose to as low as reasonably   achievable.       COMPARISON:   03/16/2019, 03/26/2019       HISTORY:   ORDERING SYSTEM PROVIDED HISTORY: ? Mucus plugging, PLE, respiratory failure   TECHNOLOGIST PROVIDED HISTORY:   Ordering Physician Provided Reason for Exam: R/O pneumonia       FINDINGS:   Mediastinum: The thyroid is within normal limits.  Pacemaker wires.  Aortic   and coronary atherosclerosis.  Small hiatal hernia.  Patient may be anemic. No mediastinal lymphadenopathy.       Lungs/pleura: Small bilateral pleural effusions with bibasilar atelectasis. Patchy ground-glass opacities in the upper lobes bilaterally, right greater   than left, with perihilar consolidation.  A small amount of fluid is present   along the left major fissure.  The central airways are patent.       Upper Abdomen: Excreted contrast within the gallbladder.  Tiny left adrenal   adenoma.  Trace ascites.       Soft Tissues/Bones: No acute abnormality.  Advanced sclerotic changes of the   T6 vertebral body.           Impression   1.  Bilateral upper lobe ground-glass opacities, left greater than right, with   developing left perihilar consolidation.  Findings are most concerning for   multifocal pneumonia with asymmetric pulmonary edema a less likely [employer] DVT, pulmonary embolism.   On heparin  PUD prophylaxis     Case d/w patient/family/nursing     Electronically signed by:  Kemal Mcdaniel MD    4/3/2019    11:27 AM.

## 2019-04-04 NOTE — CARE COORDINATION
Branden Benton is aware the pt is anticipated to DC to [address] [place] [address] at [place]. Writer will update [facility] so agency can follow up with pt during stay at [address] [place] [address] and arrange [place] [phone] services upon DC from [facility].    Ryanne Ghosh RN CTN with [facility] [address]  GFU:[phone]

## 2019-04-04 NOTE — PROGRESS NOTES
[facility] GENERAL SURGERY    Surgery Progress Note           PO    PATIENT NAME: Elsa Ponce     TODAY'S DATE: 4/4/2019    INTERVAL HISTORY:      Doing well this AM.  He did note that he had some belching/burping. However, he states that this is normal for him and he does the same amount of burping/belching at home. Tolerated dinner last night. OBJECTIVE:   VITALS:  /67   Pulse 95   Temp 98.7 °F (37.1 °C) (Oral)   Resp 20   Ht 5' 9\" (1.753 m)   Wt 138 lb 0.1 oz (62.6 kg)   SpO2 91%   BMI 20.38 kg/m²     INTAKE/OUTPUT:    I/O last 3 completed shifts: In: 600 [P.O.:600]  Out: 2960 [Urine:2950; Drains:10]  I/O this shift:  In: 120 [P.O.:120]  Out: 300 [Urine:300]              CONSTITUTIONAL:  awake and alert  ABDOMEN:   normal bowel sounds, soft, non-distended, tender, patricio serosanguinous, ostomy functional   INCISION: clean, dry, no drainage    Data:  CBC:   Recent Labs     04/02/19  0410 04/02/19  0500 04/02/19  1210 04/03/19  0451 04/04/19  0532   WBC 8.2  --   --  11.3* 9.3   HGB 6.8* 6.7*  --  8.1* 8.3*   HCT 20.5* 20.2* 26.1* 24.4* 24.4*   *  --   --  125* 126*     BMP:    Recent Labs     04/02/19  0410 04/03/19  0451   * 132*   K 3.7 3.4*   CL 99 95*   CO2 27 26   BUN 10 7   CREATININE <0.5* <0.5*   GLUCOSE 140* 144*     Hepatic: No results for input(s): AST, ALT, ALB, BILITOT, ALKPHOS in the last 72 hours. Mag:      Recent Labs     04/02/19  0410 04/03/19  0451   MG 1.80 1.40*      Phos:   No results for input(s): PHOS in the last 72 hours. INR:   Recent Labs     04/02/19  1210   INR 1.40*         ASSESSMENT AND PLAN:  69 y.o. male status post left colectomy, return for fariha's    - Ok to continue diet  - Continue antibiotics, leukocytosis resolved   - PATRICIO will likely stay until discharge   - Medical management per primary team       Sahil Parry MD PGY 4  General Surgery Resident     Patient seen and agree with above.     Sterling Fan MD

## 2019-04-04 NOTE — PROGRESS NOTES
Writer informed MD that [id number] [place] informed me that patient is having 4 beat PVC followed by 10-13 more. Magnesium and potassium are being infused at this time.

## 2019-04-04 NOTE — PROGRESS NOTES
[address]   Daily Progress Note    Admit Date:  3/7/2019  HPI:    Chief Complaint   Patient presents with    Abdominal Pain     LLQ pain, was seen last week for Diverticulitis, patient states he cannot get rid of the pain, had Chemo on Friday        Janny Shin is a 69 y.o. male who was admitted 3/7/2019 for LLQ pain. Treated for recurrent episode of sigmoid diverticulitis. 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 Underwent open left colectomy for perforated diverticulitis 3/14/19. Cardiology consulted for NSVT on 3/16/19. Hx CAD/ PCI to RCA and LAD, VT s/p ICD on Amio, PAD s/p AAA repair [facility]). Increased shortness of breath, CT showed bilateral PEs, treated with heparin. Returned to OR for anastomotic leak 3/27/19, s/p Gerda's procedure. Increased O2 requirements on 3/31/19, CXR and then CT chest concerning for HCAP. Bronchoscopy 4/3/19 with mucous plugs, no other acute abnormality. Given Lasix 40 mg IVP. Subjective:  Mr. Zuri Silvers sleeping quietly but awakens with conversation. Wife at bedside, contemplating LTAC choices. Breathing not labored but still requiring high flow O2. Did not tolerate CPAP overnight. Objective:   /79   Pulse 100   Temp 98 °F (36.7 °C) (Oral)   Resp 20   Ht 5' 9\" (1.753 m)   Wt 138 lb 0.1 oz (62.6 kg)   SpO2 95%   BMI 20.38 kg/m²       Intake/Output Summary (Last 24 hours) at 4/4/2019 1030  Last data filed at 4/4/2019 0534  Gross per 24 hour   Intake 600 ml   Output 2860 ml   Net -2260 ml     Wt Readings from Last 3 Encounters:   04/04/19 138 lb 0.1 oz (62.6 kg)   02/28/19 184 lb 9.6 oz (83.7 kg)   02/19/19 183 lb (83 kg)         ASSESSMENT:   1. NSVT - improved over last 24 hrs, Amio, replete lytes  2. CAD - s/p PCI to RCA and LAD 2011, last [facility] 2016 without obstructive dz; on statin, BB, ASA  3.  Cardiomyopathy, unknown etiology - 30-35% by echo, on BB, statin, ASA; ACEi and Leighton Ant stopped due to MAHSA  4. PE, large bilateral - on IV Heparin, plan to eventually transition to [address]  5. Perforated divertic, s/p sigmoid colectomy 3/14/19, s/p Gerda's procedure 3/29 for anastomotic leak, tolerating po intake  6. Metastatic lung cancer with mets - per hem/onc, stable  7. AAA s/p endograft repair [facility], 2011) - IV Heparin, ASA  8. Anemia - s/p 1 uPRBC's 4/2/19 (total of 3 this admit), stable  9. PNA - increased O2 requirements, CT chest with PNA, s/p bronch      PLAN:  1. Replete- K >4, Mag >2  2. Favor limited use of diuretics and if needed use low dose and add additional K and Mag  3. Daily BMP and [address], APRN - CNP, 4/4/2019, 10:30 AM  [address]   [phone]       Telemetry: 's, NSVT 2-4 bts, isolated and pairs PVC's.    NYHA: III    Physical Exam:  General:  Awake, alert, NAD  Skin:  Warm and dry  Neck:  JVP 8 cm upright  Chest:  Clear to auscultation though diminished in bases  Cardiovascular:  RRR, normal Z7H8, + systolic murmur, no g/r  Abdomen:  Soft, nontender, +bowel sounds, + gas and stool in colostomy bag  Extremities:  No BLE edema      Medications:    vancomycin  1,000 mg Intravenous Q12H    sodium chloride flush  10 mL Intravenous Q12H    ipratropium-albuterol  1 ampule Inhalation Q4H WA    potassium chloride  10 mEq Oral 4x Daily    zinc oxide  1 applicator Topical Daily    acyclovir  800 mg Oral TID    metoprolol succinate  50 mg Oral Daily    meropenem  1 g Intravenous Q8H    amiodarone  200 mg Oral Daily    pantoprazole  40 mg Intravenous Daily    aspirin  81 mg Oral Daily    sodium chloride flush  10 mL Intravenous 2 times per day    simvastatin  40 mg Oral Nightly    folic acid  1 mg Oral Daily    oyster shell calcium/vitamin D  1 tablet Oral Daily      heparin (porcine) 16.8 mL/hr (04/03/19 1308)    dextrose 100 mL/hr (03/08/19 1700)       Lab Data: Lab results independently reviewed by myself 4/2/19   CBC:   Recent Labs     04/02/19  0410 04/02/19  0500 04/03/19  0456

## 2019-04-04 NOTE — PROGRESS NOTES
04/03/19 6592   NIV Type   $NIV $Daily Charge   Skin Assessment Clean, dry, & intact   NIV Started Yes   Equipment Type v60   Mode BIPAP   Mask Type Full face mask   Mask Size Large   Settings/Measurements   Comfort Level Good   Using Accessory Muscles No   IPAP 12 cmH20   EPAP 6 cmH2O   Rate Ordered 8   Resp 15   SpO2 96   FiO2  50 %   Vt Exhaled 1025 mL   Mask Leak (lpm) 0 lpm   Skin Protection for O2 Device Yes   Alarm Settings   Alarms On Y   Press Low Alarm 6 cmH2O   High Pressure Alarm 30 cmH2O   Apnea (secs) 20 secs   Resp Rate Low Alarm 6   High Respiratory Rate 45 br/min   Oxygen Therapy/Pulse Ox   O2 Therapy Oxygen   O2 Device PAP (positive airway pressure)

## 2019-04-04 NOTE — PROGRESS NOTES
04/03/19 2343   NIV Type   Equipment Type v60   Mode BIPAP   Mask Type Full face mask   Mask Size Large   Settings/Measurements   Comfort Level Good   Using Accessory Muscles No   IPAP 12 cmH20   EPAP 6 cmH2O   Rate Ordered 8   Resp 19   SpO2 95   FiO2  50 %   Vt Exhaled 779 mL   Skin Protection for O2 Device Yes   Breath Sounds   Right Upper Lobe Clear   Right Middle Lobe Diminished   Right Lower Lobe Diminished   Left Upper Lobe Clear   Left Lower Lobe Diminished   Alarm Settings   Alarms On Y   Press Low Alarm 6 cmH2O   High Pressure Alarm 30 cmH2O   Resp Rate Low Alarm 6   High Respiratory Rate 45 br/min

## 2019-04-04 NOTE — CONSULTS
Ostomy Referral Progress Note      [facility]:  [id number]  AGE: 69 y.o. GENDER:  male  :  1945  TODAY'S DATE:  2019    Subjective     Ck Hansen is a 69 y.o. 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 Current Outpatient Medications on File Prior to Encounter   Medication Sig Dispense Refill    amiodarone (CORDARONE) 200 MG tablet Take 200 mg by mouth daily      atenolol (TENORMIN) 100 MG tablet Take 200 mg by mouth daily Verified with [facility]      vitamin D3 (CHOLECALCIFEROL) 400 units TABS tablet Take 400 Units by mouth daily      prochlorperazine (COMPAZINE) 25 MG suppository Place 25 mg rectally every 12 hours as needed for Nausea      ondansetron (ZOFRAN) 8 MG tablet 2019Ondansetron 8 MG Oral Tablet [Zofran] orally1.0 tabletevery 8 hours2019Active      lisinopril (PRINIVIL;ZESTRIL) 10 MG tablet Take 10 mg by mouth      amLODIPine (NORVASC) 5 MG tablet Take 1 tablet by mouth daily 30 tablet 3    simvastatin (ZOCOR) 40 MG tablet TAKE 1 TABLET BY MOUTH NIGHTLY 90 tablet 1    famotidine (PEPCID) 20 MG tablet Take 20 mg by mouth 2 times daily.  aspirin 81 MG EC tablet Take 81 mg by mouth daily.         clopidogrel (PLAVIX) 75 MG tablet Take 75 mg by mouth daily.  Multiple Vitamin (MULTIVITAMIN PO) Take  by mouth.  folic acid (FOLVITE) 1 MG tablet Take 1 tablet by mouth daily 30 tablet 3    Psyllium (METAMUCIL) WAFR Take 6 tablets by mouth 2 times daily.          ALLERGIES:    No Known Allergies    PAST SURGICAL HISTORY:    Past Surgical History:   Procedure Laterality Date    ARTERIAL ANEURYSM REPAIR  11/2010    right popliteal artery aneurysm repair    BRONCHOSCOPY  01/18/2019    left endobronchial tumor    BRONCHOSCOPY N/A 1/18/2019    BRONCHOSCOPY ALVEOLAR LAVAGE performed by Allyson Ceja MD at [address] [place] [address]  1/18/2019    BRONCHOSCOPY/TRANSBRONCHIAL LUNG BIOPSY performed by Allyson Ceja MD at [address] [place] [address] N/A 1/23/2019    EBUS ULTRASOUND W/ ANESTHESIA AND NEEDLE BIOPSY performed by Steve Munson MD at [address] [place] [address]  1/23/2019    BRONCHOSCOPY ALVEOLAR LAVAGE performed by Steve Munson MD at [address] [place] [address]  1/23/2019    BRONCHOSCOPY BIOPSY BRONCHUS performed by Steve Munson MD at [address] [place] [address]  1/23/2019    BRONCHOSCOPY BRUSHINGS performed by Steve Munson MD at [address] [place] [address]  1/23/2019    BRONCHOSCOPY/TRANSBRONCHIAL NEEDLE BIOPSY performed by Steve Munson MD at [address] [place] [address]  1/23/2019    BRONCHOSCOPY/TRANSBRONCHIAL NEEDLE BIOPSY ADDL LOBE performed by Steve Munson MD at [address] [place] [address] N/A 4/3/2019    BRONCHOSCOPY ALVEOLAR LAVAGE performed by Steve Munson MD at [address] [place] [address]  4/3/2019    BRONCHOSCOPY THERAPUTIC ASPIRATION INITIAL performed by Steve Munson MD at [address] [place] [address]  10/08/01 12/20/04    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    COLONOSCOPY  4/4/14    wnl    CORONARY ANGIOPLASTY WITH STENT PLACEMENT      FINGER FRACTURE SURGERY      left middle finger    HEMICOLECTOMY N/A 3/14/2019 LAPAROSCOPIC CONVERT TO OPEN SIGMOID COLECTOMY performed by Jim Yeung MD at [address] / REMOVAL / [address] Right 2019    RIGHT SUBCLAVIAN VEIN PORT A CATH PLACEMENT performed by Kael Anne MD at [address] [place] [address] N/A 3/27/2019    EXPLORATORY LAPAROTOMY, SIGMOID COLECTOMY, COLOSTOMY performed by Jim Yeung MD at [facility]  2/10/2014    BX duodenum, atrum, distal esophagus    UPPER GASTROINTESTINAL ENDOSCOPY  14    BX gastric    UPPER GASTROINTESTINAL ENDOSCOPY  11/4/15    UPPER GASTROINTESTINAL ENDOSCOPY N/A 3/21/2019    EGD ESOPHAGOGASTRODUODENOSCOPY performed by Ailyn Babin MD at [facility]:    family history includes Schizophrenia in his brother; Stroke in his mother. He was adopted. SOCIAL HISTORY:    Social History     Tobacco Use    Smoking status: Former Smoker     Packs/day: 0.00     Years: 0.00     Pack years: 0.00     Last attempt to quit: 2003     Years since quittin.2    Smokeless tobacco: Never Used   Substance Use Topics    Alcohol use:  Yes     Alcohol/week: 10.8 oz     Types: 18 Shots of liquor per week     Comment: social-used to drink alot     Drug use: No       LABS:  WBC:    Lab Results   Component Value Date    WBC 9.3 2019     H/H:    Lab Results   Component Value Date    HGB 8.3 2019    HCT 24.4 2019     BMP:    Lab Results   Component Value Date     2019    K 3.7 2019    CL 97 2019    CO2 26 2019    BUN 8 2019    LABALBU 2.8 2019    CREATININE <0.5 2019    CALCIUM 7.2 2019    GFRAA >60 2019    GFRAA >60 2013    LABGLOM >60 2019    GLUCOSE 112 2019     PTT:    Lab Results   Component Value Date    APTT 64.6 2019   [APTT}  PT/INR:    Lab Results   Component Value Date    PROTIME 16.0 2019    INR 1.40 04/02/2019       Objective    /79   Pulse 100   Temp 98 °F (36.7 °C) (Oral)   Resp 20   Ht 5' 9\" (1.753 m)   Wt 138 lb 0.1 oz (62.6 kg)   SpO2 98%   BMI 20.38 kg/m²     Jared Risk Score Jared Scale Score: 17    Patient Active Problem List   Diagnosis Code    Elevated fasting glucose R73.01    CAD (coronary artery disease) I25.10    PAD (peripheral artery disease) ([facility]) I73.9    Essential hypertension I10    Mixed hyperlipidemia E78.2    Sprain of right foot S93.601A    Sprain of anterior talofibular ligament of right ankle S93.491A    Closed nondisplaced fracture of fifth right metatarsal bone S92.354A    Closed fracture of proximal lateral malleolus of ankle with routine healing S82.63XD    Right foot pain M79.671    Right ankle pain M25.571    Nondisplaced fracture of fifth right metatarsal bone with routine healing S92.354D    History of pulmonary emphysema Z87.09    Chronic obstructive pulmonary disease with (acute) exacerbation ([facility]) J44.1    Pulmonary nodule, left R91.1    Abnormal bone scan of thoracic spine R93.7    Back pain M54.9    Visit for monitoring Plavix therapy Z51.81, Z79.02    History of ventricular tachycardia Z86.79    Gallstones K80.20    Hemoptysis R04.2    Former smoker Z87.891    Elevated PSA R97.20    Adenocarcinoma of left lung ([facility]) C34.92    Acute diverticulitis K57.92    Pancytopenia ([facility]) W53.384    Diverticulitis of large intestine with perforation and abscess without bleeding K57.20    Diverticulitis K57.92    Chemotherapy-induced neutropenia ([facility]) D70.1, T45.1X5A    Mucositis K12.30    Herpes zoster oticus B02.21    Hearing loss of right ear H91.91    Acute respiratory failure with hypoxia ([facility]) J96.01    H/O colectomy Z90.49    Primary malignant neoplasm of lung metastatic to other site ([facility] C34.90    NSVT (nonsustained ventricular tachycardia) ([facility]) I47.2    Other pulmonary embolism without acute cor pulmonale ([facility] I26.99    Cardiomyopathy ([facility] I42.9    Pneumonia of left lung due to infectious organism J18.9    Iron deficiency anemia due to chronic blood loss D50.0       Assessment           Colostomy LUQ Descending/sigmoid (Active)   Stomal Appliance 2 piece; Intact; Changed 4/4/2019 11:40 AM   Flange Size (inches) 2.75 Inches 4/4/2019 11:40 AM   Stoma  Assessment Pink;Moist;Protrudes 4/4/2019 11:40 AM   Mucocutaneous Junction Intact 4/4/2019 11:40 AM   Peristomal Assessment Intact 4/4/2019 11:40 AM   Treatment Site care 4/4/2019 11:40 AM   Stool Appearance Soft;Watery 4/4/2019 11:40 AM   Stool Color Brown 4/4/2019 11:40 AM   Stool Amount Small 4/4/2019 11:40 AM   Output (mL) 50 ml 4/4/2019 11:40 AM   Number of days: 7     Appliance emptied of small amount of soft formed stool, liquid brown stool, and flatus. Per patient, he has eaten 2 meals. Wife present for Colostomy Routine Care Session. Appliance emptied, new appliance prepared, and applied per wife with mostly verbal assistance. Reviewed care, appliance differences and choices. Wife has Supply Catalog for ordering Ostomy supplies after discharge. Wife tolerated well understands care. Will need some practice. Intake/Output Summary (Last 24 hours) at 4/4/2019 1217  Last data filed at 4/4/2019 1140  Gross per 24 hour   Intake 600 ml   Output 2910 ml   Net -2310 ml         Plan   Plan for Ostomy Care:        Ostomy Plan of Care  [x] Supplies/Instructions left in room  [] Patient using home supplies  [x] Brand/supplies at bedside - [employer]  [x] Current pouching system - 2-piece cut-to-fit 2 3/4\" drainable appliance    Current Diet: DIET GENERAL;  Dietician consult:  Yes    Discharge Plan:  Placement for patient upon discharge: skilled nursing    Outpatient visit plan - Yes, follow up visit with Dr Deena Ricardo after discharge. Contact [facility] as needed.   Supplies given - Yes   Samples requested  - Yes, upon discharge    Referrals:  []   [] [facility]  [] Supplies  [] Other      Patient/Caregiver Teaching:  Written Instructions given to patient/family - Colostomy Care Booklet with [facility] contact information  Teaching provided:  [x] Reviewed GI and A&P        [x] Supplies  [x] Pouch emptying      [x] Manipulate closure  [x] Routine Care         [x] Comment - provided teaching to wife  [x] Pouch maintenance           Level of patient/caregiver wife understanding able to:  [x] Indicates understanding       [x] Needs reinforcement  [] Unsuccessful      [] Verbal Understanding  [x] Demonstrated understanding       [] No evidence of learning  [] Refused teaching         [] N/A  Will continue to follow.     Electronically signed by Anuel Juarez RN, [address] on 4/4/2019 at 12:17 PM

## 2019-04-04 NOTE — PROGRESS NOTES
Hospitalist Progress Note      PCP: Louise Griggs MD    Date of Admission: 3/7/2019    Chief Complaint: Abdominal pain     Hospital Course:   69 y. o. male who presented to [facility] with LLQ abdominal pain. Pt was recently d/c'd for diverticulitis, states pain never went away. C/o low grade fevers for 2 weeks. States pain not as severe as previous admission. Had chemo on Friday, states received neulasta. Denies cp, n/v, sob. Subjective:   Pt lying in bed. On 15 liters HFNC. Afebrile. He denies dyspnea or chest pain. No N/V.      Medications:  Reviewed    Infusion Medications    heparin (porcine) 16.8 mL/hr (04/03/19 1308)    dextrose 100 mL/hr (03/08/19 1700)     Scheduled Medications    magnesium sulfate  2 g Intravenous Q2H    methylPREDNISolone  40 mg Intravenous Q12H    vancomycin  1,000 mg Intravenous Q12H    sodium chloride flush  10 mL Intravenous Q12H    ipratropium-albuterol  1 ampule Inhalation Q4H WA    potassium chloride  10 mEq Oral 4x Daily    zinc oxide  1 applicator Topical Daily    acyclovir  800 mg Oral TID    metoprolol succinate  50 mg Oral Daily    meropenem  1 g Intravenous Q8H    amiodarone  200 mg Oral Daily    pantoprazole  40 mg Intravenous Daily    aspirin  81 mg Oral Daily    sodium chloride flush  10 mL Intravenous 2 times per day    simvastatin  40 mg Oral Nightly    folic acid  1 mg Oral Daily    oyster shell calcium/vitamin D  1 tablet Oral Daily     PRN Meds: oxyCODONE **OR** oxyCODONE, heparin (porcine), heparin (porcine), HYDROmorphone, sodium chloride flush, magic (miracle) mouthwash with nystatin, glucose, dextrose, glucagon (rDNA), dextrose, ondansetron, acetaminophen      Intake/Output Summary (Last 24 hours) at 4/4/2019 1431  Last data filed at 4/4/2019 1140  Gross per 24 hour   Intake 600 ml   Output 2910 ml   Net -2310 ml       Physical Exam Performed:    /79   Pulse 100   Temp 98 °F (36.7 °C) (Oral)   Resp 20   Ht 5' 9\" (1.753 m)   Wt 138 lb 0.1 oz (62.6 kg)   SpO2 98%   BMI 20.38 kg/m²     General appearance: No apparent distress, appears stated age and cooperative. HEENT: Pupils equal, round, and reactive to light. Conjunctivae/corneas clear. Neck: Supple, with full range of motion. No jugular venous distention. Trachea midline. Respiratory:  Normal respiratory effort. Clear to auscultation, bilaterally without Rales/Wheezes/Rhonchi. Cardiovascular: Regular rate and rhythm with normal S1/S2 without murmurs, rubs or gallops. Abdomen: Soft, non-tender, non-distended with normal bowel sounds. Musculoskeletal: No clubbing, cyanosis or edema bilaterally. Full range of motion without deformity. Skin: Skin color, texture, turgor normal.  No rashes or lesions. Neurologic:  Neurovascularly intact without any focal sensory/motor deficits. Cranial nerves: II-XII intact, grossly non-focal.  Psychiatric: Alert and oriented, thought content appropriate, normal insight  Capillary Refill: Brisk,< 3 seconds   Peripheral Pulses: +2 palpable, equal bilaterally       Labs:   Recent Labs     04/02/19  0410 04/02/19  0500 04/02/19  1210 04/03/19  0451 04/04/19  0532   WBC 8.2  --   --  11.3* 9.3   HGB 6.8* 6.7*  --  8.1* 8.3*   HCT 20.5* 20.2* 26.1* 24.4* 24.4*   *  --   --  125* 126*     Recent Labs     04/02/19  0410 04/03/19  0451 04/04/19  0532   * 132* 133*   K 3.7 3.4* 3.7   CL 99 95* 97*   CO2 27 26 26   BUN 10 7 8   CREATININE <0.5* <0.5* <0.5*   CALCIUM 7.0* 6.8* 7.2*       Urinalysis:      Lab Results   Component Value Date    NITRU Negative 03/10/2019    WBCUA 3-5 03/10/2019    RBCUA 0-2 03/10/2019    BLOODU Negative 03/10/2019    SPECGRAV 1.025 03/10/2019    GLUCOSEU Negative 03/10/2019       Radiology:  CT CHEST WO CONTRAST   Final Result   1. Bilateral upper lobe ground-glass opacities, left greater than right, with   developing left perihilar consolidation.   Findings are most concerning for   multifocal pneumonia with Diverticulitis [K57.92] 03/07/2019    Diverticulitis of large intestine with perforation and abscess without bleeding [K57.20]        Acute perforated recurrent diverticulitis/pneumoperitoneum/abscess in setting of immunosuppression due to chemotherapy:   - CT A/P on admission demonstrated acute perforated colon diverticulitis with free air and fluid collection. General surgery consulted. Pt is status post laparoscopic converted to open left colectomy on 3/14/19. CT scan on 3/25/19 showed [place] [facility] [place] Pt is now s/p Harding's procedure with colostomy placement on 3/27/19.  - Continue IV Merrem and Vanc. Completed course of Diflucan  - Continue prn pain control.      Acute hypoxic respiratory failure due to bilateral pulmonary emboli and pneumonia:   - CT chest 3/16/19 showed acute PE. Lower extremity dopplers +DVT. Vascular surgery consulted, no indication for IVC filter at this time. - Wean supplemental O2 as tolerated. He is not normally on home O2. He is currently requiring HFNC.  - Pulmonary re-consulted. Chest CT obtained bilateral upper lobe GGO, concerning for multifocal pneumonia. Pt on [facility]. Pt is s/p bronch on 4/3. Follow-up BAL. - Continue heparin infusion. Plan for Eliquis at discharge. - Continue inhaled bronchodilator therapy. Pulmonary toilet with IS and acapella. - Pt started on IV solumedrol on 4/4/19 per Pulm.     Metastatic lung cancer, stage 4 with bone mets:  - Hem/onc is following. Chemo on hold to allow for surgical healing.      Thrombocytopenia (resolved) now with hypercoagulable state:  - Platelets required preoperatively. Monitor daily CBC.      Neutropenia due to chemotherapy (resolved):  - S/p Neulasta on 3/1. S/p Granix on 3/8.      Anemia, likely due to acute illness:  -  Pt is s/p 1 unit PBRC for Hgb 6.6 on 3/19, 1 unit for Hgb 7.2 on 3/22, 1 unit on 4/2 for Hgb 6.7. No overt signs of bleeding. CT A/P showed no RP bleed.  Pt is s/p B12 injection. Stool for occult blood was +. GI consulted, s/p EGD on 3/21 which was normal. Continue PPI. Monitor daily CBC.      PAD/CAD/HTN/Cardiomyopathy:   - Defer antiplatelet management to hem/onc given the complexity of his present situation, bleeding risk and thrombotic states. Previously used intergrillin infusion during this admission, stopped due to bleeding concerns.   - ECHO showed reduced EF of 30-35%. Previous ECHO showed EF 40-45%. - Cardiology is following. Continue on aspirin, lisinopril, Toprol and statin. Lasix prn.  - Cards recommending LHC given drop in EF but would like to wait 4-6 weeks post PE on AC.      NSVT:   - Known history of VT s/p ICD. Went into VT on 3/12, PMD went off. Continue amiodarone. Keep K > 4 and Mag > 2.     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 Nephrology following. Monitor daily BMP.      Hypokalemia/hypomagnesemia:  - Monitor and replete as needed.      Zoster oticus:  - Resumed acyclovir 3/31. Goals of care:  - Palliative care consulted 4/3 -- appreciate. Maintain full code for now. Discussions will be ongoing. DVT Prophylaxis: Pt is on heparin infusion   Diet: DIET GENERAL;  Code Status: Full Code    PT/OT Eval Status: Ordered     Dispo - Uncertain. Possible plan for LTAC at discharge.      [address] [facility], APRN - CNP

## 2019-04-04 NOTE — ONCOLOGY
--  125* 126*   MCV 85.8  --   --  86.1 85.9        Recent Labs     04/02/19  0410 04/03/19  0451 04/04/19  0532   * 132* 133*   K 3.7 3.4* 3.7   CL 99 95* 97*   CO2 27 26 26   BUN 10 7 8   CREATININE <0.5* <0.5* <0.5*     No results for input(s): AST, ALT, ALB, BILIDIR, BILITOT, ALKPHOS in the last 72 hours.     Magnesium:    Lab Results   Component Value Date    MG 1.40 04/03/2019    MG 1.80 04/02/2019    MG 1.40 04/01/2019         Problem List  Patient Active Problem List   Diagnosis    Elevated fasting glucose    CAD (coronary artery disease)    PAD (peripheral artery disease) ([address]    Essential hypertension    Mixed hyperlipidemia    Sprain of right foot    Sprain of anterior talofibular ligament of right ankle    Closed nondisplaced fracture of fifth right metatarsal bone    Closed fracture of proximal lateral malleolus of ankle with routine healing    Right foot pain    Right ankle pain    Nondisplaced fracture of fifth right metatarsal bone with routine healing    History of pulmonary emphysema    Chronic obstructive pulmonary disease with (acute) exacerbation (HCC)    Pulmonary nodule, left    Abnormal bone scan of thoracic spine    Back pain    Visit for monitoring Plavix therapy    History of ventricular tachycardia    Gallstones    Hemoptysis    Former smoker    Elevated PSA    Adenocarcinoma of left lung ([address]    Acute diverticulitis    Pancytopenia ([address]    Diverticulitis of large intestine with perforation and abscess without bleeding    Diverticulitis    Chemotherapy-induced neutropenia (HCC)    Mucositis    Herpes zoster oticus    Hearing loss of right ear    Acute respiratory failure with hypoxia ([address]    H/O colectomy    Primary malignant neoplasm of lung metastatic to other site [facility])    NSVT (nonsustained ventricular tachycardia) ([address]    Other pulmonary embolism without acute cor pulmonale (HCC)    Cardiomyopathy ([address]    Pneumonia of left lung due to infectious organism    Iron deficiency anemia due to chronic blood loss       ASSESSMENT AND PLAN    Acute Perforated descending colon diverticulitis (not immune mediated colitis) with free air in the abd   - per gen surgery , s/p Laparoscopic converted to open left Colectomy, 3/14/2019, with Dr. Solitario Stuart  - ATB support (Zosyn, Maxipime, Vanco) , changed to [address] until POD 9, now on Merrem only. - CT scan done 3/25 with anastomotic [place] [facility] [place]. - Status post Gerda procedure on 3/27/2019 with Dr. Solitario Stuart. Nutrition  - NPO for bronch     Neutropenia secondary to chemo  - Granix stopped 3/11   - resolved     Thrombocytopenia secondary to chemo  -Resolved    PE  -Diagnosed on 3/16/2019 with bilateral PEs involving the right and left main pulm arteries.   -Also had thrombus in the right femoral, peroneal, greater saphenous, short saphenous veins and in the left peroneal veins.  -Plan Eliquis on discharge.   -Will need lifelong anticoagulation.  -Does not need a filter per Dr. Candida Lee on 3/18   -Started on a heparin drip on 3/31/2019. HCAP  - Vanco/Rolando. - Status post bronchoscopy on 4/03/2019. No endobronchial lesions. Cultures are pending.  - Should we add steroids in case of pneumonitis? Will contact Dr. Kostas Hubbard. Lytes  - Replete K PRN     Anemia, possible GI bleed on hep drip  - EGD 3/21 negative   - PPI added, now BID dosing per GI 3/21  - CT AP 3/21 - no signs of bleeding   - due for B12 shot - given 3/19/2019 - required every 9 weeks with this chemo. Lung CA, stage 4 bone mets   - Will hold chemo until surgically healed  - Cycle 3 carbo/alimta Pretty Gretchen was due on 3/22- held due to acute issues, cont to hold. Plans to resume Maurizio Decant only outpatient.   - CT chest/abd/pelvis, 3/10/2019, showed improvement in the left hilar soft tissue and radiation changes. No obvious new disease.  Not hospice appropriate at this time from an oncology standpoint.     - CT Chest from 4-1 reviewed. L sided disease seems consistent with PNA and not cancer progression. Wife aware.       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 May need to resume- hospitalist is speaking with vascular surgery for clarification. (Dr. Harshad Ross)   - [facility] given surgical burden and recent leak;       ONCOLOGIC DISPOSITION:  TBD.   Per surgical team.     Brittani Hernandez, MICHAEL   [facility]  [employer]

## 2019-04-04 NOTE — PROGRESS NOTES
INPATIENT PULMONARY CRITICAL CARE PROGRESS NOTE      Reason for visit: Patient seen by us earlier during this hospitalization for acute respiratory failure related to pulmonary embolism, also had DVT. We had signed off as the patient was stable. Repeat consult placed by Dr. Christelle Cheatham for possible HCAP. SUBJECTIVE: Patient underwent bronchoscopy yesterday and had some mucus plugs which were lavaged out;patient was tried on BIPAP last night for sleep apnea-which he used for 90 minutes but could not tolerate any further ;patient got [facility] for cancer prior to admission as per Oncology  , patient was afebrile and hemodynamically maintained, patient continues to be on high oxygen requirements and when seen was on 15  L of high flow oxygen to keep oxygen saturation, patient was saturating 98% when seen, patient was having a cumulative fluid balance of -8.7 L, patient's blood sugar was  acceptable, patient was alert and communicative, no other pertinent review of system of concern      Physical Exam:  Blood pressure 131/79, pulse 100, temperature 98 °F (36.7 °C), temperature source Oral, resp. rate 20, height 5' 9\" (1.753 m), weight 138 lb 0.1 oz (62.6 kg), SpO2 98 %.'   Constitutional:  Ill-appearing, sitting up in a chair,underweight. No acute distress. HENT:  Oropharynx is clear and dry. Eyes:  Conjunctivae are normal. Pupils equal, round, and reactive to light. No scleral icterus. Wearing glasses. Neck: No JVD. No tracheal deviation present. No obvious thyroid mass. Cardiovascular: Normal rate, regular rhythm, normal heart sounds. No right ventricular heave. No lower extremity edema. Pulmonary/Chest: Diminished air entry left hemithorax, B/L  scattered crackles. No accessory muscle usage or stridor. Abdominal: Soft, distended, no tenderness. Colostomy present    Musculoskeletal: No cyanosis. No clubbing. No obvious joint deformity.    Lymphadenopathy: No cervical adenopathy    Skin: Skin is warm and dry. No rash or nodules on the exposed extremities. Psychiatric: Normal mood and affect. Behavior is normal.  No anxiety. Neurologic: Alert, awake and oriented. PERRL. Speech fluent        Results:  CBC:   Recent Labs     04/02/19  0410 04/02/19  0500 04/02/19  1210 04/03/19  0451 04/04/19  0532   WBC 8.2  --   --  11.3* 9.3   HGB 6.8* 6.7*  --  8.1* 8.3*   HCT 20.5* 20.2* 26.1* 24.4* 24.4*   MCV 85.8  --   --  86.1 85.9   *  --   --  125* 126*     BMP:   Recent Labs     04/02/19  0410 04/03/19  0451 04/04/19  0532   * 132* 133*   K 3.7 3.4* 3.7   CL 99 95* 97*   CO2 27 26 26   BUN 10 7 8   CREATININE <0.5* <0.5* <0.5*     APTT:   Recent Labs     04/03/19  2300 04/04/19  0532 04/04/19  0944   APTT 66.2* 58.5* 64.6*       Imaging:  I have reviewed radiology images personally. CT CHEST WO CONTRAST   Final Result   1. Bilateral upper lobe ground-glass opacities, left greater than right, with   developing left perihilar consolidation. Findings are most concerning for   multifocal pneumonia with asymmetric pulmonary edema a less likely   possibility. 2. Small bilateral pleural effusions which are slightly increased from   03/26/2019.   3. Trace ascites in the upper abdomen. 4. Sclerotic metastasis in the T6 vertebral body. 5. Coronary atherosclerosis. XR CHEST PORTABLE   Final Result   Nonspecific consolidation mid and lower left lung with left pleural effusion   presumably related to sequela from pulmonary edema. Pneumonia is a   consideration. Calcific atherosclerosis aorta. Cardiomegaly. XR ABDOMEN (KUB) (SINGLE AP VIEW)   Final Result   Dilated small bowel, may reflect ileus or obstruction. Recommend follow-up   study. CT ABDOMEN PELVIS WO CONTRAST Additional Contrast? None   Final Result   Interval increase in size of the left lower quadrant collection which now   measures up to 7.8 cm. Administered rectal contrast extends into the   collection.   Right anterior approach drain courses adjacent to the collection. No significant change in pleural effusions and associated airspace disease. The findings were sent to the Radiology Results [address] at 12:53   pm on 3/26/2019to be communicated to a licensed caregiver. [place] BARIUM ENEMA   Final Result   Active extravasation of contrast from the sigmoid colon, compatible with   anastomotic leak. Please refer to accompanying CT. Diverticulosis. CT ABDOMEN PELVIS W IV CONTRAST Additional Contrast? Radiologist Recommendation   Final Result   1. The patient is 11 days status post partial sigmoid colectomy. Surgical   drain remains in place with a small ill-defined fluid collection that is   stable from prior exam.  This does not appear to represent an abscess and may   represent postoperative hematoma or seroma. 2. There is a new collection of free intraperitoneal air immediately anterior   to the prior anastomosis site, contained within the left lower quadrant. This indicates an anastomosis leak. No distant free air. 3. 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 4. Cholelithiasis with no evidence of acute cholecystitis. 5. Worsening small pleural effusions and airspace changes in the lower chest.   Pattern may represent underlying pulmonary edema or perhaps pneumonitis. XR CHEST PORTABLE   Final Result   Interval development of mild pulmonary edema. There is a small left-sided pleural effusion with more focal consolidation   the left lung base, atelectasis versus pneumonia. IR PICC WO SQ PORT/PUMP > 5 YEARS   Final Result   Successful placement of PICC line. CT ABDOMEN PELVIS WO CONTRAST Additional Contrast? None   Final Result   No evidence retroperitoneal hematoma. Interval colonic resection. Small volume loculated collection in the left   pericolic gutter, with surgical drain terminating adjacent. Mildly dilated loops of small bowel with scattered air-fluid levels, most   likely postoperative ileus. Trace pleural effusions and bibasilar airspace disease representing   atelectasis or pneumonia. VL Extremity Venous Bilateral   Final Result      CT CHEST PULMONARY EMBOLISM W CONTRAST   Final Result   Bilateral pulmonary emboli      Scattered opacities throughout the lungs, greatest at the lung bases, with   tiny pleural effusions. Changes are increased compared to prior      Sclerotic osseous metastatic disease as described previously      The findings were sent to the Radiology Results [address] at 3:57   pm on 3/16/2019to be communicated to a licensed caregiver. XR CHEST 1 VW   Final Result   Shallow inflation with findings consistent with basilar atelectasis. Left   pleural thickening versus trace effusion. CT CHEST ABDOMEN PELVIS WO CONTRAST   Final Result   Perforated sigmoid diverticulitis and pneumoperitoneum unchanged. Stable   left [facility] colonic air-fluid collection may represent an intramural or   extramural abscess or giant diverticulum. Infrarenal abdominal aortic aneurysm and aorto bi-iliac stent. Other incidental findings as above. CT ABDOMEN PELVIS W IV CONTRAST Additional Contrast? None   Final Result   Acute perforated descending colon diverticulitis. There is free air in the   abdomen and there is a small extraluminal gas and fluid collection/abscess   adjacent to the descending colon measuring 4.1 cm in AP diameter and 6 cm in   length. There is diverticulosis of the entire colon. Cholelithiasis. 4.7 cm infrarenal abdominal aortic aneurysm status post endograft repair with   a patent aorto bi-iliac graft. There is suspicion of an endoleak with blush   of contrast enhancement suspected in the inferior aspect of the native   aneurysm.   As a noncontrast study was not performed, calcification of the   intraluminal with EF of 35%   S/.p  one dose of lasix   Monitor I/O and BMP  Bronchodilators started   Correct electrolytes on PRN basis   Diverticulitis perforation and abscess, anastomotic leak. Followed by surgery. colostomy care as per wound care team   Metastatic lung cancer, herpes zoster. Followed by Dr. Nessa Quevedo. DVT, pulmonary embolism.   On heparin  PUD prophylaxis     Case d/w patient and case management     Electronically signed by:  Abby Rosen MD    4/4/2019    12:37 PM.

## 2019-04-04 NOTE — PROGRESS NOTES
Trina Talley MD. Patient is recovering from shingles and on acyclovir, patient is complaining of increased pain on right side of face and has a new sore on his tongue on right side.

## 2019-04-05 NOTE — PROGRESS NOTES
INPATIENT PULMONARY CRITICAL CARE PROGRESS NOTE      Reason for visit: Patient seen by us earlier during this hospitalization for acute respiratory failure related to pulmonary embolism, also had DVT. We had signed off as the patient was stable. Repeat consult placed by Dr. Kirill Nelson for possible HCAP. SUBJECTIVE: Patient could not tolerate the BIPAP last night and was put on Vapotherm oxygenation and patient when seen was still on vapotherm on 100% oxygen with saturation of 95% , patient was afebrile and hemodynamically maintained, patient does not have any increased cough/expectoration/sob/wheezing/chest pain , patient was having a cumulative fluid balance of -8.6 L, patient's blood sugar are suboptimally controlled , patient was alert and communicative, no other pertinent review of system of concern      Physical Exam:  Blood pressure 130/72, pulse 92, temperature 97.3 °F (36.3 °C), temperature source Oral, resp. rate 20, height 5' 9\" (1.753 m), weight 138 lb 14.2 oz (63 kg), SpO2 93 %.'   Constitutional:  Ill-appearing, sitting up in a chair,underweight. No acute distress. HENT:  Oropharynx is clear and dry. Eyes:  Conjunctivae are normal. Pupils equal, round, and reactive to light. No scleral icterus. Neck: No JVD. No tracheal deviation present. No obvious thyroid mass. Cardiovascular: Normal rate, regular rhythm, normal heart sounds. No right ventricular heave. No lower extremity edema. Pulmonary/Chest: Diminished air entry left hemithorax, B/L  scattered crackles. No accessory muscle usage or stridor. Abdominal: Soft, distended, no tenderness. Colostomy present    Musculoskeletal: No cyanosis. No clubbing. No obvious joint deformity. Lymphadenopathy: No cervical adenopathy    Skin: Skin is warm and dry. No rash or nodules on the exposed extremities. Psychiatric: Normal mood and affect. Behavior is normal.  No anxiety. Neurologic: Alert, awake and oriented. PERRL.   Speech fluent        Results:  CBC:   Recent Labs     04/03/19  0451 04/04/19  0532 04/05/19  0425   WBC 11.3* 9.3 7.7   HGB 8.1* 8.3* 7.7*   HCT 24.4* 24.4* 22.6*   MCV 86.1 85.9 85.5   * 126* 128*     BMP:   Recent Labs     04/03/19  0451 04/04/19  0532 04/05/19  0425   * 133* 131*   K 3.4* 3.7 4.7   CL 95* 97* 96*   CO2 26 26 26   BUN 7 8 8   CREATININE <0.5* <0.5* <0.5*     APTT:   Recent Labs     04/04/19  0944 04/04/19  1315 04/05/19  0425   APTT 64.6* 60.4* 86.3*       Imaging:  I have reviewed radiology images personally. CT CHEST WO CONTRAST   Final Result   1. Bilateral upper lobe ground-glass opacities, left greater than right, with   developing left perihilar consolidation. Findings are most concerning for   multifocal pneumonia with asymmetric pulmonary edema a less likely   possibility. 2. Small bilateral pleural effusions which are slightly increased from   03/26/2019.   3. Trace ascites in the upper abdomen. 4. Sclerotic metastasis in the T6 vertebral body. 5. Coronary atherosclerosis. XR CHEST PORTABLE   Final Result   Nonspecific consolidation mid and lower left lung with left pleural effusion   presumably related to sequela from pulmonary edema. Pneumonia is a   consideration. Calcific atherosclerosis aorta. Cardiomegaly. XR ABDOMEN (KUB) (SINGLE AP VIEW)   Final Result   Dilated small bowel, may reflect ileus or obstruction. Recommend follow-up   study. CT ABDOMEN PELVIS WO CONTRAST Additional Contrast? None   Final Result   Interval increase in size of the left lower quadrant collection which now   measures up to 7.8 cm. Administered rectal contrast extends into the   collection. Right anterior approach drain courses adjacent to the collection. No significant change in pleural effusions and associated airspace disease.       The findings were sent to the Radiology Results Communication Center at 12:53   pm on 3/26/2019to be communicated to a licensed caregiver. FL BARIUM ENEMA   Final Result   Active extravasation of contrast from the sigmoid colon, compatible with   anastomotic leak. Please refer to accompanying CT. Diverticulosis. CT ABDOMEN PELVIS W IV CONTRAST Additional Contrast? Radiologist Recommendation   Final Result   1. The patient is 11 days status post partial sigmoid colectomy. Surgical   drain remains in place with a small ill-defined fluid collection that is   stable from prior exam.  This does not appear to represent an abscess and may   represent postoperative hematoma or seroma. 2. There is a new collection of free intraperitoneal air immediately anterior   to the prior anastomosis site, contained within the left lower quadrant. This indicates an anastomosis leak. No distant free air. 3. 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 4. Cholelithiasis with no evidence of acute cholecystitis. 5. Worsening small pleural effusions and airspace changes in the lower chest.   Pattern may represent underlying pulmonary edema or perhaps pneumonitis. XR CHEST PORTABLE   Final Result   Interval development of mild pulmonary edema. There is a small left-sided pleural effusion with more focal consolidation   the left lung base, atelectasis versus pneumonia. IR PICC WO SQ PORT/PUMP > 5 YEARS   Final Result   Successful placement of PICC line. CT ABDOMEN PELVIS WO CONTRAST Additional Contrast? None   Final Result   No evidence retroperitoneal hematoma. Interval colonic resection. Small volume loculated collection in the left   pericolic gutter, with surgical drain terminating adjacent. Mildly dilated loops of small bowel with scattered air-fluid levels, most   likely postoperative ileus. Trace pleural effusions and bibasilar airspace disease representing   atelectasis or pneumonia.          VL Extremity Venous Bilateral   Final Result CT CHEST PULMONARY EMBOLISM W CONTRAST   Final Result   Bilateral pulmonary emboli      Scattered opacities throughout the lungs, greatest at the lung bases, with   tiny pleural effusions. Changes are increased compared to prior      Sclerotic osseous metastatic disease as described previously      The findings were sent to the Radiology Results [address] at 3:57   pm on 3/16/2019to be communicated to a licensed caregiver. XR CHEST 1 VW   Final Result   Shallow inflation with findings consistent with basilar atelectasis. Left   pleural thickening versus trace effusion. CT CHEST ABDOMEN PELVIS WO CONTRAST   Final Result   Perforated sigmoid diverticulitis and pneumoperitoneum unchanged. Stable   left [facility] colonic air-fluid collection may represent an intramural or   extramural abscess or giant diverticulum. Infrarenal abdominal aortic aneurysm and aorto bi-iliac stent. Other incidental findings as above. CT ABDOMEN PELVIS W IV CONTRAST Additional Contrast? None   Final Result   Acute perforated descending colon diverticulitis. There is free air in the   abdomen and there is a small extraluminal gas and fluid collection/abscess   adjacent to the descending colon measuring 4.1 cm in AP diameter and 6 cm in   length. There is diverticulosis of the entire colon. Cholelithiasis. 4.7 cm infrarenal abdominal aortic aneurysm status post endograft repair with   a patent aorto bi-iliac graft. There is suspicion of an endoleak with blush   of contrast enhancement suspected in the inferior aspect of the native   aneurysm. As a noncontrast study was not performed, calcification of the   intraluminal thrombus accounting for the apparent enhancement is not   excluded. 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 Comparison with a noncontrast CT examination would be   helpful. Critical results were called by Dr. Bharat Turner. MD Elizabeth to Krissy on   3/7/2019 at 20:35. Results for Lolis Yost" (MRN [id number]) as of 4/5/2019 10:28   Ref. Range 4/4/2019 05:32 4/4/2019 09:44 4/4/2019 13:15 4/5/2019 04:25   Sodium Latest Ref Range: 136 - 145 mmol/L 133 (L)   131 (L)   Potassium Latest Ref Range: 3.5 - 5.1 mmol/L 3.7   4.7   Chloride Latest Ref Range: 99 - 110 mmol/L 97 (L)   96 (L)   CO2 Latest Ref Range: 21 - 32 mmol/L 26   26   BUN Latest Ref Range: 7 - 20 mg/dL 8   8   Creatinine Latest Ref Range: 0.8 - 1.3 mg/dL <0.5 (L)   <0.5 (L)   Anion Gap Latest Ref Range: 3 - 16  10   9   GFR Non- Latest Ref Range: >60  >60   >60   GFR  Latest Ref Range: >60  >60   >60   Magnesium Latest Ref Range: 1.80 - 2.40 mg/dL  1.80  1.90   Glucose Latest Ref Range: 70 - 99 mg/dL 112 (H)   175 (H)   Calcium Latest Ref Range: 8.3 - 10.6 mg/dL 7.2 (L)   7.6 (L)   Vancomycin Tr Latest Ref Range: 10.0 - 20.0 ug/mL  6.7 (L)  13.5   WBC Latest Ref Range: 4.0 - 11.0 K/uL 9.3   7.7   RBC Latest Ref Range: 4.20 - 5.90 M/uL 2.84 (L)   2.65 (L)   Hemoglobin Quant Latest Ref Range: 13.5 - 17.5 g/dL 8.3 (L)   7.7 (L)   Hematocrit Latest Ref Range: 40.5 - 52.5 % 24.4 (L)   22.6 (L)   MCV Latest Ref Range: 80.0 - 100.0 fL 85.9   85.5   MCH Latest Ref Range: 26.0 - 34.0 pg 29.1   29.0   MCHC Latest Ref Range: 31.0 - 36.0 g/dL 33.8   33.9   MPV Latest Ref Range: 5.0 - 10.5 fL 9.6   9.4   RDW Latest Ref Range: 12.4 - 15.4 % 15.5 (H)   15.9 (H)   Platelet Count Latest Ref Range: 135 - 450 K/uL 126 (L)   128 (L)   Neutrophils % Latest Units: % 63.0   81.0   Lymphocyte % Latest Units: % 11.0   4.0   Monocytes % Latest Units: % 8.0   3.0   Eosinophils % Latest Units: % 0.0   0.0   Basophils % Latest Units: % 0.0   0.0     Results for Lolis Yost" (MRN [id number]) as of 4/5/2019 10:28   Ref. Range 4/3/2019 15:06   Gram Stain Result Unknown 1+ WBC's (Polymor. ..    CULTURE, RESPIRATORY Unknown Further report to. .. ACID FAST CULTURE WITH SMEAR Unknown Rpt   AFB Smear Unknown No AFB observed b. .. Fungus Stain Unknown No Fungal element. .. FUNGUS CULTURE Unknown Rpt       ECHO- Conclusions      Summary   Definity contrast was used, but port not working properly.  Malissa Nava left ventricular systolic function is moderately reduced with an   ejection fraction of 30-35 %. Inferolateral hypokinesia. Mid anteroseptal,   apical lateral and apical septal akinesia.   There is mild concentric left ventricular hypertrophy.   Normal left ventricular filling pressure.   Left ventricular cavity size is normal.   Mild thickening of the anterior leaflet of mitral valve.   Mild mitral regurgitation.   The left atrium is severely dilated. Assessment and plan:  Acute respiratory failure, hypoxemic. O2 supplementation to keep Sao2 between 90-4% ONLY  Patient continues to require high flow oxygenation -now on vapotherm oxygenation and fio2 was decreased from 100% to 85% and patient's Sao2 to be monitored  Pulmonary toilet  S/p bronchoscopy  Low dose steroids started   Possible healthcare associated pneumonia. CXR with new left lung infiltrate, will obtain CT chest.  He does not have fever, chills, purulent phlegm. We will evaluate after CT chest obtained. On vancomycin and meropenem-will consider deescalating or d/cing   CT chest reviewed  Echocardiogram reviewed-patient has cardiomyopathy with EF of 35%   S/.p  one dose of lasix   Monitor I/O and BMP  Bronchodilators started   Correct electrolytes on PRN basis   Diverticulitis perforation and abscess, anastomotic leak. Followed by surgery. colostomy care as per wound care team   Metastatic lung cancer, herpes zoster. Followed by Dr. Lui Fox. DVT, pulmonary embolism.   On heparin  PUD prophylaxis     Case d/w patient and family along with nursing      Patient is not stable for any discharge from pulm/[facility] stand point      Electronically signed by:  Marie Elder MD 4/5/2019    10:25 AM.

## 2019-04-05 NOTE — PROGRESS NOTES
Hospitalist Progress Note      PCP: Rebecca Ha MD    Date of Admission: 3/7/2019    Chief Complaint: Abdominal pain     Hospital Course:   69 y. o. male who presented to [facility] with LLQ abdominal pain. Pt was recently d/c'd for diverticulitis, states pain never went away. C/o low grade fevers for 2 weeks. States pain not as severe as previous admission. Had chemo on Friday, states received neulasta. Denies cp, n/v, sob. Subjective:   Pt sitting up in chair. Placed on Vapotherm overnight. Currently on 28 LPM with 85% FIO2. Pt denies dyspnea or chest pain. No N/V.      Medications:  Reviewed    Infusion Medications    heparin (porcine) 16.8 mL/hr (04/05/19 1026)    dextrose 100 mL/hr (03/08/19 1700)     Scheduled Medications    metoprolol succinate  50 mg Oral BID    magnesium oxide  400 mg Oral BID    [START ON 4/6/2019] potassium chloride  10 mEq Oral BID    methylPREDNISolone  40 mg Intravenous Q12H    vancomycin  1,000 mg Intravenous Q12H    sodium chloride flush  10 mL Intravenous Q12H    ipratropium-albuterol  1 ampule Inhalation Q4H WA    zinc oxide  1 applicator Topical Daily    acyclovir  800 mg Oral TID    meropenem  1 g Intravenous Q8H    amiodarone  200 mg Oral Daily    pantoprazole  40 mg Intravenous Daily    aspirin  81 mg Oral Daily    sodium chloride flush  10 mL Intravenous 2 times per day    simvastatin  40 mg Oral Nightly    folic acid  1 mg Oral Daily    oyster shell calcium/vitamin D  1 tablet Oral Daily     PRN Meds: magic (miracle) mouthwash with nystatin, oxyCODONE **OR** oxyCODONE, heparin (porcine), heparin (porcine), HYDROmorphone, sodium chloride flush, glucose, dextrose, glucagon (rDNA), dextrose, ondansetron, acetaminophen      Intake/Output Summary (Last 24 hours) at 4/5/2019 1450  Last data filed at 4/5/2019 1402  Gross per 24 hour   Intake 1394 ml   Output 2265 ml   Net -871 ml       Physical Exam Performed:    /66   Pulse 100   Temp 97.5 °F (36.4 °C) (Oral)   Resp 20   Ht 5' 9\" (1.753 m)   Wt 138 lb 14.2 oz (63 kg)   SpO2 93%   BMI 20.51 kg/m²     General appearance: No apparent distress, appears stated age and cooperative. HEENT: Pupils equal, round, and reactive to light. Conjunctivae/corneas clear. Neck: Supple, with full range of motion. No jugular venous distention. Trachea midline. Respiratory:  Normal respiratory effort. Clear to auscultation, bilaterally without Rales/Wheezes/Rhonchi. Cardiovascular: Regular rate and rhythm with normal S1/S2 without murmurs, rubs or gallops. Abdomen: Soft, non-tender, non-distended with normal bowel sounds. Musculoskeletal: No clubbing, cyanosis or edema bilaterally. Full range of motion without deformity. Skin: Skin color, texture, turgor normal.  No rashes or lesions. Neurologic:  Neurovascularly intact without any focal sensory/motor deficits. Cranial nerves: II-XII intact, grossly non-focal.  Psychiatric: Alert and oriented, thought content appropriate, normal insight  Capillary Refill: Brisk,< 3 seconds   Peripheral Pulses: +2 palpable, equal bilaterally       Labs:   Recent Labs     04/03/19  0451 04/04/19  0532 04/05/19  0425   WBC 11.3* 9.3 7.7   HGB 8.1* 8.3* 7.7*   HCT 24.4* 24.4* 22.6*   * 126* 128*     Recent Labs     04/03/19  0451 04/04/19  0532 04/05/19  0425   * 133* 131*   K 3.4* 3.7 4.7   CL 95* 97* 96*   CO2 26 26 26   BUN 7 8 8   CREATININE <0.5* <0.5* <0.5*   CALCIUM 6.8* 7.2* 7.6*       Urinalysis:      Lab Results   Component Value Date    NITRU Negative 03/10/2019    WBCUA 3-5 03/10/2019    RBCUA 0-2 03/10/2019    BLOODU Negative 03/10/2019    SPECGRAV 1.025 03/10/2019    GLUCOSEU Negative 03/10/2019       Radiology:  CT CHEST WO CONTRAST   Final Result   1. Bilateral upper lobe ground-glass opacities, left greater than right, with   developing left perihilar consolidation.   Findings are most concerning for   multifocal pneumonia with asymmetric anastomosis leak. No distant free air. 3. 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 4. Cholelithiasis with no evidence of acute cholecystitis. 5. Worsening small pleural effusions and airspace changes in the lower chest.   Pattern may represent underlying pulmonary edema or perhaps pneumonitis. XR CHEST PORTABLE   Final Result   Interval development of mild pulmonary edema. There is a small left-sided pleural effusion with more focal consolidation   the left lung base, atelectasis versus pneumonia. IR PICC WO SQ PORT/PUMP > 5 YEARS   Final Result   Successful placement of PICC line. CT ABDOMEN PELVIS WO CONTRAST Additional Contrast? None   Final Result   No evidence retroperitoneal hematoma. Interval colonic resection. Small volume loculated collection in the left   pericolic gutter, with surgical drain terminating adjacent. Mildly dilated loops of small bowel with scattered air-fluid levels, most   likely postoperative ileus. Trace pleural effusions and bibasilar airspace disease representing   atelectasis or pneumonia. VL Extremity Venous Bilateral   Final Result      CT CHEST PULMONARY EMBOLISM W CONTRAST   Final Result   Bilateral pulmonary emboli      Scattered opacities throughout the lungs, greatest at the lung bases, with   tiny pleural effusions. Changes are increased compared to prior      Sclerotic osseous metastatic disease as described previously      The findings were sent to the Radiology Results [address] at 3:57   pm on 3/16/2019to be communicated to a licensed caregiver. XR CHEST 1 VW   Final Result   Shallow inflation with findings consistent with basilar atelectasis. Left   pleural thickening versus trace effusion. CT CHEST ABDOMEN PELVIS WO CONTRAST   Final Result   Perforated sigmoid diverticulitis and pneumoperitoneum unchanged.   Stable   left [facility] colonic air-fluid collection may represent an intramural or   extramural abscess or giant diverticulum. Infrarenal abdominal aortic aneurysm and aorto bi-iliac stent. Other incidental findings as above. CT ABDOMEN PELVIS W IV CONTRAST Additional Contrast? None   Final Result   Acute perforated descending colon diverticulitis. There is free air in the   abdomen and there is a small extraluminal gas and fluid collection/abscess   adjacent to the descending colon measuring 4.1 cm in AP diameter and 6 cm in   length. There is diverticulosis of the entire colon. Cholelithiasis. 4.7 cm infrarenal abdominal aortic aneurysm status post endograft repair with   a patent aorto bi-iliac graft. There is suspicion of an endoleak with blush   of contrast enhancement suspected in the inferior aspect of the native   aneurysm. As a noncontrast study was not performed, calcification of the   intraluminal thrombus accounting for the apparent enhancement is not   excluded. 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 Comparison with a noncontrast CT examination would be   helpful. Critical results were called by Dr. Shanae Johnson MD to [facility] on   3/7/2019 at 20:35.              Assessment/Plan:    Active Hospital Problems    Diagnosis Date Noted    Iron deficiency anemia due to chronic blood loss [D50.0]     Pneumonia of left lung due to infectious organism [J18.9]     Cardiomyopathy ([address] [I42.9]     NSVT (nonsustained ventricular tachycardia) ([facility]) [I47.2]     Other pulmonary embolism without acute cor pulmonale ([facility]) [I26.99]     Acute respiratory failure with hypoxia ([address] [J96.01]     H/O colectomy [Z90.49]     Primary malignant neoplasm of lung metastatic to other site (HCC) [C34.90]     Herpes zoster oticus [B02.21]     Hearing loss of right ear [H91.91]     Chemotherapy-induced neutropenia (HCC) [D70.1, T45.1X5A]     Mucositis [K12.30]     Diverticulitis [K57.92] 03/07/2019    Diverticulitis of large intestine with perforation and abscess without bleeding [K57.20]        Acute perforated recurrent diverticulitis/pneumoperitoneum/abscess in setting of immunosuppression due to chemotherapy:   - CT A/P on admission demonstrated acute perforated colon diverticulitis with free air and fluid collection. General surgery consulted. Pt is status post laparoscopic converted to open left colectomy on 3/14/19. CT scan on 3/25/19 showed anastomotic leak at the [facility] site. Pt is now s/p Harding's procedure with colostomy placement on 3/27/19.  - Continue IV Merrem and Vanc. Completed course of Diflucan  - Continue prn pain control.      Acute hypoxic respiratory failure due to bilateral pulmonary emboli and pneumonia:   - CT chest 3/16/19 showed acute PE. Lower extremity dopplers +DVT. Vascular surgery consulted, no indication for IVC filter at this time. - Wean supplemental O2 as tolerated. He is not normally on home O2. He is currently requiring Vapotherm. - Pulmonary re-consulted. Chest CT 4/1 obtained bilateral upper lobe GGO, concerning for multifocal pneumonia. Pt on [facility]. Pt is s/p bronch on 4/3. BAL showed NRF.   - Continue heparin infusion. Plan for Eliquis at discharge. - Continue inhaled bronchodilator therapy. Pulmonary toilet with IS and acapella. - Pt started on IV solumedrol on 4/4/19 per Pulm for possible radiation pneumonitis.      Metastatic lung cancer, stage 4 with bone mets:  - Hem/onc is following. Chemo on hold to allow for surgical healing.      Thrombocytopenia (resolved) now with hypercoagulable state:  - Platelets required preoperatively. Monitor daily CBC.      Neutropenia due to chemotherapy (resolved):  - S/p Neulasta on 3/1. S/p Granix on 3/8.      Anemia, likely due to acute illness:  -  Pt is s/p 1 unit PBRC for Hgb 6.6 on 3/19, 1 unit for Hgb 7.2 on 3/22, 1 unit on 4/2 for Hgb 6.7.  No overt signs of

## 2019-04-05 NOTE — PROGRESS NOTES
Patient called out to come off BIPAP at 10.15pm, RT Zaria Hope called who advised this writer if comfortable to take off BiPAP and commence back to HF 12L. Patient commenced back to HF 12L. Sats 96%. Will continue to monitor.

## 2019-04-05 NOTE — PROGRESS NOTES
[facility] GENERAL SURGERY    Surgery Progress Note           PO    PATIENT NAME: Alan Gaytan     TODAY'S DATE: 4/5/2019    INTERVAL HISTORY:    Eating better today - ostomy functioning. OBJECTIVE:   VITALS:  /66   Pulse 100   Temp 97.5 °F (36.4 °C) (Oral)   Resp 20   Ht 5' 9\" (1.753 m)   Wt 138 lb 14.2 oz (63 kg)   SpO2 91%   BMI 20.51 kg/m²     INTAKE/OUTPUT:    I/O last 3 completed shifts: In: 7391 [P.O.:120; I.V.:924; IV Piggyback:350]  Out: 8181 [Urine:1750; Drains:45; Stool:50]  I/O this shift:  In: -   Out: 5 [Urine:400; Drains:20]              CONSTITUTIONAL:  awake and alert  ABDOMEN:   normal bowel sounds, soft, non-distended, tender, patricio serosanguinous, ostomy functional   INCISION: clean, dry, no drainage    Data:  CBC:   Recent Labs     04/03/19 0451 04/04/19  0532 04/05/19  0425   WBC 11.3* 9.3 7.7   HGB 8.1* 8.3* 7.7*   HCT 24.4* 24.4* 22.6*   * 126* 128*     BMP:    Recent Labs     04/03/19 0451 04/04/19  0532 04/05/19  0425   * 133* 131*   K 3.4* 3.7 4.7   CL 95* 97* 96*   CO2 26 26 26   BUN 7 8 8   CREATININE <0.5* <0.5* <0.5*   GLUCOSE 144* 112* 175*     Hepatic: No results for input(s): AST, ALT, ALB, BILITOT, ALKPHOS in the last 72 hours. Mag:      Recent Labs     04/03/19  0451 04/04/19  0944 04/05/19  0425   MG 1.40* 1.80 1.90      Phos:   No results for input(s): PHOS in the last 72 hours. INR:   Recent Labs     04/02/19  1210   INR 1.40*         ASSESSMENT AND PLAN:  69 y.o. male status post left colectomy, return for fariha's    Doing well from surgery standpoint - continue with diet. Remove patricio prior to d/c   Anemia: stable - monitor. Electronically signed by DEBRA Bates CNP on 4/5/2019 at 11:55 AM     Patient seen and agree with above.     Milton Oliveira MD

## 2019-04-05 NOTE — PROGRESS NOTES
Currently sitting up in chair. Wife not present. Current appliance with secure seal.  Changed yesterday 4/4/19. Stool now formed in bag. Patient states he is eating better. Patient now on High flow oximizer O2 at 28 liters. Instructed patient that samples will be sent to his home. Stoma smaller now. Will change to 2 1/4 two piece flat flange # [id number] and lock and roll bag # [id number].     Call to secure start samples ordered:

## 2019-04-05 NOTE — PROGRESS NOTES
RESPIRATORY THERAPY ASSESSMENT    Name:  Huong Santiago Record Number:  [id number]  Age: 69 y.o. Gender: male  : 1945  Today's Date:  2019  Room:  [address] [place], [place] [address]    Assessment     Is the patient being admitted for a COPD or Asthma exacerbation? No   (If yes the patient will be seen every 4 hours for the first 24 hours and then reassessed)    Patient Admission Diagnosis      Allergies  No Known Allergies    Minimum Predicted Vital Capacity:     993          Actual Vital Capacity:      1000              Pulmonary History:former smoker  Home Oxygen Therapy:  room air  Home Respiratory Therapy:None   Current Respiratory Therapy:  Duoneb txs Q4H w/a  Treatment Type: HHN, Vibratory Mucous Clearing Therapy or Intervention  Medications: Albuterol/Ipratropium    Respiratory Severity Index(RSI)   Patients with orders for inhalation medications, oxygen, or any therapeutic treatment modality will be placed on Respiratory Protocol. They will be assessed with the first treatment and at least every 72 hours thereafter. The following severity scale will be used to determine frequency of treatment intervention. Smoking History: Pulmonary Disease or Smoking History, Greater than 15 pack year = 2    Social History  Social History     Tobacco Use    Smoking status: Former Smoker     Packs/day: 0.00     Years: 0.00     Pack years: 0.00     Last attempt to quit: 2003     Years since quittin.2    Smokeless tobacco: Never Used   Substance Use Topics    Alcohol use:  Yes     Alcohol/week: 10.8 oz     Types: 18 Shots of liquor per week     Comment: social-used to drink alot     Drug use: No       Recent Surgical History: None = 0  Past Surgical History  Past Surgical History:   Procedure Laterality Date    ARTERIAL ANEURYSM REPAIR  2010    right popliteal artery aneurysm repair    BRONCHOSCOPY  2019    left endobronchial tumor    BRONCHOSCOPY N/A 2019    BRONCHOSCOPY ALVEOLAR LAVAGE performed by Kate Alcantar MD at [address] [place] [address]  1/18/2019    BRONCHOSCOPY/TRANSBRONCHIAL LUNG BIOPSY performed by Kate Alcantar MD at [address] [place] [address] N/A 1/23/2019    EBUS ULTRASOUND W/ ANESTHESIA AND NEEDLE BIOPSY performed by Sneha Estrada MD at [address] [place] [address]  1/23/2019    BRONCHOSCOPY ALVEOLAR LAVAGE performed by Sneha Estrada MD at [address] [place] [address]  1/23/2019    BRONCHOSCOPY BIOPSY BRONCHUS performed by Sneha Estrada MD at [address] [place] [address]  1/23/2019    BRONCHOSCOPY BRUSHINGS performed by Sneha Estrada MD at [address] [place] [address]  1/23/2019    BRONCHOSCOPY/TRANSBRONCHIAL NEEDLE BIOPSY performed by Sneha Estrada MD at [address] [place] [address]  1/23/2019    BRONCHOSCOPY/TRANSBRONCHIAL NEEDLE BIOPSY ADDL LOBE performed by Sneha Estrada MD at [address] [place] [address] N/A 4/3/2019    BRONCHOSCOPY ALVEOLAR LAVAGE performed by Sneha Estrada MD at [address] [place] [address]  4/3/2019    BRONCHOSCOPY THERAPUTIC ASPIRATION INITIAL performed by Sneha Estrada MD at [facility]  10/08/01 12/20/04    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    COLONOSCOPY  4/4/14    wnl    CORONARY ANGIOPLASTY WITH STENT PLACEMENT      FINGER FRACTURE SURGERY      left middle finger    HEMICOLECTOMY N/A 3/14/2019    LAPAROSCOPIC CONVERT TO OPEN SIGMOID COLECTOMY performed by Rehana Rodriguez MD at [address] / REMOVAL / [address] Right 1/23/2019    RIGHT SUBCLAVIAN VEIN PORT A CATH PLACEMENT performed by Nancy Ingram MD at [employer] N/A 3/27/2019    EXPLORATORY LAPAROTOMY, SIGMOID COLECTOMY, COLOSTOMY performed by Rehana Rodriguez MD at [facility]  2/10/2014    BX duodenum, atrum, distal esophagus    UPPER GASTROINTESTINAL ENDOSCOPY  4/4/14    BX will be held for heart rate (HR) greater than 140 beats per minute, pending direction from physician. 3. Bronchodilators will be administered via Metered Dose Inhaler (MDI) with spacer when the following criteria are met:  a. Alert and cooperative     b. HR < 140 bpm  c. RR < 30 bpm                d. Can demonstrate a 2-3 second inspiratory hold  4. Bronchodilators will be administered via Hand Held Nebulizer [facility]) to patients when ANY of the following criteria are met  a. Incognizant or uncooperative          b. Patients treated with HHN at Home        c. Unable to demonstrate proper use of MDI with spacer     d. RR > 30 bpm   5. Bronchodilators will be delivered via Metered Dose Inhaler (MDI), HHN, Aerogen to intubated patients on mechanical ventilation. 6. Inhalation medication orders will be delivered and/or substituted as outlined below. Aerosolized Medications Ordering and Administration Guidelines:    1. All Medications will be ordered by a physician, and their frequency and/or modality will be adjusted as defined by the patients Respiratory Severity Index (RSI) score. 2. If the patient does not have documented COPD, consider discontinuing anticholinergics when RSI is less than 9.  3. If the bronchospasm worsens (increased RSI), then the bronchodilator frequency can be increased to a maximum of every 4 hours. If greater than every 4 hours is required, the physician will be contacted. 4. If the bronchospasm improves, the frequency of the bronchodilator can be decreased, based on the patient's RSI, but not less than home treatment regimen frequency. 5. Bronchodilator(s) will be discontinued if patient has a RSI less than 9 and has received no scheduled or as needed treatment for 72  Hrs. Patients Ordered on a Mucolytic Agent:    1. Must always be administered with a bronchodilator.     2. Discontinue if patient experiences worsened bronchospasm, or secretions have lessened to the point that the patient is able to clear them with a cough. Anti-inflammatory and Combination Medications:    1. If the patient lacks prior history of lung disease, is not using inhaled anti-inflammatory medication at home, and lacks wheezing by examination or by history for at least 24 hours, contact physician for possible discontinuation.

## 2019-04-05 NOTE — ONCOLOGY
ONCOLOGY HEMATOLOGY CARE PROGRESS NOTE      SUBJECTIVE:     Afebrile. On Vapotherm 24 LPM.  General diet. Wife is looking at [facility]. ROS:   The remaining 10 point review of symptoms is unremarkable. OBJECTIVE        Physical    VITALS:  BP (!) 143/81   Pulse 87   Temp 97.6 °F (36.4 °C) (Oral)   Resp 20   Ht 5' 9\" (1.753 m)   Wt 138 lb 14.2 oz (63 kg)   SpO2 95%   BMI 20.51 kg/m²   TEMPERATURE:  Current - Temp: 97.6 °F (36.4 °C); Max - Temp  Av.9 °F (36.6 °C)  Min: 97.6 °F (36.4 °C)  Max: 98.1 °F (36.7 °C)  PULSE OXIMETRY RANGE: SpO2  Av.3 %  Min: 91 %  Max: 98 %  24HR INTAKE/OUTPUT:      Intake/Output Summary (Last 24 hours) at 2019 0714  Last data filed at 2019 0448  Gross per 24 hour   Intake 1394 ml   Output 1845 ml   Net -451 ml       CONSTITUTIONAL:  awake, alert, cooperative, no apparent distress, HEENT oral pharynx , no scleral icterus, wearing high flow nasal canula   HEMATOLOGIC/LYMPHATICS:  no cervical lymphadenopathy, no supraclavicular lymphadenopathy, no axillary lymphadenopathy and no inguinal lymphadenopathy  LUNGS:  No increased work of breathing, good air exchange, clear to auscultation bilaterally, no crackles or wheezing  CARDIOVASCULAR:  , regular rate and rhythm, normal S1 and S2, no S3 or S4, and no murmur noted  ABDOMEN: Absent BS.  + colostomy (empty). Red stoma    Back: no bruising, purpura, or hematomas noted   MUSCULOSKELETAL:  There is no redness, warmth, or swelling of the joints. EXTREMETIES: BLE edema +1, non pitting   NEUROLOGIC:  Awake, alert, oriented to name, place and time. Cranial nerves II-XII are grossly intact. Motor is 5 out of 5 bilaterally.  Hip flexion 5/5 bilaterally in the bed     Data      Recent Labs     19  0451 19  0532 19  0425   WBC 11.3* 9.3 7.7   HGB 8.1* 8.3* 7.7*   HCT 24.4* 24.4* 22.6*   * 126* 128*   MCV 86.1 85.9 85.5        Recent Labs     19  0457 04/04/19  0532 04/05/19  0425   * 133* 131*   K 3.4* 3.7 4.7   CL 95* 97* 96*   CO2 26 26 26   BUN 7 8 8   CREATININE <0.5* <0.5* <0.5*     No results for input(s): AST, ALT, ALB, BILIDIR, BILITOT, ALKPHOS in the last 72 hours.     Magnesium:    Lab Results   Component Value Date    MG 1.90 04/05/2019    MG 1.80 04/04/2019    MG 1.40 04/03/2019         Problem List  Patient Active Problem List   Diagnosis    Elevated fasting glucose    CAD (coronary artery disease)    PAD (peripheral artery disease) ([address]    Essential hypertension    Mixed hyperlipidemia    Sprain of right foot    Sprain of anterior talofibular ligament of right ankle    Closed nondisplaced fracture of fifth right metatarsal bone    Closed fracture of proximal lateral malleolus of ankle with routine healing    Right foot pain    Right ankle pain    Nondisplaced fracture of fifth right metatarsal bone with routine healing    History of pulmonary emphysema    Chronic obstructive pulmonary disease with (acute) exacerbation (HCC)    Pulmonary nodule, left    Abnormal bone scan of thoracic spine    Back pain    Visit for monitoring Plavix therapy    History of ventricular tachycardia    Gallstones    Hemoptysis    Former smoker    Elevated PSA    Adenocarcinoma of left lung ([address]    Acute diverticulitis    Pancytopenia ([address]    Diverticulitis of large intestine with perforation and abscess without bleeding    Diverticulitis    Chemotherapy-induced neutropenia (HCC)    Mucositis    Herpes zoster oticus    Hearing loss of right ear    Acute respiratory failure with hypoxia ([address]    H/O colectomy    Primary malignant neoplasm of lung metastatic to other site [facility])    NSVT (nonsustained ventricular tachycardia) ([address]    Other pulmonary embolism without acute cor pulmonale (HCC)    Cardiomyopathy (HCC)    Pneumonia of left lung due to infectious organism    Iron deficiency anemia due to chronic blood loss ASSESSMENT AND PLAN    Acute Perforated descending colon diverticulitis (not immune mediated colitis) with free air in the abd   - per gen surgery , s/p Laparoscopic converted to open left Colectomy, 3/14/2019, with Dr. Abril Muñoz  - CT scan done 3/25 with anastomotic leak at the [facility] site. - Status post Gerda procedure on 3/27/2019 with Dr. Abril Muñoz. HCAP  - Vanco/Rolando. - Status post bronchoscopy on 4/03/2019. No endobronchial lesions. Cultures are pending.  - Started Methylprednisolone 40 mg q12 hrs on 4/04/2019 in case of pneumonitis, as the bronchoscopy did not show obviously purulent secretions. Adenocarcinoma of the LLL of the lung, stage IV with bone mets   - Will hold chemo until surgically healed. - Cycle 3 carbo/alimta Anitha Bairse was due on 3/22- held due to acute issues, cont to hold. Plan to resume [facility] only outpatient.   - CT chest/abd/pelvis, 3/10/2019, showed improvement in the left hilar soft tissue and radiation changes. No obvious new disease. Not hospice appropriate at this time from an oncology standpoint. PE  -Diagnosed on 3/16/2019 with bilateral PEs involving the right and left main pulm arteries.   -Also had thrombus in the right femoral, peroneal, greater saphenous, short saphenous veins and in the left peroneal veins.  -Plan Eliquis on discharge.   -Will need lifelong anticoagulation.  -Does not need a filter per Dr. Swapnil Roldan on 3/18   -Started on a heparin drip on 3/31/2019. Can switch to a NOAC at discharge. Nutrition  - Now tolerating a general diet. Neutropenia secondary to chemo  - Granix stopped 3/11.  - Resolved. Thrombocytopenia secondary to chemo  -Resolved. Lytes  - Replete K PRN. Anemia, possible GI bleed on hep drip  - EGD 3/21 negative.   - PPI added, now BID dosing per GI 3/21.  - CT AP 3/21 - no signs of bleeding.  - due for B12 shot - given 3/19/2019 - required every 9 weeks with this chemo.       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 May need to resume- hospitalist is speaking with vascular surgery for clarification. (Dr. Buddy Peters)      ONCOLOGIC DISPOSITION:  - LTAC in the near future?     Malou Raphael, MICHAEL   [facility]  Perfect Serve

## 2019-04-05 NOTE — PROGRESS NOTES
Physical Therapy  Facility/Department: [place] [address] [facility]  Daily Treatment Note  NAME: Kevin Morrison  : 1945  MRN: [id number]    Date of Service: 2019    Discharge Recommendations:  (LTAC)   PT Equipment Recommendations  Equipment Needed: No(defer to rehab)    Patient Diagnosis(es): The primary encounter diagnosis was Diverticulitis of large intestine with perforation and abscess without bleeding. Diagnoses of Leaking abdominal aortic aneurysm (AAA) ([address] Abdominal pain, left lower quadrant, and Chemotherapy-induced neutropenia ([address] were also pertinent to this visit. has a past medical history of Benign neoplasm of colon, Cancer ([address] Diverticulosis of colon (without mention of hemorrhage), Elevated fasting glucose, Former cigarette smoker, Herpes simplex viral infection, Hyperlipidemia, Hypertension, MI, old, 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[address] and Right popliteal artery occlusion ([address] has a past surgical history that includes Arterial aneurysm repair (2010); Finger fracture surgery; Coronary angioplasty with stent; Upper gastrointestinal endoscopy (2/10/2014); Colonoscopy (10/08/01 12/20/04); Colonoscopy (14); Upper gastrointestinal endoscopy (14); Upper gastrointestinal endoscopy (11/4/15); bronchoscopy (2019); bronchoscopy (N/A, 2019); bronchoscopy (2019); INSERTION / REMOVAL / REPLACEMENT VENOUS ACCESS CATHETER (Right, 2019); bronchoscopy (N/A, 2019); bronchoscopy (2019); bronchoscopy (2019); bronchoscopy (2019); bronchoscopy (2019); bronchoscopy (2019); hemicolectomy (N/A, 3/14/2019); Upper gastrointestinal endoscopy (N/A, 3/21/2019); LAPAROTOMY EXPLORATORY (N/A, 3/27/2019); bronchoscopy (N/A, 4/3/2019); and bronchoscopy (4/3/2019).     Restrictions  Restrictions/Precautions  Restrictions/Precautions: General Precautions, Fall Risk  Position Activity Restriction  Other position/activity restrictions: Medium fall risk per nursing assessment, up with assistance, vapotherm 28L at 85%, telemetry, continuous pulse oximeter  Subjective   General  Chart Reviewed: Yes  Additional Pertinent Hx: Stage IV lung CA with bone mets  Response To Previous Treatment: Patient with no complaints from previous session. Family / Caregiver Present: Yes(dtr)  Referring Practitioner: Dr. Shankar Espitia: Pt agreeable  Pain Screening  Patient Currently in Pain: Denies  Vital Signs  Patient Currently in Pain: Denies       Orientation  Orientation  Overall Orientation Status: Within Functional Limits       Objective   Bed mobility  Rolling to Left: Minimal assistance;Contact guard assistance  Supine to Sit: Minimal assistance;Contact guard assistance  Transfers  Sit to Stand: Minimal Assistance;Contact guard assistance  Stand to sit: Minimal Assistance;Contact guard assistance  Bed to Chair: Minimal assistance(with RW)  Ambulation  Ambulation?: Yes  Ambulation 1  Surface: level tile  Device: Rolling Walker  Other Apparatus: (vapotherm)  Assistance: Contact guard assistance;Minimal assistance  Quality of Gait: Unsteady but no LOB, small shuffling steps with minimal foot clearance B  Distance: 3 feet forward and back, limited by vapotherm (done twice)        Exercises  Quad Sets: x15 BLE  Heelslides: X 10 B supine  Gluteal Sets: X 10 B  Hip Flexion: X 15 BLE seated  Hip Abduction: X 15 B supine  Ankle Pumps: X 15 BLE  Comments: SpO2 drops at EOB  and after each walk in to mid ot upper 80's, breaks given to reconver to mid 90's prior to resuming activity. Assessment   Body structures, Functions, Activity limitations: Decreased functional mobility ; Decreased strength;Decreased balance;Decreased endurance  Assessment: Pt amb at bedside 3 feet forward and back, twice, limited by vapotherm. SpO2 dropping after each and rest given to recover. Ex done in bed.   Con't here per POC and recs for LTAC at D/C  Treatment Diagnosis: Decreased

## 2019-04-05 NOTE — PROGRESS NOTES
Speech Language Pathology  Facility/Department: [facility]  Dysphagia Daily Treatment Note    NAME: Ibrahima Calvert  : 1945  MRN: [id number]    Patient Diagnosis(es):   Patient Active Problem List    Diagnosis Date Noted    Iron deficiency anemia due to chronic blood loss     Pneumonia of left lung due to infectious organism     Cardiomyopathy ([address]     NSVT (nonsustained ventricular tachycardia) ([address]     Other pulmonary embolism without acute cor pulmonale (HCC)     Acute respiratory failure with hypoxia ([address]     H/O colectomy     Primary malignant neoplasm of lung metastatic to other site [facility])     Herpes zoster oticus     Hearing loss of right ear     Chemotherapy-induced neutropenia (HCC)     Mucositis     Diverticulitis 2019    Diverticulitis of large intestine with perforation and abscess without bleeding     Acute diverticulitis 2019    Pancytopenia ([address] 2019    Adenocarcinoma of left lung ([address] 2019    Gallstones 2019    Hemoptysis 2019    Former smoker 2019    Elevated PSA 2019    Back pain 2019    Visit for monitoring Plavix therapy     History of ventricular tachycardia     History of pulmonary emphysema 2019    Chronic obstructive pulmonary disease with (acute) exacerbation ([address] 2019    Pulmonary nodule, left 2019    Abnormal bone scan of thoracic spine 2019    Nondisplaced fracture of fifth right metatarsal bone with routine healing 2018    Sprain of right foot 2018    Sprain of anterior talofibular ligament of right ankle 2018    Closed nondisplaced fracture of fifth right metatarsal bone 2018    Closed fracture of proximal lateral malleolus of ankle with routine healing 2018    Right foot pain 2018    Right ankle pain 2018    Essential hypertension 2017    Mixed hyperlipidemia 2017    PAD (peripheral artery disease)

## 2019-04-05 NOTE — PROGRESS NOTES
Nutrition Assessment    Type and Reason for Visit: Reassess    Nutrition Recommendations:   · Continue general diet, free of therapeutic restrictions  · Re-start Ensure ONS  · Monitor and encourage PO intake  · Please obtain/record standing scale weight  · Monitor nutrition adequacy, weights, pertinent labs, BMs and clinical progress      Nutrition Assessment: Follow up:Pt nutrition status improved diet advancement to general, with PO intakes 51-75%, per EMR. Continues at risk for decline related to overall poor intakes this admission, increased nutrient needs for catabolic illness and stage 2 pressure wound. Will re start Ensure, pt is favorable. Pt reports appetite continues to improve. + Ostomy output. Denies any nutrition needs at this time. Will continue to monitor nutrition status. Malnutrition Assessment:  · Malnutrition Status: Meets the criteria for severe malnutrition  · Context: Acute illness or injury  · Findings of the 6 clinical characteristics of malnutrition (Minimum of 2 out of 6 clinical characteristics is required to make the diagnosis of moderate or severe Protein Calorie Malnutrition based on AND/ASPEN Guidelines):  1. Energy Intake-Less than or equal to 75% of estimated energy requirement, Greater than or equal to 1 month    2. Weight Loss-20% loss or greater, in 1 month  3. Fat Loss-Unable to assess,    4. Muscle Loss-Unable to assess, Thigh (quadriceps), Clavicles (pectoralis and deltoids)  5. Fluid Accumulation-No significant fluid accumulation,    6.   Strength-Not measured    Nutrition Risk Level: High    Nutrient Needs:  · Estimated Daily Total Kcal: 3209-0557  · Estimated Daily Protein (g): 109-131  · Estimated Daily Total Fluid (ml/day): 1 mL/kcal     Nutrition Diagnosis:   · Problem: Severe malnutrition  · Etiology: related to Insufficient energy/nutrient consumption, Alteration in GI function, Increased demand for energy/nutrients     Signs and symptoms:  as evidenced by Intake 0-25%, Intake 25-50%, Weight loss    Objective Information:  · Nutrition-Focused Physical Findings: +50 mL ostomy x 24 hrs. abdominal incision  · Wound Type: Stage II, Pressure Ulcer  · Current Nutrition Therapies:  · Oral Diet Orders: General   · Oral Diet intake: 51-75%  · Oral Nutrition Supplement (ONS) Orders: None  · Anthropometric Measures:  · Ht: 5' 9\" (175.3 cm)   · Current Body Wt: 138 lb (62.6 kg)(question accuracy)  · Admission Body Wt: 191 lb (86.6 kg)  · Usual Body Wt: 194 lb (88 kg)(in january )  · % Weight Change:  ,  -12 lbs (6.2% wt loss) x past 2 months.    · Ideal Body Wt: 160 lb (72.6 kg)   · BMI Classification: BMI 25.0 - 29.9 Overweight    Nutrition Interventions:   Continue current diet, Start ONS  Continued Inpatient Monitoring    Nutrition Evaluation:   · Evaluation: Progressing toward goals   · Goals: Pt will tolerate diet advancement per General surgery     · Monitoring: Nutrition Progression, Meal Intake, Supplement Intake, Diet Tolerance, Weight, Pertinent Labs      Electronically signed by Eric Bautista RD, LD on 4/5/19 at 12:19 PM    Contact Number: Office: [phone]; [address]: [address]

## 2019-04-05 NOTE — PROGRESS NOTES
04/05/19 1604   NIV Type   Equipment Type v60   Mode BIPAP   Mask Type Full face mask   Mask Size Large   Settings/Measurements   Comfort Level Good   Using Accessory Muscles No   IPAP 12 cmH20   EPAP 6 cmH2O   Rate Ordered 4   Resp 17   SpO2 96   FiO2  60 %   Vt Exhaled 597 mL   Mask Leak (lpm) 43 lpm   Skin Protection for O2 Device No   Alarm Settings   Alarms On Y   Press Low Alarm 6 cmH2O   High Pressure Alarm 30 cmH2O   Apnea (secs) 20 secs   Resp Rate Low Alarm 6   High Respiratory Rate 45 br/min   Oxygen Therapy/Pulse Ox   O2 Therapy Oxygen   O2 Device PAP (positive airway pressure)

## 2019-04-05 NOTE — PROGRESS NOTES
[address]   Daily Progress Note    Admit Date:  3/7/2019  HPI:    Chief Complaint   Patient presents with    Abdominal Pain     LLQ pain, was seen last week for Diverticulitis, patient states he cannot get rid of the pain, had Chemo on Friday        Rick Murrell is a 69 y.o. male who was admitted 3/7/2019 for LLQ pain. Treated for recurrent episode of sigmoid diverticulitis. 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 Underwent open left colectomy for perforated diverticulitis 3/14/19. Cardiology consulted for NSVT on 3/16/19. Hx CAD/ PCI to RCA and LAD, VT s/p ICD on Amio, PAD s/p AAA repair [facility]). Increased shortness of breath, CT showed bilateral PEs, treated with heparin. Returned to OR for anastomotic leak 3/27/19, s/p Gerda's procedure. Increased O2 requirements on 3/31/19, CXR and then CT chest concerning for HCAP. Bronchoscopy 4/3/19 with mucous plugs, no other acute abnormality. Given Lasix 40 mg IVP. Subjective:  Mr. Reagan Pro awake alert, denies chest pain or any pain, no palpitations. Admits to some dizziness, variable, after am meds. Objective:   /72   Pulse 92   Temp 97.3 °F (36.3 °C) (Oral)   Resp 20   Ht 5' 9\" (1.753 m)   Wt 138 lb 14.2 oz (63 kg)   SpO2 91%   BMI 20.51 kg/m²       Intake/Output Summary (Last 24 hours) at 4/5/2019 0916  Last data filed at 4/5/2019 0448  Gross per 24 hour   Intake 1394 ml   Output 1845 ml   Net -451 ml     Wt Readings from Last 3 Encounters:   04/05/19 138 lb 14.2 oz (63 kg)   02/28/19 184 lb 9.6 oz (83.7 kg)   02/19/19 183 lb (83 kg)         ASSESSMENT:   1. NSVT - increased overnight, Amio 200 mg qd, keep K >4, Mag >2  2. CAD - s/p PCI to RCA and LAD 2011, last [facility] 2016 without obstructive dz; on statin, BB, ASA  3.  Cardiomyopathy, unknown etiology - 30-35% by echo, on BB, statin, ASA; ACEi and Leighton Ant stopped due to MAHSA  4. PE, large bilateral - on IV Heparin, plan to eventually transition to DOAC (Eliquis) 5. Perforated divertic, s/p sigmoid colectomy 3/14/19, s/p Gerda's procedure 3/29 for anastomotic leak, good po intake  6. Metastatic lung cancer with mets - per hem/onc, stable  7. AAA s/p endograft repair [facility], 2011) - IV Heparin, ASA  8. Anemia - s/p 1 uPRBC's 4/2/19 (total of 3 this admit), stable  9. PNA - increased O2 requirements, CT chest with PNA, s/p bronch, started on IV steroids      PLAN:  1. Keep K >4, Mag >2  2. Increase Toprol to 50 mg bid  3.  Lasix 20 mg IV X 1 dose today    DEBRA Martinez - CNP, 4/5/2019, 9:16 AM  [address]   [phone]       Telemetry: SR 's, NSVT 4-6 beats   NYHA: III    Physical Exam:  General:  Awake, alert, NAD  Skin:  Warm and dry  Neck:  JVP 8 cm  Chest:  Bibasilar rales posteriorly L >R  Cardiovascular:  RRR, normal N4Y8, + systolic murmur, no g/r  Abdomen:  Soft, nontender, +bowel sounds, + gas and stool in colostomy bag  Extremities:  No BLE edema      Medications:    methylPREDNISolone  40 mg Intravenous Q12H    vancomycin  1,000 mg Intravenous Q12H    sodium chloride flush  10 mL Intravenous Q12H    ipratropium-albuterol  1 ampule Inhalation Q4H WA    potassium chloride  10 mEq Oral 4x Daily    zinc oxide  1 applicator Topical Daily    acyclovir  800 mg Oral TID    metoprolol succinate  50 mg Oral Daily    meropenem  1 g Intravenous Q8H    amiodarone  200 mg Oral Daily    pantoprazole  40 mg Intravenous Daily    aspirin  81 mg Oral Daily    sodium chloride flush  10 mL Intravenous 2 times per day    simvastatin  40 mg Oral Nightly    folic acid  1 mg Oral Daily    oyster shell calcium/vitamin D  1 tablet Oral Daily      heparin (porcine) 16.8 mL/hr (04/04/19 1919)    dextrose 100 mL/hr (03/08/19 1700)       Lab Data: Lab results independently reviewed by myself 4/2/19   CBC:   Recent Labs     04/03/19  0451 04/04/19  0532 04/05/19  0425   WBC 11.3* 9.3 7.7   HGB 8.1* 8.3* 7.7*   * 126* 128*     BMP:    Recent Labs     04/03/19  0451 04/04/19  0532 04/05/19  0425   * 133* 131*   K 3.4* 3.7 4.7   CO2 26 26 26   BUN 7 8 8   CREATININE <0.5* <0.5* <0.5*     INR:    Recent Labs     04/02/19  1210   INR 1.40*     BNP:  No results for input(s): PROBNP in the last 72 hours. Cardiac Enzymes: No results for input(s): TROPONINI in the last 72 hours. Lipids:   Lab Results   Component Value Date    TRIG 178 05/22/2018    TRIG 183 11/21/2017    HDL 39 05/22/2018    HDL 46 11/21/2017    HDL 31 12/28/2011    HDL 31 05/17/2011    LDLCALC 72 05/22/2018    LDLCALC 97 11/21/2017       Cardiac Imaging:    Echo 3/19/2019:  Definity contrast was used, but port not working properly. The left ventricular systolic function is moderately reduced with an ejection fraction of 30-35%. Inferolateral hypokinesia. Mid anteroseptal, apical lateral and apical septal akinesia. There is mild concentric left ventricular hypertrophy. Normal left ventricular filling pressure. Left ventricular cavity size is normal.   Mild thickening of the anterior leaflet of mitral valve. Mild mitral regurgitation. The left atrium is severely dilated. CARDIAC CATH MAR 2016 [facility])   Summary Comments:    3 Vessel CAD  Mild LV systolic dysfunction  Patent mid and distal LAD stents  Patent proximal, mid, and distal RCA stents  Normal LVEDP  Normal systemic pressures   Plan:  Cardiac risk factor modification. Treat with medical therapy.     Findings/Summary:  CORONARY ANGIOGRAPHY FINDINGS  DOMINANCE:  Right dominant        LEFT MAIN: Luminal irregularities                       LEFT ANTERIOR DESCENDING:  Luminal irregularities, 30 % mid lesion, patent mid and distal overlapping stents                   LEFT CIRCUMFLEX: Luminal irregularities    20-30% mid lesion, OM1: small caliber vessel with severe diffuse disease                  RIGHT CORONARY:  Stenotic, overlapping proximal, mid and distal stents with mid focal 50% in-stent restenosis, diffuse distal 50% stenosis, RPAVGA: distal 70-80% stenosis                LEFT VENTRICULAR FUNCTION:        LVEF: 40-45%        LVEDP: Normal pre-angio        LV Systolic Pressure: Normal         LV to AO Gradient: none         LV Wall Motion:  Abnormal, inferior basal severe hypokinesis    .        MITRAL VALVE: No mitral insufficiency             STRESS MPI MAR 2014: Interpetation Summary:  The LV EF is 47%  There is mild inferior wall hypokinesis. There is a medium sized, fixed defect in the inferior wall. There is a small sized, reversible defect in the anterior wall. There is a medium sized, partially reversible defect in the anteroapical wall. 1.Negative ECG for ischemia with pharmocologic stress. 2.Positive nuclear stress imaging suggestive of inducible ischemia in the anteroapical wall.

## 2019-04-06 NOTE — PROGRESS NOTES
Assumed care for Houston Samuels at 0501. Pt alert and oriented, VSS. Pt on Vapotherm 32lpm, 100% FiO2, RR 20. Pt with surgical incisions, DIAN drain, colostomy to abd. Pt denies pain, denies needs. Daughter at bedside. Bed locked and in lowest position, call light within reach. Encouraging pt to reposition himself in bed, pt verbalized understanding. Will continue to monitor.

## 2019-04-06 NOTE — PROGRESS NOTES
INPATIENT PULMONARY CRITICAL CARE PROGRESS NOTE      Reason for visit: Patient seen by us earlier during this hospitalization for acute respiratory failure related to pulmonary embolism, also had DVT. We had signed off as the patient was stable. Repeat consult placed by Dr. Nay Thomson for possible HCAP. SUBJECTIVE: Patient apparently had increasing hypoxemia when he was sleeping and the Fio2 had to be increased from 85% to  100% oxygen via the vaoptherm and the oxygen was increased from 28 to 32Lwith saturation of 94% , patient was afebrile and hemodynamically maintained, patient does not have any increased cough/expectoration/sob/wheezing/chest pain;patient also had multiple runs of VTach last night which were as long as 41 beats as seen on the telemetry strips  , patient was having a cumulative fluid balance of -8.4 L, patient's blood sugar are suboptimally controlled but better than yesterday  , patient was alert and communicative, no other pertinent review of system of concern      Physical Exam:  Blood pressure 121/71, pulse 92, temperature 98.6 °F (37 °C), temperature source Oral, resp. rate 20, height 5' 9\" (1.753 m), weight 140 lb 6.9 oz (63.7 kg), SpO2 95 %.'   Constitutional:  Ill-appearing, sitting up in a chair,underweight. No acute distress. HENT:  Oropharynx is clear and dry. Eyes:  Conjunctivae are normal. Pupils equal, round, and reactive to light. No scleral icterus. Neck: No JVD. No tracheal deviation present. No obvious thyroid mass. Cardiovascular: Normal rate, regular rhythm, normal heart sounds. No right ventricular heave. No lower extremity edema. Pulmonary/Chest: Diminished air entry left hemithorax, B/L  scattered crackles. No accessory muscle usage or stridor. Abdominal: Soft, distended, no tenderness. Colostomy present    Musculoskeletal: No cyanosis. No clubbing. No obvious joint deformity. Lymphadenopathy: No cervical adenopathy    Skin: Skin is warm and dry.  No rash or nodules on the exposed extremities. Psychiatric: Normal mood and affect. Behavior is normal.  No anxiety. Neurologic: Alert, awake and oriented. PERRL. Speech fluent        Results:  CBC:   Recent Labs     04/04/19  0532 04/05/19 0425 04/06/19 0615   WBC 9.3 7.7 10.7   HGB 8.3* 7.7* 7.5*   HCT 24.4* 22.6* 22.4*   MCV 85.9 85.5 85.7   * 128* 128*     BMP:   Recent Labs     04/04/19  0532 04/05/19  0425 04/06/19 0615   * 131* 134*   K 3.7 4.7 4.3   CL 97* 96* 99   CO2 26 26 25   BUN 8 8 9   CREATININE <0.5* <0.5* <0.5*     APTT:   Recent Labs     04/04/19  1315 04/05/19 0425 04/06/19 0615   APTT 60.4* 86.3* 75.9*       Imaging:  I have reviewed radiology images personally. CT CHEST WO CONTRAST   Final Result   1. Bilateral upper lobe ground-glass opacities, left greater than right, with   developing left perihilar consolidation. Findings are most concerning for   multifocal pneumonia with asymmetric pulmonary edema a less likely   possibility. 2. Small bilateral pleural effusions which are slightly increased from   03/26/2019.   3. Trace ascites in the upper abdomen. 4. Sclerotic metastasis in the T6 vertebral body. 5. Coronary atherosclerosis. XR CHEST PORTABLE   Final Result   Nonspecific consolidation mid and lower left lung with left pleural effusion   presumably related to sequela from pulmonary edema. Pneumonia is a   consideration. Calcific atherosclerosis aorta. Cardiomegaly. XR ABDOMEN (KUB) (SINGLE AP VIEW)   Final Result   Dilated small bowel, may reflect ileus or obstruction. Recommend follow-up   study. CT ABDOMEN PELVIS WO CONTRAST Additional Contrast? None   Final Result   Interval increase in size of the left lower quadrant collection which now   measures up to 7.8 cm. Administered rectal contrast extends into the   collection. Right anterior approach drain courses adjacent to the collection.       No significant change in pleural effusions and associated airspace disease. The findings were sent to the Radiology Results [address] at 12:53   pm on 3/26/2019to be communicated to a licensed caregiver. FL BARIUM ENEMA   Final Result   Active extravasation of contrast from the sigmoid colon, compatible with   anastomotic leak. Please refer to accompanying CT. Diverticulosis. CT ABDOMEN PELVIS W IV CONTRAST Additional Contrast? Radiologist Recommendation   Final Result   1. The patient is 11 days status post partial sigmoid colectomy. Surgical   drain remains in place with a small ill-defined fluid collection that is   stable from prior exam.  This does not appear to represent an abscess and may   represent postoperative hematoma or seroma. 2. There is a new collection of free intraperitoneal air immediately anterior   to the prior anastomosis site, contained within the left lower quadrant. This indicates an anastomosis leak. No distant free air. 3. 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 4. Cholelithiasis with no evidence of acute cholecystitis. 5. Worsening small pleural effusions and airspace changes in the lower chest.   Pattern may represent underlying pulmonary edema or perhaps pneumonitis. XR CHEST PORTABLE   Final Result   Interval development of mild pulmonary edema. There is a small left-sided pleural effusion with more focal consolidation   the left lung base, atelectasis versus pneumonia. IR PICC WO SQ PORT/PUMP > 5 YEARS   Final Result   Successful placement of PICC line. CT ABDOMEN PELVIS WO CONTRAST Additional Contrast? None   Final Result   No evidence retroperitoneal hematoma. Interval colonic resection. Small volume loculated collection in the left   pericolic gutter, with surgical drain terminating adjacent.       Mildly dilated loops of small bowel with scattered air-fluid levels, most   likely postoperative ileus.      Trace pleural effusions and bibasilar airspace disease representing   atelectasis or pneumonia. VL Extremity Venous Bilateral   Final Result      CT CHEST PULMONARY EMBOLISM W CONTRAST   Final Result   Bilateral pulmonary emboli      Scattered opacities throughout the lungs, greatest at the lung bases, with   tiny pleural effusions. Changes are increased compared to prior      Sclerotic osseous metastatic disease as described previously      The findings were sent to the Radiology Results [address] at 3:57   pm on 3/16/2019to be communicated to a licensed caregiver. XR CHEST 1 VW   Final Result   Shallow inflation with findings consistent with basilar atelectasis. Left   pleural thickening versus trace effusion. CT CHEST ABDOMEN PELVIS WO CONTRAST   Final Result   Perforated sigmoid diverticulitis and pneumoperitoneum unchanged. Stable   left Emerita colonic air-fluid collection may represent an intramural or   extramural abscess or giant diverticulum. Infrarenal abdominal aortic aneurysm and aorto bi-iliac stent. Other incidental findings as above. CT ABDOMEN PELVIS W IV CONTRAST Additional Contrast? None   Final Result   Acute perforated descending colon diverticulitis. There is free air in the   abdomen and there is a small extraluminal gas and fluid collection/abscess   adjacent to the descending colon measuring 4.1 cm in AP diameter and 6 cm in   length. There is diverticulosis of the entire colon. Cholelithiasis. 4.7 cm infrarenal abdominal aortic aneurysm status post endograft repair with   a patent aorto bi-iliac graft. There is suspicion of an endoleak with blush   of contrast enhancement suspected in the inferior aspect of the native   aneurysm. As a noncontrast study was not performed, calcification of the   intraluminal thrombus accounting for the apparent enhancement is not   excluded.   Native aneurysm has not changed 81.0    Lymphocyte % Latest Units: % 11.0   4.0    Monocytes % Latest Units: % 8.0   3.0      Resulting lab: [facility]   Value: >100,000 CFU/mL Normal respiratory debra   *Additional information available - narrative   4/5/2019 11:15 AM - Dominick Broody Incoming Lab Results From Soft (Epic Adt)     Specimen Information: BAL- Bronch. Lavage        Component Collected Lab   CULTURE, RESPIRATORY 04/03/2019  3:06 PM [address] [facility]   >100,000 CFU/mL Normal respiratory debra    Gram Stain Result 04/03/2019  3:06 PM [address] Lab   1+ WBC's (Polymorphonuclear)   1+ Gram positive cocci      Results for Lolis Yost" (MRN [id number]) as of 4/6/2019 10:17   Ref. Range 4/3/2019 15:06   Gram Stain Result Unknown 1+ WBC's (Polymor. .. CULTURE, RESPIRATORY Unknown >100,000 CFU/mL N. .. ACID FAST CULTURE WITH SMEAR Unknown Rpt   AFB Smear Unknown No AFB observed b. .. Fungus Stain Unknown No Fungal element. .. FUNGUS CULTURE Unknown Rpt       ECHO- Conclusions      Summary   Definity contrast was used, but port not working properly.  Ganga Rodriguez left ventricular systolic function is moderately reduced with an   ejection fraction of 30-35 %. Inferolateral hypokinesia. Mid anteroseptal,   apical lateral and apical septal akinesia.   There is mild concentric left ventricular hypertrophy.   Normal left ventricular filling pressure.   Left ventricular cavity size is normal.   Mild thickening of the anterior leaflet of mitral valve.   Mild mitral regurgitation.   The left atrium is severely dilated. Assessment and plan:  Acute respiratory failure, hypoxemic. O2 supplementation to keep Sao2 between 90-4% ONLY  Patient continues to require high flow oxygenation  Pulmonary toilet  S/p bronchoscopy  Low dose steroids to continue   Will Rpt CXR in AM and reassess   Possible healthcare associated pneumonia.   CXR with new left lung infiltrate, will obtain CT chest.  He does not have fever,

## 2019-04-06 NOTE — ONCOLOGY
ONCOLOGY HEMATOLOGY CARE PROGRESS NOTE      SUBJECTIVE:     Afebrile. On Vapotherm 24 LPM.  General diet. Has sore under R tongue. ROS:   The remaining 10 point review of symptoms is unremarkable. OBJECTIVE        Physical    VITALS:  /75   Pulse 89   Temp 97.7 °F (36.5 °C) (Oral)   Resp 18   Ht 5' 9\" (1.753 m)   Wt 140 lb 6.9 oz (63.7 kg)   SpO2 92%   BMI 20.74 kg/m²   TEMPERATURE:  Current - Temp: 97.7 °F (36.5 °C); Max - Temp  Av.6 °F (36.4 °C)  Min: 97.3 °F (36.3 °C)  Max: 97.9 °F (36.6 °C)  PULSE OXIMETRY RANGE: SpO2  Av.6 %  Min: 91 %  Max: 97 %  24HR INTAKE/OUTPUT:      Intake/Output Summary (Last 24 hours) at 2019 3666  Last data filed at 2019 8328  Gross per 24 hour   Intake 1474 ml   Output 1635 ml   Net -161 ml       CONSTITUTIONAL:  awake, alert, cooperative, no apparent distress, HEENT oral pharynx , no scleral icterus, wearing high flow nasal canula   HEMATOLOGIC/LYMPHATICS:  no cervical lymphadenopathy, no supraclavicular lymphadenopathy, no axillary lymphadenopathy and no inguinal lymphadenopathy  LUNGS:  No increased work of breathing, good air exchange, clear to auscultation bilaterally, no crackles or wheezing  CARDIOVASCULAR:  , regular rate and rhythm, normal S1 and S2, no S3 or S4, and no murmur noted  ABDOMEN: Absent BS.  + colostomy (empty). Red stoma    Back: no bruising, purpura, or hematomas noted   MUSCULOSKELETAL:  There is no redness, warmth, or swelling of the joints. EXTREMETIES: BLE edema +1, non pitting   NEUROLOGIC:  Awake, alert, oriented to name, place and time. Cranial nerves II-XII are grossly intact. Motor is 5 out of 5 bilaterally.  Hip flexion 5/5 bilaterally in the bed     Data      Recent Labs     19  0532 19  0425 19  0615   WBC 9.3 7.7 10.7   HGB 8.3* 7.7* 7.5*   HCT 24.4* 22.6* 22.4*   * 128* 128*   MCV 85.9 85.5 85.7        Recent Labs     19  0532 19  0425 endograft secondary to PAD  - was on Plavix PTA   - Plavix on hold for surgery   - currently on baby asa only   - previously used Integrilin this admit, stopped due to bleeding concerns.  May need to resume- hospitalist is speaking with vascular surgery for clarification. (Dr. Yusuf Vinson)   Ziggy Quan MD

## 2019-04-06 NOTE — PROGRESS NOTES
Writer with Dr. Mitch Zamora at bedside, made aware of episodes of vtach and magnesium 1.60 order for 2g of magnesium in addition to PO magnesium oxide that is ordered two times daily. Pt can not be discharged today from pulmonology standpoint. Anjelica Guido  4/6/2019      1113 Taryn NP at bedside, aware of vtach episodes, per NP had discussed with cardiology and is aware. Writer will continue to monitor.    [facility] [place] [place] HOME  4/6/2019

## 2019-04-06 NOTE — PROGRESS NOTES
Hospitalist Progress Note      PCP: Radha Torres MD    Date of Admission: 3/7/2019    Chief Complaint: Abdominal pain     Hospital Course:   69 y. o. male who presented to [facility] with LLQ abdominal pain. Pt was recently d/c'd for diverticulitis, states pain never went away. C/o low grade fevers for 2 weeks. States pain not as severe as previous admission. Had chemo on Friday, states received neulasta. Denies cp, n/v, sob. Subjective:   Pt lying in bed. On vapotherm 32 LPM at 100% FIO2. Afebrile. VSS. Pt denies dyspnea or chest pain. No N/V. + stool per ostomy.      Medications:  Reviewed    Infusion Medications    heparin (porcine) 16.8 mL/hr (04/06/19 0127)    dextrose 100 mL/hr (03/08/19 1700)     Scheduled Medications    metoprolol succinate  50 mg Oral TID    furosemide  20 mg Intravenous BID    potassium chloride  10 mEq Oral TID    magnesium sulfate  2 g Intravenous Q1H    magnesium oxide  400 mg Oral BID    methylPREDNISolone  40 mg Intravenous Q12H    sodium chloride flush  10 mL Intravenous Q12H    ipratropium-albuterol  1 ampule Inhalation Q4H WA    zinc oxide  1 applicator Topical Daily    acyclovir  800 mg Oral TID    amiodarone  200 mg Oral Daily    pantoprazole  40 mg Intravenous Daily    aspirin  81 mg Oral Daily    sodium chloride flush  10 mL Intravenous 2 times per day    simvastatin  40 mg Oral Nightly    folic acid  1 mg Oral Daily    oyster shell calcium/vitamin D  1 tablet Oral Daily     PRN Meds: magic (miracle) mouthwash with nystatin, oxyCODONE **OR** oxyCODONE, heparin (porcine), heparin (porcine), HYDROmorphone, sodium chloride flush, glucose, dextrose, glucagon (rDNA), dextrose, ondansetron, acetaminophen      Intake/Output Summary (Last 24 hours) at 4/6/2019 1413  Last data filed at 4/6/2019 1400  Gross per 24 hour   Intake 2132.2 ml   Output 1815 ml   Net 317.2 ml       Physical Exam Performed:    /73   Pulse 85   Temp 97.6 °F (36.4 °C) free air. 3. 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 4. Cholelithiasis with no evidence of acute cholecystitis. 5. Worsening small pleural effusions and airspace changes in the lower chest.   Pattern may represent underlying pulmonary edema or perhaps pneumonitis. XR CHEST PORTABLE   Final Result   Interval development of mild pulmonary edema. There is a small left-sided pleural effusion with more focal consolidation   the left lung base, atelectasis versus pneumonia. IR PICC WO SQ PORT/PUMP > 5 YEARS   Final Result   Successful placement of PICC line. CT ABDOMEN PELVIS WO CONTRAST Additional Contrast? None   Final Result   No evidence retroperitoneal hematoma. Interval colonic resection. Small volume loculated collection in the left   pericolic gutter, with surgical drain terminating adjacent. Mildly dilated loops of small bowel with scattered air-fluid levels, most   likely postoperative ileus. Trace pleural effusions and bibasilar airspace disease representing   atelectasis or pneumonia. VL Extremity Venous Bilateral   Final Result      CT CHEST PULMONARY EMBOLISM W CONTRAST   Final Result   Bilateral pulmonary emboli      Scattered opacities throughout the lungs, greatest at the lung bases, with   tiny pleural effusions. Changes are increased compared to prior      Sclerotic osseous metastatic disease as described previously      The findings were sent to the Radiology Results [address] at 3:57   pm on 3/16/2019to be communicated to a licensed caregiver. XR CHEST 1 VW   Final Result   Shallow inflation with findings consistent with basilar atelectasis. Left   pleural thickening versus trace effusion. CT CHEST ABDOMEN PELVIS WO CONTRAST   Final Result   Perforated sigmoid diverticulitis and pneumoperitoneum unchanged.   Stable   left [facility] colonic air-fluid collection may represent an intramural or   extramural abscess or giant diverticulum. Infrarenal abdominal aortic aneurysm and aorto bi-iliac stent. Other incidental findings as above. CT ABDOMEN PELVIS W IV CONTRAST Additional Contrast? None   Final Result   Acute perforated descending colon diverticulitis. There is free air in the   abdomen and there is a small extraluminal gas and fluid collection/abscess   adjacent to the descending colon measuring 4.1 cm in AP diameter and 6 cm in   length. There is diverticulosis of the entire colon. Cholelithiasis. 4.7 cm infrarenal abdominal aortic aneurysm status post endograft repair with   a patent aorto bi-iliac graft. There is suspicion of an endoleak with blush   of contrast enhancement suspected in the inferior aspect of the native   aneurysm. As a noncontrast study was not performed, calcification of the   intraluminal thrombus accounting for the apparent enhancement is not   excluded. 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 Comparison with a noncontrast CT examination would be   helpful. Critical results were called by Dr. J Luis Goldman. MD Elizabeth to [facility] on   3/7/2019 at 20:35.          XR CHEST PORTABLE    (Results Pending)       Assessment/Plan:    Active Hospital Problems    Diagnosis Date Noted    Iron deficiency anemia due to chronic blood loss [D50.0]     Pneumonia of left lung due to infectious organism [J18.9]     Cardiomyopathy ([address] [I42.9]     NSVT (nonsustained ventricular tachycardia) ([facility]) [I47.2]     Other pulmonary embolism without acute cor pulmonale ([facility]) [I26.99]     Acute respiratory failure with hypoxia ([address] [J96.01]     H/O colectomy [Z90.49]     Primary malignant neoplasm of lung metastatic to other site (HCC) [C34.90]     Herpes zoster oticus [B02.21]     Hearing loss of right ear [H91.91]     Chemotherapy-induced neutropenia (HCC) [D70.1, T45.1X5A]     Mucositis [K12.30]     Diverticulitis [K57.92] 03/07/2019    Diverticulitis of large intestine with perforation and abscess without bleeding [K57.20]        Acute perforated recurrent diverticulitis/pneumoperitoneum/abscess in setting of immunosuppression due to chemotherapy:   - CT A/P on admission demonstrated acute perforated colon diverticulitis with free air and fluid collection. General surgery consulted. Pt is status post laparoscopic converted to open left colectomy on 3/14/19. CT scan on 3/25/19 showed [place] [facility] [place]. Pt is now s/p Harding's procedure with colostomy placement on 3/27/19.  - Continue IV Merrem and Vanc. Completed course of Diflucan  - Continue prn pain control.      Acute hypoxic respiratory failure due to bilateral pulmonary emboli and pneumonia:   - CT chest 3/16/19 showed acute PE. Lower extremity dopplers +DVT. Vascular surgery consulted, no indication for IVC filter at this time. - Wean supplemental O2 as tolerated. He is not normally on home O2. He is currently requiring Vapotherm (s 4/5). - Pulmonary re-consulted. Chest CT 4/1 obtained bilateral upper lobe GGO, concerning for multifocal pneumonia. Pt on [place]. Pt is s/p bronch on 4/3. BAL showed NRF.   - Continue heparin infusion. Plan to eventually transition to Eliquis at discharge. - Continue inhaled bronchodilator therapy. Pulmonary toilet with IS and acapella. - Pt started on IV solumedrol on 4/4/19 per Pulm for possible radiation pneumonitis.      Metastatic lung cancer, stage 4 with bone mets:  - Hem/onc is following. Chemo on hold to allow for surgical healing.      Thrombocytopenia (resolved) now with hypercoagulable state:  - Platelets required preoperatively. Monitor daily CBC.      Neutropenia due to chemotherapy (resolved):  - S/p Neulasta on 3/1.  S/p Granix on 3/8.      Anemia, likely due to acute illness:  -  Pt is s/p 1 unit PBRC for Hgb 6.6 on 3/19, 1 unit for Hgb 7.2 on 3/22, 1 unit on 4/2 for Hgb 6.7. No overt signs of bleeding. CT A/P showed no RP bleed. Pt is s/p B12 injection. Stool for occult blood was +. GI consulted, s/p EGD on 3/21 which was normal. Continue PPI. Monitor daily CBC.      PAD/CAD/HTN/Cardiomyopathy:   - Defer antiplatelet management to hem/onc given the complexity of his present situation, bleeding risk and thrombotic states. Previously used intergrillin infusion during this admission, stopped due to bleeding concerns.   - ECHO showed reduced EF of 30-35%. Previous ECHO showed EF 40-45%. - Cardiology is following. Continue on aspirin, Toprol and statin. Lasix prn.  - Cards recommending LHC given drop in EF but would like to wait 4-6 weeks post PE on AC.      NSVT:   - Known history of VT s/p ICD. Went into VT on 3/12, PMD went off. Continue amiodarone. Keep K > 4 and Mag > 2. BB increased per Cards given runs of NSVT on 4/6.     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 Nephrology following. Monitor daily BMP.      Hypokalemia/hypomagnesemia:  - Monitor and replete as needed.      Zoster oticus:  - Resumed acyclovir 3/31. Goals of care:  - Palliative care consulted 4/3. Maintain full code for now. Discussions will be ongoing. DVT Prophylaxis: Pt is on heparin infusion   Diet: DIET GENERAL;  Dietary Nutrition Supplements: Standard High Calorie Oral Supplement  Code Status: Full Code    PT/OT Eval Status: Ordered     Dispo - Uncertain. Possible plan for LTAC at discharge although expect pt to be here at least another 1-2 weeks.      Izzy Pena, APRN - CNP

## 2019-04-06 NOTE — PLAN OF CARE
Problem: Nutrition  Goal: Optimal nutrition therapy  4/6/2019 1634 by Myles Kevin RN  Outcome: Met This Shift  Note:   Pt has eaten % of meals thus far this shift.

## 2019-04-06 NOTE — PROGRESS NOTES
[facility]   Daily Progress Note    Admit Date:  3/7/2019  HPI:    Chief Complaint   Patient presents with    Abdominal Pain     LLQ pain, was seen last week for Diverticulitis, patient states he cannot get rid of the pain, had Chemo on Friday        Sheri Caldwell is a 69 y.o. male who was admitted 3/7/2019 for LLQ pain. Treated for recurrent episode of sigmoid diverticulitis. 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 Underwent open left colectomy for perforated diverticulitis 3/14/19. Cardiology consulted for NSVT on 3/16/19. Hx CAD/ PCI to RCA and LAD, VT s/p ICD on Amio, PAD s/p AAA repair [facility]). Increased shortness of breath, CT showed bilateral PEs, treated with heparin. Returned to OR for anastomotic leak 3/27/19, s/p Gerda's procedure. Increased O2 requirements on 3/31/19, CXR and then CT chest concerning for HCAP. Bronchoscopy 4/3/19 with mucous plugs, no other acute abnormality. Given Lasix 40 mg IVP. Subjective:  Mr. Bryant Hard more NSVT overnight. Increased O2 needs. Wife at bedside. Patient, though denies complaints, seems more dyspneic and not as clear mentally. Objective:   /65   Pulse 85   Temp 97.6 °F (36.4 °C) (Oral)   Resp 20   Ht 5' 9\" (1.753 m)   Wt 140 lb 6.9 oz (63.7 kg)   SpO2 92%   BMI 20.74 kg/m²       Intake/Output Summary (Last 24 hours) at 4/6/2019 1305  Last data filed at 4/6/2019 1139  Gross per 24 hour   Intake 1894 ml   Output 1490 ml   Net 404 ml     Wt Readings from Last 3 Encounters:   04/06/19 140 lb 6.9 oz (63.7 kg)   02/28/19 184 lb 9.6 oz (83.7 kg)   02/19/19 183 lb (83 kg)         ASSESSMENT:   1. NSVT - 5-40 bts overnight. Did not get increased dose of metoprolol yesterday  2. CAD - s/p PCI to RCA and LAD 2011, last [facility] 2016 without obstructive dz; on statin, BB, ASA  3. Cardiomyopathy, unknown etiology - 30-35% by echo, on BB, statin, ASA; ACEi and Leighton Ant stopped due to MAHSA, BNP elevated though decreased from 3/16/19. 4. PE, large bilateral - on IV Heparin, plan to eventually transition to DOAC (Eliquis)   5. Perforated divertic, s/p sigmoid colectomy 3/14/19, s/p Gerda's procedure 3/29 for anastomotic leak, good po intake  6. Metastatic lung cancer with mets - per hem/onc, stable  7. AAA s/p endograft repair [facility], 2011) - IV Heparin, ASA  8. Anemia - s/p 1 uPRBC's 4/2/19 (total of 3 this admit), stable  9. PNA - increased O2 requirements, CT chest with PNA, s/p bronch, started on IV steroids      PLAN:  Reviewed with Dr. Prakash Calvillo hx and strips of NSVT. Recommend increased BB and ischemia evaluation if appropriate. May also benefit from verapamil but favorable given LV dysfunction. 1. Increase Toprol  2. Keep Mag >2, K >4  3. Lasix 20 mg IV bid today  4.  Call for NSVT > 40 bts      DEBRA Powell - CNP, 4/6/2019, 1:05 PM  [facility]   [phone]       Telemetry: SR 's, NSVT up to 40 beats (15 sec)  NYHA: III    Physical Exam:  General:  Awake, alert, NAD  Skin:  Warm and dry  Neck:  JVP 8 cm  Chest:  Bibasilar rales posteriorly L >R  Cardiovascular:  RRR, normal M5P9, + systolic murmur, no g/r  Abdomen:  Soft, nontender, +bowel sounds, + gas and stool in colostomy bag  Extremities:  No BLE edema      Medications:    metoprolol succinate  50 mg Oral BID    magnesium oxide  400 mg Oral BID    potassium chloride  10 mEq Oral BID    methylPREDNISolone  40 mg Intravenous Q12H    sodium chloride flush  10 mL Intravenous Q12H    ipratropium-albuterol  1 ampule Inhalation Q4H WA    zinc oxide  1 applicator Topical Daily    acyclovir  800 mg Oral TID    amiodarone  200 mg Oral Daily    pantoprazole  40 mg Intravenous Daily    aspirin  81 mg Oral Daily    sodium chloride flush  10 mL Intravenous 2 times per day    simvastatin  40 mg Oral Nightly    folic acid  1 mg Oral Daily    oyster shell calcium/vitamin D  1 tablet Oral Daily      heparin (porcine) 16.8 mL/hr (04/06/19 0127)    dextrose 100 mL/hr (03/08/19 1700)       Lab Data: Lab results independently reviewed by myself 4/2/19   CBC:   Recent Labs     04/04/19  0532 04/05/19  0425 04/06/19  0615   WBC 9.3 7.7 10.7   HGB 8.3* 7.7* 7.5*   * 128* 128*     BMP:    Recent Labs     04/04/19  0532 04/05/19  0425 04/06/19  0615   * 131* 134*   K 3.7 4.7 4.3   CO2 26 26 25   BUN 8 8 9   CREATININE <0.5* <0.5* <0.5*     INR:    No results for input(s): INR in the last 72 hours. BNP:    Recent Labs     04/06/19  0615   PROBNP 5,868*     Cardiac Enzymes: No results for input(s): TROPONINI in the last 72 hours. Lipids:   Lab Results   Component Value Date    TRIG 178 05/22/2018    TRIG 183 11/21/2017    HDL 39 05/22/2018    HDL 46 11/21/2017    HDL 31 12/28/2011    HDL 31 05/17/2011    LDLCALC 72 05/22/2018    LDLCALC 97 11/21/2017       Cardiac Imaging:    Echo 3/19/2019:  Definity contrast was used, but port not working properly. The left ventricular systolic function is moderately reduced with an ejection fraction of 30-35%. Inferolateral hypokinesia. Mid anteroseptal, apical lateral and apical septal akinesia. There is mild concentric left ventricular hypertrophy. Normal left ventricular filling pressure. Left ventricular cavity size is normal.   Mild thickening of the anterior leaflet of mitral valve. Mild mitral regurgitation. The left atrium is severely dilated. CARDIAC CATH MAR 2016 [facility])   Summary Comments:    3 Vessel CAD  Mild LV systolic dysfunction  Patent mid and distal LAD stents  Patent proximal, mid, and distal RCA stents  Normal LVEDP  Normal systemic pressures   Plan:  Cardiac risk factor modification. Treat with medical therapy.     Findings/Summary:  CORONARY ANGIOGRAPHY FINDINGS  DOMINANCE:  Right dominant        LEFT MAIN: Luminal irregularities                       LEFT ANTERIOR DESCENDING:  Luminal irregularities, 30 % mid lesion, patent mid and distal overlapping stents                 LEFT CIRCUMFLEX: Luminal irregularities    20-30% mid lesion, OM1: small caliber vessel with severe diffuse disease                  RIGHT CORONARY:  Stenotic, overlapping proximal, mid and distal stents with mid focal 50% in-stent restenosis, diffuse distal 50% stenosis, RPAVGA: distal 70-80% stenosis                LEFT VENTRICULAR FUNCTION:        LVEF: 40-45%        LVEDP: Normal pre-angio        LV Systolic Pressure: Normal         LV to AO Gradient: none         LV Wall Motion:  Abnormal, inferior basal severe hypokinesis    .        MITRAL VALVE: No mitral insufficiency             STRESS MPI MAR 2014: Interpetation Summary:  The LV EF is 47%  There is mild inferior wall hypokinesis. There is a medium sized, fixed defect in the inferior wall. There is a small sized, reversible defect in the anterior wall. There is a medium sized, partially reversible defect in the anteroapical wall. 1.Negative ECG for ischemia with pharmocologic stress. 2.Positive nuclear stress imaging suggestive of inducible ischemia in the anteroapical wall.

## 2019-04-07 NOTE — PROGRESS NOTES
Events related to respiratory status noted. No ongoing issues related to abdomen/GI systems. Will sign off for now. Will order staples and DIAN to be removed now. Please call w/ further questions.   Thanks    NELL

## 2019-04-07 NOTE — PROGRESS NOTES
Buttocks becomes sore; waffle pillow added. Patient comfortable. Will monitor.     Noni Campuzano RN

## 2019-04-07 NOTE — PROGRESS NOTES
04/07/19 0945   Oxygen Therapy/Pulse Ox   O2 Therapy Oxygen humidified   O2 Device Other (Comment)  (vapotherm, foam ear pads in place on nc)   O2 Flow Rate (L/min) 40 L/min   FiO2  100 %   Humidification Temp 37   SpO2 (!) 89 %

## 2019-04-07 NOTE — PROGRESS NOTES
04/07/19 1313   Oxygen Therapy/Pulse Ox   O2 Therapy Oxygen humidified   O2 Device   (VAPOTHERM)   O2 Flow Rate (L/min) 40 L/min   FiO2  100 %   Resp 20   Humidification Temp 37   SpO2 93 %

## 2019-04-07 NOTE — PROGRESS NOTES
4 Eyes Skin Assessment     The patient is being assess for   Shift Handoff    I agree that 2 RN's have performed a thorough Head to Toe Skin Assessment on the patient. ALL assessment sites listed below have been assessed. Areas assessed by both nurses:   [x]   Head, Face, and Ears   [x]   Shoulders, Back, and Chest, Abdomen  [x]   Arms, Elbows, and Hands   [x]   Coccyx, Sacrum, and Ischium  [x]   Legs, Feet, and Heels        Colostomy LUQ descending/sigmoid    **SHARE this note so that the co-signing nurse is able to place an eSignature**    Co-signer eSignature: Electronically signed by Zak Walker RN on 4/7/19 at 7:30 PM    Does the Patient have Skin Breakdown?   Yes LDA WOUND CARE was Initiated documentation include the [facility]-wound, Wound Assessment, Measurements, Dressing Treatment, Drainage, and Color\",          Jared Prevention initiated:  Yes   Wound Care Orders initiated:  Yes; already in place      [id number]  nurse consulted for Pressure Injury (Stage 3,4, Unstageable, DTI, NWPT, Complex wounds)and New or Established Ostomies:  yes    Primary Nurse eSignature: Electronically signed by Lanie Bosch RN on 4/7/19 at 7:20 PM

## 2019-04-07 NOTE — PROGRESS NOTES
04/07/19 0845   NIV Type   Equipment Type v60   Mode BIPAP   Mask Type Full face mask   Mask Size Large   Settings/Measurements   Comfort Level Good   Using Accessory Muscles No   IPAP 12 cmH20   EPAP 6 cmH2O   Rate Ordered 4   Resp (!) 33   SpO2 97   FiO2  100 %   Vt Exhaled 992 mL   Mask Leak (lpm) 0 lpm   Skin Protection for O2 Device Yes   Breath Sounds   Right Upper Lobe Diminished   Right Middle Lobe Diminished   Right Lower Lobe Diminished   Left Upper Lobe Diminished   Left Lower Lobe Diminished   Patient Observation   Observations pt wife did not want  to go on vapotherm until she talks to Dr. Mary Cullen, hhn with bipap   Alarm Settings   Alarms On Y   Press Low Alarm 6 cmH2O   High Pressure Alarm 45 cmH2O   Delay Alarm 20 sec(s)   Apnea (secs) 20 secs   Resp Rate Low Alarm 6   High Respiratory Rate 45 br/min   Oxygen Therapy/Pulse Ox   O2 Therapy Oxygen humidified   $Oxygen $Daily Charge   O2 Device PAP (positive airway pressure)   SpO2 97 %   $Pulse Oximeter $Spot check (multiple)

## 2019-04-07 NOTE — PROGRESS NOTES
04/07/19 1251   NIV Type   Equipment Type V60   Mode BIPAP   Mask Type Full face mask   Mask Size Large   Settings/Measurements   Comfort Level Good   Using Accessory Muscles No   IPAP 12 cmH20   EPAP 6 cmH2O   Rate Ordered 4   Resp 26   FiO2  80 %   Vt Exhaled 759 mL   Mask Leak (lpm) 33 lpm   Skin Protection for O2 Device Yes   Patient Observation   Observations MEPILEX IN PLACE, HHN WITH BIPAP   Alarm Settings   Alarms On Y   Press Low Alarm 6 cmH2O   High Pressure Alarm 45 cmH2O   Delay Alarm 20 sec(s)   Apnea (secs) 20 secs   Resp Rate Low Alarm 6   Oxygen Therapy/Pulse Ox   O2 Therapy Oxygen   O2 Device PAP (positive airway pressure)   SpO2 99 %

## 2019-04-07 NOTE — PROGRESS NOTES
Assuming care of patient. Bedside report and 4 eyes assessment completed with previous nurse Juany Johnson RN.  chlorahexadine bath completed. Patient A&Ox4. Heparin gtt infusing per [facility] with handoff documented in flowsheet. BIPAP in place - see flowsheet. Critical care involved in transfer from [facility].     Heriberto Crandall RN

## 2019-04-07 NOTE — PROGRESS NOTES
Hospitalist Progress Note      PCP: Brian Hhan MD    Date of Admission: 3/7/2019    Chief Complaint: Abdominal pain     Hospital Course:   69 y. o. male who presented to [facility] with LLQ abdominal pain. Pt was recently d/c'd for diverticulitis, states pain never went away. C/o low grade fevers for 2 weeks. States pain not as severe as previous admission. Had chemo on Friday, states received neulasta. Denies cp, n/v, sob. Subjective:   Pt lying in bed. Desaturated overnight on Vapotherm. Placed on BiPAP around 3 am. Transitioned back to Vapotherm this morning per Pulm. Pt on 40 LPM with 100% FIO2 on Vapotherm. O2 sats are in the high 80's. Multiple family members are at bedside.      Medications:  Reviewed    Infusion Medications    heparin (porcine) 16.8 mL/hr (04/07/19 0321)    dextrose 100 mL/hr (03/08/19 1700)     Scheduled Medications    metoprolol succinate  50 mg Oral TID    furosemide  20 mg Intravenous BID    potassium chloride  10 mEq Oral TID    magnesium oxide  400 mg Oral BID    methylPREDNISolone  40 mg Intravenous Q12H    sodium chloride flush  10 mL Intravenous Q12H    ipratropium-albuterol  1 ampule Inhalation Q4H WA    zinc oxide  1 applicator Topical Daily    acyclovir  800 mg Oral TID    amiodarone  200 mg Oral Daily    pantoprazole  40 mg Intravenous Daily    aspirin  81 mg Oral Daily    sodium chloride flush  10 mL Intravenous 2 times per day    simvastatin  40 mg Oral Nightly    folic acid  1 mg Oral Daily    oyster shell calcium/vitamin D  1 tablet Oral Daily     PRN Meds: magic (miracle) mouthwash with nystatin, oxyCODONE **OR** oxyCODONE, heparin (porcine), heparin (porcine), HYDROmorphone, sodium chloride flush, glucose, dextrose, glucagon (rDNA), dextrose, ondansetron, acetaminophen      Intake/Output Summary (Last 24 hours) at 4/7/2019 0950  Last data filed at 4/7/2019 0451  Gross per 24 hour   Intake 822.9 ml   Output 2510 ml   Net -1687.1 ml Physical Exam Performed:    /78   Pulse 79   Temp 98.1 °F (36.7 °C) (Oral)   Resp (!) 32   Ht 5' 9\" (1.753 m)   Wt 132 lb 4.4 oz (60 kg)   SpO2 (!) 89%   BMI 19.53 kg/m²     General appearance: No apparent distress, appears stated age and cooperative. HEENT: Pupils equal, round, and reactive to light. Conjunctivae/corneas clear. Neck: Supple, with full range of motion. No jugular venous distention. Trachea midline. Respiratory:  Normal respiratory effort. Clear to auscultation, bilaterally without Rales/Wheezes/Rhonchi. Cardiovascular: Regular rate and rhythm with normal S1/S2 without murmurs, rubs or gallops. Abdomen: Soft, non-tender, non-distended with normal bowel sounds. Musculoskeletal: No clubbing, cyanosis or edema bilaterally. Full range of motion without deformity. Skin: Skin color, texture, turgor normal.  No rashes or lesions. Neurologic:  Neurovascularly intact without any focal sensory/motor deficits. Cranial nerves: II-XII intact, grossly non-focal.  Psychiatric: Alert and oriented, thought content appropriate, normal insight  Capillary Refill: Brisk,< 3 seconds   Peripheral Pulses: +2 palpable, equal bilaterally       Labs:   Recent Labs     04/05/19  0425 04/06/19  0615 04/07/19  0320   WBC 7.7 10.7 17.9*   HGB 7.7* 7.5* 8.1*   HCT 22.6* 22.4* 24.3*   * 128* 161     Recent Labs     04/05/19  0425 04/06/19  0615 04/07/19  0320   * 134* 134*   K 4.7 4.3 4.1   CL 96* 99 96*   CO2 26 25 28   BUN 8 9 13   CREATININE <0.5* <0.5* <0.5*   CALCIUM 7.6* 7.7* 7.7*       Urinalysis:      Lab Results   Component Value Date    NITRU Negative 03/10/2019    WBCUA 3-5 03/10/2019    RBCUA 0-2 03/10/2019    BLOODU Negative 03/10/2019    SPECGRAV 1.025 03/10/2019    GLUCOSEU Negative 03/10/2019       Radiology:  XR CHEST PORTABLE   Final Result   Increasing right lung airspace disease could represent edema or pneumonia. Left lung airspace disease appears unchanged.          CT CHEST WO CONTRAST   Final Result   1. Bilateral upper lobe ground-glass opacities, left greater than right, with   developing left perihilar consolidation. Findings are most concerning for   multifocal pneumonia with asymmetric pulmonary edema a less likely   possibility. 2. Small bilateral pleural effusions which are slightly increased from   03/26/2019.   3. Trace ascites in the upper abdomen. 4. Sclerotic metastasis in the T6 vertebral body. 5. Coronary atherosclerosis. XR CHEST PORTABLE   Final Result   Nonspecific consolidation mid and lower left lung with left pleural effusion   presumably related to sequela from pulmonary edema. Pneumonia is a   consideration. Calcific atherosclerosis aorta. Cardiomegaly. XR ABDOMEN (KUB) (SINGLE AP VIEW)   Final Result   Dilated small bowel, may reflect ileus or obstruction. Recommend follow-up   study. CT ABDOMEN PELVIS WO CONTRAST Additional Contrast? None   Final Result   Interval increase in size of the left lower quadrant collection which now   measures up to 7.8 cm. Administered rectal contrast extends into the   collection. Right anterior approach drain courses adjacent to the collection. No significant change in pleural effusions and associated airspace disease. The findings were sent to the Radiology Results [address] at 12:53   pm on 3/26/2019to be communicated to a licensed caregiver. FL BARIUM ENEMA   Final Result   Active extravasation of contrast from the sigmoid colon, compatible with   anastomotic leak. Please refer to accompanying CT. Diverticulosis. CT ABDOMEN PELVIS W IV CONTRAST Additional Contrast? Radiologist Recommendation   Final Result   1. The patient is 11 days status post partial sigmoid colectomy.   Surgical   drain remains in place with a small ill-defined fluid collection that is   stable from prior exam.  This does not appear to represent an abscess and may represent postoperative hematoma or seroma. 2. There is a new collection of free intraperitoneal air immediately anterior   to the prior anastomosis site, contained within the left lower quadrant. This indicates an anastomosis leak. No distant free air. 3. 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 4. Cholelithiasis with no evidence of acute cholecystitis. 5. Worsening small pleural effusions and airspace changes in the lower chest.   Pattern may represent underlying pulmonary edema or perhaps pneumonitis. XR CHEST PORTABLE   Final Result   Interval development of mild pulmonary edema. There is a small left-sided pleural effusion with more focal consolidation   the left lung base, atelectasis versus pneumonia. IR PICC WO SQ PORT/PUMP > 5 YEARS   Final Result   Successful placement of PICC line. CT ABDOMEN PELVIS WO CONTRAST Additional Contrast? None   Final Result   No evidence retroperitoneal hematoma. Interval colonic resection. Small volume loculated collection in the left   pericolic gutter, with surgical drain terminating adjacent. Mildly dilated loops of small bowel with scattered air-fluid levels, most   likely postoperative ileus. Trace pleural effusions and bibasilar airspace disease representing   atelectasis or pneumonia. VL Extremity Venous Bilateral   Final Result      CT CHEST PULMONARY EMBOLISM W CONTRAST   Final Result   Bilateral pulmonary emboli      Scattered opacities throughout the lungs, greatest at the lung bases, with   tiny pleural effusions. Changes are increased compared to prior      Sclerotic osseous metastatic disease as described previously      The findings were sent to the Radiology Results [address] at 3:57   pm on 3/16/2019to be communicated to a licensed caregiver. XR CHEST 1 VW   Final Result   Shallow inflation with findings consistent with basilar atelectasis. Left   pleural thickening versus trace effusion. CT CHEST ABDOMEN PELVIS WO CONTRAST   Final Result   Perforated sigmoid diverticulitis and pneumoperitoneum unchanged. Stable   left [facility] colonic air-fluid collection may represent an intramural or   extramural abscess or giant diverticulum. Infrarenal abdominal aortic aneurysm and aorto bi-iliac stent. Other incidental findings as above. CT ABDOMEN PELVIS W IV CONTRAST Additional Contrast? None   Final Result   Acute perforated descending colon diverticulitis. There is free air in the   abdomen and there is a small extraluminal gas and fluid collection/abscess   adjacent to the descending colon measuring 4.1 cm in AP diameter and 6 cm in   length. There is diverticulosis of the entire colon. Cholelithiasis. 4.7 cm infrarenal abdominal aortic aneurysm status post endograft repair with   a patent aorto bi-iliac graft. There is suspicion of an endoleak with blush   of contrast enhancement suspected in the inferior aspect of the native   aneurysm. As a noncontrast study was not performed, calcification of the   intraluminal thrombus accounting for the apparent enhancement is not   excluded. 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 Comparison with a noncontrast CT examination would be   helpful. Critical results were called by Dr. J Luis Goldman. MD Elizabeth to [facility] on   3/7/2019 at 20:35.              Assessment/Plan:    Active Hospital Problems    Diagnosis Date Noted    Iron deficiency anemia due to chronic blood loss [D50.0]     Pneumonia of left lung due to infectious organism [J18.9]     Cardiomyopathy ([address] [I42.9]     NSVT (nonsustained ventricular tachycardia) ([facility]) [I47.2]     Other pulmonary embolism without acute cor pulmonale (HCC) [I26.99]     Acute respiratory failure with hypoxia ([address] [J96.01]     H/O colectomy [Z90.49]     Primary malignant neoplasm of lung metastatic to other site [facility]) [C34.90]     Herpes zoster oticus [B02.21]     Hearing loss of right ear [H91.91]     Chemotherapy-induced neutropenia (HCC) [D70.1, T45.1X5A]     Mucositis [K12.30]     Diverticulitis [K57.92] 03/07/2019    Diverticulitis of large intestine with perforation and abscess without bleeding [K57.20]        Acute perforated recurrent diverticulitis/pneumoperitoneum/abscess in setting of immunosuppression due to chemotherapy:   - CT A/P on admission demonstrated acute perforated colon diverticulitis with free air and fluid collection. General surgery consulted. Pt is status post laparoscopic converted to open left colectomy on 3/14/19. CT scan on 3/25/19 showed anastomotic leak at the [facility] surgical site. Pt is now s/p Harding's procedure with colostomy placement on 3/27/19.  - Continue IV Merrem and Vanc. Completed course of Diflucan  - Continue prn pain control.      Acute hypoxic respiratory failure due to bilateral pulmonary emboli and pneumonia:   - CT chest 3/16/19 showed acute PE. Lower extremity dopplers +DVT. Vascular surgery consulted, no indication for IVC filter at this time. - Wean supplemental O2 as tolerated. He is not normally on home O2. He is currently requiring Vapotherm/BiPAP (s 4/5). - Pulmonary re-consulted. Chest CT 4/1 obtained bilateral upper lobe GGO, concerning for multifocal pneumonia. Pt on [place]. Pt is s/p bronch on 4/3. BAL showed NRF.   - Continue heparin infusion. Plan to eventually transition to Eliquis at discharge. - Continue inhaled bronchodilator therapy. Pulmonary toilet with IS and acapella. - Pt started on IV solumedrol on 4/4/19 per Pulm for possible pneumonitis.      Metastatic lung cancer, stage 4 with bone mets:  - Hem/onc is following.  Chemo on hold to allow for surgical healing.      Thrombocytopenia (resolved) now with hypercoagulable state:  - Platelets required preoperatively. Monitor daily CBC.      Neutropenia due to chemotherapy (resolved):  - S/p Neulasta on 3/1. S/p Granix on 3/8.      Anemia, likely due to acute illness:  -  Pt is s/p 1 unit PBR for Hgb 6.6 on 3/19, 1 unit for Hgb 7.2 on 3/22, 1 unit on 4/2 for Hgb 6.7. No overt signs of bleeding. CT A/P showed no RP bleed. Pt is s/p B12 injection. Stool for occult blood was +. GI consulted, s/p EGD on 3/21 which was normal. Continue PPI. Monitor daily CBC.      PAD/CAD/HTN/Cardiomyopathy:   - Defer antiplatelet management to hem/onc given the complexity of his present situation, bleeding risk and thrombotic states. Previously used intergrillin infusion during this admission, stopped due to bleeding concerns.   - ECHO showed reduced EF of 30-35%. Previous ECHO showed EF 40-45%. - Cardiology is following. Continue on aspirin, Toprol and statin. Lasix per Cardiology. - Cards recommending LHC given drop in EF but would like to wait 4-6 weeks post PE on AC.      NSVT:   - Known history of VT s/p ICD. Went into VT on 3/12, PMD went off. Continue amiodarone. Keep K > 4 and Mag > 2. BB increased per Cards given runs of NSVT on 4/6.     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 Nephrology following. Monitor daily BMP.      Hypokalemia/hypomagnesemia:  - Continue daily K and Mag replacements. Monitor daily BMP.      Zoster oticus:  - Resumed acyclovir 3/31. Goals of care:  - Palliative care consulted 4/3. Maintain full code for now. Discussions will be ongoing. DVT Prophylaxis: Pt is on heparin infusion   Diet: DIET GENERAL;  Dietary Nutrition Supplements: Standard High Calorie Oral Supplement  Code Status: Full Code    PT/OT Eval Status: Ordered     Dispo - Uncertain. Transferring to ICU due worsening respiratory status.      [address], APRN - CNP

## 2019-04-07 NOTE — PROGRESS NOTES
Dr. Joanne Oleary updated on patient status; patient stable on vapotherm since [phone] @ [facility] % 02; RR 24 - general diet approved.     Arun Regan RN

## 2019-04-07 NOTE — PROGRESS NOTES
04/06/19 2217   NIV Type   Equipment Type v60   Mode BIPAP   Mask Type Full face mask   Mask Size Large   Settings/Measurements   Comfort Level Good   Using Accessory Muscles No   IPAP 12 cmH20   EPAP 6 cmH2O   Rate Ordered 4   Resp 25   SpO2 93   FiO2  75 %   I Time/ I Time % 1 s   Vt Exhaled 1033 mL   Mask Leak (lpm) 42 lpm   Skin Protection for O2 Device Yes   Breath Sounds   Right Upper Lobe Clear   Right Middle Lobe Diminished   Right Lower Lobe Clear   Left Upper Lobe Clear   Left Lower Lobe Diminished   Alarm Settings   Alarms On Y   Press Low Alarm 6 cmH2O   High Pressure Alarm 30 cmH2O   Apnea (secs) 20 secs   Resp Rate Low Alarm 6   High Respiratory Rate 45 br/min   Oxygen Therapy/Pulse Ox   O2 Therapy Oxygen   O2 Device PAP (positive airway pressure)   SpO2 90 %

## 2019-04-07 NOTE — PROGRESS NOTES
Transferred to room 233 bedside report given to [facility] remains on bipap belongings transferred to [facility]. Family at bedside.

## 2019-04-07 NOTE — PROGRESS NOTES
heard.  Pulmonary/Chest: He is in respiratory distress. He has no wheezes. He has no rales. Equal chest rise and expansion bilaterally   Abdominal: Soft. Bowel sounds are normal. He exhibits no distension. There is no tenderness. Musculoskeletal: Normal range of motion. He exhibits no edema. Lymphadenopathy:     He has no cervical adenopathy. Neurological: He is alert. No cranial nerve deficit. CN 2-12 grossly intact   Skin: Skin is warm and dry. No rash noted. He is not diaphoretic. Data Reviewed:   LABS:  CBC:  Recent Labs     04/05/19 0425 04/06/19  0615 04/07/19  0320   WBC 7.7 10.7 17.9*   HGB 7.7* 7.5* 8.1*   HCT 22.6* 22.4* 24.3*   MCV 85.5 85.7 84.8   * 128* 161     BMP:  Recent Labs     04/05/19 0425 04/06/19  0615 04/07/19  0320   * 134* 134*   K 4.7 4.3 4.1   CL 96* 99 96*   CO2 26 25 28   BUN 8 9 13   CREATININE <0.5* <0.5* <0.5*     LIVER PROFILE: No results for input(s): AST, ALT, LIPASE, ALB, BILIDIR, BILITOT, ALKPHOS in the last 72 hours. Invalid input(s): AMYLASE  PT/INR:No results for input(s): PROTIME, INR in the last 72 hours. APTT:   Recent Labs     04/05/19 0425 04/06/19 0615 04/07/19  0320   APTT 86.3* 75.9* 63.5*     UA:No results for input(s): NITRITE, COLORU, PHUR, LABCAST, WBCUA, RBCUA, MUCUS, TRICHOMONAS, YEAST, BACTERIA, CLARITYU, SPECGRAV, LEUKOCYTESUR, UROBILINOGEN, BILIRUBINUR, BLOODU, GLUCOSEU, AMORPHOUS in the last 72 hours. Invalid input(s): KETONESU  No results for input(s): PHART, FOT5KYY, PO2ART in the last 72 hours. Vent Information  Skin Assessment: Clean, dry, & intact  FiO2 : 100 %  I Time/ I Time %: 1 s  Humidification Source: Heated wire  Humidification Temp: 37    CXR personally reviewed, dense left sided consolidation, right basilar increased atelectasis vs pneumonia          Assessment:     1. Acute on chronic resp failure, hypoxic; multifactorial   -pneumonitis from malignancy    -HCAP  2.  Acute PE, bilateral/submassive   -? Tumor emboli syndrome vs thrombotic   3. Lung cancer with metastatic disease   -adenocarcinoma of LLL  4. Acute on chronic anemia, multifactorial  5. Diverticulitis/perforation s/p colectomy    Plan:      -Continue intermittent bipap and vapotherm, overall worsening respiratory status in the past week and infection was ruled out. Now with slight worsening of the right lung base and leukocytosis, will monitor with low threshold to reimplement atb  -Had a lengthy discussion with the patient/family at the bedside, still wishing to pursue aggressive measures including intubation if needed.  Transfer to ICU for higher level of care as this is a possibility  -Continue steroids, will increase dose  -Serial CXRs and ABGs  -Cardiology following, monitor heart rhythm and electrolytes    Due to life threatening respiratory failure this patient is critically ill, total critical care time involved in his care was 90 mins    Nick Rogers MD

## 2019-04-07 NOTE — PROGRESS NOTES
Abdominal staples removed; DIAN drain removed; RLQ distal from DIAN drain staples removed per order    Patient tolerated well. Site cleaned with chlorhexdine preps. No redness; swelling; warmth related to s/s of infection. Edges well approximated in all areas    DIAN drain removed. No drainage; leakage; bleeding. Dry gauze and pressure applied with subsequent tegaderm. Will monitor sites.       Jerry Tejada RN'

## 2019-04-07 NOTE — PROGRESS NOTES
[facility]   Daily Progress Note    Admit Date:  3/7/2019  HPI:    Chief Complaint   Patient presents with    Abdominal Pain     LLQ pain, was seen last week for Diverticulitis, patient states he cannot get rid of the pain, had Chemo on Friday        Juan Diego Churchill is a 69 y.o. male who was admitted 3/7/2019 for LLQ pain. Treated for recurrent episode of sigmoid diverticulitis. 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 Underwent open left colectomy for perforated diverticulitis 3/14/19. Cardiology consulted for NSVT on 3/16/19. Hx CAD/ PCI to RCA and LAD, VT s/p ICD on Amio, PAD s/p AAA repair [facility]). Increased shortness of breath, CT showed bilateral PEs, treated with heparin. Returned to OR for anastomotic leak 3/27/19, s/p Gerda's procedure. Increased O2 requirements on 3/31/19, CXR and then CT chest concerning for HCAP. Bronchoscopy 4/3/19 with mucous plugs, no other acute abnormality. Given Lasix 40 mg IVP. Subjective:  Mr. Celeste Guallpa awake, on Bipap. Had a rough night with oxygenation and restlessness. No further NSVT overnight. Discussed with nurse, patient, wife, and daughter at bedside. Objective:   BP (!) 140/81   Pulse 80   Temp 98.1 °F (36.7 °C) (Oral)   Resp 28   Ht 5' 9\" (1.753 m)   Wt 132 lb 4.4 oz (60 kg)   SpO2 98%   BMI 19.53 kg/m²       Intake/Output Summary (Last 24 hours) at 4/7/2019 0831  Last data filed at 4/7/2019 0451  Gross per 24 hour   Intake 1242.9 ml   Output 2510 ml   Net -1267.1 ml     Wt Readings from Last 3 Encounters:   04/07/19 132 lb 4.4 oz (60 kg)   02/28/19 184 lb 9.6 oz (83.7 kg)   02/19/19 183 lb (83 kg)         ASSESSMENT:   1. NSVT - decreased ectopy overnight, tolerating increased dose of BB, continue toprol 25 tid, amio 200 qd  2. CAD - s/p PCI to RCA and LAD 2011, last [facility] 2016 without obstructive dz; on statin, BB, ASA  3.  Cardiomyopathy, unknown etiology - 30-35% by echo, on BB, statin, ASA; ACEi and Leighton Ant stopped due to MAHSA, BNP elevated though decreased from 3/16/19.   4. PE, large bilateral - on IV Heparin, plan to eventually transition to DOAC (Eliquis)   5. Perforated divertic, s/p sigmoid colectomy 3/14/19, s/p Gerda's procedure 3/29 for anastomotic leak, good po intake  6. Metastatic lung cancer with mets - per hem/onc, stable  7. AAA s/p endograft repair [facility], 2011) - IV Heparin, ASA  8. Anemia - s/p 1 uPRBC's 4/2/19 (total of 3 this admit), stable  9. PNA - increased O2 requirements, CT chest with PNA, s/p bronch, started on IV steroids      Previous plan was for [facility] after 4-6 weeks of anticoagulation for new acute bilateral PE (3/16/19), may need to consider this sooner given increased NSVT. However, currently oxygenation a concern and may not be a good candidate to take to cath lab. Will discuss with Dr. Barry Green in AM.       PLAN:  1. Continue Toprol 25 mg TID, amiodarone 200 mg qd  2. Continue Lasix 20 mg IV BID - tolerated well overnight  3. Keep K >4, Mag >2 - if unable to take po, please call to change to IV  4.  Interrogate [facility], APRN - CNP, 4/7/2019, 8:31 AM  [address]   [phone]       Telemetry: SR with pairs and triplets of PVC's, no further NSVT  NYHA: III    Physical Exam:  General:  Awake, alert, NAD  Skin:  Warm and dry  Neck:  JVP 8 cm  Chest:  Bibasilar rales   Cardiovascular:  RRR, normal M6J0, + systolic murmur, no g/r  Abdomen:  Soft, nontender, +bowel sounds, + stool in colostomy bag  Extremities:  No BLE edema      Medications:    metoprolol succinate  50 mg Oral TID    furosemide  20 mg Intravenous BID    potassium chloride  10 mEq Oral TID    magnesium oxide  400 mg Oral BID    methylPREDNISolone  40 mg Intravenous Q12H    sodium chloride flush  10 mL Intravenous Q12H    ipratropium-albuterol  1 ampule Inhalation Q4H WA    zinc oxide  1 applicator Topical Daily    acyclovir  800 mg Oral TID    amiodarone  200 mg Oral Daily    pantoprazole  40 mg Intravenous Daily    aspirin  81 mg Oral Daily    sodium chloride flush  10 mL Intravenous 2 times per day    simvastatin  40 mg Oral Nightly    folic acid  1 mg Oral Daily    oyster shell calcium/vitamin D  1 tablet Oral Daily      heparin (porcine) 16.8 mL/hr (04/07/19 0321)    dextrose 100 mL/hr (03/08/19 1700)       Lab Data: Lab results independently reviewed by myself 4/2/19   CBC:   Recent Labs     04/05/19  0425 04/06/19  0615 04/07/19  0320   WBC 7.7 10.7 17.9*   HGB 7.7* 7.5* 8.1*   * 128* 161     BMP:    Recent Labs     04/05/19  0425 04/06/19  0615 04/07/19  0320   * 134* 134*   K 4.7 4.3 4.1   CO2 26 25 28   BUN 8 9 13   CREATININE <0.5* <0.5* <0.5*     INR:    No results for input(s): INR in the last 72 hours. BNP:    Recent Labs     04/06/19 0615   PROBNP 5,868*     Cardiac Enzymes: No results for input(s): TROPONINI in the last 72 hours. Lipids:   Lab Results   Component Value Date    TRIG 178 05/22/2018    TRIG 183 11/21/2017    HDL 39 05/22/2018    HDL 46 11/21/2017    HDL 31 12/28/2011    HDL 31 05/17/2011    LDLCALC 72 05/22/2018    LDLCALC 97 11/21/2017       Cardiac Imaging:    Echo 3/19/2019:  Definity contrast was used, but port not working properly. The left ventricular systolic function is moderately reduced with an ejection fraction of 30-35%. Inferolateral hypokinesia. Mid anteroseptal, apical lateral and apical septal akinesia. There is mild concentric left ventricular hypertrophy. Normal left ventricular filling pressure. Left ventricular cavity size is normal.   Mild thickening of the anterior leaflet of mitral valve. Mild mitral regurgitation. The left atrium is severely dilated.     CARDIAC CATH MAR 2016 [facility])   Summary Comments:    3 Vessel CAD  Mild LV systolic dysfunction  Patent mid and distal LAD stents  Patent proximal, mid, and distal RCA stents  Normal LVEDP  Normal systemic pressures   Plan:  Cardiac risk factor modification. Treat with medical therapy. Findings/Summary:  CORONARY ANGIOGRAPHY FINDINGS  DOMINANCE:  Right dominant        LEFT MAIN: Luminal irregularities                       LEFT ANTERIOR DESCENDING:  Luminal irregularities, 30 % mid lesion, patent mid and distal overlapping stents                   LEFT CIRCUMFLEX: Luminal irregularities    20-30% mid lesion, OM1: small caliber vessel with severe diffuse disease                  RIGHT CORONARY:  Stenotic, overlapping proximal, mid and distal stents with mid focal 50% in-stent restenosis, diffuse distal 50% stenosis, RPAVGA: distal 70-80% stenosis                LEFT VENTRICULAR FUNCTION:        LVEF: 40-45%        LVEDP: Normal pre-angio        LV Systolic Pressure: Normal         LV to AO Gradient: none         LV Wall Motion:  Abnormal, inferior basal severe hypokinesis    .        MITRAL VALVE: No mitral insufficiency             STRESS MPI MAR 2014: Interpetation Summary:  The LV EF is 47%  There is mild inferior wall hypokinesis. There is a medium sized, fixed defect in the inferior wall. There is a small sized, reversible defect in the anterior wall. There is a medium sized, partially reversible defect in the anteroapical wall. 1.Negative ECG for ischemia with pharmocologic stress. 2.Positive nuclear stress imaging suggestive of inducible ischemia in the anteroapical wall.

## 2019-04-08 NOTE — PROGRESS NOTES
TID   furosemide (LASIX) injection 20 mg BID   potassium chloride (KLOR-CON M) extended release tablet 10 mEq TID   magnesium oxide (MAG-OX) tablet 400 mg BID   magic (miracle) mouthwash with nystatin 4x Daily PRN   sodium chloride flush 0.9 % injection 10 mL Q12H   ipratropium-albuterol (DUONEB) nebulizer solution 1 ampule Q4H WA   oxyCODONE (ROXICODONE) immediate release tablet 5 mg Q4H PRN   Or    oxyCODONE (ROXICODONE) immediate release tablet 10 mg Q4H PRN   zinc oxide (TRIAD HYDROPHILIC) paste 1 applicator Daily   acyclovir (ZOVIRAX) tablet 800 mg TID   heparin (porcine) injection 4,800 Units PRN   heparin (porcine) injection 2,400 Units PRN   HYDROmorphone (DILAUDID) injection 1 mg Q2H PRN   heparin 25,000 units in dextrose 5% 250 mL infusion Continuous   amiodarone (CORDARONE) tablet 200 mg Daily   pantoprazole (PROTONIX) injection 40 mg Daily   aspirin chewable tablet 81 mg Daily   sodium chloride flush 0.9 % injection 10 mL PRN   simvastatin (ZOCOR) tablet 40 mg Nightly   glucose (GLUTOSE) 40 % oral gel 15 g PRN   dextrose 50 % solution 12.5 g PRN   glucagon (rDNA) injection 1 mg PRN   dextrose 5 % solution PRN   folic acid (FOLVITE) tablet 1 mg Daily   oyster shell calcium/vitamin D 250-125 MG-UNIT per tablet 250 mg Daily   ondansetron (ZOFRAN) injection 4 mg Q6H PRN   acetaminophen (TYLENOL) tablet 650 mg Q4H PRN          PHYSICAL EXAM   /63   Pulse 73   Temp 97.8 °F (36.6 °C) (Axillary)   Resp 24   Ht 5' 9\" (1.753 m)   Wt 132 lb 4.4 oz (60 kg)   SpO2 90%   BMI 19.53 kg/m²    Vitals:    04/08/19 0502 04/08/19 0600 04/08/19 0848 04/08/19 0903   BP: 126/67 137/73  111/63   Pulse: 72 71  73   Resp: 22 23 24    Temp:       TempSrc:       SpO2: 98% 99% 90%    Weight:       Height:             Intake/Output Summary (Last 24 hours) at 4/8/2019 0953  Last data filed at 4/8/2019 0903  Gross per 24 hour   Intake 448 ml   Output 1475 ml   Net -1027 ml     Wt Readings from Last 3 Encounters:   04/07/19 LAD (post endograft AAA repair MI)1/2011  4. Ischemic cardiomyopathy: LVEF 30-35%  5. HTN  6. Hx of aortic aneurysm repair   - Per Dr. Wild Dasilva, on ASA/plavix for this  7. Hx of sigmoid colectomy for perf diverticulosis 3/14/2019   - Laparoscopic converted to Open left Colectomy   s/p Gerda's procedure for anastomotic leak   8. Metastatic lung CA, stage 4 with bone mets  9. Hypokalemia  10. Hypomag        PLAN  1. Pt with more NSVT over weekend, terminated by ICD's ATP protocol  2. No shocks but pt NSVT triggered by low K, Mag or hypoxia   - control as tolerated  3. PT remains on vapotherm but will try to wean quickly as possible, -'ve 9L and lungs sound good on auscultation   - goal to improve resp status  4. LHC and RHC once medically stable   - if able to get pt laying flat, will try for tomorrow AM  5. Given dropping LVEF< +wall motion and Ca on CT-> strongly suspicious of obstructive CAD  6. Cont ASA, IV lasix, toprol, zocor   - may consider d/c Amio following cath based on finding   - if negative cath, likely consult EP          1. Agree with 4 gram mag  2. Aggressive KCL replace  3. Cardiac cath likely as outpt once surgical issues resolve  4. Cont ASA ,PO lasix, lisinopril, toprol, zocor, aldactone  5. Cont outpt Amio for SVT/VT (TSH normal)  6. HOLD diuresis until hypokalemia is improved  7. Plavix is NOT currently on board for cardiac reason, will defer use to Vascular   - however given need for anticoagulation and Plavix, would hold ASA to prevent triple tx. Critical care: 45 min   - updated Dr Gogo Thomas, Family. , ICU RN    Patient Active Problem List   Diagnosis    Elevated fasting glucose    CAD (coronary artery disease)    PAD [facility]peripheral artery disease) [facility] [address]    Essential hypertension    Mixed hyperlipidemia    Sprain of right foot    Sprain of anterior talofibular ligament of right ankle    Closed nondisplaced fracture of fifth right metatarsal bone    Closed fracture of proximal lateral malleolus of ankle with routine healing    Right foot pain    Right ankle pain    Nondisplaced fracture of fifth right metatarsal bone with routine healing    History of pulmonary emphysema    Chronic obstructive pulmonary disease with (acute) exacerbation (HCC)    Pulmonary nodule, left    Abnormal bone scan of thoracic spine    Back pain    Visit for monitoring Plavix therapy    History of ventricular tachycardia    Gallstones    Hemoptysis    Former smoker    Elevated PSA    Adenocarcinoma of left lung (HCC)    Acute diverticulitis    Pancytopenia (HCC)    Diverticulitis of large intestine with perforation and abscess without bleeding    Diverticulitis    Chemotherapy-induced neutropenia (HCC)    Mucositis    Herpes zoster oticus    Hearing loss of right ear    Acute respiratory failure with hypoxia ([address]    H/O colectomy    Primary malignant neoplasm of lung metastatic to other site [facility])    NSVT (nonsustained ventricular tachycardia) (HCC)    Other pulmonary embolism without acute cor pulmonale (HCC)    Cardiomyopathy (HCC)    Pneumonia of left lung due to infectious organism    Iron deficiency anemia due to chronic blood loss         Thank you for allowing me to participate in the care of your patient. Please call me with any questions [phone].       Abigail Escalera MD, [address] [facility] Cardiologist  [place]  [phone] [facility]  [phone] [address] [place] [address]  4/8/2019 9:53 AM

## 2019-04-08 NOTE — PROGRESS NOTES
Physical Therapy/Occupational Therapy    Pt was transferred to ICU since last PT session. Will need new PT/OT orders to resume therapy services. Thank you.     Mingo Hernandez, PT, DPT #[id number]  Charmayne Jakes, OTR/L #[id number]

## 2019-04-08 NOTE — CARE COORDINATION
Chart review completed. Patient a 69year old male, admitted for syncope and collapse. Hospital day 32, currently in ICU level of care. Patient currently on Vapotherm. Discharge plan has morphed multiple times during admission and current plans are for patient to discharge to [address] [place] [address] when team feels medically stable for transfer. Patient was seen earlier today by palliative care and she notes that we are awaiting evaluation by cardiology Dr Barry Green to determine plan of care. Writer did place call and leave message for Mere Holman with [facility] to update her on the above. Awaiting returned call back as message was left. Will follow for cardiology input and plan of care.   Cecilia Eddy RN

## 2019-04-08 NOTE — PROGRESS NOTES
Hospitalist Progress Note      PCP: Jose Gil MD    Date of Admission: 3/7/2019    Chief Complaint: shortness of breath     Hospital Course: long admission due to PE, perforated diverticulitis, respiratory failure with hypoxemia to count a few. Transferred back to the ICU for worsening hypoxemic respiratory failure. Patient is awake and alert. Family at bedside. Palliative care meeting was in process when I entered the room. Subjective: has shortness of breath at rest. No chest pain, no nausea or vomiting.         Medications:  Reviewed    Infusion Medications    heparin (porcine) 16.8 mL/hr (04/07/19 1836)    dextrose 100 mL/hr (03/08/19 1700)     Scheduled Medications    mupirocin   Nasal BID    methylPREDNISolone  60 mg Intravenous Q6H    metoprolol succinate  50 mg Oral TID    furosemide  20 mg Intravenous BID    potassium chloride  10 mEq Oral TID    magnesium oxide  400 mg Oral BID    sodium chloride flush  10 mL Intravenous Q12H    ipratropium-albuterol  1 ampule Inhalation Q4H WA    zinc oxide  1 applicator Topical Daily    acyclovir  800 mg Oral TID    amiodarone  200 mg Oral Daily    pantoprazole  40 mg Intravenous Daily    aspirin  81 mg Oral Daily    simvastatin  40 mg Oral Nightly    folic acid  1 mg Oral Daily    oyster shell calcium/vitamin D  1 tablet Oral Daily     PRN Meds: magic (miracle) mouthwash with nystatin, oxyCODONE **OR** oxyCODONE, heparin (porcine), heparin (porcine), HYDROmorphone, sodium chloride flush, glucose, dextrose, glucagon (rDNA), dextrose, ondansetron, acetaminophen      Intake/Output Summary (Last 24 hours) at 4/8/2019 1007  Last data filed at 4/8/2019 0903  Gross per 24 hour   Intake 448 ml   Output 1475 ml   Net -1027 ml       Physical Exam Performed:    /63   Pulse 73   Temp 97.8 °F (36.6 °C) (Axillary)   Resp 24   Ht 5' 9\" (1.753 m)   Wt 132 lb 4.4 oz (60 kg)   SpO2 90%   BMI 19.53 kg/m²     General appearance: No apparent distress, appears stated age. Pleasant and cooperative. HEENT: Pupils equal, round, and reactive to light. Conjunctivae/corneas clear. Neck: Supple, with full range of motion. No jugular venous distention. Trachea midline. Respiratory:  Normal respiratory effort. Bilateral diffuse rhonchi  Cardiovascular: Regular rate and rhythm with normal S1/S2 without murmurs, rubs or gallops. Abdomen: Soft, mild tenderness  Musculoskeletal: No clubbing, cyanosis or edema bilaterally. Full range of motion without deformity. Skin: Skin color, texture, turgor normal.  No rashes or lesions. Neurologic:  Neurovascularly intact without any focal sensory/motor deficits. Cranial nerves: II-XII intact, grossly non-focal.  Psychiatric: Alert and oriented, thought content appropriate, normal insight  Capillary Refill: Brisk,< 3 seconds   Peripheral Pulses: +2 palpable, equal bilaterally       Labs:   Recent Labs     04/06/19  0615 04/07/19  0320 04/08/19  0403   WBC 10.7 17.9* 9.8   HGB 7.5* 8.1* 7.8*   HCT 22.4* 24.3* 23.1*   * 161 129*     Recent Labs     04/06/19  0615 04/07/19  0320 04/08/19  0403   * 134* 134*   K 4.3 4.1 4.4   CL 99 96* 97*   CO2 25 28 28   BUN 9 13 15   CREATININE <0.5* <0.5* <0.5*   CALCIUM 7.7* 7.7* 7.7*     No results for input(s): AST, ALT, BILIDIR, BILITOT, ALKPHOS in the last 72 hours. No results for input(s): INR in the last 72 hours. No results for input(s): Precious Anna in the last 72 hours. Urinalysis:      Lab Results   Component Value Date    NITRU Negative 03/10/2019    WBCUA 3-5 03/10/2019    RBCUA 0-2 03/10/2019    BLOODU Negative 03/10/2019    SPECGRAV 1.025 03/10/2019    GLUCOSEU Negative 03/10/2019       Radiology:  XR CHEST PORTABLE   Final Result   Increasing right lung airspace disease could represent edema or pneumonia. Left lung airspace disease appears unchanged. CT CHEST WO CONTRAST   Final Result   1.  Bilateral upper lobe ground-glass opacities, left greater than right, with   developing left perihilar consolidation. Findings are most concerning for   multifocal pneumonia with asymmetric pulmonary edema a less likely   possibility. 2. Small bilateral pleural effusions which are slightly increased from   03/26/2019.   3. Trace ascites in the upper abdomen. 4. Sclerotic metastasis in the T6 vertebral body. 5. Coronary atherosclerosis. XR CHEST PORTABLE   Final Result   Nonspecific consolidation mid and lower left lung with left pleural effusion   presumably related to sequela from pulmonary edema. Pneumonia is a   consideration. Calcific atherosclerosis aorta. Cardiomegaly. XR ABDOMEN (KUB) (SINGLE AP VIEW)   Final Result   Dilated small bowel, may reflect ileus or obstruction. Recommend follow-up   study. CT ABDOMEN PELVIS WO CONTRAST Additional Contrast? None   Final Result   Interval increase in size of the left lower quadrant collection which now   measures up to 7.8 cm. Administered rectal contrast extends into the   collection. Right anterior approach drain courses adjacent to the collection. No significant change in pleural effusions and associated airspace disease. The findings were sent to the Radiology Results [address] at 12:53   pm on 3/26/2019to be communicated to a licensed caregiver. FL BARIUM ENEMA   Final Result   Active extravasation of contrast from the sigmoid colon, compatible with   anastomotic leak. Please refer to accompanying CT. Diverticulosis. CT ABDOMEN PELVIS W IV CONTRAST Additional Contrast? Radiologist Recommendation   Final Result   1. The patient is 11 days status post partial sigmoid colectomy. Surgical   drain remains in place with a small ill-defined fluid collection that is   stable from prior exam.  This does not appear to represent an abscess and may   represent postoperative hematoma or seroma.    2. There is a new collection of free intraperitoneal air immediately anterior   to the prior anastomosis site, contained within the left lower quadrant. This indicates an anastomosis leak. No distant free air. 3. 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 4. Cholelithiasis with no evidence of acute cholecystitis. 5. Worsening small pleural effusions and airspace changes in the lower chest.   Pattern may represent underlying pulmonary edema or perhaps pneumonitis. XR CHEST PORTABLE   Final Result   Interval development of mild pulmonary edema. There is a small left-sided pleural effusion with more focal consolidation   the left lung base, atelectasis versus pneumonia. IR PICC WO SQ PORT/PUMP > 5 YEARS   Final Result   Successful placement of PICC line. CT ABDOMEN PELVIS WO CONTRAST Additional Contrast? None   Final Result   No evidence retroperitoneal hematoma. Interval colonic resection. Small volume loculated collection in the left   pericolic gutter, with surgical drain terminating adjacent. Mildly dilated loops of small bowel with scattered air-fluid levels, most   likely postoperative ileus. Trace pleural effusions and bibasilar airspace disease representing   atelectasis or pneumonia. VL Extremity Venous Bilateral   Final Result      CT CHEST PULMONARY EMBOLISM W CONTRAST   Final Result   Bilateral pulmonary emboli      Scattered opacities throughout the lungs, greatest at the lung bases, with   tiny pleural effusions. Changes are increased compared to prior      Sclerotic osseous metastatic disease as described previously      The findings were sent to the Radiology Results [address] at 3:57   pm on 3/16/2019to be communicated to a licensed caregiver. XR CHEST 1 VW   Final Result   Shallow inflation with findings consistent with basilar atelectasis. Left   pleural thickening versus trace effusion.          CT CHEST ABDOMEN PELVIS WO [C34.90]     Herpes zoster oticus [B02.21]     Hearing loss of right ear [H91.91]     Chemotherapy-induced neutropenia (HCC) [D70.1, T45.1X5A]     Mucositis [K12.30]     Diverticulitis [K57.92] 03/07/2019    Diverticulitis of large intestine with perforation and abscess without bleeding [K57.20]      PLAN:    Acute hypoxemic respiratory failure  Multifactorial, with PE, malignancy and possible pneumonia  Is on vapotherm  Stable but not better  CXR ordered for today, results pending    Metastatic lung cancer  Chemotherapy on hold to avoid healing  Has responded to chemo when could be administered.  Persistent acute medical issues prevent further chemo, causing prognosis to get worse  Palliative care discussions ongoing    Colonic diverticulitis with perforation  Had colectomy and colostomy  Stable now    Hyponatremia  Mild, secondary to lung malignancy  Monitor BMP    Anemia  Malignancy and chronic disease combined  No active blood loss  Transfuse if indicated  No transfusion needed as of today    D/w palliative care  D/w family      DVT Prophylaxis: SCD  Diet: DIET GENERAL;  Code Status: Full Code    PT/OT Eval Status: on hold    Dispo - hospice is advisable at this point    Osmani Mejia MD

## 2019-04-08 NOTE — ONCOLOGY
04/06/19  0615 04/07/19  0320 04/08/19  0403   WBC 10.7 17.9* 9.8   HGB 7.5* 8.1* 7.8*   HCT 22.4* 24.3* 23.1*   * 161 129*   MCV 85.7 84.8 85.6        Recent Labs     04/06/19  0615 04/07/19  0320 04/08/19  0403   * 134* 134*   K 4.3 4.1 4.4   CL 99 96* 97*   CO2 25 28 28   BUN 9 13 15   CREATININE <0.5* <0.5* <0.5*     No results for input(s): AST, ALT, ALB, BILIDIR, BILITOT, ALKPHOS in the last 72 hours.     Magnesium:    Lab Results   Component Value Date    MG 1.90 04/08/2019    MG 2.20 04/07/2019    MG 1.60 04/06/2019         Problem List  Patient Active Problem List   Diagnosis    Elevated fasting glucose    CAD (coronary artery disease)    PAD (peripheral artery disease) ([address]    Essential hypertension    Mixed hyperlipidemia    Sprain of right foot    Sprain of anterior talofibular ligament of right ankle    Closed nondisplaced fracture of fifth right metatarsal bone    Closed fracture of proximal lateral malleolus of ankle with routine healing    Right foot pain    Right ankle pain    Nondisplaced fracture of fifth right metatarsal bone with routine healing    History of pulmonary emphysema    Chronic obstructive pulmonary disease with (acute) exacerbation (HCC)    Pulmonary nodule, left    Abnormal bone scan of thoracic spine    Back pain    Visit for monitoring Plavix therapy    History of ventricular tachycardia    Gallstones    Hemoptysis    Former smoker    Elevated PSA    Adenocarcinoma of left lung ([address]    Acute diverticulitis    Pancytopenia ([address]    Diverticulitis of large intestine with perforation and abscess without bleeding    Diverticulitis    Chemotherapy-induced neutropenia (HCC)    Mucositis    Herpes zoster oticus    Hearing loss of right ear    Acute respiratory failure with hypoxia ([address]    H/O colectomy    Primary malignant neoplasm of lung metastatic to other site [facility])    NSVT (nonsustained ventricular tachycardia) ([address]    Other pulmonary embolism without acute cor pulmonale (HCC)    Cardiomyopathy (HCC)    Pneumonia of left lung due to infectious organism    Iron deficiency anemia due to chronic blood loss       ASSESSMENT AND PLAN    Acute Perforated descending colon diverticulitis (not immune mediated colitis) with free air in the abd   - per gen surgery , s/p Laparoscopic converted to open left Colectomy, 3/14/2019, with Dr. Alvin Sebastian  - CT scan done 3/25 with anastomotic leak at the [facility] surgical site. - Status post Gerda procedure on 3/27/2019 with Dr. Alvin Sebastian.  - Ramos Moores drain removed, staples. Surgery signed off. HCAP/ARDS   - Vanco/Rolando completed. S/p bronch- no signs of infectious etiology- likely already treated via ATB prior to bronch being conducted   - Status post bronchoscopy on 4/03/2019. No endobronchial lesions. Cultures are NGTD. - Started Methylprednisolone 40 mg q12 hrs on 4/04/2019 in case of pneumonitis, as the bronchoscopy did not show obviously purulent secretions. Increased to 60 mg every 6 hours over weekend   - Now on 40 liters via Vasotherm     Adenocarcinoma of the LLL of the lung, stage IV with bone mets   - Holding chemo for acute issues. - Cycle 3 carbo/alimta [place] R[place] was due on 3/22- held due to acute issues, cont to hold. Plan to resume Raynell Primus only outpatient.   - CT chest/abd/pelvis, 3/10/2019, showed improvement in the left hilar soft tissue and radiation changes. No obvious new disease. PE  -Diagnosed on 3/16/2019 with bilateral PEs involving the right and left main pulm arteries.   -Also had thrombus in the right femoral, peroneal, greater saphenous, short saphenous veins and in the left peroneal veins.  -Plan Eliquis on discharge.   -Will need lifelong anticoagulation.  -Does not need a filter per Dr. Chanell Pearson on 3/18   -Started on a heparin drip on 3/31/2019. Can switch to a NOAC at discharge. Lytes  - Replete K PRN.     Anemia, possible GI bleed on hep drip  - EGD 3/21 negative. - PPI added, now BID dosing per GI 3/21.  - CT AP 3/21 - no signs of bleeding.  - due for B12 shot - given 3/19/2019 - required every 9 weeks with this chemo. - transfuse if Hgb < 7, Hgb 7.1 today       Shingles to the V3 dermatome of the trigeminal nerve  - Acyclovir 800 TID for prophylaxis; already completed treatment dose earlier in admit     NSVT- ICD-Known hx of VT s/p ICD, on Amio, VT 3/11/2019 with defibb   - per cardiology  - family reports that news regarding his cardiac arrhythmia sheds light on a more sinister cardiac issue. Await discussion with Dr. Zack Low- possible hospice. Renal endograft secondary to PAD  - was on Plavix PTA   - Plavix on hold presently due to bleeding and post op healing complications. - currently on baby asa only   - previously used Integrilin this admit, stopped due to bleeding concerns. May need to resume- hospitalist is speaking with vascular surgery for clarification. (Dr. Reddy Montano)     Prognosis is overall guarded. Likely has ARDS, impending ventilation. I discussed with family and patient that per Dr. Mckeon [place] oncology/pulm standpoint, he would recommend using ventilation for lung support for up to one week only.  They are awaiting cardiac input at present.      Gayle Al, MICHAEL

## 2019-04-08 NOTE — PROGRESS NOTES
Speech Language Pathology    Pt transferred to ICU since last ST session. ST to require new orders 2/2 change in medical status. Please place new orders for ST as pt is able and appropriate.     Thank you,  Brianna Tavera, M.S. [id number] [facility]  Speech-language pathologist  [phone]

## 2019-04-08 NOTE — PROGRESS NOTES
RESPIRATORY THERAPY ASSESSMENT    Name:  Huong Santiago Record Number:  [id number]  Age: 69 y.o. Gender: male  : 1945  Today's Date:  2019  Room:  [address] [place] [address]    Assessment     Is the patient being admitted for a COPD or Asthma exacerbation? No   (If yes the patient will be seen every 4 hours for the first 24 hours and then reassessed)    Patient Admission Diagnosis      Allergies  No Known Allergies    Minimum Predicted Vital Capacity:     N/A          Actual Vital Capacity:      N/A              Pulmonary History:Lung CA with mets to spine. Home Oxygen Therapy:  room air  Home Respiratory Therapy:None   Current Respiratory Therapy:  DuoNeb HHN Q4H WA  Treatment Type: HHN  Medications: Albuterol/Ipratropium    Respiratory Severity Index(RSI)   Patients with orders for inhalation medications, oxygen, or any therapeutic treatment modality will be placed on Respiratory Protocol. They will be assessed with the first treatment and at least every 72 hours thereafter. The following severity scale will be used to determine frequency of treatment intervention. Smoking History: Smoking History Less than 1ppd or less than 15 pack year = 1    Social History  Social History     Tobacco Use    Smoking status: Former Smoker     Packs/day: 0.00     Years: 0.00     Pack years: 0.00     Last attempt to quit: 2003     Years since quittin.2    Smokeless tobacco: Never Used   Substance Use Topics    Alcohol use:  Yes     Alcohol/week: 10.8 oz     Types: 18 Shots of liquor per week     Comment: social-used to drink alot     Drug use: No       Recent Surgical History: None = 0  Past Surgical History  Past Surgical History:   Procedure Laterality Date    ARTERIAL ANEURYSM REPAIR  2010    right popliteal artery aneurysm repair    BRONCHOSCOPY  2019    left endobronchial tumor    BRONCHOSCOPY N/A 2019    BRONCHOSCOPY ALVEOLAR LAVAGE performed by William Tinsley MD at [address] [place] [address] ENDOSCOPY    BRONCHOSCOPY  1/18/2019    BRONCHOSCOPY/TRANSBRONCHIAL LUNG BIOPSY performed by Mali Maria MD at [address] [place] [address] N/A 1/23/2019    EBUS ULTRASOUND W/ ANESTHESIA AND NEEDLE BIOPSY performed by Elva Plaza MD at [address] [place] [address]  1/23/2019    BRONCHOSCOPY ALVEOLAR LAVAGE performed by Elva Plaza MD at [address] [place] [address]  1/23/2019    BRONCHOSCOPY BIOPSY BRONCHUS performed by Elva Plaza MD at [address] [place] [address]  1/23/2019    BRONCHOSCOPY BRUSHINGS performed by Elva Plaza MD at [address] [place] [address]  1/23/2019    BRONCHOSCOPY/TRANSBRONCHIAL NEEDLE BIOPSY performed by Elva Plaza MD at [address] [place] [address]  1/23/2019    BRONCHOSCOPY/TRANSBRONCHIAL NEEDLE BIOPSY ADDL LOBE performed by Elva Plaza MD at [address] [place] [address] N/A 4/3/2019    BRONCHOSCOPY ALVEOLAR LAVAGE performed by Elva Plaza MD at [address] [place] [address]  4/3/2019    BRONCHOSCOPY THERAPUTIC ASPIRATION INITIAL performed by Elva Plaza MD at [address] [place] [address]  10/08/01 12/20/04    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    COLONOSCOPY  4/4/14    wnl    CORONARY ANGIOPLASTY WITH STENT PLACEMENT      FINGER FRACTURE SURGERY      left middle finger    HEMICOLECTOMY N/A 3/14/2019    LAPAROSCOPIC CONVERT TO OPEN SIGMOID COLECTOMY performed by Jim Yeung MD at [facility] / REMOVAL / [address] 1/23/2019    RIGHT SUBCLAVIAN VEIN PORT A CATH PLACEMENT performed by Kael Anne MD at [email or web address] N/A 3/27/2019    EXPLORATORY LAPAROTOMY, SIGMOID COLECTOMY, COLOSTOMY performed by Jim Yeung MD at [address] [place] [address]  2/10/2014    BX duodenum, atrum, distal esophagus    UPPER GASTROINTESTINAL ENDOSCOPY  4/4/14    BX gastric    UPPER GASTROINTESTINAL ENDOSCOPY  11/4/15  UPPER GASTROINTESTINAL ENDOSCOPY N/A 3/21/2019    EGD ESOPHAGOGASTRODUODENOSCOPY performed by Abdirizak Alejandra MD at [address] [place] [address]       Level of Consciousness: Alert, Oriented, and Cooperative = 0    Level of Activity: Mostly sedentary, minimal walking = 2    Respiratory Pattern: Dyspnea with exertion;Irregular pattern;or RR less than 6 = 2    Breath Sounds: Diminshed bilaterally and/or crackles = 2    Sputum  Sputum Color: None, Tenacity: None, Sputum How Obtained: Spontaneous cough  Cough: Weak, non-productive = 3    Vital Signs   /63   Pulse 73   Temp 97.8 °F (36.6 °C) (Axillary)   Resp 24   Ht 5' 9\" (1.753 m)   Wt 132 lb 4.4 oz (60 kg)   SpO2 90%   BMI 19.53 kg/m²   SPO2 (COPD values may differ): Less than 86% on room air or greater than 92% on FiO2 greater than 50% = 4    Peak Flow (asthma only): not applicable = 0    RSI: 60-62 Q4H WA (Every 4 Hours While Awake)        Plan       Goals: medication delivery and improve oxygenation    Patient/caregiver was educated on the proper method of use for Respiratory Care Devices:  Yes      Level of patient/caregiver understanding able to:   ? Verbalize understanding   ? Demonstrate understanding       ? Teach back        ? Needs reinforcement       ? No available caregiver               ? Other:     Response to education:  Excellent     Is patient being placed on Home Treatment Regimen? No     Does the patient have everything they need prior to discharge? Yes     Comments: Patient assessed and chart reviewed. Discussed with patient pulmonary disease history, smoking history, surgical history, home oxygen usage, and home respiratory medications. Treatment plan discussed with patient, and patient is in agreement. Plan of Care: [address] [place]. Re-evaluate in 3 days. Electronically signed by Emelia Alonso RCP on 4/8/2019 at 9:44 AM    Respiratory Protocol Guidelines     1.  Assessment and treatment by Respiratory Therapy will be initiated for medication and therapeutic interventions upon initiation of aerosolized medication. 2. Physician will be contacted for respiratory rate (RR) greater than 35 breaths per minute. Therapy will be held for heart rate (HR) greater than 140 beats per minute, pending direction from physician. 3. Bronchodilators will be administered via Metered Dose Inhaler (MDI) with spacer when the following criteria are met:  a. Alert and cooperative     b. HR < 140 bpm  c. RR < 30 bpm                d. Can demonstrate a 2-3 second inspiratory hold  4. Bronchodilators will be administered via Hand Held Nebulizer [facility]) to patients when ANY of the following criteria are met  a. Incognizant or uncooperative          b. Patients treated with HHN at Home        c. Unable to demonstrate proper use of MDI with spacer     d. RR > 30 bpm   5. Bronchodilators will be delivered via Metered Dose Inhaler (MDI), HHN, Aerogen to intubated patients on mechanical ventilation. 6. Inhalation medication orders will be delivered and/or substituted as outlined below. Aerosolized Medications Ordering and Administration Guidelines:    1. All Medications will be ordered by a physician, and their frequency and/or modality will be adjusted as defined by the patients Respiratory Severity Index (RSI) score. 2. If the patient does not have documented COPD, consider discontinuing anticholinergics when RSI is less than 9.  3. If the bronchospasm worsens (increased RSI), then the bronchodilator frequency can be increased to a maximum of every 4 hours. If greater than every 4 hours is required, the physician will be contacted. 4. If the bronchospasm improves, the frequency of the bronchodilator can be decreased, based on the patient's RSI, but not less than home treatment regimen frequency. 5. Bronchodilator(s) will be discontinued if patient has a RSI less than 9 and has received no scheduled or as needed treatment for 72  Hrs.     Patients Ordered on a Mucolytic Agent:    1. Must always be administered with a bronchodilator. 2. Discontinue if patient experiences worsened bronchospasm, or secretions have lessened to the point that the patient is able to clear them with a cough. Anti-inflammatory and Combination Medications:    1. If the patient lacks prior history of lung disease, is not using inhaled anti-inflammatory medication at home, and lacks wheezing by examination or by history for at least 24 hours, contact physician for possible discontinuation.

## 2019-04-08 NOTE — PROGRESS NOTES
Ostomy Referral Follow-up Progress Note      NAME:  Akash Rojas  MEDICAL RECORD NUMBER:  [id number]  AGE: 69 y.o. GENDER:  male  :  1945  TODAY'S DATE:  2019    Subjective: The appliance has not been changed since last week 4 day ago. Akash Rojas is a 69 y.o. male referred by:   [x] Physician  [] Nursing  [] Other:     *wounds not observed today. Here for stoma teaching with wife. Stoma size: 1 5/8 inch = 41mm (Smaller today)  Flange size: 2 3/4 inch.  Using two piece 2 3/4 inch with flat flange # [id number] and lock and roll bag # [id number]. 2 1/4 flange and bags left in room but please use larger appliance first to use up. Objective  In ICU now. On Oximizer O2. Wife and other family at bed side. DIAN drain out. Stool formed. Staples out of incision line. /61   Pulse 78   Temp 97.8 °F (36.6 °C) (Axillary)   Resp 21   Ht 5' 9\" (1.753 m)   Wt 132 lb 4.4 oz (60 kg)   SpO2 93%   BMI 19.53 kg/m²     Jared Risk Score Jared Scale Score: 16    Assessment  Stoma smaller red and moist.  Emerita stomal skin intact. Surgeon       Colostomy LUQ Descending/sigmoid (Active)   Stomal Appliance 2 piece 2019 12:26 PM   Flange Size (inches) 2.75 Inches 2019 12:26 PM   Stoma  Assessment Moist;Red;Swelling 2019 12:26 PM   Mucocutaneous Junction Intact 2019 12:26 PM   Peristomal Assessment Clean; Intact 2019 12:26 PM   Treatment Bag change;Site care; Heat applied 2019 12:26 PM   Stool Appearance Formed 2019 12:26 PM   Stool Color Brown 2019 12:26 PM   Stool Amount Medium 2019 12:26 PM   Output (mL) 50 ml 2019  2:02 PM   Number of days: 11          Intake/Output Summary (Last 24 hours) at 2019 1242  Last data filed at 2019 0552  Gross per 24 hour   Intake 448 ml   Output 1215 ml   Net -767 ml       Plan:   Plan for Ostomy Care: Wife reviewed how and demonstrated how to change colostomy with patient. Supplies gathered.   Current bag removed. Instructed on wafer management. Cleansed with warm water. Pat dry. Measured and cut flange. Applied around stoma. Bag attached. Closed devise. Wife verbalized and demonstrated understanding. Instructed bed side RN to place transfer dressing on O2 tubing for prevention of skin break down. Ostomy Plan of Care  [x] Supplies/Instructions left in room 2 3/4 appliance 2 piece and 2 1/4 appliance 2 piece.   [] Patient using home supplies  [x] Brand/supplies at bedside Marvel    Current Diet: DIET GENERAL;  Dietician consult:  N/A    Discharge Plan:  Placement for patient upon discharge: intermediate care facility    Outpatient visit plan No  Supplies given Yes   Samples requested No    Referrals:  []   [] [facility]  [] Supplies  [] Other      Patient/Caregiver Teaching:  Written Instructions given to patient/family wife  Teaching provided:  [] Reviewed GI and A&P        [] Supplies  [] Pouch emptying      [] Manipulate closure  [x] Routine Care         [] Comment  [x] Pouch maintenance           Level of patient/caregiver understanding able to:  [x] Indicates understanding       [x] Needs reinforcement  [] Unsuccessful      [] Verbal Understanding  [x] Demonstrated understanding       [] No evidence of learning  [] Refused teaching         [] N/A    Electronically signed by Jose Curtis, RN, MSN, Alfredo Coronel on 4/8/2019 at 12:42 PM

## 2019-04-08 NOTE — PROGRESS NOTES
04/08/19 0329   NIV Type   Equipment Type V60   Mode BIPAP   Mask Type Full face mask   Mask Size Large   Settings/Measurements   Comfort Level Good   Using Accessory Muscles No   IPAP 12 cmH20   EPAP 6 cmH2O   Resp 18   SpO2 93   FiO2  100 %   Vt Exhaled 725 mL   Mask Leak (lpm) 0 lpm   Skin Protection for O2 Device Yes   Breath Sounds   Right Upper Lobe Diminished   Right Middle Lobe Diminished   Right Lower Lobe Diminished   Left Upper Lobe Diminished   Left Lower Lobe Diminished   Alarm Settings   Alarms On Y   Oxygen Therapy/Pulse Ox   SpO2 (!) 83 %

## 2019-04-08 NOTE — PLAN OF CARE
Palliative Care Progress Note  Palliative Care Admit date: 4/3/19    Advance Directives:  Met @ [facility] w/ pt, spouse, 3/4 dtrs and a BJ. They had been discussing code wishes among themselves, a subject we discussed w/ pt and spouse last week. Pt is leaning toward amending code status to reflect DNR/DNI because, as he states, \"when I'm gone, I'm gone, don't bring me back. \"   Mercedez Mccarthy also advanced discussion around resp failure and pt does not believe he'd want to go onto MV support. That said, \"this is a new way of thinking and before we change anything, we want to have the benefit of d/w\" Cards before making any decision. Apprised shift RN and Pulm NP    Plan:  All are agreeable to writer returning for ongoing ACP discussions.           Reason for consult:    _X_ Advance Care Planning  ___ Transition of Care Planning  _X_ Psychosocial/Spiritual Support  ___ Symptom Management                                                    Heather Way RN

## 2019-04-09 NOTE — PROGRESS NOTES
RT accompanied the patient to Cath Lab with Cath Lab staff. Patient was bagged using an ambu bag and 100% FiO2. On arrival to the Cath Lab, patient placed on the 840 ventilator using ordered settings from Pulmonology/Critical Care. RT will remain in Cath Lab with patient for procedure to manage any mechanical ventilation needs.

## 2019-04-09 NOTE — ONCOLOGY
ONCOLOGY HEMATOLOGY CARE PROGRESS NOTE      SUBJECTIVE:     Possible wean from vent today, but more likely tomorrow. Cardiac notes reviewed, s/p PCI. Now on ASA, Brilinta, and Hep drip. S/p 8 hours of Integrilin yesterday. No bleeding episodes. Hgb 7.4 and plt are 108k. ROS:   The remaining 10 point review of symptoms is unremarkable. OBJECTIVE        Physical    VITALS:  BP (!) 121/58   Pulse 79   Temp 98 °F (36.7 °C) (Oral)   Resp 22   Ht 5' 9\" (1.753 m)   Wt 163 lb 12.8 oz (74.3 kg)   SpO2 92%   BMI 24.19 kg/m²   TEMPERATURE:  Current - Temp: 98 °F (36.7 °C); Max - Temp  Av.1 °F (36.7 °C)  Min: 97.9 °F (36.6 °C)  Max: 98.5 °F (36.9 °C)  PULSE OXIMETRY RANGE: SpO2  Av.3 %  Min: 86 %  Max: 99 %  24HR INTAKE/OUTPUT:      Intake/Output Summary (Last 24 hours) at 2019 1251  Last data filed at 2019 1000  Gross per 24 hour   Intake 623 ml   Output 2300 ml   Net -1677 ml       CONSTITUTIONAL: sedated, intubated   HEENT oral pharynx , no scleral icterus, wearing high flow nasal canula   HEMATOLOGIC/LYMPHATICS:  no cervical lymphadenopathy, no supraclavicular lymphadenopathy, no axillary lymphadenopathy and no inguinal lymphadenopathy  LUNGS:  Ventilator   CARDIOVASCULAR:  , regular rate and rhythm, normal S1 and S2, no S3 or S4, and no murmur noted  ABDOMEN: soft, non tender   Back: no bruising, purpura, or hematomas noted   MUSCULOSKELETAL:  There is no redness, warmth, or swelling of the joints.   EXTREMETIES: BLE edema +1, non pitting   NEUROLOGIC:  Sedated      Data      Recent Labs     19  0320 19  0403 19  0503   WBC 17.9* 9.8 9.1   HGB 8.1* 7.8* 7.3*   HCT 24.3* 23.1* 21.9*    129* 114*   MCV 84.8 85.6 85.9        Recent Labs     19  0320 19  0403 19  0503   * 134* 136   K 4.1 4.4 3.8   CL 96* 97* 98*   CO2 28 28 27   BUN 13 15 18   CREATININE <0.5* <0.5* <0.5*     No results for input(s): AST, ALT, ALB, 3/21.  - CT AP 3/21 - no signs of bleeding.  - due for B12 shot - given 3/19/2019 - required every 9 weeks with this chemo. - transfuse if Hgb < 7, Hgb 7.4 today       Shingles to the V3 dermatome of the trigeminal nerve  - Acyclovir 800 TID for prophylaxis; already completed treatment dose earlier in admit     NSVT- ICD-Known hx of VT s/p ICD, on Amio, VT 3/11/2019 with defibb   - s/p LHC 4-9 with new drug eluting stents for severe stenosis     Renal endograft secondary to PAD  -anti-platelet therapy     Prognosis remains regarded. Thomas Ann, MICHAEL     Discussed with Dr. Srinath Hidalgo.

## 2019-04-09 NOTE — PROGRESS NOTES
Nutrition Assessment    Type and Reason for Visit: Reassess    Nutrition Recommendations:   · NPO for cath  · Resume liberalized general diet diet and Ensure ONS TID when appropriate  · Monitor respiratory status, ability to adv diet vs. need for nutrition support     Nutrition Assessment: Follow up: Nutritionally declining AEB NPO status. Plans for heart cath today. Was favorable to ensure ONS. Oxygen requirements are currently high, on 40L vapotherm currently. May need intubation per MDR. RD will continue to follow for pts nutritional needs. To remain NPO today for Heart cath. Malnutrition Assessment:  · Malnutrition Status: Meets the criteria for severe malnutrition  · Context: Chronic illness  · Findings of the 6 clinical characteristics of malnutrition (Minimum of 2 out of 6 clinical characteristics is required to make the diagnosis of moderate or severe Protein Calorie Malnutrition based on AND/ASPEN Guidelines):  1. Energy Intake-Less than or equal to 75% of estimated energy requirement, Greater than or equal to 1 month    2. Weight Loss-10% loss or greater, in 3 months  3. Fat Loss-Unable to assess,    4. Muscle Loss-Unable to assess, Thigh (quadriceps), Clavicles (pectoralis and deltoids)  5. Fluid Accumulation-No significant fluid accumulation,    6.   Strength-Not measured    Nutrition Risk Level: High    Nutrient Needs:  · Estimated Daily Total Kcal: 9751-3141  · Estimated Daily Protein (g): 109-131  · Estimated Daily Total Fluid (ml/day): 1 mL/kcal     Nutrition Diagnosis:   · Problem: Severe malnutrition  · Etiology: related to Insufficient energy/nutrient consumption, Alteration in GI function, Increased demand for energy/nutrients     Signs and symptoms:  as evidenced by Intake 0-25%, Intake 25-50%, Weight loss    Objective Information:  · Nutrition-Focused Physical Findings: 200 mL ostomy output 4/8  · Wound Type: Stage II, Pressure Ulcer  · Current Nutrition Therapies:  · Oral Diet Orders: NPO   · Oral Diet intake: NPO  · Oral Nutrition Supplement (ONS) Orders: None  · ONS intake: NPO  · Anthropometric Measures:  · Ht: 5' 9\" (175.3 cm)   · Current Body Wt: 163 lb (73.9 kg)  · Admission Body Wt: 191 lb (86.6 kg)  · Usual Body Wt: 194 lb (88 kg)(in january )  · % Weight Change:   31 lbs; -16% wt loss x past 3 months   · Ideal Body Wt: 160 lb (72.6 kg),BMI Classification: BMI 18.5 - 24.9 Normal Weight    Nutrition Interventions:   Continue NPO(for cath. Resume diet when appropriate )  Continued Inpatient Monitoring    Nutrition Evaluation:   · Evaluation: Progressing toward goals   · Goals: Pt will tolerate most appropriate form of nutrition therapy this admission     · Monitoring: Nutrition Progression, Meal Intake, Supplement Intake, Diet Tolerance, Weight, Pertinent Labs      Electronically signed by Jarrett Leon.  Zoe Espinoza RD, LD on 4/9/19 at 10:47 AM    Contact Number: [phone]

## 2019-04-09 NOTE — PROCEDURES
Brief Pre-Op Note/Sedation Assessment      Princess Pretty  1945  [id number]  [id number]  2:37 PM    Planned Procedure: Cardiac Catheterization Procedure    Post Procedure Plan: Return to same level of care    Consent: I have discussed with the patient and/or the patient representative the indication, alternatives, and the possible risks and/or complications of the planned procedure and the anesthesia methods. The patient and/or patient representative appear to understand and agree to proceed. Chief Complaint: Anginal Equivalent  Dyspnea on Exertion      Indications for the Procedure:   CAD Presentation:  Worsening Angina, Resuscitated Cardiac Arrest and Suspected CAD  Anginal Classification within 2 weeks:  CCS III - Symptoms with everyday living activities, i.e., moderate limitation  NYHA Heart Failure Class within 2 weeks: Class IV - Symptoms of HF at rest, Newly Diagnosed?  Yes, Heart Failure Type: Systolic      Anti- Anginal Meds within 2 weeks:   ANTI-ANGINAL MEDS: Yes: Beta Blockers, Aspirin and Statin (Any)      Stress or Imaging Studies Performed:  None    Vital Signs:  BP (!) 121/58   Pulse 83   Temp 98 °F (36.7 °C) (Oral)   Resp 18   Ht 5' 9\" (1.753 m)   Wt 163 lb 12.8 oz (74.3 kg)   SpO2 98%   BMI 24.19 kg/m²     Allergies:  No Known Allergies    Past Medical History:  Past Medical History:   Diagnosis Date    Benign neoplasm of colon 2001    Cancer ([address]     lung cancer with mets with spine     Diverticulosis of colon (without mention of hemorrhage) 2001    Elevated fasting glucose     Former cigarette smoker     Herpes simplex viral infection 03/07/2019    orolabial    Hyperlipidemia     Hypertension     MI, old     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    Right popliteal artery occlusion ([address] 11/2010         Surgical History:  Past Surgical History:   Procedure Laterality Date    ARTERIAL ANEURYSM REPAIR  11/2010    right popliteal artery aneurysm repair   [facility] BRONCHOSCOPY  01/18/2019    left endobronchial tumor    BRONCHOSCOPY N/A 1/18/2019    BRONCHOSCOPY ALVEOLAR LAVAGE performed by Ganga Odom MD at [address] [place] [address]  1/18/2019    BRONCHOSCOPY/TRANSBRONCHIAL LUNG BIOPSY performed by Ganga Odom MD at [address] [place] [address] N/A 1/23/2019    EBUS ULTRASOUND W/ ANESTHESIA AND NEEDLE BIOPSY performed by Flex Mitchell MD at [address] [place] [address]  1/23/2019    BRONCHOSCOPY ALVEOLAR LAVAGE performed by Flex Mitchell MD at [address] [place] [address]  1/23/2019    BRONCHOSCOPY BIOPSY BRONCHUS performed by Flex Mitchell MD at [address] [place] [address]  1/23/2019    BRONCHOSCOPY BRUSHINGS performed by Flex Mitchell MD at [address] [place] [address]  1/23/2019    BRONCHOSCOPY/TRANSBRONCHIAL NEEDLE BIOPSY performed by Flex Mitchell MD at [address] [place] [address]  1/23/2019    BRONCHOSCOPY/TRANSBRONCHIAL NEEDLE BIOPSY ADDL LOBE performed by Flex Mitchell MD at [address] [place] [address] N/A 4/3/2019    BRONCHOSCOPY ALVEOLAR LAVAGE performed by Flex Micthell MD at [address] [place] [address]  4/3/2019    BRONCHOSCOPY THERAPUTIC ASPIRATION INITIAL performed by Flex Mitchell MD at [address] [facility]  10/08/01 12/20/04    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    COLONOSCOPY  4/4/14    wnl    CORONARY ANGIOPLASTY WITH STENT PLACEMENT      FINGER FRACTURE SURGERY      left middle finger    HEMICOLECTOMY N/A 3/14/2019    LAPAROSCOPIC CONVERT TO OPEN SIGMOID COLECTOMY performed by Elzbieta Boyd MD at [address] / REMOVAL / [address] Right 1/23/2019    RIGHT SUBCLAVIAN VEIN PORT A CATH PLACEMENT performed by Tremaine Moore MD at [employer] N/A 3/27/2019    EXPLORATORY LAPAROTOMY, SIGMOID COLECTOMY, COLOSTOMY performed by Elzbieta Boyd MD at [address] [place] [address] ENDOSCOPY  2/10/2014    BX duodenum, atrum, distal esophagus    UPPER GASTROINTESTINAL ENDOSCOPY  4/4/14    BX gastric    UPPER GASTROINTESTINAL ENDOSCOPY  11/4/15    UPPER GASTROINTESTINAL ENDOSCOPY N/A 3/21/2019    EGD ESOPHAGOGASTRODUODENOSCOPY performed by James Altamirano MD at [address]         Medications:  Current Facility-Administered Medications   Medication Dose Route Frequency Provider Last Rate Last Dose    furosemide (LASIX) injection 60 mg  60 mg Intravenous BID Roger Jacobs MD   60 mg at 04/09/19 0854    propofol 1000 MG/100ML injection  10 mcg/kg/min Intravenous Titrated DEBRA Damon CNP 22.3 mL/hr at 04/09/19 1412 50 mcg/kg/min at 04/09/19 1412    fentaNYL (SUBLIMAZE) 1,000 mcg in sodium chloride 0.9 % 100 mL infusion  25 mcg/hr Intravenous Titrated Roro Ambrosio MD 7.5 mL/hr at 04/09/19 1429 75 mcg/hr at 04/09/19 1429    fentaNYL (SUBLIMAZE) injection 25 mcg  25 mcg Intravenous Q1H PRN Roro Ambrosio MD        chlorhexidine (PERIDEX) 0.12 % solution 15 mL  15 mL Mouth/Throat BID Roro Ambrosio MD        carboxymethylcellulose PF (THERATEARS) 1 % ophthalmic gel 1 drop  1 drop Both Eyes TID Roro Ambrosio MD        norepinephrine (LEVOPHED) 16 mg in dextrose 5 % 250 mL infusion  2 mcg/min Intravenous Titrated DEBRA Damon CNP        mupirocin (BACTROBAN) 2 % ointment   Nasal BID Anjelica Vilchis MD   Stopped at 04/09/19 0900    methylPREDNISolone sodium (SOLU-MEDROL) injection 60 mg  60 mg Intravenous Q6H Roro Ambrosio MD   60 mg at 04/09/19 1310    metoprolol succinate (TOPROL XL) extended release tablet 50 mg  50 mg Oral TID DEBRA Blake CNP   50 mg at 04/08/19 2148    potassium chloride (KLOR-CON M) extended release tablet 10 mEq  10 mEq Oral TID DEBRA Blake CNP   Stopped at 04/09/19 0900    magnesium oxide (MAG-OX) tablet 400 mg  400 mg Oral BID Sol DEBRA Vinson CNP   Stopped at 04/09/19 0900    magic (miracle) mouthwash with nystatin  5 mL Swish & Spit 4x Daily PRN DEBRA Del Rosario CNP        sodium chloride flush 0.9 % injection 10 mL  10 mL Intravenous Q12H [address] DEBRA Cuevas CNP   10 mL at 04/09/19 0854    ipratropium-albuterol (DUONEB) nebulizer solution 1 ampule  1 ampule Inhalation Q4H WA Kathryn Kendrick MD   1 ampule at 04/09/19 0904    oxyCODONE (ROXICODONE) immediate release tablet 5 mg  5 mg Oral Q4H PRN Bibiana Mohamud MD   5 mg at 04/02/19 1207    Or    oxyCODONE (ROXICODONE) immediate release tablet 10 mg  10 mg Oral Q4H PRN Bibiana Mohamud MD   10 mg at 04/05/19 0023    zinc oxide (TRIAD HYDROPHILIC) paste 1 applicator  1 applicator Topical Daily Marisel Blum MD   Stopped at 04/09/19 0900    acyclovir (ZOVIRAX) tablet 800 mg  800 mg Oral TID DEBRA Cuello CNP   800 mg at 04/08/19 2150    heparin (porcine) injection 4,800 Units  80 Units/kg Intravenous PRN DEBRA Cuello CNP        heparin (porcine) injection 2,400 Units  40 Units/kg Intravenous PRN DEBRA Cuello CNP   2,400 Units at 04/03/19 1109    HYDROmorphone (DILAUDID) injection 1 mg  1 mg Intravenous Q2H PRN Corinna Noriega MD   1 mg at 04/01/19 2236    heparin 25,000 units in dextrose 5% 250 mL infusion  16.8 mL/hr Intravenous Continuous DEBRA Del Rosario CNP   Stopped at 04/09/19 1423    amiodarone (CORDARONE) tablet 200 mg  200 mg Oral Daily Corinna Noriega MD   Stopped at 04/09/19 0900    pantoprazole (PROTONIX) injection 40 mg  40 mg Intravenous Daily Corinna Noriega MD   40 mg at 04/09/19 1310    aspirin chewable tablet 81 mg  81 mg Oral Daily Corinna Noriega MD   Stopped at 04/09/19 0900    sodium chloride flush 0.9 % injection 10 mL  10 mL Intravenous PRN Corinna Noriega MD   10 mL at 04/02/19 1631    simvastatin (ZOCOR) tablet 40 mg  40 mg Oral Nightly Corinna Noriega MD   40 mg at 04/08/19 2586    intravenously    Patient is an appropriate candidate for plan of sedation: yes      Electronically signed by Estefania Burnham MD on 4/9/2019 at 2:37 PM

## 2019-04-09 NOTE — PROGRESS NOTES
04/09/19 0024   NIV Type   $NIV $Daily Charge   NIV Started Yes   Equipment Type v60   Mode BIPAP   Mask Type Full face mask   Mask Size Large   Settings/Measurements   Comfort Level Good   Using Accessory Muscles No   IPAP 12 cmH20   EPAP 6 cmH2O   Rate Ordered 4   Resp 23   SpO2 96   FiO2  100 %   Vt Exhaled 689 mL   Mask Leak (lpm) 2 lpm   Skin Protection for O2 Device Yes   Breath Sounds   Right Upper Lobe Diminished   Right Middle Lobe Diminished   Right Lower Lobe Diminished   Left Upper Lobe Diminished   Left Lower Lobe Diminished   Alarm Settings   Alarms On Y   Press Low Alarm 6 cmH2O   High Pressure Alarm 45 cmH2O   Apnea (secs) 20 secs   Resp Rate Low Alarm 6   High Respiratory Rate 45 br/min   Oxygen Therapy/Pulse Ox   SpO2 96 %

## 2019-04-09 NOTE — PROGRESS NOTES
Bedside report received. 4 eyed skin assessment completed. No evidence of skin breakdown. Mepilex dsg on sacrum. The patient is alert and oriented x4. SpO2 85% on 40L Vapotherm at 100%. oscar NP at bedside. Alarms on and audible. Will continue to monitor.         heparin (porcine) 16.8 mL/hr (04/08/19 2332)    dextrose 100 mL/hr (03/08/19 1700)

## 2019-04-09 NOTE — PROGRESS NOTES
04/09/19 0904   Oxygen Therapy/Pulse Ox   O2 Therapy Oxygen humidified   $Oxygen $Daily Charge   O2 Device Heated high flow cannula   O2 Flow Rate (L/min) 40 L/min   FiO2  100 %   Resp 22   SpO2 90 %   $Pulse Oximeter $Spot check (multiple)

## 2019-04-09 NOTE — PROGRESS NOTES
Patient in bed A/Ox4. Family at bedside. Pt on 80% vapotherm. Lung sounds are diminished. VSS. Emptied colostomy bag. No complaints at this time. Will continue to monitor.

## 2019-04-09 NOTE — PROCEDURES
Intubation Procedure Note    Indication: impending respiratory failure    Consent: The patient provided verbal consent for this procedure. Medications Used: etomidate intravenously and midazolam intravenously    Procedure: The patient was placed in the appropriate position. Cricoid pressure was not required. Intubation was performed by direct laryngoscopy using a laryngoscope and an 8.0 cuffed endotracheal tube. The cuff was then inflated and the tube was secured appropriately at a distance of 23 cm to the dental ridge. Initial confirmation of placement included bilateral breath sounds and an end tidal CO2 detector. A chest x-ray to verify correct placement of the tube has been ordered but is still pending. The patient tolerated the procedure well. Complications: None    Radha Bagley, APRN-CNP

## 2019-04-09 NOTE — PROGRESS NOTES
RT and Cath Lab Staff accompanied patient back to ICU from Cath Lab at this time. Patient was bagged using the ambu bag and 100% FiO2. Upon arrival to the ICU, patient was placed back on the 840 ventilator with ordered settings by Pulmonary/Critical Care. In Cath Lab, patient was swtiched from AC/VC to AC/VC+. Eloisa Mcdonald CNP is aware of this changed made by RT in Cath Lab and is okay with this change. 04/09/19 1652   Vent Information   Vent Type 840   Vent Mode AC/VC+  (changed to VC+ while in Cath Lab.  Eloisa Mcdonald CNP aware.)   Vt Ordered 500 mL   Rate Set 16 bmp   Pressure Support 0 cmH20   FiO2  60 %   Sensitivity 3   PEEP/CPAP 8   I Time/ I Time % 0.95 s   Vent Patient Data   Peak Inspiratory Pressure 16 cmH2O   Mean Airway Pressure 11 cmH20   Rate Measured 21 br/min   Vt Exhaled 546 mL   Minute Volume 11.4 Liters   I:E Ratio 1:2.10   Plateau Pressure 14 [id number]   Static Compliance 60.67 mL/cmH2O   Dynamic Compliance 49.63 mL/cmH2O   Patient Transport   Time spent transporting Other  (120+ minutes)   Transport ventillation type Manual ventilation   Transport from ICU   Transport destination Other  (Cath Lab)   Transport destination Other  (Cath Lab)   Transport destination ICU   Emergency equipment included Yes   Cough/Sputum   Sputum How Obtained Endotracheal;Suctioned   Cough Productive   Sputum Amount Small   Sputum Color Bright red;Red   Tenacity Thick   Breath Sounds   Right Upper Lobe Diminished   Right Middle Lobe Diminished   Right Lower Lobe Diminished   Left Upper Lobe Diminished   Left Lower Lobe Diminished   Alarm Settings   High Pressure Alarm 45 cmH2O   Low Minute Volume Alarm 3 L/min   Apnea (secs) 20 secs   High Respiratory Rate 40 br/min   Low Exhaled Vt  300 mL   Patient Observation   Observations ambu @ Bedside   ETT (adult)   Placement Date/Time: 04/09/19 1245   Timeout: Patient  Mask Ventilation: Ventilated by mask (1)  Technique: Video laryngoscopy  Type: Cuffed  Tube Size: 8 mm

## 2019-04-09 NOTE — PROGRESS NOTES
04/09/19 1301   Vent Information   $Ventilation $Initial Day   Vent Type 840   Vent Mode AC/VC   Vt Ordered 500 mL   Rate Set 16 bmp   Peak Flow 60 L/min   Pressure Support 0 cmH20   FiO2  100 %   Sensitivity 3   PEEP/CPAP 8   Cuff Pressure (cm H2O) 30 cm H2O   Humidification Source Heated wire   Humidification Temp 37   Humidification Temp Measured 40   Vent Patient Data   Peak Inspiratory Pressure 33 cmH2O   Mean Airway Pressure 18 cmH20   Rate Measured 34 br/min   Vt Exhaled 409 mL   Minute Volume 17.3 Liters   I:E Ratio 1.20:1   Plateau Pressure 20 [id number]   Static Compliance 32.06 mL/cmH2O   Dynamic Compliance 19.24 mL/cmH2O   Cough/Sputum   Sputum How Obtained Endotracheal;Suctioned   Cough Non-productive   Sputum Amount None   Spontaneous Breathing Trial (SBT) RT Doc   Contraindications to SBT? (Patient just intubated.)   Pulse 80   SpO2 97 %   Breath Sounds   Right Upper Lobe Diminished   Right Middle Lobe Diminished   Right Lower Lobe Diminished   Left Upper Lobe Diminished   Left Lower Lobe Diminished   Additional Respiratory  Assessments   Resp (!) 31   End Tidal CO2 19 (%)   Alarm Settings   High Pressure Alarm 45 cmH2O   Low Minute Volume Alarm 3 L/min   Apnea (secs) 20 secs   High Respiratory Rate 40 br/min   Low Exhaled Vt  300 mL   Adult IBW   Height 5' 9\" (1.753 m)   IBW/kg (Calculated) 70.7   Patient Observation   Observations ambu @ bedside   ETT (adult)   Placement Date/Time: 04/09/19 1245   Timeout: Patient  Mask Ventilation: Ventilated by mask (1)  Technique: Video laryngoscopy  Type: Cuffed  Tube Size: 8 mm  Laryngoscope: GlideScope  Blade Size: 4  Location: Oral  Grade View: Full view of the glotti. ..   $ Intubation emergent  $ Yes   Secured at 23 cm   Measured From Lips   ET Placement Right   Secured By [employer] tube peoples   Site Condition Cool;Dry

## 2019-04-09 NOTE — PLAN OF CARE
Problem: Nutrition  Goal: Optimal nutrition therapy  Outcome: Ongoing   Nutrition Problem: Severe malnutrition  Intervention: Food and/or Nutrient Delivery: Continue NPO(for cath.  Resume diet when appropriate )  Nutritional Goals: Pt will tolerate most appropriate form of nutrition therapy this admission

## 2019-04-09 NOTE — PROGRESS NOTES
Hospitalist Progress Note      PCP: Efrem Cunha MD    Date of Admission: 3/7/2019    Chief Complaint: shortness of breath     Hospital Course: long admission due to PE, perforated diverticulitis, respiratory failure with hypoxemia to count a few. Transferred back to the ICU for worsening hypoxemic respiratory failure. Patient was intubated earlier today  Cardiology has evaluated and determined that cardiac catheterization is indicated.     Subjective: Review of systems is not possible due to patient being unconscious on the ventilator      Medications:  Reviewed    Infusion Medications    propofol 50 mcg/kg/min (04/09/19 1412)    fentaNYL (SUBLIMAZE) infusion 50 mcg/hr (04/09/19 1411)    norepinephrine      heparin (porcine) 16.8 mL/hr (04/09/19 1300)    dextrose 100 mL/hr (03/08/19 1700)     Scheduled Medications    furosemide  60 mg Intravenous BID    chlorhexidine  15 mL Mouth/Throat BID    carboxymethylcellulose PF  1 drop Both Eyes TID    mupirocin   Nasal BID    methylPREDNISolone  60 mg Intravenous Q6H    metoprolol succinate  50 mg Oral TID    potassium chloride  10 mEq Oral TID    magnesium oxide  400 mg Oral BID    sodium chloride flush  10 mL Intravenous Q12H    ipratropium-albuterol  1 ampule Inhalation Q4H WA    zinc oxide  1 applicator Topical Daily    acyclovir  800 mg Oral TID    amiodarone  200 mg Oral Daily    pantoprazole  40 mg Intravenous Daily    aspirin  81 mg Oral Daily    simvastatin  40 mg Oral Nightly    folic acid  1 mg Oral Daily    oyster shell calcium/vitamin D  1 tablet Oral Daily     PRN Meds: fentanNYL, magic (miracle) mouthwash with nystatin, oxyCODONE **OR** oxyCODONE, heparin (porcine), heparin (porcine), HYDROmorphone, sodium chloride flush, glucose, dextrose, glucagon (rDNA), dextrose, ondansetron, acetaminophen      Intake/Output Summary (Last 24 hours) at 4/9/2019 1427  Last data filed at 4/9/2019 1000  Gross per 24 hour   Intake 623 ml compatible with   anastomotic leak. Please refer to accompanying CT. Diverticulosis. CT ABDOMEN PELVIS W IV CONTRAST Additional Contrast? Radiologist Recommendation   Final Result   1. The patient is 11 days status post partial sigmoid colectomy. Surgical   drain remains in place with a small ill-defined fluid collection that is   stable from prior exam.  This does not appear to represent an abscess and may   represent postoperative hematoma or seroma. 2. There is a new collection of free intraperitoneal air immediately anterior   to the prior anastomosis site, contained within the left lower quadrant. This indicates an anastomosis leak. No distant free air. 3. 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 4. Cholelithiasis with no evidence of acute cholecystitis. 5. Worsening small pleural effusions and airspace changes in the lower chest.   Pattern may represent underlying pulmonary edema or perhaps pneumonitis. XR CHEST PORTABLE   Final Result   Interval development of mild pulmonary edema. There is a small left-sided pleural effusion with more focal consolidation   the left lung base, atelectasis versus pneumonia. IR PICC WO SQ PORT/PUMP > 5 YEARS   Final Result   Successful placement of PICC line. CT ABDOMEN PELVIS WO CONTRAST Additional Contrast? None   Final Result   No evidence retroperitoneal hematoma. Interval colonic resection. Small volume loculated collection in the left   pericolic gutter, with surgical drain terminating adjacent. Mildly dilated loops of small bowel with scattered air-fluid levels, most   likely postoperative ileus. Trace pleural effusions and bibasilar airspace disease representing   atelectasis or pneumonia.          VL Extremity Venous Bilateral   Final Result      CT CHEST PULMONARY EMBOLISM W CONTRAST   Final Result   Bilateral pulmonary emboli      Scattered opacities throughout the lungs, greatest at the lung bases, with   tiny pleural effusions. Changes are increased compared to prior      Sclerotic osseous metastatic disease as described previously      The findings were sent to the Radiology Results [address] at 3:57   pm on 3/16/2019to be communicated to a licensed caregiver. XR CHEST 1 VW   Final Result   Shallow inflation with findings consistent with basilar atelectasis. Left   pleural thickening versus trace effusion. CT CHEST ABDOMEN PELVIS WO CONTRAST   Final Result   Perforated sigmoid diverticulitis and pneumoperitoneum unchanged. Stable   left [facility] colonic air-fluid collection may represent an intramural or   extramural abscess or giant diverticulum. Infrarenal abdominal aortic aneurysm and aorto bi-iliac stent. Other incidental findings as above. CT ABDOMEN PELVIS W IV CONTRAST Additional Contrast? None   Final Result   Acute perforated descending colon diverticulitis. There is free air in the   abdomen and there is a small extraluminal gas and fluid collection/abscess   adjacent to the descending colon measuring 4.1 cm in AP diameter and 6 cm in   length. There is diverticulosis of the entire colon. Cholelithiasis. 4.7 cm infrarenal abdominal aortic aneurysm status post endograft repair with   a patent aorto bi-iliac graft. There is suspicion of an endoleak with blush   of contrast enhancement suspected in the inferior aspect of the native   aneurysm. As a noncontrast study was not performed, calcification of the   intraluminal thrombus accounting for the apparent enhancement is not   excluded. 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 Comparison with a noncontrast CT examination would be   helpful. Critical results were called by Dr. Sergio Johnson MD to [facility] on   3/7/2019 at 20:35.                  Assessment/Plan:    Active Hospital Problems Diagnosis Date Noted    Iron deficiency anemia due to chronic blood loss [D50.0]     Pneumonia of left lung due to infectious organism [J18.9]     Cardiomyopathy ([address] [I42.9]     NSVT (nonsustained ventricular tachycardia) (HCC) [I47.2]     Other pulmonary embolism without acute cor pulmonale (HCC) [I26.99]     Acute respiratory failure with hypoxia ([address] [J96.01]     H/O colectomy [Z90.49]     Primary malignant neoplasm of lung metastatic to other site (HCC) [C34.90]     Herpes zoster oticus [B02.21]     Hearing loss of right ear [H91.91]     Chemotherapy-induced neutropenia (HCC) [D70.1, T45.1X5A]     Mucositis [K12.30]     Diverticulitis [K57.92] 03/07/2019    Diverticulitis of large intestine with perforation and abscess without bleeding [K57.20]      PLAN:    Acute hypoxemic respiratory failure  Multifactorial, with PE, malignancy and possible pneumonia  On mechanical ventilation  Pulmonology follow    Metastatic lung cancer  Chemotherapy on hold to avoid healing  Has responded to chemo when could be administered. Persistent acute medical issues prevent further chemo, causing prognosis to get worse  Palliative care consult was requested  Current and includes acute treatment    Colonic diverticulitis with perforation  Had colectomy and colostomy  Stable now    Hyponatremia  Mild, secondary to lung malignancy  Monitor BMP    Anemia  Malignancy and chronic disease combined  No active blood loss  Transfuse if indicated  No transfusion needed as of today    Non-ST elevation myocardial infarction  Cardiology consulted. Patient is to have cardiac catheterization soon    Discussed with nursing    DVT Prophylaxis: SCD  Diet: Diet NPO Time Specified  Code Status: Full Code    PT/OT Eval Status: on hold    Dispo - hospice recommendations rejected by patient. Planning to discharge the patient to LTAC, once cardiac catheterization is performed.     Brandon Mott MD

## 2019-04-09 NOTE — PLAN OF CARE
Palliative Care Progress Note  Palliative Care Admit date:  4/3/19    Plan of care/goals:  Met w/ three dtrs as pts resp status labored, on Vapotherm, 100%m 40L. They report ongoing d/w pt, pts wife and physicians and all have a good understanding w/ no misgivings that there will necessarily be a desired outcome. Family shared that pt has expressed \"if I don't do anything, I can't and don't want to live like this so it seems best to at least try. \"   The family understands that pt may not wean successfully off vent and, importantly, they understand the potential need to decide to w/d care given \"he has told us he'd never want to be on a vent long term. \"    Family skeptical tough hopeful; fully supporting pts desire to \"try. \"    Social/Spiritual:   obtaining  for Sacrament of the Sick at pts request.    Plan:  Impending intubation, possible cath,  to come          Reason for consult:    _X_ Advance Care Planning  ___ Transition of Care Planning  _X_ Psychosocial/Spiritual Support  ___ Symptom Management                                                              Allie Tavarez RN

## 2019-04-09 NOTE — PROGRESS NOTES
Pulmonary & Critical Care Inpatient Progress Note   Melita Brown MD     REASON FOR TODAY'S VISIT:  Acute on chronic resp failure    SUBJECTIVE:   Persistent vapotherm requirements   No new complaints    Scheduled Meds:   mupirocin   Nasal BID    methylPREDNISolone  60 mg Intravenous Q6H    metoprolol succinate  50 mg Oral TID    furosemide  20 mg Intravenous BID    potassium chloride  10 mEq Oral TID    magnesium oxide  400 mg Oral BID    sodium chloride flush  10 mL Intravenous Q12H    ipratropium-albuterol  1 ampule Inhalation Q4H WA    zinc oxide  1 applicator Topical Daily    acyclovir  800 mg Oral TID    amiodarone  200 mg Oral Daily    pantoprazole  40 mg Intravenous Daily    aspirin  81 mg Oral Daily    simvastatin  40 mg Oral Nightly    folic acid  1 mg Oral Daily    oyster shell calcium/vitamin D  1 tablet Oral Daily       Continuous Infusions:   heparin (porcine) 16.8 mL/hr (04/07/19 1836)    dextrose 100 mL/hr (03/08/19 1700)       PRN Meds:  magic (miracle) mouthwash with nystatin, oxyCODONE **OR** oxyCODONE, heparin (porcine), heparin (porcine), HYDROmorphone, sodium chloride flush, glucose, dextrose, glucagon (rDNA), dextrose, ondansetron, acetaminophen    ALLERGIES:  Patient has No Known Allergies. Objective:   PHYSICAL EXAM:  /72   Pulse 91   Temp 97.9 °F (36.6 °C) (Oral)   Resp 23   Ht 5' 9\" (1.753 m)   Wt 132 lb 4.4 oz (60 kg)   SpO2 92%   BMI 19.53 kg/m²    Physical Exam   Constitutional: He appears well-developed. He appears distressed. HENT:   Head: Normocephalic and atraumatic. Mouth/Throat: Oropharynx is clear and moist. No oropharyngeal exudate. Eyes: Pupils are equal, round, and reactive to light. EOM are normal.   Neck: Neck supple. No JVD present. Cardiovascular: Normal heart sounds. Exam reveals no gallop and no friction rub. No murmur heard. Pulmonary/Chest: He is in respiratory distress. He has no wheezes. He has no rales.    Equal chest rise and expansion bilaterally   Abdominal: Soft. Bowel sounds are normal. He exhibits no distension. There is no tenderness. Musculoskeletal: Normal range of motion. He exhibits no edema. Lymphadenopathy:     He has no cervical adenopathy. Neurological: He is alert. No cranial nerve deficit. CN 2-12 grossly intact   Skin: Skin is warm and dry. No rash noted. He is not diaphoretic. Data Reviewed:   LABS:  CBC:  Recent Labs     04/06/19 0615 04/07/19 0320 04/08/19  0403   WBC 10.7 17.9* 9.8   HGB 7.5* 8.1* 7.8*   HCT 22.4* 24.3* 23.1*   MCV 85.7 84.8 85.6   * 161 129*     BMP:  Recent Labs     04/06/19 0615 04/07/19 0320 04/08/19  0403   * 134* 134*   K 4.3 4.1 4.4   CL 99 96* 97*   CO2 25 28 28   BUN 9 13 15   CREATININE <0.5* <0.5* <0.5*     LIVER PROFILE: No results for input(s): AST, ALT, LIPASE, ALB, BILIDIR, BILITOT, ALKPHOS in the last 72 hours. Invalid input(s): AMYLASE  PT/INR:No results for input(s): PROTIME, INR in the last 72 hours. APTT:   Recent Labs     04/06/19 0615 04/07/19 0320 04/08/19  0403   APTT 75.9* 63.5* 80.5*     UA:No results for input(s): NITRITE, COLORU, PHUR, LABCAST, WBCUA, RBCUA, MUCUS, TRICHOMONAS, YEAST, BACTERIA, CLARITYU, SPECGRAV, LEUKOCYTESUR, UROBILINOGEN, BILIRUBINUR, BLOODU, GLUCOSEU, AMORPHOUS in the last 72 hours. Invalid input(s): KETONESU  No results for input(s): PHART, DIX7UMT, PO2ART in the last 72 hours. Vent Information  Skin Assessment: Clean, dry, & intact  FiO2 : 80 %  I Time/ I Time %: 1 s  Humidification Source: Heated wire  Humidification Temp: 37    CXR personally reviewed, dense left sided consolidation, right basilar increased atelectasis vs pneumonia          Assessment:     1. Acute on chronic resp failure, hypoxic; multifactorial   -pneumonitis from malignancy    -HCAP  2. Acute PE, bilateral/submassive   -? Tumor emboli syndrome vs thrombotic   3. Lung cancer with metastatic disease   -adenocarcinoma of LLL  4. Acute on chronic anemia, multifactorial  5. Diverticulitis/perforation s/p colectomy    Plan:      -Continue intermittent bipap and vapotherm. If needed to facilitate further workup can proceed with endotracheal intubation and vent support, the family and patient were agreeable to this  -Cardiology input noted, plan for C   -High dose steroids  -Infection ruled out, leukocytosis downtrending.  Monitor off atb  -CXR and ABG in AM    Due to life threatening respiratory failure this patient is critically ill, total critical care time involved in his care was 40 mins    Vasquez Martinez MD

## 2019-04-09 NOTE — BRIEF OP NOTE
PCI to InStent restenosis using cutting balloon and Xience drug stent post dilated to 3.5mm. 0% residual, PARISH-3 flow  3. Mid RCA POBA using high pressure POBA and cutting balloons, 40% residual (area was already double layer) so not stented  4. Distal RCA successful PCI using Xience drug stent, 0% residual  5. ASA 81mg poqday for life  6. Ticagelor 90mg po BID for 1 yr as possible  7. Integrillin for 8 hr  8. BB and statin as tolerated  9. HYpoxia and Resp failure noncardiac and per pulm.       Total dye: 280mL        Ryley Hutchins MD  Date: 4/9/2019  Time: 4:24 PM

## 2019-04-09 NOTE — PROGRESS NOTES
Intubation order received from Dr. Christian Rod. Patient and family aware. Radha NP, Jose RN, Tonio Coronel RN, and Jossie Persaud RT at bedside.     Radha NP at bedside to intubate  1243:  3mg of Versed administered  1244:  20mg Etomidate given  1245:  2mg of Versed given  1247:  10mg Etomidate given  1248:  Patient intubated at 24cm

## 2019-04-09 NOTE — PROGRESS NOTES
Pulmonary & Critical Care Inpatient Progress Note   Dianne Yo MD     REASON FOR TODAY'S VISIT:  Acute on chronic resp failure    SUBJECTIVE:   Ongoing high oxygen requirements this AM, was intubated for assistance with workup. Underwent cath with stent placement this afternoon    Scheduled Meds:   chlorhexidine  15 mL Mouth/Throat BID    carboxymethylcellulose PF  1 drop Both Eyes TID    [START ON 4/10/2019] metoprolol succinate  50 mg Oral Daily    ticagrelor  90 mg Oral BID    aspirin  325 mg Oral Daily    mupirocin   Nasal BID    methylPREDNISolone  60 mg Intravenous Q6H    potassium chloride  10 mEq Oral TID    magnesium oxide  400 mg Oral BID    sodium chloride flush  10 mL Intravenous Q12H    ipratropium-albuterol  1 ampule Inhalation Q4H WA    zinc oxide  1 applicator Topical Daily    acyclovir  800 mg Oral TID    pantoprazole  40 mg Intravenous Daily    aspirin  81 mg Oral Daily    simvastatin  40 mg Oral Nightly    folic acid  1 mg Oral Daily    oyster shell calcium/vitamin D  1 tablet Oral Daily       Continuous Infusions:   propofol 50 mcg/kg/min (04/09/19 1710)    fentaNYL (SUBLIMAZE) infusion 75 mcg/hr (04/09/19 1429)    norepinephrine      eptifibatide 2 mcg/kg/min (04/09/19 1515)    sodium chloride 200 mL/hr at 04/09/19 1655    heparin (porcine) Stopped (04/09/19 1423)    dextrose 100 mL/hr (03/08/19 1700)       PRN Meds:  fentanNYL, magic (miracle) mouthwash with nystatin, oxyCODONE **OR** oxyCODONE, heparin (porcine), heparin (porcine), HYDROmorphone, sodium chloride flush, glucose, dextrose, glucagon (rDNA), dextrose, ondansetron, acetaminophen    ALLERGIES:  Patient has No Known Allergies. Objective:   PHYSICAL EXAM:  /65   Pulse 68   Temp 96.7 °F (35.9 °C) (Core)   Resp 21   Ht 5' 9\" (1.753 m)   Wt 163 lb 12.8 oz (74.3 kg)   SpO2 94%   BMI 24.19 kg/m²    Physical Exam   Constitutional: He appears well-developed. He appears distressed.    HENT:   Head: Normocephalic and atraumatic. Mouth/Throat: Oropharynx is clear and moist. No oropharyngeal exudate. Eyes: Pupils are equal, round, and reactive to light. EOM are normal.   Neck: Neck supple. No JVD present. Cardiovascular: Normal heart sounds. Exam reveals no gallop and no friction rub. No murmur heard. Pulmonary/Chest: He is in respiratory distress. He has no wheezes. He has no rales. Equal chest rise and expansion bilaterally   Abdominal: Soft. Bowel sounds are normal. He exhibits no distension. There is no tenderness. Musculoskeletal: Normal range of motion. He exhibits no edema. Lymphadenopathy:     He has no cervical adenopathy. Neurological: He is alert. No cranial nerve deficit. CN 2-12 grossly intact   Skin: Skin is warm and dry. No rash noted. He is not diaphoretic. Data Reviewed:   LABS:  CBC:  Recent Labs     04/07/19 0320 04/08/19  0403 04/09/19  0503   WBC 17.9* 9.8 9.1   HGB 8.1* 7.8* 7.3*   HCT 24.3* 23.1* 21.9*   MCV 84.8 85.6 85.9    129* 114*     BMP:  Recent Labs     04/07/19  0320 04/08/19  0403 04/09/19  0503   * 134* 136   K 4.1 4.4 3.8   CL 96* 97* 98*   CO2 28 28 27   BUN 13 15 18   CREATININE <0.5* <0.5* <0.5*     LIVER PROFILE: No results for input(s): AST, ALT, LIPASE, ALB, BILIDIR, BILITOT, ALKPHOS in the last 72 hours. Invalid input(s): AMYLASE  PT/INR:No results for input(s): PROTIME, INR in the last 72 hours. APTT:   Recent Labs     04/07/19  0320 04/08/19  0403 04/09/19  0502   APTT 63.5* 80.5* 59.5*     UA:No results for input(s): NITRITE, COLORU, PHUR, LABCAST, WBCUA, RBCUA, MUCUS, TRICHOMONAS, YEAST, BACTERIA, CLARITYU, SPECGRAV, LEUKOCYTESUR, UROBILINOGEN, BILIRUBINUR, BLOODU, GLUCOSEU, AMORPHOUS in the last 72 hours.     Invalid input(s): \[facility] [place] [facility]\""  Recent Labs     04/09/19  0524   PHART 7.510*   [id number] 34.9*   PO2ART 46.6*       Vent Information  $Ventilation: $Initial Day  Skin Assessment: Clean, dry, & intact  Vent Type: 840  Vent Mode: AC/VC+(changed to VC+ while in Cath Lab. Sharren Hodgkins, CNP aware.)  Vt Ordered: 500 mL  Rate Set: 16 bmp  Peak Flow: 60 L/min  Pressure Support: 0 cmH20  FiO2 : 60 %  Sensitivity: 3  PEEP/CPAP: 8  I Time/ I Time %: 0.95 s  Cuff Pressure (cm H2O): 30 cm H2O  Humidification Source: Heated wire  Humidification Temp: 37  Humidification Temp Measured: 40    CXR personally reviewed, dense left sided consolidation, bilateral consolidations        Assessment:     1. Acute on chronic resp failure, hypoxic; multifactorial   -pneumonitis from malignancy    -HCAP  2. Acute PE, bilateral/submassive   -? Tumor emboli syndrome vs thrombotic   3. Lung cancer with metastatic disease   -adenocarcinoma of LLL  4. Acute on chronic anemia, multifactorial  5.  Diverticulitis/perforation s/p colectomy    Plan:      -Placed on vent support, will trend ABGs  -Propofol and fentanyl for sedation  -Appreciate cardiology input, received stents today but right heart pressures not elevated indicating noncardiogenic etiology to resp failure  -Continue high dose steroids, monitor for adverse effects  -Will plan for possible bronch in AM to further evaluate airways and obtain directed specimen for culture  -Serial CXRs, hold off on tube feeds for now with plan to initiate in AM if not able to remove vent support   -anticoagulation for PE    Due to life threatening respiratory failure this patient is critically ill, total critical care time involved in his care was 120 mins    Dimas Kvng RINCON

## 2019-04-09 NOTE — CARE COORDINATION
SARAH updated Yousuf Ryan with [facility] on the plan for [facility]. They are able to accept patient whenever stable for transfer.   Anthony Mann RN

## 2019-04-10 NOTE — PROGRESS NOTES
04/10/19 0345   Vent Information   Vent Type 840   Vent Mode AC/VC+   Vt Ordered 500 mL   Rate Set 12 bmp   Pressure Support 0 cmH20   FiO2  60 %   Sensitivity 3   PEEP/CPAP 8   I Time/ I Time % 0 s   Cuff Pressure (cm H2O) 28 cm H2O   Humidification Temp 36.7   Circuit Condensation Drained   Vent Patient Data   Peak Inspiratory Pressure 14 cmH2O   Mean Airway Pressure 10 cmH20   Rate Measured 19 br/min   Vt Exhaled 648 mL   Minute Volume 10.3 Liters   I:E Ratio 1:2.80   Spontaneous Breathing Trial (SBT) RT Doc   Pulse 75   SpO2 95 %   Breath Sounds   Right Upper Lobe Diminished   Right Middle Lobe Diminished   Right Lower Lobe Diminished   Left Upper Lobe Diminished   Left Lower Lobe Diminished   Additional Respiratory  Assessments   Resp 27   Alarm Settings   High Pressure Alarm 45 cmH2O   Low Minute Volume Alarm 2 L/min   High Respiratory Rate 40 br/min   Low Exhaled Vt  200 mL   ETT (adult)   Placement Date/Time: 04/09/19 1245   Timeout: Patient  Mask Ventilation: Ventilated by mask (1)  Technique: Video laryngoscopy  Type: Cuffed  Tube Size: 8 mm  Laryngoscope: GlideScope  Blade Size: 4  Location: Oral  Grade View: Full view of the glotti. ..    Secured at 23 cm   Measured From Lips   ET Placement Right   Secured By Commercial tube peoples   Site Condition Dry

## 2019-04-10 NOTE — PROGRESS NOTES
Arterial sheath pulled 2226, manual pressure applied and released at 7247 w/o complication. Venous  Sheath pulled 2238, manual pressure applied and released at 2251. 4x4 and clear tegaderm applied to site. Site soft to touch. Will continue to monitor.

## 2019-04-10 NOTE — PROGRESS NOTES
04/10/19 1335   Vent Information   Vt Ordered 500 mL   Rate Set 12 bmp   Peak Flow 61 L/min   Pressure Support 10 cmH20   FiO2  55 %   Sensitivity 3   PEEP/CPAP 5   I Time/ I Time % 0 s   Vent Patient Data   Peak Inspiratory Pressure 11 cmH2O   Mean Airway Pressure 8.1 cmH20   Rate Measured 23 br/min   Vt Exhaled 873 mL   Minute Volume 12 Liters   I:E Ratio 1:3.70   Spontaneous Breathing Trial (SBT) RT Doc   Pulse 87   SpO2 94 %   Additional Respiratory  Assessments   Resp 19   End Tidal CO2 24 (%)   Alarm Settings   High Pressure Alarm 45 cmH2O   Low Minute Volume Alarm 2 L/min   High Respiratory Rate 40 br/min

## 2019-04-10 NOTE — PROGRESS NOTES
4 Eyes Skin Assessment     The patient is being assess for   Shift Handoff    I agree that 2 RN's have performed a thorough Head to Toe Skin Assessment on the patient. ALL assessment sites listed below have been assessed. Areas assessed by both nurses:   [x]   Head, Face, and Ears   [x]   Shoulders, Back, and Chest, Abdomen  [x]   Arms, Elbows, and Hands   [x]   Coccyx, Sacrum, and Ischium  [x]   Legs, Feet, and Heels            **SHARE this note so that the co-signing nurse is able to place an eSignature**    Co-signer eSignature: {Esignature:[id number]}    Does the Patient have Skin Breakdown?   No          Jared Prevention initiated:  Yes   Wound Care Orders initiated:  No      WOC nurse consulted for Pressure Injury (Stage 3,4, Unstageable, DTI, NWPT, Complex wounds)and New or Established Ostomies:  Yes      Primary Nurse eSignature: Electronically signed by Niurka Vargas RN on 4/9/19 at 8:04 PM

## 2019-04-10 NOTE — PROGRESS NOTES
[address]   Daily Cardiovascular Progress Note    Admit Date: 3/7/2019    Chief complaint: NSVT  HPI: did well on earlier SBT trial        Medications/Labs all Reviewed:  Patient Active Problem List   Diagnosis    Elevated fasting glucose    CAD (coronary artery disease)    PAD (peripheral artery disease) ([address]    Essential hypertension    Mixed hyperlipidemia    Sprain of right foot    Sprain of anterior talofibular ligament of right ankle    Closed nondisplaced fracture of fifth right metatarsal bone    Closed fracture of proximal lateral malleolus of ankle with routine healing    Right foot pain    Right ankle pain    Nondisplaced fracture of fifth right metatarsal bone with routine healing    History of pulmonary emphysema    Chronic obstructive pulmonary disease with (acute) exacerbation (HCC)    Pulmonary nodule, left    Abnormal bone scan of thoracic spine    Back pain    Visit for monitoring Plavix therapy    History of ventricular tachycardia    Gallstones    Hemoptysis    Former smoker    Elevated PSA    Adenocarcinoma of left lung ([address]    Acute diverticulitis    Pancytopenia ([address]    Diverticulitis of large intestine with perforation and abscess without bleeding    Diverticulitis    Chemotherapy-induced neutropenia (HCC)    Mucositis    Herpes zoster oticus    Hearing loss of right ear    Acute respiratory failure with hypoxia ([address]    H/O colectomy    Primary malignant neoplasm of lung metastatic to other site [facility])    NSVT (nonsustained ventricular tachycardia) ([address]    Other pulmonary embolism without acute cor pulmonale (HCC)    Cardiomyopathy (HCC)    Pneumonia of left lung due to infectious organism    Iron deficiency anemia due to chronic blood loss       Medications:    magnesium sulfate 2 g in 50 mL IVPB premix Once   propofol 1000 MG/100ML injection Titrated   fentaNYL (SUBLIMAZE) 1,000 mcg in sodium chloride 0.9 % 100 mL infusion Titrated   fentaNYL (SUBLIMAZE) injection 25 mcg Q1H PRN   chlorhexidine (PERIDEX) 0.12 % solution 15 mL BID   carboxymethylcellulose PF (THERATEARS) 1 % ophthalmic gel 1 drop TID   metoprolol succinate (TOPROL XL) extended release tablet 50 mg Daily   ticagrelor (BRILINTA) tablet 90 mg BID   mupirocin (BACTROBAN) 2 % ointment BID   methylPREDNISolone sodium (SOLU-MEDROL) injection 60 mg Q6H   potassium chloride (KLOR-CON M) extended release tablet 10 mEq TID   magnesium oxide (MAG-OX) tablet 400 mg BID   magic (miracle) mouthwash with nystatin 4x Daily PRN   sodium chloride flush 0.9 % injection 10 mL Q12H   ipratropium-albuterol (DUONEB) nebulizer solution 1 ampule Q4H WA   oxyCODONE (ROXICODONE) immediate release tablet 5 mg Q4H PRN   Or    oxyCODONE (ROXICODONE) immediate release tablet 10 mg Q4H PRN   zinc oxide (TRIAD HYDROPHILIC) paste 1 applicator Daily   acyclovir (ZOVIRAX) tablet 800 mg TID   heparin (porcine) injection 4,800 Units PRN   heparin (porcine) injection 2,400 Units PRN   HYDROmorphone (DILAUDID) injection 1 mg Q2H PRN   heparin 25,000 units in dextrose 5% 250 mL infusion Continuous   pantoprazole (PROTONIX) injection 40 mg Daily   aspirin chewable tablet 81 mg Daily   sodium chloride flush 0.9 % injection 10 mL PRN   simvastatin (ZOCOR) tablet 40 mg Nightly   glucose (GLUTOSE) 40 % oral gel 15 g PRN   dextrose 50 % solution 12.5 g PRN   glucagon (rDNA) injection 1 mg PRN   dextrose 5 % solution PRN   folic acid (FOLVITE) tablet 1 mg Daily   oyster shell calcium/vitamin D 250-125 MG-UNIT per tablet 250 mg Daily   ondansetron (ZOFRAN) injection 4 mg Q6H PRN   acetaminophen (TYLENOL) tablet 650 mg Q4H PRN          PHYSICAL EXAM   /64   Pulse 75   Temp 98.5 °F (36.9 °C) (Core)   Resp 18   Ht 5' 9\" (1.753 m)   Wt 163 lb 12.8 oz (74.3 kg)   SpO2 93%   BMI 24.19 kg/m²    Vitals:    04/10/19 0853 04/10/19 0858 04/10/19 0900 04/10/19 0901   BP:    118/64   Pulse: 75 75 76 75   Resp: 17 17 18 18   Temp:    98.5 °F (36.9 °C)   TempSrc:    Core   SpO2: 93% 94% 94% 93%   Weight:       Height:             Intake/Output Summary (Last 24 hours) at 4/10/2019 0915  Last data filed at 4/10/2019 0725  Gross per 24 hour   Intake 94.15 ml   Output 2865 ml   Net -2770.85 ml     Wt Readings from Last 3 Encounters:   04/09/19 163 lb 12.8 oz (74.3 kg)   02/28/19 184 lb 9.6 oz (83.7 kg)   02/19/19 183 lb (83 kg)         Gen: Patient in NAD, resting comfortably  Neck: No JVD or bruits  Respiratory: faint wheezes  Chest: normal without deformity  Cardiovascular:RRR, S1S2, no mrg, normal PMI  Abdomen: Soft, mildly tender  Extremities: No clubbing, cyanosis, or edema (mild presacral edema). RT groin C/D/I  Neurological/Psychiatric: sedated (was awake but rebolused before exam), no gross issues  Skin:  Warm and dry      Labs:  CBC:   Recent Labs     04/08/19 0403 04/09/19  0503 04/10/19  0452   WBC 9.8 9.1 9.6   HGB 7.8* 7.3* 7.4*   HCT 23.1* 21.9* 21.5*   MCV 85.6 85.9 86.3   * 114* 108*     BMP:   Recent Labs     04/08/19  0403 04/09/19  0503 04/10/19  0452   * 136 135*   K 4.4 3.8 3.8   CL 97* 98* 97*   CO2 28 27 26   BUN 15 18 21*   CREATININE <0.5* <0.5* <0.5*     MG:    Recent Labs     04/08/19 0403 04/09/19  0503 04/10/19  0452   MG 1.90 1.70* 1.70*      PT/INR: No results for input(s): PROTIME, INR in the last 72 hours. APTT:   Recent Labs     04/08/19  0403 04/09/19  0502 04/10/19  0452   APTT 80.5* 59.5* 61.4*     Cardiac Enzymes:   No results for input(s): CKTOTAL, CKMB, CKMBINDEX, TROPONINI in the last 72 hours. Cardiac Studies:    ECHO   Definity contrast was used, but port not working properly.  Kasi Whitmore left ventricular systolic function is moderately reduced with an   ejection fraction of 30-35 %. Inferolateral hypokinesia. Mid anteroseptal,   apical lateral and apical septal akinesia.   There is mild concentric left ventricular hypertrophy.   Normal left ventricular filling pressure.   Left ventricular cavity size is normal.   Mild thickening of the anterior leaflet of mitral valve.   Mild mitral regurgitation.   The left atrium is severely dilated.     CXR:   1. New right perihilar and suspected increasing left upper lobe airspace   disease, either pneumonia or edema. 2. The proximal side hole for the enteric tube is in the esophagus and the   tube should be advanced 5 cm. Assessment and Plan   1. NSVT   - Known hx of VT s/p ICD, on Amio   - 3/11/2018 defib for VT, since then multiple NSVT getting ATP  2. New bilateral PE 3/16/2019   - RV is normal  3. CAD   - s/p PCI to RCA 6/2011   - hx of PCi to LAD (post endograft AAA repair MI)1/2011   - s/p PCI to mid LAD, Mid RCA (POBA), and distal RCA. 4. Ischemic cardiomyopathy: LVEF 30-35%  5. HTN  6. Hx of aortic aneurysm repair   - Per Dr. Harshad Ross, on ASA/plavix for this  7. Hx of sigmoid colectomy for perf diverticulosis 3/14/2019   - Laparoscopic converted to Open left Colectomy   - s/p Gerda's procedure for anastomotic leak   8. Metastatic lung CA, stage 4 with bone mets  9. Pulmonary edema   - Noncardiac  10. Respiratory failure: acute on chronic hypoxic       PLAN  1. Pt s/p PCi to LAD and RCA yesterday for critical restenosis  2. RHC showed normal/low cardiac filling pressures. - Pulm issues appear noncardiac at this time. PaO2 somewhat improving  3. No further diuresis, would get accurate weight and goal for neutral I/o  4. Cont ASA, ticagrelor + anticoagulation for now   - After 5/10/2019, would DROP ASA and continue ticagrelor and anticoagulation to prevent triple Tx    - Cont Toprol, zocor (as able to tolerate)  5. D/c Amio given s/p revascularation      Critical care: 30 min.  Spoke with ICU Rn and team  Patient Active Problem List   Diagnosis    Elevated fasting glucose    CAD (coronary artery disease)    [facility]peripheral [facility]) [facility]    Essential hypertension    Mixed hyperlipidemia    Sprain of right foot   

## 2019-04-10 NOTE — ONCOLOGY
ONCOLOGY HEMATOLOGY CARE PROGRESS NOTE      SUBJECTIVE:     Possible wean from vent today, but more likely tomorrow. Cardiac notes reviewed, s/p PCI. Now on ASA, Brilinta, and Hep drip. S/p 8 hours of Integrilin yesterday. No bleeding episodes. Hgb 7.4 and plt are 108k. ROS:   The remaining 10 point review of symptoms is unremarkable. OBJECTIVE        Physical    VITALS:  /64   Pulse 73   Temp 98.5 °F (36.9 °C) (Core)   Resp 15   Ht 5' 9\" (1.753 m)   Wt 163 lb 12.8 oz (74.3 kg)   SpO2 96%   BMI 24.19 kg/m²   TEMPERATURE:  Current - Temp: 98.5 °F (36.9 °C); Max - Temp  Av.2 °F (36.2 °C)  Min: 96.2 °F (35.7 °C)  Max: 98.6 °F (37 °C)  PULSE OXIMETRY RANGE: SpO2  Av.5 %  Min: 93 %  Max: 100 %  24HR INTAKE/OUTPUT:      Intake/Output Summary (Last 24 hours) at 4/10/2019 1244  Last data filed at 4/10/2019 1036  Gross per 24 hour   Intake 1506.24 ml   Output 2190 ml   Net -683.76 ml       CONSTITUTIONAL: sedated, intubated   HEENT oral pharynx , no scleral icterus, wearing high flow nasal canula   HEMATOLOGIC/LYMPHATICS:  no cervical lymphadenopathy, no supraclavicular lymphadenopathy, no axillary lymphadenopathy and no inguinal lymphadenopathy  LUNGS:  Ventilator   CARDIOVASCULAR:  , regular rate and rhythm, normal S1 and S2, no S3 or S4, and no murmur noted  ABDOMEN: soft, non tender   Back: no bruising, purpura, or hematomas noted   MUSCULOSKELETAL:  There is no redness, warmth, or swelling of the joints.   EXTREMETIES: BLE edema +1, non pitting   NEUROLOGIC:  Sedated      Data      Recent Labs     19  0403 19  0503 04/10/19  0452   WBC 9.8 9.1 9.6   HGB 7.8* 7.3* 7.4*   HCT 23.1* 21.9* 21.5*   * 114* 108*   MCV 85.6 85.9 86.3        Recent Labs     19  0403 19  0503 04/10/19  0452   * 136 135*   K 4.4 3.8 3.8   CL 97* 98* 97*   CO2 28 27 26   BUN 15 18 21*   CREATININE <0.5* <0.5* <0.5*     No results for input(s): AST, ALT, ALB, BILIDIR, BILITOT, ALKPHOS in the last 72 hours.     Magnesium:    Lab Results   Component Value Date    MG 1.70 04/10/2019    MG 1.70 04/09/2019    MG 1.90 04/08/2019         Problem List  Patient Active Problem List   Diagnosis    Elevated fasting glucose    CAD (coronary artery disease)    PAD (peripheral artery disease) ([address]    Essential hypertension    Mixed hyperlipidemia    Sprain of right foot    Sprain of anterior talofibular ligament of right ankle    Closed nondisplaced fracture of fifth right metatarsal bone    Closed fracture of proximal lateral malleolus of ankle with routine healing    Right foot pain    Right ankle pain    Nondisplaced fracture of fifth right metatarsal bone with routine healing    History of pulmonary emphysema    Chronic obstructive pulmonary disease with (acute) exacerbation (HCC)    Pulmonary nodule, left    Abnormal bone scan of thoracic spine    Back pain    Visit for monitoring Plavix therapy    History of ventricular tachycardia    Gallstones    Hemoptysis    Former smoker    Elevated PSA    Adenocarcinoma of left lung ([address]    Acute diverticulitis    Pancytopenia ([address]    Diverticulitis of large intestine with perforation and abscess without bleeding    Diverticulitis    Chemotherapy-induced neutropenia (HCC)    Mucositis    Herpes zoster oticus    Hearing loss of right ear    Acute respiratory failure with hypoxia ([address]    H/O colectomy    Primary malignant neoplasm of lung metastatic to other site [facility])    NSVT (nonsustained ventricular tachycardia) ([address]    Other pulmonary embolism without acute cor pulmonale (HCC)    Cardiomyopathy (HCC)    Pneumonia of left lung due to infectious organism    Iron deficiency anemia due to chronic blood loss       ASSESSMENT AND PLAN    Acute Perforated descending colon diverticulitis (not immune mediated colitis) with free air in the abd   - per gen surgery , s/p Laparoscopic converted to open left Colectomy, 3/14/2019, with Dr. Rosario Marques  - CT scan done 3/25 with anastomotic leak at the [facility] surgical site. - Status post Gerda procedure on 3/27/2019 with Dr. Rosario Marques.  - Krystina Robert drain removed, staples. Surgery signed off. HCAP/ARDS   - Vanco/Rolando completed. S/p bronch- no signs of infectious etiology- likely already treated via ATB prior to bronch being conducted   - Status post bronchoscopy on 4/03/2019. No endobronchial lesions. Cultures are NGTD. - Started Methylprednisolone 40 mg q12 hrs on 4/04/2019 in case of pneumonitis, as the bronchoscopy did not show obviously purulent secretions. Increased to 60 mg every 6 hours over weekend   - INTUBATED 4-9-19 for left heart cath with possible revascularization, s/p PCI with drug eluting stents 4-9-19     Adenocarcinoma of the LLL of the lung, stage IV with bone mets   - Holding chemo for acute issues. - Cycle 3 carbo/alimta Claudette Sous was due on 3/22- held due to acute issues, cont to hold. Plan to resume Alinda Manges only outpatient.   - CT chest/abd/pelvis, 3/10/2019, showed improvement in the left hilar soft tissue and radiation changes. No obvious new disease. PE  -Diagnosed on 3/16/2019 with bilateral PEs involving the right and left main pulm arteries.   -Also had thrombus in the right femoral, peroneal, greater saphenous, short saphenous veins and in the left peroneal veins.  -Plan Eliquis on discharge. This is still the plan s/p PCI with new stents on 4/9/19.   -Will need lifelong anticoagulation.  -Does not need a filter per Dr. Sergio Junior on 3/18   -Started on a heparin drip on 3/31/2019. Can switch to a NOAC at discharge or once on the floor. I would be worried to change him to Eliquis too soon given GI bleed this admit. Hep drip for the next few days at least, await for him to return to PO status. Lytes  - Replete K PRN. Anemia, possible GI bleed on hep drip vs anastomotic leak   - EGD 3/21 negative.   - PPI added, now BID dosing

## 2019-04-10 NOTE — PROGRESS NOTES
Pulmonary & Critical Care Inpatient Progress Note   Becky Santos MD     REASON FOR TODAY'S VISIT:  Acute on chronic resp failure    SUBJECTIVE:   Continued need for vent support  Had agitation issues when sedation was being weaned and remained hypoxic during SBT earlier today    Scheduled Meds:   chlorhexidine  15 mL Mouth/Throat BID    carboxymethylcellulose PF  1 drop Both Eyes TID    metoprolol succinate  50 mg Oral Daily    ticagrelor  90 mg Oral BID    mupirocin   Nasal BID    methylPREDNISolone  60 mg Intravenous Q6H    potassium chloride  10 mEq Oral TID    magnesium oxide  400 mg Oral BID    sodium chloride flush  10 mL Intravenous Q12H    ipratropium-albuterol  1 ampule Inhalation Q4H WA    zinc oxide  1 applicator Topical Daily    acyclovir  800 mg Oral TID    pantoprazole  40 mg Intravenous Daily    aspirin  81 mg Oral Daily    simvastatin  40 mg Oral Nightly    folic acid  1 mg Oral Daily    oyster shell calcium/vitamin D  1 tablet Oral Daily       Continuous Infusions:   dexmedetomidine (PRECEDEX) IV infusion 0.6 mcg/kg/hr (04/10/19 1558)    propofol 10 mcg/kg/min (04/10/19 1602)    fentaNYL (SUBLIMAZE) infusion 50 mcg/hr (04/10/19 1450)    heparin (porcine) 16.8 mL/hr (04/10/19 0543)    dextrose 100 mL/hr (03/08/19 1700)       PRN Meds:  fentanNYL, magic (miracle) mouthwash with nystatin, oxyCODONE **OR** oxyCODONE, heparin (porcine), heparin (porcine), HYDROmorphone, sodium chloride flush, glucose, dextrose, glucagon (rDNA), dextrose, ondansetron, acetaminophen    ALLERGIES:  Patient has No Known Allergies. Objective:   PHYSICAL EXAM:  /78   Pulse 87   Temp 98.5 °F (36.9 °C) (Core)   Resp 27   Ht 5' 9\" (1.753 m)   Wt 163 lb 12.8 oz (74.3 kg)   SpO2 (!) 89%   BMI 24.19 kg/m²    Physical Exam   Constitutional: He appears well-developed. HENT:   Head: Normocephalic and atraumatic. Mouth/Throat: Oropharynx is clear and moist. No oropharyngeal exudate.    Eyes: Pupils are equal, round, and reactive to light. EOM are normal.   Neck: Neck supple. No JVD present. Cardiovascular: Normal heart sounds. Exam reveals no gallop and no friction rub. No murmur heard. Pulmonary/Chest: He has no wheezes. He has no rales. Equal chest rise and expansion bilaterally   Abdominal: Soft. Bowel sounds are normal. He exhibits no distension. There is no tenderness. Musculoskeletal: Normal range of motion. He exhibits no edema. Lymphadenopathy:     He has no cervical adenopathy. Neurological: He is alert. No cranial nerve deficit. Intubated, sedated   Skin: Skin is warm and dry. No rash noted. He is not diaphoretic. Data Reviewed:   LABS:  CBC:  Recent Labs     04/08/19  0403 04/09/19  0503 04/10/19  0452   WBC 9.8 9.1 9.6   HGB 7.8* 7.3* 7.4*   HCT 23.1* 21.9* 21.5*   MCV 85.6 85.9 86.3   * 114* 108*     BMP:  Recent Labs     04/08/19  0403 04/09/19  0503 04/10/19  0452   * 136 135*   K 4.4 3.8 3.8   CL 97* 98* 97*   CO2 28 27 26   BUN 15 18 21*   CREATININE <0.5* <0.5* <0.5*     LIVER PROFILE: No results for input(s): AST, ALT, LIPASE, ALB, BILIDIR, BILITOT, ALKPHOS in the last 72 hours. Invalid input(s): AMYLASE  PT/INR:No results for input(s): PROTIME, INR in the last 72 hours. APTT:   Recent Labs     04/09/19  0502 04/10/19  0452 04/10/19  1510   APTT 59.5* 61.4* 85.3*     UA:No results for input(s): NITRITE, COLORU, PHUR, LABCAST, WBCUA, RBCUA, MUCUS, TRICHOMONAS, YEAST, BACTERIA, CLARITYU, SPECGRAV, LEUKOCYTESUR, UROBILINOGEN, BILIRUBINUR, BLOODU, GLUCOSEU, AMORPHOUS in the last 72 hours.     Invalid input(s): Rena Getting  Recent Labs     04/09/19  1823 04/10/19  0501   PHART 7.495* 7.471*   ALZ6PDB 35.0 37.8   PO2ART 104.2 89.7       Vent Information  $Ventilation: $Subsequent Day  Skin Assessment: Clean, dry, & intact  Vent Type: 840  Vent Mode: AC/VC+  Vt Ordered: 500 mL  Rate Set: 12 bmp  Peak Flow: 61 L/min  Pressure Support: 10 cmH20  FiO2 : 55 %  Sensitivity: 3  PEEP/CPAP: 5  I Time/ I Time %: 0 s  Cuff Pressure (cm H2O): 30 cm H2O  Humidification Source: Heated wire  Humidification Temp: 36.7  Humidification Temp Measured: 36.7  Circuit Condensation: Drained    CXR personally reviewed, Left greater than right infiltrates        Assessment:     1. Acute on chronic resp failure, hypoxic; multifactorial   -pneumonitis from malignancy    -HCAP  2. Acute PE, bilateral/submassive   -? Tumor emboli syndrome vs thrombotic   3. Lung cancer with metastatic disease   -adenocarcinoma of LLL  4. Acute on chronic anemia, multifactorial  5. Diverticulitis/perforation s/p colectomy    Plan:      -Continue vent support, daily SBTs and sedation holidays.  Will add precedex to assist  -Bronch today to evaluate airways and obtain directed specimen for collection  -Appreciate cardiology assistance, holding off on diuresis for today  -Continue high dose steroids while he remains so hypoxic  -No need for atb  -anticoagulation for PE    Due to life threatening respiratory failure this patient is critically ill, total critical care time involved in his care was 90 mins excluding separately listed procedures    Monica Ortega MD

## 2019-04-10 NOTE — PLAN OF CARE
Problem: Nutrition  Goal: Optimal nutrition therapy  Outcome: Ongoing   Nutrition Problem: Severe malnutrition  Intervention: Food and/or Nutrient Delivery: Start Tube Feeding  Nutritional Goals: Pt will tolerate most appropriate form of nutrition therapy this admission

## 2019-04-10 NOTE — CONSULTS
Nutrition Assessment (Enteral Nutrition)    Type and Reason for Visit: Reassess, Consult(Consult for \"TF ordering and mgmt\" )    Nutrition Recommendations:   1. Recommend Initiate Osmolite 1.5 (1.5 calorie without fiber formula) at 15 mL/hr and as tolerated, increase by 20 mL/hr q 4 hours until goal of 55 mL/hr is met via N/G  2. Recommend 30 mL H20 flush q 4 hours. Monitor IVF infusion, Na labs and need for adjustments in water flush  3. Recommend 1 Bottle Proteinex P2Go daily via syringe. Flush with 30 mL H20 before and after  4. Ensure head of bed is at least 30 - 45 degrees during continuous feeding. Turn off the feeding if head of bed is lowered less than 30 degrees  5. Monitor TF tolerance (abd distention, bowel habits, N/V, cramping, GRV > 500 mL)  6. Check TG q 3 days while on diprivan infusion  7. Monitor nutrition adequacy, pertinent labs, bowel habits, wt changes, and clinical progress      Nutrition Assessment: Follow up: Pt intubated 4/9. Currently sedated on propofol at 22 mL/hr (581 kcals). NPO and nutritionally declining. NGT in place. Consult to start TF. Orders in place and d/w RN. Plan for Bronch today. Malnutrition Assessment:  · Malnutrition Status: Meets the criteria for severe malnutrition  · Context: Chronic illness  · Findings of the 6 clinical characteristics of malnutrition (Minimum of 2 out of 6 clinical characteristics is required to make the diagnosis of moderate or severe Protein Calorie Malnutrition based on AND/ASPEN Guidelines):  1. Energy Intake-Less than or equal to 75% of estimated energy requirement, Greater than or equal to 1 month    2. Weight Loss-10% loss or greater, in 3 months  3. Fat Loss-Unable to assess,    4. Muscle Loss-Unable to assess, Thigh (quadriceps), Clavicles (pectoralis and deltoids)  5. Fluid Accumulation-No significant fluid accumulation,    6.   Strength-Not measured    Nutrition Risk Level: High    Nutrition Needs:  · Estimated Daily Total Kcal: 3051-7821 kcals  · Estimated Daily Protein (g):  gm   · Estimated Daily Fluid (ml/day): 1 mL/kcal     Nutrition Diagnosis:   · Problem: Severe malnutrition  · Etiology: related to Insufficient energy/nutrient consumption, Alteration in GI function, Increased demand for energy/nutrients     Signs and symptoms:  as evidenced by Intake 0-25%, Intake 25-50%, Weight loss    Objective Information:  · Nutrition-Focused Physical Findings: 540 mL ostomy output   · Wound Type: Stage II, Pressure Ulcer  · Current Nutrition Therapies:  · Oral Diet Orders: NPO   · Oral Diet intake: NPO  · Oral Nutrition Supplement (ONS) Orders: None  · ONS intake: NPO  · Tube Feeding (TF) Orders:   · Feeding Route: Nasogastric  · Formula: 1.5 Calorie without Fiber  · Goal TF & Flush Orders Provides: With current propofol titrations, recommend Osmolite 1.5 (1.5 calorie without fiber) at goal of 55 mL/hr x 20 hrs to provide 1100 mL TV, 1650 kcals, 70 gm protein, and 836 mL free water. + 1 protienes modular to provide an additional 26 gm protein and 104 kcals. · Anthropometric Measures:  · Ht: 5' 9\" (175.3 cm)   · Current Body Wt: 163 lb (73.9 kg)  · Admission Body Wt: 191 lb (86.6 kg)  · Usual Body Wt: 194 lb (88 kg)(in january )  · Weight Change: 31 lbs; -16% wt loss x past 3 months    · Ideal Body Wt: 160 lb (72.6 kg)   · BMI Classification: BMI 18.5 - 24.9 Normal Weight    Nutrition Interventions:   Start Tube Feeding  Continued Inpatient Monitoring    Nutrition Evaluation:   · Evaluation: Progressing toward goals   · Goals: Pt will tolerate most appropriate form of nutrition therapy this admission    · Monitoring: TF Intake, TF Tolerance, Monitor Bowel Function, Weight, Nutrition Progression      Electronically signed by Delia Overton.  Devin Valencia RD, [place] on 4/10/19 at 9:41 AM    Contact Number: [phone]

## 2019-04-10 NOTE — PROGRESS NOTES
Site assessed and full 4x4 lightly saturated with blood and removed. Pressure applied to site for an additional 6 min. and a thrombix applied with Tegaderm. Will continuee to monitor.

## 2019-04-10 NOTE — PROGRESS NOTES
Patient extubated by RN. Placed on 15 lpm high flow. 90% spo2. Patient tolerating well at this time. Vapotherm on standby in room per Dr. Austin Jones.

## 2019-04-10 NOTE — PROGRESS NOTES
Patient back on [facility]. Per note from RN, placed back on [facility] at 09:40.

## 2019-04-10 NOTE — PROGRESS NOTES
Hospitalist Progress Note      PCP: Radha Taylor MD    Date of Admission: 3/7/2019    Chief Complaint: shortness of breath     Hospital Course: long admission due to PE, perforated diverticulitis, respiratory failure with hypoxemia to count a few. Transferred back to the ICU for worsening hypoxemic respiratory failure.    Patient was intubated yesterday  Cardiac cath done yesterday  Bronchoscopy later today    Subjective: Review of systems is not possible due to patient being unconscious on the ventilator      Medications:  Reviewed    Infusion Medications    propofol 50 mcg/kg/min (04/10/19 1009)    fentaNYL (SUBLIMAZE) infusion 75 mcg/hr (04/09/19 1561)    heparin (porcine) 16.8 mL/hr (04/10/19 0543)    dextrose 100 mL/hr (03/08/19 1700)     Scheduled Medications    magnesium sulfate  2 g Intravenous Once    chlorhexidine  15 mL Mouth/Throat BID    carboxymethylcellulose PF  1 drop Both Eyes TID    metoprolol succinate  50 mg Oral Daily    ticagrelor  90 mg Oral BID    mupirocin   Nasal BID    methylPREDNISolone  60 mg Intravenous Q6H    potassium chloride  10 mEq Oral TID    magnesium oxide  400 mg Oral BID    sodium chloride flush  10 mL Intravenous Q12H    ipratropium-albuterol  1 ampule Inhalation Q4H WA    zinc oxide  1 applicator Topical Daily    acyclovir  800 mg Oral TID    pantoprazole  40 mg Intravenous Daily    aspirin  81 mg Oral Daily    simvastatin  40 mg Oral Nightly    folic acid  1 mg Oral Daily    oyster shell calcium/vitamin D  1 tablet Oral Daily     PRN Meds: fentanNYL, magic (miracle) mouthwash with nystatin, oxyCODONE **OR** oxyCODONE, heparin (porcine), heparin (porcine), HYDROmorphone, sodium chloride flush, glucose, dextrose, glucagon (rDNA), dextrose, ondansetron, acetaminophen      Intake/Output Summary (Last 24 hours) at 4/10/2019 1217  Last data filed at 4/10/2019 1036  Gross per 24 hour   Intake 1506.24 ml   Output 2190 ml   Net -683.76 ml Physical Exam Performed:    /64   Pulse 73   Temp 98.5 °F (36.9 °C) (Core)   Resp 15   Ht 5' 9\" (1.753 m)   Wt 163 lb 12.8 oz (74.3 kg)   SpO2 96%   BMI 24.19 kg/m²     General appearance: No apparent distress, appears stated age. On mechanical ventilation  HEENT: Pupils equal, round, and reactive to light. Conjunctivae/corneas clear. Neck: Supple, with full range of motion. No jugular venous distention. Trachea midline. Endotracheal tube in place  Respiratory: On mechanical ventilation, bilateral rhonchi. Cardiovascular: Regular rate and rhythm with normal S1/S2 without murmurs, rubs or gallops. Abdomen: Soft, mild tenderness  Musculoskeletal: No clubbing, cyanosis or edema bilaterally. Full range of motion without deformity. Skin: Skin color, texture, turgor normal.  No rashes or lesions. Neurologic:  Sedated, unresponsive. I believe this is a grossly nonfocal exam.  Psychiatric: Unresponsive, sedated, on mechanical ventilation. Capillary Refill: Brisk,< 3 seconds   Peripheral Pulses: +2 palpable, equal bilaterally     I examined the patient today (04/10/19). Physical exam is similar to yesterday (4/9)    Labs:   Recent Labs     04/08/19  0403 04/09/19  0503 04/10/19  0452   WBC 9.8 9.1 9.6   HGB 7.8* 7.3* 7.4*   HCT 23.1* 21.9* 21.5*   * 114* 108*     Recent Labs     04/08/19  0403 04/09/19  0503 04/10/19  0452   * 136 135*   K 4.4 3.8 3.8   CL 97* 98* 97*   CO2 28 27 26   BUN 15 18 21*   CREATININE <0.5* <0.5* <0.5*   CALCIUM 7.7* 7.1* 7.0*     No results for input(s): AST, ALT, BILIDIR, BILITOT, ALKPHOS in the last 72 hours. No results for input(s): INR in the last 72 hours. No results for input(s): Nanetta Catherine in the last 72 hours.     Urinalysis:      Lab Results   Component Value Date    NITRU Negative 03/10/2019    WBCUA 3-5 03/10/2019    RBCUA 0-2 03/10/2019    BLOODU Negative 03/10/2019    SPECGRAV 1.025 03/10/2019    GLUCOSEU Negative 03/10/2019 Radiology:  XR ABDOMEN (KUB) (SINGLE AP VIEW)   Final Result   1. Nasogastric tube terminating in the gastric body. XR CHEST PORTABLE   Final Result   1. New right perihilar and suspected increasing left upper lobe airspace   disease, either pneumonia or edema. 2. The proximal side hole for the enteric tube is in the esophagus and the   tube should be advanced 5 cm. XR CHEST PORTABLE   Final Result   Endotracheal tube tip in the mid trachea. Enteric tube tip in the body of the stomach. No significant change in pulmonary edema. XR CHEST PORTABLE   Final Result   Persistent findings of pulmonary edema. XR CHEST PORTABLE   Final Result   Persistent bilateral airspace disease, similar to prior, likely edema. Pneumonia is not excluded in the appropriate clinical setting. Small pleural   effusions. XR CHEST PORTABLE   Final Result   Increasing right lung airspace disease could represent edema or pneumonia. Left lung airspace disease appears unchanged. CT CHEST WO CONTRAST   Final Result   1. Bilateral upper lobe ground-glass opacities, left greater than right, with   developing left perihilar consolidation. Findings are most concerning for   multifocal pneumonia with asymmetric pulmonary edema a less likely   possibility. 2. Small bilateral pleural effusions which are slightly increased from   03/26/2019.   3. Trace ascites in the upper abdomen. 4. Sclerotic metastasis in the T6 vertebral body. 5. Coronary atherosclerosis. XR CHEST PORTABLE   Final Result   Nonspecific consolidation mid and lower left lung with left pleural effusion   presumably related to sequela from pulmonary edema. Pneumonia is a   consideration. Calcific atherosclerosis aorta. Cardiomegaly. XR ABDOMEN (KUB) (SINGLE AP VIEW)   Final Result   Dilated small bowel, may reflect ileus or obstruction. Recommend follow-up   study.          CT ABDOMEN PELVIS WO CONTRAST Additional Contrast? None   Final Result   Interval increase in size of the left lower quadrant collection which now   measures up to 7.8 cm. Administered rectal contrast extends into the   collection. Right anterior approach drain courses adjacent to the collection. No significant change in pleural effusions and associated airspace disease. The findings were sent to the Radiology Results [address] at 12:53   pm on 3/26/2019to be communicated to a licensed caregiver. FL BARIUM ENEMA   Final Result   Active extravasation of contrast from the sigmoid colon, compatible with   anastomotic leak. Please refer to accompanying CT. Diverticulosis. CT ABDOMEN PELVIS W IV CONTRAST Additional Contrast? Radiologist Recommendation   Final Result   1. The patient is 11 days status post partial sigmoid colectomy. Surgical   drain remains in place with a small ill-defined fluid collection that is   stable from prior exam.  This does not appear to represent an abscess and may   represent postoperative hematoma or seroma. 2. There is a new collection of free intraperitoneal air immediately anterior   to the prior anastomosis site, contained within the left lower quadrant. This indicates an anastomosis leak. No distant free air. 3. 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 4. Cholelithiasis with no evidence of acute cholecystitis. 5. Worsening small pleural effusions and airspace changes in the lower chest.   Pattern may represent underlying pulmonary edema or perhaps pneumonitis. XR CHEST PORTABLE   Final Result   Interval development of mild pulmonary edema. There is a small left-sided pleural effusion with more focal consolidation   the left lung base, atelectasis versus pneumonia. IR PICC WO SQ PORT/PUMP > 5 YEARS   Final Result   Successful placement of PICC line.          CT ABDOMEN PELVIS WO CONTRAST Additional Contrast? None   Final Result   No evidence retroperitoneal hematoma. Interval colonic resection. Small volume loculated collection in the left   pericolic gutter, with surgical drain terminating adjacent. Mildly dilated loops of small bowel with scattered air-fluid levels, most   likely postoperative ileus. Trace pleural effusions and bibasilar airspace disease representing   atelectasis or pneumonia. VL Extremity Venous Bilateral   Final Result      CT CHEST PULMONARY EMBOLISM W CONTRAST   Final Result   Bilateral pulmonary emboli      Scattered opacities throughout the lungs, greatest at the lung bases, with   tiny pleural effusions. Changes are increased compared to prior      Sclerotic osseous metastatic disease as described previously      The findings were sent to the Radiology Results [address] at 3:57   pm on 3/16/2019to be communicated to a licensed caregiver. XR CHEST 1 VW   Final Result   Shallow inflation with findings consistent with basilar atelectasis. Left   pleural thickening versus trace effusion. CT CHEST ABDOMEN PELVIS WO CONTRAST   Final Result   Perforated sigmoid diverticulitis and pneumoperitoneum unchanged. Stable   left [facility] colonic air-fluid collection may represent an intramural or   extramural abscess or giant diverticulum. Infrarenal abdominal aortic aneurysm and aorto bi-iliac stent. Other incidental findings as above. CT ABDOMEN PELVIS W IV CONTRAST Additional Contrast? None   Final Result   Acute perforated descending colon diverticulitis. There is free air in the   abdomen and there is a small extraluminal gas and fluid collection/abscess   adjacent to the descending colon measuring 4.1 cm in AP diameter and 6 cm in   length. There is diverticulosis of the entire colon. Cholelithiasis.       4.7 cm infrarenal abdominal aortic aneurysm status post endograft repair with   a patent aorto bi-iliac graft. There is suspicion of an endoleak with blush   of contrast enhancement suspected in the inferior aspect of the native   aneurysm. As a noncontrast study was not performed, calcification of the   intraluminal thrombus accounting for the apparent enhancement is not   excluded. 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 Comparison with a noncontrast CT examination would be   helpful. Critical results were called by Dr. Flavio Tian. MD Elizabeth to Vivek Dunn on   3/7/2019 at 20:35. Assessment/Plan:    Active Hospital Problems    Diagnosis Date Noted    Iron deficiency anemia due to chronic blood loss [D50.0]     Pneumonia of left lung due to infectious organism [J18.9]     Cardiomyopathy ([address] [I42.9]     NSVT (nonsustained ventricular tachycardia) (HCC) [I47.2]     Other pulmonary embolism without acute cor pulmonale (HCC) [I26.99]     Acute respiratory failure with hypoxia ([address] [J96.01]     H/O colectomy [Z90.49]     Primary malignant neoplasm of lung metastatic to other site (HCC) [C34.90]     Herpes zoster oticus [B02.21]     Hearing loss of right ear [H91.91]     Chemotherapy-induced neutropenia (HCC) [D70.1, T45.1X5A]     Mucositis [K12.30]     Diverticulitis [K57.92] 03/07/2019    Diverticulitis of large intestine with perforation and abscess without bleeding [K57.20]      PLAN:    Acute hypoxemic respiratory failure  Multifactorial, with PE, malignancy and possible pneumonia  On mechanical ventilation  Pulmonology following  Bronchoscopy being planned     Metastatic lung cancer  Chemotherapy on hold to avoid healing  Has responded to chemo when could be administered. Persistent acute medical issues prevent further chemo, causing prognosis to get worse  Seen by palliative care.  Requesting active treatment    Colonic diverticulitis with perforation  Had colectomy and colostomy  Stable now    Hyponatremia  Mild, secondary to lung malignancy  Monitor BMP    Anemia  Malignancy and chronic disease combined  No active blood loss  Transfuse if indicated  No transfusion needed as of today    Non-ST elevation myocardial infarction  Had cardiac cath and PCI  Monitor    Discussed with nursing  Discussed with critical care NP    DVT Prophylaxis: SCD  Diet: DIET TUBE FEED CONTINUOUS/CYCLIC NPO; 1.5 Calorie without Fiber (Osmolite 1.5); Orogastric; 15; 55  Code Status: Full Code    PT/OT Eval Status: on hold    Dispo - hospice recommendations rejected by patient.   Planning to discharge the patient to [place] [id number] when stable    Ivanna Hale MD

## 2019-04-10 NOTE — PROGRESS NOTES
at 23 cm   Measured From [address] [place] [address],[address] By [employer]   Site Condition Dry

## 2019-04-11 NOTE — CARE COORDINATION
CM met with patient and wife at bedside. Plan is still for [facility] [place]. Both patient and wife verbalizing that they are agreeable with the above plan and that the [place] location is preference. Wife verbalizing that she is ok with move whenever optimal for patient however, her preference would be to stay here one more night.     Writer reached out to [facility] with [facility] and they are ok to accept patient however the [place] facility is discharge dependent. They anticipate that they will likely have a bed late today or tomorrow should MD feel patient ok for transfer.   Plastio to continue to monitor bed situation and update writer ASAP with bed availability.       Jong Vital, RN

## 2019-04-11 NOTE — PROGRESS NOTES
RESPIRATORY THERAPY ASSESSMENT    Name:  Huong Santiago Record Number:  [id number]  Age: 69 y.o. Gender: male  : 1945  Today's Date:  2019  Room:  [address] [place] [address]-    Assessment     Is the patient being admitted for a COPD or Asthma exacerbation? No   (If yes the patient will be seen every 4 hours for the first 24 hours and then reassessed)    Patient Admission Diagnosis      Allergies  No Known Allergies    Minimum Predicted Vital Capacity:     na          Actual Vital Capacity:      Na unable to preform              Pulmonary History:COPD  Home Oxygen Therapy:  room air  Home Respiratory Therapy:none   Current Respiratory Therapy:  Duo neb hhn q4 wa  Treatment Type: HHN  Medications: Albuterol/Ipratropium    Respiratory Severity Index(RSI)   Patients with orders for inhalation medications, oxygen, or any therapeutic treatment modality will be placed on Respiratory Protocol. They will be assessed with the first treatment and at least every 72 hours thereafter. The following severity scale will be used to determine frequency of treatment intervention. Smoking History: Pulmonary Disease or Smoking History, Greater than 15 pack year = 2    Social History  Social History     Tobacco Use    Smoking status: Former Smoker     Packs/day: 0.00     Years: 0.00     Pack years: 0.00     Last attempt to quit: 2003     Years since quittin.2    Smokeless tobacco: Never Used   Substance Use Topics    Alcohol use:  Yes     Alcohol/week: 10.8 oz     Types: 18 Shots of liquor per week     Comment: social-used to drink alot     Drug use: No       Recent Surgical History: None = 0  Past Surgical History  Past Surgical History:   Procedure Laterality Date    ARTERIAL ANEURYSM REPAIR  2010    right popliteal artery aneurysm repair    BRONCHOSCOPY  2019    left endobronchial tumor    BRONCHOSCOPY N/A 2019    BRONCHOSCOPY ALVEOLAR LAVAGE performed by [id number], MD at [address] [place] [address]  UPPER GASTROINTESTINAL ENDOSCOPY N/A 3/21/2019    EGD ESOPHAGOGASTRODUODENOSCOPY performed by Rod Szymanski MD at [address]       Level of Consciousness: Alert, Oriented, and Cooperative = 0    Level of Activity: Walking with assistance = 1    Respiratory Pattern: Regular Pattern; RR 8-20 = 0    Breath Sounds: Clear = 0    Sputum  Sputum Color: Bright red, Clear, Tenacity: None, Sputum How Obtained: Spontaneous cough  Cough: Strong, spontaneous, non-productive = 0    Vital Signs   /68   Pulse 89   Temp 98.4 °F (36.9 °C) (Axillary)   Resp 20   Ht 5' 9\" (1.753 m)   Wt 166 lb 3.6 oz (75.4 kg)   SpO2 93%   BMI 24.55 kg/m²   SPO2 (COPD values may differ): Less than 86% on room air or greater than 92% on FiO2 greater than 50% = 4    Peak Flow (asthma only): not applicable = 0    RSI: 7-8 = BID and Q4HPRN (every four hours as needed) for dyspnea        Plan       Goals:     Patient/caregiver was educated on the proper method of use for Respiratory Care Devices:  Yes      Level of patient/caregiver understanding able to:   ? Verbalize understanding   ? Demonstrate understanding       ? Teach back        ? Needs reinforcement       ? No available caregiver               ? Other:     Response to education:  Excellent     Is patient being placed on Home Treatment Regimen? No     Does the patient have everything they need prior to discharge? NA     Comments: reviewed pt chart    Plan of Care: continue current therapy per pts needs    Electronically signed by Jose David Forrest RCP on 4/11/2019 at 3:39 PM    Respiratory Protocol Guidelines     1. Assessment and treatment by Respiratory Therapy will be initiated for medication and therapeutic interventions upon initiation of aerosolized medication. 2. Physician will be contacted for respiratory rate (RR) greater than 35 breaths per minute.  Therapy will be held for heart rate (HR) greater than 140 beats per minute, pending direction from Medications:    1. If the patient lacks prior history of lung disease, is not using inhaled anti-inflammatory medication at home, and lacks wheezing by examination or by history for at least 24 hours, contact physician for possible discontinuation.

## 2019-04-11 NOTE — PROGRESS NOTES
Report given to C4 nurses. Belongings packed, med box empty. Patient transferred to rm [id number] via bed on tele box and high flow o2/non-rebreather.

## 2019-04-11 NOTE — ONCOLOGY
ONCOLOGY HEMATOLOGY CARE PROGRESS NOTE      SUBJECTIVE:     On 15 LPM by NC. O2 sat 93%. Afebrile. Denies pain. ROS:   The remaining 10 point review of symptoms is unremarkable. OBJECTIVE        Physical    VITALS:  /64   Pulse 81   Temp 97.5 °F (36.4 °C) (Core)   Resp 22   Ht 5' 9\" (1.753 m)   Wt 163 lb 12.8 oz (74.3 kg)   SpO2 (!) 82%   BMI 24.19 kg/m²   TEMPERATURE:  Current - Temp: 97.5 °F (36.4 °C); Max - Temp  Av.6 °F (37 °C)  Min: 97.5 °F (36.4 °C)  Max: 99.2 °F (37.3 °C)  PULSE OXIMETRY RANGE: SpO2  Av.5 %  Min: 82 %  Max: 100 %  24HR INTAKE/OUTPUT:      Intake/Output Summary (Last 24 hours) at 2019 0737  Last data filed at 2019 7827  Gross per 24 hour   Intake 1053.37 ml   Output 1205 ml   Net -151.63 ml       CONSTITUTIONAL: sedated, intubated   HEENT oral pharynx , no scleral icterus, wearing high flow nasal canula   HEMATOLOGIC/LYMPHATICS:  no cervical lymphadenopathy, no supraclavicular lymphadenopathy, no axillary lymphadenopathy and no inguinal lymphadenopathy  LUNGS:  Ventilator   CARDIOVASCULAR:  , regular rate and rhythm, normal S1 and S2, no S3 or S4, and no murmur noted  ABDOMEN: soft, non tender   Back: no bruising, purpura, or hematomas noted   MUSCULOSKELETAL:  There is no redness, warmth, or swelling of the joints. EXTREMETIES: BLE edema +1, non pitting   NEUROLOGIC:  Sedated      Data      Recent Labs     19  0503 04/10/19  0452 19  0529   WBC 9.1 9.6 11.8*   HGB 7.3* 7.4* 7.6*   HCT 21.9* 21.5* 23.1*   * 108* 101*   MCV 85.9 86.3 87.0        Recent Labs     19  0503 04/10/19  0452 19  0529    135* 134*   K 3.8 3.8 3.8   CL 98* 97* 96*   CO2 27 26 27   BUN 18 21* 19   CREATININE <0.5* <0.5* <0.5*     No results for input(s): AST, ALT, ALB, BILIDIR, BILITOT, ALKPHOS in the last 72 hours.     Magnesium:    Lab Results   Component Value Date    MG 2.00 2019    MG 1.70 04/10/2019 MG 1.70 04/09/2019         Problem List  Patient Active Problem List   Diagnosis    Elevated fasting glucose    CAD (coronary artery disease)    PAD (peripheral artery disease) ([address]    Essential hypertension    Mixed hyperlipidemia    Sprain of right foot    Sprain of anterior talofibular ligament of right ankle    Closed nondisplaced fracture of fifth right metatarsal bone    Closed fracture of proximal lateral malleolus of ankle with routine healing    Right foot pain    Right ankle pain    Nondisplaced fracture of fifth right metatarsal bone with routine healing    History of pulmonary emphysema    Chronic obstructive pulmonary disease with (acute) exacerbation (HCC)    Pulmonary nodule, left    Abnormal bone scan of thoracic spine    Back pain    Visit for monitoring Plavix therapy    History of ventricular tachycardia    Gallstones    Hemoptysis    Former smoker    Elevated PSA    Adenocarcinoma of left lung ([address]    Acute diverticulitis    Pancytopenia ([address]    Diverticulitis of large intestine with perforation and abscess without bleeding    Diverticulitis    Chemotherapy-induced neutropenia (HCC)    Mucositis    Herpes zoster oticus    Hearing loss of right ear    Acute respiratory failure with hypoxia ([address]    H/O colectomy    Primary malignant neoplasm of lung metastatic to other site [facility])    NSVT (nonsustained ventricular tachycardia) ([address]    Other pulmonary embolism without acute cor pulmonale ([address]    Cardiomyopathy (HCC)    Pneumonia of left lung due to infectious organism    Iron deficiency anemia due to chronic blood loss       ASSESSMENT AND PLAN    Acute Perforated descending colon diverticulitis (not immune mediated colitis) with free air in the abd   - per gen surgery , s/p Laparoscopic converted to open left Colectomy, 3/14/2019, with Dr. Ama Shi  - CT [place] 3[place] with [place] [facility] [place]    - Status post Gerda procedure AP 3/21 - no signs of bleeding.  - due for B12 shot - given 3/19/2019 - required every 9 weeks with this chemo. - transfuse if Hgb < 7, Hgb 7.4 today       Shingles to the V3 dermatome of the trigeminal nerve  - Acyclovir 800 TID for prophylaxis; already completed treatment dose earlier in admit     NSVT- ICD-Known hx of VT s/p ICD, on Amio, VT 3/11/2019 with defibb   - s/p LHC 4-9 with new drug eluting stents for severe stenosis     Renal endograft secondary to PAD  -anti-platelet therapy     Prognosis remains regarded. LTAC placement likely in the near future.     Wendy Tellez MD

## 2019-04-11 NOTE — CARE COORDINATION
Spoke to Dr José Miguel Burkett he is ok for patient to transfer to [facility] whenever a bed becomes available. Writer updated him that Cait Livingston is discharge dependent and will notify him as soon as spot becomes available. MD to update family of pending discharge when bed available.   Neo Swenson RN

## 2019-04-11 NOTE — FLOWSHEET NOTE
04/11/19 1105   Assessment   Charting Type Shift assessment   Neurological   Neuro (WDL) WDL   Level of Consciousness 0   Orientation Level Oriented X4   Cognition Appropriate judgement; Appropriate safety awareness; Appropriate attention/concentration; Appropriate for developmental age; Follows commands   Language Clear; Appropriate for developmental age   [de-identified] Coma Scale   Eye Opening 4   Best Verbal Response 5   Best Motor Response 6   Nati Coma Scale Score 15   HEENT   HEENT (WDL) X   Right Eye Impaired vision   Left Eye Impaired vision   Nose Intact;Drainage  (bloody )   Respiratory   Respiratory (WDL) X  (High flow )   R Breath Sounds Diminished   L Breath Sounds Diminished   Chest Assessment Trachea midline; Chest expansion symmetrical   Cough/Sputum   Cough Productive   Cough Description   Sputum Amount Small   Sputum Color Bright red;Clear   Cardiac   Cardiac (WDL) WDL   Cardiac Monitor   Telemetry Monitor On Yes   Telemetry Audible Yes   Telemetry Alarms Set Yes   Gastrointestinal   Abdominal (WDL) X   RUQ Bowel Sounds Active   RLQ Bowel Sounds Active   LUQ Bowel Sounds Active   LLQ Bowel Sounds Active   Passing Flatus Y   Abdomen Inspection Rounded; Soft   Peripheral Vascular   Peripheral Vascular (WDL) WDL   Skin Color/Condition   Skin Color/Condition (WDL) WDL   Skin Integrity   Skin Integrity (WDL) X   Musculoskeletal   Musculoskeletal (WDL) X   RL Extremity Weakness   LL Extremity Weakness   Genitourinary   Genitourinary (WDL) WDL   Urine Assessment   Urine Color Yellow/straw   Urine Appearance Clear   Wound 03/08/19 Sacrum Mid;Right   Date First Assessed/Time First Assessed: 03/08/19 0032   Present on Hospital Admission: Yes  Wound Approximate Age at First Assessment (Weeks): 1.5 weeks  Primary Wound Type: Pressure Injury  Location: Sacrum  Wound Location Orientation: Mid;Right   Dressing Status Clean;Dry; Intact   Dressing/Treatment Foam   Wound Assessment Clean;Dry; Intact; Pink   Drainage Amount None   Colostomy LUQ Descending/sigmoid   Placement Date/Time: 03/27/19 1401   Pre-existing: No  Timeout: Patient;Procedure; Consent Confirmed;Site prepped with chlorhexidine; Availability of Implant; Correct Position  Inserted by: Dr. Osborn Files  Location: LUQ  Colostomy Type: Descending/sigmoid   Stoma  Assessment Pink   Mucocutaneous Junction Intact   Peristomal Assessment Intact   Stool Appearance Loose   Stool Color Brown   Stool Amount Medium   Urethral Catheter 16 fr   Placement Date/Time: 04/09/19 1400   Urethral Catheter Timeout: Sterile technique; Appropriate Equipment  Inserted by: Elissa Burkett RN  Tube Size (fr): 16 fr  Catheter Balloon Size: 10 mL  Collection Container: Standard  Securement Method: Leg strap  U... Catheter Indications Need for fluid management in critically ill patients in a critical care setting not able to be managed by other means such as BSC with hat, bedpan, urinal, condom catheter, or short term intermittent urethral catherization   Site Assessment No urethral drainage   Urine Color Yellow   Urine Appearance Clear   Incision 03/14/19 Abdomen Right   Date First Assessed/Time First Assessed: 03/14/19 1052   Location: Abdomen  Wound Location Orientation: Right   Closure Adhesive bandage   Dressing/Treatment Dry Dressing;4x4   Dressing Status Clean;Dry; Intact   Incision 03/27/19 Abdomen   Date First Assessed/Time First Assessed: 03/27/19 1400   Location: Abdomen   Wound Assessment Clean;Dry; Intact   Closure Open to air   Drainage Amount None   Psychosocial   Psychosocial (WDL) WDL

## 2019-04-11 NOTE — PROGRESS NOTES
Pulmonary & Critical Care Inpatient Progress Note   Mignon Jackson MD     REASON FOR TODAY'S VISIT:  Acute on chronic resp failure    SUBJECTIVE:   Extubated yesterday, placed on high flow oxygen and tolerating 15LPM   No complaints    Scheduled Meds:   methylPREDNISolone  60 mg Intravenous Q12H    metoprolol succinate  50 mg Oral Daily    ticagrelor  90 mg Oral BID    mupirocin   Nasal BID    potassium chloride  10 mEq Oral TID    magnesium oxide  400 mg Oral BID    sodium chloride flush  10 mL Intravenous Q12H    ipratropium-albuterol  1 ampule Inhalation Q4H WA    zinc oxide  1 applicator Topical Daily    acyclovir  800 mg Oral TID    pantoprazole  40 mg Intravenous Daily    aspirin  81 mg Oral Daily    simvastatin  40 mg Oral Nightly    folic acid  1 mg Oral Daily    oyster shell calcium/vitamin D  1 tablet Oral Daily       Continuous Infusions:   heparin (porcine) 16.2 mL/hr (04/11/19 1521)    dextrose 100 mL/hr (03/08/19 1700)       PRN Meds:  magic (miracle) mouthwash with nystatin, oxyCODONE **OR** oxyCODONE, heparin (porcine), heparin (porcine), HYDROmorphone, sodium chloride flush, glucose, dextrose, glucagon (rDNA), dextrose, ondansetron, acetaminophen    ALLERGIES:  Patient has No Known Allergies. Objective:   PHYSICAL EXAM:  /71   Pulse 114   Temp 97.8 °F (36.6 °C) (Oral)   Resp 18   Ht 5' 9\" (1.753 m)   Wt 166 lb 3.6 oz (75.4 kg)   SpO2 92%   BMI 24.55 kg/m²    Physical Exam   Constitutional: He appears well-developed. HENT:   Head: Normocephalic and atraumatic. Mouth/Throat: Oropharynx is clear and moist. No oropharyngeal exudate. Eyes: Pupils are equal, round, and reactive to light. EOM are normal.   Neck: Neck supple. No JVD present. Cardiovascular: Normal heart sounds. Exam reveals no gallop and no friction rub. No murmur heard. Pulmonary/Chest: He has no wheezes. He has no rales. Equal chest rise and expansion bilaterally   Abdominal: Soft.  Bowel sounds are normal. He exhibits no distension. There is no tenderness. Musculoskeletal: Normal range of motion. He exhibits no edema. Lymphadenopathy:     He has no cervical adenopathy. Neurological: He is alert. No cranial nerve deficit. Skin: Skin is warm and dry. No rash noted. He is not diaphoretic. Data Reviewed:   LABS:  CBC:  Recent Labs     04/09/19  0503 04/10/19  0452 04/11/19  0529   WBC 9.1 9.6 11.8*   HGB 7.3* 7.4* 7.6*   HCT 21.9* 21.5* 23.1*   MCV 85.9 86.3 87.0   * 108* 101*     BMP:  Recent Labs     04/09/19  0503 04/10/19  0452 04/11/19  0529    135* 134*   K 3.8 3.8 3.8   CL 98* 97* 96*   CO2 27 26 27   BUN 18 21* 19   CREATININE <0.5* <0.5* <0.5*     LIVER PROFILE: No results for input(s): AST, ALT, LIPASE, ALB, BILIDIR, BILITOT, ALKPHOS in the last 72 hours. Invalid input(s): AMYLASE  PT/INR:No results for input(s): PROTIME, INR in the last 72 hours. APTT:   Recent Labs     04/10/19  2332 04/11/19  0749 04/11/19  1431   APTT 94.6* 72.1* 47.2*     UA:No results for input(s): NITRITE, COLORU, PHUR, LABCAST, WBCUA, RBCUA, MUCUS, TRICHOMONAS, YEAST, BACTERIA, CLARITYU, SPECGRAV, LEUKOCYTESUR, UROBILINOGEN, BILIRUBINUR, BLOODU, GLUCOSEU, AMORPHOUS in the last 72 hours. Invalid input(s): Elif Villanueva  Recent Labs     04/10/19  0501 04/11/19  0519   PHART 7.471* 7.532*   BCL9MWH 37.8 31.2*   PO2ART 89.7 65.8*           CXR personally reviewed, left greater than right airspace disease      Assessment:     1. Acute on chronic resp failure, hypoxic   -pneumonitis  2. Acute PE, bilateral/submassive  3. Lung cancer with metastatic disease   -adenocarcinoma of LLL  4. Acute on chronic anemia, multifactorial  5. Diverticulitis/perforation s/p colectomy   6. CAD with recent LHC/stent placement    Plan:      -Titrate FIO2 as tolerated  -Continue steroids but will decrease dose  -Bronchodilators  -Follow up on culture results, so far negative.  No need for atb but at high risk for infection  -Serial CXRs  -Cardiology following    Purvi Jorge MD

## 2019-04-11 NOTE — PROGRESS NOTES
Occupational Therapy   Occupational Therapy Initial Assessment and Treatment Note  Date: 2019   Patient Name: Ibrahima Calvert  MRN: [id number]     : 1945    Date of Service: 2019    Discharge Recommendations:  Patient would benefit from continued therapy after discharge(LTAC, then pt would benefit from IP rehab)  OT Equipment Recommendations  Equipment Needed: No  Other: defer to next level of care    Assessment   Performance deficits / Impairments: Decreased functional mobility ; Decreased ADL status; Decreased balance;Decreased strength;Decreased high-level IADLs;Decreased endurance  Assessment: Pt motivated to work with therapy this date, but limited by fatigue and decreased Spo2, requiring rest breaks between eat set of exercises. Pt and daughter issued written UE HEP, with both verbalizing/demonstrating understanding of all exercises. Believe pt would benefit from LTAC initially, but also an IP rehab stay prior to returning home, d/t pt's high PLOF and motivation. Continue OT tx. Patient Education: role of OT, safety, therex  REQUIRES OT FOLLOW UP: Yes  Activity Tolerance  Activity Tolerance: Patient limited by fatigue  Activity Tolerance: Pt Spo2 85% on 15L high flow O2 during exercises, with pt with signs of SOB and given cues for PLB, with Spo2 increasing to 88% within ~2 minutes  Safety Devices  Safety Devices in place: Yes  Type of devices: Call light within reach; Bed alarm in place; Left in bed;Nurse notified           Patient Diagnosis(es): The primary encounter diagnosis was Diverticulitis of large intestine with perforation and abscess without bleeding. Diagnoses of Leaking abdominal aortic aneurysm (AAA) ([address] Abdominal pain, left lower quadrant, and Chemotherapy-induced neutropenia ([address] were also pertinent to this visit.      has a past medical history of Benign neoplasm of colon, Cancer ([address] Diverticulosis of colon (without mention of hemorrhage), Elevated fasting glucose, Former cigarette smoker, Herpes simplex viral infection, Hyperlipidemia, Hypertension, MI, old, 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[facility] [address] and Right popliteal artery occlusion ([facility] [address] has a past surgical history that includes Arterial aneurysm repair (11/2010); Finger fracture surgery; Coronary angioplasty with stent; Upper gastrointestinal endoscopy (2/10/2014); Colonoscopy (10/08/01 12/20/04); Colonoscopy (4/4/14); Upper gastrointestinal endoscopy (4/4/14); Upper gastrointestinal endoscopy (11/4/15); bronchoscopy (01/18/2019); bronchoscopy (N/A, 1/18/2019); bronchoscopy (1/18/2019); INSERTION / REMOVAL / REPLACEMENT VENOUS ACCESS CATHETER (Right, 1/23/2019); bronchoscopy (N/A, 1/23/2019); bronchoscopy (1/23/2019); bronchoscopy (1/23/2019); bronchoscopy (1/23/2019); bronchoscopy (1/23/2019); bronchoscopy (1/23/2019); hemicolectomy (N/A, 3/14/2019); Upper gastrointestinal endoscopy (N/A, 3/21/2019); LAPAROTOMY EXPLORATORY (N/A, 3/27/2019); bronchoscopy (N/A, 4/3/2019); and bronchoscopy (4/3/2019). Restrictions  Restrictions/Precautions  Restrictions/Precautions: General Precautions, Fall Risk  Position Activity Restriction  Other position/activity restrictions: 15L high flow O2, continuous pulse oximeter    Subjective   General  Chart Reviewed: Yes  Patient assessed for rehabilitation services?: Yes(Simultaneous filing.  User may not have seen previous data.)  Family / Caregiver Present: Yes(Pt's wife, daughters)  Referring Practitioner: Migue Damian MD  Diagnosis: Metastatic lung cancer; diverticulosis; s/p Laparoscopic converted to Open left Colectomy 3-14-19  Subjective  Subjective: Pt in bed on arrival, pleasant and agreeable to treatment   General Comment  Comments: Per RN okay to treat, okay for in-bed activity     Social/Functional History  Social/Functional History  Lives With: Spouse(on family leave)  Type of Home: [place]: One level(laundry on main level also)  Home Access: Stairs to enter without Strengthening, Patient/Caregiver Education & Training, Equipment Evaluation, Education, & procurement, Pain Management, Positioning      AM-PAC Score  AM-PAC Inpatient Daily Activity Raw Score: 13  AM-PAC Inpatient ADL T-Scale Score : 32.03  ADL Inpatient CMS 0-100% Score: 63.03  ADL Inpatient CMS G-Code Modifier : CL    Goals  Short term goals  Time Frame for Short term goals: By 4/18/19  Short term goal 1: Pt will complete functional transfers with CGA  Short term goal 2: Pt will complete LE dressing with Min A  Short term goal 3: Pt will perform 2-3 minutes dynamic standing activity with Min A and Spo2 >90% by 3/16  Short term goal 4: supervision with toileting  Short term goal 5: supervision with functional/toilet transfers  Patient Goals   Patient goals : \"to get my strength back and to go home\"       Therapy Time   Individual Concurrent Group Co-treatment   Time In 1449(10 minutes for eval)         Time Out 1517         Minutes 28         Timed Code Treatment Minutes: 18 Minutes     This note to serve as d/c summary should pt d/c prior to next session.     Abran Barrientos OTR/L

## 2019-04-11 NOTE — PROGRESS NOTES
[facility]   Daily Cardiovascular Progress Note    Admit Date: 3/7/2019    Chief complaint: NSVT  HPI: extubated yesterday and moved to floor        Medications/Labs all Reviewed:  Patient Active Problem List   Diagnosis    Elevated fasting glucose    CAD (coronary artery disease)    PAD (peripheral artery disease) ([address]    Essential hypertension    Mixed hyperlipidemia    Sprain of right foot    Sprain of anterior talofibular ligament of right ankle    Closed nondisplaced fracture of fifth right metatarsal bone    Closed fracture of proximal lateral malleolus of ankle with routine healing    Right foot pain    Right ankle pain    Nondisplaced fracture of fifth right metatarsal bone with routine healing    History of pulmonary emphysema    Chronic obstructive pulmonary disease with (acute) exacerbation ([address]    Pulmonary nodule, left    Abnormal bone scan of thoracic spine    Back pain    Visit for monitoring Plavix therapy    History of ventricular tachycardia    Gallstones    Hemoptysis    Former smoker    Elevated PSA    Adenocarcinoma of left lung ([address]    Acute diverticulitis    Pancytopenia ([address]    Diverticulitis of large intestine with perforation and abscess without bleeding    Diverticulitis    Chemotherapy-induced neutropenia (HCC)    Mucositis    Herpes zoster oticus    Hearing loss of right ear    Acute respiratory failure with hypoxia ([address]    H/O colectomy    Primary malignant neoplasm of lung metastatic to other site [facility])    NSVT (nonsustained ventricular tachycardia) ([address]    Other pulmonary embolism without acute cor pulmonale (HCC)    Cardiomyopathy (HCC)    Pneumonia of left lung due to infectious organism    Iron deficiency anemia due to chronic blood loss       Medications:    heparin 25,000 units in dextrose 5% 250 mL infusion Continuous   methylPREDNISolone sodium (SOLU-MEDROL) injection 60 mg Q12H   metoprolol succinate (TOPROL XL) Encounters:   04/11/19 166 lb 3.6 oz (75.4 kg)   02/28/19 184 lb 9.6 oz (83.7 kg)   02/19/19 183 lb (83 kg)         Gen: Patient in NAD, resting comfortably  Neck: No JVD or bruits  Respiratory: decreased  Chest: normal without deformity  Cardiovascular:RRR, S1S2, no mrg, normal PMI  Abdomen: Soft, mildly tender  Extremities: No clubbing, cyanosis, or edema RT groin C/D/I. No hematoma  Neurological/Psychiatric: Ax0 x 4, no gross issues  Skin:  Warm and dry      Labs:  CBC:   Recent Labs     04/09/19  0503 04/10/19  0452 04/11/19  0529   WBC 9.1 9.6 11.8*   HGB 7.3* 7.4* 7.6*   HCT 21.9* 21.5* 23.1*   MCV 85.9 86.3 87.0   * 108* 101*     BMP:   Recent Labs     04/09/19  0503 04/10/19  0452 04/11/19  0529    135* 134*   K 3.8 3.8 3.8   CL 98* 97* 96*   CO2 27 26 27   BUN 18 21* 19   CREATININE <0.5* <0.5* <0.5*     MG:    Recent Labs     04/09/19  0503 04/10/19  0452 04/11/19  0529   MG 1.70* 1.70* 2.00      PT/INR: No results for input(s): PROTIME, INR in the last 72 hours. APTT:   Recent Labs     04/10/19  1510 04/10/19  2332 04/11/19  0749   APTT 85.3* 94.6* 72.1*     Cardiac Enzymes:   No results for input(s): CKTOTAL, CKMB, CKMBINDEX, TROPONINI in the last 72 hours. Cardiac Studies:    ECHO   Definity contrast was used, but port not working properly.  Zac Tavera left ventricular systolic function is moderately reduced with an   ejection fraction of 30-35%. Inferolateral hypokinesia. Mid anteroseptal,   apical lateral and apical septal akinesia.   There is mild concentric left ventricular hypertrophy.   Normal left ventricular filling pressure.   Left ventricular cavity size is normal.   Mild thickening of the anterior leaflet of mitral valve.   Mild mitral regurgitation.   The left atrium is severely dilated.     CXR:   1. New right perihilar and suspected increasing left upper lobe airspace   disease, either pneumonia or edema.    2. The proximal side hole for the enteric tube is in the esophagus and the   tube should be advanced 5 cm. Assessment and Plan     1. NSVT   - Known hx of VT s/p ICD, on Amio   - 3/11/2018 defib for VT, since then multiple NSVT getting ATP  2. New bilateral PE 3/16/2019   - RV is normal  3. CAD   - s/p PCI to RCA 6/2011   - hx of PCi to LAD (post endograft AAA repair MI)1/2011   - s/p PCI to mid LAD, Mid RCA (POBA), and distal RCA. 4. Ischemic cardiomyopathy: LVEF 30-35%  5. HTN  6. Hx of aortic aneurysm repair   - Per Dr. Jonathan Wong, on ASA/plavix for this  7. Hx of sigmoid colectomy for perf diverticulosis 3/14/2019   - Laparoscopic converted to Open left Colectomy   - s/p Gerda's procedure for anastomotic leak   8. Metastatic lung CA, stage 4 with bone mets  9. Pulmonary edema   - Noncardiac  10. Respiratory failure: acute on chronic hypoxic       PLAN  1. Pt states feels much better after cath and feels \"breathing opened up\"  2. Pt being treated for HCAP and slowly improving, extubated 4/10  3. Pt well diuresed, NO more diuresis needed. goal keep I/o even 163lbs on bed scale   - standing scale weight only  4. Cont ASA, ticagrelor + anticoagulation for now   - After 5/10/2019, would DROP ASA and continue ticagrelor and anticoagulation to prevent triple Tx    - Cont Toprol, zocor (as able to tolerate)  5. D/c Amio given s/p revascularation      Will have pt f/u in office 1-2 wks after discharge.          Patient Active Problem List   Diagnosis    Elevated fasting glucose    CAD (coronary artery disease)    [facility]AD [facility]peripheral artery disease) [facility]    Essential hypertension    Mixed hyperlipidemia    Sprain of right foot    Sprain of anterior talofibular ligament of right ankle    Closed nondisplaced fracture of fifth right metatarsal bone    Closed fracture of proximal lateral malleolus of ankle with routine healing    Right foot pain    Right ankle pain    Nondisplaced fracture of fifth right metatarsal bone with routine healing    History of pulmonary emphysema    Chronic obstructive pulmonary disease with (acute) exacerbation (HCC)    Pulmonary nodule, left    Abnormal bone scan of thoracic spine    Back pain    Visit for monitoring Plavix therapy    History of ventricular tachycardia    Gallstones    Hemoptysis    Former smoker    Elevated PSA    Adenocarcinoma of left lung (HCC)    Acute diverticulitis    Pancytopenia (HCC)    Diverticulitis of large intestine with perforation and abscess without bleeding    Diverticulitis    Chemotherapy-induced neutropenia (HCC)    Mucositis    Herpes zoster oticus    Hearing loss of right ear    Acute respiratory failure with hypoxia ([address]    H/O colectomy    Primary malignant neoplasm of lung metastatic to other site [facility])    NSVT (nonsustained ventricular tachycardia) (HCC)    Other pulmonary embolism without acute cor pulmonale (HCC)    Cardiomyopathy (HCC)    Pneumonia of left lung due to infectious organism    Iron deficiency anemia due to chronic blood loss         Thank you for allowing me to participate in the care of your patient. Please call me with any questions [phone].       Mario Hurst MD, [facility] Cardiologist  [facility]  [phone] [facility]  [phone] [address] [place] [address]  4/11/2019 1:14 PM

## 2019-04-11 NOTE — FLOWSHEET NOTE
04/11/19 1055   Vitals   Temp 98.4 °F (36.9 °C)   Temp Source Axillary   Pulse 89   Heart Rate Source Monitor   Resp 18   /68   BP Location Right upper arm   Level of Consciousness 0   MEWS Score 1   Patient Currently in Pain Denies   Oxygen Therapy   SpO2 93 %   O2 Device High flow nasal cannula   O2 Flow Rate (L/min) 15 L/min   Pain Assessment   Pain Assessment 0-10   Pain Level 0   received pt from icu. Bedside report given by dominga katz. Pt alert and oriented. On high flow 15 l. Family at bedside. Will cont to monitor.

## 2019-04-11 NOTE — PROGRESS NOTES
Physical Therapy    Facility/Department: [facility] C4 PCU  Re-Evaluation and Treatment    NAME: Shelley Thorne  : 1945  MRN: [id number]    Date of Service: 2019    Discharge Recommendations:  (LTAC with transition to IP rehab at d/c)   PT Equipment Recommendations  Equipment Needed: No    Assessment   Body structures, Functions, Activity limitations: Decreased functional mobility ; Decreased strength;Decreased balance;Decreased endurance  Assessment: Pt seen for re-evaluation after transfer to ICU due to hypoxemia and respiratory failure. Pt currently on 15 L high flow and only able to tolerated supine ther ex this date. Handout of exercises given to daughter. Pt would benefit from continued skilled therapy to address mentioned deficits. Recommend LTAC at d/c. Pt would benefit from IP rehab following LTAC. Treatment Diagnosis: Decreased endurance and (I) with mobility  Specific instructions for Next Treatment: Progress ther ex and mobility as tolerated  Prognosis: Good  Decision Making: Medium Complexity  Patient Education: Role of PT, safety, activity tolerance, ther ex  REQUIRES PT FOLLOW UP: Yes  Activity Tolerance  Activity Tolerance: Patient limited by endurance; Patient Tolerated treatment well  Activity Tolerance: SpO2 85-87% while completing supine ther ex. Increased to 88-89% on rest on 15L high flow. Patient Diagnosis(es): The primary encounter diagnosis was Diverticulitis of large intestine with perforation and abscess without bleeding. Diagnoses of Leaking abdominal aortic aneurysm (AAA) ([address] Abdominal pain, left lower quadrant, and Chemotherapy-induced neutropenia ([address] were also pertinent to this visit.      has a past medical history of Benign neoplasm of colon, Cancer ([address] Diverticulosis of colon (without mention of hemorrhage), Elevated fasting glucose, Former cigarette smoker, Herpes simplex viral infection, Hyperlipidemia, Hypertension, MI, old, 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[address] and Right popliteal artery occlusion ([facility] has a past surgical history that includes Arterial aneurysm repair (11/2010); Finger fracture surgery; Coronary angioplasty with stent; Upper gastrointestinal endoscopy (2/10/2014); Colonoscopy (10/08/01 12/20/04); Colonoscopy (4/4/14); Upper gastrointestinal endoscopy (4/4/14); Upper gastrointestinal endoscopy (11/4/15); bronchoscopy (01/18/2019); bronchoscopy (N/A, 1/18/2019); bronchoscopy (1/18/2019); INSERTION / REMOVAL / REPLACEMENT VENOUS ACCESS CATHETER (Right, 1/23/2019); bronchoscopy (N/A, 1/23/2019); bronchoscopy (1/23/2019); bronchoscopy (1/23/2019); bronchoscopy (1/23/2019); bronchoscopy (1/23/2019); bronchoscopy (1/23/2019); hemicolectomy (N/A, 3/14/2019); Upper gastrointestinal endoscopy (N/A, 3/21/2019); LAPAROTOMY EXPLORATORY (N/A, 3/27/2019); bronchoscopy (N/A, 4/3/2019); and bronchoscopy (4/3/2019). Restrictions  Restrictions/Precautions  Restrictions/Precautions: General Precautions, Fall Risk  Position Activity Restriction  Other position/activity restrictions: 15L high flow O2, continuous pulse oximeter    Subjective  General  Chart Reviewed: Yes  Patient assessed for rehabilitation services?: Yes(Simultaneous filing. 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   Family / Caregiver Present: Yes(family)  Referring Practitioner: Dr. Lalo Mittal MD  Referral Date : 04/11/19  Diagnosis: Acute perforated recurrent diverticulitis, s/p [id number] L colectomy on 3/14/19; acute respiratory failure 2* acute bilateral PE's  Follows Commands: Within Functional Limits  General Comment  Comments: Pt resting in bed on approach; RN cleared pt for bed exercises only due to high flow oxygen  Subjective  Subjective: pt agreeable to therapy  Pain Screening  Patient Currently in Pain: Denies procurement  Safety Devices  Type of devices: All fall risk precautions in place, Call light within reach, Nurse notified, Left in bed    AM-PAC Score  AM-PAC Inpatient Mobility Raw Score : 18  AM-PAC Inpatient T-Scale Score : 43.63  Mobility Inpatient CMS 0-100% Score: 46.58  Mobility Inpatient CMS G-Code Modifier : CK          Goals  Short term goals  Time Frame for Short term goals: 1 week (unless otherwise specified) (4/18)  Short term goal 1: Pt will transfer supine <-> sit with CGA  Short term goal 2: Pt will transfer sit <-> stand and bed>chair using RW or least AD with CGA  Short term goal 3: Pt will ambulate x 50 feet using RW or least AD with CGA  Short term goal 4: By 4/15/19: Pt will tolerate 12-15 reps BLE exercise for strengthening, balance, and endurance  Short term goal 5: Pt will ambulate up/down 2 steps without use of handrails (using appropriate AD as needed) with min A  Patient Goals   Patient goals : \"To get my strength back and go home at discharge. \"       Therapy Time   Individual Concurrent Group Co-treatment   Time In 4399         Time Out 1518         Minutes 29         Timed Code Treatment Minutes: [address] [place], [place], DPT #[id number]

## 2019-04-12 NOTE — PLAN OF CARE
Planning  ___ Transition of Care Planning  ___ Psychosocial/Spiritual Support  ___ Symptom Management                                                                Tk Fragoso RN

## 2019-04-12 NOTE — CARE COORDINATION
Writer spoke to [facility] RN who states that she has reached out to [facility] and they are willing to come to hospital to do a trial bipap set up to see if this would be able to keep patient SpO2 up for transport. Writer updated All [facility] at [facility] of the above. Per All Newton if this works preference would be to plan for admission tomorrow as they would like to ensure patient safety for transport as well as a smooth transition. All Newton will continue to monitor for bed at [facility] as well as [facility] as [facility] is preference still. Spoke to patient and family and all are in agreement for plans. [facility] to arrive around 5pm to trial bipap settings. Please notify All [facility] with discharge plans tomorrow at [phone].    Abdirizak Simms RN

## 2019-04-12 NOTE — PROGRESS NOTES
Hospitalist Progress Note      PCP: Corby Flaherty MD    Date of Admission: 3/7/2019    Chief Complaint: shortness of breath     Hospital Course: long admission due to PE, perforated diverticulitis, respiratory failure with hypoxemia to count a few. Transferred back to the ICU for worsening hypoxemic respiratory failure. Extubated  Feels better  LTACH transfer was planned. However had to be canceled because of patient's hypoxemia. Subjective: shortness of breath at baseline. No chest pain, no nausea or vomiting       Medications:  Reviewed    Infusion Medications    dextrose 100 mL/hr (03/08/19 1700)     Scheduled Medications    apixaban  5 mg Oral BID    methylPREDNISolone  60 mg Intravenous Q12H    metoprolol succinate  50 mg Oral Daily    ticagrelor  90 mg Oral BID    potassium chloride  10 mEq Oral TID    magnesium oxide  400 mg Oral BID    sodium chloride flush  10 mL Intravenous Q12H    ipratropium-albuterol  1 ampule Inhalation Q4H WA    zinc oxide  1 applicator Topical Daily    pantoprazole  40 mg Intravenous Daily    aspirin  81 mg Oral Daily    simvastatin  40 mg Oral Nightly    folic acid  1 mg Oral Daily    oyster shell calcium/vitamin D  1 tablet Oral Daily     PRN Meds: magic (miracle) mouthwash with nystatin, oxyCODONE **OR** oxyCODONE, HYDROmorphone, sodium chloride flush, glucose, dextrose, glucagon (rDNA), dextrose, ondansetron, acetaminophen      Intake/Output Summary (Last 24 hours) at 4/12/2019 1420  Last data filed at 4/12/2019 0249  Gross per 24 hour   Intake 240 ml   Output 725 ml   Net -485 ml       Physical Exam Performed:    BP (!) 151/71   Pulse 94   Temp 97.8 °F (36.6 °C) (Oral)   Resp 18   Ht 5' 9\" (1.753 m)   Wt 164 lb 10.9 oz (74.7 kg)   SpO2 94%   BMI 24.32 kg/m²     General appearance: No apparent distress, appears stated age. Oriented and cooperative  HEENT: Pupils equal, round, and reactive to light. Conjunctivae/corneas clear.   Neck: Supple, with full range of motion. No jugular venous distention. Trachea midline. Respiratory:  Good effort, bilateral rhonchi  Cardiovascular: Regular rate and rhythm with normal S1/S2 without murmurs, rubs or gallops. Abdomen: Soft, mild tenderness  Musculoskeletal: No clubbing, cyanosis or edema bilaterally. Full range of motion without deformity. Skin: Skin color, texture, turgor normal.  No rashes or lesions. Neurologic:  Grossly nonfocal neuro exam.  Psychiatric: Awake, alert, fully oriented, normal and appropriate thought content  Capillary Refill: Brisk,< 3 seconds   Peripheral Pulses: +2 palpable, equal bilaterally     I examined the patient today (04/12/19). Physical exam is same as yesterday (4/11). Labs:   Recent Labs     04/10/19  0452 04/11/19  0529 04/12/19  0420   WBC 9.6 11.8* 9.4   HGB 7.4* 7.6* 7.5*   HCT 21.5* 23.1* 22.7*   * 101* 87*     Recent Labs     04/10/19  0452 04/11/19  0529 04/12/19  0420   * 134* 135*   K 3.8 3.8 3.6   CL 97* 96* 98*   CO2 26 27 25   BUN 21* 19 16   CREATININE <0.5* <0.5* <0.5*   CALCIUM 7.0* 7.3* 7.4*     No results for input(s): AST, ALT, BILIDIR, BILITOT, ALKPHOS in the last 72 hours. No results for input(s): INR in the last 72 hours. No results for input(s): [place] Daily in the last 72 hours. Urinalysis:      Lab Results   Component Value Date    NITRU Negative 03/10/2019    WBCUA 3-5 03/10/2019    RBCUA 0-2 03/10/2019    BLOODU Negative 03/10/2019    SPECGRAV 1.025 03/10/2019    GLUCOSEU Negative 03/10/2019       Radiology:  XR CHEST PORTABLE   Final Result   Some improvement in diffuse hazy opacity throughout the lungs interstitial   prominence. No significant change otherwise. XR CHEST PORTABLE   Final Result   Multifocal airspace disease, mildly increased on the right. XR ABDOMEN (KUB) (SINGLE AP VIEW)   Final Result   1. Nasogastric tube terminating in the gastric body. XR CHEST PORTABLE   Final Result   1. New right perihilar and suspected increasing left upper lobe airspace   disease, either pneumonia or edema. 2. The proximal side hole for the enteric tube is in the esophagus and the   tube should be advanced 5 cm. XR CHEST PORTABLE   Final Result   Endotracheal tube tip in the mid trachea. Enteric tube tip in the body of the stomach. No significant change in pulmonary edema. XR CHEST PORTABLE   Final Result   Persistent findings of pulmonary edema. XR CHEST PORTABLE   Final Result   Persistent bilateral airspace disease, similar to prior, likely edema. Pneumonia is not excluded in the appropriate clinical setting. Small pleural   effusions. XR CHEST PORTABLE   Final Result   Increasing right lung airspace disease could represent edema or pneumonia. Left lung airspace disease appears unchanged. CT CHEST WO CONTRAST   Final Result   1. Bilateral upper lobe ground-glass opacities, left greater than right, with   developing left perihilar consolidation. Findings are most concerning for   multifocal pneumonia with asymmetric pulmonary edema a less likely   possibility. 2. Small bilateral pleural effusions which are slightly increased from   03/26/2019.   3. Trace ascites in the upper abdomen. 4. Sclerotic metastasis in the T6 vertebral body. 5. Coronary atherosclerosis. XR CHEST PORTABLE   Final Result   Nonspecific consolidation mid and lower left lung with left pleural effusion   presumably related to sequela from pulmonary edema. Pneumonia is a   consideration. Calcific atherosclerosis aorta. Cardiomegaly. XR ABDOMEN (KUB) (SINGLE AP VIEW)   Final Result   Dilated small bowel, may reflect ileus or obstruction. Recommend follow-up   study. CT ABDOMEN PELVIS WO CONTRAST Additional Contrast? None   Final Result   Interval increase in size of the left lower quadrant collection which now   measures up to 7.8 cm. Administered rectal contrast extends into the   collection. Right anterior approach drain courses adjacent to the collection. No significant change in pleural effusions and associated airspace disease. The findings were sent to the Radiology Results [address] at 12:53   pm on 3/26/2019to be communicated to a licensed caregiver. FL BARIUM ENEMA   Final Result   Active extravasation of contrast from the sigmoid colon, compatible with   anastomotic leak. Please refer to accompanying CT. Diverticulosis. CT ABDOMEN PELVIS W IV CONTRAST Additional Contrast? Radiologist Recommendation   Final Result   1. The patient is 11 days status post partial sigmoid colectomy. Surgical   drain remains in place with a small ill-defined fluid collection that is   stable from prior exam.  This does not appear to represent an abscess and may   represent postoperative hematoma or seroma. 2. There is a new collection of free intraperitoneal air immediately anterior   to the prior anastomosis site, contained within the left lower quadrant. This indicates an anastomosis leak. No distant free air. 3. 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 4. Cholelithiasis with no evidence of acute cholecystitis. 5. Worsening small pleural effusions and airspace changes in the lower chest.   Pattern may represent underlying pulmonary edema or perhaps pneumonitis. XR CHEST PORTABLE   Final Result   Interval development of mild pulmonary edema. There is a small left-sided pleural effusion with more focal consolidation   the left lung base, atelectasis versus pneumonia. IR PICC WO SQ PORT/PUMP > 5 YEARS   Final Result   Successful placement of PICC line. CT ABDOMEN PELVIS WO CONTRAST Additional Contrast? None   Final Result   No evidence retroperitoneal hematoma. Interval colonic resection.   Small volume loculated collection in the left pericolic gutter, with surgical drain terminating adjacent. Mildly dilated loops of small bowel with scattered air-fluid levels, most   likely postoperative ileus. Trace pleural effusions and bibasilar airspace disease representing   atelectasis or pneumonia. VL Extremity Venous Bilateral   Final Result      CT CHEST PULMONARY EMBOLISM W CONTRAST   Final Result   Bilateral pulmonary emboli      Scattered opacities throughout the lungs, greatest at the lung bases, with   tiny pleural effusions. Changes are increased compared to prior      Sclerotic osseous metastatic disease as described previously      The findings were sent to the Radiology Results [address] at 3:57   pm on 3/16/2019to be communicated to a licensed caregiver. XR CHEST 1 VW   Final Result   Shallow inflation with findings consistent with basilar atelectasis. Left   pleural thickening versus trace effusion. CT CHEST ABDOMEN PELVIS WO CONTRAST   Final Result   Perforated sigmoid diverticulitis and pneumoperitoneum unchanged. Stable   left [facility] colonic air-fluid collection may represent an intramural or   extramural abscess or giant diverticulum. Infrarenal abdominal aortic aneurysm and aorto bi-iliac stent. Other incidental findings as above. CT ABDOMEN PELVIS W IV CONTRAST Additional Contrast? None   Final Result   Acute perforated descending colon diverticulitis. There is free air in the   abdomen and there is a small extraluminal gas and fluid collection/abscess   adjacent to the descending colon measuring 4.1 cm in AP diameter and 6 cm in   length. There is diverticulosis of the entire colon. Cholelithiasis. 4.7 cm infrarenal abdominal aortic aneurysm status post endograft repair with   a patent aorto bi-iliac graft. There is suspicion of an endoleak with blush   of contrast enhancement suspected in the inferior aspect of the native   aneurysm.   As a noncontrast study was not performed, calcification of the   intraluminal thrombus accounting for the apparent enhancement is not   excluded. 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 Comparison with a noncontrast CT examination would be   helpful. Critical results were called by Dr. Bharat Johnson MD to [facility] on   3/7/2019 at 20:35. Assessment/Plan:    Active Hospital Problems    Diagnosis Date Noted    Iron deficiency anemia due to chronic blood loss [D50.0]     Pneumonia of left lung due to infectious organism [J18.9]     Cardiomyopathy ([address] [I42.9]     NSVT (nonsustained ventricular tachycardia) [facility] [I47.2]     Other pulmonary embolism without acute cor pulmonale ([facility]) [I26.99]     Acute respiratory failure with hypoxia ([address] [J96.01]     H/O colectomy [Z90.49]     Primary malignant neoplasm of lung metastatic to other site ([facility]) [C34.90]     Herpes zoster oticus [B02.21]     Hearing loss of right ear [H91.91]     Chemotherapy-induced neutropenia (HCC) [D70.1, T45.1X5A]     Mucositis [K12.30]     Diverticulitis [K57.92] 03/07/2019    Diverticulitis of large intestine with perforation and abscess without bleeding [K57.20]      PLAN:    Acute hypoxemic respiratory failure  Multifactorial, with PE, malignancy and possible pneumonia  Extubated. Could not tolerate transfer, became hypoxemic when bed to bed transfer was attempted     Metastatic lung cancer  Chemotherapy on hold to avoid healing  Has responded to chemo when could be administered. Acute medical conditions interfere with chemo plans  Seen by palliative care.  Requesting active treatment  I believe patient qualifies for hospice, but declines    Colonic diverticulitis with perforation  Had colectomy and colostomy  Stable now    Hyponatremia  Mild, secondary to lung malignancy  Monitor BMP  Stable at mid-130    Anemia  Malignancy and chronic disease combined  No active blood loss  Transfuse if indicated  No transfusion needed   Monitor hemoglobin daily    Non-ST elevation myocardial infarction  Had cardiac cath and PCI  No more chest pain        DVT Prophylaxis: SCD  Diet: DIET CARDIAC;   Dietary Nutrition Supplements: Standard High Calorie Oral Supplement  Code Status: Full Code    PT/OT Eval Status: on hold    Dispo -  Discharge canceled because of hypoxemia      Susan Lindsay MD

## 2019-04-12 NOTE — PROGRESS NOTES
[facility] called stating pt's sats were low. SpO2: 60s on 15L HF. Placed nonrebreather on patient and he is 83%. Called RT and NP. Patient will be placed on vapotherm.

## 2019-04-12 NOTE — PROGRESS NOTES
(BRILINTA) tablet 90 mg BID   potassium chloride (KLOR-CON M) extended release tablet 10 mEq TID   magnesium oxide (MAG-OX) tablet 400 mg BID   magic (miracle) mouthwash with nystatin 4x Daily PRN   sodium chloride flush 0.9 % injection 10 mL Q12H   ipratropium-albuterol (DUONEB) nebulizer solution 1 ampule Q4H WA   oxyCODONE (ROXICODONE) immediate release tablet 5 mg Q4H PRN   Or    oxyCODONE (ROXICODONE) immediate release tablet 10 mg Q4H PRN   zinc oxide (TRIAD HYDROPHILIC) paste 1 applicator Daily   HYDROmorphone (DILAUDID) injection 1 mg Q2H PRN   pantoprazole (PROTONIX) injection 40 mg Daily   aspirin chewable tablet 81 mg Daily   sodium chloride flush 0.9 % injection 10 mL PRN   simvastatin (ZOCOR) tablet 40 mg Nightly   glucose (GLUTOSE) 40 % oral gel 15 g PRN   dextrose 50 % solution 12.5 g PRN   glucagon (rDNA) injection 1 mg PRN   dextrose 5 % solution PRN   folic acid (FOLVITE) tablet 1 mg Daily   oyster shell calcium/vitamin D 250-125 MG-UNIT per tablet 250 mg Daily   ondansetron (ZOFRAN) injection 4 mg Q6H PRN   acetaminophen (TYLENOL) tablet 650 mg Q4H PRN          PHYSICAL EXAM   BP (!) 142/72   Pulse 104   Temp 98.2 °F (36.8 °C) (Oral)   Resp 18   Ht 5' 9\" (1.753 m)   Wt 164 lb 10.9 oz (74.7 kg)   SpO2 (!) 89%   BMI 24.32 kg/m²    Vitals:    04/12/19 0853 04/12/19 1150 04/12/19 1157 04/12/19 1514   BP:  (!) 151/71  (!) 142/72   Pulse:  94  104   Resp:  18  18   Temp:    98.2 °F (36.8 °C)   TempSrc:    Oral   SpO2: 93% 93% 94% (!) 89%   Weight:       Height:             Intake/Output Summary (Last 24 hours) at 4/12/2019 1820  Last data filed at 4/12/2019 1639  Gross per 24 hour   Intake 240 ml   Output 1400 ml   Net -1160 ml     Wt Readings from Last 3 Encounters:   04/12/19 164 lb 10.9 oz (74.7 kg)   02/28/19 184 lb 9.6 oz (83.7 kg)   02/19/19 183 lb (83 kg)         Gen: Patient in NAD, resting comfortably  Neck: No JVD or bruits  Respiratory: decreased  Chest: normal without deformity  Cardiovascular:RRR, S1S2, no mrg, normal PMI  Abdomen: Soft, mildly tender  Extremities: No clubbing, cyanosis, or edema RT groin C/D/I. No hematoma  Neurological/Psychiatric: Ax0 x 4, no gross issues  Skin:  Warm and dry      Labs:  CBC:   Recent Labs     04/10/19  0452 04/11/19  0529 04/12/19  0420   WBC 9.6 11.8* 9.4   HGB 7.4* 7.6* 7.5*   HCT 21.5* 23.1* 22.7*   MCV 86.3 87.0 87.6   * 101* 87*     BMP:   Recent Labs     04/10/19  0452 04/11/19  0529 04/12/19  0420   * 134* 135*   K 3.8 3.8 3.6   CL 97* 96* 98*   CO2 26 27 25   BUN 21* 19 16   CREATININE <0.5* <0.5* <0.5*     MG:    Recent Labs     04/10/19  0452 04/11/19  0529 04/12/19  0420   MG 1.70* 2.00 1.80      PT/INR: No results for input(s): PROTIME, INR in the last 72 hours. APTT:   Recent Labs     04/11/19  1431 04/11/19  2115 04/12/19  0420   APTT 47.2* 73.5* 75.7*     Cardiac Enzymes:   No results for input(s): CKTOTAL, CKMB, CKMBINDEX, TROPONINI in the last 72 hours. Cardiac Studies:    ECHO   Definity contrast was used, but port not working properly.  Conchis Shields left ventricular systolic function is moderately reduced with an   ejection fraction of 30-35%. Inferolateral hypokinesia. Mid anteroseptal,   apical lateral and apical septal akinesia.   There is mild concentric left ventricular hypertrophy.   Normal left ventricular filling pressure.   Left ventricular cavity size is normal.   Mild thickening of the anterior leaflet of mitral valve.   Mild mitral regurgitation.   The left atrium is severely dilated.     CXR:   1. New right perihilar and suspected increasing left upper lobe airspace   disease, either pneumonia or edema. 2. The proximal side hole for the enteric tube is in the esophagus and the   tube should be advanced 5 cm. Assessment and Plan     1. NSVT   - Known hx of VT s/p ICD, on Amio   - 3/11/2018 defib for VT, since then multiple NSVT getting ATP  2. New bilateral PE 3/16/2019   - RV is normal  3. perforation and abscess without bleeding    Diverticulitis    Chemotherapy-induced neutropenia (HCC)    Mucositis    Herpes zoster oticus    Hearing loss of right ear    Acute respiratory failure with hypoxia (HCC)    H/O colectomy    Primary malignant neoplasm of lung metastatic to other site [facility])    NSVT (nonsustained ventricular tachycardia) (HCC)    Other pulmonary embolism without acute cor pulmonale (HCC)    Cardiomyopathy (HCC)    Pneumonia of left lung due to infectious organism    Iron deficiency anemia due to chronic blood loss         Thank you for allowing me to participate in the care of your patient. Please call me with any questions [phone].       Daisy Swartz MD, [facility] Cardiologist  [address]  [phone] [facility]  [phone] [address] [place]  4/12/2019 6:20 PM

## 2019-04-12 NOTE — CARE COORDINATION
CM notified my [address] RN family would like to move forward with placement at [facility]. CM placed call and left message for Sherren Squibb with [facility]. Awaiting response.   Amos Burns RN

## 2019-04-12 NOTE — PROGRESS NOTES
04/12/19 1942   Oxygen Therapy/Pulse Ox   O2 Therapy Oxygen humidified   O2 Device Heated high flow cannula   O2 Flow Rate (L/min) 35 L/min   FiO2  100 %   Humidification Source Heated wire   Humidification Temp 37   SpO2 90 %

## 2019-04-12 NOTE — DISCHARGE SUMMARY
Hospital Medicine Discharge Summary    Patient ID: Elle Montemayor      Patient's PCP: Radha Taylor MD    Admit Date: 3/7/2019     Discharge Date:   04/12/19     Admitting Physician: Rafaela Cummings MD     Discharge Physician: Nury Callaway MD     Discharge Diagnoses: Active Hospital Problems    Diagnosis Date Noted    Iron deficiency anemia due to chronic blood loss [D50.0]     Pneumonia of left lung due to infectious organism [J18.9]     Cardiomyopathy ([address] [I42.9]     NSVT (nonsustained ventricular tachycardia) (HCC) [I47.2]     Other pulmonary embolism without acute cor pulmonale (HCC) [I26.99]     Acute respiratory failure with hypoxia ([address] [J96.01]     H/O colectomy [Z90.49]     Primary malignant neoplasm of lung metastatic to other site (HCC) [C34.90]     Herpes zoster oticus [B02.21]     Hearing loss of right ear [H91.91]     Chemotherapy-induced neutropenia (HCC) [D70.1, T45.1X5A]     Mucositis [K12.30]     Diverticulitis [K57.92] 03/07/2019    Diverticulitis of large intestine with perforation and abscess without bleeding [K57.20]        The patient was seen and examined on day of discharge and this discharge summary is in conjunction with any daily progress note from day of discharge. Hospital Course:     69 Y M with a h/o adenocarcinoma of the lung with known mets to the spine who had radiation and is now on chemotherapy presents with a few days of worsening LLQ pain and fevers.  CT showed diverticulitis. Had colectomy due to perforation and abscess. colostomy was placed  Stay was complicated by multiple issues:    Acute hypoxemic respiratory failure  Multifactorial, with PE, malignancy and possible pneumonia  Intubated twice, then extubated successfully     Metastatic lung cancer  Chemotherapy on hold to avoid healing  Has responded to chemo when could be administered. Acute medical conditions interfere with chemo plans  Seen by palliative care.  Requesting 04/12/2019       Renal:    Lab Results   Component Value Date     04/12/2019    K 3.6 04/12/2019    K 3.7 04/04/2019    CL 98 04/12/2019    CO2 25 04/12/2019    BUN 16 04/12/2019    CREATININE <0.5 04/12/2019    CALCIUM 7.4 04/12/2019    PHOS 1.1 03/27/2019         Significant Diagnostic Studies    Radiology:   XR CHEST PORTABLE   Final Result   Some improvement in diffuse hazy opacity throughout the lungs interstitial   prominence. No significant change otherwise. XR CHEST PORTABLE   Final Result   Multifocal airspace disease, mildly increased on the right. XR ABDOMEN (KUB) (SINGLE AP VIEW)   Final Result   1. Nasogastric tube terminating in the gastric body. XR CHEST PORTABLE   Final Result   1. New right perihilar and suspected increasing left upper lobe airspace   disease, either pneumonia or edema. 2. The proximal side hole for the enteric tube is in the esophagus and the   tube should be advanced 5 cm. XR CHEST PORTABLE   Final Result   Endotracheal tube tip in the mid trachea. Enteric tube tip in the body of the stomach. No significant change in pulmonary edema. XR CHEST PORTABLE   Final Result   Persistent findings of pulmonary edema. XR CHEST PORTABLE   Final Result   Persistent bilateral airspace disease, similar to prior, likely edema. Pneumonia is not excluded in the appropriate clinical setting. Small pleural   effusions. XR CHEST PORTABLE   Final Result   Increasing right lung airspace disease could represent edema or pneumonia. Left lung airspace disease appears unchanged. CT CHEST WO CONTRAST   Final Result   1. Bilateral upper lobe ground-glass opacities, left greater than right, with   developing left perihilar consolidation. Findings are most concerning for   multifocal pneumonia with asymmetric pulmonary edema a less likely   possibility.    2. Small bilateral pleural effusions which are slightly increased from   03/26/2019.   3. Trace ascites in the upper abdomen. 4. Sclerotic metastasis in the T6 vertebral body. 5. Coronary atherosclerosis. XR CHEST PORTABLE   Final Result   Nonspecific consolidation mid and lower left lung with left pleural effusion   presumably related to sequela from pulmonary edema. Pneumonia is a   consideration. Calcific atherosclerosis aorta. Cardiomegaly. XR ABDOMEN (KUB) (SINGLE AP VIEW)   Final Result   Dilated small bowel, may reflect ileus or obstruction. Recommend follow-up   study. CT ABDOMEN PELVIS WO CONTRAST Additional Contrast? None   Final Result   Interval increase in size of the left lower quadrant collection which now   measures up to 7.8 cm. Administered rectal contrast extends into the   collection. Right anterior approach drain courses adjacent to the collection. No significant change in pleural effusions and associated airspace disease. The findings were sent to the Radiology Results [address] at 12:53   pm on 3/26/2019to be communicated to a licensed caregiver. FL BARIUM ENEMA   Final Result   Active extravasation of contrast from the sigmoid colon, compatible with   anastomotic leak. Please refer to accompanying CT. Diverticulosis. CT ABDOMEN PELVIS W IV CONTRAST Additional Contrast? Radiologist Recommendation   Final Result   1. The patient is 11 days status post partial sigmoid colectomy. Surgical   drain remains in place with a small ill-defined fluid collection that is   stable from prior exam.  This does not appear to represent an abscess and may   represent postoperative hematoma or seroma. 2. There is a new collection of free intraperitoneal air immediately anterior   to the prior anastomosis site, contained within the left lower quadrant. This indicates an anastomosis leak. No distant free air. 3. 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 4. Cholelithiasis with no evidence of acute cholecystitis. 5. Worsening small pleural effusions and airspace changes in the lower chest.   Pattern may represent underlying pulmonary edema or perhaps pneumonitis. XR CHEST PORTABLE   Final Result   Interval development of mild pulmonary edema. There is a small left-sided pleural effusion with more focal consolidation   the left lung base, atelectasis versus pneumonia. IR PICC WO SQ PORT/PUMP > 5 YEARS   Final Result   Successful placement of PICC line. CT ABDOMEN PELVIS WO CONTRAST Additional Contrast? None   Final Result   No evidence retroperitoneal hematoma. Interval colonic resection. Small volume loculated collection in the left   pericolic gutter, with surgical drain terminating adjacent. Mildly dilated loops of small bowel with scattered air-fluid levels, most   likely postoperative ileus. Trace pleural effusions and bibasilar airspace disease representing   atelectasis or pneumonia. VL Extremity Venous Bilateral   Final Result      CT CHEST PULMONARY EMBOLISM W CONTRAST   Final Result   Bilateral pulmonary emboli      Scattered opacities throughout the lungs, greatest at the lung bases, with   tiny pleural effusions. Changes are increased compared to prior      Sclerotic osseous metastatic disease as described previously      The findings were sent to the Radiology Results [address] at 3:57   pm on 3/16/2019to be communicated to a licensed caregiver. XR CHEST 1 VW   Final Result   Shallow inflation with findings consistent with basilar atelectasis. Left   pleural thickening versus trace effusion. CT CHEST ABDOMEN PELVIS WO CONTRAST   Final Result   Perforated sigmoid diverticulitis and pneumoperitoneum unchanged. Stable   left [facility] colonic air-fluid collection may represent an intramural or   extramural abscess or giant diverticulum.       Infrarenal abdominal aortic aneurysm and aorto bi-iliac stent. Other incidental findings as above. CT ABDOMEN PELVIS W IV CONTRAST Additional Contrast? None   Final Result   Acute perforated descending colon diverticulitis. There is free air in the   abdomen and there is a small extraluminal gas and fluid collection/abscess   adjacent to the descending colon measuring 4.1 cm in AP diameter and 6 cm in   length. There is diverticulosis of the entire colon. Cholelithiasis. 4.7 cm infrarenal abdominal aortic aneurysm status post endograft repair with   a patent aorto bi-iliac graft. There is suspicion of an endoleak with blush   of contrast enhancement suspected in the inferior aspect of the native   aneurysm. As a noncontrast study was not performed, calcification of the   intraluminal thrombus accounting for the apparent enhancement is not   excluded. 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 Comparison with a noncontrast CT examination would be   helpful. Critical results were called by Dr. Sayra Johnson MD to [facility] on   3/7/2019 at 20:35.                 Consults:     IP CONSULT TO HOSPITALIST  IP CONSULT TO GENERAL SURGERY  IP CONSULT TO VASCULAR SURGERY  IP CONSULT TO ONCOLOGY  IP CONSULT TO PHARMACY  IP CONSULT TO NEPHROLOGY  IP CONSULT TO INFECTIOUS DISEASES  IP CONSULT TO CARDIOLOGY  IP CONSULT TO PULMONOLOGY  IP CONSULT TO VASCULAR SURGERY  IP CONSULT TO GI  IP CONSULT TO PHARMACY  IP CONSULT TO PULMONOLOGY  IP CONSULT TO PALLIATIVE CARE  IP CONSULT TO PALLIATIVE CARE  IP CONSULT TO CARDIAC REHAB  IP CONSULT TO DIETITIAN    Disposition:  LTACH    Condition at Discharge: Stable    Discharge Instructions/Follow-up:  LTACH    Code Status:  Full Code     Activity: activity as tolerated    Diet: regular diet      Discharge Medications:     Current Discharge Medication List           Details   ticagrelor (BRILINTA) 90 MG TABS tablet Take 1 tablet by mouth 2 times daily  Qty: 60 tablet, Refills: 0      apixaban (ELIQUIS STARTER PACK) 5 MG TABS tablet Take 10 mg (2 tablets) orally twice daily for 7 days, then take 5 mg (1 tablet) orally twice daily thereafter. Qty: 74 tablet, Refills: 0      metoprolol succinate (TOPROL XL) 50 MG extended release tablet Take 1 tablet by mouth daily  Qty: 30 tablet, Refills: 3      methylPREDNISolone sodium (SOLU-MEDROL) 125 MG injection Infuse 0.96 mLs intravenously every 12 hours      zinc oxide (TRIAD HYDROPHILIC) PSTE paste Apply 1 applicator topically daily Clean buttocks with foamy cleanser or NSS. Pat dry. Add triad cream to right buttocks small area on left buttocks. Cover right buttocks with 2x2 and tegaderm or medipore tape. If the dressing does not stick it is OK to apply the cream and leave open to air. Refills: 0      pantoprazole (PROTONIX) 40 MG injection Infuse 40 mg intravenously daily              Details   simvastatin (ZOCOR) 40 MG tablet TAKE 1 TABLET BY MOUTH NIGHTLY  Qty: 90 tablet, Refills: 1    Associated Diagnoses: Mixed hyperlipidemia      aspirin 81 MG EC tablet Take 81 mg by mouth daily. folic acid (FOLVITE) 1 MG tablet Take 1 tablet by mouth daily  Qty: 30 tablet, Refills: 3      Psyllium (METAMUCIL) WAFR Take 6 tablets by mouth 2 times daily. Time Spent on discharge is more than 45 minutes in the examination, evaluation, counseling and review of medications and discharge plan. Signed:    Shashi Martinez MD   4/12/2019      Thank you Ric Hernandez MD for the opportunity to be involved in this patient's care. If you have any questions or concerns please feel free to contact me at [phone].

## 2019-04-12 NOTE — CARE COORDINATION
Multiple attempts to place patient on oxygen for transport. Typical transport for someone on vapotherm is for them to transport on a non-rebreather or cpap. Patient de-sated on both devices to the 60% range. At this point patient very anxious and uncomfortable with transport due to the breathless. Writer communicating with MD who is aware of the above struggle and ok to cancel discharge today and hold transfer until patient able to tolerate transport or wishes to pursue hospice. Patient aware of the above and wishes to stay here with hopes of decreased oxygen demand. Writer placed call to Hernesto Blount with [facility] to update her of the above. Hernesto Blount aware and will continue to follow patient. Writer did ask if they had any knowledge of a transport company that can provide an appropriate oxygen delivery method. Hernesto Blount checked with their  and to their knowledge there is not. Discharge Cancelled. Writer did discuss with counterparts to see if they had alternate transport ideas and at this time we are without. Writer also discussed case with Palliative care RN Hallie Mc who is going to follow up with family.   Kortney Stone, RN

## 2019-04-12 NOTE — PLAN OF CARE
Palliative Care Progress Note  Palliative Care Admit date:   4/3/19    Advance Directives: We have reviewed components of resuscitation; pt and his spouse will continue the discussion. Both understand the likelihood for trach if he were reintubated     Plan of care/goals:  Again, exhaustive discussion w/ pt/spouse around their goals. Pt states \"It's not like I'm in pain or suffering right now, I'd like to keep fighting. \"   They hold onto the hope, even if seemingly remote, that pt can benefit from LTAC and be able to return home on a manageable FiO2. They have reiterated their desire for pt to d/c to [facility] once a bed is available. Pulm & Hospitalist aware. Plan:  CM will verify bed availability. Continue discussions around code wishes.           Reason for consult:    _X_ Advance Care Planning  ___ Transition of Care Planning  _X_ Psychosocial/Spiritual Support  ___ Symptom Management                                                            Jocelyn Isbell RN

## 2019-04-12 NOTE — PROGRESS NOTES
Pulmonary & Critical Care Inpatient Progress Note   Becky Santos MD     REASON FOR TODAY'S VISIT:  Acute on chronic resp failure    SUBJECTIVE:   Worsening hypoxia overnight, placed on Vapotherm     Scheduled Meds:   methylPREDNISolone  60 mg Intravenous Q12H    metoprolol succinate  50 mg Oral Daily    ticagrelor  90 mg Oral BID    potassium chloride  10 mEq Oral TID    magnesium oxide  400 mg Oral BID    sodium chloride flush  10 mL Intravenous Q12H    ipratropium-albuterol  1 ampule Inhalation Q4H WA    zinc oxide  1 applicator Topical Daily    acyclovir  800 mg Oral TID    pantoprazole  40 mg Intravenous Daily    aspirin  81 mg Oral Daily    simvastatin  40 mg Oral Nightly    folic acid  1 mg Oral Daily    oyster shell calcium/vitamin D  1 tablet Oral Daily       Continuous Infusions:   heparin (porcine) 16.2 mL/hr (04/12/19 0313)    dextrose 100 mL/hr (03/08/19 1700)       PRN Meds:  magic (miracle) mouthwash with nystatin, oxyCODONE **OR** oxyCODONE, heparin (porcine), heparin (porcine), HYDROmorphone, sodium chloride flush, glucose, dextrose, glucagon (rDNA), dextrose, ondansetron, acetaminophen    ALLERGIES:  Patient has No Known Allergies. Objective:   PHYSICAL EXAM:  /77   Pulse 97   Temp 98.2 °F (36.8 °C) (Oral)   Resp 18   Ht 5' 9\" (1.753 m)   Wt 164 lb 10.9 oz (74.7 kg)   SpO2 91%   BMI 24.32 kg/m²    Physical Exam   Constitutional: He appears well-developed. HENT:   Head: Normocephalic and atraumatic. Mouth/Throat: Oropharynx is clear and moist. No oropharyngeal exudate. Eyes: Pupils are equal, round, and reactive to light. EOM are normal.   Neck: Neck supple. No JVD present. Cardiovascular: Normal heart sounds. Exam reveals no gallop and no friction rub. No murmur heard. Pulmonary/Chest: He has no wheezes. He has no rales. Equal chest rise and expansion bilaterally   Abdominal: Soft. Bowel sounds are normal. He exhibits no distension. There is no tenderness. Musculoskeletal: Normal range of motion. He exhibits no edema. Lymphadenopathy:     He has no cervical adenopathy. Neurological: He is alert. No cranial nerve deficit. Skin: Skin is warm and dry. No rash noted. He is not diaphoretic. Data Reviewed:   LABS:  CBC:  Recent Labs     04/10/19  0452 04/11/19 0529 04/12/19  0420   WBC 9.6 11.8* 9.4   HGB 7.4* 7.6* 7.5*   HCT 21.5* 23.1* 22.7*   MCV 86.3 87.0 87.6   * 101* 87*     BMP:  Recent Labs     04/10/19  0452 04/11/19  0529 04/12/19  0420   * 134* 135*   K 3.8 3.8 3.6   CL 97* 96* 98*   CO2 26 27 25   BUN 21* 19 16   CREATININE <0.5* <0.5* <0.5*     LIVER PROFILE: No results for input(s): AST, ALT, LIPASE, ALB, BILIDIR, BILITOT, ALKPHOS in the last 72 hours. Invalid input(s): AMYLASE  PT/INR:No results for input(s): PROTIME, INR in the last 72 hours. APTT:   Recent Labs     04/11/19  1431 04/11/19  2115 04/12/19  0420   APTT 47.2* 73.5* 75.7*     UA:No results for input(s): NITRITE, COLORU, PHUR, LABCAST, WBCUA, RBCUA, MUCUS, TRICHOMONAS, YEAST, BACTERIA, CLARITYU, SPECGRAV, LEUKOCYTESUR, UROBILINOGEN, BILIRUBINUR, BLOODU, GLUCOSEU, AMORPHOUS in the last 72 hours. Invalid input(s): Galdino Watts  Recent Labs     04/11/19  0519 04/12/19  0200   PHART 7.532* 7.563*   [id number] 31.2* 27.7*   PO2ART 65.8* 64.7*           CXR personally reviewed, left greater than right airspace disease      Assessment:     1. Acute on chronic resp failure, hypoxic   -pneumonitis with progressive fibrosis   2. Acute PE, bilateral/submassive  3. Lung cancer with metastatic disease   -adenocarcinoma of LLL  4. Acute on chronic anemia, multifactorial  5. Diverticulitis/perforation s/p colectomy   6.  CAD with recent LHC/stent placement    Plan:      -Will decrease SPO2 goal, continue intermittent oxygen and bipap support  -If re-intubated would likely require tracheostomy tube placement, this was conveyed to the family in assistance with their goals of care discussions they plan to have today  -CXRs without findings of edema/effusions, BAL negative for any significant infection  -On high dose steroids in absence of any other intervention for pneumonitis/fibrosis   -Anticoagulation  -Cardiology and oncology following    Due to life threatening respiratory failure this patient is critically ill, total critical care time involved in his care was 31 mins.        Becky Santos MD

## 2019-04-12 NOTE — CARE COORDINATION
[facility] to [facility]        [facility] to follow from [facility] if home care is needed once DC'ed from facility.     Chirag Mcdonald  Work mobile: [phone]  [facility] office: [phone]

## 2019-04-12 NOTE — CARE COORDINATION
Spoke to SAULO with [facility] and they do not have a bed available at [facility], they are able to accept at [facility] or [address] to patient and wife at bedside and they wish to go to [facility].  will arrange discharge. CASE MANAGEMENT DISCHARGE SUMMARY      Discharge to: [facility] completed: not needed  Hospital Exemption Notification (HENS) completed: not needed    [id number] ordered/agency: none    Transportation: ambulance   Medical Transport/ time: [facility] 1pm   Ambulance form completed: Yes    Notified: patient aware of discharge plans and in agreeement   Family: at bedside and aware of discharge plans. Facility/Agency: [facility]/AVS faxed to [facility]   RN: Janneth RN aware of the above. Phone number for report to facility: [phone]    Note: Discharging nurse to complete [facility], reconcile AVS, and place final copy with patient's discharge packet. RN to ensure that written prescriptions for  Level II medications are sent with patient to the facility as per protocol.     April Cornejo RN

## 2019-04-12 NOTE — ONCOLOGY
ONCOLOGY HEMATOLOGY CARE PROGRESS NOTE      SUBJECTIVE:     On Vapotherm again. O2 sat 91%. Afebrile. Denies pain. We discussed code status/hospice at length this AM.    ROS:   The remaining 10 point review of symptoms is unremarkable. OBJECTIVE        Physical    VITALS:  /77   Pulse 97   Temp 98.2 °F (36.8 °C) (Oral)   Resp 18   Ht 5' 9\" (1.753 m)   Wt 164 lb 10.9 oz (74.7 kg)   SpO2 91%   BMI 24.32 kg/m²   TEMPERATURE:  Current - Temp: 98.2 °F (36.8 °C); Max - Temp  Av.1 °F (36.7 °C)  Min: 97.8 °F (36.6 °C)  Max: 98.4 °F (36.9 °C)  PULSE OXIMETRY RANGE: SpO2  Av.9 %  Min: 78 %  Max: 95 %  24HR INTAKE/OUTPUT:      Intake/Output Summary (Last 24 hours) at 2019 0659  Last data filed at 2019 0249  Gross per 24 hour   Intake 840 ml   Output 1570 ml   Net -730 ml       CONSTITUTIONAL: sedated, intubated   HEENT oral pharynx , no scleral icterus, wearing high flow nasal canula   HEMATOLOGIC/LYMPHATICS:  no cervical lymphadenopathy, no supraclavicular lymphadenopathy, no axillary lymphadenopathy and no inguinal lymphadenopathy  LUNGS:  Ventilator   CARDIOVASCULAR:  , regular rate and rhythm, normal S1 and S2, no S3 or S4, and no murmur noted  ABDOMEN: soft, non tender   Back: no bruising, purpura, or hematomas noted   MUSCULOSKELETAL:  There is no redness, warmth, or swelling of the joints. EXTREMETIES: BLE edema +1, non pitting   NEUROLOGIC:  Sedated      Data      Recent Labs     04/10/19  0452 04/11/19  0529 19  0420   WBC 9.6 11.8* 9.4   HGB 7.4* 7.6* 7.5*   HCT 21.5* 23.1* 22.7*   * 101* 87*   MCV 86.3 87.0 87.6        Recent Labs     04/10/19  0452 19  0529 19  0420   * 134* 135*   K 3.8 3.8 3.6   CL 97* 96* 98*   CO2 26 27 25   BUN 21* 19 16   CREATININE <0.5* <0.5* <0.5*     No results for input(s): AST, ALT, ALB, BILIDIR, BILITOT, ALKPHOS in the last 72 hours.     Magnesium:    Lab Results   Component Value Date    MG 1.80 04/12/2019    MG 2.00 04/11/2019    MG 1.70 04/10/2019         Problem List  Patient Active Problem List   Diagnosis    Elevated fasting glucose    CAD (coronary artery disease)    PAD (peripheral artery disease) ([address]    Essential hypertension    Mixed hyperlipidemia    Sprain of right foot    Sprain of anterior talofibular ligament of right ankle    Closed nondisplaced fracture of fifth right metatarsal bone    Closed fracture of proximal lateral malleolus of ankle with routine healing    Right foot pain    Right ankle pain    Nondisplaced fracture of fifth right metatarsal bone with routine healing    History of pulmonary emphysema    Chronic obstructive pulmonary disease with (acute) exacerbation (HCC)    Pulmonary nodule, left    Abnormal bone scan of thoracic spine    Back pain    Visit for monitoring Plavix therapy    History of ventricular tachycardia    Gallstones    Hemoptysis    Former smoker    Elevated PSA    Adenocarcinoma of left lung ([address]    Acute diverticulitis    Pancytopenia ([address]    Diverticulitis of large intestine with perforation and abscess without bleeding    Diverticulitis    Chemotherapy-induced neutropenia (HCC)    Mucositis    Herpes zoster oticus    Hearing loss of right ear    Acute respiratory failure with hypoxia ([address]    H/O colectomy    Primary malignant neoplasm of lung metastatic to other site [facility])    NSVT (nonsustained ventricular tachycardia) ([address]    Other pulmonary embolism without acute cor pulmonale ([address]    Cardiomyopathy (HCC)    Pneumonia of left lung due to infectious organism    Iron deficiency anemia due to chronic blood loss       ASSESSMENT AND PLAN    Acute Perforated descending colon diverticulitis (not immune mediated colitis) with free air in the abd   - per gen surgery , s/p Laparoscopic converted to open left Colectomy, 3/14/2019, with Dr. Ethan Chin  - CT scan done 3/25 with anastomotic leak at TID for prophylaxis; already completed treatment dose earlier in admit     NSVT- ICD-Known hx of VT s/p ICD, on Amio, VT 3/11/2019 with defibb   - s/p LHC 4-9 with new drug eluting stents for severe stenosis     Renal endograft secondary to PAD  -anti-platelet therapy     Prognosis remains guarded. We discussed that I worry that he is losing ground with regards to his respiratory status. I do not think he should be intubated again, as I do not see another reversible issue at this time. I recommended that he consider hospice. He wants to discuss code status and his overall situation with his wife before making a decision. They want to decide as a couple before involving [facility]/hospice.     Vilma Harris MD

## 2019-04-13 NOTE — DISCHARGE SUMMARY
Hospital Medicine Discharge Summary    Patient ID: Dayana Tijerina      Patient's PCP: Lupe Santos MD    Admit Date: 3/7/2019     Discharge Date:   04/13/19     Admitting Physician: Rosalee Mena MD     Discharge Physician: Duran Seay MD     Discharge Diagnoses: Active Hospital Problems    Diagnosis Date Noted    Iron deficiency anemia due to chronic blood loss [D50.0]     Pneumonia of left lung due to infectious organism [J18.9]     Cardiomyopathy ([address] [I42.9]     NSVT (nonsustained ventricular tachycardia) (HCC) [I47.2]     Other pulmonary embolism without acute cor pulmonale (HCC) [I26.99]     Acute respiratory failure with hypoxia ([address] [J96.01]     H/O colectomy [Z90.49]     Primary malignant neoplasm of lung metastatic to other site (HCC) [C34.90]     Herpes zoster oticus [B02.21]     Hearing loss of right ear [H91.91]     Chemotherapy-induced neutropenia (HCC) [D70.1, T45.1X5A]     Mucositis [K12.30]     Diverticulitis [K57.92] 03/07/2019    Diverticulitis of large intestine with perforation and abscess without bleeding [K57.20]        The patient was seen and examined on day of discharge and this discharge summary is in conjunction with any daily progress note from day of discharge. Hospital Course:     69 Y M with a h/o adenocarcinoma of the lung with known mets to the spine who had radiation and is now on chemotherapy presents with a few days of worsening LLQ pain and fevers.  CT showed diverticulitis. Had colectomy due to perforation and abscess. colostomy was placed  Stay was complicated by multiple issues:    Acute hypoxemic respiratory failure  Multifactorial, with PE, malignancy and possible pneumonia  Intubated twice, then extubated successfully     Metastatic lung cancer  Chemotherapy on hold to avoid healing  Has responded to chemo when could be administered. Acute medical conditions interfere with chemo plans  Seen by palliative care.  Requesting active treatment.      Colonic diverticulitis with perforation  Had colectomy and colostomy  Stable now     Hyponatremia  Mild, secondary to lung malignancy  Stable at mid-130     Anemia  Malignancy and chronic disease combined  No active blood loss  Transfuse if indicated  No transfusion needed as of today  Hemoglobin very slowly coming up     Non-ST elevation myocardial infarction  Had cardiac cath and PCI  No more chest pain    Pulmonary embolism:  Was on heparin drip during admission  Will be switched to Eliquis on the day of transfer to LT[facility]      Shingles:  Patient completed the course of acyclovir. Isolation and treatment are no longer needed  It was trigeminal. There is no rash on his face    Patient's discharge was postponed by one day due to hypoxemia. Will be [facility] on 04/13/19       Physical Exam Performed:     BP (!) 161/84   Pulse 99   Temp 98.3 °F (36.8 °C) (Oral)   Resp 26   Ht 5' 9\" (1.753 m)   Wt 161 lb 6 oz (73.2 kg)   SpO2 (!) 87%   BMI 23.83 kg/m²       General appearance: No apparent distress, appears stated age. Oriented and cooperative  HEENT: Pupils equal, round, and reactive to light. Conjunctivae/corneas clear. Neck: Supple, with full range of motion. No jugular venous distention. Trachea midline. Respiratory:  Good effort, bilateral rhonchi  Cardiovascular: Regular rate and rhythm with normal S1/S2 without murmurs, rubs or gallops. Abdomen: Soft, mild tenderness  Musculoskeletal: No clubbing, cyanosis or edema bilaterally. Full range of motion without deformity. Skin: Skin color, texture, turgor normal.  No rashes or lesions. Neurologic:  Grossly nonfocal neuro exam.  Psychiatric: Awake, alert, fully oriented, normal and appropriate thought content  Capillary Refill: Brisk,< 3 seconds   Peripheral Pulses: +2 palpable, equal bilaterally     Stable exam      Labs:  For convenience and continuity at follow-up the following most recent labs are provided:      CBC: pulmonary edema a less likely   possibility. 2. Small bilateral pleural effusions which are slightly increased from   03/26/2019.   3. Trace ascites in the upper abdomen. 4. Sclerotic metastasis in the T6 vertebral body. 5. Coronary atherosclerosis. XR CHEST PORTABLE   Final Result   Nonspecific consolidation mid and lower left lung with left pleural effusion   presumably related to sequela from pulmonary edema. Pneumonia is a   consideration. Calcific atherosclerosis aorta. Cardiomegaly. XR ABDOMEN (KUB) (SINGLE AP VIEW)   Final Result   Dilated small bowel, may reflect ileus or obstruction. Recommend follow-up   study. CT ABDOMEN PELVIS WO CONTRAST Additional Contrast? None   Final Result   Interval increase in size of the left lower quadrant collection which now   measures up to 7.8 cm. Administered rectal contrast extends into the   collection. Right anterior approach drain courses adjacent to the collection. No significant change in pleural effusions and associated airspace disease. The findings were sent to the Radiology Results [address] at 12:53   pm on 3/26/2019to be communicated to a licensed caregiver. FL BARIUM ENEMA   Final Result   Active extravasation of contrast from the sigmoid colon, compatible with   anastomotic leak. Please refer to accompanying CT. Diverticulosis. CT ABDOMEN PELVIS W IV CONTRAST Additional Contrast? Radiologist Recommendation   Final Result   1. The patient is 11 days status post partial sigmoid colectomy. Surgical   drain remains in place with a small ill-defined fluid collection that is   stable from prior exam.  This does not appear to represent an abscess and may   represent postoperative hematoma or seroma. 2. There is a new collection of free intraperitoneal air immediately anterior   to the prior anastomosis site, contained within the left lower quadrant.    This indicates an anastomosis leak. No distant free air. 3. 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 4. Cholelithiasis with no evidence of acute cholecystitis. 5. Worsening small pleural effusions and airspace changes in the lower chest.   Pattern may represent underlying pulmonary edema or perhaps pneumonitis. XR CHEST PORTABLE   Final Result   Interval development of mild pulmonary edema. There is a small left-sided pleural effusion with more focal consolidation   the left lung base, atelectasis versus pneumonia. IR PICC WO SQ PORT/PUMP > 5 YEARS   Final Result   Successful placement of PICC line. CT ABDOMEN PELVIS WO CONTRAST Additional Contrast? None   Final Result   No evidence retroperitoneal hematoma. Interval colonic resection. Small volume loculated collection in the left   pericolic gutter, with surgical drain terminating adjacent. Mildly dilated loops of small bowel with scattered air-fluid levels, most   likely postoperative ileus. Trace pleural effusions and bibasilar airspace disease representing   atelectasis or pneumonia. VL Extremity Venous Bilateral   Final Result      CT CHEST PULMONARY EMBOLISM W CONTRAST   Final Result   Bilateral pulmonary emboli      Scattered opacities throughout the lungs, greatest at the lung bases, with   tiny pleural effusions. Changes are increased compared to prior      Sclerotic osseous metastatic disease as described previously      The findings were sent to the Radiology Results [id number] at 3:57   pm on 3/16/2019to be communicated to a licensed caregiver. XR CHEST 1 VW   Final Result   Shallow inflation with findings consistent with basilar atelectasis. Left   pleural thickening versus trace effusion. CT CHEST ABDOMEN PELVIS WO CONTRAST   Final Result   Perforated sigmoid diverticulitis and pneumoperitoneum unchanged.   Stable   left [facility] colonic air-fluid collection may represent an intramural or   extramural abscess or giant diverticulum. Infrarenal abdominal aortic aneurysm and aorto bi-iliac stent. Other incidental findings as above. CT ABDOMEN PELVIS W IV CONTRAST Additional Contrast? None   Final Result   Acute perforated descending colon diverticulitis. There is free air in the   abdomen and there is a small extraluminal gas and fluid collection/abscess   adjacent to the descending colon measuring 4.1 cm in AP diameter and 6 cm in   length. There is diverticulosis of the entire colon. Cholelithiasis. 4.7 cm infrarenal abdominal aortic aneurysm status post endograft repair with   a patent aorto bi-iliac graft. There is suspicion of an endoleak with blush   of contrast enhancement suspected in the inferior aspect of the native   aneurysm. As a noncontrast study was not performed, calcification of the   intraluminal thrombus accounting for the apparent enhancement is not   excluded. 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 Comparison with a noncontrast CT examination would be   helpful. Critical results were called by Dr. Robb Landeros. MD Elizabeth to [facility] on   3/7/2019 at 20:35.                 Consults:     IP CONSULT TO HOSPITALIST  IP CONSULT TO GENERAL SURGERY  IP CONSULT TO VASCULAR SURGERY  IP CONSULT TO ONCOLOGY  IP CONSULT TO PHARMACY  IP CONSULT TO NEPHROLOGY  IP CONSULT TO INFECTIOUS DISEASES  IP CONSULT TO CARDIOLOGY  IP CONSULT TO PULMONOLOGY  IP CONSULT TO VASCULAR SURGERY  IP CONSULT TO GI  IP CONSULT TO PHARMACY  IP CONSULT TO PULMONOLOGY  IP CONSULT TO PALLIATIVE CARE  IP CONSULT TO PALLIATIVE CARE  IP CONSULT TO CARDIAC REHAB  IP CONSULT TO DIETITIAN    Disposition:  LTACH    Condition at Discharge: Stable    Discharge Instructions/Follow-up:  LTACH    Code Status:  Full Code     Activity: activity as tolerated    Diet: regular diet      Discharge Medications: Current Discharge Medication List           Details   HYDROmorphone (DILAUDID) 2 MG/ML injection Infuse 0.25 mLs intravenously every 2 hours as needed for Pain for up to 3 days. Qty: 1 mL, Refills: 0    Comments: Reduce doses taken as pain becomes manageable  Associated Diagnoses: Adenocarcinoma of left lung ([facility] Primary malignant neoplasm of lung metastatic to other site, unspecified laterality (HCC)      oxyCODONE (ROXICODONE) 5 MG immediate release tablet Take 1 tablet by mouth every 4 hours as needed (pain<5) for up to 3 days. Qty: 1 tablet, Refills: 0    Comments: Reduce doses taken as pain becomes manageable  Associated Diagnoses: Primary malignant neoplasm of lung metastatic to other site, unspecified laterality ([facility]      ipratropium-albuterol (DUONEB) 0.5-2.5 (3) MG/3ML SOLN nebulizer solution Inhale 3 mLs into the lungs every 4 hours (while awake)  Qty: 360 mL, Refills: 0      ticagrelor (BRILINTA) 90 MG TABS tablet Take 1 tablet by mouth 2 times daily  Qty: 60 tablet, Refills: 0      apixaban (ELIQUIS STARTER PACK) 5 MG TABS tablet Take 10 mg (2 tablets) orally twice daily for 7 days, then take 5 mg (1 tablet) orally twice daily thereafter. Qty: 74 tablet, Refills: 0      metoprolol succinate (TOPROL XL) 50 MG extended release tablet Take 1 tablet by mouth daily  Qty: 30 tablet, Refills: 3      methylPREDNISolone sodium (SOLU-MEDROL) 125 MG injection Infuse 0.96 mLs intravenously every 12 hours      zinc oxide (TRIAD HYDROPHILIC) PSTE paste Apply 1 applicator topically daily Clean buttocks with foamy cleanser or NSS. Pat dry. Add triad cream to right buttocks small area on left buttocks. Cover right buttocks with 2x2 and tegaderm or medipore tape. If the dressing does not stick it is OK to apply the cream and leave open to air.   Refills: 0      pantoprazole (PROTONIX) 40 MG injection Infuse 40 mg intravenously daily              Details   simvastatin (ZOCOR) 40 MG tablet TAKE 1 TABLET BY

## 2019-04-13 NOTE — FLOWSHEET NOTE
04/13/19 0753   Assessment   Charting Type Shift assessment   Neurological   Neuro (WDL) WDL   Level of Consciousness 0   Orientation Level Oriented X4   Cognition Appropriate judgement; Appropriate safety awareness; Appropriate attention/concentration; Appropriate for developmental age; Follows commands   Language Clear; Appropriate for developmental age   [de-identified] Coma Scale   Eye Opening 4   Best Verbal Response 5   Best Motor Response 6   Nati Coma Scale Score 15   HEENT   HEENT (WDL) X   Right Eye Impaired vision   Left Eye Impaired vision   Voice Normal   Mucous Membrane Moist;Pink; Intact   Respiratory   Respiratory (WDL) X   R Breath Sounds Diminished   L Breath Sounds Diminished   Respiratory Quality/Effort Unlabored   Chest Assessment Trachea midline; Chest expansion symmetrical   Respiratory Pattern Regular   Respiratory Depth Normal   Breath Sounds   Right Upper Lobe Diminished   Right Middle Lobe Diminished   Right Lower Lobe Diminished   Left Upper Lobe Diminished   Left Lower Lobe Diminished   Cardiac   Cardiac (WDL) X   Cardiac Regularity Regular   Heart Sounds S1, S2   Cardiac Rhythm NSR   Ectopy PVC   Cardiac Monitor   Telemetry Monitor On Yes   Telemetry Audible Yes   Telemetry Alarms Set Yes   Telemetry Box Number 12   Gastrointestinal   Abdominal (WDL) X   RUQ Bowel Sounds Active   RLQ Bowel Sounds Active   LUQ Bowel Sounds Active   LLQ Bowel Sounds Active   Abdomen Inspection Rounded; Soft   Peripheral Vascular   Peripheral Vascular (WDL) WDL   Edema None   Skin Color/Condition   Skin Color/Condition (WDL) WDL   Skin Integrity   Skin Integrity (WDL) X   Musculoskeletal   Musculoskeletal (WDL) X   RL Extremity Weakness   LL Extremity Weakness   Genitourinary   Genitourinary (WDL) X  (nguyen)   Flank Tenderness No   Suprapubic Tenderness No   Dysuria JOY   Anus/Rectum   Anus/Rectum (WDL) WDL   Wound 03/08/19 Sacrum Mid;Right   Date First Assessed/Time First Assessed: 03/08/19 0032   Present on Hospital Admission: Yes  Wound Approximate Age at First Assessment (Weeks): 1.5 weeks  Primary Wound Type: Pressure Injury  Location: Sacrum  Wound Location Orientation: Mid;Right   Dressing Status Clean;Dry; Intact   Dressing/Treatment Foam   Wound Assessment Clean;Dry; Intact; Pink   Drainage Amount None   Colostomy LUQ Descending/sigmoid   Placement Date/Time: 03/27/19 1401   Pre-existing: No  Timeout: Patient;Procedure; Consent Confirmed;Site prepped with chlorhexidine; Availability of Implant; Correct Position  Inserted by: Dr. Deonna Felix  Location: LUQ  Colostomy Type: Descending/sigmoid   Mucocutaneous Junction Intact   Peristomal Assessment Intact   Stool Appearance Loose   Stool Color Brown   Urethral Catheter 16 fr   Placement Date/Time: 04/09/19 1400   Urethral Catheter Timeout: Sterile technique; Appropriate Equipment  Inserted by: Roxy Hendrix RN  Tube Size (fr): 16 fr  Catheter Balloon Size: 10 mL  Collection Container: Standard  Securement Method: Leg strap  U...    Site Assessment No urethral drainage   Urine Color Yellow   Urine Appearance Clear   Psychosocial   Psychosocial (WDL) WDL

## 2019-04-13 NOTE — CARE COORDINATION
CM spoke with [place] [id number] ambulance who said they did come yesterday to trial the bipap and worked with respiratory therapy who are working with patients settings. [facility] ambulance will return today at 4pm to trail patient again before transport to [facility].

## 2019-04-13 NOTE — PROGRESS NOTES
[facility]    Cardiology                                     Progress Note    Admission date:  3/7/2019    Reason for follow up visit: CAD, VT, ICM    HPI/CC: Lu Tello was admitted on 3/7/2019 with abdominal pain and was diagnosed with diverticulitis. Had a left colectomy on 3/14/2019 and Gerda's procedure on 3/27. Hospitalization has been complicated by resp failure, anemia, PE, and VT/CAD. Had a LHC and PCI on 2019. Rhythm has been sinus/sinus tach with some PVCs (3 beat NSVT noted). Subjective: States SOB has improved. Denies chest pain and palpitations. Vitals:  Blood pressure (!) 161/84, pulse 99, temperature 98.3 °F (36.8 °C), temperature source Oral, resp. rate 26, height 5' 9\" (1.753 m), weight 161 lb 6 oz (73.2 kg), SpO2 (!) 87 %.   Temp  Av.1 °F (36.7 °C)  Min: 97.4 °F (36.3 °C)  Max: 98.3 °F (36.8 °C)  Pulse  Av.3  Min: 94  Max: 113  BP  Min: 142/72  Max: 161/84  SpO2  Av.9 %  Min: 87 %  Max: 94 %  FiO2   Av.6 %  Min: 60 %  Max: 100 %    24 hour I/O    Intake/Output Summary (Last 24 hours) at 2019 0911  Last data filed at 2019 0459  Gross per 24 hour   Intake 240 ml   Output 1450 ml   Net -1210 ml     Current Facility-Administered Medications   Medication Dose Route Frequency Provider Last Rate Last Dose    apixaban (ELIQUIS) tablet 5 mg  5 mg Oral BID Emilie Rome MD   5 mg at 19    lisinopril (PRINIVIL;ZESTRIL) tablet 5 mg  5 mg Oral Daily Sandi MD Akash   5 mg at 19    methylPREDNISolone sodium (SOLU-MEDROL) injection 60 mg  60 mg Intravenous Q12H Nata Antonio MD   60 mg at 19 0504    metoprolol succinate (TOPROL XL) extended release tablet 50 mg  50 mg Oral Daily Sandi MD Akash   50 mg at 19 0831    ticagrelor (BRILINTA) tablet 90 mg  90 mg Oral BID Sandi MD Akash   90 mg at 19 210    potassium chloride (KLOR-CON M) extended release tablet 10 mEq  10 mEq Oral TID Raimundo Street MD William   10 mEq at 04/12/19 2106    magnesium oxide (MAG-OX) tablet 400 mg  400 mg Oral BID Jaz Haskins MD   400 mg at 04/12/19 2106    magic (miracle) mouthwash with nystatin  5 mL Swish & Spit 4x Daily PRN Jaz Haskins MD   5 mL at 04/09/19 2036    sodium chloride flush 0.9 % injection 10 mL  10 mL Intravenous Q12H Jaz Haskins MD   10 mL at 04/12/19 2111    ipratropium-albuterol (DUONEB) nebulizer solution 1 ampule  1 ampule Inhalation Q4H WA Jaz Haskins MD   1 ampule at 04/13/19 0800    oxyCODONE (ROXICODONE) immediate release tablet 5 mg  5 mg Oral Q4H PRN Jaz Haskins MD   5 mg at 04/02/19 1207    Or    oxyCODONE (ROXICODONE) immediate release tablet 10 mg  10 mg Oral Q4H PRN Jaz Haskins MD   10 mg at 04/05/19 0023    zinc oxide (TRIAD HYDROPHILIC) paste 1 applicator  1 applicator Topical Daily Jaz Haskins MD   1 applicator at 86/90/91 6371    HYDROmorphone (DILAUDID) injection 1 mg  1 mg Intravenous Q2H PRN Jaz Haskins MD   1 mg at 04/01/19 2236    pantoprazole (PROTONIX) injection 40 mg  40 mg Intravenous Daily Jaz Haskins MD   40 mg at 04/12/19 2500    aspirin chewable tablet 81 mg  81 mg Oral Daily Jaz Haskins MD   81 mg at 04/12/19 8947    sodium chloride flush 0.9 % injection 10 mL  10 mL Intravenous PRN Jaz Haskins MD   10 mL at 04/02/19 1631    simvastatin (ZOCOR) tablet 40 mg  40 mg Oral Nightly Jaz Haskins MD   40 mg at 04/12/19 2106    glucose (GLUTOSE) 40 % oral gel 15 g  15 g Oral PRN Jaz Haskins MD        dextrose 50 % solution 12.5 g  12.5 g Intravenous PRN Jaz Haskins MD        glucagon (rDNA) injection 1 mg  1 mg Intramuscular PRN Jaz Haskins MD        dextrose 5 % solution  100 mL/hr Intravenous PRN Jaz Haskins  mL/hr at 03/08/19 1700 100 mL/hr at 01/54/60 6362    folic acid (FOLVITE) tablet 1 mg  1 mg Oral Daily Jaz Haskins MD   1 mg at 04/12/19 0832    oyster shell calcium/vitamin D 250-125 MG-UNIT per tablet 250 mg  1 tablet Oral Daily Ene Cruz MD   250 mg at 04/12/19 5089    ondansetron (ZOFRAN) injection 4 mg  4 mg Intravenous Q6H PRN Ene Cruz MD   4 mg at 03/30/19 0810    acetaminophen (TYLENOL) tablet 650 mg  650 mg Oral Q4H PRN Ene Cruz MD   650 mg at 04/10/19 2142       Objective:     Telemetry monitor: SR/ST    Physical Exam:  Constitutional and general appearance: alert, cooperative, mild distress and appears stated age  HEENT: PERRL, no cervical lymphadenopathy. No masses palpable. Normal oral mucosa  Respiratory:  · Normal excursion and expansion without use of accessory muscles  · Resp auscultation: Clear but diminished breath sounds without wheezing, rhonchi, and rales  Cardiovascular:  · The apical impulse is not displaced  · Heart tones are crisp and normal. regular S1 and S2.  · Jugular venous pulsation Normal  · The carotid upstroke is normal in amplitude and contour without delay or bruit  · Peripheral pulses are symmetrical and full   Abdomen:  · No masses or tenderness  · Bowel sounds present  Extremities:  ·  No cyanosis or clubbing  ·  No lower extremity edema  ·  Skin: warm and dry  Neurological:  · Alert and oriented  · Moves all extremities well  · No abnormalities of mood, affect, memory, mentation, or behavior are noted    Data  Limited echo 4/8/2019:   Limited exam to assess right heart function.   Normal right ventricular size and function.   The right atrium is normal in size.   Trivial tricuspid regurgitation.   Trivial pulmonic regurgitation.  IVC size is normal (<2.1cm) and collapses > 50% with respiration consistent with normal RA pressure (3mmHg). Echo 3/19/2019:  Definity contrast was used, but port not working properly.  Malissa Nava left ventricular systolic function is moderately reduced with an ejection fraction of 30-35%. Inferolateral hypokinesia.  Mid anteroseptal, apical lateral and apical septal akinesia.   There is mild concentric left ventricular hypertrophy.   Normal left ventricular filling pressure.   Left ventricular cavity size is normal.   Mild thickening of the anterior leaflet of mitral valve.   Mild mitral regurgitation.   The left atrium is severely dilated. [facility] 4/9/2019 (William):  LEFT HEART CATH  LM: calcified, luminals  LAD: calcified proximal segment, 85% focal napkin ring just at prox stent edge (PARISH-2 flow                Diag1- calcified, mild diffuse disease (<20%)  LCX: mild diffuse disease <20% with L-R colalterals  RCA:dominant, long stents from prox to mid/distal with 80% mid napkin ring, distal shelf 60% then 85% napkin ring at distal stent edge. PDA: luminals                PLV: L-R collaterals to PLV. Ostial 80%, mid 99% near      LVEDP: 6  LVEF: not done to save dye     LESION 1: mid LAD 85% napkin ring at proximal stent edge  NC euphora 3mm then 3mm wolverine cutter,  STENT: Xience Alana 3 x 23mm the post dilated using 3.5mm NC balloon  0% residual, PARISH-3 flow     LESION 2: mid RCA: napkin ring 75%  predilated- NC trek 3mm then Adel 3.5mm cutting balloon  High pressure 3.5mm NC trek and Cutter balloon to 10atm  40% residual and PARISH-3 Flow         LESION 3: distal RCA sequential 80-95% lesions  Pre dilated using 2.5 Trek  STENT: Xience 2.5x33m distal RCA extending into PDA, post dilated 2.5-3mm  0% residual, PARISH-3 flow        RIGHT HEART CATH  RA: 2  RV:  27/2  PA:  27/15     and mean of 21  PCWP: 7  (FiO2 of 80% and Peep 8)        Sats: on 80% FIO2  Ao: 97%  RA: 56%   PA: 58%     CO/CI 6.49/3.43  SVR//207     Assessment  1. Low cardiac filling pressures                - 200ml/hr x 4 hr fluid to prevent PRASHANT                - watch for CHF  2. Mid LAD- successful PCI to InStent restenosis using cutting balloon and Xience drug stent post dilated to 3.5mm. 0% residual, PARISH-3 flow  3.  Mid RCA POBA using high pressure POBA and cutting balloons, 40% residual (area was already double layer) so not stented  4. Distal RCA successful PCI using Xience drug stent, 0% residual  5. ASA 81mg poqday for life  6. Ticagelor 90mg po BID for 1 yr as possible  7. Integrillin for 8 hr  8. BB and statin as tolerated  9. HYpoxia and Resp failure noncardiac and per pulm. All labs and testing reviewed. Lab Review     Renal Profile:   Lab Results   Component Value Date    CREATININE <0.5 04/13/2019    BUN 18 04/13/2019     04/13/2019    K 3.7 04/13/2019    K 3.7 04/04/2019     04/13/2019    CO2 25 04/13/2019     CBC:    Lab Results   Component Value Date    WBC 8.6 04/13/2019    RBC 2.46 04/13/2019    HGB 7.2 04/13/2019    HCT 21.6 04/13/2019    MCV 88.1 04/13/2019    RDW 18.9 04/13/2019    PLT 74 04/13/2019     BNP:  No results found for: BNP  Fasting Lipid Panel:    Lab Results   Component Value Date    CHOL 147 05/22/2018    HDL 39 05/22/2018    HDL 31 12/28/2011    TRIG 741 04/10/2019     Cardiac Enzymes:  CK/MbTroponin  Lab Results   Component Value Date    CKTOTAL 65 11/23/2010    CKMB 1.39 11/23/2010    TROPONINI 0.01 03/16/2019     PT/ INR   Lab Results   Component Value Date    INR 1.40 04/02/2019    INR 1.13 03/13/2019    INR 1.25 03/08/2019    PROTIME 16.0 04/02/2019    PROTIME 12.9 03/13/2019    PROTIME 14.3 03/08/2019     PTT No results found for: PTT   Lab Results   Component Value Date    MG 1.90 04/13/2019      Lab Results   Component Value Date    TSH 1.65 11/21/2017       Assessment:  CAD: stable    -s/p PCI in 2011   -s/p PCI of LAD and PCA on 4/9/2019  VT: known history, successful ATP this admission   -amiodarone stopped after PCI  Ischemic cardiomyopathy:    -history dual chamber ICD implant ([employer]), followed by Malu Jackson  HTN: BP elevated  Bilateral PE: diagnosed 3/2019  PAD: followed by Dr. Gildardo Chandler  Metastatic lung cancer  Respiratory failure: extubated 4/10  Anemia: stable, hematology following  Thrombocytopenia: worsening  Shingles    Plan:   1.  Continue Eliquis, ASA, Brilinta, statin, lisinopril, and Toprol (patient is on triple anticoagulation due to PE and PCI in 4/2019). Per Dr. Hargis Scheuermann, would stop ASA on 5/10/2019.   2. Would like to increase Toprol if possible from a respiratory standpoint  3. Monitor platelet count at [facility]. May need to stop ASA. Discussed platelet count and medications with Dr. Kendra Damian. No further testing or treatment recommended. Patient transferring service to [place] [id number] and has an appointment with Dr. Hargis Scheuermann on 4/25/2019.     Irish Packer, APRN-CNP  [place] [address]  [phone]

## 2019-04-13 NOTE — PROGRESS NOTES
pt was unable to transfer to [facility] due to insufficient Bipap equipment provided by the EMS drivers as pt is currently on vapotherm 40L.  is aware and is trying to resolve issue but has informed me that it will probably will not be today. Cross cover (Dr. Amy Velasquez) has been messaged.

## 2019-04-13 NOTE — PROGRESS NOTES
Pt being discharged to [address] [place] [address]. Pickup time 1600. This writer called in report to Nurse Lakesha Rios.

## 2019-04-13 NOTE — ONCOLOGY
ONCOLOGY HEMATOLOGY CARE PROGRESS NOTE      SUBJECTIVE:     On Vapotherm again. O2 sat 87%. Afebrile. Denies pain. Up in bed eating cereal.    We discussed code status/hospice at length on 2019 in the AM.    ROS:   The remaining 10 point review of symptoms is unremarkable. OBJECTIVE        Physical    VITALS:  BP (!) 161/84   Pulse 99   Temp 98.3 °F (36.8 °C) (Oral)   Resp 26   Ht 5' 9\" (1.753 m)   Wt 161 lb 6 oz (73.2 kg)   SpO2 (!) 87%   BMI 23.83 kg/m²   TEMPERATURE:  Current - Temp: 98.3 °F (36.8 °C); Max - Temp  Av °F (36.7 °C)  Min: 97.4 °F (36.3 °C)  Max: 98.3 °F (36.8 °C)  PULSE OXIMETRY RANGE: SpO2  Av.1 %  Min: 87 %  Max: 94 %  24HR INTAKE/OUTPUT:      Intake/Output Summary (Last 24 hours) at 2019  Last data filed at 2019 0459  Gross per 24 hour   Intake 240 ml   Output 1450 ml   Net -1210 ml       CONSTITUTIONAL: sedated, intubated   HEENT oral pharynx , no scleral icterus, wearing high flow nasal canula   HEMATOLOGIC/LYMPHATICS:  no cervical lymphadenopathy, no supraclavicular lymphadenopathy, no axillary lymphadenopathy and no inguinal lymphadenopathy  LUNGS:  Ventilator   CARDIOVASCULAR:  , regular rate and rhythm, normal S1 and S2, no S3 or S4, and no murmur noted  ABDOMEN: soft, non tender   Back: no bruising, purpura, or hematomas noted   MUSCULOSKELETAL:  There is no redness, warmth, or swelling of the joints.   EXTREMETIES: BLE edema +1, non pitting   NEUROLOGIC:  Sedated      Data      Recent Labs     19  0519  0515   WBC 11.8* 9.4 8.6   HGB 7.6* 7.5* 7.2*   HCT 23.1* 22.7* 21.6*   * 87* 74*   MCV 87.0 87.6 88.1        Recent Labs     19  [id number]   * 135* 138   K 3.8 3.6 3.7   CL 96* 98* 102   CO2 27 25 25   BUN 19 16 18   CREATININE <0.5* <0.5* <0.5*     No results for input(s): AST, ALT, ALB, BILIDIR, BILITOT, ALKPHOS in the last 72 hours.    Magnesium:    Lab Results   Component Value Date    MG 1.90 04/13/2019    MG 1.80 04/12/2019    MG 2.00 04/11/2019         Problem List  Patient Active Problem List   Diagnosis    Elevated fasting glucose    CAD (coronary artery disease)    PAD (peripheral artery disease) (HCC)    Essential hypertension    Mixed hyperlipidemia    Sprain of right foot    Sprain of anterior talofibular ligament of right ankle    Closed nondisplaced fracture of fifth right metatarsal bone    Closed fracture of proximal lateral malleolus of ankle with routine healing    Right foot pain    Right ankle pain    Nondisplaced fracture of fifth right metatarsal bone with routine healing    History of pulmonary emphysema    Chronic obstructive pulmonary disease with (acute) exacerbation (HCC)    Pulmonary nodule, left    Abnormal bone scan of thoracic spine    Back pain    Visit for monitoring Plavix therapy    History of ventricular tachycardia    Gallstones    Hemoptysis    Former smoker    Elevated PSA    Adenocarcinoma of left lung ([address]    Acute diverticulitis    Pancytopenia ([address]    Diverticulitis of large intestine with perforation and abscess without bleeding    Diverticulitis    Chemotherapy-induced neutropenia (HCC)    Mucositis    Herpes zoster oticus    Hearing loss of right ear    Acute respiratory failure with hypoxia ([address]    H/O colectomy    Primary malignant neoplasm of lung metastatic to other site [facility])    NSVT (nonsustained ventricular tachycardia) ([address]    Other pulmonary embolism without acute cor pulmonale ([address]    Cardiomyopathy (HCC)    Pneumonia of left lung due to infectious organism    Iron deficiency anemia due to chronic blood loss       ASSESSMENT AND PLAN    Acute Perforated descending colon diverticulitis (not immune mediated colitis) with free air in the abd   - per gen surgery , s/p Laparoscopic converted to open left Colectomy, 3/14/2019, with  Rosario Marques  - CT scan done 3/25 with anastomotic leak at the [facility] surgical site. - Status post Gerda procedure on 3/27/2019 with Dr. Rosario Marques.  - Krystina Robert drain removed, staples. Surgery signed off. HCAP/ARDS   - Vanco/Rolando completed. S/p bronch- no signs of infectious etiology- likely already treated via ATB prior to bronch being conducted   - Status post bronchoscopy on 4/03/2019. No endobronchial lesions. Cultures are NGTD. - Started Methylprednisolone 40 mg q12 hrs on 4/04/2019 in case of pneumonitis, as the bronchoscopy did not show obviously purulent secretions. Increased to 60 mg every 6 hours over weekend   - INTUBATED 4-9-19 for left heart cath with possible revascularization, s/p PCI with drug eluting stents to LAD and RCA 4-9-19. EXTUBATED 4/10.  - Right heart filling pressures were normal/low at the time of LHC/RHC on 4-09/2019. 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 - Cycle 3 carbo/alimta Claudette Sous was due on 3/22- held due to acute issues, cont to hold. Plan to resume Alinda Manges only outpatient.   - CT chest/abd/pelvis, 3/10/2019, showed improvement in the left hilar soft tissue and radiation changes. No obvious new disease. PE  -Diagnosed on 3/16/2019 with bilateral PEs involving the right and left main pulm arteries.   -Also had thrombus in the right femoral, peroneal, greater saphenous, short saphenous veins and in the left peroneal veins. Transitioned to Eliquis 5 mg BID on 4/12/2019. Lytes  - Replete K PRN. Anemia, possible GI bleed on hep drip vs anastomotic leak   - EGD 3/21 negative. - PPI added, now BID dosing per GI 3/21.  - CT AP 3/21 - no signs of bleeding.  - due for B12 shot - given 3/19/2019 - required every 9 weeks with this chemo.    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 On 4/12/2019 we discussed that I worry that he is losing ground with regards to his respiratory status. I do not think he should be intubated again, as I do not see another reversible issue at this time. I recommended that he consider hospice. He is still adamant the he would like to try to DC to an LTAC.     Hazel Mallory MD

## 2019-04-13 NOTE — CARE COORDINATION
[facility] ambulance unable to transport patient safely with there mobile bipap setting. SARAH spoke with SAULO at [facility] and will plan for patient not to admit to them today. CM will contact different ambulance companies to see if transport can be made tomorrow.

## 2019-04-13 NOTE — PROGRESS NOTES
Pulmonary & Critical Care Inpatient Progress Note   Sabrina Watkins MD     REASON FOR TODAY'S VISIT:  Acute on chronic resp failure    SUBJECTIVE:   Persistent and relatively unchanged need for vapotherm. Has difficulty tolerating BiPAP therapy  No new complaints. Had lengthy discussions with the patient and family by palliative care team as well as oncology. They awknowledge the current situation but wish to pursue full measures. Working on transport to [facility] but failing due to desaturations when taken off vapotherm. Scheduled Meds:   [START ON 4/14/2019] magnesium oxide  400 mg Oral Daily    apixaban  5 mg Oral BID    lisinopril  5 mg Oral Daily    methylPREDNISolone  60 mg Intravenous Q12H    metoprolol succinate  50 mg Oral Daily    ticagrelor  90 mg Oral BID    potassium chloride  10 mEq Oral TID    sodium chloride flush  10 mL Intravenous Q12H    ipratropium-albuterol  1 ampule Inhalation Q4H WA    zinc oxide  1 applicator Topical Daily    pantoprazole  40 mg Intravenous Daily    aspirin  81 mg Oral Daily    simvastatin  40 mg Oral Nightly    folic acid  1 mg Oral Daily    oyster shell calcium/vitamin D  1 tablet Oral Daily       Continuous Infusions:   dextrose 100 mL/hr (03/08/19 1700)       PRN Meds:  magic (miracle) mouthwash with nystatin, oxyCODONE **OR** oxyCODONE, HYDROmorphone, sodium chloride flush, glucose, dextrose, glucagon (rDNA), dextrose, ondansetron, acetaminophen    ALLERGIES:  Patient has No Known Allergies. Objective:   PHYSICAL EXAM:  /74   Pulse 109   Temp 97.9 °F (36.6 °C) (Oral)   Resp 18   Ht 5' 9\" (1.753 m)   Wt 161 lb 6 oz (73.2 kg)   SpO2 (!) 87%   BMI 23.83 kg/m²    Physical Exam   Constitutional: He appears well-developed. HENT:   Head: Normocephalic and atraumatic. Mouth/Throat: Oropharynx is clear and moist. No oropharyngeal exudate. Eyes: Pupils are equal, round, and reactive to light. EOM are normal.   Neck: Neck supple.  No JVD present. Cardiovascular: Normal heart sounds. Exam reveals no gallop and no friction rub. No murmur heard. Pulmonary/Chest: He has no wheezes. He has no rales. Equal chest rise and expansion bilaterally   Abdominal: Soft. Bowel sounds are normal. He exhibits no distension. There is no tenderness. Musculoskeletal: Normal range of motion. He exhibits no edema. Lymphadenopathy:     He has no cervical adenopathy. Neurological: He is alert. No cranial nerve deficit. Skin: Skin is warm and dry. No rash noted. He is not diaphoretic. Data Reviewed:   LABS:  CBC:  Recent Labs     04/11/19  0529 04/12/19 0420 04/13/19  0515   WBC 11.8* 9.4 8.6   HGB 7.6* 7.5* 7.2*   HCT 23.1* 22.7* 21.6*   MCV 87.0 87.6 88.1   * 87* 74*     BMP:  Recent Labs     04/11/19  0529 04/12/19 0420 04/13/19  0515   * 135* 138   K 3.8 3.6 3.7   CL 96* 98* 102   CO2 27 25 25   BUN 19 16 18   CREATININE <0.5* <0.5* <0.5*     LIVER PROFILE: No results for input(s): AST, ALT, LIPASE, ALB, BILIDIR, BILITOT, ALKPHOS in the last 72 hours. Invalid input(s): AMYLASE  PT/INR:No results for input(s): PROTIME, INR in the last 72 hours. APTT:   Recent Labs     04/11/19  1431 04/11/19  2115 04/12/19  0420   APTT 47.2* 73.5* 75.7*     UA:No results for input(s): NITRITE, COLORU, PHUR, LABCAST, WBCUA, RBCUA, MUCUS, TRICHOMONAS, YEAST, BACTERIA, CLARITYU, SPECGRAV, LEUKOCYTESUR, UROBILINOGEN, BILIRUBINUR, BLOODU, GLUCOSEU, AMORPHOUS in the last 72 hours. Invalid input(s): Jacqueline Los  Recent Labs     04/11/19  0519 04/12/19  0200   PHART 7.532* 7.563*   APS1AUF 31.2* 27.7*   PO2ART 65.8* 64.7*           CXR personally reviewed, left greater than right airspace disease      Assessment:     1. Acute on chronic resp failure, hypoxic   -pneumonitis with progressive fibrosis   2. Acute PE, bilateral/submassive  3. Lung cancer with metastatic disease   -adenocarcinoma of LLL  4. Acute on chronic anemia, multifactorial  5. Diverticulitis/perforation s/p colectomy   6. CAD with recent LHC/stent placement    Plan:      -Will decrease SPO2 goal, continue intermittent oxygen and bipap support  -If re-intubated would likely require tracheostomy tube placement however it may be possible to still transport to LTAC with an endotracheal tube, discussed with case management if this is possible.    -CXRs without findings of edema/effusions, BAL negative for any significant infection, no need for diuresis or atb at this time  -Decrease steroid dose   -Anticoagulation  -Cardiology and oncology following      Nick Rogers MD

## 2019-04-13 NOTE — CARE COORDINATION
Pt was trialed by [facility] ambulance Friday evening and again Saturday afternoon to 15 L hi flow and NRB for possible transport to [facility] today. Pt unable to hold saturations on 15 L for safe transport. [facility] transport was called and suggested intubation for transport.  mobile ICU transport was called to inquire whether they have vapotherm available for transport. Team is on run currently and will return call and leave  if able to transport with vapotherm.  mobile ICU unit states that they do transport nicu pts on vapotherm but checking to see if adults on vapotherm can be transported.

## 2019-04-14 PROBLEM — J96.20 ACUTE ON CHRONIC RESPIRATORY FAILURE (HCC): Status: ACTIVE | Noted: 2019-01-01

## 2019-04-14 NOTE — PROGRESS NOTES
Chava Fregoso or Facility: [facility] From: Caryle Lope RE: Ricky Gifty 1945 RM: [id number] Can you please put an order in for Cath flow pt picc line on one line is occluded.  Thank you Caryle Lope Need Callback: NO CALLBACK REQ C4 PROGRESSIVE CARE

## 2019-04-14 NOTE — PROGRESS NOTES
Hospitalist Progress Note      PCP: Don Parks MD    Date of Admission: 3/7/2019    Chief Complaint: shortness of breath     Hospital Course: long admission due to PE, perforated diverticulitis, respiratory failure with hypoxemia to count a few. Transferred back to the ICU for worsening hypoxemic respiratory failure. Two attempts to transfer to [facility] were unsuccessful due to unstable respiratory function  Patient had worsening respiratory function earlier today. Declined intubation and decided to proceed with hospice. Subjective: progressive shortness of breath.  No pain at the time of this interview      Medications:  Reviewed    Infusion Medications    dextrose 100 mL/hr (03/08/19 1700)     Scheduled Medications    LORazepam        magnesium oxide  400 mg Oral Daily    apixaban  5 mg Oral BID    lisinopril  5 mg Oral Daily    methylPREDNISolone  60 mg Intravenous Q12H    metoprolol succinate  50 mg Oral Daily    ticagrelor  90 mg Oral BID    potassium chloride  10 mEq Oral TID    sodium chloride flush  10 mL Intravenous Q12H    ipratropium-albuterol  1 ampule Inhalation Q4H WA    zinc oxide  1 applicator Topical Daily    pantoprazole  40 mg Intravenous Daily    aspirin  81 mg Oral Daily    simvastatin  40 mg Oral Nightly    folic acid  1 mg Oral Daily    oyster shell calcium/vitamin D  1 tablet Oral Daily     PRN Meds: morphine, LORazepam, atropine, magic (miracle) mouthwash with nystatin, oxyCODONE **OR** oxyCODONE, HYDROmorphone, sodium chloride flush, glucose, dextrose, glucagon (rDNA), dextrose, ondansetron, acetaminophen      Intake/Output Summary (Last 24 hours) at 4/14/2019 1004  Last data filed at 4/14/2019 0544  Gross per 24 hour   Intake 360 ml   Output 1650 ml   Net -1290 ml       Physical Exam Performed:    BP (!) 154/88   Pulse 101   Temp 97.5 °F (36.4 °C) (Oral)   Resp 26   Ht 5' 9\" (1.753 m)   Wt 163 lb 2.3 oz (74 kg)   SpO2 (!) 88%   BMI 24.09 kg/m² General appearance: Mild respiratory distress, appears stated age. Oriented and cooperative. On NRB  HEENT: Pupils equal, round, and reactive to light. Conjunctivae/corneas clear. Neck: Supple, with full range of motion. No jugular venous distention. Trachea midline. Respiratory:  Good effort, bilateral rhonchi  Cardiovascular: Regular rate and rhythm with normal S1/S2 without murmurs, rubs or gallops. Abdomen: Soft, mild tenderness  Musculoskeletal: No clubbing, cyanosis or edema bilaterally. Full range of motion without deformity. Skin: Skin color, texture, turgor normal.  No rashes or lesions. Neurologic:  Grossly nonfocal neuro exam.  Psychiatric: Awake, alert, fully oriented, normal and appropriate thought content  Capillary Refill: Brisk,< 3 seconds   Peripheral Pulses: +2 palpable, equal bilaterally     I examined the patient today (04/14/19). Physical exam is same as yesterday (4/13). Labs:   Recent Labs     04/12/19  0420 04/13/19  0515   WBC 9.4 8.6   HGB 7.5* 7.2*   HCT 22.7* 21.6*   PLT 87* 74*     Recent Labs     04/12/19  0420 04/13/19  0515   * 138   K 3.6 3.7   CL 98* 102   CO2 25 25   BUN 16 18   CREATININE <0.5* <0.5*   CALCIUM 7.4* 7.6*     No results for input(s): AST, ALT, BILIDIR, BILITOT, ALKPHOS in the last 72 hours. No results for input(s): INR in the last 72 hours. No results for input(s): Wyatt Milch in the last 72 hours. Urinalysis:      Lab Results   Component Value Date    NITRU Negative 03/10/2019    WBCUA 3-5 03/10/2019    RBCUA 0-2 03/10/2019    BLOODU Negative 03/10/2019    SPECGRAV 1.025 03/10/2019    GLUCOSEU Negative 03/10/2019       Radiology:  XR CHEST PORTABLE   Final Result   Persistent diffuse airspace opacities. Differential considerations include   edema, infection and ARDS. XR CHEST PORTABLE   Final Result   Some improvement in diffuse hazy opacity throughout the lungs interstitial   prominence. No significant change otherwise. XR CHEST PORTABLE   Final Result   Multifocal airspace disease, mildly increased on the right. XR ABDOMEN (KUB) (SINGLE AP VIEW)   Final Result   1. Nasogastric tube terminating in the gastric body. XR CHEST PORTABLE   Final Result   1. New right perihilar and suspected increasing left upper lobe airspace   disease, either pneumonia or edema. 2. The proximal side hole for the enteric tube is in the esophagus and the   tube should be advanced 5 cm. XR CHEST PORTABLE   Final Result   Endotracheal tube tip in the mid trachea. Enteric tube tip in the body of the stomach. No significant change in pulmonary edema. XR CHEST PORTABLE   Final Result   Persistent findings of pulmonary edema. XR CHEST PORTABLE   Final Result   Persistent bilateral airspace disease, similar to prior, likely edema. Pneumonia is not excluded in the appropriate clinical setting. Small pleural   effusions. XR CHEST PORTABLE   Final Result   Increasing right lung airspace disease could represent edema or pneumonia. Left lung airspace disease appears unchanged. CT CHEST WO CONTRAST   Final Result   1. Bilateral upper lobe ground-glass opacities, left greater than right, with   developing left perihilar consolidation. Findings are most concerning for   multifocal pneumonia with asymmetric pulmonary edema a less likely   possibility. 2. Small bilateral pleural effusions which are slightly increased from   03/26/2019.   3. Trace ascites in the upper abdomen. 4. Sclerotic metastasis in the T6 vertebral body. 5. Coronary atherosclerosis. XR CHEST PORTABLE   Final Result   Nonspecific consolidation mid and lower left lung with left pleural effusion   presumably related to sequela from pulmonary edema. Pneumonia is a   consideration. Calcific atherosclerosis aorta. Cardiomegaly.          XR ABDOMEN (KUB) (SINGLE AP VIEW)   Final Result   Dilated small bowel, may reflect ileus or obstruction. Recommend follow-up   study. CT ABDOMEN PELVIS WO CONTRAST Additional Contrast? None   Final Result   Interval increase in size of the left lower quadrant collection which now   measures up to 7.8 cm. Administered rectal contrast extends into the   collection. Right anterior approach drain courses adjacent to the collection. No significant change in pleural effusions and associated airspace disease. The findings were sent to the Radiology Results [address] at 12:53   pm on 3/26/2019to be communicated to a licensed caregiver. FL BARIUM ENEMA   Final Result   Active extravasation of contrast from the sigmoid colon, compatible with   anastomotic leak. Please refer to accompanying CT. Diverticulosis. CT ABDOMEN PELVIS W IV CONTRAST Additional Contrast? Radiologist Recommendation   Final Result   1. The patient is 11 days status post partial sigmoid colectomy. Surgical   drain remains in place with a small ill-defined fluid collection that is   stable from prior exam.  This does not appear to represent an abscess and may   represent postoperative hematoma or seroma. 2. There is a new collection of free intraperitoneal air immediately anterior   to the prior anastomosis site, contained within the left lower quadrant. This indicates an anastomosis leak. No distant free air. 3. 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 4. Cholelithiasis with no evidence of acute cholecystitis. 5. Worsening small pleural effusions and airspace changes in the lower chest.   Pattern may represent underlying pulmonary edema or perhaps pneumonitis. XR CHEST PORTABLE   Final Result   Interval development of mild pulmonary edema. There is a small left-sided pleural effusion with more focal consolidation   the left lung base, atelectasis versus pneumonia.          IR PICC WO SQ PORT/PUMP > 5 YEARS Final Result   Successful placement of PICC line. CT ABDOMEN PELVIS WO CONTRAST Additional Contrast? None   Final Result   No evidence retroperitoneal hematoma. Interval colonic resection. Small volume loculated collection in the left   pericolic gutter, with surgical drain terminating adjacent. Mildly dilated loops of small bowel with scattered air-fluid levels, most   likely postoperative ileus. Trace pleural effusions and bibasilar airspace disease representing   atelectasis or pneumonia. VL Extremity Venous Bilateral   Final Result      CT CHEST PULMONARY EMBOLISM W CONTRAST   Final Result   Bilateral pulmonary emboli      Scattered opacities throughout the lungs, greatest at the lung bases, with   tiny pleural effusions. Changes are increased compared to prior      Sclerotic osseous metastatic disease as described previously      The findings were sent to the Radiology Results [address] at 3:57   pm on 3/16/2019to be communicated to a licensed caregiver. XR CHEST 1 VW   Final Result   Shallow inflation with findings consistent with basilar atelectasis. Left   pleural thickening versus trace effusion. CT CHEST ABDOMEN PELVIS WO CONTRAST   Final Result   Perforated sigmoid diverticulitis and pneumoperitoneum unchanged. Stable   left [facility] colonic air-fluid collection may represent an intramural or   extramural abscess or giant diverticulum. Infrarenal abdominal aortic aneurysm and aorto bi-iliac stent. Other incidental findings as above. CT ABDOMEN PELVIS W IV CONTRAST Additional Contrast? None   Final Result   Acute perforated descending colon diverticulitis. There is free air in the   abdomen and there is a small extraluminal gas and fluid collection/abscess   adjacent to the descending colon measuring 4.1 cm in AP diameter and 6 cm in   length. There is diverticulosis of the entire colon. Cholelithiasis.       4.7 cm infrarenal abdominal aortic aneurysm status post endograft repair with   a patent aorto bi-iliac graft. There is suspicion of an endoleak with blush   of contrast enhancement suspected in the inferior aspect of the native   aneurysm. As a noncontrast study was not performed, calcification of the   intraluminal thrombus accounting for the apparent enhancement is not   excluded. 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 Comparison with a noncontrast CT examination would be   helpful. Critical results were called by Dr. Flavio Tian. MD Elizabeth to [facility] on   3/7/2019 at 20:35. Assessment/Plan:    Active Hospital Problems    Diagnosis Date Noted    Iron deficiency anemia due to chronic blood loss [D50.0]     Pneumonia of left lung due to infectious organism [J18.9]     Cardiomyopathy ([address] [I42.9]     NSVT (nonsustained ventricular tachycardia) (HCC) [I47.2]     Other pulmonary embolism without acute cor pulmonale (HCC) [I26.99]     Acute respiratory failure with hypoxia ([address] [J96.01]     H/O colectomy [Z90.49]     Primary malignant neoplasm of lung metastatic to other site (HCC) [C34.90]     Herpes zoster oticus [B02.21]     Hearing loss of right ear [H91.91]     Chemotherapy-induced neutropenia (HCC) [D70.1, T45.1X5A]     Mucositis [K12.30]     Diverticulitis [K57.92] 03/07/2019    Diverticulitis of large intestine with perforation and abscess without bleeding [K57.20]      PLAN:    Acute hypoxemic respiratory failure  Multifactorial, with PE and malignancy  Worsening  Declined intubation  Not stable for transfer  Progressive worsening noted by patient and family.  Decided to proceed with comfort care, and asked for hospice consult    Metastatic lung cancer  Hospice consulted      Colonic diverticulitis with perforation  Had colectomy and colostomy  Stable now    Anemia  Malignancy and chronic disease combined  No active blood loss  No more lab checks, no transfusions    Non-ST elevation myocardial infarction  Had cardiac cath and PCI  No more chest pain    D/w patient and family  Hospice consulted    DVT Prophylaxis: SCD  Diet: DIET CARDIAC;   Dietary Nutrition Supplements: Standard High Calorie Oral Supplement  Code Status: DNR-CC    PT/OT Eval Status: on hold    Ismael Galvez MD

## 2019-04-14 NOTE — PROGRESS NOTES
RESPIRATORY THERAPY ASSESSMENT    Name:  Huong Santiago Record Number:  [id number]  Age: 69 y.o. Gender: male  : 1945  Today's Date:  2019  Room:  [address] [place] [address]-    Assessment     Is the patient being admitted for a COPD or Asthma exacerbation? No   (If yes the patient will be seen every 4 hours for the first 24 hours and then reassessed)    Patient Admission Diagnosis      Allergies  No Known Allergies    Minimum Predicted Vital Capacity:     na          Actual Vital Capacity:      na              Pulmonary History:COPD  Home Oxygen Therapy:  room air  Home Respiratory Therapy:None   Current Respiratory Therapy:  duoneb Q4W/A  Treatment Type: HHN  Medications: Albuterol/Ipratropium    Respiratory Severity Index(RSI)   Patients with orders for inhalation medications, oxygen, or any therapeutic treatment modality will be placed on Respiratory Protocol. They will be assessed with the first treatment and at least every 72 hours thereafter. The following severity scale will be used to determine frequency of treatment intervention. Smoking History: Pulmonary Disease or Smoking History, Greater than 15 pack year = 2    Social History  Social History     Tobacco Use    Smoking status: Former Smoker     Packs/day: 0.00     Years: 0.00     Pack years: 0.00     Last attempt to quit: 2003     Years since quittin.2    Smokeless tobacco: Never Used   Substance Use Topics    Alcohol use:  Yes     Alcohol/week: 10.8 oz     Types: 18 Shots of liquor per week     Comment: social-used to drink alot     Drug use: No       Recent Surgical History: None = 0  Past Surgical History  Past Surgical History:   Procedure Laterality Date    ARTERIAL ANEURYSM REPAIR  2010    right popliteal artery aneurysm repair    BRONCHOSCOPY  2019    left endobronchial tumor    BRONCHOSCOPY N/A 2019    BRONCHOSCOPY ALVEOLAR LAVAGE performed by Cm Rudd MD at [facility] BRONCHOSCOPY  1/18/2019    BRONCHOSCOPY/TRANSBRONCHIAL LUNG BIOPSY performed by Migue Damian MD at [address] [place] [address] N/A 1/23/2019    EBUS ULTRASOUND W/ ANESTHESIA AND NEEDLE BIOPSY performed by Gemma Martines MD at [address] [place] [address]  1/23/2019    BRONCHOSCOPY ALVEOLAR LAVAGE performed by Gemma Martines MD at [address] [place] [address]  1/23/2019    BRONCHOSCOPY BIOPSY BRONCHUS performed by Gemma Martines MD at [address] [place] [address]  1/23/2019    BRONCHOSCOPY BRUSHINGS performed by Gemma Martines MD at [address] [place] [address]  1/23/2019    BRONCHOSCOPY/TRANSBRONCHIAL NEEDLE BIOPSY performed by Gemma Martines MD at [address] [place] [address]  1/23/2019    BRONCHOSCOPY/TRANSBRONCHIAL NEEDLE BIOPSY ADDL LOBE performed by Gemma Martines MD at [address] [place] [address] N/A 4/3/2019    BRONCHOSCOPY ALVEOLAR LAVAGE performed by Gemma Martines MD at [address] [place] [address]  4/3/2019    BRONCHOSCOPY THERAPUTIC ASPIRATION INITIAL performed by Gemma Martines MD at [facility]  10/08/01 12/20/04    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    COLONOSCOPY  4/4/14    wnl    CORONARY ANGIOPLASTY WITH STENT PLACEMENT      FINGER FRACTURE SURGERY      left middle finger    HEMICOLECTOMY N/A 3/14/2019    LAPAROSCOPIC CONVERT TO OPEN SIGMOID COLECTOMY performed by Kelvin Quan MD at [facility] / REMOVAL / [address] 1/23/2019    RIGHT SUBCLAVIAN VEIN PORT A CATH PLACEMENT performed by Antoni Dixon MD at [address] N/A 3/27/2019    EXPLORATORY LAPAROTOMY, SIGMOID COLECTOMY, COLOSTOMY performed by Kelvin Quan MD at [address] [place] [address]  2/10/2014    BX duodenum, atrum, distal esophagus    UPPER GASTROINTESTINAL ENDOSCOPY  4/4/14    BX gastric    UPPER GASTROINTESTINAL ENDOSCOPY  11/4/15    UPPER GASTROINTESTINAL ENDOSCOPY N/A 3/21/2019    EGD ESOPHAGOGASTRODUODENOSCOPY performed by James Altamirano MD at [address]       Level of Consciousness: Alert, Oriented, and Cooperative = 0    Level of Activity: Non weight bearing- transfers bed to chair only = 3    Respiratory Pattern: Regular Pattern; RR 8-20 = 0    Breath Sounds: Diminished unilaterally = 1    Sputum  Sputum Color: Dark red, Tan, Tenacity: Thick, Sputum How Obtained: Spontaneous cough  Cough: Weak, productive = 2    Vital Signs   BP (!) 154/88   Pulse 101   Temp 97.5 °F (36.4 °C) (Oral)   Resp 26   Ht 5' 9\" (1.753 m)   Wt 163 lb 2.3 oz (74 kg)   SpO2 (!) 88%   BMI 24.09 kg/m²   SPO2 (COPD values may differ): Less than 86% on room air or greater than 92% on FiO2 greater than 50% = 4    Peak Flow (asthma only): not applicable = 0    RSI: 7-8 = BID and Q4HPRN (every four hours as needed) for dyspnea        Plan       Goals: medication delivery, mobilize retained secretions, volume expansion and improve oxygenation    Patient/caregiver was educated on the proper method of use for Respiratory Care Devices:  Yes      Level of patient/caregiver understanding able to:   ? Verbalize understanding   ? Demonstrate understanding       ? Teach back        ? Needs reinforcement       ? No available caregiver               ? Other:     Response to education:  Good     Is patient being placed on Home Treatment Regimen? NA     Does the patient have everything they need prior to discharge? NA     Comments: pt rx being changed to PRN   Pts family has been reusing for pt. Pt family was instructed to call if further assistance was needed    Plan of Care: as above    Electronically signed by Marcial Parks RCP on 4/14/2019 at 11:18 AM    Respiratory Protocol Guidelines     1. Assessment and treatment by Respiratory Therapy will be initiated for medication and therapeutic interventions upon initiation of aerosolized medication.   2. Physician will be contacted for respiratory rate (RR) greater than 35 breaths per minute. Therapy will be held for heart rate (HR) greater than 140 beats per minute, pending direction from physician. 3. Bronchodilators will be administered via Metered Dose Inhaler (MDI) with spacer when the following criteria are met:  a. Alert and cooperative     b. HR < 140 bpm  c. RR < 30 bpm                d. Can demonstrate a 2-3 second inspiratory hold  4. Bronchodilators will be administered via Hand Held Nebulizer [facility]) to patients when ANY of the following criteria are met  a. Incognizant or uncooperative          b. Patients treated with HHN at Home        c. Unable to demonstrate proper use of MDI with spacer     d. RR > 30 bpm   5. Bronchodilators will be delivered via Metered Dose Inhaler (MDI), HHN, Aerogen to intubated patients on mechanical ventilation. 6. Inhalation medication orders will be delivered and/or substituted as outlined below. Aerosolized Medications Ordering and Administration Guidelines:    1. All Medications will be ordered by a physician, and their frequency and/or modality will be adjusted as defined by the patients Respiratory Severity Index (RSI) score. 2. If the patient does not have documented COPD, consider discontinuing anticholinergics when RSI is less than 9.  3. If the bronchospasm worsens (increased RSI), then the bronchodilator frequency can be increased to a maximum of every 4 hours. If greater than every 4 hours is required, the physician will be contacted. 4. If the bronchospasm improves, the frequency of the bronchodilator can be decreased, based on the patient's RSI, but not less than home treatment regimen frequency. 5. Bronchodilator(s) will be discontinued if patient has a RSI less than 9 and has received no scheduled or as needed treatment for 72  Hrs. Patients Ordered on a Mucolytic Agent:    1. Must always be administered with a bronchodilator.     2. Discontinue if patient

## 2019-04-14 NOTE — PROGRESS NOTES
Patient extremely aggitated trying to climb out of bed medicated with ativan 1 mg ivp and morphine 2 mg ivp. Remained at bedside with patient.

## 2019-04-14 NOTE — PROGRESS NOTES
74yM respiratory failure. Pt has spoken with wife and daughters. They have decided they do not want intubation or CPR (no meds, defib, or compressions). We need a Nox4 limited code status. And can we please have morphine comfort care orders?

## 2019-04-14 NOTE — ONCOLOGY
ONCOLOGY HEMATOLOGY CARE PROGRESS NOTE      SUBJECTIVE:     Now on NRB. Has decided on comfort. DNR.    ROS:   The remaining 10 point review of symptoms is unremarkable. OBJECTIVE        Physical    VITALS:  BP (!) 154/88   Pulse 101   Temp 97.5 °F (36.4 °C) (Oral)   Resp 26   Ht 5' 9\" (1.753 m)   Wt 163 lb 2.3 oz (74 kg)   SpO2 (!) 88%   BMI 24.09 kg/m²   TEMPERATURE:  Current - Temp: 97.5 °F (36.4 °C); Max - Temp  Av °F (36.7 °C)  Min: 97.5 °F (36.4 °C)  Max: 98.4 °F (36.9 °C)  PULSE OXIMETRY RANGE: SpO2  Av.9 %  Min: 83 %  Max: 93 %  24HR INTAKE/OUTPUT:      Intake/Output Summary (Last 24 hours) at 2019 0829  Last data filed at 2019 0544  Gross per 24 hour   Intake 360 ml   Output 1650 ml   Net -1290 ml       CONSTITUTIONAL: sedated, intubated   HEENT oral pharynx , no scleral icterus, wearing high flow nasal canula   HEMATOLOGIC/LYMPHATICS:  no cervical lymphadenopathy, no supraclavicular lymphadenopathy, no axillary lymphadenopathy and no inguinal lymphadenopathy  LUNGS:  Ventilator   CARDIOVASCULAR:  , regular rate and rhythm, normal S1 and S2, no S3 or S4, and no murmur noted  ABDOMEN: soft, non tender   Back: no bruising, purpura, or hematomas noted   MUSCULOSKELETAL:  There is no redness, warmth, or swelling of the joints. EXTREMETIES: BLE edema +1, non pitting   NEUROLOGIC:  Sedated      Data      Recent Labs     19  0515   WBC 9.4 8.6   HGB 7.5* 7.2*   HCT 22.7* 21.6*   PLT 87* 74*   MCV 87.6 88.1        Recent Labs     19  0515   * 138   K 3.6 3.7   CL 98* 102   CO2 25 25   BUN 16 18   CREATININE <0.5* <0.5*     No results for input(s): AST, ALT, ALB, BILIDIR, BILITOT, ALKPHOS in the last 72 hours.     Magnesium:    Lab Results   Component Value Date    MG 1.90 2019    MG 1.80 2019    MG 2.00 2019         Problem List  Patient Active Problem List   Diagnosis    Elevated - Vanco/Rolando completed. S/p bronch- no signs of infectious etiology- likely already treated via ATB prior to bronch being conducted   - Status post bronchoscopy on 4/03/2019. No endobronchial lesions. Cultures are NGTD. - Started Methylprednisolone 40 mg q12 hrs on 4/04/2019 in case of pneumonitis, as the bronchoscopy did not show obviously purulent secretions. Increased to 60 mg every 6 hours over weekend   - INTUBATED 4-9-19 for left heart cath with possible revascularization, s/p PCI with drug eluting stents to LAD and RCA 4-9-19. EXTUBATED 4/10.  - Right heart filling pressures were normal/low at the time of LHC/RHC on 4-09/2019. 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 - Cycle 3 carbo/alimta [place] Rist was due on 3/22- held due to acute issues, cont to hold. [facility]   - CT chest/abd/pelvis, 3/10/2019, showed improvement in the left hilar soft tissue and radiation changes. No obvious new disease. PE  -Diagnosed on 3/16/2019 with bilateral PEs involving the right and left main pulm arteries.   -Also had thrombus in the right femoral, peroneal, greater saphenous, short saphenous veins and in the left peroneal veins. Transitioned to Eliquis 5 mg BID on 4/12/2019. He is on a NRB and has decided on comfort care. He has had communion and the last rites. Now DNR-CC and receiving Morphine/Lorazepam for air hunger. He appears comfortable.     Aida Villa MD

## 2019-04-14 NOTE — CODE DOCUMENTATION
Daughters en route to hospital. Pt tolerating BiPAP for time being, SpO2 85% on BiPAP with FiO2 100%.

## 2019-04-14 NOTE — DISCHARGE SUMMARY
Hospital Medicine Discharge Summary    Patient ID: Edna Liang      Patient's PCP: Pascual Fink MD    Admitting Physician: Elizabeth Chaudhry MD  Discharge Physician: Angel Coto MD     Admit Date: 2019     Disposition:     Discharge Diagnoses: Active Problems:    Acute on chronic respiratory failure (HCC)  Resolved Problems:    * No resolved hospital problems. *      Date of Death: 19   Time of Death:15:14    Immediate Cause of Death:acute hypoxemic respiratory failure  Underlying Conditions:Lung cancer with mets, NSTEMI, pulmonary embolism  Other Contributing Conditions:Pneumonia    Hospital Course:   69 Y M with a h/o adenocarcinoma of the lung with known mets to the spine who had radiation and is now on chemotherapy presents with a few days of worsening LLQ pain and fevers.  CT showed diverticulitis. Had colectomy due to perforation and abscess. Colostomy was placed  Stay was complicated by multiple issues. NSTEMI was treated with PCI. Required antibiotics for pneumonia. Heparin was needed both for PE and NSTEMI  Respiratory function never fully recovered. Approval to transfer to [facility] was obtained, but the patient could not be transferred due to worsening respiratory function when transport was attempted. On 19 in the morning, patient developed another episode of shortness of breath. This time, he refused intubation and decided to proceed with hospice. Patient was seen by hospice nurse, and readmitted under hospice partnership program.   peacefully on 19 @ 15:14. Family aware. 45 minutes spent    Consults:  IP CONSULT TO HOSPICE    Signed:  Angel Coto MD   2019    Thank you Pascual Fink MD for the opportunity to be involved in this patient's care. If you have any questions or concerns please feel free to contact me at [phone].

## 2019-04-14 NOTE — DISCHARGE SUMMARY
Hospital Medicine Discharge Summary    Patient ID: Janny Shin      Patient's PCP: Carleen Nguyen MD    Admit Date: 3/7/2019     Discharge Date:   04/14/19     Admitting Physician: Tiffanie Galarza MD     Discharge Physician: Juancarlos Shaver MD     Discharge Diagnoses: Active Hospital Problems    Diagnosis Date Noted    Iron deficiency anemia due to chronic blood loss [D50.0]     Pneumonia of left lung due to infectious organism [J18.9]     Cardiomyopathy ([address] [I42.9]     NSVT (nonsustained ventricular tachycardia) (HCC) [I47.2]     Other pulmonary embolism without acute cor pulmonale (HCC) [I26.99]     Acute respiratory failure with hypoxia ([address] [J96.01]     H/O colectomy [Z90.49]     Primary malignant neoplasm of lung metastatic to other site (HCC) [C34.90]     Herpes zoster oticus [B02.21]     Hearing loss of right ear [H91.91]     Chemotherapy-induced neutropenia (HCC) [D70.1, T45.1X5A]     Mucositis [K12.30]     Diverticulitis [K57.92] 03/07/2019    Diverticulitis of large intestine with perforation and abscess without bleeding [K57.20]        The patient was seen and examined on day of discharge and this discharge summary is in conjunction with any daily progress note from day of discharge. Hospital Course:     Patient was discharged to be readmitted under hospice partnership   Long admission with multiple issues dis not result in improvement  Metastatic lung cancer not amenable to treatment      Physical Exam Performed:     BP (!) 154/88   Pulse 101   Temp 97.5 °F (36.4 °C) (Oral)   Resp 26   Ht 5' 9\" (1.753 m)   Wt 163 lb 2.3 oz (74 kg)   SpO2 (!) 88%   BMI 24.09 kg/m²        General appearance: Mild respiratory distress, appears stated age. Oriented and cooperative. On NRB  HEENT: Pupils equal, round, and reactive to light. Conjunctivae/corneas clear. Neck: Supple, with full range of motion. No jugular venous distention. Trachea midline.    Respiratory: Good effort, bilateral rhonchi  Cardiovascular: Regular rate and rhythm with normal S1/S2 without murmurs, rubs or gallops. Abdomen: Soft, mild tenderness  Musculoskeletal: No clubbing, cyanosis or edema bilaterally. Full range of motion without deformity. Skin: Skin color, texture, turgor normal.  No rashes or lesions. Neurologic:  Grossly nonfocal neuro exam.  Psychiatric: Awake, alert, fully oriented, normal and appropriate thought content  Capillary Refill: Brisk,< 3 seconds   Peripheral Pulses: +2 palpable, equal bilaterally           Labs: For convenience and continuity at follow-up the following most recent labs are provided:      CBC:    Lab Results   Component Value Date    WBC 8.6 04/13/2019    HGB 7.2 04/13/2019    HCT 21.6 04/13/2019    PLT 74 04/13/2019       Renal:    Lab Results   Component Value Date     04/13/2019    K 3.7 04/13/2019    K 3.7 04/04/2019     04/13/2019    CO2 25 04/13/2019    BUN 18 04/13/2019    CREATININE <0.5 04/13/2019    CALCIUM 7.6 04/13/2019    PHOS 1.1 03/27/2019         Significant Diagnostic Studies    Radiology:   XR CHEST PORTABLE   Final Result   Persistent diffuse airspace opacities. Differential considerations include   edema, infection and ARDS. XR CHEST PORTABLE   Final Result   Some improvement in diffuse hazy opacity throughout the lungs interstitial   prominence. No significant change otherwise. XR CHEST PORTABLE   Final Result   Multifocal airspace disease, mildly increased on the right. XR ABDOMEN (KUB) (SINGLE AP VIEW)   Final Result   1. Nasogastric tube terminating in the gastric body. XR CHEST PORTABLE   Final Result   1. New right perihilar and suspected increasing left upper lobe airspace   disease, either pneumonia or edema. 2. The proximal side hole for the enteric tube is in the esophagus and the   tube should be advanced 5 cm.          XR CHEST PORTABLE   Final Result   Endotracheal tube tip in the mid trachea. Enteric tube tip in the body of the stomach. No significant change in pulmonary edema. XR CHEST PORTABLE   Final Result   Persistent findings of pulmonary edema. XR CHEST PORTABLE   Final Result   Persistent bilateral airspace disease, similar to prior, likely edema. Pneumonia is not excluded in the appropriate clinical setting. Small pleural   effusions. XR CHEST PORTABLE   Final Result   Increasing right lung airspace disease could represent edema or pneumonia. Left lung airspace disease appears unchanged. CT CHEST WO CONTRAST   Final Result   1. Bilateral upper lobe ground-glass opacities, left greater than right, with   developing left perihilar consolidation. Findings are most concerning for   multifocal pneumonia with asymmetric pulmonary edema a less likely   possibility. 2. Small bilateral pleural effusions which are slightly increased from   03/26/2019.   3. Trace ascites in the upper abdomen. 4. Sclerotic metastasis in the T6 vertebral body. 5. Coronary atherosclerosis. XR CHEST PORTABLE   Final Result   Nonspecific consolidation mid and lower left lung with left pleural effusion   presumably related to sequela from pulmonary edema. Pneumonia is a   consideration. Calcific atherosclerosis aorta. Cardiomegaly. XR ABDOMEN (KUB) (SINGLE AP VIEW)   Final Result   Dilated small bowel, may reflect ileus or obstruction. Recommend follow-up   study. CT ABDOMEN PELVIS WO CONTRAST Additional Contrast? None   Final Result   Interval increase in size of the left lower quadrant collection which now   measures up to 7.8 cm. Administered rectal contrast extends into the   collection. Right anterior approach drain courses adjacent to the collection. No significant change in pleural effusions and associated airspace disease.       The findings were sent to the Radiology Results Communication Center at 12:53   pm on 3/26/2019to be communicated to a licensed caregiver. FL BARIUM ENEMA   Final Result   Active extravasation of contrast from the sigmoid colon, compatible with   anastomotic leak. Please refer to accompanying CT. Diverticulosis. CT ABDOMEN PELVIS W IV CONTRAST Additional Contrast? Radiologist Recommendation   Final Result   1. The patient is 11 days status post partial sigmoid colectomy. Surgical   drain remains in place with a small ill-defined fluid collection that is   stable from prior exam.  This does not appear to represent an abscess and may   represent postoperative hematoma or seroma. 2. There is a new collection of free intraperitoneal air immediately anterior   to the prior anastomosis site, contained within the left lower quadrant. This indicates an anastomosis leak. No distant free air. 3. 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 4. Cholelithiasis with no evidence of acute cholecystitis. 5. Worsening small pleural effusions and airspace changes in the lower chest.   Pattern may represent underlying pulmonary edema or perhaps pneumonitis. XR CHEST PORTABLE   Final Result   Interval development of mild pulmonary edema. There is a small left-sided pleural effusion with more focal consolidation   the left lung base, atelectasis versus pneumonia. IR PICC WO SQ PORT/PUMP > 5 YEARS   Final Result   Successful placement of PICC line. CT ABDOMEN PELVIS WO CONTRAST Additional Contrast? None   Final Result   No evidence retroperitoneal hematoma. Interval colonic resection. Small volume loculated collection in the left   pericolic gutter, with surgical drain terminating adjacent. Mildly dilated loops of small bowel with scattered air-fluid levels, most   likely postoperative ileus. Trace pleural effusions and bibasilar airspace disease representing   atelectasis or pneumonia.          VL Extremity Venous Bilateral   Final Result      CT CHEST PULMONARY EMBOLISM W CONTRAST   Final Result   Bilateral pulmonary emboli      Scattered opacities throughout the lungs, greatest at the lung bases, with   tiny pleural effusions. Changes are increased compared to prior      Sclerotic osseous metastatic disease as described previously      The findings were sent to the Radiology Results [address] at 3:57   pm on 3/16/2019to be communicated to a licensed caregiver. XR CHEST 1 VW   Final Result   Shallow inflation with findings consistent with basilar atelectasis. Left   pleural thickening versus trace effusion. CT CHEST ABDOMEN PELVIS WO CONTRAST   Final Result   Perforated sigmoid diverticulitis and pneumoperitoneum unchanged. Stable   left [facility] colonic air-fluid collection may represent an intramural or   extramural abscess or giant diverticulum. Infrarenal abdominal aortic aneurysm and aorto bi-iliac stent. Other incidental findings as above. CT ABDOMEN PELVIS W IV CONTRAST Additional Contrast? None   Final Result   Acute perforated descending colon diverticulitis. There is free air in the   abdomen and there is a small extraluminal gas and fluid collection/abscess   adjacent to the descending colon measuring 4.1 cm in AP diameter and 6 cm in   length. There is diverticulosis of the entire colon. Cholelithiasis. 4.7 cm infrarenal abdominal aortic aneurysm status post endograft repair with   a patent aorto bi-iliac graft. There is suspicion of an endoleak with blush   of contrast enhancement suspected in the inferior aspect of the native   aneurysm. As a noncontrast study was not performed, calcification of the   intraluminal thrombus accounting for the apparent enhancement is not   excluded. 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 Comparison with a noncontrast CT examination would be   helpful.       Critical results were called by Dr. Krystian Ryan. MD Elizabeth to [facility] on   3/7/2019 at 20:35. Consults:     IP CONSULT TO HOSPITALIST  IP CONSULT TO GENERAL SURGERY  IP CONSULT TO VASCULAR SURGERY  IP CONSULT TO ONCOLOGY  IP CONSULT TO PHARMACY  IP CONSULT TO NEPHROLOGY  IP CONSULT TO INFECTIOUS DISEASES  IP CONSULT TO CARDIOLOGY  IP CONSULT TO PULMONOLOGY  IP CONSULT TO VASCULAR SURGERY  IP CONSULT TO GI  IP CONSULT TO PHARMACY  IP CONSULT TO PULMONOLOGY  IP CONSULT TO PALLIATIVE CARE  IP CONSULT TO DIETITIAN  IP CONSULT TO HOSPICE    Disposition:  Hospice    Condition at Discharge: Terminal    Discharge Instructions/Follow-up:  hospice    Code Status:  DNR-CC     Activity: activity as tolerated    Diet: regular diet      Discharge Medications:     Current Discharge Medication List           Details   HYDROmorphone (DILAUDID) 2 MG/ML injection Infuse 0.25 mLs intravenously every 2 hours as needed for Pain for up to 3 days. Qty: 1 mL, Refills: 0    Comments: Reduce doses taken as pain becomes manageable  Associated Diagnoses: Adenocarcinoma of left lung ([facility] [address] Primary malignant neoplasm of lung metastatic to other site, unspecified laterality (HCC)      oxyCODONE (ROXICODONE) 5 MG immediate release tablet Take 1 tablet by mouth every 4 hours as needed (pain<5) for up to 3 days. Qty: 1 tablet, Refills: 0    Comments: Reduce doses taken as pain becomes manageable  Associated Diagnoses: Primary malignant neoplasm of lung metastatic to other site, unspecified laterality ([facility] [address]      ipratropium-albuterol (DUONEB) 0.5-2.5 (3) MG/3ML SOLN nebulizer solution Inhale 3 mLs into the lungs every 4 hours (while awake)  Qty: 360 mL, Refills: 0      ticagrelor (BRILINTA) 90 MG TABS tablet Take 1 tablet by mouth 2 times daily  Qty: 60 tablet, Refills: 0      apixaban (ELIQUIS STARTER PACK) 5 MG TABS tablet Take 10 mg (2 tablets) orally twice daily for 7 days, then take 5 mg (1 tablet) orally twice daily thereafter.   Qty: 74 tablet, Refills: 0      metoprolol succinate (TOPROL XL) 50 MG extended release tablet Take 1 tablet by mouth daily  Qty: 30 tablet, Refills: 3      methylPREDNISolone sodium (SOLU-MEDROL) 125 MG injection Infuse 0.96 mLs intravenously every 12 hours      zinc oxide (TRIAD HYDROPHILIC) PSTE paste Apply 1 applicator topically daily Clean buttocks with foamy cleanser or NSS. Pat dry. Add triad cream to right buttocks small area on left buttocks. Cover right buttocks with 2x2 and tegaderm or medipore tape. If the dressing does not stick it is OK to apply the cream and leave open to air. Refills: 0      pantoprazole (PROTONIX) 40 MG injection Infuse 40 mg intravenously daily              Details   simvastatin (ZOCOR) 40 MG tablet TAKE 1 TABLET BY MOUTH NIGHTLY  Qty: 90 tablet, Refills: 1    Associated Diagnoses: Mixed hyperlipidemia      aspirin 81 MG EC tablet Take 81 mg by mouth daily. folic acid (FOLVITE) 1 MG tablet Take 1 tablet by mouth daily  Qty: 30 tablet, Refills: 3      Psyllium (METAMUCIL) WAFR Take 6 tablets by mouth 2 times daily. Time Spent on discharge is more than 45 minutes in the examination, evaluation, counseling and review of medications and discharge plan. Signed:    Florence Carvalho MD   4/14/2019      Thank you Corby Flaherty MD for the opportunity to be involved in this patient's care. If you have any questions or concerns please feel free to contact me at [phone].

## 2019-04-14 NOTE — PROGRESS NOTES
Hospitalist Progress Note      PCP: Tara Rock MD    Date of Admission: 3/7/2019    Chief Complaint: shortness of breath     Hospital Course: long admission due to PE, perforated diverticulitis, respiratory failure with hypoxemia to count a few. Transferred back to the ICU for worsening hypoxemic respiratory failure. Extubated  Feels better  LTACH transfer was planned. However had to be canceled because of patient's hypoxemia on 4/12. Tried again today with BiPAP, still unsuccessful. Subjective: shortness of breath at baseline. No chest pain, no nausea or vomiting.  Stable when immobile in bed only      Medications:  Reviewed    Infusion Medications    dextrose 100 mL/hr (03/08/19 1700)     Scheduled Medications    LORazepam        magnesium oxide  400 mg Oral Daily    apixaban  5 mg Oral BID    lisinopril  5 mg Oral Daily    methylPREDNISolone  60 mg Intravenous Q12H    metoprolol succinate  50 mg Oral Daily    ticagrelor  90 mg Oral BID    potassium chloride  10 mEq Oral TID    sodium chloride flush  10 mL Intravenous Q12H    ipratropium-albuterol  1 ampule Inhalation Q4H WA    zinc oxide  1 applicator Topical Daily    pantoprazole  40 mg Intravenous Daily    aspirin  81 mg Oral Daily    simvastatin  40 mg Oral Nightly    folic acid  1 mg Oral Daily    oyster shell calcium/vitamin D  1 tablet Oral Daily     PRN Meds: morphine, LORazepam, atropine, magic (miracle) mouthwash with nystatin, oxyCODONE **OR** oxyCODONE, HYDROmorphone, sodium chloride flush, glucose, dextrose, glucagon (rDNA), dextrose, ondansetron, acetaminophen      Intake/Output Summary (Last 24 hours) at 4/14/2019 0847  Last data filed at 4/14/2019 0544  Gross per 24 hour   Intake 360 ml   Output 1650 ml   Net -1290 ml       Physical Exam Performed:    BP (!) 154/88   Pulse 101   Temp 97.5 °F (36.4 °C) (Oral)   Resp 26   Ht 5' 9\" (1.753 m)   Wt 163 lb 2.3 oz (74 kg)   SpO2 (!) 88%   BMI 24.09 kg/m² PORTABLE   Final Result   Multifocal airspace disease, mildly increased on the right. XR ABDOMEN (KUB) (SINGLE AP VIEW)   Final Result   1. Nasogastric tube terminating in the gastric body. XR CHEST PORTABLE   Final Result   1. New right perihilar and suspected increasing left upper lobe airspace   disease, either pneumonia or edema. 2. The proximal side hole for the enteric tube is in the esophagus and the   tube should be advanced 5 cm. XR CHEST PORTABLE   Final Result   Endotracheal tube tip in the mid trachea. Enteric tube tip in the body of the stomach. No significant change in pulmonary edema. XR CHEST PORTABLE   Final Result   Persistent findings of pulmonary edema. XR CHEST PORTABLE   Final Result   Persistent bilateral airspace disease, similar to prior, likely edema. Pneumonia is not excluded in the appropriate clinical setting. Small pleural   effusions. XR CHEST PORTABLE   Final Result   Increasing right lung airspace disease could represent edema or pneumonia. Left lung airspace disease appears unchanged. CT CHEST WO CONTRAST   Final Result   1. Bilateral upper lobe ground-glass opacities, left greater than right, with   developing left perihilar consolidation. Findings are most concerning for   multifocal pneumonia with asymmetric pulmonary edema a less likely   possibility. 2. Small bilateral pleural effusions which are slightly increased from   03/26/2019.   3. Trace ascites in the upper abdomen. 4. Sclerotic metastasis in the T6 vertebral body. 5. Coronary atherosclerosis. XR CHEST PORTABLE   Final Result   Nonspecific consolidation mid and lower left lung with left pleural effusion   presumably related to sequela from pulmonary edema. Pneumonia is a   consideration. Calcific atherosclerosis aorta. Cardiomegaly.          XR ABDOMEN (KUB) (SINGLE AP VIEW)   Final Result   Dilated small bowel, may reflect ileus or obstruction. Recommend follow-up   study. CT ABDOMEN PELVIS WO CONTRAST Additional Contrast? None   Final Result   Interval increase in size of the left lower quadrant collection which now   measures up to 7.8 cm. Administered rectal contrast extends into the   collection. Right anterior approach drain courses adjacent to the collection. No significant change in pleural effusions and associated airspace disease. The findings were sent to the Radiology Results [address] at 12:53   pm on 3/26/2019to be communicated to a licensed caregiver. [place] BARIUM ENEMA   Final Result   Active extravasation of contrast from the sigmoid colon, compatible with   anastomotic leak. Please refer to accompanying CT. Diverticulosis. CT ABDOMEN PELVIS W IV CONTRAST Additional Contrast? Radiologist Recommendation   Final Result   1. The patient is 11 days status post partial sigmoid colectomy. Surgical   drain remains in place with a small ill-defined fluid collection that is   stable from prior exam.  This does not appear to represent an abscess and may   represent postoperative hematoma or seroma. 2. There is a new collection of free intraperitoneal air immediately anterior   to the prior anastomosis site, contained within the left lower quadrant. This indicates an anastomosis leak. No distant free air. 3. 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 4. Cholelithiasis with no evidence of acute cholecystitis. 5. Worsening small pleural effusions and airspace changes in the lower chest.   Pattern may represent underlying pulmonary edema or perhaps pneumonitis. XR CHEST PORTABLE   Final Result   Interval development of mild pulmonary edema. There is a small left-sided pleural effusion with more focal consolidation   the left lung base, atelectasis versus pneumonia.          IR PICC WO SQ PORT/PUMP > 5 YEARS   Final Result Successful placement of PICC line. CT ABDOMEN PELVIS WO CONTRAST Additional Contrast? None   Final Result   No evidence retroperitoneal hematoma. Interval colonic resection. Small volume loculated collection in the left   pericolic gutter, with surgical drain terminating adjacent. Mildly dilated loops of small bowel with scattered air-fluid levels, most   likely postoperative ileus. Trace pleural effusions and bibasilar airspace disease representing   atelectasis or pneumonia. VL Extremity Venous Bilateral   Final Result      CT CHEST PULMONARY EMBOLISM W CONTRAST   Final Result   Bilateral pulmonary emboli      Scattered opacities throughout the lungs, greatest at the lung bases, with   tiny pleural effusions. Changes are increased compared to prior      Sclerotic osseous metastatic disease as described previously      The findings were sent to the Radiology Results [address] at 3:57   pm on 3/16/2019to be communicated to a licensed caregiver. XR CHEST 1 VW   Final Result   Shallow inflation with findings consistent with basilar atelectasis. Left   pleural thickening versus trace effusion. CT CHEST ABDOMEN PELVIS WO CONTRAST   Final Result   Perforated sigmoid diverticulitis and pneumoperitoneum unchanged. Stable   left [facility] colonic air-fluid collection may represent an intramural or   extramural abscess or giant diverticulum. Infrarenal abdominal aortic aneurysm and aorto bi-iliac stent. Other incidental findings as above. CT ABDOMEN PELVIS W IV CONTRAST Additional Contrast? None   Final Result   Acute perforated descending colon diverticulitis. There is free air in the   abdomen and there is a small extraluminal gas and fluid collection/abscess   adjacent to the descending colon measuring 4.1 cm in AP diameter and 6 cm in   length. There is diverticulosis of the entire colon. Cholelithiasis.       4.7 cm infrarenal qualifies for hospice, but keeps declining     Colonic diverticulitis with perforation  Had colectomy and colostomy  Stable now  No new issues    Hyponatremia  Mild, secondary to lung malignancy  Monitor BMP  Stable at mid-130    Anemia  Malignancy and chronic disease combined  No active blood loss  Transfuse if indicated  No transfusion needed   Monitor hemoglobin daily    Non-ST elevation myocardial infarction  Had cardiac cath and PCI  No more chest pain        DVT Prophylaxis: SCD  Diet: DIET CARDIAC;   Dietary Nutrition Supplements: Standard High Calorie Oral Supplement  Code Status: DNR-CC    PT/OT Eval Status: on hold    Dispo -  Discharge canceled again, because of transport unable to safely keep the patient on Caroline Rodriguez MD

## 2019-04-14 NOTE — PROGRESS NOTES
[facility] or Facility: [facility] From: Paulene Frankel RE: rickey rider 1945 RM: [phone] I just spoke with hospice nurse and we think that to get him off of vapo therm an move him to nasal cannula high flow 15L would be best also can we up his Ativan orders to 1mg Ativan every hour instead of .5 every 2 he needs more and then 2 mg morphine every hour and give both together also because he is getting his ICD turned off in the next 2 hours that would help him relax. Pt is ok with us going to just high flow. Please review and advise.  Thank you so much Paulene Frankel Need Callback: NO CALLBACK REQ C4 PROGRESSIVE CARE  Unread

## 2019-04-14 NOTE — PROGRESS NOTES
Post mortem care given toe tag and tag on bag family took belongings home with wife. Transport called for [employer] cart.

## 2019-04-14 NOTE — PROGRESS NOTES
Patient without spontaneous respirations pulse or blood pressure family at bedside. Dr Zita Lara pageromel. verified with Cristino Abarca Rn

## 2019-04-14 NOTE — FLOWSHEET NOTE
04/14/19 1144   Encounter Summary   Services provided to: Patient and family together   Referral/Consult From: Nurse;Patient; Family   Support System Spouse; Children;Family members   Continue Visiting   (4/14 EOL spt/prayer w/pt & family. FOLLOW)   Complexity of Encounter High   Length of Encounter 45 minutes   Grief and Life Adjustment   Type End of life   Assessment Calm; Approachable;Tearful; Anticipatory grief; Hopeful;Coping;Peaceful   Intervention Active listening;Explored feelings, thoughts, concerns;Explored coping resources;Nurtured hope;Prayer;Scripture;Sustaining presence/ Ministry of presence; End of life care; Discussed death;Discussed afterlife; Discussed relationship with God;Discussed belief system/Voodoo practices/vita;Discussed illness/injury and it's impact   Outcome Comfort;Expressed gratitude;Expressed feelings of ky, peace, and/or awe;Engaged in conversation; Shared life review;Expressed feelings/needs/concerns;Coping;Tearful;Less anxious, less agitated;Grieving;Encouraged; Hopeful;Receptive  (Pt is at peace w/God and his own death.  Family is vigiling.)

## 2019-04-14 NOTE — H&P
Hospitalist Progress Note      PCP: Tara Rock MD    Date of Admission: 19     Chief Complaint: shortness of breath     HPI : long recent admission due to PE, perforated diverticulitis, respiratory failure with hypoxemia to count a few. Transferred back to the ICU for worsening hypoxemic respiratory failure. Two attempts to transfer to [facility] were unsuccessful due to unstable respiratory function  Patient had worsening respiratory function earlier today. Declined intubation and decided to proceed with hospice. Being admitted to IP hospice in the hospital    Subjective: progressive shortness of breath. No pain at the time of this interview      Medications:  Reviewed    Infusion Medications     Scheduled Medications     PRN Meds:     No intake or output data in the 24 hours ending 19 1417       Family History   Adopted: Yes   Problem Relation Age of Onset    Stroke Mother     Schizophrenia Brother     Cancer Neg Hx         Social History     Tobacco Use    Smoking status: Former Smoker     Packs/day: 0.00     Years: 0.00     Pack years: 0.00     Last attempt to quit: 2003     Years since quittin.2    Smokeless tobacco: Never Used   Substance Use Topics    Alcohol use: Yes     Alcohol/week: 10.8 oz     Types: 18 Shots of liquor per week     Comment: social-used to drink alot     Drug use: No         ROS: shortness of breath, weakness. All others reviewed and negative    Physical Exam Performed: There were no vitals taken for this visit. General appearance: Mild respiratory distress, appears stated age. Oriented and cooperative. On NRB  HEENT: Pupils equal, round, and reactive to light. Conjunctivae/corneas clear. Neck: Supple, with full range of motion. No jugular venous distention. Trachea midline. Respiratory:  Good effort, bilateral rhonchi  Cardiovascular: Regular rate and rhythm with normal S1/S2 without murmurs, rubs or gallops.   Abdomen: Soft, mild tenderness  Musculoskeletal: No clubbing, cyanosis or edema bilaterally. Full range of motion without deformity. Skin: Skin color, texture, turgor normal.  No rashes or lesions. Neurologic:  Grossly nonfocal neuro exam.  Psychiatric: Awake, alert, fully oriented, normal and appropriate thought content  Capillary Refill: Brisk,< 3 seconds   Peripheral Pulses: +2 palpable, equal bilaterally     I examined the patient today (04/14/19). Physical exam is same as yesterday (4/13). Labs:   Recent Labs     04/12/19  0420 04/13/19  0515   WBC 9.4 8.6   HGB 7.5* 7.2*   HCT 22.7* 21.6*   PLT 87* 74*     Recent Labs     04/12/19  0420 04/13/19  0515   * 138   K 3.6 3.7   CL 98* 102   CO2 25 25   BUN 16 18   CREATININE <0.5* <0.5*   CALCIUM 7.4* 7.6*     No results for input(s): AST, ALT, BILIDIR, BILITOT, ALKPHOS in the last 72 hours. No results for input(s): INR in the last 72 hours. No results for input(s): Clarance Barnett in the last 72 hours.     Urinalysis:      Lab Results   Component Value Date    NITRU Negative 03/10/2019    WBCUA 3-5 03/10/2019    RBCUA 0-2 03/10/2019    BLOODU Negative 03/10/2019    SPECGRAV 1.025 03/10/2019    GLUCOSEU Negative 03/10/2019       Radiology:  No orders to display           Assessment/Plan:    Active Hospital Problems    Diagnosis Date Noted    Acute on chronic respiratory failure ([facility] [address] [J96.20] 04/14/2019     PLAN:    Acute hypoxemic respiratory failure  Multifactorial, with PE and malignancy  Worsening  Declined intubation  Admitted to hospice    Metastatic lung cancer  Hospice consulted    Anemia  Malignancy and chronic disease combined  No active blood loss  No more lab checks, no transfusions    Non-ST elevation myocardial infarction  No more chest pain    D/w patient and family  D/w hospice   DVT Prophylaxis: SCD  Diet: No diet orders on file  Code Status: Prior    PT/OT Eval Status: not needed any more    Hilda Wright MD

## 2019-04-14 NOTE — PROGRESS NOTES
medtronic turned defibrillator off. Medicated with ativan 1 mg ivp and morphine 2 mg ivp at this time for comfort nrb mask off at this time remains on vaportherm at this point.

## 2019-04-14 NOTE — PROGRESS NOTES
Pt family at bedside. Pt resting in bed with eyes closed on vapo therm and non rebreather. Morphine given per MAR. Will continue to monitor.

## 2019-04-14 NOTE — PROGRESS NOTES
10 mL at 04/14/19 0849    ipratropium-albuterol (DUONEB) nebulizer solution 1 ampule  1 ampule Inhalation Q4H WA Jaz Haskins MD   1 ampule at 04/13/19 2017    oxyCODONE (ROXICODONE) immediate release tablet 5 mg  5 mg Oral Q4H PRN Jaz Haskins MD   5 mg at 04/02/19 1207    Or    oxyCODONE (ROXICODONE) immediate release tablet 10 mg  10 mg Oral Q4H PRN Jaz Haskins MD   10 mg at 04/05/19 0023    zinc oxide (TRIAD HYDROPHILIC) paste 1 applicator  1 applicator Topical Daily Jaz Haskins MD   1 applicator at 09/52/71 3140    HYDROmorphone (DILAUDID) injection 1 mg  1 mg Intravenous Q2H PRN Jaz Haskins MD   1 mg at 04/01/19 2236    sodium chloride flush 0.9 % injection 10 mL  10 mL Intravenous PRN Jaz Haskins MD   10 mL at 04/02/19 1631    glucose (GLUTOSE) 40 % oral gel 15 g  15 g Oral PRN Jaz Haskins MD        dextrose 50 % solution 12.5 g  12.5 g Intravenous PRN Jaz Haskins MD        glucagon (rDNA) injection 1 mg  1 mg Intramuscular PRN Jaz Haskins MD        dextrose 5 % solution  100 mL/hr Intravenous PRN Jaz Haskins  mL/hr at 03/08/19 1700 100 mL/hr at 03/08/19 1700    ondansetron (ZOFRAN) injection 4 mg  4 mg Intravenous Q6H PRN Jaz Haskins MD   4 mg at 03/30/19 0810    acetaminophen (TYLENOL) tablet 650 mg  650 mg Oral Q4H PRN Jaz Haskins MD   650 mg at 04/10/19 2142       Objective:     Telemetry monitor: SR/ST    Physical Exam:  Constitutional and general appearance: alert, cooperative, mild distress and appears stated age  · Further physical exam deferred   Neurological:  · Alert and oriented  · No abnormalities of mood, affect, memory, mentation, or behavior are noted    Data  Limited echo 4/8/2019:   Limited exam to assess right heart function.   Normal right ventricular size and function.   The right atrium is normal in size.   Trivial tricuspid regurgitation.   Trivial pulmonic regurgitation.    IVC size is normal (<2.1cm) and collapses > 50% with respiration consistent with normal RA pressure (3mmHg). Echo 3/19/2019:  Definity contrast was used, but port not working properly.  Malissa Person left ventricular systolic function is moderately reduced with an ejection fraction of 30-35%. Inferolateral hypokinesia. Mid anteroseptal, apical lateral and apical septal akinesia.   There is mild concentric left ventricular hypertrophy.   Normal left ventricular filling pressure.   Left ventricular cavity size is normal.   Mild thickening of the anterior leaflet of mitral valve.   Mild mitral regurgitation.   The left atrium is severely dilated. RHC and LHC 4/9/2019 (William):  LEFT HEART CATH  LM: calcified, luminals  LAD: calcified proximal segment, 85% focal napkin ring just at prox stent edge (PARISH-2 flow                Diag1- calcified, mild diffuse disease (<20%)  LCX: mild diffuse disease <20% with L-R colalterals  RCA:dominant, long stents from prox to mid/distal with 80% mid napkin ring, distal shelf 60% then 85% napkin ring at distal stent edge. PDA: luminals                PLV: L-R collaterals to PLV.  Ostial 80%, mid 99% near      LVEDP: 6  LVEF: not done to save dye     LESION 1: mid LAD 85% napkin ring at proximal stent edge  NC euphora 3mm then 3mm wolverine cutter,  STENT: Xience Alana 3 x 23mm the post dilated using 3.5mm NC balloon  0% residual, PARISH-3 flow     LESION 2: mid RCA: napkin ring 75%  predilated- NC trek 3mm then Calvin 3.5mm cutting balloon  High pressure 3.5mm NC trek and Cutter balloon to 10atm  40% residual and PARISH-3 Flow         LESION 3: distal RCA sequential 80-95% lesions  Pre dilated using 2.5 Trek  STENT: Xience 2.5x33m distal RCA extending into PDA, post dilated 2.5-3mm  0% residual, PARISH-3 flow        RIGHT HEART CATH  RA: 2  RV:  27/2  PA:  27/15     and mean of 21  PCWP: 7  (FiO2 of 80% and Peep 8)        Sats: on 80% FIO2  Ao: 97%  RA: 56%   PA: 58%     CO/CI 6.49/3.43  SVR/PVR

## 2019-04-14 NOTE — PROGRESS NOTES
Patient noted to be without Spontaneous circulation. No Respirations. No blood pressure. Verified with Etienne Johnson RN. Dr. Filiberto Samuels notified.   T.O.D. 5856

## 2019-04-15 LAB
FINAL REPORT: NORMAL
PRELIMINARY: NORMAL

## 2019-05-04 LAB
FUNGUS (MYCOLOGY) CULTURE: ABNORMAL
FUNGUS (MYCOLOGY) CULTURE: ABNORMAL
FUNGUS STAIN: ABNORMAL
ORGANISM: ABNORMAL

## 2019-05-21 LAB
AFB CULTURE (MYCOBACTERIA): NORMAL
AFB SMEAR: NORMAL

## 2021-01-04 NOTE — ONCOLOGY
ONCOLOGY HEMATOLOGY CARE PROGRESS NOTE      SUBJECTIVE:     Ostomy bag is still empty. Adequate pain relief with the PCA. ROS:   The remaining 10 point review of symptoms is unremarkable. OBJECTIVE        Physical    VITALS:  BP (!) 151/88   Pulse 117   Temp 98 °F (36.7 °C) (Oral)   Resp 16   Ht 5' 9\" (1.753 m)   Wt 132 lb 0.9 oz (59.9 kg)   SpO2 91%   BMI 19.50 kg/m²   TEMPERATURE:  Current - Temp: 98 °F (36.7 °C); Max - Temp  Av °F (36.7 °C)  Min: 97.6 °F (36.4 °C)  Max: 98.3 °F (36.8 °C)  PULSE OXIMETRY RANGE: SpO2  Av.6 %  Min: 91 %  Max: 94 %  24HR INTAKE/OUTPUT:      Intake/Output Summary (Last 24 hours) at 3/30/2019 0905  Last data filed at 3/30/2019 0545  Gross per 24 hour   Intake 2045.5 ml   Output 1306 ml   Net 739.5 ml       CONSTITUTIONAL:  awake, alert, cooperative, no apparent distress, HEENT oral pharynx , no scleral icterus, wearing high flow nasal canula   HEMATOLOGIC/LYMPHATICS:  no cervical lymphadenopathy, no supraclavicular lymphadenopathy, no axillary lymphadenopathy and no inguinal lymphadenopathy  LUNGS:  No increased work of breathing, good air exchange, clear to auscultation bilaterally, no crackles or wheezing  CARDIOVASCULAR:  , regular rate and rhythm, normal S1 and S2, no S3 or S4, and no murmur noted  ABDOMEN: Absent BS.  + colostomy (empty). Red stoma    Back: no bruising, purpura, or hematomas noted   MUSCULOSKELETAL:  There is no redness, warmth, or swelling of the joints. EXTREMETIES: BLE edema +1, non pitting   NEUROLOGIC:  Awake, alert, oriented to name, place and time. Cranial nerves II-XII are grossly intact. Motor is 5 out of 5 bilaterally.  Hip flexion 5/5 bilaterally in the bed     Data      Recent Labs     19  0435 19  0500 19  0520   WBC 16.9* 12.9* 11.1*   HGB 8.9* 7.7* 7.4*   HCT 27.7* 24.0* 23.1*    147 146   MCV 89.5 89.7 89.3        Recent Labs     19  0435 19  0500 Pt denies any vaginal leaking bleeding or contractions. Patient is here today for possible colpo due to ASCUS and +HPV. 03/30/19  0520    142 144   K 5.4*  5.4* 4.4 3.8    107 111*   CO2 20* 23 22   BUN 16 17 13   CREATININE 1.2 0.7* <0.5*     No results for input(s): AST, ALT, ALB, BILIDIR, BILITOT, ALKPHOS in the last 72 hours.     Magnesium:    Lab Results   Component Value Date    MG 1.50 03/27/2019    MG 1.50 03/24/2019    MG 1.60 03/23/2019         Problem List  Patient Active Problem List   Diagnosis    Elevated fasting glucose    CAD (coronary artery disease)    PAD (peripheral artery disease) (HCC)    Essential hypertension    Mixed hyperlipidemia    Sprain of right foot    Sprain of anterior talofibular ligament of right ankle    Closed nondisplaced fracture of fifth right metatarsal bone    Closed fracture of proximal lateral malleolus of ankle with routine healing    Right foot pain    Right ankle pain    Nondisplaced fracture of fifth right metatarsal bone with routine healing    History of pulmonary emphysema    Chronic obstructive pulmonary disease with (acute) exacerbation (HCC)    Pulmonary nodule, left    Abnormal bone scan of thoracic spine    Back pain    Visit for monitoring Plavix therapy    History of ventricular tachycardia    Gallstones    Hemoptysis    Former smoker    Elevated PSA    Adenocarcinoma of left lung ([address]    Acute diverticulitis    Pancytopenia ([address]    Diverticulitis of large intestine with perforation and abscess without bleeding    Diverticulitis    Chemotherapy-induced neutropenia (HCC)    Mucositis    Herpes zoster oticus    Hearing loss of right ear    Acute respiratory failure with hypoxia ([address]    H/O colectomy    Primary malignant neoplasm of lung metastatic to other site [facility])    NSVT (nonsustained ventricular tachycardia) ([address]    Other pulmonary embolism without acute cor pulmonale (HCC)    Cardiomyopathy (HCC)       ASSESSMENT AND PLAN    Acute Perforated descending colon diverticulitis (not immune mediated colitis) with free air in the abd   - per gen surgery , s/p Laparoscopic converted to Open left Colectomy, 3/14/2019, with Dr. Trisha Wakefield  - ATB support (Zosyn, Maxipime, Vanco) , changed to [address] until POD 7, now on Merrem only. - CT scan done 3/25 with anastomotic leak at the [facility] surgical site. - Status post Gerda procedure on 3/27/2019 with Dr. Trisha Wakefield. Nutrition  - May need TPN? Await trial of clear liq diet     Neutropenia secondary to chemo  - Granix stopped 3/11   - resolved     Thrombocytopenia secondary to chemo  -Resolved    PE  -Diagnosed on 3/16/2019 with bilateral PEs involving the right and left main pulm arteries.   -Also had thrombus in the right femoral, peroneal, greater saphenous, short saphenous veins and in the left peroneal veins.  -Plan Eliquis on discharge.   -Will need lifelong anticoagulation.  -Does not need a filter per Dr. Juan Rose on 3/18   -Started Lovenox 40 mg on 3/29. May transition to Hep drip on Gokul 3/31 if Hgb stable. Anemia, possible GI bleed on hep drip  -EGD 3/21 negative   - PPI added, now BID dosing per GI 3/21  - No transfusion today, Hgb is 7.7   - CT AP 3/21 - no signs of bleeding   - due for B12 shot - given 3/19/2019 - required every 9 weeks with this chemo. Lung CA, stage 4 bone mets   - Will hold chemo until surgically healed  - Cycle 3 carbo/alimta Bere Mathias was due on 3/22- held due to acute issues, cont to hold. Plans to resume Robb Davies only outpatient.   - CT chest/abd/pelvis, 3/10/2019, showed improvement in the left hilar soft tissue and radiation changes. No obvious new disease. Not hospice appropriate at this time from an oncology standpoint.       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  May need to resume- hospitalist is speaking with vascular surgery for clarification. (Dr. Vivek Beckham)   - [facility] will be difficult given surgical burden and recent leak;       ONCOLOGIC DISPOSITION:  TBD.   Per surgical team.     Ingrid Yancey MD  [facility]

## 2021-09-03 NOTE — PROGRESS NOTES
Hospitalist Progress Note      PCP: Doris Henry MD    Date of Admission: 3/7/2019    Chief Complaint: shortness of breath     Hospital Course: long admission due to PE, perforated diverticulitis, respiratory failure with hypoxemia to count a few. Transferred back to the ICU for worsening hypoxemic respiratory failure. Extubated  Feels better  LTACH plans in place    Subjective: shortness of breath at baseline. No chest pain, no nausea or vomiting       Medications:  Reviewed    Infusion Medications    heparin (porcine) 15 mL/hr (04/11/19 1059)    dextrose 100 mL/hr (03/08/19 1700)     Scheduled Medications    methylPREDNISolone  60 mg Intravenous Q12H    metoprolol succinate  50 mg Oral Daily    ticagrelor  90 mg Oral BID    mupirocin   Nasal BID    potassium chloride  10 mEq Oral TID    magnesium oxide  400 mg Oral BID    sodium chloride flush  10 mL Intravenous Q12H    ipratropium-albuterol  1 ampule Inhalation Q4H WA    zinc oxide  1 applicator Topical Daily    acyclovir  800 mg Oral TID    pantoprazole  40 mg Intravenous Daily    aspirin  81 mg Oral Daily    simvastatin  40 mg Oral Nightly    folic acid  1 mg Oral Daily    oyster shell calcium/vitamin D  1 tablet Oral Daily     PRN Meds: magic (miracle) mouthwash with nystatin, oxyCODONE **OR** oxyCODONE, heparin (porcine), heparin (porcine), HYDROmorphone, sodium chloride flush, glucose, dextrose, glucagon (rDNA), dextrose, ondansetron, acetaminophen      Intake/Output Summary (Last 24 hours) at 4/11/2019 1221  Last data filed at 4/11/2019 1211  Gross per 24 hour   Intake 1241.28 ml   Output 1575 ml   Net -333.72 ml       Physical Exam Performed:    /68   Pulse 89   Temp 98.4 °F (36.9 °C) (Axillary)   Resp 22   Ht 5' 9\" (1.753 m)   Wt 166 lb 3.6 oz (75.4 kg)   SpO2 94%   BMI 24.55 kg/m²     General appearance: No apparent distress, appears stated age.  Oriented and cooperative  HEENT: Pupils equal, round, and reactive to light. Conjunctivae/corneas clear. Neck: Supple, with full range of motion. No jugular venous distention. Trachea midline. Respiratory:  Good effort, bilateral rhonchi  Cardiovascular: Regular rate and rhythm with normal S1/S2 without murmurs, rubs or gallops. Abdomen: Soft, mild tenderness  Musculoskeletal: No clubbing, cyanosis or edema bilaterally. Full range of motion without deformity. Skin: Skin color, texture, turgor normal.  No rashes or lesions. Neurologic:  Grossly nonfocal neuro exam.  Psychiatric: Awake, alert, fully oriented, normal and appropriate thought content  Capillary Refill: Brisk,< 3 seconds   Peripheral Pulses: +2 palpable, equal bilaterally         Labs:   Recent Labs     04/09/19  0503 04/10/19  0452 04/11/19  0529   WBC 9.1 9.6 11.8*   HGB 7.3* 7.4* 7.6*   HCT 21.9* 21.5* 23.1*   * 108* 101*     Recent Labs     04/09/19  0503 04/10/19  0452 04/11/19  0529    135* 134*   K 3.8 3.8 3.8   CL 98* 97* 96*   CO2 27 26 27   BUN 18 21* 19   CREATININE <0.5* <0.5* <0.5*   CALCIUM 7.1* 7.0* 7.3*     No results for input(s): AST, ALT, BILIDIR, BILITOT, ALKPHOS in the last 72 hours. No results for input(s): INR in the last 72 hours. No results for input(s): Floreen Pill in the last 72 hours. Urinalysis:      Lab Results   Component Value Date    NITRU Negative 03/10/2019    WBCUA 3-5 03/10/2019    RBCUA 0-2 03/10/2019    BLOODU Negative 03/10/2019    SPECGRAV 1.025 03/10/2019    GLUCOSEU Negative 03/10/2019       Radiology:  XR CHEST PORTABLE   Final Result   Multifocal airspace disease, mildly increased on the right. XR ABDOMEN (KUB) (SINGLE AP VIEW)   Final Result   1. Nasogastric tube terminating in the gastric body. XR CHEST PORTABLE   Final Result   1. New right perihilar and suspected increasing left upper lobe airspace   disease, either pneumonia or edema.    2. The proximal side hole for the enteric tube is in the esophagus and the   tube should be advanced 5 cm. XR CHEST PORTABLE   Final Result   Endotracheal tube tip in the mid trachea. Enteric tube tip in the body of the stomach. No significant change in pulmonary edema. XR CHEST PORTABLE   Final Result   Persistent findings of pulmonary edema. XR CHEST PORTABLE   Final Result   Persistent bilateral airspace disease, similar to prior, likely edema. Pneumonia is not excluded in the appropriate clinical setting. Small pleural   effusions. XR CHEST PORTABLE   Final Result   Increasing right lung airspace disease could represent edema or pneumonia. Left lung airspace disease appears unchanged. CT CHEST WO CONTRAST   Final Result   1. Bilateral upper lobe ground-glass opacities, left greater than right, with   developing left perihilar consolidation. Findings are most concerning for   multifocal pneumonia with asymmetric pulmonary edema a less likely   possibility. 2. Small bilateral pleural effusions which are slightly increased from   03/26/2019.   3. Trace ascites in the upper abdomen. 4. Sclerotic metastasis in the T6 vertebral body. 5. Coronary atherosclerosis. XR CHEST PORTABLE   Final Result   Nonspecific consolidation mid and lower left lung with left pleural effusion   presumably related to sequela from pulmonary edema. Pneumonia is a   consideration. Calcific atherosclerosis aorta. Cardiomegaly. XR ABDOMEN (KUB) (SINGLE AP VIEW)   Final Result   Dilated small bowel, may reflect ileus or obstruction. Recommend follow-up   study. CT ABDOMEN PELVIS WO CONTRAST Additional Contrast? None   Final Result   Interval increase in size of the left lower quadrant collection which now   measures up to 7.8 cm. Administered rectal contrast extends into the   collection. Right anterior approach drain courses adjacent to the collection. No significant change in pleural effusions and associated airspace disease. The findings were sent to the Radiology Results [address] at 12:53   pm on 3/26/2019to be communicated to a licensed caregiver. [place] BARIUM ENEMA   Final Result   Active extravasation of contrast from the sigmoid colon, compatible with   anastomotic leak. Please refer to accompanying CT. Diverticulosis. CT ABDOMEN PELVIS W IV CONTRAST Additional Contrast? Radiologist Recommendation   Final Result   1. The patient is 11 days status post partial sigmoid colectomy. Surgical   drain remains in place with a small ill-defined fluid collection that is   stable from prior exam.  This does not appear to represent an abscess and may   represent postoperative hematoma or seroma. 2. There is a new collection of free intraperitoneal air immediately anterior   to the prior anastomosis site, contained within the left lower quadrant. This indicates an anastomosis leak. No distant free air. 3. 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 4. Cholelithiasis with no evidence of acute cholecystitis. 5. Worsening small pleural effusions and airspace changes in the lower chest.   Pattern may represent underlying pulmonary edema or perhaps pneumonitis. XR CHEST PORTABLE   Final Result   Interval development of mild pulmonary edema. There is a small left-sided pleural effusion with more focal consolidation   the left lung base, atelectasis versus pneumonia. IR PICC WO SQ PORT/PUMP > 5 YEARS   Final Result   Successful placement of PICC line. CT ABDOMEN PELVIS WO CONTRAST Additional Contrast? None   Final Result   No evidence retroperitoneal hematoma. Interval colonic resection. Small volume loculated collection in the left   pericolic gutter, with surgical drain terminating adjacent. Mildly dilated loops of small bowel with scattered air-fluid levels, most   likely postoperative ileus.       Trace pleural effusions and bibasilar airspace disease representing   atelectasis or pneumonia. VL Extremity Venous Bilateral   Final Result      CT CHEST PULMONARY EMBOLISM W CONTRAST   Final Result   Bilateral pulmonary emboli      Scattered opacities throughout the lungs, greatest at the lung bases, with   tiny pleural effusions. Changes are increased compared to prior      Sclerotic osseous metastatic disease as described previously      The findings were sent to the Radiology Results [address] at 3:57   pm on 3/16/2019to be communicated to a licensed caregiver. XR CHEST 1 VW   Final Result   Shallow inflation with findings consistent with basilar atelectasis. Left   pleural thickening versus trace effusion. CT CHEST ABDOMEN PELVIS WO CONTRAST   Final Result   Perforated sigmoid diverticulitis and pneumoperitoneum unchanged. Stable   left [facility] colonic air-fluid collection may represent an intramural or   extramural abscess or giant diverticulum. Infrarenal abdominal aortic aneurysm and aorto bi-iliac stent. Other incidental findings as above. CT ABDOMEN PELVIS W IV CONTRAST Additional Contrast? None   Final Result   Acute perforated descending colon diverticulitis. There is free air in the   abdomen and there is a small extraluminal gas and fluid collection/abscess   adjacent to the descending colon measuring 4.1 cm in AP diameter and 6 cm in   length. There is diverticulosis of the entire colon. Cholelithiasis. 4.7 cm infrarenal abdominal aortic aneurysm status post endograft repair with   a patent aorto bi-iliac graft. There is suspicion of an endoleak with blush   of contrast enhancement suspected in the inferior aspect of the native   aneurysm. As a noncontrast study was not performed, calcification of the   intraluminal thrombus accounting for the apparent enhancement is not   excluded.   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 Comparison with a noncontrast CT examination would be   helpful. Critical results were called by Dr. Mariia Wilkins. MD [facility] on   3/7/2019 at 20:35. Assessment/Plan:    Active Hospital Problems    Diagnosis Date Noted    Iron deficiency anemia due to chronic blood loss [D50.0]     Pneumonia of left lung due to infectious organism [J18.9]     Cardiomyopathy ([address] [I42.9]     NSVT (nonsustained ventricular tachycardia) (HCC) [I47.2]     Other pulmonary embolism without acute cor pulmonale (HCC) [I26.99]     Acute respiratory failure with hypoxia ([address] [J96.01]     H/O colectomy [Z90.49]     Primary malignant neoplasm of lung metastatic to other site (HCC) [C34.90]     Herpes zoster oticus [B02.21]     Hearing loss of right ear [H91.91]     Chemotherapy-induced neutropenia (HCC) [D70.1, T45.1X5A]     Mucositis [K12.30]     Diverticulitis [K57.92] 03/07/2019    Diverticulitis of large intestine with perforation and abscess without bleeding [K57.20]      PLAN:    Acute hypoxemic respiratory failure  Multifactorial, with PE, malignancy and possible pneumonia  Extubated. Doing fine       Metastatic lung cancer  Chemotherapy on hold to avoid healing  Has responded to chemo when could be administered. Acute medical conditions interfere with chemo plans  Seen by palliative care.  Requesting active treatment    Colonic diverticulitis with perforation  Had colectomy and colostomy  Stable now    Hyponatremia  Mild, secondary to lung malignancy  Monitor BMP  Stable at mid-130    Anemia  Malignancy and chronic disease combined  No active blood loss  Transfuse if indicated  No transfusion needed as of today  Hemoglobin very slowly coming up    Non-ST elevation myocardial infarction  Had cardiac cath and PCI  No more chest pain    Discussed with patient's family members  D/w   Planning to transfer to [facility] when bed available    DVT Prophylaxis: SCD  Diet: DIET CARDIAC; Dietary Nutrition Supplements: Standard High Calorie Oral Supplement  Code Status: Full Code    PT/OT Eval Status: on hold    Dispo - LTACH when bed available.  Anticipated for tomorrow    Eddie Fabian MD [phone]

## 2021-09-09 NOTE — PROGRESS NOTES
[facility]    [facility]              [phone]                 Plan :     Received IV contrast yesterday, serum creatinine was still 0.6 today, so far no signs of significant IV contrast induced nephropathy  Also got some half-normal saline, sodium level was down to 133. The patient is completely asymptomatic. We'll continue to monitor his sodium level, will also continue to monitor his kidney function because of the IV contrast use. Typically IV induced nephropathy will show up within the first 48 hours. No other recommendation from renal aspect as of today. Assessment :     Hyponatremia  Acute on chronic  Down to 123- now normal  Suspect SIADH from ca lung, pain, and nausea  Ca ca lung( adenocarcinoma) with vertebral mets  Sodium low side since 2/19    Cortisol- normal, TSH - 1.65, LFT normal  Echo 2/12- EF of 45-50%, AL mildly dilated    Acute Kidney Injury  Cr peaked to 1.6- now 1  Making urine  Likely hemodynamic changes   Now BP better  Surgery done  On 3/14/19    Hypotension  BP: (122-143)/(81-85) Pulse:  [120-124]   Normally hypertensive  Now has fever, neutropenic- getting better           pancytopenia  Ca lung  Diverticulitis with perforation- supportive treatment and plan for surgery this admission  Radiation dermatitis  Mucositis  [facility] would like to thank DEBRA Lu   for opportunity to serve this patient      Please call with questions at-   24 Hrs Answering service [phone] or  7 am- 5 pm via Perfect serve or cell phone  Dr. Tolentino Records for consult :     hyponatremia     HPI :     From consult note-     Lucy Hernandez is a 69 y.o. male presented to   the hospital on 3/7/2019 with left lower quadrant pain. He has that for a week. Was hospitalized with diverticulitis, DCed,  And pain never went away. Also low grade fever. No nausea. Mild pain in belly  Diagnosed to have fever and also diverticulitis.  Planning for surgery. 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 Has pancytopenia from chemotherapy. We are consulted for hyponatremia and related issues     Interval History:     The patient was seen and examined in his room with his wife and daughter at his bedside  He is awake, alert and conversant  He requires supplemental oxygen via nasal cannula  He states that he feels well, does not feel short winded  Hematologist/oncologist is on his case now.   He is able to use some  Most recent set of vital signs show temperature 99.5, heart rate 120 and blood pressure 122/81  Urine output for yesterday was 2900  Lab work from this morning show sodium 133, potassium 3.4, bicarbonate 27, BUN 9, creatinine 0.6, glucose 124, calcium 7.6    WBC 7.5, hemoglobin 10.8 and platelet 374      ROS:     Seen and examined with wife and daughter at bedside  On O2 via nasal cannula  Feels ok  Apparently had tachycardia and worsening of hypoxia   now found to have PE  Trace leg edema         Medication:     Scheduled Meds:   amiodarone  200 mg Oral BID    acyclovir  800 mg Oral 5x Daily    sodium chloride  250 mL Intravenous Once    sodium chloride  250 mL Intravenous Once    0.9 % sodium chloride  250 mL Intravenous Once    meropenem  1 g Intravenous Q8H    fluconazole  200 mg Intravenous Q24H    insulin lispro  0-6 Units Subcutaneous TID WC    insulin lispro  0-3 Units Subcutaneous Nightly    folic acid  1 mg Oral Daily    oyster shell calcium/vitamin D  1 tablet Oral Daily    silver sulfADIAZINE   Topical Daily    sodium chloride flush  10 mL Intravenous 2 times per day     Continuous Infusions:   heparin (porcine) 14.9 mL/hr (03/17/19 0849)    sodium chloride 50 mL/hr at 03/17/19 1137    HYDROmorphone      dextrose 100 mL/hr (03/08/19 1700)     PRN Meds:.heparin (porcine), heparin (porcine), naloxone, magic (miracle) mouthwash with nystatin, glucose, dextrose, glucagon (rDNA), dextrose, sodium chloride flush, ondansetron, acetaminophen       Vitals :     Vitals:    03/17/19 2015   BP: 122/81   Pulse: 124   Resp: 18   Temp: 98.4 °F (36.9 °C)   SpO2: 93%       I & O :       Intake/Output Summary (Last 24 hours) at 3/17/2019 2026  Last data filed at 3/17/2019 2015  Gross per 24 hour   Intake 3803 ml   Output 1985 ml   Net 1818 ml        Physical Examination :     General appearance: Anxious- no, distressed- no, in good spirits- no    Sitting up on chair, comfortable,  HEENT: Lips- normal, teeth- ok , oral mucosa- moist, has skin lesions in the face  Neck : Mass- no, appears symmetrical, JVD- not visible  Respiratory: Respiratory effort-  normal, wheeze- no, crackles - no  Cardiovascular:  Ausculation- No M/R/G, Edema no  Abdomen: visible mass- no, distention- no, scar- no, tenderness- no                            hepatosplenomegaly-  no  Musculoskeletal:  clubbing no,cyanosis- no , digital ischemia- no                           muscle strength- grossly normal , tone - grossly normal  Skin: rashes- no , ulcers- no, induration- no, tightening - no  Psychiatric:  Judgement and insight- normal           AAO X 3     LABS:     Recent Labs     03/16/19  0655 03/16/19  1651 03/17/19  0645   WBC 10.7 12.2* 7.5   HGB 8.1* 8.1* 10.8*   HCT 23.7* 24.3* 32.7*    145 118*     Recent Labs     03/15/19  0550 03/16/19  0655 03/16/19  1336 03/17/19  0645    139  --  133*   K 4.2 3.9  --  3.4*    103  --  98*   CO2 24 24  --  27   BUN 13 12  --  9   CREATININE 1.2 0.9  --  0.6*   GLUCOSE 145* 111*  --  124*   MG  --   --  1.50* 1.70* Quality 137: Melanoma: Continuity Of Care - Recall System: Patient information entered into a recall system that includes: target date for the next exam specified AND a process to follow up with patients regarding missed or unscheduled appointments Detail Level: Detailed

## 2023-05-12 NOTE — ONCOLOGY
ONCOLOGY HEMATOLOGY CARE PROGRESS NOTE      SUBJECTIVE:     Afebrile. Now on 15 LPM by NC. Clear liquid diet. No emesis. Portable CXR on 3/31 showed new left-sided infiltrate worrisome for HCAP.     ROS:   The remaining 10 point review of symptoms is unremarkable. OBJECTIVE        Physical    VITALS:  /68   Pulse 93   Temp 97.9 °F (36.6 °C) (Oral)   Resp 22   Ht 5' 9\" (1.753 m)   Wt 132 lb 4.4 oz (60 kg)   SpO2 91%   BMI 19.53 kg/m²   TEMPERATURE:  Current - Temp: 97.9 °F (36.6 °C); Max - Temp  Av.1 °F (36.7 °C)  Min: 97.6 °F (36.4 °C)  Max: 99 °F (37.2 °C)  PULSE OXIMETRY RANGE: SpO2  Av.4 %  Min: 87 %  Max: 93 %  24HR INTAKE/OUTPUT:      Intake/Output Summary (Last 24 hours) at 2019 0741  Last data filed at 3/31/2019 2203  Gross per 24 hour   Intake 480.6 ml   Output 1515 ml   Net -1034.4 ml       CONSTITUTIONAL:  awake, alert, cooperative, no apparent distress, HEENT oral pharynx , no scleral icterus, wearing high flow nasal canula   HEMATOLOGIC/LYMPHATICS:  no cervical lymphadenopathy, no supraclavicular lymphadenopathy, no axillary lymphadenopathy and no inguinal lymphadenopathy  LUNGS:  No increased work of breathing, good air exchange, clear to auscultation bilaterally, no crackles or wheezing  CARDIOVASCULAR:  , regular rate and rhythm, normal S1 and S2, no S3 or S4, and no murmur noted  ABDOMEN: Absent BS.  + colostomy (empty). Red stoma    Back: no bruising, purpura, or hematomas noted   MUSCULOSKELETAL:  There is no redness, warmth, or swelling of the joints. EXTREMETIES: BLE edema +1, non pitting   NEUROLOGIC:  Awake, alert, oriented to name, place and time. Cranial nerves II-XII are grossly intact. Motor is 5 out of 5 bilaterally.  Hip flexion 5/5 bilaterally in the bed     Data      Recent Labs     19  0805 19  1410 19  0529   WBC 9.3 9.4 7.4   HGB 7.6* 8.2* 7.2*   HCT 23.5* 25.0* 21.6*   * 159 133*   MCV diverticulitis (not immune mediated colitis) with free air in the abd   - per gen surgery , s/p Laparoscopic converted to open left Colectomy, 3/14/2019, with Dr. Ethan Chin  - ATB support (Zosyn, Maxipime, Vanco) , changed to [address] until POD 7, now on Merrem only. - CT scan done 3/25 with anastomotic leak at the [facility] [place]. - Status post Gerda procedure on 3/27/2019 with Dr. Ethan Chin. Nutrition  - Now on a clear liquid diet. Neutropenia secondary to chemo  - Granix stopped 3/11   - resolved     Thrombocytopenia secondary to chemo  -Resolved    PE  -Diagnosed on 3/16/2019 with bilateral PEs involving the right and left main pulm arteries.   -Also had thrombus in the right femoral, peroneal, greater saphenous, short saphenous veins and in the left peroneal veins.  -Plan Eliquis on discharge.   -Will need lifelong anticoagulation.  -Does not need a filter per Dr. Julienne Kemp on 3/18   -Started on a heparin drip on 3/31/2019. HCAP  - Vanco/Rolando. - Lasix 20 mg given last night. UOP not yet recorded. Wife reports large amount of urine. Will give again. DC maint IVF. - Would ask pulmonary to see. Lytes  - Replete K. Anemia, possible GI bleed on hep drip  - EGD 3/21 negative   - PPI added, now BID dosing per GI 3/21  - CT AP 3/21 - no signs of bleeding   - due for B12 shot - given 3/19/2019 - required every 9 weeks with this chemo. Lung CA, stage 4 bone mets   - Will hold chemo until surgically healed  - Cycle 3 carbo/alimta Heather Copping was due on 3/22- held due to acute issues, cont to hold. Plans to resume Lakyce Levers only outpatient.   - CT chest/abd/pelvis, 3/10/2019, showed improvement in the left hilar soft tissue and radiation changes. No obvious new disease. Not hospice appropriate at this time from an oncology standpoint.       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 May need to resume- hospitalist is speaking with vascular surgery for clarification. (Dr. Freedman Morning)   - Resuming full dose [facility] will be difficult given surgical burden and recent leak;       ONCOLOGIC DISPOSITION:  TBD.   Per surgical team.     Pedro Ruelas MD  [facility] Serve [email or web address]

## 2024-12-02 NOTE — ONCOLOGY
EMERGENCY DEPARTMENT ENCOUNTER    Pt Name: Jose Kaplan  MRN: [id number]  Birthdate 1989  Date of evaluation: 10/28/24  CHIEF COMPLAINT       Chief Complaint   Patient presents with    Medication Refill     Px arrives requesting medication refills     HISTORY OF PRESENT ILLNESS   35-year-old male presents emergency room with request for medication refills.  Patient wants refills on his Tylenol Flexeril and alprazolam.  Patient has underlying bipolar disorder and polysubstance abuse.  He does not have any acute medical complaints here today.  He is also requesting food.             REVIEW OF SYSTEMS     Review of Systems   All other systems reviewed and are negative.    PASTMEDICAL HISTORY     Past Medical History:   Diagnosis Date    Anxiety     Arthritis     Asthma     Bipolar I disorder, most recent episode (or current) depressed, unspecified 9/12/2014    Clostridium difficile infection     COPD (chronic obstructive pulmonary disease) ([facility])     Depression     Disease of blood and blood forming organ     Eczema     Fracture, metacarpal     R 4th and 5th    Gastric ulcer     Gastritis 06/13/2018    GERD (gastroesophageal reflux disease)     GI bleed     H. 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 ROS:   The remaining 10 point review of symptoms is unremarkable. OBJECTIVE        Physical    VITALS:  /75   Pulse 87   Temp 98.1 °F (36.7 °C) (Oral)   Resp 16   Ht 5' 9\" (1.753 m)   Wt 172 lb (78 kg)   SpO2 92%   BMI 25.40 kg/m²   TEMPERATURE:  Current - Temp: 98.1 °F (36.7 °C); Max - Temp  Av.3 °F (36.8 °C)  Min: 98 °F (36.7 °C)  Max: 98.6 °F (37 °C)  PULSE OXIMETRY RANGE: SpO2  Av.2 %  Min: 87 %  Max: 95 %  24HR INTAKE/OUTPUT:      Intake/Output Summary (Last 24 hours) at 3/25/2019 0047  Last data filed at 3/25/2019 0522  Gross per 24 hour   Intake 120 ml   Output 1050 ml   Net -930 ml       CONSTITUTIONAL:  awake, alert, cooperative, no apparent distress, HEENT oral pharynx , no scleral icterus, wearing high flow nasal canula   HEMATOLOGIC/LYMPHATICS:  no cervical lymphadenopathy, no supraclavicular lymphadenopathy, no axillary lymphadenopathy and no inguinal lymphadenopathy  LUNGS:  No increased work of breathing, good air exchange, clear to auscultation bilaterally, no crackles or wheezing  CARDIOVASCULAR:  , regular rate and rhythm, normal S1 and S2, no S3 or S4, and no murmur noted  ABDOMEN: normal BS,  dressing to DIAN drain site to RLQ is with slight serosanguinous drainage, NON distended, NON tender, DIAN drain in place with serosanguinous drainage , no rebound tenderness, SOFT   Back: no bruising, purpura, or hematomas noted   MUSCULOSKELETAL:  There is no redness, warmth, or swelling of the joints. EXTREMETIES: BLE edema +1, non pitting   NEUROLOGIC:  Awake, alert, oriented to name, place and time. Cranial nerves II-XII are grossly intact. Motor is 5 out of 5 bilaterally.  Hip flexion 5/5 bilaterally in the bed     Data      Recent Labs     19  0535 19  0635 19  0527   WBC 8.9 6.9 7.4   HGB 7.7* 7.9* 8.0*   HCT 22.7* 23.3* 23.7*    166 174   MCV 88.4 88.7 90.0        Recent Labs     03/23/19  0535 03/24/19  0635 03/25/19  0527   * 134* 134*   K 3.3* 3.2* 3.8   CL 94* 96* 98*   CO2 28 27 26   BUN 12 10 10   CREATININE 0.6* 0.6* 0.7*     No results for input(s): AST, ALT, ALB, BILIDIR, BILITOT, ALKPHOS in the last 72 hours.     Magnesium:    Lab Results   Component Value Date    MG 1.50 03/24/2019    MG 1.60 03/23/2019    MG 1.50 03/22/2019         Problem List  Patient Active Problem List   Diagnosis    Elevated fasting glucose    CAD (coronary artery disease)    PAD (peripheral artery disease) ([address]    Essential hypertension    Mixed hyperlipidemia    Sprain of right foot    Sprain of anterior talofibular ligament of right ankle    Closed nondisplaced fracture of fifth right metatarsal bone    Closed fracture of proximal lateral malleolus of ankle with routine healing    Right foot pain    Right ankle pain    Nondisplaced fracture of fifth right metatarsal bone with routine healing    History of pulmonary emphysema    Chronic obstructive pulmonary disease with (acute) exacerbation (HCC)    Pulmonary nodule, left    Abnormal bone scan of thoracic spine    Back pain    Visit for monitoring Plavix therapy    History of ventricular tachycardia    Gallstones    Hemoptysis    Former smoker    Elevated PSA    Adenocarcinoma of left lung ([address]    Acute diverticulitis    Pancytopenia ([address]    Diverticulitis of large intestine with perforation and abscess without bleeding    Diverticulitis    Chemotherapy-induced neutropenia (HCC)    Mucositis    Herpes zoster oticus    Hearing loss of right ear    Acute respiratory failure with hypoxia ([address]    H/O colectomy    Primary malignant neoplasm of lung metastatic to other site [facility])    NSVT (nonsustained ventricular tachycardia) (HCC)    Other pulmonary embolism without acute cor pulmonale ([address]    Cardiomyopathy ([address] Patient/Caregiver provided printed discharge information.

## (undated) DEVICE — Device

## (undated) DEVICE — TROCAR: Brand: KII SLEEVE

## (undated) DEVICE — SMARTGOWN SURGICAL GOWN, LARGE: Brand: CONVERTORS

## (undated) DEVICE — ELECTRODE,ECG,STRESS,FOAM,3PK: Brand: MEDLINE

## (undated) DEVICE — CONMED SCOPE SAVER BITE BLOCK, 20X27 MM: Brand: SCOPE SAVER

## (undated) DEVICE — SYRINGE MED 50ML LUERSLIP TIP

## (undated) DEVICE — SUTURE PERMAHAND SZ 3-0 L18IN NONABSORBABLE BLK L26MM SH C013D

## (undated) DEVICE — DRAPE THER FLUID WARMING 66X44 IN FLAT SLUSH DBL DISC ORS

## (undated) DEVICE — BLADE ES L6IN ELASTOMERIC COAT EXT DURABLE BEND UPTO 90DEG

## (undated) DEVICE — C-ARM: Brand: UNBRANDED

## (undated) DEVICE — Z DISCONTINUED USE 2429233 DRESSING FOAM W10XL10CM 5 LAYR SELF ADH VERSATILE SAFETAC

## (undated) DEVICE — FORCEPS BRONCH BIOPSY

## (undated) DEVICE — GLOVE ORANGE PI 7 1/2   MSG9075

## (undated) DEVICE — STAPLER SKIN H3.9MM WIRE DIA0.58MM CRWN 6.9MM 35 STPL FIX

## (undated) DEVICE — SPONGE LAP W18XL18IN WHT COT 4 PLY FLD STRUNG RADPQ DISP ST

## (undated) DEVICE — SYRINGE BLB 50CC IRRIG PLIABLE FNGR FLNG GRAD FLSK DISP

## (undated) DEVICE — SUTURE PROL 2-0 L48IN NONABSORBABLE BLU SH L26MM 1/2 CIR 8533H

## (undated) DEVICE — GLOVE ORANGE PI 8   MSG9080

## (undated) DEVICE — GOWN,SURGICAL,AURORA,SLEEVE: Brand: MEDLINE

## (undated) DEVICE — YANKAUER,BULB TIP,W/O VENT,RIGID,STERILE: Brand: MEDLINE

## (undated) DEVICE — PUMP SUC IRR TBNG L10FT W/ HNDPC ASSEMB STRYKEFLOW 2

## (undated) DEVICE — CANNULA,OXY,ADULT,SUPERSOFT,W/7'TUB,SC: Brand: MEDLINE INDUSTRIES, INC.

## (undated) DEVICE — ADAPTER TBNG DIA15MM SWVL FBROPT BRONCHSCP TERM 2 AXIS PEEP

## (undated) DEVICE — SYSTEM SMK EVAC LAP TBNG FILTER HSNG BENT STYL PNK SEE CLR

## (undated) DEVICE — STANDARD HYPODERMIC NEEDLE,POLYPROPYLENE HUB: Brand: MONOJECT

## (undated) DEVICE — SUTURE PERMAHAND SZ 2-0 L18IN NONABSORBABLE BLK L26MM SH C012D

## (undated) DEVICE — TUBING, SUCTION, 3/16" X 12', STRAIGHT: Brand: MEDLINE

## (undated) DEVICE — SUTURE VCRL + SZ 3-0 L18IN ABSRB UD SH 1/2 CIR TAPERCUT NDL VCP864D

## (undated) DEVICE — MINOR SET UP: Brand: MEDLINE INDUSTRIES, INC.

## (undated) DEVICE — TUBING, SUCTION, 3/16" X 6', STRAIGHT: Brand: MEDLINE

## (undated) DEVICE — BOWL MED M 16OZ PLAS CAP GRAD

## (undated) DEVICE — GLOVE,SURG,SENSICARE SLT,LF,PF,7.5: Brand: MEDLINE

## (undated) DEVICE — SUTURE VCRL + SZ 0 L27IN ABSRB VLT L26MM UR-6 5/8 CIR VCP603H

## (undated) DEVICE — COLOSTOMY/ILEOSTOMY KIT,FLEXWEAR: Brand: NEW IMAGE

## (undated) DEVICE — NEEDLE ASPIR 21GA L700MM US GUID TREAT DST END FOR EFFICIENT

## (undated) DEVICE — DRAPE,UNDERBUTTOCKS,PCH,STERILE: Brand: MEDLINE

## (undated) DEVICE — SYRINGE MED 10ML LUERLOCK TIP W/O SFTY DISP

## (undated) DEVICE — SUTURE PERMAHAND SZ 2-0 L30IN NONABSORBABLE BLK SH L26MM C016D

## (undated) DEVICE — GLOVE SURG SZ 6 THK91MIL LTX FREE SYN POLYISOPRENE ANTI

## (undated) DEVICE — SINGLE USE SUCTION VALVE MAJ-209: Brand: SINGLE USE SUCTION VALVE (STERILE)

## (undated) DEVICE — LINER,SOFT,SUCTION CANISTER,1500CC: Brand: MEDLINE

## (undated) DEVICE — SHEET,DRAPE,53X77,STERILE: Brand: MEDLINE

## (undated) DEVICE — TIP SCIS L5MM DBL ACT CRV DISP METZ

## (undated) DEVICE — DRAPE,T,LAPARO,TRANS,STERILE: Brand: MEDLINE

## (undated) DEVICE — GAUZE,SPONGE,2"X2",8PLY,STERILE,LF,2'S: Brand: MEDLINE

## (undated) DEVICE — SUTURE MCRYL + SZ 4-0 L18IN ABSRB UD L19MM PS-2 3/8 CIR MCP496G

## (undated) DEVICE — SYRINGE MED 10ML SLIP TIP BLNT FILL AND LUERLOCK DISP

## (undated) DEVICE — 3M™ TEGADERM™ TRANSPARENT FILM DRESSING FRAME STYLE, 1626W, 4 IN X 4-3/4 IN (10 CM X 12 CM), 50/CT 4CT/CASE: Brand: 3M™ TEGADERM™

## (undated) DEVICE — SUTURE PROL 3-0 L36IN NONABSORBABLE BLU L26MM SH 1/2 CIR P8522

## (undated) DEVICE — SHEET,DRAPE,40X58,STERILE: Brand: MEDLINE

## (undated) DEVICE — ENDO CARRY-ON PROCEDURE KIT INCLUDES SUCTION TUBING, LUBRICANT, GAUZE, BIOHAZARD STICKER, TRANSPORT PAD AND INTERCEPT BEDSIDE KIT.: Brand: ENDO CARRY-ON PROCEDURE KIT

## (undated) DEVICE — SOLUTION IV IRRIG POUR BRL 0.9% SODIUM CHL 2F7124

## (undated) DEVICE — PACK PROCEDURE SURG GYN LAP CDS

## (undated) DEVICE — TRAP,MUCUS SPECIMEN, 80CC: Brand: MEDLINE

## (undated) DEVICE — SUTURE PROL SZ 2-0 L30IN NONABSORBABLE BLU L26MM SH 1/2 CIR 8833H

## (undated) DEVICE — GAUZE,SPONGE,4"X4",8PLY,STRL,LF,10/TRAY: Brand: MEDLINE

## (undated) DEVICE — ELECTRODE,RADIOTRANSLUCENT,FOAM,3PK: Brand: MEDLINE

## (undated) DEVICE — STAPLER INT L28CM DIA29MM CLS STPL H10-2.5MM OPN LEG L5.5MM

## (undated) DEVICE — TOTAL TRAY, DB, 100% SILI FOLEY, 16FR 10: Brand: MEDLINE

## (undated) DEVICE — LEGGINGS, PAIR, 31X48, STERILE: Brand: MEDLINE

## (undated) DEVICE — SOLUTION IV 1000ML LAC RINGERS PH 6.5 INJ USP VIAFLX PLAS

## (undated) DEVICE — Z DISCONTINUED USE 2716237 RELOAD STPL L40MM H1.5-3.5MM WIRE DIA0.2MM REG TISS BLU CRV

## (undated) DEVICE — DRESSING FOAM W10XL30CM ANTIMIC SELF ADH SAFETAC TECHNOLOGY

## (undated) DEVICE — TOWEL,OR,DSP,ST,BLUE,STD,6/PK,12PK/CS: Brand: MEDLINE

## (undated) DEVICE — DRESSING TRNSPAR W2XL2.75IN FLM SHT SEMIPERMEABLE WIND

## (undated) DEVICE — LENS EYEGLASS LTWT REPL DISP SAFEVIEW

## (undated) DEVICE — SEALER TISS L20CM DIA13MM ADV BPLR L CRV JAW OPN APPRCH

## (undated) DEVICE — GOWN,REINF,POLY,AURORA,XLNG/XXL,STRL: Brand: MEDLINE

## (undated) DEVICE — DRESSING FOAM W10XL20CM ANTIMIC SELF ADH SAFETAC TECHNOLOGY

## (undated) DEVICE — SINGLE USE BIOPSY VALVE MAJ-210: Brand: SINGLE USE BIOPSY VALVE (STERILE)

## (undated) DEVICE — STAPLER INT L60MM REG TISS BLU B FRM 8 FIRING 2 ROW AUTO

## (undated) DEVICE — GLOVE,SURG,SENSICARE SLT,LF,PF,7: Brand: MEDLINE

## (undated) DEVICE — DRAIN SURG 15FR L3/16IN DIA4.7MM SIL CHN RND HUBLESS FULL

## (undated) DEVICE — Z DISCONTINUED USE 2276105 GOWN PROTCT UNIV CHST W28IN L49IN SL 24IN BLU SPUNBOND FLM

## (undated) DEVICE — CHLORAPREP 26ML ORANGE

## (undated) DEVICE — GOWN SIRUS NONREIN XL W/TWL: Brand: MEDLINE INDUSTRIES, INC.

## (undated) DEVICE — CONMED DISPOSABLE MICROBIOLOGY BRUSH, Ø1 MM, 1.8 MM WORKING DIAMETER, 110 CM LENGTH: Brand: CONMED

## (undated) DEVICE — Z DISCONTINUED USE 2716239 STAPLER INT STPL 51MM CUT LN L40MM STD TISS CRV CUT CR40B

## (undated) DEVICE — OINTMENT ANESTHETIC LIDOCAINE HCL 2% 10 ML

## (undated) DEVICE — DRAIN SURG 19FR 100% SIL RND END PERF

## (undated) DEVICE — NEEDLE ASPIR 19GA HISTOLOGY FLX VIZISHOT 2

## (undated) DEVICE — PENCIL ES CRD L10FT HND SWCHING ROCK SWCH W/ EDGE COAT BLDE

## (undated) DEVICE — FRAME EYEWR PROTCT ASST CLR DISP SAFEVIEW

## (undated) DEVICE — STAPLER INT RELD REG 60 MM VERY THCK TISS 2 ROW 4FIRING PROX

## (undated) DEVICE — 3M™ TEGADERM™ TRANSPARENT FILM DRESSING FRAME STYLE WITH BORDER, 1616, 4 IN X 4-3/4 IN (10 CM X 12 CM), 50/CT 4CT/CASE: Brand: 3M™ TEGADERM™

## (undated) DEVICE — SUTURE PDS II SZ 0 L60IN ABSRB VLT L48MM CTX 1/2 CIR Z990G

## (undated) DEVICE — DRESSING FOAM W4XL10IN POLYUR SAFETAC MULTILAYERED ABSRB PD

## (undated) DEVICE — DECANTER: Brand: UNBRANDED